# Patient Record
Sex: MALE | Race: WHITE | Employment: OTHER | RURAL
[De-identification: names, ages, dates, MRNs, and addresses within clinical notes are randomized per-mention and may not be internally consistent; named-entity substitution may affect disease eponyms.]

---

## 2017-01-03 ENCOUNTER — NURSE ONLY (OUTPATIENT)
Dept: FAMILY MEDICINE CLINIC | Age: 67
End: 2017-01-03
Payer: MEDICARE

## 2017-01-03 DIAGNOSIS — I48.91 ATRIAL FIBRILLATION, UNSPECIFIED TYPE (HCC): Primary | ICD-10-CM

## 2017-01-03 LAB
INTERNATIONAL NORMALIZATION RATIO, POC: 2.7
PROTHROMBIN TIME, POC: NORMAL

## 2017-01-03 PROCEDURE — 85610 PROTHROMBIN TIME: CPT | Performed by: FAMILY MEDICINE

## 2017-01-09 RX ORDER — FUROSEMIDE 40 MG/1
TABLET ORAL
Qty: 30 TABLET | Refills: 5 | Status: SHIPPED | OUTPATIENT
Start: 2017-01-09 | End: 2017-06-19 | Stop reason: SDUPTHER

## 2017-01-09 RX ORDER — FERROUS SULFATE 325(65) MG
TABLET ORAL
Qty: 60 TABLET | Refills: 5 | Status: SHIPPED | OUTPATIENT
Start: 2017-01-09 | End: 2017-06-19 | Stop reason: SDUPTHER

## 2017-01-12 ENCOUNTER — OFFICE VISIT (OUTPATIENT)
Dept: CARDIOLOGY | Facility: CLINIC | Age: 67
End: 2017-01-12

## 2017-01-12 VITALS
SYSTOLIC BLOOD PRESSURE: 120 MMHG | BODY MASS INDEX: 27.57 KG/M2 | DIASTOLIC BLOOD PRESSURE: 60 MMHG | HEART RATE: 69 BPM | WEIGHT: 208 LBS | HEIGHT: 73 IN

## 2017-01-12 DIAGNOSIS — I48.0 PAROXYSMAL ATRIAL FIBRILLATION (HCC): ICD-10-CM

## 2017-01-12 DIAGNOSIS — I25.10 CORONARY ARTERY DISEASE INVOLVING NATIVE CORONARY ARTERY OF NATIVE HEART WITHOUT ANGINA PECTORIS: Primary | ICD-10-CM

## 2017-01-12 DIAGNOSIS — I10 ESSENTIAL HYPERTENSION: ICD-10-CM

## 2017-01-12 PROCEDURE — 99213 OFFICE O/P EST LOW 20 MIN: CPT | Performed by: INTERNAL MEDICINE

## 2017-01-12 RX ORDER — FUROSEMIDE 40 MG/1
40 TABLET ORAL DAILY
COMMUNITY
End: 2019-07-19 | Stop reason: DRUGHIGH

## 2017-01-12 RX ORDER — WARFARIN SODIUM 1 MG/1
1 TABLET ORAL
COMMUNITY
End: 2018-01-25

## 2017-01-12 RX ORDER — FERROUS SULFATE 325(65) MG
325 TABLET ORAL 2 TIMES DAILY
COMMUNITY
End: 2023-01-01 | Stop reason: SDUPTHER

## 2017-01-12 RX ORDER — GLIPIZIDE 10 MG/1
10 TABLET ORAL 3 TIMES DAILY
COMMUNITY
End: 2022-01-01

## 2017-01-12 RX ORDER — METOPROLOL SUCCINATE 25 MG/1
12.5 TABLET, EXTENDED RELEASE ORAL DAILY
Status: ON HOLD | COMMUNITY

## 2017-01-12 RX ORDER — DIGOXIN 250 MCG
250 TABLET ORAL
COMMUNITY
End: 2019-07-19 | Stop reason: DRUGHIGH

## 2017-01-12 RX ORDER — LOVASTATIN 20 MG/1
20 TABLET ORAL NIGHTLY
COMMUNITY
End: 2022-01-01

## 2017-01-12 RX ORDER — AMLODIPINE BESYLATE AND BENAZEPRIL HYDROCHLORIDE 5; 20 MG/1; MG/1
1 CAPSULE ORAL 2 TIMES DAILY
COMMUNITY
End: 2020-08-13

## 2017-01-12 NOTE — PROGRESS NOTES
Subjective:     Encounter Date:01/12/2017      Patient ID: Swapnil Lawson is a 66 y.o. male.    Chief Complaint: Routine follow-up  HPI  This is a 66-year-old white male patient with known coronary artery disease who has undergone 2 separate coronary artery bypass surgeries, the first in 1993 and a redo in 2013.  The patient has no chest pain or shortness of breath.  He has no exertional chest arm neck jaw shoulder or back discomfort.  He has no orthopnea PND or lower extremity edema.  He has no dizziness palpitations or syncope.  The patient remains reasonably active without exertional limitation.  He is a nonsmoker.  He reports compliance with his medications.  There has been no changes to his past medical history, family history or social history since his last visit.  The following portions of the patient's history were reviewed and updated as appropriate: allergies, current medications, past family history, past medical history, past social history, past surgical history and problem  Review of Systems   Constitution: Negative for chills, diaphoresis, fever, malaise/fatigue, weight gain and weight loss.   HENT: Negative for ear discharge, hearing loss, hoarse voice and nosebleeds.    Eyes: Negative for discharge, double vision, pain and photophobia.   Cardiovascular: Negative for chest pain, claudication, cyanosis, dyspnea on exertion, irregular heartbeat, leg swelling, near-syncope, orthopnea, palpitations, paroxysmal nocturnal dyspnea and syncope.   Respiratory: Negative for cough, hemoptysis, sputum production and wheezing.    Endocrine: Negative for cold intolerance, heat intolerance, polydipsia, polyphagia and polyuria.   Hematologic/Lymphatic: Negative for adenopathy and bleeding problem. Does not bruise/bleed easily.   Skin: Negative for color change, flushing, itching and rash.   Musculoskeletal: Negative for muscle cramps, muscle weakness, myalgias and stiffness.   Gastrointestinal: Negative for  abdominal pain, diarrhea, hematemesis, hematochezia, nausea and vomiting.   Genitourinary: Negative for dysuria, frequency and nocturia.   Neurological: Negative for focal weakness, loss of balance, numbness, paresthesias and seizures.   Psychiatric/Behavioral: Negative for altered mental status, hallucinations and suicidal ideas.   Allergic/Immunologic: Negative for HIV exposure, hives and persistent infections.       Current Outpatient Prescriptions:   •  amLODIPine-benazepril (LOTREL 5-20) 5-20 MG per capsule, Take 1 capsule by mouth 2 (Two) Times a Day., Disp: , Rfl:   •  aspirin 81 MG tablet, Take 81 mg by mouth Daily., Disp: , Rfl:   •  digoxin (LANOXIN) 250 MCG tablet, Take 250 mcg by mouth Daily., Disp: , Rfl:   •  ferrous sulfate 325 (65 FE) MG tablet, Take 325 mg by mouth 2 (Two) Times a Day., Disp: , Rfl:   •  furosemide (LASIX) 40 MG tablet, Take 40 mg by mouth Daily., Disp: , Rfl:   •  glipiZIDE (GLUCOTROL) 10 MG tablet, Take 10 mg by mouth 2 (Two) Times a Day Before Meals., Disp: , Rfl:   •  lovastatin (MEVACOR) 20 MG tablet, Take 20 mg by mouth Every Night., Disp: , Rfl:   •  metFORMIN (GLUCOPHAGE) 1000 MG tablet, Take 1,000 mg by mouth 2 (Two) Times a Day With Meals., Disp: , Rfl:   •  metoprolol succinate XL (TOPROL-XL) 25 MG 24 hr tablet, Take 25 mg by mouth Daily., Disp: , Rfl:   •  warfarin (COUMADIN) 1 MG tablet, Take 1 mg by mouth Daily. As directed, Disp: , Rfl:      Objective:     Physical Exam   Constitutional: He is oriented to person, place, and time. He appears well-developed and well-nourished.   HENT:   Head: Normocephalic and atraumatic.   Eyes: Conjunctivae are normal. No scleral icterus.   Cardiovascular: Normal rate, regular rhythm, normal heart sounds and intact distal pulses.  Exam reveals no gallop and no friction rub.    No murmur heard.  Pulmonary/Chest: Effort normal and breath sounds normal. No respiratory distress.   Abdominal: Soft. Bowel sounds are normal. There is no  "tenderness.   Musculoskeletal: He exhibits no edema.   Neurological: He is alert and oriented to person, place, and time.   Skin: Skin is warm and dry.   Psychiatric: He has a normal mood and affect. His behavior is normal.     Blood pressure 120/60, pulse 69, height 73\" (185.4 cm), weight 208 lb (94.3 kg).   Lab Review:       Assessment:         1. Coronary artery disease involving native coronary artery of native heart without angina pectoris  Stable and angina free    2. Essential hypertension  Excellent blood pressure control    3. Paroxysmal atrial fibrillation  He appears to be maintaining normal sinus rhythm.  He is therapeutically anticoagulated with Coumadin.    Procedures     Plan:       No changes in his medication profile are indicated at this time.  No additional cardiovascular testing is indicated.  He will follow-up in our office in one year.         "

## 2017-02-03 ENCOUNTER — HOSPITAL ENCOUNTER (OUTPATIENT)
Dept: OTHER | Age: 67
Discharge: HOME | End: 2017-02-03
Attending: NURSE PRACTITIONER | Admitting: NURSE PRACTITIONER

## 2017-02-03 ENCOUNTER — OFFICE VISIT (OUTPATIENT)
Dept: FAMILY MEDICINE CLINIC | Age: 67
End: 2017-02-03
Payer: MEDICARE

## 2017-02-03 VITALS
DIASTOLIC BLOOD PRESSURE: 73 MMHG | OXYGEN SATURATION: 96 % | HEART RATE: 60 BPM | HEIGHT: 73 IN | BODY MASS INDEX: 27.04 KG/M2 | WEIGHT: 204 LBS | SYSTOLIC BLOOD PRESSURE: 137 MMHG

## 2017-02-03 DIAGNOSIS — E78.00 HYPERCHOLESTEROLEMIA: ICD-10-CM

## 2017-02-03 DIAGNOSIS — E11.9 TYPE 2 DIABETES MELLITUS WITHOUT COMPLICATION, WITHOUT LONG-TERM CURRENT USE OF INSULIN (HCC): ICD-10-CM

## 2017-02-03 DIAGNOSIS — I25.119 ATHEROSCLEROSIS OF NATIVE CORONARY ARTERY OF NATIVE HEART WITH ANGINA PECTORIS (HCC): ICD-10-CM

## 2017-02-03 DIAGNOSIS — I10 ESSENTIAL HYPERTENSION, BENIGN: ICD-10-CM

## 2017-02-03 DIAGNOSIS — I48.91 ATRIAL FIBRILLATION, UNSPECIFIED TYPE (HCC): Primary | ICD-10-CM

## 2017-02-03 LAB
HBA1C MFR BLD: 7.4 %
INTERNATIONAL NORMALIZATION RATIO, POC: 2.5
PROTHROMBIN TIME, POC: NORMAL

## 2017-02-03 PROCEDURE — 83036 HEMOGLOBIN GLYCOSYLATED A1C: CPT | Performed by: NURSE PRACTITIONER

## 2017-02-03 PROCEDURE — 99214 OFFICE O/P EST MOD 30 MIN: CPT | Performed by: NURSE PRACTITIONER

## 2017-02-03 PROCEDURE — 85610 PROTHROMBIN TIME: CPT | Performed by: NURSE PRACTITIONER

## 2017-02-03 RX ORDER — AMLODIPINE BESYLATE AND BENAZEPRIL HYDROCHLORIDE 5; 20 MG/1; MG/1
1 CAPSULE ORAL 2 TIMES DAILY
Qty: 60 CAPSULE | Refills: 5 | Status: SHIPPED | OUTPATIENT
Start: 2017-02-03 | End: 2017-08-04 | Stop reason: SDUPTHER

## 2017-02-03 RX ORDER — METOPROLOL SUCCINATE 25 MG/1
TABLET, EXTENDED RELEASE ORAL
Qty: 30 TABLET | Refills: 5 | Status: SHIPPED | OUTPATIENT
Start: 2017-02-03 | End: 2017-08-04 | Stop reason: SDUPTHER

## 2017-02-03 RX ORDER — LOVASTATIN 20 MG/1
20 TABLET ORAL DAILY
Qty: 30 TABLET | Refills: 5 | Status: SHIPPED | OUTPATIENT
Start: 2017-02-03 | End: 2017-08-04 | Stop reason: SDUPTHER

## 2017-02-03 ASSESSMENT — ENCOUNTER SYMPTOMS
SHORTNESS OF BREATH: 0
BLOOD IN STOOL: 0
ALLERGIC/IMMUNOLOGIC NEGATIVE: 1
GASTROINTESTINAL NEGATIVE: 1
EYES NEGATIVE: 1
RESPIRATORY NEGATIVE: 1

## 2017-02-04 LAB
CHOLESTEROL, TOTAL: 141 MG/DL
CHOLESTEROL/HDL RATIO: 2.4
CREATININE, URINE: 88.8
HDLC SERPL-MCNC: 28 MG/DL (ref 35–70)
LDL CHOLESTEROL CALCULATED: 68 MG/DL (ref 0–160)
MICROALBUMIN/CREAT 24H UR: 51.1 MG/G{CREAT}
MICROALBUMIN/CREAT UR-RTO: 57.5
TRIGL SERPL-MCNC: 225 MG/DL
VLDLC SERPL CALC-MCNC: 45 MG/DL

## 2017-02-06 DIAGNOSIS — E11.9 TYPE 2 DIABETES MELLITUS WITHOUT COMPLICATION, WITHOUT LONG-TERM CURRENT USE OF INSULIN (HCC): ICD-10-CM

## 2017-02-06 RX ORDER — GLIPIZIDE 10 MG/1
TABLET ORAL
Qty: 90 TABLET | Refills: 3 | Status: SHIPPED | OUTPATIENT
Start: 2017-02-06 | End: 2017-06-04 | Stop reason: SDUPTHER

## 2017-02-14 DIAGNOSIS — E11.9 TYPE 2 DIABETES MELLITUS WITHOUT COMPLICATION, WITHOUT LONG-TERM CURRENT USE OF INSULIN (HCC): ICD-10-CM

## 2017-02-14 DIAGNOSIS — E78.00 HYPERCHOLESTEROLEMIA: ICD-10-CM

## 2017-02-15 DIAGNOSIS — E78.00 HYPERCHOLESTEROLEMIA: ICD-10-CM

## 2017-02-15 DIAGNOSIS — I10 ESSENTIAL HYPERTENSION, BENIGN: ICD-10-CM

## 2017-03-03 ENCOUNTER — OFFICE VISIT (OUTPATIENT)
Dept: FAMILY MEDICINE CLINIC | Age: 67
End: 2017-03-03
Payer: MEDICARE

## 2017-03-03 VITALS
WEIGHT: 199.2 LBS | OXYGEN SATURATION: 94 % | SYSTOLIC BLOOD PRESSURE: 116 MMHG | HEIGHT: 73 IN | BODY MASS INDEX: 26.4 KG/M2 | DIASTOLIC BLOOD PRESSURE: 65 MMHG | HEART RATE: 66 BPM | RESPIRATION RATE: 18 BRPM

## 2017-03-03 DIAGNOSIS — I48.91 ATRIAL FIBRILLATION, UNSPECIFIED TYPE (HCC): Primary | ICD-10-CM

## 2017-03-03 LAB
INTERNATIONAL NORMALIZATION RATIO, POC: 2.3
PROTHROMBIN TIME, POC: NORMAL

## 2017-03-03 PROCEDURE — 85610 PROTHROMBIN TIME: CPT | Performed by: NURSE PRACTITIONER

## 2017-03-03 PROCEDURE — 99213 OFFICE O/P EST LOW 20 MIN: CPT | Performed by: NURSE PRACTITIONER

## 2017-03-03 ASSESSMENT — ENCOUNTER SYMPTOMS
GASTROINTESTINAL NEGATIVE: 1
EYES NEGATIVE: 1
RESPIRATORY NEGATIVE: 1
ALLERGIC/IMMUNOLOGIC NEGATIVE: 1

## 2017-03-06 RX ORDER — NITROGLYCERIN 0.4 MG/1
TABLET SUBLINGUAL
Qty: 25 TABLET | Refills: 5 | Status: SHIPPED | OUTPATIENT
Start: 2017-03-06

## 2017-04-03 ENCOUNTER — OFFICE VISIT (OUTPATIENT)
Dept: FAMILY MEDICINE CLINIC | Age: 67
End: 2017-04-03
Payer: MEDICARE

## 2017-04-03 VITALS
DIASTOLIC BLOOD PRESSURE: 70 MMHG | OXYGEN SATURATION: 96 % | WEIGHT: 203 LBS | BODY MASS INDEX: 26.9 KG/M2 | SYSTOLIC BLOOD PRESSURE: 121 MMHG | HEIGHT: 73 IN | RESPIRATION RATE: 18 BRPM | HEART RATE: 60 BPM

## 2017-04-03 DIAGNOSIS — I48.91 ATRIAL FIBRILLATION, UNSPECIFIED TYPE (HCC): Primary | ICD-10-CM

## 2017-04-03 LAB
INTERNATIONAL NORMALIZATION RATIO, POC: 2.4
PROTHROMBIN TIME, POC: NORMAL

## 2017-04-03 PROCEDURE — 99213 OFFICE O/P EST LOW 20 MIN: CPT | Performed by: NURSE PRACTITIONER

## 2017-04-03 PROCEDURE — 85610 PROTHROMBIN TIME: CPT | Performed by: NURSE PRACTITIONER

## 2017-04-03 ASSESSMENT — ENCOUNTER SYMPTOMS
GASTROINTESTINAL NEGATIVE: 1
ALLERGIC/IMMUNOLOGIC NEGATIVE: 1
EYES NEGATIVE: 1
RESPIRATORY NEGATIVE: 1
SHORTNESS OF BREATH: 0

## 2017-04-06 DIAGNOSIS — I48.20 CHRONIC ATRIAL FIBRILLATION (HCC): ICD-10-CM

## 2017-04-06 DIAGNOSIS — I25.119 ATHEROSCLEROSIS OF NATIVE CORONARY ARTERY OF NATIVE HEART WITH ANGINA PECTORIS (HCC): ICD-10-CM

## 2017-04-06 RX ORDER — DULOXETINE HYDROCHLORIDE 30 MG/1
CAPSULE, DELAYED RELEASE ORAL
Qty: 30 TABLET | Refills: 5 | Status: SHIPPED | OUTPATIENT
Start: 2017-04-06 | End: 2017-10-04 | Stop reason: SDUPTHER

## 2017-05-04 ENCOUNTER — OFFICE VISIT (OUTPATIENT)
Dept: FAMILY MEDICINE CLINIC | Age: 67
End: 2017-05-04
Payer: MEDICARE

## 2017-05-04 ENCOUNTER — HOSPITAL ENCOUNTER (OUTPATIENT)
Dept: OTHER | Age: 67
Discharge: HOME | End: 2017-05-04
Attending: NURSE PRACTITIONER | Admitting: NURSE PRACTITIONER

## 2017-05-04 VITALS
BODY MASS INDEX: 26.51 KG/M2 | RESPIRATION RATE: 18 BRPM | DIASTOLIC BLOOD PRESSURE: 63 MMHG | OXYGEN SATURATION: 96 % | HEART RATE: 62 BPM | WEIGHT: 200 LBS | SYSTOLIC BLOOD PRESSURE: 109 MMHG | HEIGHT: 73 IN

## 2017-05-04 DIAGNOSIS — I48.91 ATRIAL FIBRILLATION, UNSPECIFIED TYPE (HCC): Primary | ICD-10-CM

## 2017-05-04 DIAGNOSIS — I10 ESSENTIAL HYPERTENSION, BENIGN: ICD-10-CM

## 2017-05-04 DIAGNOSIS — M10.9 ACUTE GOUT, UNSPECIFIED CAUSE, UNSPECIFIED SITE: ICD-10-CM

## 2017-05-04 DIAGNOSIS — E11.9 TYPE 2 DIABETES MELLITUS WITHOUT COMPLICATION, WITHOUT LONG-TERM CURRENT USE OF INSULIN (HCC): ICD-10-CM

## 2017-05-04 DIAGNOSIS — E78.00 HYPERCHOLESTEROLEMIA: ICD-10-CM

## 2017-05-04 LAB
GLUCOSE BLD-MCNC: 116 MG/DL
HBA1C MFR BLD: 7.1 %
INTERNATIONAL NORMALIZATION RATIO, POC: 3.3
PROTHROMBIN TIME, POC: ABNORMAL

## 2017-05-04 PROCEDURE — 99214 OFFICE O/P EST MOD 30 MIN: CPT | Performed by: NURSE PRACTITIONER

## 2017-05-04 PROCEDURE — 83036 HEMOGLOBIN GLYCOSYLATED A1C: CPT | Performed by: NURSE PRACTITIONER

## 2017-05-04 PROCEDURE — 85610 PROTHROMBIN TIME: CPT | Performed by: NURSE PRACTITIONER

## 2017-05-04 PROCEDURE — 96372 THER/PROPH/DIAG INJ SC/IM: CPT | Performed by: NURSE PRACTITIONER

## 2017-05-04 PROCEDURE — 82962 GLUCOSE BLOOD TEST: CPT | Performed by: NURSE PRACTITIONER

## 2017-05-04 RX ORDER — METHYLPREDNISOLONE ACETATE 80 MG/ML
80 INJECTION, SUSPENSION INTRA-ARTICULAR; INTRALESIONAL; INTRAMUSCULAR; SOFT TISSUE ONCE
Status: COMPLETED | OUTPATIENT
Start: 2017-05-04 | End: 2017-05-04

## 2017-05-04 RX ORDER — COLCHICINE 0.6 MG/1
TABLET ORAL
Qty: 30 TABLET | Refills: 3 | Status: SHIPPED | OUTPATIENT
Start: 2017-05-04 | End: 2017-08-04 | Stop reason: ALTCHOICE

## 2017-05-04 RX ADMIN — METHYLPREDNISOLONE ACETATE 80 MG: 80 INJECTION, SUSPENSION INTRA-ARTICULAR; INTRALESIONAL; INTRAMUSCULAR; SOFT TISSUE at 09:54

## 2017-05-04 ASSESSMENT — ENCOUNTER SYMPTOMS
SHORTNESS OF BREATH: 0
ALLERGIC/IMMUNOLOGIC NEGATIVE: 1
RESPIRATORY NEGATIVE: 1
GASTROINTESTINAL NEGATIVE: 1
EYES NEGATIVE: 1

## 2017-05-09 LAB
CHOLESTEROL, TOTAL: 128 MG/DL
CHOLESTEROL/HDL RATIO: 2.1
HDLC SERPL-MCNC: 23 MG/DL (ref 35–70)
LDL CHOLESTEROL CALCULATED: 48 MG/DL (ref 0–160)
TRIGL SERPL-MCNC: 285 MG/DL
VLDLC SERPL CALC-MCNC: 57 MG/DL

## 2017-05-17 ENCOUNTER — HOSPITAL ENCOUNTER (OUTPATIENT)
Dept: OTHER | Age: 67
Discharge: HOME | End: 2017-05-17
Attending: FAMILY MEDICINE | Admitting: FAMILY MEDICINE

## 2017-05-17 ENCOUNTER — OFFICE VISIT (OUTPATIENT)
Dept: FAMILY MEDICINE CLINIC | Age: 67
End: 2017-05-17
Payer: MEDICARE

## 2017-05-17 VITALS — DIASTOLIC BLOOD PRESSURE: 70 MMHG | SYSTOLIC BLOOD PRESSURE: 115 MMHG | RESPIRATION RATE: 18 BRPM

## 2017-05-17 DIAGNOSIS — I48.91 ATRIAL FIBRILLATION, UNSPECIFIED TYPE (HCC): ICD-10-CM

## 2017-05-17 DIAGNOSIS — I82.4Z3 DEEP VEIN THROMBOSIS (DVT) OF DISTAL VEIN OF BOTH LOWER EXTREMITIES, UNSPECIFIED CHRONICITY (HCC): ICD-10-CM

## 2017-05-17 DIAGNOSIS — M1A.00X0 CHRONIC GOUTY ARTHROPATHY: Primary | ICD-10-CM

## 2017-05-17 DIAGNOSIS — M1A.00X0 CHRONIC GOUTY ARTHROPATHY: ICD-10-CM

## 2017-05-17 LAB
INTERNATIONAL NORMALIZATION RATIO, POC: 2.5
PROTHROMBIN TIME, POC: NORMAL

## 2017-05-17 PROCEDURE — 1123F ACP DISCUSS/DSCN MKR DOCD: CPT | Performed by: FAMILY MEDICINE

## 2017-05-17 PROCEDURE — 4040F PNEUMOC VAC/ADMIN/RCVD: CPT | Performed by: FAMILY MEDICINE

## 2017-05-17 PROCEDURE — 1036F TOBACCO NON-USER: CPT | Performed by: FAMILY MEDICINE

## 2017-05-17 PROCEDURE — G8419 CALC BMI OUT NRM PARAM NOF/U: HCPCS | Performed by: FAMILY MEDICINE

## 2017-05-17 PROCEDURE — G8427 DOCREV CUR MEDS BY ELIG CLIN: HCPCS | Performed by: FAMILY MEDICINE

## 2017-05-17 PROCEDURE — 85610 PROTHROMBIN TIME: CPT | Performed by: FAMILY MEDICINE

## 2017-05-17 PROCEDURE — 3017F COLORECTAL CA SCREEN DOC REV: CPT | Performed by: FAMILY MEDICINE

## 2017-05-17 PROCEDURE — G8598 ASA/ANTIPLAT THER USED: HCPCS | Performed by: FAMILY MEDICINE

## 2017-05-17 PROCEDURE — 99213 OFFICE O/P EST LOW 20 MIN: CPT | Performed by: FAMILY MEDICINE

## 2017-05-17 RX ORDER — ALLOPURINOL 100 MG/1
100 TABLET ORAL DAILY
Qty: 30 TABLET | Refills: 5 | Status: SHIPPED | OUTPATIENT
Start: 2017-05-17 | End: 2017-12-04 | Stop reason: SDUPTHER

## 2017-05-17 ASSESSMENT — ENCOUNTER SYMPTOMS
SHORTNESS OF BREATH: 0
GASTROINTESTINAL NEGATIVE: 1
ALLERGIC/IMMUNOLOGIC NEGATIVE: 1
RESPIRATORY NEGATIVE: 1
EYES NEGATIVE: 1

## 2017-05-23 ENCOUNTER — TELEPHONE (OUTPATIENT)
Dept: FAMILY MEDICINE CLINIC | Age: 67
End: 2017-05-23

## 2017-05-23 ENCOUNTER — HOSPITAL ENCOUNTER (OUTPATIENT)
Dept: OTHER | Age: 67
Discharge: HOME | End: 2017-05-23
Attending: NURSE PRACTITIONER | Admitting: NURSE PRACTITIONER

## 2017-05-23 DIAGNOSIS — I48.91 ATRIAL FIBRILLATION, UNSPECIFIED TYPE (HCC): Primary | ICD-10-CM

## 2017-05-23 DIAGNOSIS — I48.91 ATRIAL FIBRILLATION, UNSPECIFIED TYPE (HCC): ICD-10-CM

## 2017-05-23 LAB — DIGOXIN LEVEL: 2.1 NG/ML (ref 0.8–2)

## 2017-06-01 ENCOUNTER — OFFICE VISIT (OUTPATIENT)
Dept: FAMILY MEDICINE CLINIC | Age: 67
End: 2017-06-01
Payer: MEDICARE

## 2017-06-01 ENCOUNTER — HOSPITAL ENCOUNTER (OUTPATIENT)
Dept: OTHER | Age: 67
Discharge: HOME | End: 2017-06-01
Attending: NURSE PRACTITIONER | Admitting: NURSE PRACTITIONER

## 2017-06-01 VITALS
DIASTOLIC BLOOD PRESSURE: 60 MMHG | HEART RATE: 62 BPM | OXYGEN SATURATION: 98 % | RESPIRATION RATE: 18 BRPM | SYSTOLIC BLOOD PRESSURE: 99 MMHG

## 2017-06-01 DIAGNOSIS — E11.9 TYPE 2 DIABETES MELLITUS WITHOUT COMPLICATION, WITHOUT LONG-TERM CURRENT USE OF INSULIN (HCC): ICD-10-CM

## 2017-06-01 DIAGNOSIS — R79.89 ELEVATED SERUM CREATININE: Primary | ICD-10-CM

## 2017-06-01 DIAGNOSIS — E87.5 HYPERKALEMIA: ICD-10-CM

## 2017-06-01 DIAGNOSIS — R79.89 ELEVATED SERUM CREATININE: ICD-10-CM

## 2017-06-01 PROCEDURE — 99214 OFFICE O/P EST MOD 30 MIN: CPT | Performed by: NURSE PRACTITIONER

## 2017-06-01 RX ORDER — COLCHICINE 0.6 MG/1
TABLET ORAL
Qty: 30 TABLET | Refills: 3 | Status: CANCELLED | OUTPATIENT
Start: 2017-06-01

## 2017-06-01 ASSESSMENT — ENCOUNTER SYMPTOMS
ALLERGIC/IMMUNOLOGIC NEGATIVE: 1
RESPIRATORY NEGATIVE: 1
GASTROINTESTINAL NEGATIVE: 1
EYES NEGATIVE: 1

## 2017-06-19 ENCOUNTER — OFFICE VISIT (OUTPATIENT)
Dept: FAMILY MEDICINE CLINIC | Age: 67
End: 2017-06-19
Payer: MEDICARE

## 2017-06-19 ENCOUNTER — HOSPITAL ENCOUNTER (OUTPATIENT)
Dept: OTHER | Age: 67
Discharge: HOME | End: 2017-06-19
Attending: NURSE PRACTITIONER | Admitting: NURSE PRACTITIONER

## 2017-06-19 VITALS
OXYGEN SATURATION: 98 % | HEART RATE: 60 BPM | SYSTOLIC BLOOD PRESSURE: 128 MMHG | RESPIRATION RATE: 18 BRPM | DIASTOLIC BLOOD PRESSURE: 64 MMHG

## 2017-06-19 DIAGNOSIS — I82.4Z3 DEEP VEIN THROMBOSIS (DVT) OF DISTAL VEIN OF BOTH LOWER EXTREMITIES, UNSPECIFIED CHRONICITY (HCC): ICD-10-CM

## 2017-06-19 DIAGNOSIS — I48.91 ATRIAL FIBRILLATION, UNSPECIFIED TYPE (HCC): ICD-10-CM

## 2017-06-19 DIAGNOSIS — D50.9 IRON DEFICIENCY ANEMIA, UNSPECIFIED IRON DEFICIENCY ANEMIA TYPE: ICD-10-CM

## 2017-06-19 DIAGNOSIS — R79.89 ELEVATED SERUM CREATININE: ICD-10-CM

## 2017-06-19 DIAGNOSIS — L02.511 ABSCESS OF RIGHT INDEX FINGER: ICD-10-CM

## 2017-06-19 DIAGNOSIS — I10 ESSENTIAL HYPERTENSION, BENIGN: Primary | ICD-10-CM

## 2017-06-19 PROCEDURE — 85610 PROTHROMBIN TIME: CPT | Performed by: NURSE PRACTITIONER

## 2017-06-19 PROCEDURE — 99214 OFFICE O/P EST MOD 30 MIN: CPT | Performed by: NURSE PRACTITIONER

## 2017-06-19 RX ORDER — FERROUS SULFATE 325(65) MG
TABLET ORAL
Qty: 60 TABLET | Refills: 5 | Status: SHIPPED | OUTPATIENT
Start: 2017-06-19 | End: 2018-01-04 | Stop reason: SDUPTHER

## 2017-06-19 RX ORDER — SULFAMETHOXAZOLE AND TRIMETHOPRIM 800; 160 MG/1; MG/1
1 TABLET ORAL 2 TIMES DAILY
Qty: 20 TABLET | Refills: 0 | Status: SHIPPED | OUTPATIENT
Start: 2017-06-19 | End: 2017-06-29

## 2017-06-19 RX ORDER — FUROSEMIDE 40 MG/1
TABLET ORAL
Qty: 30 TABLET | Refills: 5 | Status: SHIPPED | OUTPATIENT
Start: 2017-06-19 | End: 2018-01-04 | Stop reason: SDUPTHER

## 2017-06-19 ASSESSMENT — ENCOUNTER SYMPTOMS
RESPIRATORY NEGATIVE: 1
EYES NEGATIVE: 1
SHORTNESS OF BREATH: 0
GASTROINTESTINAL NEGATIVE: 1
ALLERGIC/IMMUNOLOGIC NEGATIVE: 1

## 2017-06-20 LAB
INTERNATIONAL NORMALIZATION RATIO, POC: 2.4
PROTHROMBIN TIME, POC: NORMAL

## 2017-06-21 ENCOUNTER — HOSPITAL ENCOUNTER (OUTPATIENT)
Dept: OTHER | Age: 67
Discharge: HOME | End: 2017-06-21
Attending: NURSE PRACTITIONER | Admitting: NURSE PRACTITIONER

## 2017-06-21 DIAGNOSIS — L02.511 ABSCESS OF RIGHT INDEX FINGER: ICD-10-CM

## 2017-06-23 ENCOUNTER — HOSPITAL ENCOUNTER (OUTPATIENT)
Dept: OTHER | Age: 67
Discharge: HOME | End: 2017-06-23
Attending: NURSE PRACTITIONER | Admitting: NURSE PRACTITIONER

## 2017-06-23 ENCOUNTER — TELEPHONE (OUTPATIENT)
Dept: FAMILY MEDICINE CLINIC | Age: 67
End: 2017-06-23

## 2017-06-23 DIAGNOSIS — L03.019 CELLULITIS AND ABSCESS OF FINGER, UNSPECIFIED: ICD-10-CM

## 2017-06-23 DIAGNOSIS — M86.9 OSTEOMYELITIS OF FINGER OF RIGHT HAND (HCC): Primary | ICD-10-CM

## 2017-06-23 DIAGNOSIS — L02.519 CELLULITIS AND ABSCESS OF FINGER, UNSPECIFIED: ICD-10-CM

## 2017-06-23 DIAGNOSIS — L02.519 CELLULITIS AND ABSCESS OF FINGER, UNSPECIFIED: Primary | ICD-10-CM

## 2017-06-23 DIAGNOSIS — L03.019 CELLULITIS AND ABSCESS OF FINGER, UNSPECIFIED: Primary | ICD-10-CM

## 2017-06-26 ENCOUNTER — TELEPHONE (OUTPATIENT)
Dept: FAMILY MEDICINE CLINIC | Age: 67
End: 2017-06-26

## 2017-06-28 ENCOUNTER — TELEPHONE (OUTPATIENT)
Dept: FAMILY MEDICINE CLINIC | Age: 67
End: 2017-06-28

## 2017-07-06 ENCOUNTER — HOSPITAL ENCOUNTER (OUTPATIENT)
Dept: OTHER | Age: 67
Discharge: HOME | End: 2017-07-06
Attending: NURSE PRACTITIONER | Admitting: NURSE PRACTITIONER

## 2017-07-06 ENCOUNTER — OFFICE VISIT (OUTPATIENT)
Dept: FAMILY MEDICINE CLINIC | Age: 67
End: 2017-07-06
Payer: MEDICARE

## 2017-07-06 VITALS
RESPIRATION RATE: 18 BRPM | OXYGEN SATURATION: 97 % | HEART RATE: 57 BPM | DIASTOLIC BLOOD PRESSURE: 60 MMHG | SYSTOLIC BLOOD PRESSURE: 122 MMHG

## 2017-07-06 DIAGNOSIS — E87.5 HYPERKALEMIA: ICD-10-CM

## 2017-07-06 DIAGNOSIS — E87.5 HYPERKALEMIA: Primary | ICD-10-CM

## 2017-07-06 PROCEDURE — 99213 OFFICE O/P EST LOW 20 MIN: CPT | Performed by: NURSE PRACTITIONER

## 2017-07-06 ASSESSMENT — ENCOUNTER SYMPTOMS
GASTROINTESTINAL NEGATIVE: 1
RESPIRATORY NEGATIVE: 1
EYES NEGATIVE: 1
SHORTNESS OF BREATH: 0
ALLERGIC/IMMUNOLOGIC NEGATIVE: 1

## 2017-08-04 ENCOUNTER — HOSPITAL ENCOUNTER (OUTPATIENT)
Dept: OTHER | Age: 67
Discharge: HOME | End: 2017-08-04
Attending: NURSE PRACTITIONER | Admitting: NURSE PRACTITIONER

## 2017-08-04 ENCOUNTER — OFFICE VISIT (OUTPATIENT)
Dept: FAMILY MEDICINE CLINIC | Age: 67
End: 2017-08-04
Payer: MEDICARE

## 2017-08-04 VITALS
SYSTOLIC BLOOD PRESSURE: 110 MMHG | HEART RATE: 69 BPM | RESPIRATION RATE: 18 BRPM | OXYGEN SATURATION: 97 % | DIASTOLIC BLOOD PRESSURE: 62 MMHG

## 2017-08-04 DIAGNOSIS — I10 ESSENTIAL HYPERTENSION, BENIGN: ICD-10-CM

## 2017-08-04 DIAGNOSIS — I48.91 ATRIAL FIBRILLATION, UNSPECIFIED TYPE (HCC): Primary | ICD-10-CM

## 2017-08-04 DIAGNOSIS — E11.9 TYPE 2 DIABETES MELLITUS WITHOUT COMPLICATION, WITHOUT LONG-TERM CURRENT USE OF INSULIN (HCC): ICD-10-CM

## 2017-08-04 DIAGNOSIS — Z89.021 S/P SURGICAL AMPUTATION OF FINGER, RIGHT: ICD-10-CM

## 2017-08-04 DIAGNOSIS — E78.00 HYPERCHOLESTEROLEMIA: ICD-10-CM

## 2017-08-04 DIAGNOSIS — I25.119 ATHEROSCLEROSIS OF NATIVE CORONARY ARTERY OF NATIVE HEART WITH ANGINA PECTORIS (HCC): ICD-10-CM

## 2017-08-04 LAB
HBA1C MFR BLD: 6.7 %
INTERNATIONAL NORMALIZATION RATIO, POC: 1.9
PROTHROMBIN TIME, POC: ABNORMAL

## 2017-08-04 PROCEDURE — 99214 OFFICE O/P EST MOD 30 MIN: CPT | Performed by: NURSE PRACTITIONER

## 2017-08-04 PROCEDURE — 83036 HEMOGLOBIN GLYCOSYLATED A1C: CPT | Performed by: NURSE PRACTITIONER

## 2017-08-04 PROCEDURE — 85610 PROTHROMBIN TIME: CPT | Performed by: NURSE PRACTITIONER

## 2017-08-04 RX ORDER — METOPROLOL SUCCINATE 25 MG/1
TABLET, EXTENDED RELEASE ORAL
Qty: 30 TABLET | Refills: 5 | Status: SHIPPED | OUTPATIENT
Start: 2017-08-04 | End: 2022-04-18 | Stop reason: SDUPTHER

## 2017-08-04 RX ORDER — LOVASTATIN 20 MG/1
20 TABLET ORAL DAILY
Qty: 30 TABLET | Refills: 5 | Status: SHIPPED | OUTPATIENT
Start: 2017-08-04 | End: 2022-04-18 | Stop reason: SDUPTHER

## 2017-08-04 RX ORDER — AMLODIPINE BESYLATE AND BENAZEPRIL HYDROCHLORIDE 5; 20 MG/1; MG/1
1 CAPSULE ORAL 2 TIMES DAILY
Qty: 60 CAPSULE | Refills: 5 | Status: SHIPPED | OUTPATIENT
Start: 2017-08-04 | End: 2021-12-08

## 2017-08-04 ASSESSMENT — ENCOUNTER SYMPTOMS
EYES NEGATIVE: 1
VOMITING: 0
GASTROINTESTINAL NEGATIVE: 1
SHORTNESS OF BREATH: 0
NAUSEA: 0
ABDOMINAL PAIN: 0
COUGH: 0
RESPIRATORY NEGATIVE: 1
ALLERGIC/IMMUNOLOGIC NEGATIVE: 1
DIARRHEA: 0

## 2017-09-05 ENCOUNTER — OFFICE VISIT (OUTPATIENT)
Dept: FAMILY MEDICINE CLINIC | Age: 67
End: 2017-09-05
Payer: MEDICARE

## 2017-09-05 VITALS
RESPIRATION RATE: 18 BRPM | DIASTOLIC BLOOD PRESSURE: 72 MMHG | HEART RATE: 78 BPM | OXYGEN SATURATION: 96 % | SYSTOLIC BLOOD PRESSURE: 114 MMHG

## 2017-09-05 DIAGNOSIS — K59.00 CONSTIPATION, UNSPECIFIED CONSTIPATION TYPE: ICD-10-CM

## 2017-09-05 DIAGNOSIS — I48.91 ATRIAL FIBRILLATION, UNSPECIFIED TYPE (HCC): Primary | ICD-10-CM

## 2017-09-05 LAB
INTERNATIONAL NORMALIZATION RATIO, POC: 2.9
PROTHROMBIN TIME, POC: NORMAL

## 2017-09-05 PROCEDURE — 99214 OFFICE O/P EST MOD 30 MIN: CPT | Performed by: NURSE PRACTITIONER

## 2017-09-05 PROCEDURE — 85610 PROTHROMBIN TIME: CPT | Performed by: NURSE PRACTITIONER

## 2017-09-05 RX ORDER — DOCUSATE SODIUM 100 MG/1
100 CAPSULE, LIQUID FILLED ORAL 2 TIMES DAILY
Qty: 60 CAPSULE | Refills: 5 | Status: SHIPPED | OUTPATIENT
Start: 2017-09-05 | End: 2022-04-18 | Stop reason: SDUPTHER

## 2017-09-05 ASSESSMENT — ENCOUNTER SYMPTOMS
SHORTNESS OF BREATH: 0
DIARRHEA: 0
VOMITING: 0
BACK PAIN: 0
BLOATING: 1
NAUSEA: 0
CONSTIPATION: 1
FLATUS: 0
COUGH: 0
ALLERGIC/IMMUNOLOGIC NEGATIVE: 1
RESPIRATORY NEGATIVE: 1
HEMATOCHEZIA: 0
ABDOMINAL PAIN: 0
EYES NEGATIVE: 1
RECTAL PAIN: 0

## 2017-10-04 DIAGNOSIS — I48.20 CHRONIC ATRIAL FIBRILLATION (HCC): ICD-10-CM

## 2017-10-04 DIAGNOSIS — I25.119 ATHEROSCLEROSIS OF NATIVE CORONARY ARTERY OF NATIVE HEART WITH ANGINA PECTORIS (HCC): ICD-10-CM

## 2017-10-05 RX ORDER — DULOXETINE HYDROCHLORIDE 30 MG/1
CAPSULE, DELAYED RELEASE ORAL
Qty: 30 TABLET | Refills: 5 | Status: SHIPPED | OUTPATIENT
Start: 2017-10-05 | End: 2021-12-08

## 2017-10-06 ENCOUNTER — OFFICE VISIT (OUTPATIENT)
Dept: FAMILY MEDICINE CLINIC | Age: 67
End: 2017-10-06
Payer: MEDICARE

## 2017-10-06 VITALS
SYSTOLIC BLOOD PRESSURE: 118 MMHG | RESPIRATION RATE: 18 BRPM | DIASTOLIC BLOOD PRESSURE: 76 MMHG | HEART RATE: 56 BPM | OXYGEN SATURATION: 98 %

## 2017-10-06 DIAGNOSIS — I48.91 ATRIAL FIBRILLATION, UNSPECIFIED TYPE (HCC): Primary | ICD-10-CM

## 2017-10-06 DIAGNOSIS — K59.00 CONSTIPATION, UNSPECIFIED CONSTIPATION TYPE: ICD-10-CM

## 2017-10-06 DIAGNOSIS — Z23 NEED FOR INFLUENZA VACCINATION: ICD-10-CM

## 2017-10-06 LAB
INTERNATIONAL NORMALIZATION RATIO, POC: 3
PROTHROMBIN TIME, POC: NORMAL

## 2017-10-06 PROCEDURE — 85610 PROTHROMBIN TIME: CPT | Performed by: NURSE PRACTITIONER

## 2017-10-06 PROCEDURE — G0008 ADMIN INFLUENZA VIRUS VAC: HCPCS | Performed by: NURSE PRACTITIONER

## 2017-10-06 PROCEDURE — 90688 IIV4 VACCINE SPLT 0.5 ML IM: CPT | Performed by: NURSE PRACTITIONER

## 2017-10-06 PROCEDURE — 99213 OFFICE O/P EST LOW 20 MIN: CPT | Performed by: NURSE PRACTITIONER

## 2017-10-06 ASSESSMENT — ENCOUNTER SYMPTOMS
BLOATING: 0
ALLERGIC/IMMUNOLOGIC NEGATIVE: 1
CONSTIPATION: 0
HEMATOCHEZIA: 0
RESPIRATORY NEGATIVE: 1
EYES NEGATIVE: 1
NAUSEA: 0
ABDOMINAL PAIN: 0
DIARRHEA: 0
BACK PAIN: 0
FLATUS: 0
VOMITING: 0
RECTAL PAIN: 0

## 2017-10-06 NOTE — PROGRESS NOTES
SUBJECTIVE:    Olinda Prado is a 75 y.o. male    HPI Comments: Anticoagulation: Patient here for followup of chronic anticoagulation. Indication: atrial fibrillation  Bleeding Signs/Symptoms:  None  Thromboembolic Signs/Symptoms:  None    Missed Coumadin Doses:  None  Medication Changes:  no  Dietary Changes:  no  Bacterial/Viral Infection:  no    Other Concerns: Coumadin 5mg/5mg/7.5mg      Last visit patient was started on Colace 100mg BID for intermittent constipation. Pt reports constipation has resolved and he has not needed to take any laxatives. Pt reports he is feeling well without complaints today      Constipation   This is a new problem. The current episode started more than 1 month ago (last couple months). The problem has been resolved since onset. Stool frequency: every 3 days. The patient is on a high fiber diet. He does not exercise regularly. There has been adequate water intake. Pertinent negatives include no abdominal pain, anorexia, back pain, bloating, diarrhea, difficulty urinating, fecal incontinence, fever, flatus, hematochezia, hemorrhoids, melena, nausea, rectal pain, vomiting or weight loss. Risk factors include change in medication usage/dosage. He has tried stool softeners for the symptoms. The treatment provided significant (but causes loose stool) relief. Chief Complaint   Patient presents with    Office Visit for Anticoagulation Management        Review of Systems   Constitutional: Negative. Negative for fever and weight loss. HENT: Negative. Eyes: Negative. Respiratory: Negative. Cardiovascular: Negative. Gastrointestinal: Negative for abdominal pain, anorexia, bloating, constipation, diarrhea, flatus, hematochezia, hemorrhoids, melena, nausea, rectal pain and vomiting. Endocrine: Negative. Genitourinary: Negative. Negative for difficulty urinating. Musculoskeletal: Negative. Negative for back pain. Allergic/Immunologic: Negative. Neurological: Negative. Hematological: Negative. Does not bruise/bleed easily. Psychiatric/Behavioral: Negative. OBJECTIVE:    /76  Pulse 56  Resp 18  SpO2 98%   Physical Exam   Constitutional: He is oriented to person, place, and time. He appears well-developed and well-nourished. Neck: Carotid bruit is not present. No thyromegaly present. Cardiovascular: Normal rate, normal heart sounds and intact distal pulses. An irregularly irregular rhythm present. No murmur heard. Pulmonary/Chest: Effort normal and breath sounds normal.   Neurological: He is alert and oriented to person, place, and time. Skin: Skin is warm and dry. Psychiatric: He has a normal mood and affect. His behavior is normal.   Nursing note and vitals reviewed. ASSESSMENT/PLAN:   Stephanie Mendez was seen today for office visit for anticoagulation management. Diagnoses and all orders for this visit:    Atrial fibrillation, unspecified type ([address]  -     POCT INR 3.0  Continue 5mg/5mg/7.5mg- report any s/s of abnormal bleeding or bruising. Sunday is next 7.5mg dose. Pt instructed to take 5mg then resume regular cycle. Pt verbalized understanding. Constiption-resolved  Colace 100mg BID for constipation. Need for influenza vaccination  -     INFLUENZA, QUADV, 18 YRS AND OLDER, IM, MDV, 0.5ML (AFLURIA QUADV)Roshan was seen today for office visit for anticoagulation management. Return in about 1 month (around 11/6/2017).       Current Outpatient Prescriptions on File Prior to Visit   Medication Sig Dispense Refill    DIGITEK 250 MCG tablet take 1 tablet by mouth once daily 30 tablet 5    docusate sodium (COLACE) 100 MG capsule Take 1 capsule by mouth 2 times daily 60 capsule 5    lovastatin (MEVACOR) 20 MG tablet Take 1 tablet by mouth daily 30 tablet 5    amLODIPine-benazepril (LOTREL) 5-20 MG per capsule Take 1 capsule by mouth 2 times daily 60 capsule 5    metoprolol succinate (TOPROL XL) 25 MG

## 2017-10-18 DIAGNOSIS — E11.9 TYPE 2 DIABETES MELLITUS WITHOUT COMPLICATION, WITHOUT LONG-TERM CURRENT USE OF INSULIN (HCC): ICD-10-CM

## 2017-10-18 RX ORDER — GLIPIZIDE 10 MG/1
TABLET ORAL
Qty: 90 TABLET | Refills: 0 | OUTPATIENT
Start: 2017-10-18 | End: 2022-02-01 | Stop reason: SDUPTHER

## 2017-10-19 ENCOUNTER — NURSE ONLY (OUTPATIENT)
Dept: FAMILY MEDICINE CLINIC | Age: 67
End: 2017-10-19
Payer: MEDICARE

## 2017-10-19 DIAGNOSIS — Z23 NEED FOR PNEUMOCOCCAL VACCINATION: Primary | ICD-10-CM

## 2017-10-19 PROCEDURE — 90670 PCV13 VACCINE IM: CPT | Performed by: NURSE PRACTITIONER

## 2017-10-19 PROCEDURE — G0009 ADMIN PNEUMOCOCCAL VACCINE: HCPCS | Performed by: NURSE PRACTITIONER

## 2017-11-08 DIAGNOSIS — E11.319 DIABETIC RETINOPATHY ASSOCIATED WITH TYPE 2 DIABETES MELLITUS, MACULAR EDEMA PRESENCE UNSPECIFIED, UNSPECIFIED LATERALITY, UNSPECIFIED RETINOPATHY SEVERITY (HCC): Primary | ICD-10-CM

## 2017-11-08 DIAGNOSIS — E78.00 HYPERCHOLESTEROLEMIA: ICD-10-CM

## 2017-11-08 DIAGNOSIS — I25.119 ATHEROSCLEROSIS OF NATIVE CORONARY ARTERY OF NATIVE HEART WITH ANGINA PECTORIS (HCC): ICD-10-CM

## 2017-11-20 ENCOUNTER — HOSPITAL ENCOUNTER (OUTPATIENT)
Dept: GENERAL RADIOLOGY | Age: 67
Discharge: HOME | End: 2017-11-20
Attending: NURSE PRACTITIONER | Admitting: NURSE PRACTITIONER

## 2017-11-20 DIAGNOSIS — I25.119 ATHEROSCLEROSIS OF NATIVE CORONARY ARTERY OF NATIVE HEART WITH ANGINA PECTORIS (HCC): ICD-10-CM

## 2017-11-20 DIAGNOSIS — E78.00 HYPERCHOLESTEROLEMIA: ICD-10-CM

## 2017-11-20 DIAGNOSIS — E11.319 DIABETIC RETINOPATHY ASSOCIATED WITH TYPE 2 DIABETES MELLITUS, MACULAR EDEMA PRESENCE UNSPECIFIED, UNSPECIFIED LATERALITY, UNSPECIFIED RETINOPATHY SEVERITY (HCC): ICD-10-CM

## 2017-11-20 DIAGNOSIS — E11.319 TYPE 2 DIABETES MELLITUS WITH RETINOPATHY WITHOUT MACULAR EDEMA (HCC): ICD-10-CM

## 2017-12-04 RX ORDER — ALLOPURINOL 100 MG/1
100 TABLET ORAL DAILY
Qty: 30 TABLET | Refills: 5 | Status: SHIPPED | OUTPATIENT
Start: 2017-12-04 | End: 2022-01-05 | Stop reason: ALTCHOICE

## 2017-12-11 ENCOUNTER — HOSPITAL ENCOUNTER (OUTPATIENT)
Dept: OTHER | Age: 67
Discharge: HOME | End: 2017-12-11
Attending: NURSE PRACTITIONER | Admitting: NURSE PRACTITIONER

## 2017-12-20 DIAGNOSIS — E78.00 HYPERCHOLESTEROLEMIA: Primary | ICD-10-CM

## 2018-01-04 DIAGNOSIS — I10 ESSENTIAL HYPERTENSION, BENIGN: ICD-10-CM

## 2018-01-04 DIAGNOSIS — D50.9 IRON DEFICIENCY ANEMIA, UNSPECIFIED IRON DEFICIENCY ANEMIA TYPE: ICD-10-CM

## 2018-01-05 RX ORDER — FERROUS SULFATE 325(65) MG
TABLET ORAL
Qty: 60 TABLET | Refills: 5 | Status: SHIPPED | OUTPATIENT
Start: 2018-01-05 | End: 2022-04-18 | Stop reason: SDUPTHER

## 2018-01-05 RX ORDER — FUROSEMIDE 40 MG/1
TABLET ORAL
Qty: 30 TABLET | Refills: 5 | Status: SHIPPED | OUTPATIENT
Start: 2018-01-05

## 2018-01-11 ENCOUNTER — HOSPITAL ENCOUNTER (OUTPATIENT)
Dept: OTHER | Age: 68
Discharge: HOME | End: 2018-01-11
Attending: FAMILY MEDICINE | Admitting: FAMILY MEDICINE

## 2018-01-11 DIAGNOSIS — J44.9 CHRONIC OBSTRUCTIVE PULMONARY DISEASE, UNSPECIFIED COPD TYPE (HCC): ICD-10-CM

## 2018-01-11 DIAGNOSIS — R05.9 COUGH: ICD-10-CM

## 2018-01-25 ENCOUNTER — OFFICE VISIT (OUTPATIENT)
Dept: CARDIOLOGY | Facility: CLINIC | Age: 68
End: 2018-01-25

## 2018-01-25 VITALS
DIASTOLIC BLOOD PRESSURE: 60 MMHG | SYSTOLIC BLOOD PRESSURE: 138 MMHG | BODY MASS INDEX: 27.06 KG/M2 | HEIGHT: 73 IN | WEIGHT: 204.2 LBS | HEART RATE: 60 BPM

## 2018-01-25 DIAGNOSIS — I10 ESSENTIAL HYPERTENSION: ICD-10-CM

## 2018-01-25 DIAGNOSIS — I48.0 PAROXYSMAL ATRIAL FIBRILLATION (HCC): ICD-10-CM

## 2018-01-25 DIAGNOSIS — I87.2 VENOUS INSUFFICIENCY: ICD-10-CM

## 2018-01-25 DIAGNOSIS — E78.2 MIXED HYPERLIPIDEMIA: ICD-10-CM

## 2018-01-25 DIAGNOSIS — I25.10 CORONARY ARTERY DISEASE INVOLVING NATIVE CORONARY ARTERY OF NATIVE HEART WITHOUT ANGINA PECTORIS: Primary | ICD-10-CM

## 2018-01-25 PROCEDURE — 99214 OFFICE O/P EST MOD 30 MIN: CPT | Performed by: INTERNAL MEDICINE

## 2018-01-25 RX ORDER — WARFARIN SODIUM 5 MG/1
5 TABLET ORAL
COMMUNITY
End: 2018-01-25

## 2018-01-25 RX ORDER — ALLOPURINOL 100 MG/1
100 TABLET ORAL DAILY
COMMUNITY
End: 2019-03-28

## 2018-01-25 RX ORDER — WARFARIN SODIUM 5 MG/1
5 TABLET ORAL
Status: ON HOLD | COMMUNITY
End: 2022-01-01 | Stop reason: SDUPTHER

## 2018-01-25 NOTE — PROGRESS NOTES
[facility] at [facility]  Office visit  Swapnil Lawson  1950  [phone]    VISIT DATE:  01/25/2018    PCP: Shakir Mccarthy MD  [address] [place] [id number]    CC:  Chief Complaint   Patient presents with   • Atrial Fibrillation       PROBLEM LIST:  1. Coronary artery disease:  a. CABG, 1993, Abimael Tomlin MD.  b. CABG, August 2013, [facility].  2. Hypertension.  3. Paroxysmal atrial fibrillation.  4. Diabetes mellitus.   5. Venous insufficiency.   6.  Surgical history:  a.  Appendectomy, 1981.  b. Cholecystectomy, 2002.  c. Toe amputation, 2009.  d. Bone spur removal, 2010.  7. Previous history of myocardial infarction in 1992 and 1993 prior to CABG.  8. Hyperlipidemia.      ASSESSMENT:   Diagnosis Plan   1. Coronary artery disease involving native coronary artery of native heart without angina pectoris     2. Essential hypertension     3. Paroxysmal atrial fibrillation     4. Venous insufficiency     5. Mixed hyperlipidemia         PLAN:  Coronary artery disease: Currently stable and asymptomatic.  Continue aspirin, statin and afterload reduction    Hypertension: Goal less than 130/80 mmHg.  Controlled based on home blood pressure readings.  Continue current medications.    Peripheral edema: Consistent with venous insufficiency.  Recent worsening after upper respiratory tract infection.  Instructed patient to double up on his Lasix for one to 2 days until he gets back to baseline.  Transthoracic echo pending to evaluate underlying myocardial structure and function.    Hyperlipidemia: Goal LDL S100, ideally less than 70.  Continue statin.    [place] is more atrophic fibrillation: Currently asymptomatic.  Continue stroke prophylaxis with Coumadin, goal INR 2-3.  Continue rate control accommodation of digoxin and metoprolol.  Would check digoxin level at least every 6 months.    Subjective  67 Naseem with underlying COPD who had bronchitis approximately one month ago.   "Effectively treated and is now back to baseline.  He did notice some recent worsening of bilateral lower extremity edema which is gradually improving.  Now satting 96% on room air on his home oxygen meter.  Compliant with medical therapy.  Denies chest discomfort.  No palpitations.  Blood pressures at home tender run less than 130/80 mmHg.    PHYSICAL EXAMINATION:  Vitals:    01/25/18 0919   BP: 138/60   BP Location: Left arm   Patient Position: Sitting   Pulse: 60   Weight: 92.6 kg (204 lb 3.2 oz)   Height: 185.4 cm (73\")     General Appearance:    Alert, cooperative, no distress, appears stated age   Head:    Normocephalic, without obvious abnormality, atraumatic   Eyes:    conjunctiva/corneas clear   Nose:   Nares normal, septum midline, mucosa normal, no drainage   Throat:   Lips, teeth and gums normal   Neck:   Supple, symmetrical, trachea midline, no carotid    bruit or JVD   Lungs:     Minimal right basilar inspiratory rales, respirations unlabored   Chest Wall:    No tenderness or deformity    Heart:    Regular rate and rhythm, S1 and S2 normal, no murmur, rub   or gallop, normal carotid impulse bilaterally without bruit.   Abdomen:     Soft, non-tender   Extremities:   Extremities normal, atraumatic, no cyanosis, Trivial bilateral pretibial edema, compression stockings in place    Pulses:   2+ and symmetric all extremities   Skin:   Skin color, texture, turgor normal, no rashes or lesions       Diagnostic Data:  Procedures  No results found for: CHLPL, TRIG, HDL, LDLDIRECT  No results found for: GLUCOSE, BUN, CREATININE, NA, K, CL, CO2, CREATININE, BUN  No results found for: HGBA1C  No results found for: WBC, HGB, HCT, PLT    Allergies  No Known Allergies    Current Medications    Current Outpatient Prescriptions:   •  allopurinol (ZYLOPRIM) 100 MG tablet, Take 100 mg by mouth Daily., Disp: , Rfl:   •  amLODIPine-benazepril (LOTREL 5-20) 5-20 MG per capsule, Take 1 capsule by mouth 2 (Two) Times a Day., " Disp: , Rfl:   •  aspirin 81 MG tablet, Take 81 mg by mouth Daily., Disp: , Rfl:   •  digoxin (LANOXIN) 250 MCG tablet, Take 250 mcg by mouth Daily., Disp: , Rfl:   •  ferrous sulfate 325 (65 FE) MG tablet, Take 325 mg by mouth 2 (Two) Times a Day., Disp: , Rfl:   •  furosemide (LASIX) 40 MG tablet, Take 40 mg by mouth Daily., Disp: , Rfl:   •  glipiZIDE (GLUCOTROL) 10 MG tablet, Take 10 mg by mouth 2 (Two) Times a Day Before Meals. 2 tablets in the am and 1 tablet in the pm, Disp: , Rfl:   •  lovastatin (MEVACOR) 20 MG tablet, Take 20 mg by mouth Every Night., Disp: , Rfl:   •  metFORMIN (GLUCOPHAGE) 1000 MG tablet, Take 1,000 mg by mouth 2 (Two) Times a Day With Meals., Disp: , Rfl:   •  metoprolol succinate XL (TOPROL-XL) 25 MG 24 hr tablet, Take 25 mg by mouth Daily., Disp: , Rfl:   •  warfarin (COUMADIN) 5 MG tablet, Take 5 mg by mouth Daily. Take 5 mg as directed and 7.5 mg every third day, Disp: , Rfl:           ROS  Review of Systems   Cardiovascular: Positive for leg swelling. Negative for chest pain, dyspnea on exertion, irregular heartbeat and palpitations.   Respiratory: Negative for cough and shortness of breath.        SOCIAL HX  Social History     Social History   • Marital status:      Spouse name: N/A   • Number of children: N/A   • Years of education: N/A     Occupational History   • Not on file.     Social History Main Topics   • Smoking status: Former Smoker     Quit date: 1993   • Smokeless tobacco: Never Used   • Alcohol use No   • Drug use: No   • Sexual activity: Defer     Other Topics Concern   • Not on file     Social History Narrative       FAMILY HX  Family History   Problem Relation Age of Onset   • Heart attack Father              Naseem Campbell III, MD, FACC

## 2018-01-29 DIAGNOSIS — R06.00 DYSPNEA, UNSPECIFIED TYPE: Primary | ICD-10-CM

## 2018-01-30 ENCOUNTER — OUTSIDE FACILITY SERVICE (OUTPATIENT)
Dept: CARDIOLOGY | Facility: CLINIC | Age: 68
End: 2018-01-30

## 2018-01-30 ENCOUNTER — HOSPITAL ENCOUNTER (OUTPATIENT)
Dept: NON INVASIVE DIAGNOSTICS | Age: 68
Discharge: HOME | End: 2018-01-30
Attending: INTERNAL MEDICINE | Admitting: INTERNAL MEDICINE

## 2018-01-30 LAB
LV EF: 43 %
LVEF MODALITY: NORMAL

## 2018-01-30 PROCEDURE — 93306 TTE W/DOPPLER COMPLETE: CPT | Performed by: INTERNAL MEDICINE

## 2018-07-26 ENCOUNTER — OFFICE VISIT (OUTPATIENT)
Dept: CARDIOLOGY | Facility: CLINIC | Age: 68
End: 2018-07-26

## 2018-07-26 VITALS
OXYGEN SATURATION: 96 % | HEIGHT: 73 IN | BODY MASS INDEX: 25.71 KG/M2 | HEART RATE: 56 BPM | SYSTOLIC BLOOD PRESSURE: 128 MMHG | WEIGHT: 194 LBS | DIASTOLIC BLOOD PRESSURE: 56 MMHG

## 2018-07-26 DIAGNOSIS — I48.0 PAROXYSMAL ATRIAL FIBRILLATION (HCC): ICD-10-CM

## 2018-07-26 DIAGNOSIS — I25.10 CORONARY ARTERY DISEASE INVOLVING NATIVE CORONARY ARTERY OF NATIVE HEART WITHOUT ANGINA PECTORIS: Primary | ICD-10-CM

## 2018-07-26 DIAGNOSIS — E78.2 MIXED HYPERLIPIDEMIA: ICD-10-CM

## 2018-07-26 DIAGNOSIS — I10 ESSENTIAL HYPERTENSION: ICD-10-CM

## 2018-07-26 PROCEDURE — 99214 OFFICE O/P EST MOD 30 MIN: CPT | Performed by: INTERNAL MEDICINE

## 2018-07-26 NOTE — PROGRESS NOTES
[facility] at [facility]  Office visit  Swapnil Lawson  1950  [phone]    VISIT DATE:  01/25/2018    PCP: Shakir Mccarthy MD  [address] [place] [id number]    CC:  Chief Complaint   Patient presents with   • Atrial Fibrillation   • Coronary Artery Disease   • Hypertension       PROBLEM LIST:  1. Coronary artery disease:  a. CABG, 1993, Abimael Tomlin MD.  b. CABG, August 2013, [facility].  c. January 2018 echo - Ejection fraction is visually estimated to be 40-45 %.   mild concentric left ventricular hypertrophy.   Pseudonormal filling pattern noted.   Akinesis of the mid posterolateral, and basal to mid inferior walls   consistent with previous myocardial infarction.  2. Hypertension.  3. Paroxysmal atrial fibrillation.  4. Diabetes mellitus.   5. Venous insufficiency.   6.  Surgical history:  a.  Appendectomy, 1981.  b. Cholecystectomy, 2002.  c. Toe amputation, 2009.  d. Bone spur removal, 2010.  7. Previous history of myocardial infarction in 1992 and 1993 prior to CABG.  8. Hyperlipidemia.      ASSESSMENT:   Diagnosis Plan   1. Coronary artery disease involving native coronary artery of native heart without angina pectoris     2. Essential hypertension     3. Paroxysmal atrial fibrillation ([facility])     4. Mixed hyperlipidemia         PLAN:  Coronary artery disease: Currently stable and asymptomatic.  Continue aspirin, statin and afterload reduction    Hypertension: Goal less than 130/80 mmHg.  Controlled based on home blood pressure readings.  Continue current medications.    Peripheral edema: Consistent with venous insufficiency.  Currently well controlled, continue low-dose loop diuretic and compression stockings    Hyperlipidemia: Goal LDL <100, ideally less than 70.  Continue statin.    Paroxysmal fibrillation: Currently asymptomatic.  Continue stroke prophylaxis with Coumadin, goal INR 2-3.  Continue rate control accommodation of digoxin and metoprolol.  Would  "check digoxin level at least every 6 months with PCP, goal less than 1.    Subjective  Reports stable functional capacity. Denies chest pain, dyspnea on exertion. Intermittent brief fluttering sensations predominant noticed at night. Occur one to 2 times per week. No associated symptoms. No known triggers. Still working for the [employer] [employer].  Compliant with medical therapy.  Denies chest discomfort.  No palpitations.  Blood pressures at home tender run less than 130/80 mmHg.    PHYSICAL EXAMINATION:  Vitals:    07/26/18 0906   BP: 128/56   BP Location: Left arm   Patient Position: Sitting   Pulse: 56   SpO2: 96%   Weight: 88 kg (194 lb)   Height: 185.4 cm (73\")     General Appearance:    Alert, cooperative, no distress, appears stated age   Head:    Normocephalic, without obvious abnormality, atraumatic   Eyes:    conjunctiva/corneas clear   Nose:   Nares normal, septum midline, mucosa normal, no drainage   Throat:   Lips, teeth and gums normal   Neck:   Supple, symmetrical, trachea midline, no carotid    bruit or JVD   Lungs:     Minimal right basilar inspiratory rales, respirations unlabored   Chest Wall:    No tenderness or deformity    Heart:    Regular rate and rhythm, S1 and S2 normal, no murmur, rub   or gallop, normal carotid impulse bilaterally without bruit.   Abdomen:     Soft, non-tender   Extremities:   Extremities normal, atraumatic, no cyanosis, Trivial bilateral pretibial edema, compression stockings in place    Pulses:   2+ and symmetric all extremities   Skin:   Skin color, texture, turgor normal, no rashes or lesions       Diagnostic Data:  Procedures  No results found for: CHLPL, TRIG, HDL, LDLDIRECT  No results found for: GLUCOSE, BUN, CREATININE, NA, K, CL, CO2, CREATININE, BUN  No results found for: HGBA1C  No results found for: WBC, HGB, HCT, PLT    Allergies  No Known Allergies    Current Medications    Current Outpatient Prescriptions:   •  allopurinol (ZYLOPRIM) 100 MG tablet, " Take 100 mg by mouth Daily., Disp: , Rfl:   •  amLODIPine-benazepril (LOTREL 5-20) 5-20 MG per capsule, Take 1 capsule by mouth 2 (Two) Times a Day., Disp: , Rfl:   •  aspirin 81 MG tablet, Take 81 mg by mouth Daily., Disp: , Rfl:   •  digoxin (LANOXIN) 250 MCG tablet, Take 250 mcg by mouth Daily., Disp: , Rfl:   •  ferrous sulfate 325 (65 FE) MG tablet, Take 325 mg by mouth 2 (Two) Times a Day., Disp: , Rfl:   •  furosemide (LASIX) 40 MG tablet, Take 40 mg by mouth Daily., Disp: , Rfl:   •  glipiZIDE (GLUCOTROL) 10 MG tablet, Take 10 mg by mouth 2 (Two) Times a Day Before Meals. 2 tablets in the am and 1 tablet in the pm, Disp: , Rfl:   •  lovastatin (MEVACOR) 20 MG tablet, Take 20 mg by mouth Every Night., Disp: , Rfl:   •  metoprolol succinate XL (TOPROL-XL) 25 MG 24 hr tablet, Take 25 mg by mouth Daily., Disp: , Rfl:   •  warfarin (COUMADIN) 5 MG tablet, Take 5 mg by mouth Daily., Disp: , Rfl:           ROS  Review of Systems   Cardiovascular: Positive for irregular heartbeat. Negative for chest pain, dyspnea on exertion, leg swelling and palpitations.   Respiratory: Negative for cough and shortness of breath.        SOCIAL HX  Social History     Social History   • Marital status:      Spouse name: N/A   • Number of children: N/A   • Years of education: N/A     Occupational History   • Not on file.     Social History Main Topics   • Smoking status: Former Smoker     Types: Cigarettes     Quit date: 1993   • Smokeless tobacco: Never Used   • Alcohol use No   • Drug use: No   • Sexual activity: Defer     Other Topics Concern   • Not on file     Social History Narrative   • No narrative on file       FAMILY HX  Family History   Problem Relation Age of Onset   • COPD Mother    • Heart attack Father              Naseem Campbell III, MD, [facility]

## 2019-02-01 ENCOUNTER — TELEPHONE (OUTPATIENT)
Dept: CARDIOLOGY | Facility: CLINIC | Age: 69
End: 2019-02-01

## 2019-02-01 NOTE — TELEPHONE ENCOUNTER
Stated when to PCP on Monday and dig level was high and digoxin dose was decreased. Heart rate was in the 30's. Cannot sleep at night due to soa. Heart rate irregular  @59 bpm and having  palpitations.Instructed him to go to the nearest ED to be evaluated. He verbalized understanding.

## 2019-02-06 ENCOUNTER — APPOINTMENT (OUTPATIENT)
Dept: GENERAL RADIOLOGY | Facility: HOSPITAL | Age: 69
End: 2019-02-06
Payer: MEDICARE

## 2019-02-06 ENCOUNTER — APPOINTMENT (OUTPATIENT)
Dept: ULTRASOUND IMAGING | Facility: HOSPITAL | Age: 69
End: 2019-02-06
Payer: MEDICARE

## 2019-02-06 ENCOUNTER — HOSPITAL ENCOUNTER (EMERGENCY)
Facility: HOSPITAL | Age: 69
Discharge: HOME | End: 2019-02-06
Attending: EMERGENCY MEDICINE
Payer: MEDICARE

## 2019-02-06 VITALS
BODY MASS INDEX: 25.33 KG/M2 | RESPIRATION RATE: 16 BRPM | DIASTOLIC BLOOD PRESSURE: 63 MMHG | HEART RATE: 56 BPM | TEMPERATURE: 97.6 F | OXYGEN SATURATION: 94 % | WEIGHT: 192 LBS | SYSTOLIC BLOOD PRESSURE: 132 MMHG

## 2019-02-06 DIAGNOSIS — M79.89 LEG SWELLING: Primary | ICD-10-CM

## 2019-02-06 DIAGNOSIS — R60.9 PERIPHERAL EDEMA: ICD-10-CM

## 2019-02-06 LAB
A/G RATIO: 1.2 (ref 0.8–2)
ALBUMIN SERPL-MCNC: 3.8 G/DL (ref 3.4–4.8)
ALP BLD-CCNC: 61 U/L (ref 25–100)
ALT SERPL-CCNC: 9 U/L (ref 4–36)
ANION GAP SERPL CALCULATED.3IONS-SCNC: 14 MMOL/L (ref 3–16)
AST SERPL-CCNC: 12 U/L (ref 8–33)
BASOPHILS ABSOLUTE: 0 K/UL (ref 0–0.1)
BASOPHILS RELATIVE PERCENT: 0.3 %
BILIRUB SERPL-MCNC: 0.3 MG/DL (ref 0.3–1.2)
BILIRUBIN URINE: NEGATIVE
BLOOD, URINE: NEGATIVE
BUN BLDV-MCNC: 18 MG/DL (ref 6–20)
CALCIUM SERPL-MCNC: 9.3 MG/DL (ref 8.5–10.5)
CHLORIDE BLD-SCNC: 103 MMOL/L (ref 98–107)
CLARITY: CLEAR
CO2: 24 MMOL/L (ref 20–30)
COLOR: YELLOW
CREAT SERPL-MCNC: 1.3 MG/DL (ref 0.4–1.2)
EOSINOPHILS ABSOLUTE: 0.2 K/UL (ref 0–0.4)
EOSINOPHILS RELATIVE PERCENT: 3.3 %
GFR AFRICAN AMERICAN: >59
GFR NON-AFRICAN AMERICAN: 55
GLOBULIN: 3.3 G/DL
GLUCOSE BLD-MCNC: 149 MG/DL (ref 74–106)
GLUCOSE URINE: NEGATIVE MG/DL
HCT VFR BLD CALC: 38.7 % (ref 40–54)
HEMOGLOBIN: 12.3 G/DL (ref 13–18)
IMMATURE GRANULOCYTES #: 0.1 K/UL
IMMATURE GRANULOCYTES %: 1.3 % (ref 0–5)
INR BLD: 2 (ref 0.94–1.06)
KETONES, URINE: NEGATIVE MG/DL
LEUKOCYTE ESTERASE, URINE: NEGATIVE
LYMPHOCYTES ABSOLUTE: 0.8 K/UL (ref 1.5–4)
LYMPHOCYTES RELATIVE PERCENT: 13 %
MCH RBC QN AUTO: 29.8 PG (ref 27–32)
MCHC RBC AUTO-ENTMCNC: 31.8 G/DL (ref 31–35)
MCV RBC AUTO: 93.7 FL (ref 80–100)
MICROSCOPIC EXAMINATION: NORMAL
MONOCYTES ABSOLUTE: 0.6 K/UL (ref 0.2–0.8)
MONOCYTES RELATIVE PERCENT: 9.3 %
NEUTROPHILS ABSOLUTE: 4.4 K/UL (ref 2–7.5)
NEUTROPHILS RELATIVE PERCENT: 72.8 %
NITRITE, URINE: NEGATIVE
PDW BLD-RTO: 14.2 % (ref 11–16)
PH UA: 5.5
PLATELET # BLD: 120 K/UL (ref 150–400)
PMV BLD AUTO: 10.4 FL (ref 6–10)
POTASSIUM REFLEX MAGNESIUM: 4.3 MMOL/L (ref 3.4–5.1)
PRO-BNP: 2356 PG/ML (ref 0–1800)
PROTEIN UA: NEGATIVE MG/DL
PROTHROMBIN TIME: 19.2 SEC (ref 9.4–10.6)
RBC # BLD: 4.13 M/UL (ref 4.5–6)
SODIUM BLD-SCNC: 141 MMOL/L (ref 136–145)
SPECIFIC GRAVITY UA: 1.01
TOTAL PROTEIN: 7.1 G/DL (ref 6.4–8.3)
TROPONIN: <0.3 NG/ML
URINE REFLEX TO CULTURE: NORMAL
URINE TYPE: NORMAL
UROBILINOGEN, URINE: 0.2 E.U./DL
WBC # BLD: 6 K/UL (ref 4–11)

## 2019-02-06 PROCEDURE — 81003 URINALYSIS AUTO W/O SCOPE: CPT

## 2019-02-06 PROCEDURE — 84484 ASSAY OF TROPONIN QUANT: CPT

## 2019-02-06 PROCEDURE — 71045 X-RAY EXAM CHEST 1 VIEW: CPT

## 2019-02-06 PROCEDURE — 83880 ASSAY OF NATRIURETIC PEPTIDE: CPT

## 2019-02-06 PROCEDURE — 80053 COMPREHEN METABOLIC PANEL: CPT

## 2019-02-06 PROCEDURE — 99285 EMERGENCY DEPT VISIT HI MDM: CPT

## 2019-02-06 PROCEDURE — 36415 COLL VENOUS BLD VENIPUNCTURE: CPT

## 2019-02-06 PROCEDURE — 96374 THER/PROPH/DIAG INJ IV PUSH: CPT

## 2019-02-06 PROCEDURE — 6360000002 HC RX W HCPCS: Performed by: EMERGENCY MEDICINE

## 2019-02-06 PROCEDURE — 93971 EXTREMITY STUDY: CPT

## 2019-02-06 PROCEDURE — 85610 PROTHROMBIN TIME: CPT

## 2019-02-06 PROCEDURE — 93005 ELECTROCARDIOGRAM TRACING: CPT

## 2019-02-06 PROCEDURE — 85025 COMPLETE CBC W/AUTO DIFF WBC: CPT

## 2019-02-06 RX ORDER — FUROSEMIDE 10 MG/ML
40 INJECTION INTRAMUSCULAR; INTRAVENOUS ONCE
Status: COMPLETED | OUTPATIENT
Start: 2019-02-06 | End: 2019-02-06

## 2019-02-06 RX ADMIN — FUROSEMIDE 40 MG: 10 INJECTION, SOLUTION INTRAMUSCULAR; INTRAVENOUS at 08:30

## 2019-03-25 ENCOUNTER — TELEPHONE (OUTPATIENT)
Dept: CARDIOLOGY | Facility: CLINIC | Age: 69
End: 2019-03-25

## 2019-03-25 NOTE — TELEPHONE ENCOUNTER
Requesting f/u appt be moved up. Went to PCP today for leg swelling and soa. States they increased his lasix dosage and requested he f/u with Cardiologist. Legs have been selling for the past 3 weeks. Went to ED last month and was admitted for 5 days at [facility]. B/P has been averaging @ 108/55 HR 50's.Discussed with  in clinic. Appt in [place] this Thursday 3/28/19 at 10:45. He verbalized understanding of appt. Records requested.

## 2019-03-28 ENCOUNTER — OFFICE VISIT (OUTPATIENT)
Dept: CARDIOLOGY | Facility: CLINIC | Age: 69
End: 2019-03-28

## 2019-03-28 VITALS
DIASTOLIC BLOOD PRESSURE: 46 MMHG | OXYGEN SATURATION: 94 % | BODY MASS INDEX: 25.66 KG/M2 | HEART RATE: 64 BPM | WEIGHT: 193.6 LBS | HEIGHT: 73 IN | SYSTOLIC BLOOD PRESSURE: 120 MMHG

## 2019-03-28 DIAGNOSIS — I48.0 PAROXYSMAL ATRIAL FIBRILLATION (HCC): ICD-10-CM

## 2019-03-28 DIAGNOSIS — E78.2 MIXED HYPERLIPIDEMIA: ICD-10-CM

## 2019-03-28 DIAGNOSIS — I25.10 CORONARY ARTERY DISEASE INVOLVING NATIVE CORONARY ARTERY OF NATIVE HEART WITHOUT ANGINA PECTORIS: Primary | ICD-10-CM

## 2019-03-28 DIAGNOSIS — I10 ESSENTIAL HYPERTENSION: ICD-10-CM

## 2019-03-28 PROCEDURE — 99214 OFFICE O/P EST MOD 30 MIN: CPT | Performed by: INTERNAL MEDICINE

## 2019-03-28 RX ORDER — METOLAZONE 5 MG/1
5 TABLET ORAL DAILY
Refills: 0 | COMMUNITY
Start: 2019-03-12 | End: 2019-07-11

## 2019-03-28 RX ORDER — SPIRONOLACTONE 25 MG/1
25 TABLET ORAL DAILY
Refills: 0 | COMMUNITY
Start: 2019-03-25 | End: 2020-07-09 | Stop reason: HOSPADM

## 2019-03-28 RX ORDER — ALLOPURINOL 300 MG/1
300 TABLET ORAL DAILY
Refills: 0 | COMMUNITY
Start: 2019-01-15 | End: 2023-01-01

## 2019-03-28 RX ORDER — DOCUSATE SODIUM 100 MG/1
100 CAPSULE, LIQUID FILLED ORAL 2 TIMES DAILY
Refills: 0 | COMMUNITY
Start: 2019-03-25 | End: 2020-07-09 | Stop reason: HOSPADM

## 2019-03-28 NOTE — PROGRESS NOTES
[facility] at [facility]  Office visit  Swapnil Lawson  1950  [phone]    VISIT DATE:  01/25/2018    PCP: Shakir Mccarthy MD  [address] [place] [id number]    CC:  Chief Complaint   Patient presents with   • Atrial Fibrillation   • Shortness of Breath       PROBLEM LIST:  1. Coronary artery disease:  a. CABG, 1993, Abimael Tomlin MD.  b. CABG, August 2013, [facility].  c. January 2018 echo - Ejection fraction is visually estimated to be 40-45 %.   mild concentric left ventricular hypertrophy.   Pseudonormal filling pattern noted.   Akinesis of the mid posterolateral, and basal to mid inferior walls   consistent with previous myocardial infarction.  2. Hypertension.  3. Paroxysmal atrial fibrillation.  4. Diabetes mellitus.   5. Venous insufficiency.   6.  Surgical history:  a.  Appendectomy, 1981.  b. Cholecystectomy, 2002.  c. Toe amputation, 2009.  d. Bone spur removal, 2010.  7. Previous history of myocardial infarction in 1992 and 1993 prior to CABG.  8. Hyperlipidemia.      ASSESSMENT:   Diagnosis Plan   1. Coronary artery disease involving native coronary artery of native heart without angina pectoris     2. Essential hypertension     3. Paroxysmal atrial fibrillation (CMS/[facility])     4. Mixed hyperlipidemia         PLAN:  Congestive heart failure, diastolic, chronic: Agree with recent addition of Aldactone.  Will need to follow renal function electrolytes and digoxin level closely.  Continue current dose of loop diuretic.  We will follow-up in 3 months.  Cannot exclude underlying pericardial disease as etiology for his symptoms, will continue to trend closely and consider further evaluation if symptoms do not respond to medical therapy.    Coronary artery disease: Currently stable and asymptomatic.  Continue aspirin, statin and afterload reduction    Hypertension: Goal less than 130/80 mmHg.  Controlled based on home blood pressure readings.  Continue current  "medications.    Peripheral edema: Consistent with venous insufficiency.  Currently well controlled, continue low-dose loop diuretic and compression stockings    Hyperlipidemia: Goal LDL <100, ideally less than 70.  Continue statin.    Persistent atrial fibrillation: Currently asymptomatic.  Continue stroke prophylaxis with Coumadin, goal INR 2-3.  Continue rate control accommodation of digoxin and metoprolol.  Would check digoxin level at least every 6 months with PCP, goal less than 1.,  May need to down titrate digoxin until 125 mcg p.o. daily in the near future.    Subjective  Recently admitted at [facility] for worsening shortness of breath and lower extremity, treated for COPD exacerbation, CHF exacerbation.  Was complicated by acute on chronic kidney injury, repeat lab work reveals that his kidney function is back to baseline, current creatinine 1.3 with a GFR of 57.  Digoxin level during that admission of 1.28.  He is not anemic.  Twelve-lead EKGs revealed rate controlled atrial fibrillation.  Transthoracic echo dated February 22, 2019 revealed an EF of 50%, moderately dilated left ventricle, moderately dilated right ventricle with normal function, moderate to severe left atrial enlargement, dilated IVC.  Aldactone has been added to his regimen but he has not started taking it yet.  He feels like functionally is back to his baseline but has some mild increase in his baseline lower extremity edema, wearing compression stockings.  Does have some persistent orthopnea.    PHYSICAL EXAMINATION:  Vitals:    03/28/19 0926   BP: 120/46   BP Location: Left arm   Patient Position: Sitting   Pulse: 64   SpO2: 94%   Weight: 87.8 kg (193 lb 9.6 oz)   Height: 185.4 cm (73\")     General Appearance:    Alert, cooperative, no distress, appears stated age   Head:    Normocephalic, without obvious abnormality, atraumatic   Eyes:    conjunctiva/corneas clear   Nose:   Nares normal, septum midline, mucosa " normal, no drainage   Throat:   Lips, teeth and gums normal   Neck:   Supple, symmetrical, trachea midline, no carotid    bruit or JVD   Lungs:     Minimal right basilar inspiratory rales, respirations unlabored   Chest Wall:    No tenderness or deformity    Heart:   Irregularly irregular, S1 and S2 normal, no murmur, rub   or gallop, normal carotid impulse bilaterally without bruit.   Abdomen:     Soft, non-tender   Extremities:   Extremities normal, atraumatic, no cyanosis, 1+ bilateral pretibial edema, compression stockings in place    Pulses:   2+ and symmetric all extremities   Skin:   Skin color, texture, turgor normal, no rashes or lesions       Diagnostic Data:  Procedures  No results found for: CHLPL, TRIG, HDL, LDLDIRECT  No results found for: GLUCOSE, BUN, CREATININE, NA, K, CL, CO2, CREATININE, BUN  No results found for: HGBA1C  No results found for: WBC, HGB, HCT, PLT    Allergies  No Known Allergies    Current Medications    Current Outpatient Medications:   •  allopurinol (ZYLOPRIM) 300 MG tablet, Take 300 mg by mouth Daily., Disp: , Rfl: 0  •  amLODIPine-benazepril (LOTREL 5-20) 5-20 MG per capsule, Take 1 capsule by mouth 2 (Two) Times a Day., Disp: , Rfl:   •  aspirin 81 MG tablet, Take 81 mg by mouth Daily., Disp: , Rfl:   •  digoxin (LANOXIN) 250 MCG tablet, Take 250 mcg by mouth Daily., Disp: , Rfl:   •  ferrous sulfate 325 (65 FE) MG tablet, Take 325 mg by mouth 2 (Two) Times a Day., Disp: , Rfl:   •  furosemide (LASIX) 40 MG tablet, Take 40 mg by mouth Daily., Disp: , Rfl:   •  glipiZIDE (GLUCOTROL) 10 MG tablet, Take 10 mg by mouth 2 (Two) Times a Day., Disp: , Rfl:   •  ipratropium (ATROVENT) 0.02 % nebulizer solution, Take 0.5 mg by nebulization Every 4 (Four) Hours As Needed., Disp: , Rfl:   •  lovastatin (MEVACOR) 20 MG tablet, Take 20 mg by mouth Every Night., Disp: , Rfl:   •  metFORMIN (GLUCOPHAGE) 1000 MG tablet, Take 1 tablet by mouth 2 (Two) Times a Day., Disp: , Rfl: 0  •   metOLazone (ZAROXOLYN) 5 MG tablet, Take 5 mg by mouth Daily., Disp: , Rfl: 0  •  metoprolol succinate XL (TOPROL-XL) 25 MG 24 hr tablet, Take 25 mg by mouth Daily., Disp: , Rfl:   •  O2 (OXYGEN), Inhale 2 L/min Daily., Disp: , Rfl:   •  RA COL-RITE 100 MG capsule, Take 100 mg by mouth 2 (Two) Times a Day., Disp: , Rfl: 0  •  spironolactone (ALDACTONE) 25 MG tablet, Take 25 mg by mouth Daily., Disp: , Rfl: 0  •  Unable to find, 1 each Every Night. Med Name: C PAP MACHINE (Pt said he doesn't use it because it is hard for him to sleep), Disp: , Rfl:   •  warfarin (COUMADIN) 5 MG tablet, Take 5 mg by mouth Daily., Disp: , Rfl:           ROS  Review of Systems   Cardiovascular: Positive for irregular heartbeat and leg swelling. Negative for chest pain, dyspnea on exertion and palpitations.   Respiratory: Negative for cough and shortness of breath.        SOCIAL HX  Social History     Socioeconomic History   • Marital status:      Spouse name: Not on file   • Number of children: Not on file   • Years of education: Not on file   • Highest education level: Not on file   Tobacco Use   • Smoking status: Former Smoker     Packs/day: 1.00     Types: Cigarettes     Last attempt to quit:      Years since quittin.2   • Smokeless tobacco: Never Used   Substance and Sexual Activity   • Alcohol use: No   • Drug use: No   • Sexual activity: Defer       FAMILY HX  Family History   Problem Relation Age of Onset   • COPD Mother    • Heart attack Father              Naseem Campbell III, MD, FACC

## 2019-07-11 ENCOUNTER — OFFICE VISIT (OUTPATIENT)
Dept: CARDIOLOGY | Facility: CLINIC | Age: 69
End: 2019-07-11

## 2019-07-11 VITALS
HEIGHT: 73 IN | OXYGEN SATURATION: 98 % | DIASTOLIC BLOOD PRESSURE: 60 MMHG | HEART RATE: 61 BPM | BODY MASS INDEX: 24.73 KG/M2 | SYSTOLIC BLOOD PRESSURE: 132 MMHG | WEIGHT: 186.6 LBS

## 2019-07-11 DIAGNOSIS — E78.2 MIXED HYPERLIPIDEMIA: ICD-10-CM

## 2019-07-11 DIAGNOSIS — I10 ESSENTIAL HYPERTENSION: ICD-10-CM

## 2019-07-11 DIAGNOSIS — I25.10 CORONARY ARTERY DISEASE INVOLVING NATIVE CORONARY ARTERY OF NATIVE HEART WITHOUT ANGINA PECTORIS: Primary | ICD-10-CM

## 2019-07-11 DIAGNOSIS — I48.0 PAROXYSMAL ATRIAL FIBRILLATION (HCC): ICD-10-CM

## 2019-07-11 PROCEDURE — 99214 OFFICE O/P EST MOD 30 MIN: CPT | Performed by: INTERNAL MEDICINE

## 2019-07-11 RX ORDER — COLCHICINE 0.6 MG/1
0.6 TABLET ORAL AS NEEDED
COMMUNITY
End: 2023-01-01 | Stop reason: SDUPTHER

## 2019-07-11 RX ORDER — NITROGLYCERIN 0.4 MG/1
0.4 TABLET SUBLINGUAL
COMMUNITY

## 2019-07-11 RX ORDER — DIPHENHYDRAMINE HCL 25 MG
25 TABLET ORAL
COMMUNITY

## 2019-07-11 NOTE — PROGRESS NOTES
[facility] at [facility]  Office visit  Swpanil Lawson  1950  [phone]    VISIT DATE:  7/11/2019      PCP: Shakir Mccarthy MD  [address] [place] [id number]    CC:  Chief Complaint   Patient presents with   • Atrial Fibrillation   • Coronary Artery Disease       PROBLEM LIST:  1. Coronary artery disease:  a. CABG, 1993, Abimael Tomlin MD.  b. CABG, August 2013, [facility].  c. January 2018 echo - Ejection fraction is visually estimated to be 40-45 %.   mild concentric left ventricular hypertrophy.   Pseudonormal filling pattern noted.   Akinesis of the mid posterolateral, and basal to mid inferior walls   consistent with previous myocardial infarction.  2. Hypertension.  3. Paroxysmal atrial fibrillation.  4. Diabetes mellitus.   5. Venous insufficiency.   6.  Surgical history:  a.  Appendectomy, 1981.  b. Cholecystectomy, 2002.  c. Toe amputation, 2009.  d. Bone spur removal, 2010.  7. Previous history of myocardial infarction in 1992 and 1993 prior to CABG.  8. Hyperlipidemia.      Cardiac studies and procedures:  February 2019:Transthoracic echo - EF of 50%, moderately dilated left ventricle, moderately dilated right ventricle with normal function, moderate to severe left atrial enlargement, dilated IVC    ASSESSMENT:   Diagnosis Plan   1. Coronary artery disease involving native coronary artery of native heart without angina pectoris     2. Essential hypertension     3. Paroxysmal atrial fibrillation (CMS/[facility])     4. Mixed hyperlipidemia         PLAN:  Congestive heart failure, diastolic, chronic: Currently euvolemic and compensated.  Continue current medical therapy.    Coronary artery disease: Currently stable and asymptomatic.  Continue aspirin, statin and afterload reduction    Hypertension: Goal less than 130/80 mmHg.  Controlled based on home blood pressure readings.  Continue current medications.    Peripheral edema: Consistent with venous insufficiency.   "Currently well controlled, continue low-dose loop diuretic and compression stockings    Hyperlipidemia: Goal LDL <100, ideally less than 70.  Continue statin.    Persistent atrial fibrillation: Currently asymptomatic.  Continue stroke prophylaxis with Coumadin, goal INR 2-3.  Continue rate control accommodation of digoxin and metoprolol.  Digoxin level pending next week, goal less than 1.    Subjective  Has been doing very well without limitations.  Lower extremity edema is well controlled on current medical therapy.  Blood pressures are running less than 125/80 mmHg.  Compliant with compression stockings.  Denies chest pain, limiting dyspnea on exertion or palpitations.  Has upcoming routine lab work at primary care physician's office next week.    PHYSICAL EXAMINATION:  Vitals:    07/11/19 1058   BP: 132/60   BP Location: Left arm   Patient Position: Sitting   Pulse: 61   SpO2: 98%   Weight: 84.6 kg (186 lb 9.6 oz)   Height: 185.4 cm (73\")     General Appearance:    Alert, cooperative, no distress, appears stated age   Head:    Normocephalic, without obvious abnormality, atraumatic   Eyes:    conjunctiva/corneas clear   Nose:   Nares normal, septum midline, mucosa normal, no drainage   Throat:   Lips, teeth and gums normal   Neck:   Supple, symmetrical, trachea midline, no carotid    bruit or JVD   Lungs:     Minimal right basilar inspiratory rales, respirations unlabored   Chest Wall:    No tenderness or deformity    Heart:   Irregularly irregular, S1 and S2 normal, no murmur, rub   or gallop, normal carotid impulse bilaterally without bruit.   Abdomen:     Soft, non-tender   Extremities:   Extremities normal, atraumatic, no cyanosis, trivial bilateral pretibial edema, compression stockings in place    Pulses:   2+ and symmetric all extremities   Skin:   Skin color, texture, turgor normal, no rashes or lesions       Diagnostic Data:  Procedures  No results found for: CHLPL, TRIG, HDL, LDLDIRECT  No results found " for: GLUCOSE, BUN, CREATININE, NA, K, CL, CO2, CREATININE, BUN  No results found for: HGBA1C  No results found for: WBC, HGB, HCT, PLT    Allergies  No Known Allergies    Current Medications    Current Outpatient Medications:   •  allopurinol (ZYLOPRIM) 300 MG tablet, Take 300 mg by mouth Daily., Disp: , Rfl: 0  •  amLODIPine-benazepril (LOTREL 5-20) 5-20 MG per capsule, Take 1 capsule by mouth 2 (Two) Times a Day., Disp: , Rfl:   •  aspirin 81 MG tablet, Take 81 mg by mouth Daily., Disp: , Rfl:   •  colchicine 0.6 MG tablet, Take 0.6 mg by mouth As Needed for Muscle / Joint Pain., Disp: , Rfl:   •  digoxin (LANOXIN) 250 MCG tablet, Take 250 mcg by mouth Daily., Disp: , Rfl:   •  diphenhydrAMINE (BENADRYL) 25 MG tablet, Take 25 mg by mouth Every 6 (Six) Hours As Needed for Itching., Disp: , Rfl:   •  ferrous sulfate 325 (65 FE) MG tablet, Take 325 mg by mouth 2 (Two) Times a Day., Disp: , Rfl:   •  furosemide (LASIX) 40 MG tablet, Take 40 mg by mouth Daily., Disp: , Rfl:   •  glipiZIDE (GLUCOTROL) 10 MG tablet, Take 10 mg by mouth 3 (Three) Times a Day., Disp: , Rfl:   •  ipratropium (ATROVENT) 0.02 % nebulizer solution, Take 0.5 mg by nebulization Every 4 (Four) Hours As Needed., Disp: , Rfl:   •  lovastatin (MEVACOR) 20 MG tablet, Take 20 mg by mouth Every Night., Disp: , Rfl:   •  metoprolol succinate XL (TOPROL-XL) 25 MG 24 hr tablet, Take 25 mg by mouth Daily., Disp: , Rfl:   •  nitroglycerin (NITROSTAT) 0.4 MG SL tablet, Place 0.4 mg under the tongue Every 5 (Five) Minutes As Needed for Chest Pain. Take no more than 3 doses in 15 minutes., Disp: , Rfl:   •  RA COL-RITE 100 MG capsule, Take 100 mg by mouth 2 (Two) Times a Day., Disp: , Rfl: 0  •  spironolactone (ALDACTONE) 25 MG tablet, Take 25 mg by mouth Daily., Disp: , Rfl: 0  •  Unable to find, 1 each Every Night. Med Name: C PAP MACHINE (Pt said he doesn't use it because it is hard for him to sleep), Disp: , Rfl:   •  warfarin (COUMADIN) 5 MG tablet, Take  7.5 mg by mouth Daily., Disp: , Rfl:           ROS  Review of Systems   Cardiovascular: Negative for chest pain, dyspnea on exertion, irregular heartbeat, leg swelling and palpitations.   Respiratory: Negative for cough and shortness of breath.        SOCIAL HX  Social History     Socioeconomic History   • Marital status:      Spouse name: Not on file   • Number of children: Not on file   • Years of education: Not on file   • Highest education level: Not on file   Tobacco Use   • Smoking status: Former Smoker     Packs/day: 1.00     Types: Cigarettes     Last attempt to quit:      Years since quittin.5   • Smokeless tobacco: Never Used   Substance and Sexual Activity   • Alcohol use: No   • Drug use: No   • Sexual activity: Defer       FAMILY HX  Family History   Problem Relation Age of Onset   • COPD Mother    • Heart attack Father              Naseem Campbell III, MD, FACC

## 2019-07-19 ENCOUNTER — TELEPHONE (OUTPATIENT)
Dept: CARDIOLOGY | Facility: CLINIC | Age: 69
End: 2019-07-19

## 2019-07-19 DIAGNOSIS — Z79.899 LONG-TERM USE OF HIGH-RISK MEDICATION: Primary | ICD-10-CM

## 2019-07-19 RX ORDER — FUROSEMIDE 20 MG/1
TABLET ORAL
Qty: 60 TABLET | Refills: 0 | Status: SHIPPED | OUTPATIENT
Start: 2019-07-19 | End: 2022-02-24

## 2019-07-19 RX ORDER — DIGOXIN 125 MCG
125 TABLET ORAL DAILY
Qty: 30 TABLET | Refills: 3 | Status: SHIPPED | OUTPATIENT
Start: 2019-07-19 | End: 2019-08-16 | Stop reason: DRUGHIGH

## 2019-07-19 NOTE — TELEPHONE ENCOUNTER
Notified patient to decrease digoxin to 0.125 mg by mouth daily and decrease Lasix from 40 mg by mouth daily to alternating doses of 40 mg and 20 mg daily. New Rx's sent to his pharmacy. Also told him we needed a repeat digoxin and BMP in 2 weeks. He verbalized understanding.

## 2019-07-19 NOTE — TELEPHONE ENCOUNTER
----- Message from Naseem Campbell III, MD sent at 7/19/2019 11:43 AM EDT -----  Decrease digoxin to 0.125 mg p.o. daily and decrease Lasix from 40 mg p.o. daily to alternating doses of 40 mg and 20 mg daily.  Repeat dig level and BMP in 2 weeks.

## 2019-08-02 ENCOUNTER — RESULTS ENCOUNTER (OUTPATIENT)
Dept: CARDIOLOGY | Facility: CLINIC | Age: 69
End: 2019-08-02

## 2019-08-02 DIAGNOSIS — Z79.899 LONG-TERM USE OF HIGH-RISK MEDICATION: ICD-10-CM

## 2019-08-16 ENCOUNTER — TELEPHONE (OUTPATIENT)
Dept: CARDIOLOGY | Facility: CLINIC | Age: 69
End: 2019-08-16

## 2019-08-16 DIAGNOSIS — Z79.899 LONG-TERM USE OF HIGH-RISK MEDICATION: Primary | ICD-10-CM

## 2019-08-16 RX ORDER — DIGOXIN 125 MCG
125 TABLET ORAL EVERY OTHER DAY
Qty: 15 TABLET | Refills: 1 | Status: SHIPPED | OUTPATIENT
Start: 2019-08-16 | End: 2022-02-24 | Stop reason: SDUPTHER

## 2019-08-16 NOTE — TELEPHONE ENCOUNTER
----- Message from Naseem Campbell III, MD sent at 8/15/2019  1:22 PM EDT -----  Decrease digoxin to every other day, repeat dig level in 1 month.

## 2019-09-11 ENCOUNTER — OFFICE VISIT (OUTPATIENT)
Dept: SURGERY | Age: 69
End: 2019-09-11
Payer: MEDICARE

## 2019-09-11 ENCOUNTER — PREP FOR PROCEDURE (OUTPATIENT)
Dept: SURGERY | Age: 69
End: 2019-09-11

## 2019-09-11 VITALS
HEIGHT: 73 IN | RESPIRATION RATE: 18 BRPM | DIASTOLIC BLOOD PRESSURE: 61 MMHG | HEART RATE: 60 BPM | WEIGHT: 190.3 LBS | BODY MASS INDEX: 25.22 KG/M2 | TEMPERATURE: 98 F | SYSTOLIC BLOOD PRESSURE: 112 MMHG

## 2019-09-11 DIAGNOSIS — Z86.010 HX OF ADENOMATOUS COLONIC POLYPS: Primary | ICD-10-CM

## 2019-09-11 DIAGNOSIS — Z86.718 HISTORY OF DVT (DEEP VEIN THROMBOSIS): ICD-10-CM

## 2019-09-11 DIAGNOSIS — G47.30 SLEEP APNEA, UNSPECIFIED TYPE: ICD-10-CM

## 2019-09-11 PROCEDURE — G8419 CALC BMI OUT NRM PARAM NOF/U: HCPCS | Performed by: SURGERY

## 2019-09-11 PROCEDURE — 4040F PNEUMOC VAC/ADMIN/RCVD: CPT | Performed by: SURGERY

## 2019-09-11 PROCEDURE — G8598 ASA/ANTIPLAT THER USED: HCPCS | Performed by: SURGERY

## 2019-09-11 PROCEDURE — 99214 OFFICE O/P EST MOD 30 MIN: CPT | Performed by: SURGERY

## 2019-09-11 PROCEDURE — 1036F TOBACCO NON-USER: CPT | Performed by: SURGERY

## 2019-09-11 PROCEDURE — 3017F COLORECTAL CA SCREEN DOC REV: CPT | Performed by: SURGERY

## 2019-09-11 PROCEDURE — G8427 DOCREV CUR MEDS BY ELIG CLIN: HCPCS | Performed by: SURGERY

## 2019-09-11 PROCEDURE — 99214 OFFICE O/P EST MOD 30 MIN: CPT

## 2019-09-11 PROCEDURE — 1123F ACP DISCUSS/DSCN MKR DOCD: CPT | Performed by: SURGERY

## 2019-09-11 RX ORDER — SODIUM CHLORIDE 0.9 % (FLUSH) 0.9 %
10 SYRINGE (ML) INJECTION PRN
Status: CANCELLED | OUTPATIENT
Start: 2019-09-11

## 2019-09-11 RX ORDER — DIGOXIN 250 MCG
125 TABLET ORAL DAILY
COMMUNITY
End: 2022-01-05

## 2019-09-11 RX ORDER — SODIUM CHLORIDE, SODIUM LACTATE, POTASSIUM CHLORIDE, CALCIUM CHLORIDE 600; 310; 30; 20 MG/100ML; MG/100ML; MG/100ML; MG/100ML
INJECTION, SOLUTION INTRAVENOUS CONTINUOUS
Status: CANCELLED | OUTPATIENT
Start: 2019-09-11

## 2019-09-11 RX ORDER — FERROUS SULFATE 325(65) MG
325 TABLET ORAL 2 TIMES DAILY
COMMUNITY
End: 2021-12-08

## 2019-09-11 RX ORDER — SODIUM CHLORIDE 0.9 % (FLUSH) 0.9 %
10 SYRINGE (ML) INJECTION EVERY 12 HOURS SCHEDULED
Status: CANCELLED | OUTPATIENT
Start: 2019-09-11

## 2019-09-11 RX ORDER — SPIRONOLACTONE 25 MG/1
25 TABLET ORAL
COMMUNITY
Start: 2019-03-25 | End: 2021-12-08

## 2019-09-11 RX ORDER — GLIPIZIDE 10 MG/1
10 TABLET ORAL
COMMUNITY
End: 2021-12-08

## 2019-09-11 ASSESSMENT — ENCOUNTER SYMPTOMS
RESPIRATORY NEGATIVE: 1
EYES NEGATIVE: 1
GASTROINTESTINAL NEGATIVE: 1

## 2019-09-16 ENCOUNTER — RESULTS ENCOUNTER (OUTPATIENT)
Dept: CARDIOLOGY | Facility: CLINIC | Age: 69
End: 2019-09-16

## 2019-09-16 DIAGNOSIS — Z79.899 LONG-TERM USE OF HIGH-RISK MEDICATION: ICD-10-CM

## 2019-09-18 ENCOUNTER — HOSPITAL ENCOUNTER (OUTPATIENT)
Facility: HOSPITAL | Age: 69
Setting detail: OUTPATIENT SURGERY
Discharge: HOME | End: 2019-09-18
Attending: SURGERY | Admitting: SURGERY
Payer: MEDICARE

## 2019-09-18 ENCOUNTER — ANESTHESIA EVENT (OUTPATIENT)
Dept: ENDOSCOPY | Facility: HOSPITAL | Age: 69
End: 2019-09-18
Payer: MEDICARE

## 2019-09-18 ENCOUNTER — ANESTHESIA (OUTPATIENT)
Dept: ENDOSCOPY | Facility: HOSPITAL | Age: 69
End: 2019-09-18
Payer: MEDICARE

## 2019-09-18 VITALS
OXYGEN SATURATION: 98 % | DIASTOLIC BLOOD PRESSURE: 51 MMHG | SYSTOLIC BLOOD PRESSURE: 114 MMHG | RESPIRATION RATE: 19 BRPM

## 2019-09-18 VITALS
RESPIRATION RATE: 18 BRPM | HEIGHT: 73 IN | HEART RATE: 60 BPM | TEMPERATURE: 97.7 F | OXYGEN SATURATION: 95 % | SYSTOLIC BLOOD PRESSURE: 142 MMHG | BODY MASS INDEX: 23.66 KG/M2 | WEIGHT: 178.5 LBS | DIASTOLIC BLOOD PRESSURE: 66 MMHG

## 2019-09-18 LAB
GLUCOSE BLD-MCNC: 116 MG/DL (ref 74–106)
PERFORMED ON: ABNORMAL

## 2019-09-18 PROCEDURE — 2580000003 HC RX 258: Performed by: SURGERY

## 2019-09-18 PROCEDURE — 45385 COLONOSCOPY W/LESION REMOVAL: CPT | Performed by: SURGERY

## 2019-09-18 PROCEDURE — 3700000001 HC ADD 15 MINUTES (ANESTHESIA): Performed by: SURGERY

## 2019-09-18 PROCEDURE — 7100000010 HC PHASE II RECOVERY - FIRST 15 MIN: Performed by: SURGERY

## 2019-09-18 PROCEDURE — 2500000003 HC RX 250 WO HCPCS: Performed by: NURSE ANESTHETIST, CERTIFIED REGISTERED

## 2019-09-18 PROCEDURE — 3609010600 HC COLONOSCOPY POLYPECTOMY SNARE/COLD BIOPSY: Performed by: SURGERY

## 2019-09-18 PROCEDURE — 6360000002 HC RX W HCPCS: Performed by: NURSE ANESTHETIST, CERTIFIED REGISTERED

## 2019-09-18 PROCEDURE — 2709999900 HC NON-CHARGEABLE SUPPLY: Performed by: SURGERY

## 2019-09-18 PROCEDURE — 3700000000 HC ANESTHESIA ATTENDED CARE: Performed by: SURGERY

## 2019-09-18 PROCEDURE — 7100000011 HC PHASE II RECOVERY - ADDTL 15 MIN: Performed by: SURGERY

## 2019-09-18 RX ORDER — SODIUM CHLORIDE 0.9 % (FLUSH) 0.9 %
10 SYRINGE (ML) INJECTION PRN
Status: DISCONTINUED | OUTPATIENT
Start: 2019-09-18 | End: 2019-09-18 | Stop reason: HOSPADM

## 2019-09-18 RX ORDER — PROPOFOL 10 MG/ML
INJECTION, EMULSION INTRAVENOUS PRN
Status: DISCONTINUED | OUTPATIENT
Start: 2019-09-18 | End: 2019-09-18 | Stop reason: SDUPTHER

## 2019-09-18 RX ORDER — SODIUM CHLORIDE, SODIUM LACTATE, POTASSIUM CHLORIDE, CALCIUM CHLORIDE 600; 310; 30; 20 MG/100ML; MG/100ML; MG/100ML; MG/100ML
INJECTION, SOLUTION INTRAVENOUS CONTINUOUS
Status: DISCONTINUED | OUTPATIENT
Start: 2019-09-18 | End: 2019-09-18 | Stop reason: HOSPADM

## 2019-09-18 RX ORDER — SODIUM CHLORIDE 0.9 % (FLUSH) 0.9 %
10 SYRINGE (ML) INJECTION EVERY 12 HOURS SCHEDULED
Status: DISCONTINUED | OUTPATIENT
Start: 2019-09-18 | End: 2019-09-18 | Stop reason: HOSPADM

## 2019-09-18 RX ADMIN — LIDOCAINE HYDROCHLORIDE 20 MG: 10 INJECTION, SOLUTION INFILTRATION; PERINEURAL at 08:50

## 2019-09-18 RX ADMIN — PROPOFOL 30 MG: 10 INJECTION, EMULSION INTRAVENOUS at 09:07

## 2019-09-18 RX ADMIN — PROPOFOL 40 MG: 10 INJECTION, EMULSION INTRAVENOUS at 09:19

## 2019-09-18 RX ADMIN — PROPOFOL 40 MG: 10 INJECTION, EMULSION INTRAVENOUS at 09:12

## 2019-09-18 RX ADMIN — SODIUM CHLORIDE, POTASSIUM CHLORIDE, SODIUM LACTATE AND CALCIUM CHLORIDE: 600; 310; 30; 20 INJECTION, SOLUTION INTRAVENOUS at 07:54

## 2019-09-18 RX ADMIN — PROPOFOL 30 MG: 10 INJECTION, EMULSION INTRAVENOUS at 08:57

## 2019-09-18 RX ADMIN — PROPOFOL 40 MG: 10 INJECTION, EMULSION INTRAVENOUS at 08:50

## 2019-09-18 RX ADMIN — Medication 10 ML: at 07:54

## 2019-09-18 ASSESSMENT — COPD QUESTIONNAIRES: CAT_SEVERITY: MILD

## 2019-09-18 NOTE — PROGRESS NOTES
Report received from [facility]. Pt to room via stretcher, NAD, No c/o noted. HOB elevated 30 degrees, Left side lying position. BS active in all 4 quads. Lung sounds CTA and no distress noted. Rails up x2, stretcher locked. Assessment completed. Awakens easily. Abd soft /NT/ND:+flatus, +BS, Denies N/V/abd pain.

## 2019-09-18 NOTE — ANESTHESIA PRE PROCEDURE
tablet take 1 tablet by mouth once daily 8/4/17  Yes August All, APRN - NP   nitroGLYCERIN (NITROSTAT) 0.4 MG SL tablet place 1 tablet under the tongue if needed every 5 minutes for chest pain for 3 doses IF NO RELIEF AFTER 3RD DOSE CALL PRESCRIBER . 3/6/17  Yes August All, APRN - NP   COUMADIN 5 MG tablet take as directed 12/1/16  Yes Marianne Mon MD   cephALEXin (KEFLEX) 500 MG capsule Take 1 capsule by mouth 4 times daily 7/8/16  Yes August All, APRN - NP   ondansetron (ZOFRAN) 4 MG tablet Take 2 tablets by mouth every 8 hours as needed for Nausea or Vomiting 1 by mouth every 8 hours when necessary 11/6/15  Yes Marianne Mon MD   [facility] ULTRA TEST strip use ONE TO THREE TIMES A DAY 9/17/14  Yes Marianne Mon MD   sitaGLIPtan (JANUVIA) 100 MG tablet Take 1 tablet by mouth daily. 1/2/14  Yes Marianne Mon MD   PATADAY 0.2 % SOLN  12/30/12  Yes Historical Provider, MD   aspirin 81 MG tablet Take 81 mg by mouth daily. Yes Historical Provider, MD       Current medications:    Current Facility-Administered Medications   Medication Dose Route Frequency Provider Last Rate Last Dose    lactated ringers infusion   Intravenous Continuous Patel Luo MD 75 mL/hr at 09/18/19 0754      sodium chloride flush 0.9 % injection 10 mL  10 mL Intravenous 2 times per day Patel Luo MD   10 mL at 09/18/19 0754    sodium chloride flush 0.9 % injection 10 mL  10 mL Intravenous PRN Patel Luo MD           Allergies:  No Known Allergies    Problem List:    Patient Active Problem List   Diagnosis Code    Atrial fibrillation ([facility] [address] I48.91    Hypercholesterolemia E78.00    Diabetes mellitus ([facility] [address] E11.9    Coronary atherosclerosis of native coronary artery I25.10    Chronic gouty arthropathy M1A. 00X0    Elevated serum creatinine R79.89    Essential hypertension, benign I10    DVT of lower extremity, bilateral (HCC) I82.403    Toe pain, left M79.675       Past Medical History:

## 2019-09-25 ENCOUNTER — OFFICE VISIT (OUTPATIENT)
Dept: SURGERY | Age: 69
End: 2019-09-25
Payer: MEDICARE

## 2019-09-25 VITALS
WEIGHT: 189.9 LBS | HEART RATE: 52 BPM | TEMPERATURE: 97.8 F | BODY MASS INDEX: 25.17 KG/M2 | SYSTOLIC BLOOD PRESSURE: 106 MMHG | RESPIRATION RATE: 16 BRPM | HEIGHT: 73 IN | DIASTOLIC BLOOD PRESSURE: 57 MMHG

## 2019-09-25 DIAGNOSIS — D12.3 ADENOMATOUS POLYP OF TRANSVERSE COLON: ICD-10-CM

## 2019-09-25 DIAGNOSIS — K57.90 DIVERTICULOSIS: ICD-10-CM

## 2019-09-25 DIAGNOSIS — K64.8 INTERNAL HEMORRHOIDS WITHOUT COMPLICATION: ICD-10-CM

## 2019-09-25 DIAGNOSIS — D12.2 ADENOMATOUS POLYP OF ASCENDING COLON: Primary | ICD-10-CM

## 2019-09-25 PROCEDURE — 99212 OFFICE O/P EST SF 10 MIN: CPT

## 2019-09-25 PROCEDURE — G8598 ASA/ANTIPLAT THER USED: HCPCS | Performed by: SURGERY

## 2019-09-25 PROCEDURE — 4040F PNEUMOC VAC/ADMIN/RCVD: CPT | Performed by: SURGERY

## 2019-09-25 PROCEDURE — 1036F TOBACCO NON-USER: CPT | Performed by: SURGERY

## 2019-09-25 PROCEDURE — 3017F COLORECTAL CA SCREEN DOC REV: CPT | Performed by: SURGERY

## 2019-09-25 PROCEDURE — 1123F ACP DISCUSS/DSCN MKR DOCD: CPT | Performed by: SURGERY

## 2019-09-25 PROCEDURE — 99212 OFFICE O/P EST SF 10 MIN: CPT | Performed by: SURGERY

## 2019-09-25 PROCEDURE — G8427 DOCREV CUR MEDS BY ELIG CLIN: HCPCS | Performed by: SURGERY

## 2019-09-25 PROCEDURE — G8419 CALC BMI OUT NRM PARAM NOF/U: HCPCS | Performed by: SURGERY

## 2020-01-23 ENCOUNTER — OFFICE VISIT (OUTPATIENT)
Dept: CARDIOLOGY | Facility: CLINIC | Age: 70
End: 2020-01-23

## 2020-01-23 VITALS
SYSTOLIC BLOOD PRESSURE: 120 MMHG | WEIGHT: 188 LBS | OXYGEN SATURATION: 95 % | DIASTOLIC BLOOD PRESSURE: 58 MMHG | HEART RATE: 71 BPM | HEIGHT: 73 IN | BODY MASS INDEX: 24.92 KG/M2

## 2020-01-23 DIAGNOSIS — I48.0 PAROXYSMAL ATRIAL FIBRILLATION (HCC): ICD-10-CM

## 2020-01-23 DIAGNOSIS — I87.2 VENOUS INSUFFICIENCY: ICD-10-CM

## 2020-01-23 DIAGNOSIS — I25.10 CORONARY ARTERY DISEASE INVOLVING NATIVE CORONARY ARTERY OF NATIVE HEART WITHOUT ANGINA PECTORIS: Primary | ICD-10-CM

## 2020-01-23 DIAGNOSIS — I10 ESSENTIAL HYPERTENSION: ICD-10-CM

## 2020-01-23 DIAGNOSIS — E78.2 MIXED HYPERLIPIDEMIA: ICD-10-CM

## 2020-01-23 PROCEDURE — 99214 OFFICE O/P EST MOD 30 MIN: CPT | Performed by: INTERNAL MEDICINE

## 2020-01-23 NOTE — PROGRESS NOTES
[facility]  Office visit  Swapnil Lawson  1950  [phone]    VISIT DATE:  1/23/2020      PCP: Shakir Mccarthy MD  [address] [place] [id number]    CC:  Chief Complaint   Patient presents with   • Atrial Fibrillation   • Coronary Artery Disease       PROBLEM LIST:  1. Coronary artery disease:  a. CABG, 1993, Abimael Tomlin MD.  b. CABG, August 2013, [facility].  c. January 2018 echo - Ejection fraction is visually estimated to be 40-45 %.   mild concentric left ventricular hypertrophy.   Pseudonormal filling pattern noted.   Akinesis of the mid posterolateral, and basal to mid inferior walls   consistent with previous myocardial infarction.  2. Hypertension.  3. Paroxysmal atrial fibrillation.  4. Diabetes mellitus.   5. Venous insufficiency.   6.  Surgical history:  a.  Appendectomy, 1981.  b. Cholecystectomy, 2002.  c. Toe amputation, 2009.  d. Bone spur removal, 2010.  7. Previous history of myocardial infarction in 1992 and 1993 prior to CABG.  8. Hyperlipidemia.      Cardiac studies and procedures:  February 2019:Transthoracic echo - EF of 50%, moderately dilated left ventricle, moderately dilated right ventricle with normal function, moderate to severe left atrial enlargement, dilated IVC    ASSESSMENT:   Diagnosis Plan   1. Coronary artery disease involving native coronary artery of native heart without angina pectoris     2. Essential hypertension     3. Paroxysmal atrial fibrillation (CMS/[facility])     4. Venous insufficiency     5. Mixed hyperlipidemia         PLAN:  Congestive heart failure, diastolic, chronic: Currently euvolemic and compensated.  Continue current medical therapy.  Continue current dose of Lasix and spironolactone, trending renal function closely.  Will take an extra dose of Lasix if he gains more than 3 to 5 pounds above his baseline weight.    Coronary artery disease: Currently stable and asymptomatic.  Continue aspirin, statin and  "afterload reduction    Hypertension: Goal less than 130/80 mmHg.  Controlled based on home blood pressure readings.  Continue current medications.    Peripheral edema: Consistent with venous insufficiency.  Currently well controlled, continue low-dose loop diuretic and compression stockings    Hyperlipidemia: Goal LDL <100, ideally less than 70.  Continue statin.    Persistent atrial fibrillation: Currently asymptomatic.  Continue stroke prophylaxis with Coumadin, goal INR 2-3.  Continue rate control with combination of digoxin and metoprolol.  Digoxin level 0.7 in September 2019.    Subjective  Has been doing very well without limitations.  Lower extremity edema is well controlled on current medical therapy.  Blood pressures are running less than 130/80 mmHg.  Compliant with compression stockings.  Had an episode of weight retention of approximately 8 to 10 pounds and congestion recently which corrected with 1 extra dose of Lasix, 20 mg p.o.  He does not remember any triggers such as dietary indiscretion.  Now back to baseline..  Has upcoming routine lab work at primary care physician's office next week.  September 2019 creatinine 2.04.    PHYSICAL EXAMINATION:  Vitals:    01/23/20 0915   BP: 120/58   BP Location: Left arm   Patient Position: Sitting   Pulse: 71   SpO2: 95%   Weight: 85.3 kg (188 lb)   Height: 185.4 cm (73\")     General Appearance:    Alert, cooperative, no distress, appears stated age   Head:    Normocephalic, without obvious abnormality, atraumatic   Eyes:    conjunctiva/corneas clear   Nose:   Nares normal, septum midline, mucosa normal, no drainage   Throat:   Lips, teeth and gums normal   Neck:   Supple, symmetrical, trachea midline, no carotid    bruit or JVD   Lungs:     Minimal right basilar inspiratory rales, respirations unlabored   Chest Wall:    No tenderness or deformity    Heart:   Irregularly irregular, S1 and S2 normal, no murmur, rub   or gallop, normal carotid impulse bilaterally " without bruit.   Abdomen:     Soft, non-tender   Extremities:   Extremities normal, atraumatic, no cyanosis, trivial bilateral pretibial edema, compression stockings in place    Pulses:   2+ and symmetric all extremities   Skin:   Skin color, texture, turgor normal, no rashes or lesions       Diagnostic Data:  Procedures  No results found for: CHLPL, TRIG, HDL, LDLDIRECT  No results found for: GLUCOSE, BUN, CREATININE, NA, K, CL, CO2, CREATININE, BUN  No results found for: HGBA1C  Lab Results   Component Value Date    WBC 6.0 02/06/2019    HGB 12.3 (L) 02/06/2019    HCT 38.7 (L) 02/06/2019     (L) 02/06/2019       Allergies  No Known Allergies    Current Medications    Current Outpatient Medications:   •  aspirin 81 MG tablet, Take 81 mg by mouth Daily., Disp: , Rfl:   •  diphenhydrAMINE (BENADRYL) 25 MG tablet, Take 25 mg by mouth Every 6 (Six) Hours As Needed for Itching., Disp: , Rfl:   •  glipiZIDE (GLUCOTROL) 10 MG tablet, Take 10 mg by mouth 3 (Three) Times a Day., Disp: , Rfl:   •  lovastatin (MEVACOR) 20 MG tablet, Take 20 mg by mouth Every Night., Disp: , Rfl:   •  warfarin (COUMADIN) 5 MG tablet, Take 7.5 mg by mouth Daily., Disp: , Rfl:   •  allopurinol (ZYLOPRIM) 300 MG tablet, Take 300 mg by mouth Daily., Disp: , Rfl: 0  •  amLODIPine-benazepril (LOTREL 5-20) 5-20 MG per capsule, Take 1 capsule by mouth 2 (Two) Times a Day., Disp: , Rfl:   •  colchicine 0.6 MG tablet, Take 0.6 mg by mouth As Needed for Muscle / Joint Pain., Disp: , Rfl:   •  digoxin (LANOXIN) 125 MCG tablet, Take 1 tablet by mouth Every Other Day., Disp: 15 tablet, Rfl: 1  •  ferrous sulfate 325 (65 FE) MG tablet, Take 325 mg by mouth 2 (Two) Times a Day., Disp: , Rfl:   •  furosemide (LASIX) 20 MG tablet, Take 1 tablet by mouth Daily AND 2 tablets Every Other Day., Disp: 60 tablet, Rfl: 0  •  ipratropium (ATROVENT) 0.02 % nebulizer solution, Take 0.5 mg by nebulization Every 4 (Four) Hours As Needed., Disp: , Rfl:   •   metoprolol succinate XL (TOPROL-XL) 25 MG 24 hr tablet, Take 25 mg by mouth Daily., Disp: , Rfl:   •  nitroglycerin (NITROSTAT) 0.4 MG SL tablet, Place 0.4 mg under the tongue Every 5 (Five) Minutes As Needed for Chest Pain. Take no more than 3 doses in 15 minutes., Disp: , Rfl:   •  RA COL-RITE 100 MG capsule, Take 100 mg by mouth 2 (Two) Times a Day., Disp: , Rfl: 0  •  spironolactone (ALDACTONE) 25 MG tablet, Take 25 mg by mouth Daily., Disp: , Rfl: 0  •  Unable to find, 1 each Every Night. Med Name: C PAP MACHINE (Pt said he doesn't use it because it is hard for him to sleep), Disp: , Rfl:           ROS  Review of Systems   Cardiovascular: Positive for dyspnea on exertion, irregular heartbeat and leg swelling. Negative for chest pain and palpitations.   Respiratory: Negative for cough.        SOCIAL HX  Social History     Socioeconomic History   • Marital status:      Spouse name: Not on file   • Number of children: Not on file   • Years of education: Not on file   • Highest education level: Not on file   Tobacco Use   • Smoking status: Former Smoker     Packs/day: 1.00     Types: Cigarettes     Last attempt to quit:      Years since quittin.0   • Smokeless tobacco: Never Used   Substance and Sexual Activity   • Alcohol use: No   • Drug use: No   • Sexual activity: Defer       FAMILY HX  Family History   Problem Relation Age of Onset   • COPD Mother    • Heart attack Father              Naseem Campbell III, MD, [facility]

## 2020-06-15 ENCOUNTER — PREP FOR SURGERY (OUTPATIENT)
Dept: OTHER | Facility: HOSPITAL | Age: 70
End: 2020-06-15

## 2020-06-15 DIAGNOSIS — H25.11 NUCLEAR SCLEROTIC CATARACT OF RIGHT EYE: Primary | ICD-10-CM

## 2020-06-15 DIAGNOSIS — Z11.59 SPECIAL SCREENING EXAMINATION FOR UNSPECIFIED VIRAL DISEASE: Primary | ICD-10-CM

## 2020-06-15 DIAGNOSIS — H25.011 CORTICAL AGE-RELATED CATARACT OF RIGHT EYE: ICD-10-CM

## 2020-06-15 RX ORDER — TETRACAINE HYDROCHLORIDE 5 MG/ML
1 SOLUTION OPHTHALMIC SEE ADMIN INSTRUCTIONS
Status: CANCELLED | OUTPATIENT
Start: 2020-06-15

## 2020-06-15 RX ORDER — PHENYLEPHRINE HYDROCHLORIDE 100 MG/ML
1 SOLUTION/ DROPS OPHTHALMIC
Status: CANCELLED | OUTPATIENT
Start: 2020-06-15 | End: 2020-06-15

## 2020-06-15 RX ORDER — SODIUM CHLORIDE 0.9 % (FLUSH) 0.9 %
1-10 SYRINGE (ML) INJECTION AS NEEDED
Status: CANCELLED | OUTPATIENT
Start: 2020-06-15

## 2020-06-15 RX ORDER — SODIUM CHLORIDE 0.9 % (FLUSH) 0.9 %
3 SYRINGE (ML) INJECTION EVERY 12 HOURS SCHEDULED
Status: CANCELLED | OUTPATIENT
Start: 2020-06-15

## 2020-06-15 RX ORDER — PREDNISOLONE ACETATE 10 MG/ML
1 SUSPENSION/ DROPS OPHTHALMIC SEE ADMIN INSTRUCTIONS
Status: CANCELLED | OUTPATIENT
Start: 2020-06-15

## 2020-06-15 RX ORDER — CYCLOPENTOLATE HYDROCHLORIDE 20 MG/ML
1 SOLUTION/ DROPS OPHTHALMIC
Status: CANCELLED | OUTPATIENT
Start: 2020-06-15 | End: 2020-06-15

## 2020-06-17 PROBLEM — H25.011 CORTICAL AGE-RELATED CATARACT OF RIGHT EYE: Status: ACTIVE | Noted: 2020-06-17

## 2020-06-17 PROBLEM — H25.11 NUCLEAR SCLEROTIC CATARACT OF RIGHT EYE: Status: ACTIVE | Noted: 2020-06-17

## 2020-06-22 ENCOUNTER — LAB (OUTPATIENT)
Dept: LAB | Facility: HOSPITAL | Age: 70
End: 2020-06-22

## 2020-06-22 DIAGNOSIS — Z11.59 SPECIAL SCREENING EXAMINATION FOR UNSPECIFIED VIRAL DISEASE: ICD-10-CM

## 2020-06-22 PROCEDURE — U0004 COV-19 TEST NON-CDC HGH THRU: HCPCS

## 2020-06-22 PROCEDURE — C9803 HOPD COVID-19 SPEC COLLECT: HCPCS

## 2020-06-22 PROCEDURE — U0002 COVID-19 LAB TEST NON-CDC: HCPCS

## 2020-06-23 LAB
REF LAB TEST METHOD: NORMAL
SARS-COV-2 RNA RESP QL NAA+PROBE: NOT DETECTED

## 2020-06-25 ENCOUNTER — ANESTHESIA EVENT (OUTPATIENT)
Dept: PERIOP | Facility: HOSPITAL | Age: 70
End: 2020-06-25

## 2020-06-25 ENCOUNTER — ANESTHESIA (OUTPATIENT)
Dept: PERIOP | Facility: HOSPITAL | Age: 70
End: 2020-06-25

## 2020-06-25 ENCOUNTER — HOSPITAL ENCOUNTER (OUTPATIENT)
Facility: HOSPITAL | Age: 70
Setting detail: HOSPITAL OUTPATIENT SURGERY
Discharge: HOME | End: 2020-06-25
Attending: OPHTHALMOLOGY | Admitting: OPHTHALMOLOGY

## 2020-06-25 VITALS
HEIGHT: 73 IN | RESPIRATION RATE: 16 BRPM | DIASTOLIC BLOOD PRESSURE: 51 MMHG | TEMPERATURE: 98 F | BODY MASS INDEX: 25.71 KG/M2 | HEART RATE: 60 BPM | WEIGHT: 194 LBS | SYSTOLIC BLOOD PRESSURE: 114 MMHG | OXYGEN SATURATION: 95 %

## 2020-06-25 DIAGNOSIS — H25.011 CORTICAL AGE-RELATED CATARACT OF RIGHT EYE: ICD-10-CM

## 2020-06-25 DIAGNOSIS — H25.11 NUCLEAR SCLEROTIC CATARACT OF RIGHT EYE: ICD-10-CM

## 2020-06-25 LAB — GLUCOSE BLDC GLUCOMTR-MCNC: 137 MG/DL (ref 70–130)

## 2020-06-25 PROCEDURE — 82962 GLUCOSE BLOOD TEST: CPT

## 2020-06-25 PROCEDURE — 25010000002 PROPOFOL 200 MG/20ML EMULSION: Performed by: NURSE ANESTHETIST, CERTIFIED REGISTERED

## 2020-06-25 PROCEDURE — V2632 POST CHMBR INTRAOCULAR LENS: HCPCS | Performed by: OPHTHALMOLOGY

## 2020-06-25 DEVICE — LENS ACRYSOF IQ SA60WF W/ULTRASERT 6X13MM ACU0T0 21.5: Type: IMPLANTABLE DEVICE | Site: POSTERIOR CHAMBER | Status: FUNCTIONAL

## 2020-06-25 RX ORDER — LIDOCAINE HYDROCHLORIDE 20 MG/ML
INJECTION, SOLUTION INTRAVENOUS AS NEEDED
Status: DISCONTINUED | OUTPATIENT
Start: 2020-06-25 | End: 2020-06-25 | Stop reason: SURG

## 2020-06-25 RX ORDER — KETAMINE HCL IN NACL, ISO-OSM 100MG/10ML
SYRINGE (ML) INJECTION AS NEEDED
Status: DISCONTINUED | OUTPATIENT
Start: 2020-06-25 | End: 2020-06-25 | Stop reason: SURG

## 2020-06-25 RX ORDER — TETRACAINE HYDROCHLORIDE 5 MG/ML
SOLUTION OPHTHALMIC AS NEEDED
Status: DISCONTINUED | OUTPATIENT
Start: 2020-06-25 | End: 2020-06-25 | Stop reason: HOSPADM

## 2020-06-25 RX ORDER — SODIUM CHLORIDE, SODIUM LACTATE, POTASSIUM CHLORIDE, CALCIUM CHLORIDE 600; 310; 30; 20 MG/100ML; MG/100ML; MG/100ML; MG/100ML
1000 INJECTION, SOLUTION INTRAVENOUS CONTINUOUS
Status: DISCONTINUED | OUTPATIENT
Start: 2020-06-25 | End: 2020-06-25 | Stop reason: HOSPADM

## 2020-06-25 RX ORDER — BALANCED SALT SOLUTION 6.4; .75; .48; .3; 3.9; 1.7 MG/ML; MG/ML; MG/ML; MG/ML; MG/ML; MG/ML
SOLUTION OPHTHALMIC AS NEEDED
Status: DISCONTINUED | OUTPATIENT
Start: 2020-06-25 | End: 2020-06-25 | Stop reason: HOSPADM

## 2020-06-25 RX ORDER — PROPOFOL 10 MG/ML
INJECTION, EMULSION INTRAVENOUS AS NEEDED
Status: DISCONTINUED | OUTPATIENT
Start: 2020-06-25 | End: 2020-06-25 | Stop reason: SURG

## 2020-06-25 RX ORDER — TETRACAINE HYDROCHLORIDE 5 MG/ML
1 SOLUTION OPHTHALMIC SEE ADMIN INSTRUCTIONS
Status: COMPLETED | OUTPATIENT
Start: 2020-06-25 | End: 2020-06-25

## 2020-06-25 RX ORDER — CYCLOPENTOLATE HYDROCHLORIDE 20 MG/ML
1 SOLUTION/ DROPS OPHTHALMIC
Status: COMPLETED | OUTPATIENT
Start: 2020-06-25 | End: 2020-06-25

## 2020-06-25 RX ORDER — PHENYLEPHRINE HYDROCHLORIDE 100 MG/ML
1 SOLUTION/ DROPS OPHTHALMIC
Status: COMPLETED | OUTPATIENT
Start: 2020-06-25 | End: 2020-06-25

## 2020-06-25 RX ORDER — SODIUM CHLORIDE 0.9 % (FLUSH) 0.9 %
3 SYRINGE (ML) INJECTION EVERY 12 HOURS SCHEDULED
Status: DISCONTINUED | OUTPATIENT
Start: 2020-06-25 | End: 2020-06-25 | Stop reason: HOSPADM

## 2020-06-25 RX ORDER — PREDNISOLONE ACETATE 10 MG/ML
SUSPENSION/ DROPS OPHTHALMIC AS NEEDED
Status: DISCONTINUED | OUTPATIENT
Start: 2020-06-25 | End: 2020-06-25 | Stop reason: HOSPADM

## 2020-06-25 RX ORDER — ACETAZOLAMIDE 500 MG/1
500 CAPSULE, EXTENDED RELEASE ORAL ONCE
Status: COMPLETED | OUTPATIENT
Start: 2020-06-25 | End: 2020-06-25

## 2020-06-25 RX ORDER — SODIUM CHLORIDE 0.9 % (FLUSH) 0.9 %
1-10 SYRINGE (ML) INJECTION AS NEEDED
Status: DISCONTINUED | OUTPATIENT
Start: 2020-06-25 | End: 2020-06-25 | Stop reason: HOSPADM

## 2020-06-25 RX ORDER — PREDNISOLONE ACETATE 10 MG/ML
1 SUSPENSION/ DROPS OPHTHALMIC SEE ADMIN INSTRUCTIONS
Status: DISCONTINUED | OUTPATIENT
Start: 2020-06-25 | End: 2020-06-25 | Stop reason: HOSPADM

## 2020-06-25 RX ORDER — LIDOCAINE HYDROCHLORIDE 40 MG/ML
INJECTION, SOLUTION RETROBULBAR; TOPICAL AS NEEDED
Status: DISCONTINUED | OUTPATIENT
Start: 2020-06-25 | End: 2020-06-25 | Stop reason: HOSPADM

## 2020-06-25 RX ADMIN — ACETAZOLAMIDE 500 MG: 500 CAPSULE, EXTENDED RELEASE ORAL at 10:03

## 2020-06-25 RX ADMIN — TETRACAINE HYDROCHLORIDE 1 DROP: 5 SOLUTION OPHTHALMIC at 08:08

## 2020-06-25 RX ADMIN — TETRACAINE HYDROCHLORIDE 1 DROP: 5 SOLUTION OPHTHALMIC at 08:09

## 2020-06-25 RX ADMIN — PHENYLEPHRINE HYDROCHLORIDE 1 DROP: 100 SOLUTION/ DROPS OPHTHALMIC at 08:10

## 2020-06-25 RX ADMIN — CYCLOPENTOLATE HYDROCHLORIDE 1 DROP: 20 SOLUTION/ DROPS OPHTHALMIC at 08:15

## 2020-06-25 RX ADMIN — CYCLOPENTOLATE HYDROCHLORIDE 1 DROP: 20 SOLUTION/ DROPS OPHTHALMIC at 08:20

## 2020-06-25 RX ADMIN — CYCLOPENTOLATE HYDROCHLORIDE 1 DROP: 20 SOLUTION/ DROPS OPHTHALMIC at 08:10

## 2020-06-25 RX ADMIN — LIDOCAINE HYDROCHLORIDE 50 MG: 20 INJECTION, SOLUTION INTRAVENOUS at 09:35

## 2020-06-25 RX ADMIN — PHENYLEPHRINE HYDROCHLORIDE 1 DROP: 100 SOLUTION/ DROPS OPHTHALMIC at 08:20

## 2020-06-25 RX ADMIN — PROPOFOL 50 MG: 10 INJECTION, EMULSION INTRAVENOUS at 09:35

## 2020-06-25 RX ADMIN — Medication 25 MG: at 09:35

## 2020-06-25 RX ADMIN — SODIUM CHLORIDE, POTASSIUM CHLORIDE, SODIUM LACTATE AND CALCIUM CHLORIDE 1000 ML: 600; 310; 30; 20 INJECTION, SOLUTION INTRAVENOUS at 08:14

## 2020-06-25 RX ADMIN — PHENYLEPHRINE HYDROCHLORIDE 1 DROP: 100 SOLUTION/ DROPS OPHTHALMIC at 08:15

## 2020-06-25 NOTE — H&P
[facility]         History and Physical    Patient Name: Swapnil Lawson  MRN: [id number]  : 1950  Gender: male     HPI: Patient complaint of PPLOV Right eye diagnosed with cataract. Patient requests PHACO PCIOL for Increase of VA/ADL.    History:    Past Medical History:   Diagnosis Date   • Coronary artery disease 2016    CABG, , Abimael Tomlin MD. CABG, 2013, [facility].   • Diabetes mellitus (CMS/HCC) 2016   • Hyperlipidemia 2016   • Hypertension 2016   • Myocardial infarction (CMS/HCC)      in  and  prior to CABG.   • Paroxysmal atrial fibrillation (CMS/HCC) 2016   • Venous insufficiency 2016       Past Surgical History:   Procedure Laterality Date   • APPENDECTOMY     • CARDIAC CATHETERIZATION     • CHOLECYSTECTOMY     • COLONOSCOPY     • FINGER SURGERY      Rt index (second finger)   • OTHER SURGICAL HISTORY      Bone spur removal   • TOE AMPUTATION         Social History     Socioeconomic History   • Marital status:      Spouse name: Not on file   • Number of children: Not on file   • Years of education: Not on file   • Highest education level: Not on file   Tobacco Use   • Smoking status: Former Smoker     Packs/day: 1.00     Types: Cigarettes     Last attempt to quit:      Years since quittin.4   • Smokeless tobacco: Never Used   Substance and Sexual Activity   • Alcohol use: No   • Drug use: No   • Sexual activity: Defer       Family History   Problem Relation Age of Onset   • COPD Mother    • Heart attack Father        Prior to Admission Medications:  Medications Prior to Admission   Medication Sig Dispense Refill Last Dose   • allopurinol (ZYLOPRIM) 300 MG tablet Take 300 mg by mouth Daily.  0 2020 at 0100   • amLODIPine-benazepril (LOTREL 5-20) 5-20 MG per capsule Take 1 capsule by mouth 2 (Two) Times a Day.   2020 at 0100   • aspirin 81 MG tablet Take 81  mg by mouth Daily.   6/25/2020 at 0100   • colchicine 0.6 MG tablet Take 0.6 mg by mouth As Needed for Muscle / Joint Pain.   6/25/2020 at 0100   • digoxin (LANOXIN) 125 MCG tablet Take 1 tablet by mouth Every Other Day. (Patient taking differently: Take 125 mcg by mouth Daily.) 15 tablet 1 6/25/2020 at 0100   • diphenhydrAMINE (BENADRYL) 25 MG tablet Take 25 mg by mouth Every 6 (Six) Hours As Needed for Itching.   Past Week at Unknown time   • ferrous sulfate 325 (65 FE) MG tablet Take 325 mg by mouth 2 (Two) Times a Day.   6/25/2020 at 0100   • furosemide (LASIX) 20 MG tablet Take 1 tablet by mouth Daily AND 2 tablets Every Other Day. (Patient taking differently: Take 1 tablet by mouth twice aq day) 60 tablet 0 6/25/2020 at 0100   • glipiZIDE (GLUCOTROL) 10 MG tablet Take 10 mg by mouth 3 (Three) Times a Day.   6/25/2020 at 0100   • ipratropium (ATROVENT) 0.02 % nebulizer solution Take 0.5 mg by nebulization Every 4 (Four) Hours As Needed.   6/24/2020 at 1600   • lovastatin (MEVACOR) 20 MG tablet Take 20 mg by mouth Every Night.   6/24/2020 at 1600   • metoprolol succinate XL (TOPROL-XL) 25 MG 24 hr tablet Take 25 mg by mouth Daily.   6/25/2020 at 0100   • O2 (OXYGEN) Inhale 2 L/min As Needed.   6/25/2020 at Unknown time   • RA COL-RITE 100 MG capsule Take 100 mg by mouth 2 (Two) Times a Day.  0 Past Week at Unknown time   • spironolactone (ALDACTONE) 25 MG tablet Take 25 mg by mouth Daily.  0 6/25/2020 at 0100   • warfarin (COUMADIN) 5 MG tablet Take 7.5 mg by mouth Daily.   6/25/2020 at 0100   • nitroglycerin (NITROSTAT) 0.4 MG SL tablet Place 0.4 mg under the tongue Every 5 (Five) Minutes As Needed for Chest Pain. Take no more than 3 doses in 15 minutes.   Unknown at Unknown time   • Unable to find 1 each Every Night. Med Name: C PAP MACHINE (Pt said he doesn't use it because it is hard for him to sleep)   Taking       Allergies:  No Known Allergies     Vitals: Temp:  [98 °F (36.7 °C)] 98 °F (36.7 °C)  Heart  Rate:  [68] 68  Resp:  [16] 16  BP: (118)/(55) 118/55    Review of Systems:   Within Normal Limits Abnormal   HEENT [x]    []     Cardiovascular [x]   []     Gastrointestinal [x]   []     Gentiourinary [x]   []     Neurologic [x]   []     Pulmonary [x]   []       Physical Exam:   Within Normal Limits Abnormal   HEENT [x]    []     Heart [x]   []     Lungs [x]   []     Abdomen [x]   []     Extremities [x]   []       Impression: Right nuclear sclerotic cataract.     Plan: CATARACT PHACO EXTRACTION WITH INTRAOCULAR LENS IMPLANT RIGHT (Right)     Omar Blood MD  6/25/2020

## 2020-06-25 NOTE — ANESTHESIA PREPROCEDURE EVALUATION
Anesthesia Evaluation     Patient summary reviewed and Nursing notes reviewed   NPO Solid Status: > 8 hours  NPO Liquid Status: > 8 hours           Airway   Mallampati: I  TM distance: >3 FB  Neck ROM: full  No difficulty expected  Dental - normal exam     Pulmonary - normal exam   (+) a smoker Former,   Cardiovascular - normal exam    (+) hypertension, past MI  >12 months, CAD, dysrhythmias Atrial Fib, Paroxysmal Atrial Fib, hyperlipidemia,       Neuro/Psych- negative ROS  GI/Hepatic/Renal/Endo    (+)   diabetes mellitus,     Musculoskeletal (-) negative ROS    Abdominal  - normal exam   Substance History - negative use     OB/GYN negative ob/gyn ROS         Other                        Anesthesia Plan    ASA 3     MAC   (Pt advised that intravenous sedation will be used as primary anesthetic technique, along with topical anesthesia. Every effort will be made to make sure patient is comfortable.     The patient was told that they may experience recall for the procedure. Pt verbalized understanding and agrees to plan of care.)  intravenous induction     Anesthetic plan, all risks, benefits, and alternatives have been provided, discussed and informed consent has been obtained with: patient.    Plan discussed with CRNA.

## 2020-06-25 NOTE — DISCHARGE INSTRUCTIONS
No pushing, pulling, tugging,  heavy lifting, or strenuous activity.  No major decision making, driving, or drinking alcoholic beverages for 24 hours. ( due to the medications you have  received)  Always use good hand hygiene/washing techniques.  NO driving while taking pain medications.    * if you have an incision:  Check your incision area every day for signs of infection.   Check for:  * more redness, swelling, or pain  *more fluid or blood  *warmth  *pus or bad smell    To assist you in voiding:  Drink plenty of fluids  Listen to running water while attempting to void.    If you are unable to urinate and you have an uncomfortable urge to void or it has been   6 hours since you were discharged, return to the Emergency Room        Post Operative Cataract Instructions     Right Eye    1.  Wear eye shield at bedtime for 3 nights.  You may wear glasses or sunglasses during the day.  You must keep eye protected at all times.  2.  Try not to sleep on the side in which the eye was operated (1-2 weeks).  3.  No heavy lifting (at least 3 days).  No bending below the waist.  Keep your head above your heart.  4.  Try not to cough or sneeze excessively.  5.  Bring eye drops and instructions with you to post-op appointment.  6.  If eyes become stuck together after surgery you may soak with warm cloth.  7. Start Prednisilone (Pred-Forte) today as soon as you get home.   *Apply 1 drop to operative eye four times a day for 7 days and then twice daily until all medication is gone.    Complications from cataract surgery usually occur within the first couple of weeks.  Complications could include excessive redness, pain, decreased vision, or changes in vision.  If problems are treated early, there is a better chance of resolution.  Please contact Dr. Blood at one of the numbers listed below if you are experiencing any problems.  If a holiday, evenings, or weekends, do not hesitate to call if you are having a problem.        hSakir Blood    [phone] CELL  [phone] CELL    [phone] CELL             [phone] CELL        If you are unable to reach any of the above doctors, please call [facility] at [phone]    IMPORTANT INFORMATION  If you have any questions or need any refills on your eye drops, please call the office at [phone].PAT PASS GIVEN/REVIEWED WITH PT.  VERBALIZED UNDERSTANDING OF THE FOLLOWING:  DO NOT EAT, DRINK, SMOKE, USE SMOKELESS TOBACCO OR CHEW GUM AFTER MIDNIGHT THE NIGHT BEFORE SURGERY.  THIS ALSO INCLUDES HARD CANDIES AND MINTS.    DO NOT SHAVE THE AREA TO BE OPERATED ON AT LEAST 48 HOURS PRIOR TO THE PROCEDURE.  DO NOT WEAR MAKE UP OR NAIL POLISH.  DO NOT LEAVE IN ANY PIERCING OR WEAR JEWELRY THE DAY OF SURGERY.      DO NOT USE ADHESIVES IF YOU WEAR DENTURES.    DO NOT WEAR EYE CONTACTS; BRING IN YOUR GLASSES.    ONLY TAKE MEDICATION THE MORNING OF YOUR PROCEDURE IF INSTRUCTED BY YOUR SURGEON WITH ENOUGH WATER TO SWALLOW THE MEDICATION.  IF YOUR SURGEON DID NOT SPECIFY WHICH MEDICATIONS TO TAKE, YOU WILL NEED TO CALL THEIR OFFICE FOR FURTHER INSTRUCTIONS AND DO AS THEY INSTRUCT.    LEAVE ANYTHING YOU CONSIDER VALUABLE AT HOME.    YOU WILL NEED TO ARRANGE FOR SOMEONE TO DRIVE YOU HOME AFTER SURGERY.  IT IS RECOMMENDED THAT YOU DO NOT DRIVE, WORK, DRINK ALCOHOL OR MAKE MAJOR DECISIONS FOR AT LEAST 24 HOURS AFTER YOUR PROCEDURE IS COMPLETE.      THE DAY OF YOUR PROCEDURE, BRING IN THE FOLLOWING IF APPLICABLE:   PICTURE ID AND INSURANCE/MEDICARE OR MEDICAID CARDS/ANY CO-PAY THAT MAY BE DUE   COPY OF ADVANCED DIRECTIVE/LIVING WILL/POWER OR    CPAP/BIPAP/INHALERS   SKIN PREP SHEET   YOUR PREADMISSION TESTING PASS (IF NOT A PHONE HISTORY)

## 2020-06-25 NOTE — OP NOTE
OPERATIVE NOTE    Date of Procedure: 6/25/2020  Patient Name: Swapnil Lawson  Patient MRN: [id number]  YOB: 1950     Preoperative Diagnosis: Right nuclear sclerotic cataract.     Postoperative Diagnosis: Right nuclear sclerotic cataract.     Procedure Performed: Phacoemulsification with implantation of a  foldable posterior chamber intraocular lens, Right eye.     Surgeon: Omar Blood MD     Anesthesia:  Monitored Anesthesia Care (MAC)      Brief History and Indication: The patient presents with a history of past progressive loss of vision.  Patient was diagnosed with cataract and requests removal for increased ability to read and see.     Operation Description: The patient was taken to the OR and prepped and draped in the usual sterile ophthalmic fashion. A lid speculum was placed in the eye.  A #75 blade was then used to make a stab incision two o’clock hours from the intended temporal clear cornea groove. The anterior chamber was then inflated with a Viscoelastic. A metal microkeratome blade was then used to enter the anterior chamber at the temporal clear cornea site. A three level tunnel incision was made. A curvilinear capsulorrhexis was then performed with a bent cystotome needle and capsulorrhexis forceps.  BSS on a 30 gauge bent cannula was used to hydro-dissect, and hydro-delineate the lens. Good fluid waves and hydro-delineation were noted. Phacoemulsification was then used to remove nuclear material without complications. The residual cortical and lenticular material was then removed with irrigation and aspiration. Viscoelastics were then used to inflate the bag in a soft shell technique. A PCIOL was injected into the bag. Post-implantation, there were no rents or tears in the bag and the lens was noted to be stable and centered. The residual Viscoelastic was then removed with irrigation and aspiration.  The wound was checked and found to be without leaks. Therefore a Polydex  ointment and one drop of a Prednisilone eye drop was placed in the eye.     Implant Information:   Implant Name Type Inv. Item Serial No.  Lot No. LRB No. Used   LENS ACRYSOF IQ SA60WF W/ULTRASERT 6X13MM ACU0T0 21.5 - [id number] - [id number] Implant LENS ACRYSOF IQ [id number] W/ULTRASERT 6X13MM ACU0T0 21.5 [id number] SERGIO NA Right 1       Complications: None    Discharge and Condition  The patient was transported to same day surgery in excellent condition and scheduled for follow-up tomorrow morning. The patient was given instructions on use of eye drops for the operative eye and was specifically instructed to call Dr. Blood at his office or home for any nausea, vomiting, headache, decreased visual acuity, or pain, or if the patient had any general concerns.    Omar Blood MD  6/25/2020

## 2020-06-25 NOTE — ANESTHESIA POSTPROCEDURE EVALUATION
Patient: Swapnil Lawson    Procedure Summary     Date:  06/25/20 Room / Location:  [facility] OR  / [facility] OR    Anesthesia Start:  0930 Anesthesia Stop:  0950    Procedure:  CATARACT PHACO EXTRACTION WITH INTRAOCULAR LENS IMPLANT RIGHT (Right Eye) Diagnosis:       Nuclear sclerotic cataract of right eye      Cortical age-related cataract of right eye      (Nuclear sclerotic cataract of right eye [H25.11])      (Cortical age-related cataract of right eye [H25.011])    Surgeon:  Omar Blood MD Provider:  Lucius Andrews CRNA    Anesthesia Type:  MAC ASA Status:  3          Anesthesia Type: MAC    Vitals  Vitals Value Taken Time   /51 6/25/2020 10:27 AM   Temp 98 °F (36.7 °C) 6/25/2020  9:55 AM   Pulse 60 6/25/2020 10:27 AM   Resp 16 6/25/2020 10:27 AM   SpO2 95 % 6/25/2020 10:27 AM           Post Anesthesia Care and Evaluation    Patient location during evaluation: bedside  Patient participation: complete - patient participated  Level of consciousness: awake and sleepy but conscious  Pain management: adequate  Airway patency: patent  Anesthetic complications: No anesthetic complications  PONV Status: none  Cardiovascular status: acceptable and hemodynamically stable  Respiratory status: acceptable, room air, nonlabored ventilation and spontaneous ventilation  Hydration status: acceptable

## 2020-07-01 ENCOUNTER — PREP FOR SURGERY (OUTPATIENT)
Dept: OTHER | Facility: HOSPITAL | Age: 70
End: 2020-07-01

## 2020-07-01 DIAGNOSIS — H25.012 CORTICAL AGE-RELATED CATARACT OF LEFT EYE: ICD-10-CM

## 2020-07-01 DIAGNOSIS — H25.12 NUCLEAR SCLEROTIC CATARACT OF LEFT EYE: Primary | ICD-10-CM

## 2020-07-01 DIAGNOSIS — Z11.59 SPECIAL SCREENING EXAMINATION FOR UNSPECIFIED VIRAL DISEASE: Primary | ICD-10-CM

## 2020-07-01 RX ORDER — TETRACAINE HYDROCHLORIDE 5 MG/ML
1 SOLUTION OPHTHALMIC SEE ADMIN INSTRUCTIONS
Status: CANCELLED | OUTPATIENT
Start: 2020-07-09

## 2020-07-01 RX ORDER — SODIUM CHLORIDE 0.9 % (FLUSH) 0.9 %
3 SYRINGE (ML) INJECTION EVERY 12 HOURS SCHEDULED
Status: CANCELLED | OUTPATIENT
Start: 2020-07-09

## 2020-07-01 RX ORDER — PHENYLEPHRINE HYDROCHLORIDE 100 MG/ML
1 SOLUTION/ DROPS OPHTHALMIC
Status: CANCELLED | OUTPATIENT
Start: 2020-07-09 | End: 2020-07-09

## 2020-07-01 RX ORDER — PREDNISOLONE ACETATE 10 MG/ML
1 SUSPENSION/ DROPS OPHTHALMIC SEE ADMIN INSTRUCTIONS
Status: CANCELLED | OUTPATIENT
Start: 2020-07-09

## 2020-07-01 RX ORDER — SODIUM CHLORIDE 0.9 % (FLUSH) 0.9 %
1-10 SYRINGE (ML) INJECTION AS NEEDED
Status: CANCELLED | OUTPATIENT
Start: 2020-07-09

## 2020-07-01 RX ORDER — CYCLOPENTOLATE HYDROCHLORIDE 20 MG/ML
1 SOLUTION/ DROPS OPHTHALMIC
Status: CANCELLED | OUTPATIENT
Start: 2020-07-09 | End: 2020-07-09

## 2020-07-06 ENCOUNTER — LAB (OUTPATIENT)
Dept: LAB | Facility: HOSPITAL | Age: 70
End: 2020-07-06

## 2020-07-06 DIAGNOSIS — Z11.59 SPECIAL SCREENING EXAMINATION FOR UNSPECIFIED VIRAL DISEASE: ICD-10-CM

## 2020-07-06 PROBLEM — H25.12 NUCLEAR SCLEROTIC CATARACT OF LEFT EYE: Status: ACTIVE | Noted: 2020-07-06

## 2020-07-06 PROBLEM — H25.012 CORTICAL AGE-RELATED CATARACT OF LEFT EYE: Status: ACTIVE | Noted: 2020-07-06

## 2020-07-06 PROCEDURE — U0004 COV-19 TEST NON-CDC HGH THRU: HCPCS

## 2020-07-06 PROCEDURE — C9803 HOPD COVID-19 SPEC COLLECT: HCPCS

## 2020-07-06 PROCEDURE — U0002 COVID-19 LAB TEST NON-CDC: HCPCS

## 2020-07-06 RX ORDER — CALCIUM POLYCARBOPHIL 625 MG 625 MG/1
625 TABLET ORAL 2 TIMES DAILY
COMMUNITY

## 2020-07-07 LAB
REF LAB TEST METHOD: NORMAL
SARS-COV-2 RNA RESP QL NAA+PROBE: NOT DETECTED

## 2020-07-09 ENCOUNTER — ANESTHESIA EVENT (OUTPATIENT)
Dept: PERIOP | Facility: HOSPITAL | Age: 70
End: 2020-07-09

## 2020-07-09 ENCOUNTER — HOSPITAL ENCOUNTER (OUTPATIENT)
Facility: HOSPITAL | Age: 70
Setting detail: HOSPITAL OUTPATIENT SURGERY
Discharge: HOME | End: 2020-07-09
Attending: OPHTHALMOLOGY | Admitting: OPHTHALMOLOGY

## 2020-07-09 ENCOUNTER — ANESTHESIA (OUTPATIENT)
Dept: PERIOP | Facility: HOSPITAL | Age: 70
End: 2020-07-09

## 2020-07-09 VITALS
OXYGEN SATURATION: 96 % | HEIGHT: 73 IN | RESPIRATION RATE: 16 BRPM | SYSTOLIC BLOOD PRESSURE: 116 MMHG | BODY MASS INDEX: 25.05 KG/M2 | DIASTOLIC BLOOD PRESSURE: 54 MMHG | HEART RATE: 58 BPM | TEMPERATURE: 97.6 F | WEIGHT: 189 LBS

## 2020-07-09 DIAGNOSIS — H25.012 CORTICAL AGE-RELATED CATARACT OF LEFT EYE: ICD-10-CM

## 2020-07-09 DIAGNOSIS — H25.12 NUCLEAR SCLEROTIC CATARACT OF LEFT EYE: ICD-10-CM

## 2020-07-09 LAB — GLUCOSE BLDC GLUCOMTR-MCNC: 156 MG/DL (ref 70–130)

## 2020-07-09 PROCEDURE — 82962 GLUCOSE BLOOD TEST: CPT

## 2020-07-09 PROCEDURE — V2632 POST CHMBR INTRAOCULAR LENS: HCPCS | Performed by: OPHTHALMOLOGY

## 2020-07-09 PROCEDURE — 25010000002 PROPOFOL 10 MG/ML EMULSION: Performed by: NURSE ANESTHETIST, CERTIFIED REGISTERED

## 2020-07-09 DEVICE — LENS ACRYSOF IQ SA60WF W/ULTRASERT 6X13MM ACU0T0 21.5: Type: IMPLANTABLE DEVICE | Status: FUNCTIONAL

## 2020-07-09 RX ORDER — ACETAZOLAMIDE 500 MG/1
500 CAPSULE, EXTENDED RELEASE ORAL ONCE
Status: COMPLETED | OUTPATIENT
Start: 2020-07-09 | End: 2020-07-09

## 2020-07-09 RX ORDER — SODIUM CHLORIDE 0.9 % (FLUSH) 0.9 %
1-10 SYRINGE (ML) INJECTION AS NEEDED
Status: DISCONTINUED | OUTPATIENT
Start: 2020-07-09 | End: 2020-07-09 | Stop reason: HOSPADM

## 2020-07-09 RX ORDER — PREDNISOLONE ACETATE 10 MG/ML
SUSPENSION/ DROPS OPHTHALMIC AS NEEDED
Status: DISCONTINUED | OUTPATIENT
Start: 2020-07-09 | End: 2020-07-09 | Stop reason: HOSPADM

## 2020-07-09 RX ORDER — TETRACAINE HYDROCHLORIDE 5 MG/ML
SOLUTION OPHTHALMIC AS NEEDED
Status: DISCONTINUED | OUTPATIENT
Start: 2020-07-09 | End: 2020-07-09 | Stop reason: HOSPADM

## 2020-07-09 RX ORDER — TETRACAINE HYDROCHLORIDE 5 MG/ML
1 SOLUTION OPHTHALMIC SEE ADMIN INSTRUCTIONS
Status: COMPLETED | OUTPATIENT
Start: 2020-07-09 | End: 2020-07-09

## 2020-07-09 RX ORDER — PHENYLEPHRINE HYDROCHLORIDE 100 MG/ML
1 SOLUTION/ DROPS OPHTHALMIC
Status: COMPLETED | OUTPATIENT
Start: 2020-07-09 | End: 2020-07-09

## 2020-07-09 RX ORDER — PREDNISOLONE ACETATE 10 MG/ML
SUSPENSION/ DROPS OPHTHALMIC
Qty: 2 ML | Refills: 0
Start: 2020-07-09 | End: 2021-05-04

## 2020-07-09 RX ORDER — SODIUM CHLORIDE 0.9 % (FLUSH) 0.9 %
3 SYRINGE (ML) INJECTION EVERY 12 HOURS SCHEDULED
Status: DISCONTINUED | OUTPATIENT
Start: 2020-07-09 | End: 2020-07-09 | Stop reason: HOSPADM

## 2020-07-09 RX ORDER — BALANCED SALT SOLUTION 6.4; .75; .48; .3; 3.9; 1.7 MG/ML; MG/ML; MG/ML; MG/ML; MG/ML; MG/ML
SOLUTION OPHTHALMIC AS NEEDED
Status: DISCONTINUED | OUTPATIENT
Start: 2020-07-09 | End: 2020-07-09 | Stop reason: HOSPADM

## 2020-07-09 RX ORDER — CYCLOPENTOLATE HYDROCHLORIDE 20 MG/ML
1 SOLUTION/ DROPS OPHTHALMIC
Status: COMPLETED | OUTPATIENT
Start: 2020-07-09 | End: 2020-07-09

## 2020-07-09 RX ORDER — PREDNISOLONE ACETATE 10 MG/ML
1 SUSPENSION/ DROPS OPHTHALMIC SEE ADMIN INSTRUCTIONS
Status: DISCONTINUED | OUTPATIENT
Start: 2020-07-09 | End: 2020-07-09 | Stop reason: HOSPADM

## 2020-07-09 RX ORDER — KETAMINE HYDROCHLORIDE 50 MG/ML
INJECTION, SOLUTION, CONCENTRATE INTRAMUSCULAR; INTRAVENOUS AS NEEDED
Status: DISCONTINUED | OUTPATIENT
Start: 2020-07-09 | End: 2020-07-09 | Stop reason: SURG

## 2020-07-09 RX ORDER — SODIUM CHLORIDE, SODIUM LACTATE, POTASSIUM CHLORIDE, CALCIUM CHLORIDE 600; 310; 30; 20 MG/100ML; MG/100ML; MG/100ML; MG/100ML
1000 INJECTION, SOLUTION INTRAVENOUS CONTINUOUS
Status: DISCONTINUED | OUTPATIENT
Start: 2020-07-09 | End: 2020-07-09 | Stop reason: HOSPADM

## 2020-07-09 RX ORDER — LIDOCAINE HYDROCHLORIDE 40 MG/ML
INJECTION, SOLUTION RETROBULBAR; TOPICAL AS NEEDED
Status: DISCONTINUED | OUTPATIENT
Start: 2020-07-09 | End: 2020-07-09 | Stop reason: HOSPADM

## 2020-07-09 RX ORDER — PROPOFOL 10 MG/ML
VIAL (ML) INTRAVENOUS AS NEEDED
Status: DISCONTINUED | OUTPATIENT
Start: 2020-07-09 | End: 2020-07-09 | Stop reason: SURG

## 2020-07-09 RX ADMIN — CYCLOPENTOLATE HYDROCHLORIDE 1 DROP: 20 SOLUTION/ DROPS OPHTHALMIC at 10:55

## 2020-07-09 RX ADMIN — TETRACAINE HYDROCHLORIDE 1 DROP: 5 SOLUTION OPHTHALMIC at 10:48

## 2020-07-09 RX ADMIN — TETRACAINE HYDROCHLORIDE 1 DROP: 5 SOLUTION OPHTHALMIC at 10:49

## 2020-07-09 RX ADMIN — SODIUM CHLORIDE, POTASSIUM CHLORIDE, SODIUM LACTATE AND CALCIUM CHLORIDE 1000 ML: 600; 310; 30; 20 INJECTION, SOLUTION INTRAVENOUS at 10:56

## 2020-07-09 RX ADMIN — ACETAZOLAMIDE 500 MG: 500 CAPSULE, EXTENDED RELEASE ORAL at 13:41

## 2020-07-09 RX ADMIN — PHENYLEPHRINE HYDROCHLORIDE 1 DROP: 100 SOLUTION/ DROPS OPHTHALMIC at 11:00

## 2020-07-09 RX ADMIN — PHENYLEPHRINE HYDROCHLORIDE 1 DROP: 100 SOLUTION/ DROPS OPHTHALMIC at 10:55

## 2020-07-09 RX ADMIN — CYCLOPENTOLATE HYDROCHLORIDE 1 DROP: 20 SOLUTION/ DROPS OPHTHALMIC at 11:00

## 2020-07-09 RX ADMIN — PROPOFOL 50 MG: 10 INJECTION, EMULSION INTRAVENOUS at 13:19

## 2020-07-09 RX ADMIN — PHENYLEPHRINE HYDROCHLORIDE 1 DROP: 100 SOLUTION/ DROPS OPHTHALMIC at 10:50

## 2020-07-09 RX ADMIN — KETAMINE HYDROCHLORIDE 20 MG: 50 INJECTION, SOLUTION INTRAMUSCULAR; INTRAVENOUS at 13:13

## 2020-07-09 RX ADMIN — PROPOFOL 50 MG: 10 INJECTION, EMULSION INTRAVENOUS at 13:13

## 2020-07-09 RX ADMIN — CYCLOPENTOLATE HYDROCHLORIDE 1 DROP: 20 SOLUTION/ DROPS OPHTHALMIC at 10:50

## 2020-07-09 NOTE — DISCHARGE INSTRUCTIONS
Post Operative Cataract Instructions     Left Eye    1.  Wear eye shield at bedtime for 3 nights.  You may wear glasses or sunglasses during the day.  You must keep eye protected at all times.  2.  Try not to sleep on the side in which the eye was operated (1-2 weeks).  3.  No heavy lifting (at least 3 days).  No bending below the waist.  Keep your head above your heart.  4.  Try not to cough or sneeze excessively.  5.  Bring eye drops and instructions with you to post-op appointment.  6.  If eyes become stuck together after surgery you may soak with warm cloth.  7. Start Prednisilone (Pred-Forte) today as soon as you get home.   *Apply 1 drop to operative eye four times a day for 7 days and then twice daily until all medication is gone.    Complications from cataract surgery usually occur within the first couple of weeks.  Complications could include excessive redness, pain, decreased vision, or changes in vision.  If problems are treated early, there is a better chance of resolution.  Please contact Dr. Blood at one of the numbers listed below if you are experiencing any problems.  If a holiday, evenings, or weekends, do not hesitate to call if you are having a problem.      Dr. Shakir Blood    [phone] CELL  [phone] CELL    [phone] CELL             [phone] CELL        If you are unable to reach any of the above doctors, please call [facility] at [phone]    IMPORTANT INFORMATION  If you have any questions or need any refills on your eye drops, please call the office at [phone].No pushing, pulling, tugging,  heavy lifting, or strenuous activity.  No major decision making, driving, or drinking alcoholic beverages for 24 hours. ( due to the medications you have  received)  Always use good hand hygiene/washing techniques.  NO driving while taking pain medications.    * if you have an incision:  Check your incision area every day  for signs of infection.   Check for:  * more redness, swelling, or pain  *more fluid or blood  *warmth  *pus or bad smell    To assist you in voiding:  Drink plenty of fluids  Listen to running water while attempting to void.    If you are unable to urinate and you have an uncomfortable urge to void or it has been   6 hours since you were discharged, return to the Emergency Room

## 2020-07-09 NOTE — ANESTHESIA POSTPROCEDURE EVALUATION
Patient: Swapnil Lawson    Procedure Summary     Date:  07/09/20 Room / Location:  [facility] OR 1 / [facility] OR    Anesthesia Start:  1312 Anesthesia Stop:      Procedure:  CATARACT PHACO EXTRACTION WITH INTRAOCULAR LENS IMPLANT LEFT (Left Eye) Diagnosis:       Nuclear sclerotic cataract of left eye      Cortical age-related cataract of left eye      (Nuclear sclerotic cataract of left eye [H25.12])      (Cortical age-related cataract of left eye [H25.012])    Surgeon:  Omar Blood MD Provider:  Jack Mejia CRNA    Anesthesia Type:  MAC ASA Status:  3          Anesthesia Type: MAC    Vitals  BP 99/54  HR 61  Resp 20  Sat 98  Temp 98        Post Anesthesia Care and Evaluation    Patient location during evaluation: bedside  Patient participation: complete - patient participated  Level of consciousness: awake and alert  Pain score: 0  Pain management: adequate  Airway patency: patent  Anesthetic complications: No anesthetic complications  PONV Status: none  Cardiovascular status: acceptable  Respiratory status: acceptable  Hydration status: acceptable

## 2020-07-09 NOTE — ANESTHESIA PREPROCEDURE EVALUATION
Anesthesia Evaluation     Patient summary reviewed and Nursing notes reviewed   no history of anesthetic complications:  NPO Solid Status: > 8 hours  NPO Liquid Status: > 8 hours           Airway   Mallampati: I  TM distance: >3 FB  Neck ROM: full  No difficulty expected  Dental - normal exam     Pulmonary - normal exam   (+) a smoker Former, COPD, home oxygen, shortness of breath, sleep apnea,   Cardiovascular - normal exam    Patient on routine beta blocker    (+) hypertension, past MI  >12 months, CAD, dysrhythmias Atrial Fib, Paroxysmal Atrial Fib, angina, hyperlipidemia,       Neuro/Psych- negative ROS  GI/Hepatic/Renal/Endo    (+)   diabetes mellitus,     Musculoskeletal (-) negative ROS    Abdominal  - normal exam   Substance History - negative use     OB/GYN negative ob/gyn ROS         Other                          Anesthesia Plan    ASA 3     MAC   (Pt advised that intravenous sedation will be used as primary anesthetic technique, along with topical anesthesia. Every effort will be made to make sure patient is comfortable.     The patient was told that they may experience recall for the procedure. Pt verbalized understanding and agrees to plan of care.)  intravenous induction     Anesthetic plan, all risks, benefits, and alternatives have been provided, discussed and informed consent has been obtained with: patient.

## 2020-07-09 NOTE — OP NOTE
OPERATIVE NOTE    Date of Procedure: 7/9/2020  Patient Name: Swapnil Lawson  Patient MRN: [id number]  YOB: 1950     Preoperative Diagnosis: Left nuclear sclerotic cataract.     Postoperative Diagnosis: Left nuclear sclerotic cataract.     Procedure Performed: Phacoemulsification with implantation of a  foldable posterior chamber intraocular lens, Left eye.     Surgeon: Omar Blood MD     Anesthesia:  Monitored Anesthesia Care (MAC)      Brief History and Indication: The patient presents with a history of past progressive loss of vision.  Patient was diagnosed with cataract and requests removal for increased ability to read and see.     Operation Description: The patient was taken to the OR and prepped and draped in the usual sterile ophthalmic fashion. A lid speculum was placed in the eye.  A #75 blade was then used to make a stab incision two o’clock hours from the intended temporal clear cornea groove. The anterior chamber was then inflated with a Viscoelastic. A metal microkeratome blade was then used to enter the anterior chamber at the temporal clear cornea site. A three level tunnel incision was made. A curvilinear capsulorrhexis was then performed with a bent cystotome needle and capsulorrhexis forceps.  BSS on a 30 gauge bent cannula was used to hydro-dissect, and hydro-delineate the lens. Good fluid waves and hydro-delineation were noted. Phacoemulsification was then used to remove nuclear material without complications. The residual cortical and lenticular material was then removed with irrigation and aspiration. Viscoelastics were then used to inflate the bag in a soft shell technique. A PCIOL was injected into the bag. Post-implantation, there were no rents or tears in the bag and the lens was noted to be stable and centered. The residual Viscoelastic was then removed with irrigation and aspiration.  The wound was checked and found to be without leaks. Therefore a Polydex ointment  and one drop of a Prednisilone eye drop was placed in the eye.     Implant Information:   Implant Name Type Inv. Item Serial No.  Lot No. LRB No. Used   LENS ACRYSOF IQ SA60WF W/ULTRASERT [id number] ACU0T0 21.5 - [id number] - [id number] Implant LENS ACRYSOF IQ SA60WF W/ULTRASERT 6X13MM ACU0T0 21.5 [id number] SERGIO  Left 1       Complications: None    Discharge and Condition  The patient was transported to same day surgery in excellent condition and scheduled for follow-up tomorrow morning. The patient was given instructions on use of eye drops for the operative eye and was specifically instructed to call Dr. Blood at his office or home for any nausea, vomiting, headache, decreased visual acuity, or pain, or if the patient had any general concerns.    Omar Blood MD  7/9/2020

## 2020-07-09 NOTE — H&P
[facility]         History and Physical    Patient Name: Swapnil Lawson  MRN: [id number]  : 1950  Gender: male     HPI: Patient complaint of PPLOV Left eye diagnosed with cataract. Patient requests PHACO PCIOL for Increase of VA/ADL.    History:    Past Medical History:   Diagnosis Date   • COPD (chronic obstructive pulmonary disease) (CMS/[facility])    • Coronary artery disease 2016    CABG, , Abimael Tomlin MD. CABG, 2013, [facility].   • Diabetes mellitus (CMS/HCC) 2016   • Gout    • Hyperlipidemia 2016   • Hypertension 2016   • Myocardial infarction (CMS/[facility])      in  and  prior to CABG.   • Paroxysmal atrial fibrillation (CMS/[facility]) 2016   • Sleep apnea    • Venous insufficiency 2016       Past Surgical History:   Procedure Laterality Date   • APPENDECTOMY     • CARDIAC CATHETERIZATION     • CARDIAC SURGERY       and    • CATARACT EXTRACTION W/ INTRAOCULAR LENS IMPLANT Right 2020    Procedure: CATARACT PHACO EXTRACTION WITH INTRAOCULAR LENS IMPLANT RIGHT;  Surgeon: Omar Blood MD;  [facility];  Service: Ophthalmology;  Laterality: Right;   • CHOLECYSTECTOMY     • COLONOSCOPY     • FINGER SURGERY      Rt index (second finger)   • OTHER SURGICAL HISTORY      Bone spur removal   • TOE AMPUTATION     • VASECTOMY         Social History     Socioeconomic History   • Marital status:      Spouse name: Not on file   • Number of children: Not on file   • Years of education: Not on file   • Highest education level: Not on file   Tobacco Use   • Smoking status: Former Smoker     Packs/day: 1.00     Types: Cigarettes     Last attempt to quit:      Years since quittin.5   • Smokeless tobacco: Never Used   Substance and Sexual Activity   • Alcohol use: No   • Drug use: No   • Sexual activity: Defer       Family History   Problem Relation Age of Onset   • COPD Mother     • Heart attack Father        Prior to Admission Medications:  Medications Prior to Admission   Medication Sig Dispense Refill Last Dose   • allopurinol (ZYLOPRIM) 300 MG tablet Take 300 mg by mouth Daily.  0 7/8/2020 at 1600   • amLODIPine-benazepril (LOTREL 5-20) 5-20 MG per capsule Take 1 capsule by mouth 2 (Two) Times a Day.   7/9/2020 at 0600   • aspirin 81 MG tablet Take 81 mg by mouth Daily.   7/9/2020 at 0600   • calcium polycarbophil (FIBERCON) 625 MG tablet Take 625 mg by mouth Daily. Takes four tablets daily   7/8/2020 at 1600   • digoxin (LANOXIN) 125 MCG tablet Take 1 tablet by mouth Every Other Day. 15 tablet 1 7/9/2020 at 0600   • diphenhydrAMINE (BENADRYL) 25 MG tablet Take 25 mg by mouth Every 6 (Six) Hours As Needed for Itching.   7/8/2020 at 1600   • ferrous sulfate 325 (65 FE) MG tablet Take 325 mg by mouth 2 (Two) Times a Day.   7/9/2020 at 0600   • furosemide (LASIX) 20 MG tablet Take 1 tablet by mouth Daily AND 2 tablets Every Other Day. (Patient taking differently: Take 1 tablet by mouth twice aq day) 60 tablet 0 7/9/2020 at 0600   • glipiZIDE (GLUCOTROL) 10 MG tablet Take 10 mg by mouth 3 (Three) Times a Day.   7/8/2020 at 1600   • ipratropium (ATROVENT) 0.02 % nebulizer solution Take 0.5 mg by nebulization Every 4 (Four) Hours As Needed.   7/9/2020 at 0600   • lovastatin (MEVACOR) 20 MG tablet Take 20 mg by mouth Every Night.   7/8/2020 at 1600   • metoprolol succinate XL (TOPROL-XL) 25 MG 24 hr tablet Take 25 mg by mouth Daily.   7/9/2020 at 0600   • O2 (OXYGEN) Inhale 2 L/min Every Night.   7/8/2020 at Unknown time   • warfarin (COUMADIN) 5 MG tablet Take 7.5 mg by mouth Daily.   7/9/2020 at 0600   • colchicine 0.6 MG tablet Take 0.6 mg by mouth As Needed for Muscle / Joint Pain.   More than a month at Unknown time   • nitroglycerin (NITROSTAT) 0.4 MG SL tablet Place 0.4 mg under the tongue Every 5 (Five) Minutes As Needed for Chest Pain. Take no more than 3 doses in 15 minutes.    Unknown at Unknown time   • RA COL-RITE 100 MG capsule Take 100 mg by mouth 2 (Two) Times a Day.  0 Past Week at Unknown time   • spironolactone (ALDACTONE) 25 MG tablet Take 25 mg by mouth Daily.  0 6/25/2020 at 0100       Allergies:  No Known Allergies     Vitals: Temp:  [97.8 °F (36.6 °C)] 97.8 °F (36.6 °C)  Heart Rate:  [67] 67  Resp:  [18] 18  BP: (115)/(61) 115/61    Review of Systems:   Within Normal Limits Abnormal   HEENT [x]    []     Cardiovascular [x]   []     Gastrointestinal [x]   []     Genitourinary [x]   []     Neurologic [x]   []     Pulmonary [x]   []       Physical Exam:   Within Normal Limits Abnormal   HEENT [x]    []     Heart [x]   []     Lungs [x]   []     Abdomen [x]   []     Extremities [x]   []       Impression: Left nuclear sclerotic cataract.     Plan: CATARACT PHACO EXTRACTION WITH INTRAOCULAR LENS IMPLANT LEFT (Left)     Omar Blood MD  7/9/2020

## 2020-07-29 ENCOUNTER — HOSPITAL ENCOUNTER (OUTPATIENT)
Facility: HOSPITAL | Age: 70
Discharge: HOME | End: 2020-07-29
Payer: MEDICARE

## 2020-07-29 ENCOUNTER — HOSPITAL ENCOUNTER (OUTPATIENT)
Dept: ULTRASOUND IMAGING | Facility: HOSPITAL | Age: 70
Discharge: HOME | End: 2020-07-29
Payer: MEDICARE

## 2020-07-29 PROCEDURE — 36415 COLL VENOUS BLD VENIPUNCTURE: CPT

## 2020-07-29 PROCEDURE — 76770 US EXAM ABDO BACK WALL COMP: CPT

## 2020-08-13 ENCOUNTER — OFFICE VISIT (OUTPATIENT)
Dept: CARDIOLOGY | Facility: CLINIC | Age: 70
End: 2020-08-13

## 2020-08-13 VITALS
HEIGHT: 73 IN | WEIGHT: 195 LBS | SYSTOLIC BLOOD PRESSURE: 110 MMHG | HEART RATE: 58 BPM | OXYGEN SATURATION: 98 % | BODY MASS INDEX: 25.84 KG/M2 | DIASTOLIC BLOOD PRESSURE: 60 MMHG

## 2020-08-13 DIAGNOSIS — I48.0 PAROXYSMAL ATRIAL FIBRILLATION (HCC): ICD-10-CM

## 2020-08-13 DIAGNOSIS — I25.10 CORONARY ARTERY DISEASE INVOLVING NATIVE CORONARY ARTERY OF NATIVE HEART WITHOUT ANGINA PECTORIS: Primary | ICD-10-CM

## 2020-08-13 DIAGNOSIS — I10 ESSENTIAL HYPERTENSION: ICD-10-CM

## 2020-08-13 DIAGNOSIS — E78.2 MIXED HYPERLIPIDEMIA: ICD-10-CM

## 2020-08-13 PROCEDURE — 99214 OFFICE O/P EST MOD 30 MIN: CPT | Performed by: INTERNAL MEDICINE

## 2020-08-13 RX ORDER — DOCUSATE SODIUM 100 MG/1
300 CAPSULE, LIQUID FILLED ORAL 2 TIMES DAILY PRN
COMMUNITY

## 2020-08-13 RX ORDER — AMLODIPINE BESYLATE 5 MG/1
5 TABLET ORAL 2 TIMES DAILY
COMMUNITY
End: 2021-05-04

## 2020-08-13 NOTE — PROGRESS NOTES
[facility]  Office visit  Swapnil Lawson  1950  [phone]    VISIT DATE:  8/13/2020      PCP: Shakir Mccarthy MD  [address] [place] [id number]    CC:  Chief Complaint   Patient presents with   • Coronary artery disease involving native coronary artery of        PROBLEM LIST:  1. Coronary artery disease:  a. CABG, 1993, Abimael Tomlin MD.  b. CABG, August 2013, [facility].  c. January 2018 echo - Ejection fraction is visually estimated to be 40-45 %.   mild concentric left ventricular hypertrophy.   Pseudonormal filling pattern noted.   Akinesis of the mid posterolateral, and basal to mid inferior walls   consistent with previous myocardial infarction.  2. Hypertension.  3. Paroxysmal atrial fibrillation.  4. Diabetes mellitus.   5. Venous insufficiency.   6.  Surgical history:  a.  Appendectomy, 1981.  b. Cholecystectomy, 2002.  c. Toe amputation, 2009.  d. Bone spur removal, 2010.  7. Previous history of myocardial infarction in 1992 and 1993 prior to CABG.  8. Hyperlipidemia.      Cardiac studies and procedures:  February 2019:Transthoracic echo - EF of 50%, moderately dilated left ventricle, moderately dilated right ventricle with normal function, moderate to severe left atrial enlargement, dilated IVC    ASSESSMENT:   Diagnosis Plan   1. Coronary artery disease involving native coronary artery of native heart without angina pectoris     2. Essential hypertension     3. Paroxysmal atrial fibrillation (CMS/[facility])     4. Mixed hyperlipidemia         PLAN:  Congestive heart failure, diastolic, chronic: Currently euvolemic and compensated.  Continue current medical therapy.  Continue current dose of Lasix and spironolactone, trending renal function closely.  Will take an extra dose of Lasix if he gains more than 3 to 5 pounds above his baseline weight.    Coronary artery disease: Currently stable and asymptomatic.  Continue aspirin, statin and afterload  "reduction    Hypertension: Goal less than 130/80 mmHg.  Controlled based on home blood pressure readings.  Continue current medications.    Peripheral edema: Consistent with venous insufficiency.  Currently well controlled, continue low-dose loop diuretic and compression stockings    Hyperlipidemia: Goal LDL <100, ideally less than 70.  Continue statin.    Persistent atrial fibrillation: Currently asymptomatic.  Continue stroke prophylaxis with Coumadin, goal INR 2-3.  Continue rate control with combination of digoxin and metoprolol.  Goal dig level less than 1.    Subjective  Has been doing very well without limitations.  Lower extremity edema is well controlled on current medical therapy.  Blood pressures are running less than 120/80 mmHg.  Compliant with compression stockings.  Baseline weight is been stable.  Has upcoming routine lab work at primary care physician's office in 2 weeks.  September 2019 creatinine 2.04.    PHYSICAL EXAMINATION:  Vitals:    08/13/20 0906   BP: 110/60   BP Location: Left arm   Patient Position: Sitting   Pulse: 58   SpO2: 98%   Weight: 88.5 kg (195 lb)   Height: 185.4 cm (73\")     General Appearance:    Alert, cooperative, no distress, appears stated age   Head:    Normocephalic, without obvious abnormality, atraumatic   Eyes:    conjunctiva/corneas clear   Nose:   Nares normal, septum midline, mucosa normal, no drainage   Throat:   Lips, teeth and gums normal   Neck:   Supple, symmetrical, trachea midline, no carotid    bruit or JVD   Lungs:     Minimal right basilar inspiratory rales, respirations unlabored   Chest Wall:    No tenderness or deformity    Heart:   Irregularly irregular, S1 and S2 normal, no murmur, rub   or gallop, normal carotid impulse bilaterally without bruit.   Abdomen:     Soft, non-tender   Extremities:   Extremities normal, atraumatic, no cyanosis, trivial bilateral pretibial edema, compression stockings in place    Pulses:   2+ and symmetric all extremities "   Skin:   Skin color, texture, turgor normal, no rashes or lesions       Diagnostic Data:  Procedures  No results found for: CHLPL, TRIG, HDL, LDLDIRECT  No results found for: GLUCOSE, BUN, CREATININE, NA, K, CL, CO2, CREATININE, BUN  No results found for: HGBA1C  Lab Results   Component Value Date    WBC 6.0 02/06/2019    HGB 12.3 (L) 02/06/2019    HCT 38.7 (L) 02/06/2019     (L) 02/06/2019       Allergies  No Known Allergies    Current Medications    Current Outpatient Medications:   •  allopurinol (ZYLOPRIM) 300 MG tablet, Take 300 mg by mouth Daily., Disp: , Rfl: 0  •  amLODIPine (NORVASC) 5 MG tablet, Take 5 mg by mouth 2 (two) times a day., Disp: , Rfl:   •  aspirin 81 MG tablet, Take 81 mg by mouth Daily., Disp: , Rfl:   •  calcium polycarbophil (FIBERCON) 625 MG tablet, Take 625 mg by mouth Daily. Takes four tablets daily, Disp: , Rfl:   •  colchicine 0.6 MG tablet, Take 0.6 mg by mouth As Needed for Muscle / Joint Pain., Disp: , Rfl:   •  digoxin (LANOXIN) 125 MCG tablet, Take 1 tablet by mouth Every Other Day., Disp: 15 tablet, Rfl: 1  •  diphenhydrAMINE (BENADRYL) 25 MG tablet, Take 25 mg by mouth Every 6 (Six) Hours As Needed for Itching., Disp: , Rfl:   •  docusate sodium (COLACE) 100 MG capsule, Take 100 mg by mouth 2 (Two) Times a Day., Disp: , Rfl:   •  ferrous sulfate 325 (65 FE) MG tablet, Take 325 mg by mouth 2 (Two) Times a Day., Disp: , Rfl:   •  furosemide (LASIX) 20 MG tablet, Take 1 tablet by mouth Daily AND 2 tablets Every Other Day. (Patient taking differently: Take 1 tablet by mouth twice aq day), Disp: 60 tablet, Rfl: 0  •  glipiZIDE (GLUCOTROL) 10 MG tablet, Take 10 mg by mouth 3 (Three) Times a Day., Disp: , Rfl:   •  ipratropium (ATROVENT) 0.02 % nebulizer solution, Take 0.5 mg by nebulization Every 4 (Four) Hours As Needed., Disp: , Rfl:   •  lovastatin (MEVACOR) 20 MG tablet, Take 20 mg by mouth Every Night., Disp: , Rfl:   •  metoprolol succinate XL (TOPROL-XL) 25 MG 24 hr  tablet, Take 25 mg by mouth Daily., Disp: , Rfl:   •  nitroglycerin (NITROSTAT) 0.4 MG SL tablet, Place 0.4 mg under the tongue Every 5 (Five) Minutes As Needed for Chest Pain. Take no more than 3 doses in 15 minutes., Disp: , Rfl:   •  O2 (OXYGEN), Inhale 2 L/min Every Night., Disp: , Rfl:   •  prednisoLONE acetate (Pred Forte) 1 % ophthalmic suspension, Follow instructions on the After Visit Summary., Disp: 2 mL, Rfl: 0  •  warfarin (COUMADIN) 5 MG tablet, Take 7.5 mg by mouth Daily., Disp: , Rfl:           ROS  Review of Systems   Cardiovascular: Positive for dyspnea on exertion, irregular heartbeat and leg swelling. Negative for chest pain and palpitations.   Respiratory: Negative for cough.        SOCIAL HX  Social History     Socioeconomic History   • Marital status:      Spouse name: Not on file   • Number of children: Not on file   • Years of education: Not on file   • Highest education level: Not on file   Tobacco Use   • Smoking status: Former Smoker     Packs/day: 1.00     Types: Cigarettes     Last attempt to quit:      Years since quittin.6   • Smokeless tobacco: Never Used   Substance and Sexual Activity   • Alcohol use: No   • Drug use: No   • Sexual activity: Defer       FAMILY HX  Family History   Problem Relation Age of Onset   • COPD Mother    • Heart attack Father              Naseem Campbell III, MD, FACC

## 2020-09-25 ENCOUNTER — HOSPITAL ENCOUNTER (OUTPATIENT)
Facility: HOSPITAL | Age: 70
Discharge: HOME | End: 2020-09-25
Payer: MEDICARE

## 2020-09-25 PROCEDURE — 36415 COLL VENOUS BLD VENIPUNCTURE: CPT

## 2020-11-04 ENCOUNTER — LAB (OUTPATIENT)
Dept: LAB | Facility: HOSPITAL | Age: 70
End: 2020-11-04

## 2020-11-04 ENCOUNTER — CONSULT (OUTPATIENT)
Dept: ONCOLOGY | Facility: CLINIC | Age: 70
End: 2020-11-04

## 2020-11-04 VITALS
RESPIRATION RATE: 16 BRPM | HEART RATE: 99 BPM | SYSTOLIC BLOOD PRESSURE: 134 MMHG | HEIGHT: 73 IN | WEIGHT: 197 LBS | OXYGEN SATURATION: 90 % | TEMPERATURE: 96.9 F | DIASTOLIC BLOOD PRESSURE: 65 MMHG | BODY MASS INDEX: 26.11 KG/M2

## 2020-11-04 DIAGNOSIS — D69.6 THROMBOCYTOPENIA (HCC): ICD-10-CM

## 2020-11-04 DIAGNOSIS — D69.6 THROMBOCYTOPENIA (HCC): Primary | ICD-10-CM

## 2020-11-04 LAB
ALBUMIN SERPL-MCNC: 4.3 G/DL (ref 3.5–5.2)
ALBUMIN/GLOB SERPL: 1.6 G/DL
ALP SERPL-CCNC: 88 U/L (ref 39–117)
ALT SERPL W P-5'-P-CCNC: 11 U/L (ref 1–41)
ANION GAP SERPL CALCULATED.3IONS-SCNC: 12 MMOL/L (ref 5–15)
AST SERPL-CCNC: 12 U/L (ref 1–40)
BILIRUB SERPL-MCNC: 0.6 MG/DL (ref 0–1.2)
BUN SERPL-MCNC: 21 MG/DL (ref 8–23)
BUN/CREAT SERPL: 13.3 (ref 7–25)
CALCIUM SPEC-SCNC: 9.9 MG/DL (ref 8.6–10.5)
CHLORIDE SERPL-SCNC: 95 MMOL/L (ref 98–107)
CO2 SERPL-SCNC: 31 MMOL/L (ref 22–29)
CREAT SERPL-MCNC: 1.58 MG/DL (ref 0.76–1.27)
CYTOLOGIST CVX/VAG CYTO: NORMAL
ERYTHROCYTE [DISTWIDTH] IN BLOOD BY AUTOMATED COUNT: 16.2 % (ref 12.3–15.4)
FERRITIN SERPL-MCNC: 440.3 NG/ML (ref 30–400)
FOLATE SERPL-MCNC: 10.2 NG/ML (ref 4.78–24.2)
GFR SERPL CREATININE-BSD FRML MDRD: 43 ML/MIN/1.73
GLOBULIN UR ELPH-MCNC: 2.7 GM/DL
GLUCOSE SERPL-MCNC: 316 MG/DL (ref 65–99)
HCT VFR BLD AUTO: 40.2 % (ref 37.5–51)
HGB BLD-MCNC: 12.9 G/DL (ref 13–17.7)
HIV1+2 AB SER QL: NORMAL
IRON 24H UR-MRATE: 58 MCG/DL (ref 59–158)
IRON SATN MFR SERPL: 18 % (ref 20–50)
LYMPHOCYTES # BLD AUTO: 1.5 10*3/MM3 (ref 0.7–3.1)
LYMPHOCYTES NFR BLD AUTO: 17.2 % (ref 19.6–45.3)
MCH RBC QN AUTO: 29.5 PG (ref 26.6–33)
MCHC RBC AUTO-ENTMCNC: 32.1 G/DL (ref 31.5–35.7)
MCV RBC AUTO: 91.8 FL (ref 79–97)
MONOCYTES # BLD AUTO: 0.7 10*3/MM3 (ref 0.1–0.9)
MONOCYTES NFR BLD AUTO: 7.8 % (ref 5–12)
NEUTROPHILS NFR BLD AUTO: 6.5 10*3/MM3 (ref 1.7–7)
NEUTROPHILS NFR BLD AUTO: 75 % (ref 42.7–76)
PATH INTERP BLD-IMP: NORMAL
PLATELET # BLD AUTO: 140 10*3/MM3 (ref 140–450)
PMV BLD AUTO: 6.6 FL (ref 6–12)
POTASSIUM SERPL-SCNC: 4 MMOL/L (ref 3.5–5.2)
PROT SERPL-MCNC: 7 G/DL (ref 6–8.5)
RBC # BLD AUTO: 4.38 10*6/MM3 (ref 4.14–5.8)
SODIUM SERPL-SCNC: 138 MMOL/L (ref 136–145)
TIBC SERPL-MCNC: 323 MCG/DL (ref 298–536)
TRANSFERRIN SERPL-MCNC: 217 MG/DL (ref 200–360)
VIT B12 BLD-MCNC: 378 PG/ML (ref 211–946)
WBC # BLD AUTO: 8.7 10*3/MM3 (ref 3.4–10.8)

## 2020-11-04 PROCEDURE — 82728 ASSAY OF FERRITIN: CPT

## 2020-11-04 PROCEDURE — 36415 COLL VENOUS BLD VENIPUNCTURE: CPT

## 2020-11-04 PROCEDURE — 84466 ASSAY OF TRANSFERRIN: CPT

## 2020-11-04 PROCEDURE — 85060 BLOOD SMEAR INTERPRETATION: CPT

## 2020-11-04 PROCEDURE — 82607 VITAMIN B-12: CPT

## 2020-11-04 PROCEDURE — G0432 EIA HIV-1/HIV-2 SCREEN: HCPCS

## 2020-11-04 PROCEDURE — 82746 ASSAY OF FOLIC ACID SERUM: CPT

## 2020-11-04 PROCEDURE — 83540 ASSAY OF IRON: CPT

## 2020-11-04 PROCEDURE — 85025 COMPLETE CBC W/AUTO DIFF WBC: CPT

## 2020-11-04 PROCEDURE — 80053 COMPREHEN METABOLIC PANEL: CPT

## 2020-11-04 PROCEDURE — 99204 OFFICE O/P NEW MOD 45 MIN: CPT | Performed by: INTERNAL MEDICINE

## 2020-11-04 RX ORDER — OLOPATADINE HYDROCHLORIDE 7 MG/ML
SOLUTION OPHTHALMIC
COMMUNITY
Start: 2020-08-15 | End: 2022-01-01

## 2020-11-04 RX ORDER — ACETAMINOPHEN AND CODEINE PHOSPHATE 300; 30 MG/1; MG/1
TABLET ORAL
COMMUNITY
Start: 2020-09-16 | End: 2021-05-04

## 2020-11-04 RX ORDER — IBUPROFEN 800 MG/1
TABLET ORAL
COMMUNITY
Start: 2020-09-20 | End: 2021-05-04

## 2020-11-04 RX ORDER — CYCLOBENZAPRINE HCL 10 MG
TABLET ORAL
COMMUNITY
Start: 2020-10-20 | End: 2021-05-04

## 2020-11-04 NOTE — PROGRESS NOTES
New Patient Office Visit      Date: 2020     Patient Name: Swapnil Lawson  MRN: [id number]  : 1950  Referring Physician: Shakir Mccarthy    Chief Complaint: Establish care for thrombocytopenia    History of Present Illness: Swapnil Lawson is a pleasant 70 y.o. male past medical history of atrial fibrillation, CAD, hypertension, hyperlipidemia, type 2 diabetes, iron deficiency, gout, COPD who presents today for evaluation of thrombocytopenia. The patient has been in his usual state of health and followed by his PCP with recent labs showing a platelet count of 94 in 2020.  On chart review patient has had a platelet count of 120 in 2019.  He feels well denies any fatigue or any bleeding or bruising episodes.  Denies any night sweats, weight loss, unexplained fevers.  Does take a baby aspirin daily and colchicine as needed for gout but denies any heavy NSAID use.  Denies any work exposure to any heavy chemicals.  Hemoglobin has been slightly low around 12-13 range and white count has been within normal limits on lab check.  He has baseline shortness of breath with exertion and requires supplemental oxygen.  He is otherwise compliant with his other medications    Oncology History:    Oncology/Hematology History    No history exists.       Subjective      Review of Systems:     Constitutional: Negative for fevers, chills, or weight loss  Eyes: Negative for blurred vision or discharge         Ear/Nose/Throat: Negative for difficulty swallowing, sore throat, LAD                                                       Respiratory: Negative for cough, SOA, wheezing                                                                                        Cardiovascular: Negative for chest pain or palpitations                                                                  Gastrointestinal: Negative for nausea, vomiting or diarrhea                                                                      Genitourinary: Negative for dysuria or hematuria                                                                                           Musculoskeletal: Negative for any joint pains or muscle aches                                                                        Neurologic: Negative for any weakness, headaches, dizziness                                                                         Hematologic: Negative for any easy bleeding or bruising                                                                                   Psychiatric: Negative for anxiety or depression                             Past Medical History:   Past Medical History:   Diagnosis Date   • COPD (chronic obstructive pulmonary disease) (CMS/[facility])    • Coronary artery disease 12/14/2016    CABG, 1993, Abimael Tomlin MD. CABG, August 2013, [facility].   • Diabetes mellitus (CMS/HCC) 12/14/2016   • Gout    • Hyperlipidemia 12/14/2016   • Hypertension 12/14/2016   • Myocardial infarction (CMS/[facility])      in 1992 and 1993 prior to CABG.   • Paroxysmal atrial fibrillation (CMS/[facility]) 12/14/2016   • Sleep apnea    • Venous insufficiency 12/14/2016       Past Surgical History:   Past Surgical History:   Procedure Laterality Date   • APPENDECTOMY  1981   • CARDIAC CATHETERIZATION     • CARDIAC SURGERY      1993 and 2013   • CATARACT EXTRACTION W/ INTRAOCULAR LENS IMPLANT Right 6/25/2020    Procedure: CATARACT PHACO EXTRACTION WITH INTRAOCULAR LENS IMPLANT RIGHT;  Surgeon: Omar Blood MD;  Location: [facility] OR;  Service: Ophthalmology;  Laterality: Right;   • CATARACT EXTRACTION W/ INTRAOCULAR LENS IMPLANT Left 7/9/2020    Procedure: CATARACT PHACO EXTRACTION WITH INTRAOCULAR LENS IMPLANT LEFT;  Surgeon: Omar Blood MD;  Location: [facility];  Service: Ophthalmology;  Laterality: Left;   • CHOLECYSTECTOMY  2002   • COLONOSCOPY     • FINGER SURGERY      Rt index (second finger)   • OTHER SURGICAL HISTORY  2010    Bone  spur removal   • TOE AMPUTATION  2009   • VASECTOMY         Family History:   Family History   Problem Relation Age of Onset   • COPD Mother    • Heart attack Father        Social History:   Social History     Socioeconomic History   • Marital status:      Spouse name: Not on file   • Number of children: Not on file   • Years of education: Not on file   • Highest education level: Not on file   Tobacco Use   • Smoking status: Former Smoker     Packs/day: 1.00     Types: Cigarettes     Quit date:      Years since quittin.8   • Smokeless tobacco: Never Used   Substance and Sexual Activity   • Alcohol use: No   • Drug use: No   • Sexual activity: Defer       Medications:     Current Outpatient Medications:   •  acetaminophen-codeine (TYLENOL #3) 300-30 MG per tablet, TK 1 T PO Q 4 TO 6 H PRN, Disp: , Rfl:   •  allopurinol (ZYLOPRIM) 300 MG tablet, Take 300 mg by mouth Daily., Disp: , Rfl: 0  •  amLODIPine (NORVASC) 5 MG tablet, Take 5 mg by mouth 2 (two) times a day., Disp: , Rfl:   •  aspirin 81 MG tablet, Take 81 mg by mouth Daily., Disp: , Rfl:   •  calcium polycarbophil (FIBERCON) 625 MG tablet, Take 625 mg by mouth Daily. Takes four tablets daily, Disp: , Rfl:   •  colchicine 0.6 MG tablet, Take 0.6 mg by mouth As Needed for Muscle / Joint Pain., Disp: , Rfl:   •  cyclobenzaprine (FLEXERIL) 10 MG tablet, TK 1 T PO Q 8 TO 12 HOURS, Disp: , Rfl:   •  digoxin (LANOXIN) 125 MCG tablet, Take 1 tablet by mouth Every Other Day., Disp: 15 tablet, Rfl: 1  •  diphenhydrAMINE (BENADRYL) 25 MG tablet, Take 25 mg by mouth Every 6 (Six) Hours As Needed for Itching., Disp: , Rfl:   •  docusate sodium (COLACE) 100 MG capsule, Take 100 mg by mouth 2 (Two) Times a Day., Disp: , Rfl:   •  ferrous sulfate 325 (65 FE) MG tablet, Take 325 mg by mouth 2 (Two) Times a Day., Disp: , Rfl:   •  furosemide (LASIX) 20 MG tablet, Take 1 tablet by mouth Daily AND 2 tablets Every Other Day. (Patient taking differently: Take 1  "tablet by mouth twice aq day), Disp: 60 tablet, Rfl: 0  •  glipiZIDE (GLUCOTROL) 10 MG tablet, Take 10 mg by mouth 3 (Three) Times a Day., Disp: , Rfl:   •  ibuprofen (ADVIL,MOTRIN) 800 MG tablet, TAKE 1 TABLET BY MOUTH EVERY 12 HOURS AS NEEDED FOR PAIN CONTROL, Disp: , Rfl:   •  ipratropium (ATROVENT) 0.02 % nebulizer solution, Take 0.5 mg by nebulization Every 4 (Four) Hours As Needed., Disp: , Rfl:   •  lovastatin (MEVACOR) 20 MG tablet, Take 20 mg by mouth Every Night., Disp: , Rfl:   •  metoprolol succinate XL (TOPROL-XL) 25 MG 24 hr tablet, Take 25 mg by mouth Daily., Disp: , Rfl:   •  nitroglycerin (NITROSTAT) 0.4 MG SL tablet, Place 0.4 mg under the tongue Every 5 (Five) Minutes As Needed for Chest Pain. Take no more than 3 doses in 15 minutes., Disp: , Rfl:   •  O2 (OXYGEN), Inhale 2 L/min Every Night., Disp: , Rfl:   •  Pazeo 0.7 % solution, INSTILL 1 DROP INTO BOTH EYES ONCE DAILY, Disp: , Rfl:   •  prednisoLONE acetate (Pred Forte) 1 % ophthalmic suspension, Follow instructions on the After Visit Summary., Disp: 2 mL, Rfl: 0  •  warfarin (COUMADIN) 5 MG tablet, Take 7.5 mg by mouth Daily., Disp: , Rfl:     Allergies:   No Known Allergies    Objective     Physical Exam:  Vital Signs:   Vitals:    11/04/20 0836   BP: 134/65   Pulse: 99   Resp: 16   Temp: 96.9 °F (36.1 °C)   TempSrc: Temporal   SpO2: 90%   Weight: 89.4 kg (197 lb)   Height: 185.4 cm (72.99\")   PainSc: 0-No pain     Pain Score    11/04/20 0836   PainSc: 0-No pain     ECOG Performance Status: 1 - Symptomatic but completely ambulatory    Constitutional: NAD, ECOG 1  Eyes: PERRLA, scleral anicteric  ENT: No LAD, no thyromegaly  Respiratory: CTAB, no wheezing, rales, rhonchi  Cardiovascular: RRR, no murmurs, pulses 2+ bilaterally  Abdomen: soft, NT/ND, no HSM  Musculoskeletal: strength 5/5 bilaterally, no c/c/e  Neurologic: A&O x 3, CN II-XII intact grossly  Psych: mood and affect congruent, no SI or HI    Results Review:   No visits with " results within 2 Week(s) from this visit.   Latest known visit with results is:   Admission on 07/09/2020, Discharged on 07/09/2020   Component Date Value Ref Range Status   • Glucose 07/09/2020 156* 70 - 130 mg/dL Final    Serial Number: [id number]:  [id number]       No results found.    Assessment / Plan      Assessment/Plan:   1. Thrombocytopenia (CMS/HCC) (Primary)  -Unclear etiology at this time likely medication induced versus MDS versus ITP  -We will repeat CBC today as well as labs below to rule out other secondary causes  -Platelet count remained stable we will hold off on a bone marrow biopsy as patient's performance status would not lead him to be a good candidate for aggressive treatment for MDS  -Okay to transfuse platelets as necessary for any emergent surgical/bleeding need    -     CBC & Differential; Future  -     CMP  -     Ferritin; Future  -     Iron Profile; Future  -     Transferrin Saturation; Future  -     Vitamin B12; Future  -     Folate; Future  -     Peripheral Blood Smear; Future  -     HIV-1 / O / 2 Ag / Antibody 4th Generation; Future    Follow Up:   Follow-up in 6 months with repeat CBC     James Ennis MD  Hematology and Oncology     Please note that portions of this note may have been completed with a voice recognition program. Efforts were made to edit the dictations, but occasionally words are mistranscribed.

## 2021-01-27 ENCOUNTER — HOSPITAL ENCOUNTER (OUTPATIENT)
Facility: HOSPITAL | Age: 71
Discharge: HOME | End: 2021-01-27
Payer: MEDICARE

## 2021-02-25 ENCOUNTER — OFFICE VISIT (OUTPATIENT)
Dept: CARDIOLOGY | Facility: CLINIC | Age: 71
End: 2021-02-25

## 2021-02-25 VITALS
BODY MASS INDEX: 25.58 KG/M2 | HEART RATE: 56 BPM | DIASTOLIC BLOOD PRESSURE: 48 MMHG | WEIGHT: 193 LBS | HEIGHT: 73 IN | SYSTOLIC BLOOD PRESSURE: 106 MMHG | OXYGEN SATURATION: 92 % | RESPIRATION RATE: 18 BRPM

## 2021-02-25 DIAGNOSIS — I25.10 CORONARY ARTERY DISEASE INVOLVING NATIVE CORONARY ARTERY OF NATIVE HEART WITHOUT ANGINA PECTORIS: Primary | ICD-10-CM

## 2021-02-25 DIAGNOSIS — E78.2 MIXED HYPERLIPIDEMIA: ICD-10-CM

## 2021-02-25 DIAGNOSIS — I10 ESSENTIAL HYPERTENSION: ICD-10-CM

## 2021-02-25 DIAGNOSIS — I48.0 PAROXYSMAL ATRIAL FIBRILLATION (HCC): ICD-10-CM

## 2021-02-25 PROCEDURE — 99214 OFFICE O/P EST MOD 30 MIN: CPT | Performed by: INTERNAL MEDICINE

## 2021-02-25 NOTE — PROGRESS NOTES
[facility] at [facility]  Office visit  Swapnil Lawson  1950  [phone]    VISIT DATE:  2/25/2021      PCP: Shakir Mccarthy MD  [address] [place] [id number]    CC:  Chief Complaint   Patient presents with   • Coronary Artery Disease       PROBLEM LIST:  1. Coronary artery disease:  a. CABG, 1993, Abimael Tomlin MD.  b. CABG, August 2013, [facility].  c. January 2018 echo - Ejection fraction is visually estimated to be 40-45 %.   mild concentric left ventricular hypertrophy.   Pseudonormal filling pattern noted.   Akinesis of the mid posterolateral, and basal to mid inferior walls   consistent with previous myocardial infarction.  2. Hypertension.  3. Paroxysmal atrial fibrillation.  4. Diabetes mellitus.   5. Venous insufficiency.   6.  Surgical history:  a.  Appendectomy, 1981.  b. Cholecystectomy, 2002.  c. Toe amputation, 2009.  d. Bone spur removal, 2010.  7. Previous history of myocardial infarction in 1992 and 1993 prior to CABG.  8. Hyperlipidemia.      Cardiac studies and procedures:  February 2019:Transthoracic echo - EF of 50%, moderately dilated left ventricle, moderately dilated right ventricle with normal function, moderate to severe left atrial enlargement, dilated IVC    ASSESSMENT:   Diagnosis Plan   1. Coronary artery disease involving native coronary artery of native heart without angina pectoris     2. Mixed hyperlipidemia     3. Essential hypertension     4. Paroxysmal atrial fibrillation (CMS/[facility])         PLAN:  Congestive heart failure, diastolic, chronic: Currently euvolemic and compensated.  Continue current medical therapy.  Continue current dose of Lasix.  Will take an extra dose of Lasix if he gains more than 3 to 5 pounds above his baseline weight.    Coronary artery disease: Currently stable and asymptomatic.  Continue aspirin, statin and afterload reduction    Hypertension: Goal less than 130/80 mmHg.  Controlled based on home blood pressure  "readings.  Continue current medications.    Peripheral edema: Consistent with venous insufficiency.  Currently well controlled, continue low-dose loop diuretic and compression stockings    Hyperlipidemia: Goal LDL <100, ideally less than 70.  Continue statin.    Persistent atrial fibrillation: Currently asymptomatic.  Continue stroke prophylaxis with Coumadin, goal INR 2-3.  Continue rate control with combination of digoxin and metoprolol.  Goal dig level less than 1.    Subjective  Has been doing very well without limitations.  Lower extremity edema is well controlled on current medical therapy.  Blood pressures are running less than 120/80 mmHg.  Compliant with compression stockings.  Baseline weight is been stable.     PHYSICAL EXAMINATION:  Vitals:    02/25/21 1256   BP: 106/48   Pulse: 56   Resp: 18   SpO2: 92%   Weight: 87.5 kg (193 lb)   Height: 185.4 cm (73\")     General Appearance:    Alert, cooperative, no distress, appears stated age   Head:    Normocephalic, without obvious abnormality, atraumatic   Eyes:    conjunctiva/corneas clear   Nose:   Nares normal, septum midline, mucosa normal, no drainage   Throat:   Lips, teeth and gums normal   Neck:   Supple, symmetrical, trachea midline, no carotid    bruit or JVD   Lungs:     Minimal right basilar inspiratory rales, respirations unlabored   Chest Wall:    No tenderness or deformity    Heart:   Irregularly irregular, S1 and S2 normal, no murmur, rub   or gallop, normal carotid impulse bilaterally without bruit.   Abdomen:     Soft, non-tender   Extremities:   Extremities normal, atraumatic, no cyanosis, trivial bilateral pretibial edema, compression stockings in place    Pulses:   2+ and symmetric all extremities   Skin:   Skin color, texture, turgor normal, no rashes or lesions       Diagnostic Data:  Procedures  No results found for: CHLPL, TRIG, HDL, LDLDIRECT  Lab Results   Component Value Date    GLUCOSE 316 (H) 11/04/2020    BUN 21 11/04/2020    " CREATININE 1.58 (H) 11/04/2020     11/04/2020    K 4.0 11/04/2020    CL 95 (L) 11/04/2020    CO2 31.0 (H) 11/04/2020     No results found for: HGBA1C  Lab Results   Component Value Date    WBC 8.70 11/04/2020    HGB 12.9 (L) 11/04/2020    HCT 40.2 11/04/2020     11/04/2020       Allergies  No Known Allergies    Current Medications    Current Outpatient Medications:   •  acetaminophen-codeine (TYLENOL #3) 300-30 MG per tablet, TK 1 T PO Q 4 TO 6 H PRN, Disp: , Rfl:   •  allopurinol (ZYLOPRIM) 300 MG tablet, Take 300 mg by mouth Daily., Disp: , Rfl: 0  •  amLODIPine (NORVASC) 5 MG tablet, Take 5 mg by mouth 2 (two) times a day., Disp: , Rfl:   •  aspirin 81 MG tablet, Take 81 mg by mouth Daily., Disp: , Rfl:   •  calcium polycarbophil (FIBERCON) 625 MG tablet, Take 625 mg by mouth Daily. Takes four tablets daily, Disp: , Rfl:   •  colchicine 0.6 MG tablet, Take 0.6 mg by mouth As Needed for Muscle / Joint Pain., Disp: , Rfl:   •  cyclobenzaprine (FLEXERIL) 10 MG tablet, TK 1 T PO Q 8 TO 12 HOURS, Disp: , Rfl:   •  digoxin (LANOXIN) 125 MCG tablet, Take 1 tablet by mouth Every Other Day., Disp: 15 tablet, Rfl: 1  •  diphenhydrAMINE (BENADRYL) 25 MG tablet, Take 25 mg by mouth Every 6 (Six) Hours As Needed for Itching., Disp: , Rfl:   •  docusate sodium (COLACE) 100 MG capsule, Take 100 mg by mouth 2 (Two) Times a Day., Disp: , Rfl:   •  ferrous sulfate 325 (65 FE) MG tablet, Take 325 mg by mouth 2 (Two) Times a Day., Disp: , Rfl:   •  furosemide (LASIX) 20 MG tablet, Take 1 tablet by mouth Daily AND 2 tablets Every Other Day. (Patient taking differently: Take 1 tablet by mouth twice aq day), Disp: 60 tablet, Rfl: 0  •  glipiZIDE (GLUCOTROL) 10 MG tablet, Take 10 mg by mouth 3 (Three) Times a Day., Disp: , Rfl:   •  ibuprofen (ADVIL,MOTRIN) 800 MG tablet, TAKE 1 TABLET BY MOUTH EVERY 12 HOURS AS NEEDED FOR PAIN CONTROL, Disp: , Rfl:   •  ipratropium (ATROVENT) 0.02 % nebulizer solution, Take 0.5 mg by  nebulization Every 4 (Four) Hours As Needed., Disp: , Rfl:   •  lovastatin (MEVACOR) 20 MG tablet, Take 20 mg by mouth Every Night., Disp: , Rfl:   •  metoprolol succinate XL (TOPROL-XL) 25 MG 24 hr tablet, Take 25 mg by mouth Daily., Disp: , Rfl:   •  nitroglycerin (NITROSTAT) 0.4 MG SL tablet, Place 0.4 mg under the tongue Every 5 (Five) Minutes As Needed for Chest Pain. Take no more than 3 doses in 15 minutes., Disp: , Rfl:   •  O2 (OXYGEN), Inhale 2 L/min Every Night., Disp: , Rfl:   •  Pazeo 0.7 % solution, INSTILL 1 DROP INTO BOTH EYES ONCE DAILY, Disp: , Rfl:   •  prednisoLONE acetate (Pred Forte) 1 % ophthalmic suspension, Follow instructions on the After Visit Summary., Disp: 2 mL, Rfl: 0  •  warfarin (COUMADIN) 5 MG tablet, Take 7.5 mg by mouth Daily., Disp: , Rfl:           ROS  Review of Systems   Cardiovascular: Positive for dyspnea on exertion, irregular heartbeat and leg swelling. Negative for chest pain and palpitations.   Respiratory: Negative for cough.        SOCIAL HX  Social History     Socioeconomic History   • Marital status:      Spouse name: Not on file   • Number of children: Not on file   • Years of education: Not on file   • Highest education level: Not on file   Tobacco Use   • Smoking status: Former Smoker     Packs/day: 1.00     Types: Cigarettes     Quit date:      Years since quittin.1   • Smokeless tobacco: Never Used   Substance and Sexual Activity   • Alcohol use: No   • Drug use: No   • Sexual activity: Defer       FAMILY HX  Family History   Problem Relation Age of Onset   • COPD Mother    • Heart attack Father              Naseem Campbell III, MD, FACC

## 2021-03-11 NOTE — PROGRESS NOTES
Patient: Swapnil Lawson    YOB: 1950    Date: 03/15/2021    Primary Care Provider: Shakir Mccarthy MD    Chief Complaint   Patient presents with   • Colonoscopy     abnormal bowel habits        SUBJECTIVE:    History of present illness:  I saw the patient in the office today as a consultation for evaluation and treatment of constipation. Patient has tried OTC stool softeners with no relief. This has went on for 5 weeks. Patients last colonoscopy was 2019 with Dr. Manuel.  Patient had multiple polyps in the past removed.  He is not due for colonoscopy for another 18 months.  No rectal bleeding or change in bowel habits.  No nausea vomiting or severe abdominal distention.    The following portions of the patient's history were reviewed and updated as appropriate: allergies, current medications, past family history, past medical history, past social history, past surgical history and problem list.    Review of Systems   Gastrointestinal: Positive for diarrhea. Negative for abdominal pain and blood in stool.   All other systems reviewed and are negative.      History:  Past Medical History:   Diagnosis Date   • COPD (chronic obstructive pulmonary disease) (CMS/HCC)    • Coronary artery disease 12/14/2016    CABG, 1993, Abimael Tomlin MD. CABG, August 2013, [facility].   • Diabetes mellitus (CMS/HCC) 12/14/2016   • Gout    • Hyperlipidemia 12/14/2016   • Hypertension 12/14/2016   • Myocardial infarction (CMS/HCC)      in 1992 and 1993 prior to CABG.   • Paroxysmal atrial fibrillation (CMS/HCC) 12/14/2016   • Sleep apnea    • Venous insufficiency 12/14/2016       Past Surgical History:   Procedure Laterality Date   • APPENDECTOMY  1981   • CARDIAC CATHETERIZATION     • CARDIAC SURGERY      1993 and 2013   • CATARACT EXTRACTION W/ INTRAOCULAR LENS IMPLANT Right 6/25/2020    Procedure: CATARACT PHACO EXTRACTION WITH INTRAOCULAR LENS IMPLANT RIGHT;  Surgeon: Omar Blood MD;  Location:   [facility];  Service: Ophthalmology;  Laterality: Right;   • CATARACT EXTRACTION W/ INTRAOCULAR LENS IMPLANT Left 2020    Procedure: CATARACT PHACO EXTRACTION WITH INTRAOCULAR LENS IMPLANT LEFT;  Surgeon: Omar Blood MD;  Location: [facility] OR;  Service: Ophthalmology;  Laterality: Left;   • CHOLECYSTECTOMY     • COLONOSCOPY     • FINGER SURGERY      Rt index (second finger)   • OTHER SURGICAL HISTORY      Bone spur removal   • TOE AMPUTATION     • VASECTOMY         Family History   Problem Relation Age of Onset   • COPD Mother    • Heart attack Father        Social History     Tobacco Use   • Smoking status: Former Smoker     Packs/day: 1.00     Types: Cigarettes     Quit date:      Years since quittin.2   • Smokeless tobacco: Never Used   Substance Use Topics   • Alcohol use: No   • Drug use: No       Medications:   Current Outpatient Medications:   •  acetaminophen-codeine (TYLENOL #3) 300-30 MG per tablet, TK 1 T PO Q 4 TO 6 H PRN, Disp: , Rfl:   •  allopurinol (ZYLOPRIM) 300 MG tablet, Take 300 mg by mouth Daily., Disp: , Rfl: 0  •  amLODIPine (NORVASC) 5 MG tablet, Take 5 mg by mouth 2 (two) times a day., Disp: , Rfl:   •  aspirin 81 MG tablet, Take 81 mg by mouth Daily., Disp: , Rfl:   •  calcium polycarbophil (FIBERCON) 625 MG tablet, Take 625 mg by mouth Daily. Takes four tablets daily, Disp: , Rfl:   •  colchicine 0.6 MG tablet, Take 0.6 mg by mouth As Needed for Muscle / Joint Pain., Disp: , Rfl:   •  cyclobenzaprine (FLEXERIL) 10 MG tablet, TK 1 T PO Q 8 TO 12 HOURS, Disp: , Rfl:   •  digoxin (LANOXIN) 125 MCG tablet, Take 1 tablet by mouth Every Other Day., Disp: 15 tablet, Rfl: 1  •  diphenhydrAMINE (BENADRYL) 25 MG tablet, Take 25 mg by mouth Every 6 (Six) Hours As Needed for Itching., Disp: , Rfl:   •  docusate sodium (COLACE) 100 MG capsule, Take 100 mg by mouth 2 (Two) Times a Day., Disp: , Rfl:   •  ferrous sulfate 325 (65 FE) MG tablet, Take 325 mg by mouth 2 (Two)  "Times a Day., Disp: , Rfl:   •  furosemide (LASIX) 20 MG tablet, Take 1 tablet by mouth Daily AND 2 tablets Every Other Day. (Patient taking differently: Take 1 tablet by mouth twice aq day), Disp: 60 tablet, Rfl: 0  •  glipiZIDE (GLUCOTROL) 10 MG tablet, Take 10 mg by mouth 3 (Three) Times a Day., Disp: , Rfl:   •  ibuprofen (ADVIL,MOTRIN) 800 MG tablet, TAKE 1 TABLET BY MOUTH EVERY 12 HOURS AS NEEDED FOR PAIN CONTROL, Disp: , Rfl:   •  ipratropium (ATROVENT) 0.02 % nebulizer solution, Take 0.5 mg by nebulization Every 4 (Four) Hours As Needed., Disp: , Rfl:   •  lovastatin (MEVACOR) 20 MG tablet, Take 20 mg by mouth Every Night., Disp: , Rfl:   •  metoprolol succinate XL (TOPROL-XL) 25 MG 24 hr tablet, Take 25 mg by mouth Daily., Disp: , Rfl:   •  nitroglycerin (NITROSTAT) 0.4 MG SL tablet, Place 0.4 mg under the tongue Every 5 (Five) Minutes As Needed for Chest Pain. Take no more than 3 doses in 15 minutes., Disp: , Rfl:   •  O2 (OXYGEN), Inhale 2 L/min Every Night., Disp: , Rfl:   •  Pazeo 0.7 % solution, INSTILL 1 DROP INTO BOTH EYES ONCE DAILY, Disp: , Rfl:   •  Polyethylene Glycol 3350 (MIRALAX PO), Take  by mouth., Disp: , Rfl:   •  prednisoLONE acetate (Pred Forte) 1 % ophthalmic suspension, Follow instructions on the After Visit Summary., Disp: 2 mL, Rfl: 0  •  warfarin (COUMADIN) 5 MG tablet, Take 7.5 mg by mouth Daily., Disp: , Rfl:        Allergies: No Known Allergies    OBJECTIVE:    Vital Signs:  Vitals:    03/15/21 1353   BP: 138/78   Pulse: 86   Resp: 18   Temp: 98.7 °F (37.1 °C)   SpO2: 96%   Weight: 87.5 kg (193 lb)   Height: 185.4 cm (72.99\")       Physical Exam:   General Appearance:    Alert, cooperative, in no acute distress   Head:    Normocephalic, without obvious abnormality, atraumatic   Eyes:            Lids and lashes normal, conjunctivae and sclerae normal, no   icterus, no pallor, corneas clear, PERRL   Ears:    Ears appear intact with no abnormalities noted   Throat:   No oral " lesions, no thrush, oral mucosa moist   Neck:   No adenopathy, supple, trachea midline, no thyromegaly,  no JVD   Lungs:     Clear to auscultation,respirations regular, even and                  unlabored    Heart:    Regular rhythm and normal rate, normal S1 and S2, no            murmur   Abdomen:     no masses, no organomegaly, soft non-tender, non-distended, no guarding.  No abdominal hernias or masses.  No abdominal distention.   Extremities:   Moves all extremities well, no edema, no cyanosis, no             redness   Pulses:   Pulses palpable and equal bilaterally   Skin:   No bleeding, bruising or rash   Lymph nodes:   No palpable adenopathy   Neurologic:   Cranial nerves 2 - 12 grossly intact, sensation intact  Psychiatric: No evidence of depression or anxiety   Results Review:   I reviewed the patient's new clinical results.    Review of Systems was reviewed and confirmed as accurate as documented by the MA.    ASSESSMENT/PLAN:    1. Chronic idiopathic constipation        I do not recommend a colonoscopy for further evaluation.  Patient told to continue stool softeners daily as needed.  Increase water intake and decrease caffeine intake.  Recommend Metamucil and probiotics daily.  Patient due for colonoscopy in 18 months and follow-up at that time.  Electronically signed by Kenji Garcias MD  03/15/21  10:38 EST

## 2021-03-15 ENCOUNTER — OFFICE VISIT (OUTPATIENT)
Dept: SURGERY | Facility: CLINIC | Age: 71
End: 2021-03-15

## 2021-03-15 VITALS
BODY MASS INDEX: 25.58 KG/M2 | SYSTOLIC BLOOD PRESSURE: 138 MMHG | DIASTOLIC BLOOD PRESSURE: 78 MMHG | HEART RATE: 86 BPM | RESPIRATION RATE: 18 BRPM | WEIGHT: 193 LBS | HEIGHT: 73 IN | TEMPERATURE: 98.7 F | OXYGEN SATURATION: 96 %

## 2021-03-15 DIAGNOSIS — K59.04 CHRONIC IDIOPATHIC CONSTIPATION: Primary | ICD-10-CM

## 2021-03-15 PROCEDURE — 99203 OFFICE O/P NEW LOW 30 MIN: CPT | Performed by: SURGERY

## 2021-03-18 ENCOUNTER — HOSPITAL ENCOUNTER (OUTPATIENT)
Facility: HOSPITAL | Age: 71
Discharge: HOME | End: 2021-03-18
Payer: MEDICARE

## 2021-03-18 PROCEDURE — 36415 COLL VENOUS BLD VENIPUNCTURE: CPT

## 2021-05-04 ENCOUNTER — LAB (OUTPATIENT)
Dept: LAB | Facility: HOSPITAL | Age: 71
End: 2021-05-04

## 2021-05-04 ENCOUNTER — OFFICE VISIT (OUTPATIENT)
Dept: ONCOLOGY | Facility: CLINIC | Age: 71
End: 2021-05-04

## 2021-05-04 VITALS
TEMPERATURE: 98.4 F | HEIGHT: 73 IN | HEART RATE: 77 BPM | DIASTOLIC BLOOD PRESSURE: 61 MMHG | OXYGEN SATURATION: 91 % | SYSTOLIC BLOOD PRESSURE: 102 MMHG | WEIGHT: 191 LBS | BODY MASS INDEX: 25.31 KG/M2 | RESPIRATION RATE: 16 BRPM

## 2021-05-04 DIAGNOSIS — D69.6 THROMBOCYTOPENIA (HCC): ICD-10-CM

## 2021-05-04 DIAGNOSIS — D69.6 THROMBOCYTOPENIA (HCC): Primary | ICD-10-CM

## 2021-05-04 LAB
ERYTHROCYTE [DISTWIDTH] IN BLOOD BY AUTOMATED COUNT: 16.5 % (ref 12.3–15.4)
HCT VFR BLD AUTO: 39.6 % (ref 37.5–51)
HGB BLD-MCNC: 13.4 G/DL (ref 13–17.7)
LYMPHOCYTES # BLD AUTO: 1.5 10*3/MM3 (ref 0.7–3.1)
LYMPHOCYTES NFR BLD AUTO: 18.3 % (ref 19.6–45.3)
MCH RBC QN AUTO: 30 PG (ref 26.6–33)
MCHC RBC AUTO-ENTMCNC: 33.8 G/DL (ref 31.5–35.7)
MCV RBC AUTO: 88.7 FL (ref 79–97)
MONOCYTES # BLD AUTO: 0.6 10*3/MM3 (ref 0.1–0.9)
MONOCYTES NFR BLD AUTO: 7.9 % (ref 5–12)
NEUTROPHILS NFR BLD AUTO: 6 10*3/MM3 (ref 1.7–7)
NEUTROPHILS NFR BLD AUTO: 73.8 % (ref 42.7–76)
PLATELET # BLD AUTO: 110 10*3/MM3 (ref 140–450)
PMV BLD AUTO: 7.3 FL (ref 6–12)
RBC # BLD AUTO: 4.47 10*6/MM3 (ref 4.14–5.8)
WBC # BLD AUTO: 8.1 10*3/MM3 (ref 3.4–10.8)

## 2021-05-04 PROCEDURE — 85025 COMPLETE CBC W/AUTO DIFF WBC: CPT

## 2021-05-04 PROCEDURE — 99214 OFFICE O/P EST MOD 30 MIN: CPT | Performed by: INTERNAL MEDICINE

## 2021-05-04 PROCEDURE — 36415 COLL VENOUS BLD VENIPUNCTURE: CPT

## 2021-05-04 NOTE — PROGRESS NOTES
Follow Up Office Visit      Date: 2021     Patient Name: Swapnil Lawson  MRN: [id number]  : 1950  Referring Physician: Shakir Mccarthy     Chief Complaint:  Follow-up for thrombocytopenia     History of Present Illness: Swapnil Lawson is a pleasant 70 y.o. male past medical history of atrial fibrillation, CAD, hypertension, hyperlipidemia, type 2 diabetes, iron deficiency, gout, COPD who presents today for evaluation of thrombocytopenia. The patient has been in his usual state of health and followed by his PCP with recent labs showing a platelet count of 94 in 2020.  On chart review patient has had a platelet count of 120 in 2019.  He feels well denies any fatigue or any bleeding or bruising episodes.  Denies any night sweats, weight loss, unexplained fevers.  Does take a baby aspirin daily and colchicine as needed for gout but denies any heavy NSAID use.  Denies any work exposure to any heavy chemicals.  Hemoglobin has been slightly low around 12-13 range and white count has been within normal limits on lab check.  He has baseline shortness of breath with exertion and requires supplemental oxygen.  He is otherwise compliant with his other medications    Interval History:  Presents clinic for follow-up.  States he is overall doing well.  Denies any significant easy bleeding or bruising.  Denies any unexplained weight loss, night sweats, fevers.  States that he feels well and is compliant with all his home medications.  States he recently started Trulicity for his type 2 diabetes.  Tolerating this well.  Otherwise has no major complaints today    Oncology History:    Oncology/Hematology History    No history exists.       Subjective      Review of Systems:   Constitutional: Negative for fevers, chills, or weight loss  Eyes: Negative for blurred vision or discharge         Ear/Nose/Throat: Negative for difficulty swallowing, sore throat, LAD                                                        Respiratory: Negative for cough, SOA, wheezing                                                                                        Cardiovascular: Negative for chest pain or palpitations                                                                  Gastrointestinal: Negative for nausea, vomiting or diarrhea                                                                     Genitourinary: Negative for dysuria or hematuria                                                                                           Musculoskeletal: Negative for any joint pains or muscle aches                                                                        Neurologic: Negative for any weakness, headaches, dizziness                                                                         Hematologic: Negative for any easy bleeding or bruising                                                                                   Psychiatric: Negative for anxiety or depression                          Past Medical History/Past Surgical History/ Family History/ Social History: Reviewed by me and unchanged from my previous documentation done on November 2020.     Medications:     Current Outpatient Medications:   •  allopurinol (ZYLOPRIM) 300 MG tablet, Take 300 mg by mouth Daily., Disp: , Rfl: 0  •  aspirin 81 MG tablet, Take 81 mg by mouth Daily., Disp: , Rfl:   •  calcium polycarbophil (FIBERCON) 625 MG tablet, Take 625 mg by mouth Daily. Takes four tablets daily, Disp: , Rfl:   •  colchicine 0.6 MG tablet, Take 0.6 mg by mouth As Needed for Muscle / Joint Pain., Disp: , Rfl:   •  digoxin (LANOXIN) 125 MCG tablet, Take 1 tablet by mouth Every Other Day., Disp: 15 tablet, Rfl: 1  •  diphenhydrAMINE (BENADRYL) 25 MG tablet, Take 25 mg by mouth Every 6 (Six) Hours As Needed for Itching., Disp: , Rfl:   •  docusate sodium (COLACE) 100 MG capsule, Take 100 mg by mouth 2 (Two) Times a Day., Disp: , Rfl:   •  ferrous sulfate 325  "(65 FE) MG tablet, Take 325 mg by mouth 2 (Two) Times a Day., Disp: , Rfl:   •  furosemide (LASIX) 20 MG tablet, Take 1 tablet by mouth Daily AND 2 tablets Every Other Day. (Patient taking differently: TID), Disp: 60 tablet, Rfl: 0  •  glipiZIDE (GLUCOTROL) 10 MG tablet, Take 10 mg by mouth 3 (Three) Times a Day., Disp: , Rfl:   •  ipratropium (ATROVENT) 0.02 % nebulizer solution, Take 0.5 mg by nebulization Every 4 (Four) Hours As Needed., Disp: , Rfl:   •  lovastatin (MEVACOR) 20 MG tablet, Take 20 mg by mouth Every Night., Disp: , Rfl:   •  metoprolol succinate XL (TOPROL-XL) 25 MG 24 hr tablet, Take 25 mg by mouth Daily., Disp: , Rfl:   •  nitroglycerin (NITROSTAT) 0.4 MG SL tablet, Place 0.4 mg under the tongue Every 5 (Five) Minutes As Needed for Chest Pain. Take no more than 3 doses in 15 minutes., Disp: , Rfl:   •  O2 (OXYGEN), Inhale 2 L/min Every Night., Disp: , Rfl:   •  Pazeo 0.7 % solution, INSTILL 1 DROP INTO BOTH EYES ONCE DAILY, Disp: , Rfl:   •  Polyethylene Glycol 3350 (MIRALAX PO), Take  by mouth., Disp: , Rfl:   •  warfarin (COUMADIN) 5 MG tablet, Take 7.5 mg by mouth Daily. 5, 5, 7.5, Disp: , Rfl:   •  acetaminophen-codeine (TYLENOL #3) 300-30 MG per tablet, TK 1 T PO Q 4 TO 6 H PRN, Disp: , Rfl:   •  amLODIPine (NORVASC) 5 MG tablet, Take 5 mg by mouth 2 (two) times a day., Disp: , Rfl:   •  cyclobenzaprine (FLEXERIL) 10 MG tablet, TK 1 T PO Q 8 TO 12 HOURS, Disp: , Rfl:   •  ibuprofen (ADVIL,MOTRIN) 800 MG tablet, TAKE 1 TABLET BY MOUTH EVERY 12 HOURS AS NEEDED FOR PAIN CONTROL, Disp: , Rfl:   •  prednisoLONE acetate (Pred Forte) 1 % ophthalmic suspension, Follow instructions on the After Visit Summary., Disp: 2 mL, Rfl: 0    Allergies:   No Known Allergies    Objective     Physical Exam:  Vital Signs:   Vitals:    05/04/21 0810   BP: 102/61   Pulse: 77   Resp: 16   Temp: 98.4 °F (36.9 °C)   SpO2: 91%   Weight: 86.6 kg (191 lb)   Height: 185.4 cm (73\")   PainSc: 0-No pain     Pain Score    " 05/04/21 0810   PainSc: 0-No pain     ECOG Performance Status: 0 - Asymptomatic    Constitutional: NAD, ECOG 0  Eyes: PERRLA, scleral anicteric  ENT: No LAD, no thyromegaly  Respiratory: CTAB, no wheezing, rales, rhonchi  Cardiovascular: RRR, no murmurs, pulses 2+ bilaterally  Abdomen: soft, NT/ND, no HSM  Musculoskeletal: strength 5/5 bilaterally, no c/c/e  Neurologic: A&O x 3, CN II-XII intact grossly    Results Review:   Lab on 05/04/2021   Component Date Value Ref Range Status   • WBC 05/04/2021 8.10  3.40 - 10.80 10*3/mm3 Final   • RBC 05/04/2021 4.47  4.14 - 5.80 10*6/mm3 Final   • Hemoglobin 05/04/2021 13.4  13.0 - 17.7 g/dL Final   • Hematocrit 05/04/2021 39.6  37.5 - 51.0 % Final   • RDW 05/04/2021 16.5* 12.3 - 15.4 % Final   • MCV 05/04/2021 88.7  79.0 - 97.0 fL Final   • MCH 05/04/2021 30.0  26.6 - 33.0 pg Final   • MCHC 05/04/2021 33.8  31.5 - 35.7 g/dL Final   • MPV 05/04/2021 7.3  6.0 - 12.0 fL Final   • Platelets 05/04/2021 110* 140 - 450 10*3/mm3 Final   • Neutrophil % 05/04/2021 73.8  42.7 - 76.0 % Final   • Lymphocyte % 05/04/2021 18.3* 19.6 - 45.3 % Final   • Monocyte % 05/04/2021 7.9  5.0 - 12.0 % Final   • Neutrophils, Absolute 05/04/2021 6.00  1.70 - 7.00 10*3/mm3 Final   • Lymphocytes, Absolute 05/04/2021 1.50  0.70 - 3.10 10*3/mm3 Final   • Monocytes, Absolute 05/04/2021 0.60  0.10 - 0.90 10*3/mm3 Final       No results found.    Assessment / Plan      Assessment/Plan:   1. Thrombocytopenia (CMS/HCC) (Primary)  -Unclear etiology at this time likely medication induced (ASA 81mg) vs MDS vs ITP  -Iron studies consistent with anemia of chronic disease  -Vitamin B12/folate within normal limits  -HIV negative  -Peripheral smear showing no schistocytes or abnormal WBCs  -Platelet count today 110K which is stable  -As his platelet count remains stable we will hold off on a bone marrow biopsy as patient's performance status would not lead him to be a good candidate for aggressive treatment for  MDS  -Okay to transfuse platelets as necessary for any emergent surgical/bleeding need    2.  Type 2 diabetes  -Followed by his PCP  -States that he was recently started on Trulicity    3.  Atrial fibrillation  -Rate stable today in clinic  -On warfarin for stroke prophylaxis    Follow Up:   Follow-up in 6 months with repeat CBC     James Ennis MD  Hematology and Oncology     Please note that portions of this note may have been completed with a voice recognition program. Efforts were made to edit the dictations, but occasionally words are mistranscribed.

## 2021-06-15 ENCOUNTER — HOSPITAL ENCOUNTER (OUTPATIENT)
Facility: HOSPITAL | Age: 71
Discharge: HOME | End: 2021-06-15
Payer: MEDICARE

## 2021-06-15 PROCEDURE — 36415 COLL VENOUS BLD VENIPUNCTURE: CPT

## 2021-08-26 ENCOUNTER — HOSPITAL ENCOUNTER (OUTPATIENT)
Facility: HOSPITAL | Age: 71
Discharge: HOME | End: 2021-08-26
Payer: MEDICARE

## 2021-08-26 ENCOUNTER — OFFICE VISIT (OUTPATIENT)
Dept: CARDIOLOGY | Facility: CLINIC | Age: 71
End: 2021-08-26

## 2021-08-26 VITALS
DIASTOLIC BLOOD PRESSURE: 52 MMHG | BODY MASS INDEX: 24.57 KG/M2 | HEART RATE: 68 BPM | SYSTOLIC BLOOD PRESSURE: 108 MMHG | HEIGHT: 73 IN | WEIGHT: 185.4 LBS | OXYGEN SATURATION: 93 %

## 2021-08-26 DIAGNOSIS — I48.0 PAROXYSMAL ATRIAL FIBRILLATION (HCC): ICD-10-CM

## 2021-08-26 DIAGNOSIS — E78.2 MIXED HYPERLIPIDEMIA: ICD-10-CM

## 2021-08-26 DIAGNOSIS — I25.10 CORONARY ARTERY DISEASE INVOLVING NATIVE CORONARY ARTERY OF NATIVE HEART WITHOUT ANGINA PECTORIS: Primary | ICD-10-CM

## 2021-08-26 DIAGNOSIS — I10 ESSENTIAL HYPERTENSION: ICD-10-CM

## 2021-08-26 PROCEDURE — 93005 ELECTROCARDIOGRAM TRACING: CPT

## 2021-08-26 PROCEDURE — 93000 ELECTROCARDIOGRAM COMPLETE: CPT | Performed by: INTERNAL MEDICINE

## 2021-08-26 PROCEDURE — 99214 OFFICE O/P EST MOD 30 MIN: CPT | Performed by: INTERNAL MEDICINE

## 2021-08-26 RX ORDER — METOLAZONE 5 MG/1
5 TABLET ORAL DAILY PRN
COMMUNITY
Start: 2021-05-29 | End: 2022-01-01

## 2021-08-26 RX ORDER — DULAGLUTIDE 1.5 MG/.5ML
INJECTION, SOLUTION SUBCUTANEOUS
COMMUNITY
Start: 2021-07-06 | End: 2022-01-01

## 2021-08-26 RX ORDER — AMLODIPINE BESYLATE 5 MG/1
5 TABLET ORAL 2 TIMES DAILY
COMMUNITY
Start: 2021-08-11 | End: 2022-01-01

## 2021-08-26 RX ORDER — MONTELUKAST SODIUM 10 MG/1
10 TABLET ORAL
COMMUNITY
Start: 2021-07-06 | End: 2022-01-01

## 2021-08-26 NOTE — PROGRESS NOTES
[facility]  Office visit  Swapnil Lawson  1950  [phone]    VISIT DATE:  8/26/2021      PCP: Shakir Mccarthy MD  [address] [place] [id number]    CC:  Chief Complaint   Patient presents with   • Atrial Fibrillation       PROBLEM LIST:  1. Coronary artery disease:  a. CABG, 1993, Abimael Tomlin MD.  b. CABG, August 2013, [facility].  c. January 2018 echo - Ejection fraction is visually estimated to be 40-45 %.   mild concentric left ventricular hypertrophy.   Pseudonormal filling pattern noted.   Akinesis of the mid posterolateral, and basal to mid inferior walls   consistent with previous myocardial infarction.  2. Hypertension.  3. Paroxysmal atrial fibrillation.  4. Diabetes mellitus.   5. Venous insufficiency.   6.  Surgical history:  a.  Appendectomy, 1981.  b. Cholecystectomy, 2002.  c. Toe amputation, 2009.  d. Bone spur removal, 2010.  7. Previous history of myocardial infarction in 1992 and 1993 prior to CABG.  8. Hyperlipidemia.      Cardiac studies and procedures:  February 2019:Transthoracic echo - EF of 50%, moderately dilated left ventricle, moderately dilated right ventricle with normal function, moderate to severe left atrial enlargement, dilated IVC    ASSESSMENT:   Diagnosis Plan   1. Coronary artery disease involving native coronary artery of native heart without angina pectoris     2. Mixed hyperlipidemia     3. Essential hypertension     4. Paroxysmal atrial fibrillation (CMS/[facility])         PLAN:  Congestive heart failure, diastolic, chronic: Currently euvolemic and compensated.  Continue current medical therapy.  Continue current dose of Lasix.  Will take an extra dose of Lasix if he gains more than 3 to 5 pounds above his baseline weight.    Coronary artery disease: Currently stable and asymptomatic.  Continue aspirin, statin and afterload reduction    Hypertension: Goal less than 130/80 mmHg.  Controlled based on home blood pressure  "readings.  Continue current medications.    Peripheral edema: Consistent with venous insufficiency.  Currently well controlled, continue low-dose loop diuretic and compression stockings    Hyperlipidemia: Goal LDL <100, ideally less than 70.  Continue statin.    Persistent atrial fibrillation: Currently asymptomatic.  Continue stroke prophylaxis with Coumadin, goal INR 2-3.  Continue rate control with combination of digoxin and metoprolol.  Goal dig level less than 1.    -Bilateral carotid duplex pending.    Subjective  Has been doing very well without limitations.  Lower extremity edema is well controlled on current medical therapy.  Blood pressures are running less than 120/80 mmHg.  Compliant with compression stockings.  Baseline weight is been stable.  Recent ophthalmology exam concerning for carotid atherosclerosis.    PHYSICAL EXAMINATION:  Vitals:    08/26/21 0906   BP: 108/52   BP Location: Left arm   Patient Position: Sitting   Pulse: 68   SpO2: 93%   Weight: 84.1 kg (185 lb 6.4 oz)   Height: 185.4 cm (73\")     General Appearance:    Alert, cooperative, no distress, appears stated age   Head:    Normocephalic, without obvious abnormality, atraumatic   Eyes:    conjunctiva/corneas clear   Nose:   Nares normal, septum midline, mucosa normal, no drainage   Throat:   Lips, teeth and gums normal   Neck:   Supple, symmetrical, trachea midline, no carotid    bruit or JVD   Lungs:     Minimal right basilar inspiratory rales, respirations unlabored   Chest Wall:    No tenderness or deformity    Heart:   Irregularly irregular, S1 and S2 normal, no murmur, rub   or gallop, normal carotid impulse bilaterally without bruit.   Abdomen:     Soft, non-tender   Extremities:   Extremities normal, atraumatic, no cyanosis, trivial bilateral pretibial edema, compression stockings in place    Pulses:   2+ and symmetric all extremities   Skin:   Skin color, texture, turgor normal, no rashes or lesions       Diagnostic " Data:    ECG 12 Lead    Date/Time: 8/26/2021 9:31 AM  Performed by: Naseem Campbell III, MD  Authorized by: Naseem Campbell III, MD   Previous ECG: no previous ECG available  Rhythm: atrial fibrillation  Conduction: incomplete left bundle branch block    Clinical impression: abnormal EKG          No results found for: CHLPL, TRIG, HDL, LDLDIRECT  Lab Results   Component Value Date    GLUCOSE 316 (H) 11/04/2020    BUN 21 11/04/2020    CREATININE 1.58 (H) 11/04/2020     11/04/2020    K 4.0 11/04/2020    CL 95 (L) 11/04/2020    CO2 31.0 (H) 11/04/2020     No results found for: HGBA1C  Lab Results   Component Value Date    WBC 8.10 05/04/2021    HGB 13.4 05/04/2021    HCT 39.6 05/04/2021     (L) 05/04/2021       Allergies  No Known Allergies    Current Medications    Current Outpatient Medications:   •  allopurinol (ZYLOPRIM) 300 MG tablet, Take 300 mg by mouth Daily., Disp: , Rfl: 0  •  amLODIPine (NORVASC) 5 MG tablet, Take 5 mg by mouth 2 (Two) Times a Day., Disp: , Rfl:   •  aspirin 81 MG tablet, Take 81 mg by mouth Daily., Disp: , Rfl:   •  calcium polycarbophil (FIBERCON) 625 MG tablet, Take 625 mg by mouth Daily. Takes four tablets daily, Disp: , Rfl:   •  colchicine 0.6 MG tablet, Take 0.6 mg by mouth As Needed for Muscle / Joint Pain., Disp: , Rfl:   •  digoxin (LANOXIN) 125 MCG tablet, Take 1 tablet by mouth Every Other Day., Disp: 15 tablet, Rfl: 1  •  diphenhydrAMINE (BENADRYL) 25 MG tablet, Take 25 mg by mouth Every 6 (Six) Hours As Needed for Itching., Disp: , Rfl:   •  docusate sodium (COLACE) 100 MG capsule, Take 100 mg by mouth 2 (Two) Times a Day., Disp: , Rfl:   •  ferrous sulfate 325 (65 FE) MG tablet, Take 325 mg by mouth 2 (Two) Times a Day., Disp: , Rfl:   •  furosemide (LASIX) 20 MG tablet, Take 1 tablet by mouth Daily AND 2 tablets Every Other Day. (Patient taking differently: BID), Disp: 60 tablet, Rfl: 0  •  glipiZIDE (GLUCOTROL) 10 MG tablet, Take 10 mg by mouth 3 (Three) Times a  Day., Disp: , Rfl:   •  ipratropium (ATROVENT) 0.02 % nebulizer solution, Take 0.5 mg by nebulization Every 4 (Four) Hours As Needed., Disp: , Rfl:   •  lovastatin (MEVACOR) 20 MG tablet, Take 20 mg by mouth Every Night., Disp: , Rfl:   •  metOLazone (ZAROXOLYN) 5 MG tablet, Take 5 mg by mouth Daily As Needed., Disp: , Rfl:   •  metoprolol succinate XL (TOPROL-XL) 25 MG 24 hr tablet, Take 25 mg by mouth Daily., Disp: , Rfl:   •  montelukast (SINGULAIR) 10 MG tablet, Take 10 mg by mouth every night at bedtime., Disp: , Rfl:   •  nitroglycerin (NITROSTAT) 0.4 MG SL tablet, Place 0.4 mg under the tongue Every 5 (Five) Minutes As Needed for Chest Pain. Take no more than 3 doses in 15 minutes., Disp: , Rfl:   •  O2 (OXYGEN), Inhale 2 L/min Every Night., Disp: , Rfl:   •  Pazeo 0.7 % solution, INSTILL 1 DROP INTO BOTH EYES ONCE DAILY, Disp: , Rfl:   •  Polyethylene Glycol 3350 (MIRALAX PO), Take 1 application by mouth Daily As Needed., Disp: , Rfl:   •  Trulicity 1.5 MG/0.5ML solution pen-injector, INJECT 1 SYRINGE UNDER THE SKIN EVERY WEEK ON THE SAME DAY, Disp: , Rfl:   •  warfarin (COUMADIN) 5 MG tablet, Take 7.5 mg by mouth Daily. 5, 5, 7.5, Disp: , Rfl:           ROS  Review of Systems   Cardiovascular: Positive for dyspnea on exertion, irregular heartbeat and leg swelling. Negative for chest pain and palpitations.   Respiratory: Negative for cough.        SOCIAL HX  Social History     Socioeconomic History   • Marital status:      Spouse name: Not on file   • Number of children: Not on file   • Years of education: Not on file   • Highest education level: Not on file   Tobacco Use   • Smoking status: Former Smoker     Packs/day: 1.00     Types: Cigarettes     Quit date:      Years since quittin.6   • Smokeless tobacco: Never Used   Substance and Sexual Activity   • Alcohol use: No   • Drug use: No   • Sexual activity: Defer       FAMILY HX  Family History   Problem Relation Age of Onset   • COPD  Mother    • Heart attack Father              Naseem Campbell III, MD, FACC

## 2021-09-02 ENCOUNTER — DOCUMENTATION (OUTPATIENT)
Dept: CARDIOLOGY | Facility: CLINIC | Age: 71
End: 2021-09-02

## 2021-09-02 DIAGNOSIS — I65.23 CAROTID STENOSIS, BILATERAL: Primary | ICD-10-CM

## 2021-09-07 ENCOUNTER — HOSPITAL ENCOUNTER (OUTPATIENT)
Dept: ULTRASOUND IMAGING | Facility: HOSPITAL | Age: 71
Discharge: HOME | End: 2021-09-07
Payer: MEDICARE

## 2021-09-07 DIAGNOSIS — I65.23 BILATERAL CAROTID ARTERY STENOSIS: ICD-10-CM

## 2021-09-07 PROCEDURE — 93880 EXTRACRANIAL BILAT STUDY: CPT

## 2021-11-02 ENCOUNTER — OFFICE VISIT (OUTPATIENT)
Dept: ONCOLOGY | Facility: CLINIC | Age: 71
End: 2021-11-02

## 2021-11-02 ENCOUNTER — LAB (OUTPATIENT)
Dept: LAB | Facility: HOSPITAL | Age: 71
End: 2021-11-02

## 2021-11-02 VITALS
HEIGHT: 73 IN | OXYGEN SATURATION: 91 % | DIASTOLIC BLOOD PRESSURE: 67 MMHG | SYSTOLIC BLOOD PRESSURE: 116 MMHG | TEMPERATURE: 98 F | HEART RATE: 63 BPM | BODY MASS INDEX: 25.62 KG/M2 | WEIGHT: 193.3 LBS | RESPIRATION RATE: 16 BRPM

## 2021-11-02 DIAGNOSIS — D69.6 THROMBOCYTOPENIA (HCC): Primary | ICD-10-CM

## 2021-11-02 DIAGNOSIS — D69.6 THROMBOCYTOPENIA (HCC): ICD-10-CM

## 2021-11-02 LAB
ERYTHROCYTE [DISTWIDTH] IN BLOOD BY AUTOMATED COUNT: 16 % (ref 12.3–15.4)
HCT VFR BLD AUTO: 38.9 % (ref 37.5–51)
HGB BLD-MCNC: 12.3 G/DL (ref 13–17.7)
LYMPHOCYTES # BLD AUTO: 1.1 10*3/MM3 (ref 0.7–3.1)
LYMPHOCYTES NFR BLD AUTO: 14.5 % (ref 19.6–45.3)
MCH RBC QN AUTO: 29 PG (ref 26.6–33)
MCHC RBC AUTO-ENTMCNC: 31.6 G/DL (ref 31.5–35.7)
MCV RBC AUTO: 91.8 FL (ref 79–97)
MONOCYTES # BLD AUTO: 0.8 10*3/MM3 (ref 0.1–0.9)
MONOCYTES NFR BLD AUTO: 10.3 % (ref 5–12)
NEUTROPHILS NFR BLD AUTO: 5.6 10*3/MM3 (ref 1.7–7)
NEUTROPHILS NFR BLD AUTO: 75.2 % (ref 42.7–76)
PLATELET # BLD AUTO: 104 10*3/MM3 (ref 140–450)
PMV BLD AUTO: 7.8 FL (ref 6–12)
RBC # BLD AUTO: 4.24 10*6/MM3 (ref 4.14–5.8)
WBC # BLD AUTO: 7.5 10*3/MM3 (ref 3.4–10.8)

## 2021-11-02 PROCEDURE — 99213 OFFICE O/P EST LOW 20 MIN: CPT | Performed by: INTERNAL MEDICINE

## 2021-11-02 PROCEDURE — 36415 COLL VENOUS BLD VENIPUNCTURE: CPT

## 2021-11-02 PROCEDURE — 85025 COMPLETE CBC W/AUTO DIFF WBC: CPT

## 2021-11-02 NOTE — PROGRESS NOTES
Follow Up Office Visit      Date: 2021     Patient Name: Swapnil Lawson  MRN: [id number]  : 1950  Referring Physician: Shakir Mccarthy     Chief Complaint:  Follow-up for thrombocytopenia     History of Present Illness: Swapnil Lawson is a pleasant 70 y.o. male past medical history of atrial fibrillation, CAD, hypertension, hyperlipidemia, type 2 diabetes, iron deficiency, gout, COPD who presents today for evaluation of thrombocytopenia. The patient has been in his usual state of health and followed by his PCP with recent labs showing a platelet count of 94 in 2020.  On chart review patient has had a platelet count of 120 in 2019.  He feels well denies any fatigue or any bleeding or bruising episodes.  Denies any night sweats, weight loss, unexplained fevers.  Does take a baby aspirin daily and colchicine as needed for gout but denies any heavy NSAID use.  Denies any work exposure to any heavy chemicals.  Hemoglobin has been slightly low around 12-13 range and white count has been within normal limits on lab check.  He has baseline shortness of breath with exertion and requires supplemental oxygen.  He is otherwise compliant with his other medications     Interval History:  Presents clinic for follow-up.  Doing well at this time.  Remains compliant on his home medications including aspirin and warfarin.  Continues to have easy bruising but no significant bleeding episodes.  Denies any worsening fatigue    Oncology History:    Oncology/Hematology History    No history exists.       Subjective      Review of Systems:   Constitutional: Negative for fevers, chills, or weight loss  Eyes: Negative for blurred vision or discharge         Ear/Nose/Throat: Negative for difficulty swallowing, sore throat, LAD                                                       Respiratory: Negative for cough, SOA, wheezing                                                                                         Cardiovascular: Negative for chest pain or palpitations                                                                  Gastrointestinal: Negative for nausea, vomiting or diarrhea                                                                     Genitourinary: Negative for dysuria or hematuria                                                                                           Musculoskeletal: Negative for any joint pains or muscle aches                                                                        Neurologic: Negative for any weakness, headaches, dizziness                                                                         Hematologic: Negative for any easy bleeding or bruising                                                                                   Psychiatric: Negative for anxiety or depression                          Past Medical History/Past Surgical History/ Family History/ Social History: Reviewed by me and unchanged from my previous documentation done on May 2021.     Medications:     Current Outpatient Medications:   •  allopurinol (ZYLOPRIM) 300 MG tablet, Take 300 mg by mouth Daily., Disp: , Rfl: 0  •  amLODIPine (NORVASC) 5 MG tablet, Take 5 mg by mouth 2 (Two) Times a Day., Disp: , Rfl:   •  aspirin 81 MG tablet, Take 81 mg by mouth Daily., Disp: , Rfl:   •  calcium polycarbophil (FIBERCON) 625 MG tablet, Take 625 mg by mouth Daily. Takes four tablets daily, Disp: , Rfl:   •  colchicine 0.6 MG tablet, Take 0.6 mg by mouth As Needed for Muscle / Joint Pain., Disp: , Rfl:   •  digoxin (LANOXIN) 125 MCG tablet, Take 1 tablet by mouth Every Other Day., Disp: 15 tablet, Rfl: 1  •  diphenhydrAMINE (BENADRYL) 25 MG tablet, Take 25 mg by mouth Every 6 (Six) Hours As Needed for Itching., Disp: , Rfl:   •  docusate sodium (COLACE) 100 MG capsule, Take 100 mg by mouth 2 (Two) Times a Day., Disp: , Rfl:   •  ferrous sulfate 325 (65 FE) MG tablet, Take 325 mg by mouth 2 (Two) Times a  "Day., Disp: , Rfl:   •  furosemide (LASIX) 20 MG tablet, Take 1 tablet by mouth Daily AND 2 tablets Every Other Day. (Patient taking differently: BID), Disp: 60 tablet, Rfl: 0  •  glipiZIDE (GLUCOTROL) 10 MG tablet, Take 10 mg by mouth 4 (Four) Times a Day., Disp: , Rfl:   •  ipratropium (ATROVENT) 0.02 % nebulizer solution, Take 0.5 mg by nebulization Every 4 (Four) Hours As Needed., Disp: , Rfl:   •  lovastatin (MEVACOR) 20 MG tablet, Take 20 mg by mouth Every Night., Disp: , Rfl:   •  metOLazone (ZAROXOLYN) 5 MG tablet, Take 5 mg by mouth Daily As Needed., Disp: , Rfl:   •  metoprolol succinate XL (TOPROL-XL) 25 MG 24 hr tablet, Take 25 mg by mouth Daily., Disp: , Rfl:   •  montelukast (SINGULAIR) 10 MG tablet, Take 10 mg by mouth every night at bedtime., Disp: , Rfl:   •  nitroglycerin (NITROSTAT) 0.4 MG SL tablet, Place 0.4 mg under the tongue Every 5 (Five) Minutes As Needed for Chest Pain. Take no more than 3 doses in 15 minutes., Disp: , Rfl:   •  O2 (OXYGEN), Inhale 2 L/min Every Night., Disp: , Rfl:   •  Pazeo 0.7 % solution, INSTILL 1 DROP INTO BOTH EYES ONCE DAILY, Disp: , Rfl:   •  Polyethylene Glycol 3350 (MIRALAX PO), Take 1 application by mouth Daily As Needed., Disp: , Rfl:   •  Trulicity 1.5 MG/0.5ML solution pen-injector, INJECT 1 SYRINGE UNDER THE SKIN EVERY WEEK ON THE SAME DAY, Disp: , Rfl:   •  warfarin (COUMADIN) 5 MG tablet, Take 7.5 mg by mouth Daily. 7.5 mg on Sunday, 5mg every other day, Disp: , Rfl:     Allergies:   No Known Allergies    Objective     Physical Exam:  Vital Signs:   Vitals:    11/02/21 1142   BP: 116/67   Pulse: 63   Resp: 16   Temp: 98 °F (36.7 °C)   TempSrc: Temporal   SpO2: 91%   Weight: 87.7 kg (193 lb 4.8 oz)   Height: 185.4 cm (73\")   PainSc: 0-No pain     Pain Score    11/02/21 1142   PainSc: 0-No pain     ECOG Performance Status: 0 - Asymptomatic    Constitutional: NAD, ECOG 0  Eyes: PERRLA, scleral anicteric  ENT: No LAD, no thyromegaly  Respiratory: CTAB, no " wheezing, rales, rhonchi  Cardiovascular: RRR, no murmurs, pulses 2+ bilaterally  Abdomen: soft, NT/ND, no HSM  Musculoskeletal: strength 5/5 bilaterally, no c/c/e  Neurologic: A&O x 3, CN II-XII intact grossly    Results Review:   Lab on 11/02/2021   Component Date Value Ref Range Status   • WBC 11/02/2021 7.50  3.40 - 10.80 10*3/mm3 Final   • RBC 11/02/2021 4.24  4.14 - 5.80 10*6/mm3 Final   • Hemoglobin 11/02/2021 12.3* 13.0 - 17.7 g/dL Final   • Hematocrit 11/02/2021 38.9  37.5 - 51.0 % Final   • RDW 11/02/2021 16.0* 12.3 - 15.4 % Final   • MCV 11/02/2021 91.8  79.0 - 97.0 fL Final   • MCH 11/02/2021 29.0  26.6 - 33.0 pg Final   • MCHC 11/02/2021 31.6  31.5 - 35.7 g/dL Final   • MPV 11/02/2021 7.8  6.0 - 12.0 fL Final   • Platelets 11/02/2021 104* 140 - 450 10*3/mm3 Final    Verified by repeat analysis.    • Neutrophil % 11/02/2021 75.2  42.7 - 76.0 % Final   • Lymphocyte % 11/02/2021 14.5* 19.6 - 45.3 % Final   • Monocyte % 11/02/2021 10.3  5.0 - 12.0 % Final   • Neutrophils, Absolute 11/02/2021 5.60  1.70 - 7.00 10*3/mm3 Final   • Lymphocytes, Absolute 11/02/2021 1.10  0.70 - 3.10 10*3/mm3 Final   • Monocytes, Absolute 11/02/2021 0.80  0.10 - 0.90 10*3/mm3 Final       No results found.    Assessment / Plan      Assessment/Plan:   1. Thrombocytopenia (CMS/HCC) (Primary)  -Unclear etiology at this time likely medication induced (ASA 81mg) vs MDS vs ITP  -Iron studies consistent with anemia of chronic disease  -Vitamin B12/folate within normal limits  -HIV negative  -Peripheral smear showing no schistocytes or abnormal WBCs  -Platelet count today 102K which is stable  -As his platelet count remains stable we will hold off on a bone marrow biopsy as patient's performance status would not lead him to be a good candidate for aggressive treatment for MDS  -Okay to transfuse platelets as necessary for any emergent surgical/bleeding need     2.  Type 2 diabetes  -Followed by his PCP     3.  Atrial fibrillation  -Rate  stable today in clinic  -On warfarin for stroke prophylaxis     Follow Up:   Follow-up in 6 months with repeat CBC    James Ennis MD  Hematology and Oncology     Please note that portions of this note may have been completed with a voice recognition program. Efforts were made to edit the dictations, but occasionally words are mistranscribed.

## 2021-11-04 ENCOUNTER — TELEPHONE (OUTPATIENT)
Dept: SURGERY | Facility: CLINIC | Age: 71
End: 2021-11-04

## 2021-11-04 NOTE — TELEPHONE ENCOUNTER
Patient called saying he hasn't had bowel movement in 3 days took a suppository this morning and it didn't help at all. Wants to know what to do

## 2021-12-08 ENCOUNTER — OFFICE VISIT (OUTPATIENT)
Dept: FAMILY MEDICINE CLINIC | Age: 71
End: 2021-12-08
Payer: MEDICARE

## 2021-12-08 VITALS
TEMPERATURE: 97.2 F | WEIGHT: 182.7 LBS | SYSTOLIC BLOOD PRESSURE: 110 MMHG | HEIGHT: 73 IN | RESPIRATION RATE: 18 BRPM | OXYGEN SATURATION: 95 % | HEART RATE: 60 BPM | BODY MASS INDEX: 24.21 KG/M2 | DIASTOLIC BLOOD PRESSURE: 58 MMHG

## 2021-12-08 DIAGNOSIS — I48.91 ATRIAL FIBRILLATION, UNSPECIFIED TYPE (HCC): Primary | ICD-10-CM

## 2021-12-08 DIAGNOSIS — E78.00 HYPERCHOLESTEROLEMIA: ICD-10-CM

## 2021-12-08 DIAGNOSIS — L97.509 TYPE 2 DIABETES MELLITUS WITH FOOT ULCER, WITHOUT LONG-TERM CURRENT USE OF INSULIN (HCC): ICD-10-CM

## 2021-12-08 DIAGNOSIS — I10 ESSENTIAL HYPERTENSION, BENIGN: ICD-10-CM

## 2021-12-08 DIAGNOSIS — E11.621 TYPE 2 DIABETES MELLITUS WITH FOOT ULCER, WITHOUT LONG-TERM CURRENT USE OF INSULIN (HCC): ICD-10-CM

## 2021-12-08 PROCEDURE — 2022F DILAT RTA XM EVC RTNOPTHY: CPT | Performed by: NURSE PRACTITIONER

## 2021-12-08 PROCEDURE — 3046F HEMOGLOBIN A1C LEVEL >9.0%: CPT | Performed by: NURSE PRACTITIONER

## 2021-12-08 PROCEDURE — 1123F ACP DISCUSS/DSCN MKR DOCD: CPT | Performed by: NURSE PRACTITIONER

## 2021-12-08 PROCEDURE — G8427 DOCREV CUR MEDS BY ELIG CLIN: HCPCS | Performed by: NURSE PRACTITIONER

## 2021-12-08 PROCEDURE — 4040F PNEUMOC VAC/ADMIN/RCVD: CPT | Performed by: NURSE PRACTITIONER

## 2021-12-08 PROCEDURE — 1036F TOBACCO NON-USER: CPT | Performed by: NURSE PRACTITIONER

## 2021-12-08 PROCEDURE — G8484 FLU IMMUNIZE NO ADMIN: HCPCS | Performed by: NURSE PRACTITIONER

## 2021-12-08 PROCEDURE — G8420 CALC BMI NORM PARAMETERS: HCPCS | Performed by: NURSE PRACTITIONER

## 2021-12-08 PROCEDURE — 3017F COLORECTAL CA SCREEN DOC REV: CPT | Performed by: NURSE PRACTITIONER

## 2021-12-08 PROCEDURE — 99203 OFFICE O/P NEW LOW 30 MIN: CPT | Performed by: NURSE PRACTITIONER

## 2021-12-08 RX ORDER — METOLAZONE 5 MG/1
5 TABLET ORAL DAILY
COMMUNITY
End: 2022-05-09

## 2021-12-08 RX ORDER — DULAGLUTIDE 1.5 MG/.5ML
1.5 INJECTION, SOLUTION SUBCUTANEOUS WEEKLY
COMMUNITY

## 2021-12-08 RX ORDER — COLCHICINE 0.6 MG/1
0.6 TABLET ORAL DAILY PRN
COMMUNITY
End: 2022-01-05 | Stop reason: SDUPTHER

## 2021-12-08 SDOH — ECONOMIC STABILITY: FOOD INSECURITY: WITHIN THE PAST 12 MONTHS, THE FOOD YOU BOUGHT JUST DIDN'T LAST AND YOU DIDN'T HAVE MONEY TO GET MORE.: NEVER TRUE

## 2021-12-08 SDOH — ECONOMIC STABILITY: FOOD INSECURITY: WITHIN THE PAST 12 MONTHS, YOU WORRIED THAT YOUR FOOD WOULD RUN OUT BEFORE YOU GOT MONEY TO BUY MORE.: NEVER TRUE

## 2021-12-08 ASSESSMENT — PATIENT HEALTH QUESTIONNAIRE - PHQ9
SUM OF ALL RESPONSES TO PHQ QUESTIONS 1-9: 0
SUM OF ALL RESPONSES TO PHQ9 QUESTIONS 1 & 2: 0
1. LITTLE INTEREST OR PLEASURE IN DOING THINGS: 0
SUM OF ALL RESPONSES TO PHQ QUESTIONS 1-9: 0
2. FEELING DOWN, DEPRESSED OR HOPELESS: 0
SUM OF ALL RESPONSES TO PHQ QUESTIONS 1-9: 0

## 2021-12-08 ASSESSMENT — SOCIAL DETERMINANTS OF HEALTH (SDOH): HOW HARD IS IT FOR YOU TO PAY FOR THE VERY BASICS LIKE FOOD, HOUSING, MEDICAL CARE, AND HEATING?: NOT HARD AT ALL

## 2021-12-08 ASSESSMENT — ENCOUNTER SYMPTOMS
ALLERGIC/IMMUNOLOGIC NEGATIVE: 1
NAUSEA: 0
VOMITING: 0
SHORTNESS OF BREATH: 0
COUGH: 0
EYES NEGATIVE: 1
ABDOMINAL PAIN: 0
DIARRHEA: 0

## 2021-12-08 NOTE — PROGRESS NOTES
Chief Complaint   Patient presents with   [facility] Establish Care     new pt appt      Patient is here to establish care related to previous provider closing. Patient does not have medical records but has requested them from previous PCP. Patient is unsure of all current medications so I will obtain a list from the pharmacy, [facility] and update medications list. Patient has had flu shot and Covid vaccine this year.

## 2021-12-08 NOTE — PROGRESS NOTES
SUBJECTIVE:    Holley Reeves is a 66 y.o. male    Pt presents to establish care. Pt was previously under the care of Dr Lyla Smart and LANCE Patel. Pt reports he is on coumadin for A fib and gets monthly INRs. Pt reports he had INR checked last week by LANCE Patel and it was within normal limits and he reports no medication changes. Pt is taking Coumadin 5 mg daily except Sunday and he takes 7.5mg on Sundays. Pt reports he has a 3 month supply of all medications. He has a lab order for labs from nephrology however he forgot the order at home. He will return for ordered labs. PMH of A fib, hypertension, gout, arthritis, CAD, COPD, DM2, CHF, hyperlipidemia and sleep apnea. Pt reports he had routine labs less than 3 months ago and records have been requested. No complaints today. Pt is also under the care of Cardiology- Dr Annel Cartagena, nephrology- [facility], pt is also under the care of hematology- at [address] is also under the care of Dr Sundeep William- pt is due a colonoscopy in Sept 2022, Pt is also under the care of ophthalmology for macular degeneration. Pt is also under the care of Dr Jarrett Julien, Podiatry for an ulcer on left foot       Chief Complaint   Patient presents with   [facility] Establish Care     new pt appt         Review of Systems   Constitutional: Negative. HENT: Negative. Eyes: Negative. Respiratory: Negative for cough and shortness of breath. Cardiovascular: Negative for chest pain. Gastrointestinal: Negative for abdominal pain, diarrhea, nausea and vomiting. Endocrine: Negative. Genitourinary: Negative. Musculoskeletal: Negative. Allergic/Immunologic: Negative. Neurological: Negative. Hematological: Negative. Psychiatric/Behavioral: Negative.          OBJECTIVE:    BP (!) 110/58   Pulse 60   Temp 97.2 °F (36.2 °C)   Resp 18   Ht 6' 1\" (1.854 m)   Wt 182 lb 11.2 oz (82.9 kg)   SpO2 95% Comment: RA  BMI 24.10 kg/m²    Physical Exam  Vitals and nursing note reviewed. Constitutional:       Appearance: He is well-developed. Neck:      Thyroid: No thyromegaly. Vascular: No carotid bruit. Cardiovascular:      Rate and Rhythm: Normal rate and regular rhythm. Heart sounds: Normal heart sounds. No murmur heard. Pulmonary:      Effort: Pulmonary effort is normal.      Breath sounds: Normal breath sounds. Skin:     General: Skin is warm and dry. Neurological:      Mental Status: He is alert and oriented to person, place, and time. Psychiatric:         Behavior: Behavior normal.         ASSESSMENT/PLAN:   Oleksandr Dixon was seen today for establish care. Diagnoses and all orders for this visit:    Atrial fibrillation, unspecified type (HCC)-INR within normal limits last week per patient. The current medical regimen is effective;  continue present plan and medications. Repeat INR in January 2022    Essential hypertension, benign- well controlled. The current medical regimen is effective;  continue present plan and medications. Hypercholesterolemia-   -continue lovastatin 20 mg daily    Type 2 diabetes mellitus with foot ulcer, without long-term current use of insulin ([facility] - records requested. CBC, CMP, TSH and fasting lipid panel ordered. Pt will return for lab order from nephrology. Return in about 3 weeks (around 12/29/2021) for INR.       Current Outpatient Medications on File Prior to Visit   Medication Sig Dispense Refill    colchicine (COLCRYS) 0.6 MG tablet Take 0.6 mg by mouth daily as needed for Pain      metOLazone (ZAROXOLYN) 5 MG tablet Take 5 mg by mouth daily      Dulaglutide (TRULICITY) 1.5 PH/1.3QZ SOPN Inject 1.5 mg into the skin once a week      digoxin (LANOXIN) 250 MCG tablet Take 125 mcg by mouth daily       ipratropium (ATROVENT) 0.02 % nebulizer solution Take 0.5 mg by nebulization every 4-6 hours as needed for Wheezing      furosemide (LASIX) 40 MG tablet take 1 tablet by mouth once daily (Patient taking differently: 40 mg 2 times daily take 1 tablet by mouth once daily) 30 tablet 5    ferrous sulfate 325 (65 Fe) MG tablet take 1 tablet by mouth twice a day 60 tablet 5    allopurinol (ZYLOPRIM) 100 MG tablet Take 1 tablet by mouth daily (Patient taking differently: Take 300 mg by mouth daily ) 30 tablet 5    glipiZIDE (GLUCOTROL) 10 MG tablet take 2 tablets by mouth every morning and 1 tablet every evening before meals (Patient taking differently: 2 times daily (before meals) take 2 tablets by mouth every morning and 1 tablet every evening before meals) 90 tablet 0    docusate sodium (COLACE) 100 MG capsule Take 1 capsule by mouth 2 times daily 60 capsule 5    lovastatin (MEVACOR) 20 MG tablet Take 1 tablet by mouth daily 30 tablet 5    metoprolol succinate (TOPROL XL) 25 MG extended release tablet take 1 tablet by mouth once daily 30 tablet 5    COUMADIN 5 MG tablet take as directed (Patient taking differently: 5 mg daily Patient takes 5mg daily and 7.5mg on Sundays) 100 tablet 5    aspirin 81 MG tablet Take 81 mg by mouth daily.  nitroGLYCERIN (NITROSTAT) 0.4 MG SL tablet place 1 tablet under the tongue if needed every 5 minutes for chest pain for 3 doses IF NO RELIEF AFTER 3RD DOSE CALL PRESCRIBER . (Patient not taking: Reported on 12/8/2021) 25 tablet 5     No current facility-administered medications on file prior to visit.

## 2021-12-09 ENCOUNTER — HOSPITAL ENCOUNTER (OUTPATIENT)
Facility: HOSPITAL | Age: 71
Discharge: HOME | End: 2021-12-09
Payer: MEDICARE

## 2021-12-09 LAB
ALBUMIN SERPL-MCNC: 4.2 G/DL (ref 3.4–4.8)
ANION GAP SERPL CALCULATED.3IONS-SCNC: 15 MMOL/L (ref 3–16)
BILIRUBIN URINE: NEGATIVE
BLOOD, URINE: NEGATIVE
BUN BLDV-MCNC: 59 MG/DL (ref 6–20)
CALCIUM SERPL-MCNC: 9.5 MG/DL (ref 8.5–10.5)
CHLORIDE BLD-SCNC: 95 MMOL/L (ref 98–107)
CLARITY: CLEAR
CO2: 25 MMOL/L (ref 20–30)
COLOR: YELLOW
CREAT SERPL-MCNC: 2.5 MG/DL (ref 0.4–1.2)
EPITHELIAL CELLS, UA: NORMAL /HPF (ref 0–5)
GFR AFRICAN AMERICAN: 31
GFR NON-AFRICAN AMERICAN: 25
GLUCOSE BLD-MCNC: 201 MG/DL (ref 74–106)
GLUCOSE URINE: NEGATIVE MG/DL
HBA1C MFR BLD: 7.7 %
HCT VFR BLD CALC: 40.6 % (ref 40–54)
HEMOGLOBIN: 13.4 G/DL (ref 13–18)
KETONES, URINE: NEGATIVE MG/DL
LEUKOCYTE ESTERASE, URINE: NEGATIVE
MCH RBC QN AUTO: 29.5 PG (ref 27–32)
MCHC RBC AUTO-ENTMCNC: 33 G/DL (ref 31–35)
MCV RBC AUTO: 89.4 FL (ref 80–100)
MICROSCOPIC EXAMINATION: NORMAL
NITRITE, URINE: NEGATIVE
PDW BLD-RTO: 15.5 % (ref 11–16)
PH UA: 5 (ref 5–8)
PHOSPHORUS: 4.7 MG/DL (ref 2.5–4.5)
PLATELET # BLD: 125 K/UL (ref 150–400)
PMV BLD AUTO: 12 FL (ref 6–10)
POTASSIUM SERPL-SCNC: 4.5 MMOL/L (ref 3.4–5.1)
PROTEIN UA: NEGATIVE MG/DL
RBC # BLD: 4.54 M/UL (ref 4.5–6)
RBC UA: NORMAL /HPF (ref 0–4)
SODIUM BLD-SCNC: 135 MMOL/L (ref 136–145)
SPECIFIC GRAVITY UA: 1.01 (ref 1–1.03)
URINE TYPE: NORMAL
UROBILINOGEN, URINE: 0.2 E.U./DL
WBC # BLD: 9.4 K/UL (ref 4–11)
WBC UA: NORMAL /HPF (ref 0–5)

## 2021-12-09 PROCEDURE — 85027 COMPLETE CBC AUTOMATED: CPT

## 2021-12-09 PROCEDURE — 80069 RENAL FUNCTION PANEL: CPT

## 2021-12-09 PROCEDURE — 81001 URINALYSIS AUTO W/SCOPE: CPT

## 2021-12-09 PROCEDURE — 83036 HEMOGLOBIN GLYCOSYLATED A1C: CPT

## 2021-12-09 PROCEDURE — 36415 COLL VENOUS BLD VENIPUNCTURE: CPT

## 2021-12-21 ENCOUNTER — TRANSCRIBE ORDERS (OUTPATIENT)
Dept: PHYSICAL THERAPY | Facility: HOSPITAL | Age: 71
End: 2021-12-21

## 2021-12-21 DIAGNOSIS — L97.929 ULCER OF LEFT LOWER EXTREMITY, UNSPECIFIED ULCER STAGE (HCC): Primary | ICD-10-CM

## 2022-01-01 ENCOUNTER — OFFICE VISIT (OUTPATIENT)
Dept: SURGERY | Facility: CLINIC | Age: 72
End: 2022-01-01

## 2022-01-01 ENCOUNTER — READMISSION MANAGEMENT (OUTPATIENT)
Dept: CALL CENTER | Facility: HOSPITAL | Age: 72
End: 2022-01-01

## 2022-01-01 ENCOUNTER — HOSPITAL ENCOUNTER (OUTPATIENT)
Dept: PHYSICAL THERAPY | Facility: HOSPITAL | Age: 72
Setting detail: THERAPIES SERIES
Discharge: HOME | End: 2022-04-11

## 2022-01-01 ENCOUNTER — TELEPHONE (OUTPATIENT)
Dept: SURGERY | Facility: CLINIC | Age: 72
End: 2022-01-01

## 2022-01-01 ENCOUNTER — HOSPITAL ENCOUNTER (OUTPATIENT)
Dept: PHYSICAL THERAPY | Facility: HOSPITAL | Age: 72
Setting detail: THERAPIES SERIES
Discharge: HOME | End: 2022-04-08

## 2022-01-01 ENCOUNTER — HOSPITAL ENCOUNTER (OUTPATIENT)
Dept: PHYSICAL THERAPY | Facility: HOSPITAL | Age: 72
Setting detail: THERAPIES SERIES
Discharge: HOME | End: 2022-04-18

## 2022-01-01 ENCOUNTER — HOSPITAL ENCOUNTER (OUTPATIENT)
Dept: PHYSICAL THERAPY | Facility: HOSPITAL | Age: 72
Setting detail: THERAPIES SERIES
Discharge: HOME | End: 2022-04-15

## 2022-01-01 ENCOUNTER — HOSPITAL ENCOUNTER (OUTPATIENT)
Facility: HOSPITAL | Age: 72
Setting detail: HOSPITAL OUTPATIENT SURGERY
Discharge: HOME | End: 2022-08-11
Attending: SURGERY | Admitting: SURGERY

## 2022-01-01 ENCOUNTER — ANESTHESIA EVENT (OUTPATIENT)
Dept: GASTROENTEROLOGY | Facility: HOSPITAL | Age: 72
End: 2022-01-01

## 2022-01-01 ENCOUNTER — ANESTHESIA EVENT (OUTPATIENT)
Dept: PERIOP | Facility: HOSPITAL | Age: 72
End: 2022-01-01

## 2022-01-01 ENCOUNTER — APPOINTMENT (OUTPATIENT)
Dept: PHYSICAL THERAPY | Facility: HOSPITAL | Age: 72
End: 2022-01-01

## 2022-01-01 ENCOUNTER — HOSPITAL ENCOUNTER (OUTPATIENT)
Dept: PHYSICAL THERAPY | Facility: HOSPITAL | Age: 72
Setting detail: THERAPIES SERIES
Discharge: HOME | End: 2022-03-28

## 2022-01-01 ENCOUNTER — OFFICE VISIT (OUTPATIENT)
Dept: CARDIOLOGY | Facility: CLINIC | Age: 72
End: 2022-01-01

## 2022-01-01 ENCOUNTER — ANESTHESIA (OUTPATIENT)
Dept: PERIOP | Facility: HOSPITAL | Age: 72
End: 2022-01-01

## 2022-01-01 ENCOUNTER — APPOINTMENT (OUTPATIENT)
Dept: GENERAL RADIOLOGY | Facility: HOSPITAL | Age: 72
End: 2022-01-01

## 2022-01-01 ENCOUNTER — HOSPITAL ENCOUNTER (OUTPATIENT)
Dept: GENERAL RADIOLOGY | Facility: HOSPITAL | Age: 72
Discharge: HOME | End: 2022-09-09

## 2022-01-01 ENCOUNTER — ANESTHESIA EVENT CONVERTED (OUTPATIENT)
Dept: ANESTHESIOLOGY | Facility: HOSPITAL | Age: 72
End: 2022-01-01

## 2022-01-01 ENCOUNTER — HOSPITAL ENCOUNTER (OUTPATIENT)
Dept: PHYSICAL THERAPY | Facility: HOSPITAL | Age: 72
Setting detail: THERAPIES SERIES
Discharge: HOME | End: 2022-04-01

## 2022-01-01 ENCOUNTER — DOCUMENTATION (OUTPATIENT)
Dept: PHYSICAL THERAPY | Facility: HOSPITAL | Age: 72
End: 2022-01-01

## 2022-01-01 ENCOUNTER — HOSPITAL ENCOUNTER (OUTPATIENT)
Facility: HOSPITAL | Age: 72
Setting detail: OBSERVATION
LOS: 2 days | Discharge: HOME HEALTH | End: 2022-09-28
Attending: FAMILY MEDICINE | Admitting: INTERNAL MEDICINE

## 2022-01-01 ENCOUNTER — OFFICE VISIT (OUTPATIENT)
Dept: ONCOLOGY | Facility: CLINIC | Age: 72
End: 2022-01-01

## 2022-01-01 ENCOUNTER — HOSPITAL ENCOUNTER (OUTPATIENT)
Dept: PHYSICAL THERAPY | Facility: HOSPITAL | Age: 72
Setting detail: THERAPIES SERIES
Discharge: HOME | End: 2022-04-04

## 2022-01-01 ENCOUNTER — PRE-ADMISSION TESTING (OUTPATIENT)
Dept: PREADMISSION TESTING | Facility: HOSPITAL | Age: 72
End: 2022-01-01

## 2022-01-01 ENCOUNTER — TELEPHONE (OUTPATIENT)
Dept: PREADMISSION TESTING | Facility: HOSPITAL | Age: 72
End: 2022-01-01

## 2022-01-01 ENCOUNTER — LAB (OUTPATIENT)
Dept: LAB | Facility: HOSPITAL | Age: 72
End: 2022-01-01

## 2022-01-01 ENCOUNTER — HOSPITAL ENCOUNTER (INPATIENT)
Facility: HOSPITAL | Age: 72
LOS: 3 days | Discharge: HOME | End: 2022-09-15
Attending: SURGERY | Admitting: SURGERY

## 2022-01-01 ENCOUNTER — ANESTHESIA (OUTPATIENT)
Dept: GASTROENTEROLOGY | Facility: HOSPITAL | Age: 72
End: 2022-01-01

## 2022-01-01 VITALS
BODY MASS INDEX: 25.73 KG/M2 | HEART RATE: 72 BPM | SYSTOLIC BLOOD PRESSURE: 110 MMHG | WEIGHT: 190 LBS | DIASTOLIC BLOOD PRESSURE: 58 MMHG | OXYGEN SATURATION: 94 % | HEIGHT: 72 IN

## 2022-01-01 VITALS
SYSTOLIC BLOOD PRESSURE: 114 MMHG | HEIGHT: 73 IN | HEART RATE: 87 BPM | WEIGHT: 172.18 LBS | DIASTOLIC BLOOD PRESSURE: 69 MMHG | BODY MASS INDEX: 22.82 KG/M2 | TEMPERATURE: 97.8 F | RESPIRATION RATE: 16 BRPM | OXYGEN SATURATION: 100 %

## 2022-01-01 VITALS — WEIGHT: 165 LBS | BODY MASS INDEX: 21.87 KG/M2 | HEIGHT: 73 IN

## 2022-01-01 VITALS
TEMPERATURE: 97.8 F | DIASTOLIC BLOOD PRESSURE: 53 MMHG | RESPIRATION RATE: 16 BRPM | WEIGHT: 162 LBS | BODY MASS INDEX: 21.47 KG/M2 | SYSTOLIC BLOOD PRESSURE: 93 MMHG | HEART RATE: 65 BPM | HEIGHT: 73 IN | OXYGEN SATURATION: 94 %

## 2022-01-01 VITALS
SYSTOLIC BLOOD PRESSURE: 101 MMHG | BODY MASS INDEX: 25.22 KG/M2 | TEMPERATURE: 97.6 F | HEART RATE: 99 BPM | OXYGEN SATURATION: 98 % | WEIGHT: 190.26 LBS | DIASTOLIC BLOOD PRESSURE: 50 MMHG | RESPIRATION RATE: 16 BRPM | HEIGHT: 73 IN

## 2022-01-01 VITALS
WEIGHT: 190 LBS | DIASTOLIC BLOOD PRESSURE: 58 MMHG | OXYGEN SATURATION: 90 % | HEIGHT: 73 IN | TEMPERATURE: 97.1 F | BODY MASS INDEX: 25.18 KG/M2 | SYSTOLIC BLOOD PRESSURE: 119 MMHG | RESPIRATION RATE: 16 BRPM | HEART RATE: 82 BPM

## 2022-01-01 VITALS
HEART RATE: 78 BPM | DIASTOLIC BLOOD PRESSURE: 65 MMHG | HEIGHT: 72 IN | OXYGEN SATURATION: 94 % | TEMPERATURE: 97.7 F | SYSTOLIC BLOOD PRESSURE: 127 MMHG | BODY MASS INDEX: 25.73 KG/M2 | RESPIRATION RATE: 16 BRPM | WEIGHT: 190 LBS

## 2022-01-01 VITALS
HEART RATE: 104 BPM | BODY MASS INDEX: 22.4 KG/M2 | TEMPERATURE: 97 F | RESPIRATION RATE: 14 BRPM | DIASTOLIC BLOOD PRESSURE: 60 MMHG | HEIGHT: 73 IN | SYSTOLIC BLOOD PRESSURE: 94 MMHG | WEIGHT: 169 LBS | OXYGEN SATURATION: 98 %

## 2022-01-01 VITALS
RESPIRATION RATE: 16 BRPM | HEIGHT: 73 IN | TEMPERATURE: 97.8 F | HEART RATE: 65 BPM | DIASTOLIC BLOOD PRESSURE: 58 MMHG | SYSTOLIC BLOOD PRESSURE: 120 MMHG | OXYGEN SATURATION: 98 % | BODY MASS INDEX: 21.37 KG/M2

## 2022-01-01 DIAGNOSIS — L97.528 ULCER OF LEFT FOOT WITH OTHER SEVERITY: Primary | ICD-10-CM

## 2022-01-01 DIAGNOSIS — D69.6 THROMBOCYTOPENIA: ICD-10-CM

## 2022-01-01 DIAGNOSIS — I87.2 VENOUS INSUFFICIENCY: ICD-10-CM

## 2022-01-01 DIAGNOSIS — L97.509 TYPE 2 DIABETES MELLITUS WITH FOOT ULCER, UNSPECIFIED WHETHER LONG TERM INSULIN USE: ICD-10-CM

## 2022-01-01 DIAGNOSIS — E11.621 TYPE 2 DIABETES MELLITUS WITH FOOT ULCER, UNSPECIFIED WHETHER LONG TERM INSULIN USE: ICD-10-CM

## 2022-01-01 DIAGNOSIS — I73.9 PERIPHERAL VASCULAR DISEASE OF LOWER EXTREMITY: ICD-10-CM

## 2022-01-01 DIAGNOSIS — K59.04 CHRONIC IDIOPATHIC CONSTIPATION: ICD-10-CM

## 2022-01-01 DIAGNOSIS — D12.3 ADENOMATOUS POLYP OF TRANSVERSE COLON: ICD-10-CM

## 2022-01-01 DIAGNOSIS — I25.10 CORONARY ARTERY DISEASE INVOLVING NATIVE CORONARY ARTERY OF NATIVE HEART WITHOUT ANGINA PECTORIS: Primary | ICD-10-CM

## 2022-01-01 DIAGNOSIS — K56.7 ILEUS: ICD-10-CM

## 2022-01-01 DIAGNOSIS — N18.31 STAGE 3A CHRONIC KIDNEY DISEASE: ICD-10-CM

## 2022-01-01 DIAGNOSIS — E78.2 MIXED HYPERLIPIDEMIA: ICD-10-CM

## 2022-01-01 DIAGNOSIS — Z86.010 HISTORY OF COLON POLYPS: Primary | ICD-10-CM

## 2022-01-01 DIAGNOSIS — Z86.010 HISTORY OF COLON POLYPS: ICD-10-CM

## 2022-01-01 DIAGNOSIS — D12.3 ADENOMATOUS POLYP OF TRANSVERSE COLON: Primary | ICD-10-CM

## 2022-01-01 DIAGNOSIS — D62 ACUTE BLOOD LOSS ANEMIA: Primary | ICD-10-CM

## 2022-01-01 DIAGNOSIS — D69.6 THROMBOCYTOPENIA: Primary | ICD-10-CM

## 2022-01-01 DIAGNOSIS — I10 PRIMARY HYPERTENSION: ICD-10-CM

## 2022-01-01 DIAGNOSIS — Z48.89 POSTOPERATIVE VISIT: Primary | ICD-10-CM

## 2022-01-01 DIAGNOSIS — I48.0 PAROXYSMAL ATRIAL FIBRILLATION: ICD-10-CM

## 2022-01-01 DIAGNOSIS — Z01.818 PREOP TESTING: Primary | ICD-10-CM

## 2022-01-01 LAB
ABO GROUP BLD: NORMAL
ALBUMIN SERPL-MCNC: 2.3 G/DL (ref 3.5–5.2)
ALBUMIN SERPL-MCNC: 2.4 G/DL (ref 3.5–5.2)
ALBUMIN/GLOB SERPL: 1 G/DL
ALP SERPL-CCNC: 47 U/L (ref 39–117)
ALT SERPL W P-5'-P-CCNC: 12 U/L (ref 1–41)
ANION GAP SERPL CALCULATED.3IONS-SCNC: 10.5 MMOL/L (ref 5–15)
ANION GAP SERPL CALCULATED.3IONS-SCNC: 10.9 MMOL/L (ref 5–15)
ANION GAP SERPL CALCULATED.3IONS-SCNC: 10.9 MMOL/L (ref 5–15)
ANION GAP SERPL CALCULATED.3IONS-SCNC: 4 MMOL/L (ref 5–15)
ANION GAP SERPL CALCULATED.3IONS-SCNC: 4.3 MMOL/L (ref 5–15)
ANION GAP SERPL CALCULATED.3IONS-SCNC: 7.5 MMOL/L (ref 5–15)
ANION GAP SERPL CALCULATED.3IONS-SCNC: 8.3 MMOL/L (ref 5–15)
ANION GAP SERPL CALCULATED.3IONS-SCNC: 8.9 MMOL/L (ref 5–15)
ANION GAP SERPL CALCULATED.3IONS-SCNC: 9.5 MMOL/L (ref 5–15)
AST SERPL-CCNC: 15 U/L (ref 1–40)
BACTERIA SPEC AEROBE CULT: NORMAL
BASOPHILS # BLD AUTO: 0.02 10*3/MM3 (ref 0–0.2)
BASOPHILS # BLD AUTO: 0.03 10*3/MM3 (ref 0–0.2)
BASOPHILS # BLD AUTO: 0.04 10*3/MM3 (ref 0–0.2)
BASOPHILS # BLD AUTO: 0.04 10*3/MM3 (ref 0–0.2)
BASOPHILS # BLD AUTO: 0.05 10*3/MM3 (ref 0–0.2)
BASOPHILS NFR BLD AUTO: 0.1 % (ref 0–1.5)
BASOPHILS NFR BLD AUTO: 0.4 % (ref 0–1.5)
BASOPHILS NFR BLD AUTO: 0.5 % (ref 0–1.5)
BASOPHILS NFR BLD AUTO: 0.5 % (ref 0–1.5)
BASOPHILS NFR BLD AUTO: 0.6 % (ref 0–1.5)
BH BB BLOOD EXPIRATION DATE: NORMAL
BH BB BLOOD EXPIRATION DATE: NORMAL
BH BB BLOOD TYPE BARCODE: 6200
BH BB BLOOD TYPE BARCODE: 6200
BH BB DISPENSE STATUS: NORMAL
BH BB DISPENSE STATUS: NORMAL
BH BB PRODUCT CODE: NORMAL
BH BB PRODUCT CODE: NORMAL
BH BB UNIT NUMBER: NORMAL
BH BB UNIT NUMBER: NORMAL
BILIRUB SERPL-MCNC: 0.4 MG/DL (ref 0–1.2)
BLD GP AB SCN SERPL QL: NEGATIVE
BUN SERPL-MCNC: 20 MG/DL (ref 8–23)
BUN SERPL-MCNC: 22 MG/DL (ref 8–23)
BUN SERPL-MCNC: 22 MG/DL (ref 8–23)
BUN SERPL-MCNC: 26 MG/DL (ref 8–23)
BUN SERPL-MCNC: 27 MG/DL (ref 8–23)
BUN SERPL-MCNC: 29 MG/DL (ref 8–23)
BUN SERPL-MCNC: 30 MG/DL (ref 8–23)
BUN SERPL-MCNC: 30 MG/DL (ref 8–23)
BUN SERPL-MCNC: 36 MG/DL (ref 8–23)
BUN/CREAT SERPL: 10.5 (ref 7–25)
BUN/CREAT SERPL: 11.2 (ref 7–25)
BUN/CREAT SERPL: 14.4 (ref 7–25)
BUN/CREAT SERPL: 14.6 (ref 7–25)
BUN/CREAT SERPL: 14.9 (ref 7–25)
BUN/CREAT SERPL: 15.2 (ref 7–25)
BUN/CREAT SERPL: 15.6 (ref 7–25)
BUN/CREAT SERPL: 17.4 (ref 7–25)
BUN/CREAT SERPL: 19.1 (ref 7–25)
CALCIUM SPEC-SCNC: 7.6 MG/DL (ref 8.6–10.5)
CALCIUM SPEC-SCNC: 8 MG/DL (ref 8.6–10.5)
CALCIUM SPEC-SCNC: 8 MG/DL (ref 8.6–10.5)
CALCIUM SPEC-SCNC: 8.1 MG/DL (ref 8.6–10.5)
CALCIUM SPEC-SCNC: 8.2 MG/DL (ref 8.6–10.5)
CALCIUM SPEC-SCNC: 8.7 MG/DL (ref 8.6–10.5)
CALCIUM SPEC-SCNC: 8.8 MG/DL (ref 8.6–10.5)
CALCIUM SPEC-SCNC: 9 MG/DL (ref 8.6–10.5)
CALCIUM SPEC-SCNC: 9.7 MG/DL (ref 8.6–10.5)
CHLORIDE SERPL-SCNC: 102 MMOL/L (ref 98–107)
CHLORIDE SERPL-SCNC: 103 MMOL/L (ref 98–107)
CHLORIDE SERPL-SCNC: 105 MMOL/L (ref 98–107)
CHLORIDE SERPL-SCNC: 108 MMOL/L (ref 98–107)
CHLORIDE SERPL-SCNC: 108 MMOL/L (ref 98–107)
CHLORIDE SERPL-SCNC: 111 MMOL/L (ref 98–107)
CHLORIDE SERPL-SCNC: 111 MMOL/L (ref 98–107)
CHLORIDE SERPL-SCNC: 112 MMOL/L (ref 98–107)
CHLORIDE SERPL-SCNC: 115 MMOL/L (ref 98–107)
CO2 SERPL-SCNC: 16.7 MMOL/L (ref 22–29)
CO2 SERPL-SCNC: 18.1 MMOL/L (ref 22–29)
CO2 SERPL-SCNC: 19.1 MMOL/L (ref 22–29)
CO2 SERPL-SCNC: 19.5 MMOL/L (ref 22–29)
CO2 SERPL-SCNC: 21.1 MMOL/L (ref 22–29)
CO2 SERPL-SCNC: 21.5 MMOL/L (ref 22–29)
CO2 SERPL-SCNC: 21.5 MMOL/L (ref 22–29)
CO2 SERPL-SCNC: 21.7 MMOL/L (ref 22–29)
CO2 SERPL-SCNC: 24 MMOL/L (ref 22–29)
CREAT SERPL-MCNC: 1.53 MG/DL (ref 0.76–1.27)
CREAT SERPL-MCNC: 1.55 MG/DL (ref 0.76–1.27)
CREAT SERPL-MCNC: 1.71 MG/DL (ref 0.76–1.27)
CREAT SERPL-MCNC: 1.88 MG/DL (ref 0.76–1.27)
CREAT SERPL-MCNC: 1.91 MG/DL (ref 0.76–1.27)
CREAT SERPL-MCNC: 1.92 MG/DL (ref 0.76–1.27)
CREAT SERPL-MCNC: 1.97 MG/DL (ref 0.76–1.27)
CREAT SERPL-MCNC: 1.99 MG/DL (ref 0.76–1.27)
CREAT SERPL-MCNC: 2.01 MG/DL (ref 0.76–1.27)
CROSSMATCH INTERPRETATION: NORMAL
CROSSMATCH INTERPRETATION: NORMAL
D-LACTATE SERPL-SCNC: 0.8 MMOL/L (ref 0.5–2)
D-LACTATE SERPL-SCNC: 1.1 MMOL/L (ref 0.5–2)
D-LACTATE SERPL-SCNC: 1.8 MMOL/L (ref 0.5–2)
DEPRECATED RDW RBC AUTO: 50.5 FL (ref 37–54)
DEPRECATED RDW RBC AUTO: 51.4 FL (ref 37–54)
DEPRECATED RDW RBC AUTO: 51.6 FL (ref 37–54)
DEPRECATED RDW RBC AUTO: 54.6 FL (ref 37–54)
DEPRECATED RDW RBC AUTO: 55.3 FL (ref 37–54)
DEPRECATED RDW RBC AUTO: 55.6 FL (ref 37–54)
DEPRECATED RDW RBC AUTO: 56.1 FL (ref 37–54)
DEPRECATED RDW RBC AUTO: 57.4 FL (ref 37–54)
DEPRECATED RDW RBC AUTO: 57.5 FL (ref 37–54)
DIGOXIN SERPL-MCNC: 0.33 NG/ML (ref 0.6–1.2)
EGFRCR SERPLBLD CKD-EPI 2021: 34.4 ML/MIN/1.73
EGFRCR SERPLBLD CKD-EPI 2021: 34.8 ML/MIN/1.73
EGFRCR SERPLBLD CKD-EPI 2021: 35.2 ML/MIN/1.73
EGFRCR SERPLBLD CKD-EPI 2021: 36.3 ML/MIN/1.73
EGFRCR SERPLBLD CKD-EPI 2021: 36.6 ML/MIN/1.73
EGFRCR SERPLBLD CKD-EPI 2021: 37.3 ML/MIN/1.73
EGFRCR SERPLBLD CKD-EPI 2021: 41.7 ML/MIN/1.73
EGFRCR SERPLBLD CKD-EPI 2021: 47 ML/MIN/1.73
EGFRCR SERPLBLD CKD-EPI 2021: 47.7 ML/MIN/1.73
EOSINOPHIL # BLD AUTO: 0 10*3/MM3 (ref 0–0.4)
EOSINOPHIL # BLD AUTO: 0.03 10*3/MM3 (ref 0–0.4)
EOSINOPHIL # BLD AUTO: 0.06 10*3/MM3 (ref 0–0.4)
EOSINOPHIL # BLD AUTO: 0.11 10*3/MM3 (ref 0–0.4)
EOSINOPHIL # BLD AUTO: 0.15 10*3/MM3 (ref 0–0.4)
EOSINOPHIL NFR BLD AUTO: 0 % (ref 0.3–6.2)
EOSINOPHIL NFR BLD AUTO: 0.4 % (ref 0.3–6.2)
EOSINOPHIL NFR BLD AUTO: 0.7 % (ref 0.3–6.2)
EOSINOPHIL NFR BLD AUTO: 1.3 % (ref 0.3–6.2)
EOSINOPHIL NFR BLD AUTO: 1.8 % (ref 0.3–6.2)
ERYTHROCYTE [DISTWIDTH] IN BLOOD BY AUTOMATED COUNT: 15.5 % (ref 12.3–15.4)
ERYTHROCYTE [DISTWIDTH] IN BLOOD BY AUTOMATED COUNT: 15.5 % (ref 12.3–15.4)
ERYTHROCYTE [DISTWIDTH] IN BLOOD BY AUTOMATED COUNT: 15.6 % (ref 12.3–15.4)
ERYTHROCYTE [DISTWIDTH] IN BLOOD BY AUTOMATED COUNT: 16.1 % (ref 12.3–15.4)
ERYTHROCYTE [DISTWIDTH] IN BLOOD BY AUTOMATED COUNT: 16.1 % (ref 12.3–15.4)
ERYTHROCYTE [DISTWIDTH] IN BLOOD BY AUTOMATED COUNT: 16.4 % (ref 12.3–15.4)
ERYTHROCYTE [DISTWIDTH] IN BLOOD BY AUTOMATED COUNT: 16.5 % (ref 12.3–15.4)
ERYTHROCYTE [DISTWIDTH] IN BLOOD BY AUTOMATED COUNT: 16.8 % (ref 12.3–15.4)
ERYTHROCYTE [DISTWIDTH] IN BLOOD BY AUTOMATED COUNT: 16.9 % (ref 12.3–15.4)
ERYTHROCYTE [DISTWIDTH] IN BLOOD BY AUTOMATED COUNT: 19.6 % (ref 12.3–15.4)
GLOBULIN UR ELPH-MCNC: 2.4 GM/DL
GLUCOSE BLDC GLUCOMTR-MCNC: 109 MG/DL (ref 70–130)
GLUCOSE BLDC GLUCOMTR-MCNC: 117 MG/DL (ref 70–130)
GLUCOSE BLDC GLUCOMTR-MCNC: 118 MG/DL (ref 70–130)
GLUCOSE BLDC GLUCOMTR-MCNC: 125 MG/DL (ref 70–130)
GLUCOSE BLDC GLUCOMTR-MCNC: 128 MG/DL (ref 70–130)
GLUCOSE BLDC GLUCOMTR-MCNC: 129 MG/DL (ref 70–130)
GLUCOSE BLDC GLUCOMTR-MCNC: 134 MG/DL (ref 70–130)
GLUCOSE BLDC GLUCOMTR-MCNC: 171 MG/DL (ref 70–130)
GLUCOSE BLDC GLUCOMTR-MCNC: 180 MG/DL (ref 70–130)
GLUCOSE BLDC GLUCOMTR-MCNC: 201 MG/DL (ref 70–130)
GLUCOSE BLDC GLUCOMTR-MCNC: 295 MG/DL (ref 70–130)
GLUCOSE BLDC GLUCOMTR-MCNC: 67 MG/DL (ref 70–130)
GLUCOSE BLDC GLUCOMTR-MCNC: 81 MG/DL (ref 70–130)
GLUCOSE BLDC GLUCOMTR-MCNC: 87 MG/DL (ref 70–130)
GLUCOSE BLDC GLUCOMTR-MCNC: 95 MG/DL (ref 70–130)
GLUCOSE SERPL-MCNC: 112 MG/DL (ref 65–99)
GLUCOSE SERPL-MCNC: 131 MG/DL (ref 65–99)
GLUCOSE SERPL-MCNC: 154 MG/DL (ref 65–99)
GLUCOSE SERPL-MCNC: 155 MG/DL (ref 65–99)
GLUCOSE SERPL-MCNC: 166 MG/DL (ref 65–99)
GLUCOSE SERPL-MCNC: 233 MG/DL (ref 65–99)
GLUCOSE SERPL-MCNC: 365 MG/DL (ref 65–99)
GLUCOSE SERPL-MCNC: 63 MG/DL (ref 65–99)
GLUCOSE SERPL-MCNC: 77 MG/DL (ref 65–99)
HCT VFR BLD AUTO: 23.2 % (ref 37.5–51)
HCT VFR BLD AUTO: 25.4 % (ref 37.5–51)
HCT VFR BLD AUTO: 25.9 % (ref 37.5–51)
HCT VFR BLD AUTO: 26.3 % (ref 37.5–51)
HCT VFR BLD AUTO: 26.8 % (ref 37.5–51)
HCT VFR BLD AUTO: 27.2 % (ref 37.5–51)
HCT VFR BLD AUTO: 27.6 % (ref 37.5–51)
HCT VFR BLD AUTO: 27.7 % (ref 37.5–51)
HCT VFR BLD AUTO: 28.5 % (ref 37.5–51)
HCT VFR BLD AUTO: 28.8 % (ref 37.5–51)
HCT VFR BLD AUTO: 29.8 % (ref 37.5–51)
HCT VFR BLD AUTO: 36.9 % (ref 37.5–51)
HCT VFR BLD AUTO: 40.9 % (ref 37.5–51)
HCT VFR BLD AUTO: 44.1 % (ref 37.5–51)
HGB BLD-MCNC: 12.3 G/DL (ref 13–17.7)
HGB BLD-MCNC: 12.4 G/DL (ref 13–17.7)
HGB BLD-MCNC: 14.5 G/DL (ref 13–17.7)
HGB BLD-MCNC: 7.3 G/DL (ref 13–17.7)
HGB BLD-MCNC: 7.8 G/DL (ref 13–17.7)
HGB BLD-MCNC: 8.3 G/DL (ref 13–17.7)
HGB BLD-MCNC: 8.3 G/DL (ref 13–17.7)
HGB BLD-MCNC: 8.4 G/DL (ref 13–17.7)
HGB BLD-MCNC: 8.8 G/DL (ref 13–17.7)
HGB BLD-MCNC: 8.9 G/DL (ref 13–17.7)
HGB BLD-MCNC: 9 G/DL (ref 13–17.7)
HGB BLD-MCNC: 9 G/DL (ref 13–17.7)
HGB BLD-MCNC: 9.6 G/DL (ref 13–17.7)
HGB BLD-MCNC: 9.7 G/DL (ref 13–17.7)
IMM GRANULOCYTES # BLD AUTO: 0.15 10*3/MM3 (ref 0–0.05)
IMM GRANULOCYTES # BLD AUTO: 0.2 10*3/MM3 (ref 0–0.05)
IMM GRANULOCYTES # BLD AUTO: 0.26 10*3/MM3 (ref 0–0.05)
IMM GRANULOCYTES # BLD AUTO: 0.3 10*3/MM3 (ref 0–0.05)
IMM GRANULOCYTES # BLD AUTO: 0.3 10*3/MM3 (ref 0–0.05)
IMM GRANULOCYTES NFR BLD AUTO: 0.9 % (ref 0–0.5)
IMM GRANULOCYTES NFR BLD AUTO: 2.4 % (ref 0–0.5)
IMM GRANULOCYTES NFR BLD AUTO: 2.9 % (ref 0–0.5)
IMM GRANULOCYTES NFR BLD AUTO: 3.5 % (ref 0–0.5)
IMM GRANULOCYTES NFR BLD AUTO: 3.7 % (ref 0–0.5)
INR PPP: 1.42 (ref 0.9–1.1)
INR PPP: 1.48 (ref 0.9–1.1)
INR PPP: 1.55 (ref 0.9–1.1)
IRON 24H UR-MRATE: 90 MCG/DL (ref 59–158)
IRON SATN MFR SERPL: 48 % (ref 20–50)
LYMPHOCYTES # BLD AUTO: 0.44 10*3/MM3 (ref 0.7–3.1)
LYMPHOCYTES # BLD AUTO: 0.48 10*3/MM3 (ref 0.7–3.1)
LYMPHOCYTES # BLD AUTO: 0.53 10*3/MM3 (ref 0.7–3.1)
LYMPHOCYTES # BLD AUTO: 0.56 10*3/MM3 (ref 0.7–3.1)
LYMPHOCYTES # BLD AUTO: 0.56 10*3/MM3 (ref 0.7–3.1)
LYMPHOCYTES # BLD AUTO: 1.3 10*3/MM3 (ref 0.7–3.1)
LYMPHOCYTES NFR BLD AUTO: 16 % (ref 19.6–45.3)
LYMPHOCYTES NFR BLD AUTO: 2.8 % (ref 19.6–45.3)
LYMPHOCYTES NFR BLD AUTO: 5.6 % (ref 19.6–45.3)
LYMPHOCYTES NFR BLD AUTO: 6.2 % (ref 19.6–45.3)
LYMPHOCYTES NFR BLD AUTO: 6.5 % (ref 19.6–45.3)
LYMPHOCYTES NFR BLD AUTO: 6.9 % (ref 19.6–45.3)
MCH RBC QN AUTO: 27 PG (ref 26.6–33)
MCH RBC QN AUTO: 29.5 PG (ref 26.6–33)
MCH RBC QN AUTO: 29.7 PG (ref 26.6–33)
MCH RBC QN AUTO: 30 PG (ref 26.6–33)
MCH RBC QN AUTO: 30.1 PG (ref 26.6–33)
MCH RBC QN AUTO: 30.2 PG (ref 26.6–33)
MCH RBC QN AUTO: 30.4 PG (ref 26.6–33)
MCHC RBC AUTO-ENTMCNC: 30.3 G/DL (ref 31.5–35.7)
MCHC RBC AUTO-ENTMCNC: 30.9 G/DL (ref 31.5–35.7)
MCHC RBC AUTO-ENTMCNC: 31.5 G/DL (ref 31.5–35.7)
MCHC RBC AUTO-ENTMCNC: 32 G/DL (ref 31.5–35.7)
MCHC RBC AUTO-ENTMCNC: 32.2 G/DL (ref 31.5–35.7)
MCHC RBC AUTO-ENTMCNC: 32.5 G/DL (ref 31.5–35.7)
MCHC RBC AUTO-ENTMCNC: 32.8 G/DL (ref 31.5–35.7)
MCHC RBC AUTO-ENTMCNC: 32.9 G/DL (ref 31.5–35.7)
MCHC RBC AUTO-ENTMCNC: 33.3 G/DL (ref 31.5–35.7)
MCHC RBC AUTO-ENTMCNC: 34 G/DL (ref 31.5–35.7)
MCV RBC AUTO: 89.1 FL (ref 79–97)
MCV RBC AUTO: 89.3 FL (ref 79–97)
MCV RBC AUTO: 90.7 FL (ref 79–97)
MCV RBC AUTO: 91.5 FL (ref 79–97)
MCV RBC AUTO: 91.7 FL (ref 79–97)
MCV RBC AUTO: 92.8 FL (ref 79–97)
MCV RBC AUTO: 93 FL (ref 79–97)
MCV RBC AUTO: 93.7 FL (ref 79–97)
MCV RBC AUTO: 95.4 FL (ref 79–97)
MCV RBC AUTO: 95.9 FL (ref 79–97)
MONOCYTES # BLD AUTO: 0.41 10*3/MM3 (ref 0.1–0.9)
MONOCYTES # BLD AUTO: 0.45 10*3/MM3 (ref 0.1–0.9)
MONOCYTES # BLD AUTO: 0.46 10*3/MM3 (ref 0.1–0.9)
MONOCYTES # BLD AUTO: 0.47 10*3/MM3 (ref 0.1–0.9)
MONOCYTES # BLD AUTO: 0.6 10*3/MM3 (ref 0.1–0.9)
MONOCYTES # BLD AUTO: 1.23 10*3/MM3 (ref 0.1–0.9)
MONOCYTES NFR BLD AUTO: 5 % (ref 5–12)
MONOCYTES NFR BLD AUTO: 5 % (ref 5–12)
MONOCYTES NFR BLD AUTO: 5.3 % (ref 5–12)
MONOCYTES NFR BLD AUTO: 5.8 % (ref 5–12)
MONOCYTES NFR BLD AUTO: 7.4 % (ref 5–12)
MONOCYTES NFR BLD AUTO: 7.7 % (ref 5–12)
NEUTROPHILS NFR BLD AUTO: 14.08 10*3/MM3 (ref 1.7–7)
NEUTROPHILS NFR BLD AUTO: 6.4 10*3/MM3 (ref 1.7–7)
NEUTROPHILS NFR BLD AUTO: 6.63 10*3/MM3 (ref 1.7–7)
NEUTROPHILS NFR BLD AUTO: 7 10*3/MM3 (ref 1.7–7)
NEUTROPHILS NFR BLD AUTO: 7.24 10*3/MM3 (ref 1.7–7)
NEUTROPHILS NFR BLD AUTO: 7.65 10*3/MM3 (ref 1.7–7)
NEUTROPHILS NFR BLD AUTO: 76.6 % (ref 42.7–76)
NEUTROPHILS NFR BLD AUTO: 81.3 % (ref 42.7–76)
NEUTROPHILS NFR BLD AUTO: 83.8 % (ref 42.7–76)
NEUTROPHILS NFR BLD AUTO: 84.6 % (ref 42.7–76)
NEUTROPHILS NFR BLD AUTO: 85.3 % (ref 42.7–76)
NEUTROPHILS NFR BLD AUTO: 88.5 % (ref 42.7–76)
NRBC BLD AUTO-RTO: 0 /100 WBC (ref 0–0.2)
PLATELET # BLD AUTO: 106 10*3/MM3 (ref 140–450)
PLATELET # BLD AUTO: 118 10*3/MM3 (ref 140–450)
PLATELET # BLD AUTO: 120 10*3/MM3 (ref 140–450)
PLATELET # BLD AUTO: 123 10*3/MM3 (ref 140–450)
PLATELET # BLD AUTO: 135 10*3/MM3 (ref 140–450)
PLATELET # BLD AUTO: 140 10*3/MM3 (ref 140–450)
PLATELET # BLD AUTO: 171 10*3/MM3 (ref 140–450)
PLATELET # BLD AUTO: 175 10*3/MM3 (ref 140–450)
PLATELET # BLD AUTO: 76 10*3/MM3 (ref 140–450)
PLATELET # BLD AUTO: 87 10*3/MM3 (ref 140–450)
PMV BLD AUTO: 10 FL (ref 6–12)
PMV BLD AUTO: 10 FL (ref 6–12)
PMV BLD AUTO: 10.1 FL (ref 6–12)
PMV BLD AUTO: 11.1 FL (ref 6–12)
PMV BLD AUTO: 7.4 FL (ref 6–12)
PMV BLD AUTO: 9.4 FL (ref 6–12)
PMV BLD AUTO: 9.5 FL (ref 6–12)
PMV BLD AUTO: 9.7 FL (ref 6–12)
PMV BLD AUTO: 9.9 FL (ref 6–12)
PMV BLD AUTO: 9.9 FL (ref 6–12)
POTASSIUM SERPL-SCNC: 3.5 MMOL/L (ref 3.5–5.2)
POTASSIUM SERPL-SCNC: 3.6 MMOL/L (ref 3.5–5.2)
POTASSIUM SERPL-SCNC: 3.8 MMOL/L (ref 3.5–5.2)
POTASSIUM SERPL-SCNC: 3.9 MMOL/L (ref 3.5–5.2)
POTASSIUM SERPL-SCNC: 4.5 MMOL/L (ref 3.5–5.2)
POTASSIUM SERPL-SCNC: 4.7 MMOL/L (ref 3.5–5.2)
POTASSIUM SERPL-SCNC: 4.8 MMOL/L (ref 3.5–5.2)
POTASSIUM SERPL-SCNC: 5 MMOL/L (ref 3.5–5.2)
POTASSIUM SERPL-SCNC: 5.2 MMOL/L (ref 3.5–5.2)
PREALB SERPL-MCNC: 5.6 MG/DL (ref 20–40)
PROCALCITONIN SERPL-MCNC: 0.17 NG/ML (ref 0–0.25)
PROT SERPL-MCNC: 4.7 G/DL (ref 6–8.5)
PROTHROMBIN TIME: 17.9 SECONDS (ref 12.5–14.5)
PROTHROMBIN TIME: 18.5 SECONDS (ref 12.5–14.5)
PROTHROMBIN TIME: 19.1 SECONDS (ref 12.5–14.5)
QT INTERVAL: 412 MS
QTC INTERVAL: 435 MS
RBC # BLD AUTO: 2.42 10*6/MM3 (ref 4.14–5.8)
RBC # BLD AUTO: 2.79 10*6/MM3 (ref 4.14–5.8)
RBC # BLD AUTO: 2.85 10*6/MM3 (ref 4.14–5.8)
RBC # BLD AUTO: 2.93 10*6/MM3 (ref 4.14–5.8)
RBC # BLD AUTO: 2.98 10*6/MM3 (ref 4.14–5.8)
RBC # BLD AUTO: 3.18 10*6/MM3 (ref 4.14–5.8)
RBC # BLD AUTO: 3.19 10*6/MM3 (ref 4.14–5.8)
RBC # BLD AUTO: 4.07 10*6/MM3 (ref 4.14–5.8)
RBC # BLD AUTO: 4.59 10*6/MM3 (ref 4.14–5.8)
RBC # BLD AUTO: 4.81 10*6/MM3 (ref 4.14–5.8)
REF LAB TEST METHOD: NORMAL
REF LAB TEST METHOD: NORMAL
RH BLD: POSITIVE
SODIUM SERPL-SCNC: 132 MMOL/L (ref 136–145)
SODIUM SERPL-SCNC: 132 MMOL/L (ref 136–145)
SODIUM SERPL-SCNC: 133 MMOL/L (ref 136–145)
SODIUM SERPL-SCNC: 133 MMOL/L (ref 136–145)
SODIUM SERPL-SCNC: 137 MMOL/L (ref 136–145)
SODIUM SERPL-SCNC: 137 MMOL/L (ref 136–145)
SODIUM SERPL-SCNC: 141 MMOL/L (ref 136–145)
SODIUM SERPL-SCNC: 144 MMOL/L (ref 136–145)
SODIUM SERPL-SCNC: 144 MMOL/L (ref 136–145)
T&S EXPIRATION DATE: NORMAL
TIBC SERPL-MCNC: 186 MCG/DL (ref 298–536)
TRANSFERRIN SERPL-MCNC: 125 MG/DL (ref 200–360)
UNIT  ABO: NORMAL
UNIT  ABO: NORMAL
UNIT  RH: NORMAL
UNIT  RH: NORMAL
WBC NRBC COR # BLD: 12.41 10*3/MM3 (ref 3.4–10.8)
WBC NRBC COR # BLD: 15.92 10*3/MM3 (ref 3.4–10.8)
WBC NRBC COR # BLD: 7.76 10*3/MM3 (ref 3.4–10.8)
WBC NRBC COR # BLD: 8.15 10*3/MM3 (ref 3.4–10.8)
WBC NRBC COR # BLD: 8.2 10*3/MM3 (ref 3.4–10.8)
WBC NRBC COR # BLD: 8.4 10*3/MM3 (ref 3.4–10.8)
WBC NRBC COR # BLD: 8.63 10*3/MM3 (ref 3.4–10.8)
WBC NRBC COR # BLD: 8.95 10*3/MM3 (ref 3.4–10.8)
WBC NRBC COR # BLD: 9.03 10*3/MM3 (ref 3.4–10.8)
WBC NRBC COR # BLD: 9.99 10*3/MM3 (ref 3.4–10.8)

## 2022-01-01 PROCEDURE — 99232 SBSQ HOSP IP/OBS MODERATE 35: CPT | Performed by: INTERNAL MEDICINE

## 2022-01-01 PROCEDURE — 25010000002 DEXAMETHASONE PER 1 MG: Performed by: NURSE ANESTHETIST, CERTIFIED REGISTERED

## 2022-01-01 PROCEDURE — 25010000002 ONDANSETRON PER 1 MG: Performed by: NURSE ANESTHETIST, CERTIFIED REGISTERED

## 2022-01-01 PROCEDURE — 84466 ASSAY OF TRANSFERRIN: CPT | Performed by: INTERNAL MEDICINE

## 2022-01-01 PROCEDURE — 80048 BASIC METABOLIC PNL TOTAL CA: CPT

## 2022-01-01 PROCEDURE — 94799 UNLISTED PULMONARY SVC/PX: CPT

## 2022-01-01 PROCEDURE — 97597 DBRDMT OPN WND 1ST 20 CM/<: CPT

## 2022-01-01 PROCEDURE — P9047 ALBUMIN (HUMAN), 25%, 50ML: HCPCS | Performed by: INTERNAL MEDICINE

## 2022-01-01 PROCEDURE — 94760 N-INVAS EAR/PLS OXIMETRY 1: CPT

## 2022-01-01 PROCEDURE — 94761 N-INVAS EAR/PLS OXIMETRY MLT: CPT

## 2022-01-01 PROCEDURE — 96375 TX/PRO/DX INJ NEW DRUG ADDON: CPT

## 2022-01-01 PROCEDURE — 85018 HEMOGLOBIN: CPT | Performed by: INTERNAL MEDICINE

## 2022-01-01 PROCEDURE — 88305 TISSUE EXAM BY PATHOLOGIST: CPT

## 2022-01-01 PROCEDURE — 85025 COMPLETE CBC W/AUTO DIFF WBC: CPT | Performed by: STUDENT IN AN ORGANIZED HEALTH CARE EDUCATION/TRAINING PROGRAM

## 2022-01-01 PROCEDURE — 84134 ASSAY OF PREALBUMIN: CPT | Performed by: SURGERY

## 2022-01-01 PROCEDURE — 94640 AIRWAY INHALATION TREATMENT: CPT

## 2022-01-01 PROCEDURE — 25010000002 ALBUMIN HUMAN 25% PER 50 ML: Performed by: INTERNAL MEDICINE

## 2022-01-01 PROCEDURE — 99238 HOSP IP/OBS DSCHRG MGMT 30/<: CPT | Performed by: INTERNAL MEDICINE

## 2022-01-01 PROCEDURE — 99214 OFFICE O/P EST MOD 30 MIN: CPT | Performed by: SURGERY

## 2022-01-01 PROCEDURE — 29581 APPL MULTLAYER CMPRN SYS LEG: CPT

## 2022-01-01 PROCEDURE — G0378 HOSPITAL OBSERVATION PER HR: HCPCS

## 2022-01-01 PROCEDURE — 86900 BLOOD TYPING SEROLOGIC ABO: CPT | Performed by: STUDENT IN AN ORGANIZED HEALTH CARE EDUCATION/TRAINING PROGRAM

## 2022-01-01 PROCEDURE — 82962 GLUCOSE BLOOD TEST: CPT

## 2022-01-01 PROCEDURE — 97116 GAIT TRAINING THERAPY: CPT

## 2022-01-01 PROCEDURE — 63710000001 INSULIN ASPART PER 5 UNITS: Performed by: INTERNAL MEDICINE

## 2022-01-01 PROCEDURE — 44204 LAPARO PARTIAL COLECTOMY: CPT | Performed by: SURGERY

## 2022-01-01 PROCEDURE — 83540 ASSAY OF IRON: CPT | Performed by: INTERNAL MEDICINE

## 2022-01-01 PROCEDURE — 0 AMPICILLIN-SULBACTAM PER 1.5 G

## 2022-01-01 PROCEDURE — 80048 BASIC METABOLIC PNL TOTAL CA: CPT | Performed by: STUDENT IN AN ORGANIZED HEALTH CARE EDUCATION/TRAINING PROGRAM

## 2022-01-01 PROCEDURE — 96376 TX/PRO/DX INJ SAME DRUG ADON: CPT

## 2022-01-01 PROCEDURE — 85027 COMPLETE CBC AUTOMATED: CPT | Performed by: INTERNAL MEDICINE

## 2022-01-01 PROCEDURE — 99214 OFFICE O/P EST MOD 30 MIN: CPT | Performed by: INTERNAL MEDICINE

## 2022-01-01 PROCEDURE — 93005 ELECTROCARDIOGRAM TRACING: CPT

## 2022-01-01 PROCEDURE — 96365 THER/PROPH/DIAG IV INF INIT: CPT

## 2022-01-01 PROCEDURE — 85610 PROTHROMBIN TIME: CPT | Performed by: STUDENT IN AN ORGANIZED HEALTH CARE EDUCATION/TRAINING PROGRAM

## 2022-01-01 PROCEDURE — 45380 COLONOSCOPY AND BIOPSY: CPT | Performed by: SURGERY

## 2022-01-01 PROCEDURE — 97597 DBRDMT OPN WND 1ST 20 CM/<: CPT | Performed by: PHYSICAL THERAPIST

## 2022-01-01 PROCEDURE — 71045 X-RAY EXAM CHEST 1 VIEW: CPT

## 2022-01-01 PROCEDURE — 25010000002 FENTANYL CITRATE (PF) 250 MCG/5ML SOLUTION: Performed by: NURSE ANESTHETIST, CERTIFIED REGISTERED

## 2022-01-01 PROCEDURE — 94664 DEMO&/EVAL PT USE INHALER: CPT

## 2022-01-01 PROCEDURE — 88309 TISSUE EXAM BY PATHOLOGIST: CPT

## 2022-01-01 PROCEDURE — 80048 BASIC METABOLIC PNL TOTAL CA: CPT | Performed by: INTERNAL MEDICINE

## 2022-01-01 PROCEDURE — 85014 HEMATOCRIT: CPT | Performed by: INTERNAL MEDICINE

## 2022-01-01 PROCEDURE — 25010000002 HYDROMORPHONE 1 MG/ML SOLUTION: Performed by: SURGERY

## 2022-01-01 PROCEDURE — 25010000002 PROPOFOL 200 MG/20ML EMULSION: Performed by: NURSE ANESTHETIST, CERTIFIED REGISTERED

## 2022-01-01 PROCEDURE — 97530 THERAPEUTIC ACTIVITIES: CPT

## 2022-01-01 PROCEDURE — 99024 POSTOP FOLLOW-UP VISIT: CPT | Performed by: SURGERY

## 2022-01-01 PROCEDURE — 99239 HOSP IP/OBS DSCHRG MGMT >30: CPT | Performed by: INTERNAL MEDICINE

## 2022-01-01 PROCEDURE — 85610 PROTHROMBIN TIME: CPT | Performed by: INTERNAL MEDICINE

## 2022-01-01 PROCEDURE — 82040 ASSAY OF SERUM ALBUMIN: CPT | Performed by: SURGERY

## 2022-01-01 PROCEDURE — 87040 BLOOD CULTURE FOR BACTERIA: CPT | Performed by: STUDENT IN AN ORGANIZED HEALTH CARE EDUCATION/TRAINING PROGRAM

## 2022-01-01 PROCEDURE — 80048 BASIC METABOLIC PNL TOTAL CA: CPT | Performed by: SURGERY

## 2022-01-01 PROCEDURE — 25010000002 HYDROMORPHONE 1 MG/ML SOLUTION: Performed by: NURSE ANESTHETIST, CERTIFIED REGISTERED

## 2022-01-01 PROCEDURE — 99223 1ST HOSP IP/OBS HIGH 75: CPT | Performed by: STUDENT IN AN ORGANIZED HEALTH CARE EDUCATION/TRAINING PROGRAM

## 2022-01-01 PROCEDURE — 86900 BLOOD TYPING SEROLOGIC ABO: CPT

## 2022-01-01 PROCEDURE — 99213 OFFICE O/P EST LOW 20 MIN: CPT | Performed by: SURGERY

## 2022-01-01 PROCEDURE — 93010 ELECTROCARDIOGRAM REPORT: CPT | Performed by: INTERNAL MEDICINE

## 2022-01-01 PROCEDURE — 97165 OT EVAL LOW COMPLEX 30 MIN: CPT

## 2022-01-01 PROCEDURE — 25010000002 PIPERACILLIN SOD-TAZOBACTAM PER 1 G: Performed by: STUDENT IN AN ORGANIZED HEALTH CARE EDUCATION/TRAINING PROGRAM

## 2022-01-01 PROCEDURE — 97610 LOW FREQUENCY NON-THERMAL US: CPT

## 2022-01-01 PROCEDURE — 25010000002 FUROSEMIDE PER 20 MG: Performed by: INTERNAL MEDICINE

## 2022-01-01 PROCEDURE — G0379 DIRECT REFER HOSPITAL OBSERV: HCPCS

## 2022-01-01 PROCEDURE — 85025 COMPLETE CBC W/AUTO DIFF WBC: CPT | Performed by: SURGERY

## 2022-01-01 PROCEDURE — 36415 COLL VENOUS BLD VENIPUNCTURE: CPT

## 2022-01-01 PROCEDURE — 96366 THER/PROPH/DIAG IV INF ADDON: CPT

## 2022-01-01 PROCEDURE — 96361 HYDRATE IV INFUSION ADD-ON: CPT

## 2022-01-01 PROCEDURE — 86920 COMPATIBILITY TEST SPIN: CPT

## 2022-01-01 PROCEDURE — 86850 RBC ANTIBODY SCREEN: CPT | Performed by: STUDENT IN AN ORGANIZED HEALTH CARE EDUCATION/TRAINING PROGRAM

## 2022-01-01 PROCEDURE — 86901 BLOOD TYPING SEROLOGIC RH(D): CPT | Performed by: STUDENT IN AN ORGANIZED HEALTH CARE EDUCATION/TRAINING PROGRAM

## 2022-01-01 PROCEDURE — 63710000001 INSULIN DETEMIR PER 5 UNITS: Performed by: INTERNAL MEDICINE

## 2022-01-01 PROCEDURE — 97161 PT EVAL LOW COMPLEX 20 MIN: CPT

## 2022-01-01 PROCEDURE — 25010000002 DIGOXIN PER 500 MCG: Performed by: STUDENT IN AN ORGANIZED HEALTH CARE EDUCATION/TRAINING PROGRAM

## 2022-01-01 PROCEDURE — 45385 COLONOSCOPY W/LESION REMOVAL: CPT | Performed by: SURGERY

## 2022-01-01 PROCEDURE — 85027 COMPLETE CBC AUTOMATED: CPT

## 2022-01-01 PROCEDURE — 85025 COMPLETE CBC W/AUTO DIFF WBC: CPT | Performed by: INTERNAL MEDICINE

## 2022-01-01 PROCEDURE — 25010000002 DEXAMETHASONE SODIUM PHOSPHATE 10 MG/ML SOLUTION: Performed by: NURSE ANESTHETIST, CERTIFIED REGISTERED

## 2022-01-01 PROCEDURE — 63710000001 ONDANSETRON PER 8 MG: Performed by: SURGERY

## 2022-01-01 PROCEDURE — 25010000002 SUCCINYLCHOLINE PER 20 MG: Performed by: NURSE ANESTHETIST, CERTIFIED REGISTERED

## 2022-01-01 PROCEDURE — 0DTF4ZZ RESECTION OF RIGHT LARGE INTESTINE, PERCUTANEOUS ENDOSCOPIC APPROACH: ICD-10-PCS | Performed by: SURGERY

## 2022-01-01 PROCEDURE — 83605 ASSAY OF LACTIC ACID: CPT | Performed by: STUDENT IN AN ORGANIZED HEALTH CARE EDUCATION/TRAINING PROGRAM

## 2022-01-01 PROCEDURE — 25010000002 ROPIVACAINE PER 1 MG: Performed by: NURSE ANESTHETIST, CERTIFIED REGISTERED

## 2022-01-01 PROCEDURE — 80053 COMPREHEN METABOLIC PANEL: CPT | Performed by: INTERNAL MEDICINE

## 2022-01-01 PROCEDURE — 85025 COMPLETE CBC W/AUTO DIFF WBC: CPT

## 2022-01-01 PROCEDURE — 25010000002 HEPARIN (PORCINE) PER 1000 UNITS: Performed by: SURGERY

## 2022-01-01 PROCEDURE — 99233 SBSQ HOSP IP/OBS HIGH 50: CPT | Performed by: INTERNAL MEDICINE

## 2022-01-01 PROCEDURE — P9016 RBC LEUKOCYTES REDUCED: HCPCS

## 2022-01-01 PROCEDURE — 74018 RADEX ABDOMEN 1 VIEW: CPT

## 2022-01-01 PROCEDURE — 36430 TRANSFUSION BLD/BLD COMPNT: CPT

## 2022-01-01 PROCEDURE — 96367 TX/PROPH/DG ADDL SEQ IV INF: CPT

## 2022-01-01 PROCEDURE — 84145 PROCALCITONIN (PCT): CPT | Performed by: STUDENT IN AN ORGANIZED HEALTH CARE EDUCATION/TRAINING PROGRAM

## 2022-01-01 PROCEDURE — 25010000002 PROPOFOL 10 MG/ML EMULSION: Performed by: NURSE ANESTHETIST, CERTIFIED REGISTERED

## 2022-01-01 PROCEDURE — 83605 ASSAY OF LACTIC ACID: CPT | Performed by: INTERNAL MEDICINE

## 2022-01-01 PROCEDURE — 97110 THERAPEUTIC EXERCISES: CPT

## 2022-01-01 PROCEDURE — 97610 LOW FREQUENCY NON-THERMAL US: CPT | Performed by: PHYSICAL THERAPIST

## 2022-01-01 PROCEDURE — 80162 ASSAY OF DIGOXIN TOTAL: CPT | Performed by: STUDENT IN AN ORGANIZED HEALTH CARE EDUCATION/TRAINING PROGRAM

## 2022-01-01 PROCEDURE — 99212 OFFICE O/P EST SF 10 MIN: CPT | Performed by: SURGERY

## 2022-01-01 PROCEDURE — C1751 CATH, INF, PER/CENT/MIDLINE: HCPCS

## 2022-01-01 PROCEDURE — C1894 INTRO/SHEATH, NON-LASER: HCPCS

## 2022-01-01 DEVICE — PROXIMATE LINEAR CUTTER RELOAD, BLUE, 75MM
Type: IMPLANTABLE DEVICE | Site: ABDOMEN | Status: FUNCTIONAL
Brand: PROXIMATE

## 2022-01-01 DEVICE — PROXIMATE RELOADABLE LINEAR CUTTER WITH SAFETY LOCK-OUT, 75MM
Type: IMPLANTABLE DEVICE | Site: ABDOMEN | Status: FUNCTIONAL
Brand: PROXIMATE

## 2022-01-01 DEVICE — PROXIMATE RELOADABLE LINEAR STAPLER
Type: IMPLANTABLE DEVICE | Site: ABDOMEN | Status: FUNCTIONAL
Brand: PROXIMATE

## 2022-01-01 RX ORDER — SODIUM BICARBONATE 650 MG/1
650 TABLET ORAL 2 TIMES DAILY
Status: DISCONTINUED | OUTPATIENT
Start: 2022-01-01 | End: 2022-01-01 | Stop reason: HOSPADM

## 2022-01-01 RX ORDER — INSULIN ASPART 100 [IU]/ML
0-9 INJECTION, SOLUTION INTRAVENOUS; SUBCUTANEOUS
Status: DISCONTINUED | OUTPATIENT
Start: 2022-01-01 | End: 2022-01-01 | Stop reason: HOSPADM

## 2022-01-01 RX ORDER — CEFTRIAXONE 500 MG/1
500 INJECTION, POWDER, FOR SOLUTION INTRAMUSCULAR; INTRAVENOUS
COMMUNITY
Start: 2022-01-01 | End: 2022-01-01

## 2022-01-01 RX ORDER — SODIUM CHLORIDE 450 MG/100ML
50 INJECTION, SOLUTION INTRAVENOUS CONTINUOUS
Status: DISCONTINUED | OUTPATIENT
Start: 2022-01-01 | End: 2022-01-01

## 2022-01-01 RX ORDER — POLYETHYLENE GLYCOL 3350 17 G/17G
POWDER, FOR SOLUTION ORAL
Qty: 238 G | Refills: 0 | Status: SHIPPED | OUTPATIENT
Start: 2022-01-01 | End: 2022-01-01

## 2022-01-01 RX ORDER — ACETAMINOPHEN 160 MG/5ML
650 SOLUTION ORAL EVERY 4 HOURS PRN
Status: DISCONTINUED | OUTPATIENT
Start: 2022-01-01 | End: 2022-01-01 | Stop reason: HOSPADM

## 2022-01-01 RX ORDER — SODIUM CHLORIDE 0.9 % (FLUSH) 0.9 %
10 SYRINGE (ML) INJECTION AS NEEDED
Status: DISCONTINUED | OUTPATIENT
Start: 2022-01-01 | End: 2022-01-01 | Stop reason: HOSPADM

## 2022-01-01 RX ORDER — TORSEMIDE 20 MG/1
TABLET ORAL
COMMUNITY
Start: 2022-01-01 | End: 2023-01-01

## 2022-01-01 RX ORDER — PROCHLORPERAZINE EDISYLATE 5 MG/ML
10 INJECTION INTRAMUSCULAR; INTRAVENOUS ONCE AS NEEDED
Status: DISCONTINUED | OUTPATIENT
Start: 2022-01-01 | End: 2022-01-01 | Stop reason: HOSPADM

## 2022-01-01 RX ORDER — SODIUM CHLORIDE 0.9 % (FLUSH) 0.9 %
10 SYRINGE (ML) INJECTION EVERY 12 HOURS SCHEDULED
Status: DISCONTINUED | OUTPATIENT
Start: 2022-01-01 | End: 2022-01-01 | Stop reason: HOSPADM

## 2022-01-01 RX ORDER — FENTANYL CITRATE 50 UG/ML
INJECTION, SOLUTION INTRAMUSCULAR; INTRAVENOUS AS NEEDED
Status: DISCONTINUED | OUTPATIENT
Start: 2022-01-01 | End: 2022-01-01 | Stop reason: SURG

## 2022-01-01 RX ORDER — ACETAMINOPHEN 650 MG/1
650 SUPPOSITORY RECTAL EVERY 4 HOURS PRN
Status: DISCONTINUED | OUTPATIENT
Start: 2022-01-01 | End: 2022-01-01 | Stop reason: HOSPADM

## 2022-01-01 RX ORDER — LORAZEPAM 2 MG/ML
1 INJECTION INTRAMUSCULAR
Status: DISCONTINUED | OUTPATIENT
Start: 2022-01-01 | End: 2022-01-01 | Stop reason: HOSPADM

## 2022-01-01 RX ORDER — NITROGLYCERIN 0.4 MG/1
0.4 TABLET SUBLINGUAL
Status: DISCONTINUED | OUTPATIENT
Start: 2022-01-01 | End: 2022-01-01 | Stop reason: HOSPADM

## 2022-01-01 RX ORDER — SODIUM CHLORIDE, SODIUM LACTATE, POTASSIUM CHLORIDE, CALCIUM CHLORIDE 600; 310; 30; 20 MG/100ML; MG/100ML; MG/100ML; MG/100ML
1000 INJECTION, SOLUTION INTRAVENOUS CONTINUOUS
Status: ACTIVE | OUTPATIENT
Start: 2022-01-01 | End: 2022-01-01

## 2022-01-01 RX ORDER — NICOTINE POLACRILEX 4 MG
15 LOZENGE BUCCAL
Status: DISCONTINUED | OUTPATIENT
Start: 2022-01-01 | End: 2022-01-01 | Stop reason: HOSPADM

## 2022-01-01 RX ORDER — ONDANSETRON 4 MG/1
4 TABLET, FILM COATED ORAL EVERY 6 HOURS PRN
Status: DISCONTINUED | OUTPATIENT
Start: 2022-01-01 | End: 2022-01-01 | Stop reason: HOSPADM

## 2022-01-01 RX ORDER — SODIUM CHLORIDE, SODIUM LACTATE, POTASSIUM CHLORIDE, CALCIUM CHLORIDE 600; 310; 30; 20 MG/100ML; MG/100ML; MG/100ML; MG/100ML
1000 INJECTION, SOLUTION INTRAVENOUS CONTINUOUS
Status: DISCONTINUED | OUTPATIENT
Start: 2022-01-01 | End: 2022-01-01 | Stop reason: HOSPADM

## 2022-01-01 RX ORDER — DIGOXIN 125 MCG
62.5 TABLET ORAL
Status: DISCONTINUED | OUTPATIENT
Start: 2022-01-01 | End: 2022-01-01 | Stop reason: HOSPADM

## 2022-01-01 RX ORDER — SODIUM CHLORIDE 0.9 % (FLUSH) 0.9 %
3-10 SYRINGE (ML) INJECTION AS NEEDED
Status: DISCONTINUED | OUTPATIENT
Start: 2022-01-01 | End: 2022-01-01 | Stop reason: HOSPADM

## 2022-01-01 RX ORDER — ROCURONIUM BROMIDE 10 MG/ML
INJECTION, SOLUTION INTRAVENOUS AS NEEDED
Status: DISCONTINUED | OUTPATIENT
Start: 2022-01-01 | End: 2022-01-01 | Stop reason: SURG

## 2022-01-01 RX ORDER — DIGOXIN 0.25 MG/ML
62.5 INJECTION INTRAMUSCULAR; INTRAVENOUS
Status: DISCONTINUED | OUTPATIENT
Start: 2022-01-01 | End: 2022-01-01

## 2022-01-01 RX ORDER — DEXAMETHASONE SODIUM PHOSPHATE 4 MG/ML
INJECTION, SOLUTION INTRA-ARTICULAR; INTRALESIONAL; INTRAMUSCULAR; INTRAVENOUS; SOFT TISSUE AS NEEDED
Status: DISCONTINUED | OUTPATIENT
Start: 2022-01-01 | End: 2022-01-01 | Stop reason: SURG

## 2022-01-01 RX ORDER — AMLODIPINE BESYLATE 5 MG/1
2.5 TABLET ORAL DAILY
Status: DISCONTINUED | OUTPATIENT
Start: 2022-01-01 | End: 2022-01-01 | Stop reason: HOSPADM

## 2022-01-01 RX ORDER — ROPIVACAINE HYDROCHLORIDE 5 MG/ML
INJECTION, SOLUTION EPIDURAL; INFILTRATION; PERINEURAL
Status: DISCONTINUED | OUTPATIENT
Start: 2022-01-01 | End: 2022-01-01 | Stop reason: SURG

## 2022-01-01 RX ORDER — PANTOPRAZOLE SODIUM 40 MG/10ML
80 INJECTION, POWDER, LYOPHILIZED, FOR SOLUTION INTRAVENOUS ONCE
Status: COMPLETED | OUTPATIENT
Start: 2022-01-01 | End: 2022-01-01

## 2022-01-01 RX ORDER — WARFARIN SODIUM 5 MG/1
5 TABLET ORAL
Status: ON HOLD
Start: 2022-01-01 | End: 2022-01-01 | Stop reason: SDUPTHER

## 2022-01-01 RX ORDER — BISACODYL 5 MG
TABLET, DELAYED RELEASE (ENTERIC COATED) ORAL
Qty: 4 TABLET | Refills: 0 | Status: SHIPPED | OUTPATIENT
Start: 2022-01-01 | End: 2022-01-01 | Stop reason: HOSPADM

## 2022-01-01 RX ORDER — BISACODYL 5 MG
TABLET, DELAYED RELEASE (ENTERIC COATED) ORAL
Qty: 4 TABLET | Refills: 0 | Status: SHIPPED | OUTPATIENT
Start: 2022-01-01 | End: 2022-01-01

## 2022-01-01 RX ORDER — ONDANSETRON 2 MG/ML
4 INJECTION INTRAMUSCULAR; INTRAVENOUS ONCE
Status: COMPLETED | OUTPATIENT
Start: 2022-01-01 | End: 2022-01-01

## 2022-01-01 RX ORDER — POTASSIUM CHLORIDE 750 MG/1
20 CAPSULE, EXTENDED RELEASE ORAL DAILY
Status: DISCONTINUED | OUTPATIENT
Start: 2022-01-01 | End: 2022-01-01 | Stop reason: HOSPADM

## 2022-01-01 RX ORDER — ONDANSETRON 2 MG/ML
4 INJECTION INTRAMUSCULAR; INTRAVENOUS EVERY 6 HOURS PRN
Status: DISCONTINUED | OUTPATIENT
Start: 2022-01-01 | End: 2022-01-01 | Stop reason: HOSPADM

## 2022-01-01 RX ORDER — LOVASTATIN 20 MG/1
1 TABLET ORAL DAILY
COMMUNITY
Start: 2022-01-01 | End: 2022-01-01

## 2022-01-01 RX ORDER — FAMOTIDINE 10 MG/ML
20 INJECTION, SOLUTION INTRAVENOUS DAILY
Status: DISCONTINUED | OUTPATIENT
Start: 2022-01-01 | End: 2022-01-01 | Stop reason: HOSPADM

## 2022-01-01 RX ORDER — HYDROCODONE BITARTRATE AND ACETAMINOPHEN 7.5; 325 MG/1; MG/1
1 TABLET ORAL EVERY 4 HOURS PRN
Status: DISCONTINUED | OUTPATIENT
Start: 2022-01-01 | End: 2022-01-01 | Stop reason: HOSPADM

## 2022-01-01 RX ORDER — FUROSEMIDE 40 MG/1
20 TABLET ORAL DAILY
Status: DISCONTINUED | OUTPATIENT
Start: 2022-01-01 | End: 2022-01-01 | Stop reason: HOSPADM

## 2022-01-01 RX ORDER — HYDROCODONE BITARTRATE AND ACETAMINOPHEN 7.5; 325 MG/1; MG/1
1 TABLET ORAL EVERY 6 HOURS PRN
Qty: 20 TABLET | Refills: 0 | Status: SHIPPED | OUTPATIENT
Start: 2022-01-01 | End: 2023-01-01

## 2022-01-01 RX ORDER — NEOMYCIN SULFATE 500 MG/1
TABLET ORAL
COMMUNITY
Start: 2022-01-01 | End: 2023-01-01

## 2022-01-01 RX ORDER — DOCUSATE SODIUM 100 MG/1
100 CAPSULE, LIQUID FILLED ORAL 2 TIMES DAILY
Status: DISCONTINUED | OUTPATIENT
Start: 2022-01-01 | End: 2022-01-01 | Stop reason: HOSPADM

## 2022-01-01 RX ORDER — METOLAZONE 5 MG/1
5 TABLET ORAL DAILY
COMMUNITY
End: 2022-01-01

## 2022-01-01 RX ORDER — WARFARIN SODIUM 5 MG/1
5 TABLET ORAL
Start: 2022-01-01 | End: 2022-01-01 | Stop reason: SDUPTHER

## 2022-01-01 RX ORDER — IPRATROPIUM BROMIDE AND ALBUTEROL SULFATE 2.5; .5 MG/3ML; MG/3ML
1.5 SOLUTION RESPIRATORY (INHALATION)
COMMUNITY
Start: 2022-01-10 | End: 2022-01-01

## 2022-01-01 RX ORDER — SODIUM CHLORIDE 9 MG/ML
100 INJECTION, SOLUTION INTRAVENOUS CONTINUOUS
Status: DISCONTINUED | OUTPATIENT
Start: 2022-01-01 | End: 2022-01-01

## 2022-01-01 RX ORDER — DEXAMETHASONE SODIUM PHOSPHATE 10 MG/ML
INJECTION, SOLUTION INTRAMUSCULAR; INTRAVENOUS AS NEEDED
Status: DISCONTINUED | OUTPATIENT
Start: 2022-01-01 | End: 2022-01-01 | Stop reason: SURG

## 2022-01-01 RX ORDER — LOVASTATIN 20 MG/1
20 TABLET ORAL NIGHTLY
COMMUNITY

## 2022-01-01 RX ORDER — WARFARIN SODIUM 5 MG/1
5 TABLET ORAL
Start: 2022-01-01

## 2022-01-01 RX ORDER — PANTOPRAZOLE SODIUM 40 MG/1
40 TABLET, DELAYED RELEASE ORAL
Qty: 30 TABLET | Refills: 0 | Status: SHIPPED | OUTPATIENT
Start: 2022-01-01 | End: 2023-01-01

## 2022-01-01 RX ORDER — DEXTROSE, SODIUM CHLORIDE, AND POTASSIUM CHLORIDE 5; .45; .15 G/100ML; G/100ML; G/100ML
50 INJECTION INTRAVENOUS CONTINUOUS
Status: DISCONTINUED | OUTPATIENT
Start: 2022-01-01 | End: 2022-01-01

## 2022-01-01 RX ORDER — ACETAMINOPHEN 325 MG/1
650 TABLET ORAL EVERY 4 HOURS PRN
Status: DISCONTINUED | OUTPATIENT
Start: 2022-01-01 | End: 2022-01-01 | Stop reason: HOSPADM

## 2022-01-01 RX ORDER — MULTIPLE VITAMINS W/ MINERALS TAB 9MG-400MCG
1 TAB ORAL DAILY
Status: DISCONTINUED | OUTPATIENT
Start: 2022-01-01 | End: 2022-01-01 | Stop reason: HOSPADM

## 2022-01-01 RX ORDER — ERYTHROMYCIN 500 MG/1
TABLET, COATED ORAL
COMMUNITY
Start: 2022-01-01 | End: 2023-01-01

## 2022-01-01 RX ORDER — PREDNISONE 10 MG/1
TABLET ORAL
COMMUNITY
Start: 2022-01-01 | End: 2022-01-01

## 2022-01-01 RX ORDER — AMLODIPINE BESYLATE 5 MG/1
1 TABLET ORAL 2 TIMES DAILY
COMMUNITY
Start: 2022-01-01 | End: 2022-01-01

## 2022-01-01 RX ORDER — ALLOPURINOL 300 MG/1
1 TABLET ORAL DAILY
COMMUNITY
Start: 2022-01-01 | End: 2022-01-01

## 2022-01-01 RX ORDER — NALOXONE HCL 0.4 MG/ML
0.1 VIAL (ML) INJECTION
Status: DISCONTINUED | OUTPATIENT
Start: 2022-01-01 | End: 2022-01-01 | Stop reason: HOSPADM

## 2022-01-01 RX ORDER — PANTOPRAZOLE SODIUM 40 MG/10ML
40 INJECTION, POWDER, LYOPHILIZED, FOR SOLUTION INTRAVENOUS
Status: DISCONTINUED | OUTPATIENT
Start: 2022-01-01 | End: 2022-01-01

## 2022-01-01 RX ORDER — MAGNESIUM HYDROXIDE 1200 MG/15ML
LIQUID ORAL AS NEEDED
Status: DISCONTINUED | OUTPATIENT
Start: 2022-01-01 | End: 2022-01-01 | Stop reason: HOSPADM

## 2022-01-01 RX ORDER — DOXYCYCLINE HYCLATE 50 MG/1
324 CAPSULE, GELATIN COATED ORAL
Qty: 30 TABLET | Refills: 0 | Status: SHIPPED | OUTPATIENT
Start: 2022-01-01

## 2022-01-01 RX ORDER — ONDANSETRON 2 MG/ML
INJECTION INTRAMUSCULAR; INTRAVENOUS AS NEEDED
Status: DISCONTINUED | OUTPATIENT
Start: 2022-01-01 | End: 2022-01-01 | Stop reason: SURG

## 2022-01-01 RX ORDER — AMLODIPINE BESYLATE 2.5 MG/1
2.5 TABLET ORAL DAILY
Status: ON HOLD | COMMUNITY
End: 2023-01-01

## 2022-01-01 RX ORDER — METOPROLOL SUCCINATE 25 MG/1
25 TABLET, EXTENDED RELEASE ORAL DAILY
Status: DISCONTINUED | OUTPATIENT
Start: 2022-01-01 | End: 2022-01-01 | Stop reason: HOSPADM

## 2022-01-01 RX ORDER — SODIUM CHLORIDE 0.9 % (FLUSH) 0.9 %
3 SYRINGE (ML) INJECTION EVERY 12 HOURS SCHEDULED
Status: DISCONTINUED | OUTPATIENT
Start: 2022-01-01 | End: 2022-01-01 | Stop reason: HOSPADM

## 2022-01-01 RX ORDER — DULAGLUTIDE 1.5 MG/.5ML
INJECTION, SOLUTION SUBCUTANEOUS WEEKLY
COMMUNITY

## 2022-01-01 RX ORDER — GUAIFENESIN 600 MG/1
1200 TABLET, EXTENDED RELEASE ORAL 2 TIMES DAILY
COMMUNITY
Start: 2022-03-01 | End: 2022-01-01

## 2022-01-01 RX ORDER — ALLOPURINOL 100 MG/1
300 TABLET ORAL DAILY
Status: DISCONTINUED | OUTPATIENT
Start: 2022-01-01 | End: 2022-01-01 | Stop reason: HOSPADM

## 2022-01-01 RX ORDER — COLCHICINE 0.6 MG/1
CAPSULE ORAL
COMMUNITY
Start: 2022-01-01

## 2022-01-01 RX ORDER — DEXTROMETHORPHAN HYDROBROMIDE, GUAIFENESIN 10; 100 MG/5ML; MG/5ML
LIQUID ORAL
COMMUNITY
Start: 2022-01-01 | End: 2022-01-01

## 2022-01-01 RX ORDER — POTASSIUM CHLORIDE 750 MG/1
10 CAPSULE, EXTENDED RELEASE ORAL 2 TIMES DAILY
COMMUNITY
End: 2023-01-01

## 2022-01-01 RX ORDER — PROPOFOL 10 MG/ML
INJECTION, EMULSION INTRAVENOUS AS NEEDED
Status: DISCONTINUED | OUTPATIENT
Start: 2022-01-01 | End: 2022-01-01 | Stop reason: SURG

## 2022-01-01 RX ORDER — PANTOPRAZOLE SODIUM 40 MG/1
40 TABLET, DELAYED RELEASE ORAL
Status: DISCONTINUED | OUTPATIENT
Start: 2022-01-01 | End: 2022-01-01 | Stop reason: HOSPADM

## 2022-01-01 RX ORDER — HEPARIN SODIUM 5000 [USP'U]/ML
5000 INJECTION, SOLUTION INTRAVENOUS; SUBCUTANEOUS EVERY 12 HOURS SCHEDULED
Status: DISCONTINUED | OUTPATIENT
Start: 2022-01-01 | End: 2022-01-01

## 2022-01-01 RX ORDER — SUCCINYLCHOLINE CHLORIDE 20 MG/ML
INJECTION INTRAMUSCULAR; INTRAVENOUS AS NEEDED
Status: DISCONTINUED | OUTPATIENT
Start: 2022-01-01 | End: 2022-01-01 | Stop reason: SURG

## 2022-01-01 RX ORDER — ALBUMIN (HUMAN) 12.5 G/50ML
25 SOLUTION INTRAVENOUS ONCE
Status: COMPLETED | OUTPATIENT
Start: 2022-01-01 | End: 2022-01-01

## 2022-01-01 RX ORDER — FUROSEMIDE 10 MG/ML
20 INJECTION INTRAMUSCULAR; INTRAVENOUS ONCE
Status: COMPLETED | OUTPATIENT
Start: 2022-01-01 | End: 2022-01-01

## 2022-01-01 RX ORDER — PROPOFOL 10 MG/ML
VIAL (ML) INTRAVENOUS AS NEEDED
Status: DISCONTINUED | OUTPATIENT
Start: 2022-01-01 | End: 2022-01-01 | Stop reason: SURG

## 2022-01-01 RX ORDER — POLYETHYLENE GLYCOL 3350 17 G/17G
POWDER, FOR SOLUTION ORAL
Qty: 238 G | Refills: 0 | Status: ON HOLD | OUTPATIENT
Start: 2022-01-01 | End: 2023-01-01

## 2022-01-01 RX ORDER — DEXTROSE AND SODIUM CHLORIDE 5; .45 G/100ML; G/100ML
100 INJECTION, SOLUTION INTRAVENOUS CONTINUOUS
Status: DISCONTINUED | OUTPATIENT
Start: 2022-01-01 | End: 2022-01-01 | Stop reason: HOSPADM

## 2022-01-01 RX ORDER — ONDANSETRON 2 MG/ML
4 INJECTION INTRAMUSCULAR; INTRAVENOUS ONCE AS NEEDED
Status: DISCONTINUED | OUTPATIENT
Start: 2022-01-01 | End: 2022-01-01 | Stop reason: HOSPADM

## 2022-01-01 RX ORDER — LIDOCAINE HYDROCHLORIDE 20 MG/ML
INJECTION, SOLUTION INTRAVENOUS AS NEEDED
Status: DISCONTINUED | OUTPATIENT
Start: 2022-01-01 | End: 2022-01-01 | Stop reason: SURG

## 2022-01-01 RX ORDER — L.ACID,PARA/B.BIFIDUM/S.THERM 8B CELL
1 CAPSULE ORAL 2 TIMES DAILY
Status: DISCONTINUED | OUTPATIENT
Start: 2022-01-01 | End: 2022-01-01 | Stop reason: HOSPADM

## 2022-01-01 RX ORDER — SODIUM CHLORIDE 0.9 % (FLUSH) 0.9 %
20 SYRINGE (ML) INJECTION AS NEEDED
Status: DISCONTINUED | OUTPATIENT
Start: 2022-01-01 | End: 2022-01-01 | Stop reason: HOSPADM

## 2022-01-01 RX ORDER — DEXTROSE MONOHYDRATE 25 G/50ML
25 INJECTION, SOLUTION INTRAVENOUS
Status: DISCONTINUED | OUTPATIENT
Start: 2022-01-01 | End: 2022-01-01 | Stop reason: HOSPADM

## 2022-01-01 RX ORDER — MEPERIDINE HYDROCHLORIDE 25 MG/ML
12.5 INJECTION INTRAMUSCULAR; INTRAVENOUS; SUBCUTANEOUS
Status: DISCONTINUED | OUTPATIENT
Start: 2022-01-01 | End: 2022-01-01 | Stop reason: HOSPADM

## 2022-01-01 RX ADMIN — PANTOPRAZOLE SODIUM 40 MG: 40 INJECTION, POWDER, FOR SOLUTION INTRAVENOUS at 06:31

## 2022-01-01 RX ADMIN — TAZOBACTAM SODIUM AND PIPERACILLIN SODIUM 3.38 G: 375; 3 INJECTION, SOLUTION INTRAVENOUS at 09:07

## 2022-01-01 RX ADMIN — HEPARIN SODIUM 5000 UNITS: 5000 INJECTION INTRAVENOUS; SUBCUTANEOUS at 20:01

## 2022-01-01 RX ADMIN — DIGOXIN 62.5 MCG: 0.25 INJECTION INTRAMUSCULAR; INTRAVENOUS at 22:02

## 2022-01-01 RX ADMIN — Medication 3 ML: at 21:35

## 2022-01-01 RX ADMIN — Medication 10 ML: at 20:41

## 2022-01-01 RX ADMIN — IPRATROPIUM BROMIDE 0.5 MG: 0.5 SOLUTION RESPIRATORY (INHALATION) at 14:07

## 2022-01-01 RX ADMIN — PANTOPRAZOLE SODIUM 80 MG: 40 INJECTION, POWDER, FOR SOLUTION INTRAVENOUS at 02:48

## 2022-01-01 RX ADMIN — ALLOPURINOL 300 MG: 100 TABLET ORAL at 08:24

## 2022-01-01 RX ADMIN — DEXTROSE AND SODIUM CHLORIDE 100 ML/HR: 5; 450 INJECTION, SOLUTION INTRAVENOUS at 10:07

## 2022-01-01 RX ADMIN — FAMOTIDINE 20 MG: 10 INJECTION INTRAVENOUS at 08:40

## 2022-01-01 RX ADMIN — AMLODIPINE BESYLATE 2.5 MG: 5 TABLET ORAL at 11:09

## 2022-01-01 RX ADMIN — Medication 10 ML: at 21:08

## 2022-01-01 RX ADMIN — HYDROMORPHONE HYDROCHLORIDE 0.5 MG: 1 INJECTION, SOLUTION INTRAMUSCULAR; INTRAVENOUS; SUBCUTANEOUS at 18:00

## 2022-01-01 RX ADMIN — TAZOBACTAM SODIUM AND PIPERACILLIN SODIUM 3.38 G: 375; 3 INJECTION, SOLUTION INTRAVENOUS at 15:08

## 2022-01-01 RX ADMIN — PROPOFOL 150 MCG/KG/MIN: 10 INJECTION, EMULSION INTRAVENOUS at 10:11

## 2022-01-01 RX ADMIN — IPRATROPIUM BROMIDE 0.2 MG: 0.5 SOLUTION RESPIRATORY (INHALATION) at 13:11

## 2022-01-01 RX ADMIN — METOPROLOL SUCCINATE 25 MG: 25 TABLET, EXTENDED RELEASE ORAL at 09:54

## 2022-01-01 RX ADMIN — PANTOPRAZOLE SODIUM 40 MG: 40 TABLET, DELAYED RELEASE ORAL at 08:14

## 2022-01-01 RX ADMIN — FUROSEMIDE 20 MG: 10 INJECTION, SOLUTION INTRAMUSCULAR; INTRAVENOUS at 09:07

## 2022-01-01 RX ADMIN — POTASSIUM CHLORIDE 20 MEQ: 10 CAPSULE, COATED, EXTENDED RELEASE ORAL at 08:39

## 2022-01-01 RX ADMIN — INSULIN ASPART 6 UNITS: 100 INJECTION, SOLUTION INTRAVENOUS; SUBCUTANEOUS at 11:11

## 2022-01-01 RX ADMIN — SODIUM BICARBONATE 650 MG TABLET 650 MG: at 08:24

## 2022-01-01 RX ADMIN — TAZOBACTAM SODIUM AND PIPERACILLIN SODIUM 3.38 G: 375; 3 INJECTION, SOLUTION INTRAVENOUS at 08:24

## 2022-01-01 RX ADMIN — Medication 10 ML: at 08:25

## 2022-01-01 RX ADMIN — DEXTROSE AND SODIUM CHLORIDE 100 ML/HR: 5; 450 INJECTION, SOLUTION INTRAVENOUS at 07:44

## 2022-01-01 RX ADMIN — DOCUSATE SODIUM 100 MG: 100 CAPSULE, LIQUID FILLED ORAL at 08:24

## 2022-01-01 RX ADMIN — Medication 10 ML: at 09:07

## 2022-01-01 RX ADMIN — HYDROMORPHONE HYDROCHLORIDE 0.5 MG: 1 INJECTION, SOLUTION INTRAMUSCULAR; INTRAVENOUS; SUBCUTANEOUS at 00:14

## 2022-01-01 RX ADMIN — SODIUM BICARBONATE 650 MG TABLET 650 MG: at 08:14

## 2022-01-01 RX ADMIN — PANTOPRAZOLE SODIUM 8 MG/HR: 40 INJECTION, POWDER, FOR SOLUTION INTRAVENOUS at 21:45

## 2022-01-01 RX ADMIN — POTASSIUM CHLORIDE 20 MEQ: 10 CAPSULE, COATED, EXTENDED RELEASE ORAL at 08:25

## 2022-01-01 RX ADMIN — FUROSEMIDE 20 MG: 40 TABLET ORAL at 11:10

## 2022-01-01 RX ADMIN — FAMOTIDINE 20 MG: 10 INJECTION INTRAVENOUS at 11:09

## 2022-01-01 RX ADMIN — FAMOTIDINE 20 MG: 10 INJECTION INTRAVENOUS at 09:54

## 2022-01-01 RX ADMIN — MULTIPLE VITAMINS W/ MINERALS TAB 1 TABLET: TAB at 08:14

## 2022-01-01 RX ADMIN — Medication 1 CAPSULE: at 20:27

## 2022-01-01 RX ADMIN — Medication 10 ML: at 21:07

## 2022-01-01 RX ADMIN — TAZOBACTAM SODIUM AND PIPERACILLIN SODIUM 3.38 G: 375; 3 INJECTION, SOLUTION INTRAVENOUS at 02:38

## 2022-01-01 RX ADMIN — SODIUM CHLORIDE 100 ML/HR: 9 INJECTION, SOLUTION INTRAVENOUS at 20:41

## 2022-01-01 RX ADMIN — Medication 10 ML: at 21:06

## 2022-01-01 RX ADMIN — IPRATROPIUM BROMIDE 0.2 MG: 0.5 SOLUTION RESPIRATORY (INHALATION) at 07:10

## 2022-01-01 RX ADMIN — Medication 10 ML: at 09:08

## 2022-01-01 RX ADMIN — IPRATROPIUM BROMIDE 0.5 MG: 0.5 SOLUTION RESPIRATORY (INHALATION) at 19:48

## 2022-01-01 RX ADMIN — ONDANSETRON 4 MG: 4 TABLET, FILM COATED ORAL at 10:04

## 2022-01-01 RX ADMIN — INSULIN DETEMIR 8 UNITS: 100 INJECTION, SOLUTION SUBCUTANEOUS at 20:28

## 2022-01-01 RX ADMIN — Medication 10 ML: at 21:56

## 2022-01-01 RX ADMIN — DIGOXIN 62.5 MCG: 125 TABLET ORAL at 11:44

## 2022-01-01 RX ADMIN — METOPROLOL SUCCINATE 25 MG: 25 TABLET, EXTENDED RELEASE ORAL at 08:25

## 2022-01-01 RX ADMIN — Medication 3 ML: at 08:40

## 2022-01-01 RX ADMIN — INSULIN ASPART 8 UNITS: 100 INJECTION, SOLUTION INTRAVENOUS; SUBCUTANEOUS at 08:58

## 2022-01-01 RX ADMIN — IPRATROPIUM BROMIDE 0.5 MG: 0.5 SOLUTION RESPIRATORY (INHALATION) at 12:16

## 2022-01-01 RX ADMIN — Medication 10 ML: at 08:24

## 2022-01-01 RX ADMIN — HYDROCODONE BITARTRATE AND ACETAMINOPHEN 1 TABLET: 7.5; 325 TABLET ORAL at 10:01

## 2022-01-01 RX ADMIN — FUROSEMIDE 20 MG: 40 TABLET ORAL at 08:25

## 2022-01-01 RX ADMIN — Medication 10 ML: at 21:25

## 2022-01-01 RX ADMIN — PROPOFOL 150 MG: 10 INJECTION, EMULSION INTRAVENOUS at 07:36

## 2022-01-01 RX ADMIN — FAMOTIDINE 20 MG: 10 INJECTION INTRAVENOUS at 10:58

## 2022-01-01 RX ADMIN — DEXAMETHASONE SODIUM PHOSPHATE 4 MG: 4 INJECTION, SOLUTION INTRAMUSCULAR; INTRAVENOUS at 07:36

## 2022-01-01 RX ADMIN — IPRATROPIUM BROMIDE 0.5 MG: 0.5 SOLUTION RESPIRATORY (INHALATION) at 08:49

## 2022-01-01 RX ADMIN — DEXTROSE AND SODIUM CHLORIDE 100 ML/HR: 5; 450 INJECTION, SOLUTION INTRAVENOUS at 11:10

## 2022-01-01 RX ADMIN — HEPARIN SODIUM 5000 UNITS: 5000 INJECTION INTRAVENOUS; SUBCUTANEOUS at 09:54

## 2022-01-01 RX ADMIN — DIGOXIN 62.5 MCG: 125 TABLET ORAL at 11:01

## 2022-01-01 RX ADMIN — DEXTROSE AND SODIUM CHLORIDE 100 ML/HR: 5; 450 INJECTION, SOLUTION INTRAVENOUS at 21:32

## 2022-01-01 RX ADMIN — IPRATROPIUM BROMIDE 0.5 MG: 0.5 SOLUTION RESPIRATORY (INHALATION) at 07:16

## 2022-01-01 RX ADMIN — POTASSIUM CHLORIDE 20 MEQ: 10 CAPSULE, COATED, EXTENDED RELEASE ORAL at 11:09

## 2022-01-01 RX ADMIN — Medication 3 ML: at 11:11

## 2022-01-01 RX ADMIN — IPRATROPIUM BROMIDE 0.5 MG: 0.5 SOLUTION RESPIRATORY (INHALATION) at 07:02

## 2022-01-01 RX ADMIN — PANTOPRAZOLE SODIUM 8 MG/HR: 40 INJECTION, POWDER, FOR SOLUTION INTRAVENOUS at 02:04

## 2022-01-01 RX ADMIN — IPRATROPIUM BROMIDE 0.5 MG: 0.5 SOLUTION RESPIRATORY (INHALATION) at 19:58

## 2022-01-01 RX ADMIN — PANTOPRAZOLE SODIUM 8 MG/HR: 40 INJECTION, POWDER, FOR SOLUTION INTRAVENOUS at 07:06

## 2022-01-01 RX ADMIN — LIDOCAINE HYDROCHLORIDE: 20 SOLUTION ORAL; TOPICAL at 20:28

## 2022-01-01 RX ADMIN — Medication 1 CAPSULE: at 08:24

## 2022-01-01 RX ADMIN — SUGAMMADEX 200 MG: 100 INJECTION, SOLUTION INTRAVENOUS at 09:04

## 2022-01-01 RX ADMIN — TAZOBACTAM SODIUM AND PIPERACILLIN SODIUM 3.38 G: 375; 3 INJECTION, SOLUTION INTRAVENOUS at 23:26

## 2022-01-01 RX ADMIN — AMLODIPINE BESYLATE 2.5 MG: 5 TABLET ORAL at 08:40

## 2022-01-01 RX ADMIN — DEXTROSE MONOHYDRATE, SODIUM CHLORIDE, AND POTASSIUM CHLORIDE 50 ML/HR: 50; 4.5; 1.49 INJECTION, SOLUTION INTRAVENOUS at 07:54

## 2022-01-01 RX ADMIN — METOPROLOL SUCCINATE 25 MG: 25 TABLET, EXTENDED RELEASE ORAL at 08:40

## 2022-01-01 RX ADMIN — POTASSIUM CHLORIDE 20 MEQ: 10 CAPSULE, COATED, EXTENDED RELEASE ORAL at 09:54

## 2022-01-01 RX ADMIN — SUCCINYLCHOLINE CHLORIDE 200 MG: 20 INJECTION, SOLUTION INTRAMUSCULAR; INTRAVENOUS at 07:37

## 2022-01-01 RX ADMIN — DIGOXIN 62.5 MCG: 0.25 INJECTION INTRAMUSCULAR; INTRAVENOUS at 11:19

## 2022-01-01 RX ADMIN — DEXTROSE MONOHYDRATE, SODIUM CHLORIDE, AND POTASSIUM CHLORIDE 50 ML/HR: 50; 4.5; 1.49 INJECTION, SOLUTION INTRAVENOUS at 11:17

## 2022-01-01 RX ADMIN — Medication 3 ML: at 09:45

## 2022-01-01 RX ADMIN — Medication 10 ML: at 08:14

## 2022-01-01 RX ADMIN — TAZOBACTAM SODIUM AND PIPERACILLIN SODIUM 3.38 G: 375; 3 INJECTION, SOLUTION INTRAVENOUS at 23:35

## 2022-01-01 RX ADMIN — PANTOPRAZOLE SODIUM 8 MG/HR: 40 INJECTION, POWDER, FOR SOLUTION INTRAVENOUS at 02:38

## 2022-01-01 RX ADMIN — IPRATROPIUM BROMIDE 0.5 MG: 0.5 SOLUTION RESPIRATORY (INHALATION) at 16:20

## 2022-01-01 RX ADMIN — TAZOBACTAM SODIUM AND PIPERACILLIN SODIUM 3.38 G: 375; 3 INJECTION, SOLUTION INTRAVENOUS at 16:22

## 2022-01-01 RX ADMIN — PANTOPRAZOLE SODIUM 40 MG: 40 INJECTION, POWDER, FOR SOLUTION INTRAVENOUS at 06:22

## 2022-01-01 RX ADMIN — SODIUM CHLORIDE, POTASSIUM CHLORIDE, SODIUM LACTATE AND CALCIUM CHLORIDE 1000 ML: 600; 310; 30; 20 INJECTION, SOLUTION INTRAVENOUS at 06:54

## 2022-01-01 RX ADMIN — SODIUM CHLORIDE, POTASSIUM CHLORIDE, SODIUM LACTATE AND CALCIUM CHLORIDE 1000 ML: 600; 310; 30; 20 INJECTION, SOLUTION INTRAVENOUS at 08:51

## 2022-01-01 RX ADMIN — AMLODIPINE BESYLATE 2.5 MG: 5 TABLET ORAL at 08:25

## 2022-01-01 RX ADMIN — Medication 10 ML: at 20:42

## 2022-01-01 RX ADMIN — DIGOXIN 62.5 MCG: 125 TABLET ORAL at 11:19

## 2022-01-01 RX ADMIN — Medication 3 G: at 07:40

## 2022-01-01 RX ADMIN — ROCURONIUM BROMIDE 5 MG: 10 INJECTION INTRAVENOUS at 07:36

## 2022-01-01 RX ADMIN — TAZOBACTAM SODIUM AND PIPERACILLIN SODIUM 3.38 G: 375; 3 INJECTION, SOLUTION INTRAVENOUS at 07:30

## 2022-01-01 RX ADMIN — Medication 10 ML: at 21:24

## 2022-01-01 RX ADMIN — DIGOXIN 62.5 MCG: 125 TABLET ORAL at 11:49

## 2022-01-01 RX ADMIN — HYDROMORPHONE HYDROCHLORIDE 0.5 MG: 1 INJECTION, SOLUTION INTRAMUSCULAR; INTRAVENOUS; SUBCUTANEOUS at 11:08

## 2022-01-01 RX ADMIN — IPRATROPIUM BROMIDE 0.5 MG: 0.5 SOLUTION RESPIRATORY (INHALATION) at 07:09

## 2022-01-01 RX ADMIN — Medication 1 CAPSULE: at 08:14

## 2022-01-01 RX ADMIN — ONDANSETRON 4 MG: 2 INJECTION INTRAMUSCULAR; INTRAVENOUS at 07:36

## 2022-01-01 RX ADMIN — PANTOPRAZOLE SODIUM 8 MG/HR: 40 INJECTION, POWDER, FOR SOLUTION INTRAVENOUS at 17:59

## 2022-01-01 RX ADMIN — ROPIVACAINE HYDROCHLORIDE 30 ML: 5 INJECTION, SOLUTION EPIDURAL; INFILTRATION; PERINEURAL at 09:12

## 2022-01-01 RX ADMIN — Medication 3 ML: at 08:28

## 2022-01-01 RX ADMIN — MULTIPLE VITAMINS W/ MINERALS TAB 1 TABLET: TAB at 15:37

## 2022-01-01 RX ADMIN — FENTANYL CITRATE 100 MCG: 50 INJECTION, SOLUTION INTRAMUSCULAR; INTRAVENOUS at 07:36

## 2022-01-01 RX ADMIN — IPRATROPIUM BROMIDE 0.2 MG: 0.5 SOLUTION RESPIRATORY (INHALATION) at 13:21

## 2022-01-01 RX ADMIN — DEXAMETHASONE SODIUM PHOSPHATE 10 MG: 10 INJECTION, SOLUTION INTRAMUSCULAR; INTRAVENOUS at 09:12

## 2022-01-01 RX ADMIN — Medication 10 ML: at 21:23

## 2022-01-01 RX ADMIN — ONDANSETRON 4 MG: 2 INJECTION INTRAMUSCULAR; INTRAVENOUS at 09:57

## 2022-01-01 RX ADMIN — SODIUM CHLORIDE 100 ML/HR: 9 INJECTION, SOLUTION INTRAVENOUS at 21:30

## 2022-01-01 RX ADMIN — ALBUMIN (HUMAN) 25 G: 0.25 INJECTION, SOLUTION INTRAVENOUS at 11:17

## 2022-01-01 RX ADMIN — AMLODIPINE BESYLATE 2.5 MG: 5 TABLET ORAL at 09:54

## 2022-01-01 RX ADMIN — FUROSEMIDE 20 MG: 40 TABLET ORAL at 08:39

## 2022-01-01 RX ADMIN — PROPOFOL 50 MG: 10 INJECTION, EMULSION INTRAVENOUS at 10:11

## 2022-01-01 RX ADMIN — LIDOCAINE HYDROCHLORIDE 60 MG: 20 INJECTION, SOLUTION INTRAVENOUS at 10:11

## 2022-01-01 RX ADMIN — DIGOXIN 62.5 MCG: 125 TABLET ORAL at 11:10

## 2022-01-01 RX ADMIN — HYDROMORPHONE HYDROCHLORIDE 0.5 MG: 1 INJECTION, SOLUTION INTRAMUSCULAR; INTRAVENOUS; SUBCUTANEOUS at 09:53

## 2022-01-01 RX ADMIN — DIGOXIN 62.5 MCG: 0.25 INJECTION INTRAMUSCULAR; INTRAVENOUS at 18:04

## 2022-01-01 RX ADMIN — FUROSEMIDE 20 MG: 40 TABLET ORAL at 09:54

## 2022-01-01 RX ADMIN — ALLOPURINOL 300 MG: 100 TABLET ORAL at 08:14

## 2022-01-01 RX ADMIN — SODIUM BICARBONATE 650 MG TABLET 650 MG: at 20:27

## 2022-01-05 ENCOUNTER — OFFICE VISIT (OUTPATIENT)
Dept: FAMILY MEDICINE CLINIC | Age: 72
End: 2022-01-05
Payer: MEDICARE

## 2022-01-05 ENCOUNTER — HOSPITAL ENCOUNTER (OUTPATIENT)
Facility: HOSPITAL | Age: 72
Discharge: HOME | End: 2022-01-05
Payer: MEDICARE

## 2022-01-05 VITALS
DIASTOLIC BLOOD PRESSURE: 55 MMHG | BODY MASS INDEX: 24.86 KG/M2 | RESPIRATION RATE: 18 BRPM | HEIGHT: 73 IN | TEMPERATURE: 97.4 F | WEIGHT: 187.6 LBS | HEART RATE: 60 BPM | SYSTOLIC BLOOD PRESSURE: 125 MMHG | OXYGEN SATURATION: 95 %

## 2022-01-05 DIAGNOSIS — N28.9 IMPAIRED RENAL FUNCTION: Primary | ICD-10-CM

## 2022-01-05 DIAGNOSIS — M1A.00X0 CHRONIC GOUTY ARTHROPATHY: ICD-10-CM

## 2022-01-05 DIAGNOSIS — I48.91 ATRIAL FIBRILLATION, UNSPECIFIED TYPE (HCC): ICD-10-CM

## 2022-01-05 DIAGNOSIS — N28.9 IMPAIRED RENAL FUNCTION: ICD-10-CM

## 2022-01-05 LAB
A/G RATIO: 1.5 (ref 0.8–2)
ALBUMIN SERPL-MCNC: 4.1 G/DL (ref 3.4–4.8)
ALP BLD-CCNC: 88 U/L (ref 25–100)
ALT SERPL-CCNC: 18 U/L (ref 4–36)
ANION GAP SERPL CALCULATED.3IONS-SCNC: 16 MMOL/L (ref 3–16)
AST SERPL-CCNC: 20 U/L (ref 8–33)
BILIRUB SERPL-MCNC: 0.3 MG/DL (ref 0.3–1.2)
BUN BLDV-MCNC: 44 MG/DL (ref 6–20)
CALCIUM SERPL-MCNC: 9.1 MG/DL (ref 8.5–10.5)
CHLORIDE BLD-SCNC: 101 MMOL/L (ref 98–107)
CO2: 22 MMOL/L (ref 20–30)
CREAT SERPL-MCNC: 2.3 MG/DL (ref 0.4–1.2)
GFR AFRICAN AMERICAN: 34
GFR NON-AFRICAN AMERICAN: 28
GLOBULIN: 2.8 G/DL
GLUCOSE BLD-MCNC: 155 MG/DL (ref 74–106)
INTERNATIONAL NORMALIZATION RATIO, POC: 2.5
POTASSIUM SERPL-SCNC: 4.2 MMOL/L (ref 3.4–5.1)
PROTHROMBIN TIME, POC: NORMAL
SODIUM BLD-SCNC: 139 MMOL/L (ref 136–145)
TOTAL PROTEIN: 6.9 G/DL (ref 6.4–8.3)

## 2022-01-05 PROCEDURE — 4040F PNEUMOC VAC/ADMIN/RCVD: CPT | Performed by: NURSE PRACTITIONER

## 2022-01-05 PROCEDURE — 80053 COMPREHEN METABOLIC PANEL: CPT

## 2022-01-05 PROCEDURE — 1123F ACP DISCUSS/DSCN MKR DOCD: CPT | Performed by: NURSE PRACTITIONER

## 2022-01-05 PROCEDURE — 3017F COLORECTAL CA SCREEN DOC REV: CPT | Performed by: NURSE PRACTITIONER

## 2022-01-05 PROCEDURE — G8427 DOCREV CUR MEDS BY ELIG CLIN: HCPCS | Performed by: NURSE PRACTITIONER

## 2022-01-05 PROCEDURE — 85610 PROTHROMBIN TIME: CPT | Performed by: NURSE PRACTITIONER

## 2022-01-05 PROCEDURE — G8420 CALC BMI NORM PARAMETERS: HCPCS | Performed by: NURSE PRACTITIONER

## 2022-01-05 PROCEDURE — 1036F TOBACCO NON-USER: CPT | Performed by: NURSE PRACTITIONER

## 2022-01-05 PROCEDURE — G8484 FLU IMMUNIZE NO ADMIN: HCPCS | Performed by: NURSE PRACTITIONER

## 2022-01-05 PROCEDURE — 99213 OFFICE O/P EST LOW 20 MIN: CPT | Performed by: NURSE PRACTITIONER

## 2022-01-05 RX ORDER — DIGOXIN 125 MCG
TABLET ORAL
COMMUNITY
Start: 2021-12-08 | End: 2022-04-18 | Stop reason: SDUPTHER

## 2022-01-05 RX ORDER — COLCHICINE 0.6 MG/1
0.6 TABLET ORAL DAILY PRN
Qty: 30 TABLET | Refills: 5 | Status: SHIPPED | OUTPATIENT
Start: 2022-01-05 | End: 2022-04-07 | Stop reason: SDUPTHER

## 2022-01-05 RX ORDER — AMLODIPINE BESYLATE 5 MG/1
TABLET ORAL
COMMUNITY
Start: 2021-12-30 | End: 2022-04-18 | Stop reason: SDUPTHER

## 2022-01-05 RX ORDER — ALLOPURINOL 300 MG/1
TABLET ORAL
COMMUNITY
Start: 2021-11-01 | End: 2022-04-18 | Stop reason: SDUPTHER

## 2022-01-05 RX ORDER — CALCIUM POLYCARBOPHIL 625 MG
625 TABLET ORAL DAILY
COMMUNITY

## 2022-01-05 NOTE — PROGRESS NOTES
SUBJECTIVE:    Hi Hill is a 66 y.o. male    Anticoagulation: Patient here for followup of chronic anticoagulation. Indication: atrial fibrillation  Bleeding Signs/Symptoms:  None  Thromboembolic Signs/Symptoms:  None    Missed Coumadin Doses:  None  Medication Changes:  Decreased lasix dose- pt reports he was previously taking 80 mg- he is now taking 20mg - denies increase in swelling. Pt reports he also started taking OTC potassium due to cramps. Pt reports cramps have improved since decreasing lasix dose and starting potassium. Dietary Changes:  no  Bacterial/Viral Infection:  no    Other Concerns:  No    Pt is taking 5 mg every day except sundays and he takes 7.5mg         Chief Complaint   Patient presents with    Diabetes     f/u        Review of Systems   Constitutional: Negative. Respiratory: Negative for cough and shortness of breath. Cardiovascular: Negative for chest pain. Gastrointestinal: Negative for abdominal pain, diarrhea, nausea and vomiting. Hematological: Does not bruise/bleed easily. OBJECTIVE:    BP (!) 125/55   Pulse 60   Temp 97.4 °F (36.3 °C)   Resp 18   Ht 6' 1\" (1.854 m)   Wt 187 lb 9.6 oz (85.1 kg)   SpO2 95% Comment: RA  BMI 24.75 kg/m²    Physical Exam  Vitals and nursing note reviewed. Constitutional:       Appearance: He is well-developed. Neck:      Thyroid: No thyromegaly. Vascular: No carotid bruit. Cardiovascular:      Rate and Rhythm: Normal rate. Rhythm irregularly irregular. Heart sounds: Normal heart sounds. No murmur heard. Pulmonary:      Effort: Pulmonary effort is normal.      Breath sounds: Normal breath sounds. Skin:     General: Skin is warm and dry. Neurological:      Mental Status: He is alert and oriented to person, place, and time. Psychiatric:         Behavior: Behavior normal.         ASSESSMENT/PLAN:   Laura Alonzo was seen today for diabetes.     Diagnoses and all orders for this visit:    Impaired renal function  -     Comprehensive Metabolic Panel; Future- will fax results to nephrology    Chronic gouty arthropathy- stable- pt request refill  -     colchicine (COLCRYS) 0.6 MG tablet; Take 1 tablet by mouth daily as needed for Pain    Atrial fibrillation, unspecified type ([facility]  -     POCT INR 2.5- no changes The current medical regimen is effective;  continue present plan and medications. Return in about 1 month (around 2/5/2022), or as needed.       Current Outpatient Medications on File Prior to Visit   Medication Sig Dispense Refill    allopurinol (ZYLOPRIM) 300 MG tablet TAKE 1 TABLET BY MOUTH EVERY DAY      amLODIPine (NORVASC) 5 MG tablet TAKE 1 TABLET BY MOUTH TWICE A DAY      Calcium Polycarbophil (FIBER) 625 MG TABS Take 625 mg by mouth daily      digoxin (LANOXIN) 125 MCG tablet TAKE 1 TABLET BY MOUTH EVERY DAY OR AS DIRECTED      metOLazone (ZAROXOLYN) 5 MG tablet Take 5 mg by mouth daily      Dulaglutide (TRULICITY) 1.5 SS/3.6UV SOPN Inject 1.5 mg into the skin once a week      ipratropium (ATROVENT) 0.02 % nebulizer solution Take 0.5 mg by nebulization every 4-6 hours as needed for Wheezing      furosemide (LASIX) 40 MG tablet take 1 tablet by mouth once daily (Patient taking differently: 40 mg 2 times daily take 1 tablet by mouth once daily) 30 tablet 5    ferrous sulfate 325 (65 Fe) MG tablet take 1 tablet by mouth twice a day 60 tablet 5    glipiZIDE (GLUCOTROL) 10 MG tablet take 2 tablets by mouth every morning and 1 tablet every evening before meals (Patient taking differently: 2 times daily (before meals) take 2 tablets by mouth every morning and 1 tablet every evening before meals) 90 tablet 0    docusate sodium (COLACE) 100 MG capsule Take 1 capsule by mouth 2 times daily 60 capsule 5    lovastatin (MEVACOR) 20 MG tablet Take 1 tablet by mouth daily 30 tablet 5    metoprolol succinate (TOPROL XL) 25 MG extended release tablet take 1 tablet by mouth once daily 30 tablet 5  COUMADIN 5 MG tablet take as directed (Patient taking differently: 5 mg daily Patient takes 5mg daily and 7.5mg on Sundays) 100 tablet 5    aspirin 81 MG tablet Take 81 mg by mouth daily.  nitroGLYCERIN (NITROSTAT) 0.4 MG SL tablet place 1 tablet under the tongue if needed every 5 minutes for chest pain for 3 doses IF NO RELIEF AFTER 3RD DOSE CALL PRESCRIBER . (Patient not taking: Reported on 12/8/2021) 25 tablet 5     No current facility-administered medications on file prior to visit.

## 2022-01-06 ASSESSMENT — ENCOUNTER SYMPTOMS
DIARRHEA: 0
NAUSEA: 0
SHORTNESS OF BREATH: 0
VOMITING: 0
COUGH: 0
ABDOMINAL PAIN: 0

## 2022-01-10 ENCOUNTER — TELEPHONE (OUTPATIENT)
Dept: FAMILY MEDICINE CLINIC | Age: 72
End: 2022-01-10

## 2022-01-10 RX ORDER — COLCHICINE 0.6 MG/1
0.6 CAPSULE ORAL DAILY
Qty: 30 CAPSULE | Refills: 1 | Status: SHIPPED | OUTPATIENT
Start: 2022-01-10 | End: 2022-02-23

## 2022-01-11 DIAGNOSIS — I10 ESSENTIAL HYPERTENSION, BENIGN: ICD-10-CM

## 2022-01-11 RX ORDER — METOLAZONE 5 MG/1
TABLET ORAL
Qty: 90 TABLET | OUTPATIENT
Start: 2022-01-11

## 2022-01-11 RX ORDER — FUROSEMIDE 40 MG/1
TABLET ORAL
Qty: 360 TABLET | OUTPATIENT
Start: 2022-01-11

## 2022-01-11 NOTE — TELEPHONE ENCOUNTER
Called pt and ask him to call nephrology to make sure that he is on current dose and get refill from his office because of his renal function.  Pt voiced understanding and agreed to call his specialist.

## 2022-01-11 NOTE — TELEPHONE ENCOUNTER
I believe this medication is prescribed by nephrology. Pt has significantly impaired renal function.

## 2022-01-14 ENCOUNTER — APPOINTMENT (OUTPATIENT)
Dept: PHYSICAL THERAPY | Facility: HOSPITAL | Age: 72
End: 2022-01-14

## 2022-01-24 ENCOUNTER — APPOINTMENT (OUTPATIENT)
Dept: PHYSICAL THERAPY | Facility: HOSPITAL | Age: 72
End: 2022-01-24

## 2022-01-31 ENCOUNTER — HOSPITAL ENCOUNTER (OUTPATIENT)
Dept: PHYSICAL THERAPY | Facility: HOSPITAL | Age: 72
Setting detail: THERAPIES SERIES
Discharge: HOME | End: 2022-01-31

## 2022-01-31 DIAGNOSIS — I73.9 PERIPHERAL VASCULAR DISEASE OF LOWER EXTREMITY: ICD-10-CM

## 2022-01-31 DIAGNOSIS — I87.2 VENOUS INSUFFICIENCY: ICD-10-CM

## 2022-01-31 DIAGNOSIS — L97.509 TYPE 2 DIABETES MELLITUS WITH FOOT ULCER, UNSPECIFIED WHETHER LONG TERM INSULIN USE: ICD-10-CM

## 2022-01-31 DIAGNOSIS — L97.528 ULCER OF LEFT FOOT WITH OTHER SEVERITY: Primary | ICD-10-CM

## 2022-01-31 DIAGNOSIS — E11.621 TYPE 2 DIABETES MELLITUS WITH FOOT ULCER, UNSPECIFIED WHETHER LONG TERM INSULIN USE: ICD-10-CM

## 2022-01-31 PROCEDURE — 97597 DBRDMT OPN WND 1ST 20 CM/<: CPT | Performed by: PHYSICAL THERAPIST

## 2022-01-31 PROCEDURE — 97610 LOW FREQUENCY NON-THERMAL US: CPT | Performed by: PHYSICAL THERAPIST

## 2022-01-31 PROCEDURE — 97162 PT EVAL MOD COMPLEX 30 MIN: CPT | Performed by: PHYSICAL THERAPIST

## 2022-02-01 ENCOUNTER — OFFICE VISIT (OUTPATIENT)
Dept: FAMILY MEDICINE CLINIC | Age: 72
End: 2022-02-01
Payer: MEDICARE

## 2022-02-01 VITALS
HEIGHT: 73 IN | OXYGEN SATURATION: 99 % | SYSTOLIC BLOOD PRESSURE: 102 MMHG | DIASTOLIC BLOOD PRESSURE: 70 MMHG | BODY MASS INDEX: 25.15 KG/M2 | HEART RATE: 90 BPM | TEMPERATURE: 96.9 F | RESPIRATION RATE: 18 BRPM | WEIGHT: 189.8 LBS

## 2022-02-01 DIAGNOSIS — E11.9 TYPE 2 DIABETES MELLITUS WITHOUT COMPLICATION, WITHOUT LONG-TERM CURRENT USE OF INSULIN (HCC): ICD-10-CM

## 2022-02-01 DIAGNOSIS — I48.91 ATRIAL FIBRILLATION, UNSPECIFIED TYPE (HCC): Primary | ICD-10-CM

## 2022-02-01 LAB
INTERNATIONAL NORMALIZATION RATIO, POC: 5.1
PROTHROMBIN TIME, POC: NORMAL

## 2022-02-01 PROCEDURE — G8427 DOCREV CUR MEDS BY ELIG CLIN: HCPCS | Performed by: NURSE PRACTITIONER

## 2022-02-01 PROCEDURE — 85610 PROTHROMBIN TIME: CPT | Performed by: NURSE PRACTITIONER

## 2022-02-01 PROCEDURE — 3046F HEMOGLOBIN A1C LEVEL >9.0%: CPT | Performed by: NURSE PRACTITIONER

## 2022-02-01 PROCEDURE — 1123F ACP DISCUSS/DSCN MKR DOCD: CPT | Performed by: NURSE PRACTITIONER

## 2022-02-01 PROCEDURE — G8417 CALC BMI ABV UP PARAM F/U: HCPCS | Performed by: NURSE PRACTITIONER

## 2022-02-01 PROCEDURE — 4040F PNEUMOC VAC/ADMIN/RCVD: CPT | Performed by: NURSE PRACTITIONER

## 2022-02-01 PROCEDURE — 1036F TOBACCO NON-USER: CPT | Performed by: NURSE PRACTITIONER

## 2022-02-01 PROCEDURE — 2022F DILAT RTA XM EVC RTNOPTHY: CPT | Performed by: NURSE PRACTITIONER

## 2022-02-01 PROCEDURE — G8484 FLU IMMUNIZE NO ADMIN: HCPCS | Performed by: NURSE PRACTITIONER

## 2022-02-01 PROCEDURE — 99213 OFFICE O/P EST LOW 20 MIN: CPT | Performed by: NURSE PRACTITIONER

## 2022-02-01 PROCEDURE — 3017F COLORECTAL CA SCREEN DOC REV: CPT | Performed by: NURSE PRACTITIONER

## 2022-02-01 RX ORDER — GLIPIZIDE 10 MG/1
20 TABLET ORAL
Qty: 360 TABLET | Refills: 2 | Status: SHIPPED | OUTPATIENT
Start: 2022-02-01

## 2022-02-01 ASSESSMENT — ENCOUNTER SYMPTOMS
EYES NEGATIVE: 1
NAUSEA: 0
COUGH: 0
ABDOMINAL PAIN: 0
DIARRHEA: 0
SHORTNESS OF BREATH: 0
ALLERGIC/IMMUNOLOGIC NEGATIVE: 1
VOMITING: 0

## 2022-02-01 NOTE — PROGRESS NOTES
Chief Complaint   Patient presents with    Atrial Fibrillation     patient here for follow and INR     Patient here for follow up and INR.      Have you seen any other physician or provider since your last visit no    Have you had any other diagnostic tests since your last visit? no

## 2022-02-03 ENCOUNTER — NURSE ONLY (OUTPATIENT)
Dept: FAMILY MEDICINE CLINIC | Age: 72
End: 2022-02-03
Payer: MEDICARE

## 2022-02-03 ENCOUNTER — APPOINTMENT (OUTPATIENT)
Dept: PHYSICAL THERAPY | Facility: HOSPITAL | Age: 72
End: 2022-02-03

## 2022-02-03 DIAGNOSIS — Z79.01 ANTICOAGULATED ON COUMADIN: ICD-10-CM

## 2022-02-03 DIAGNOSIS — E11.9 TYPE 2 DIABETES MELLITUS WITHOUT COMPLICATION, WITHOUT LONG-TERM CURRENT USE OF INSULIN (HCC): Primary | ICD-10-CM

## 2022-02-03 LAB
INTERNATIONAL NORMALIZATION RATIO, POC: 3
PROTHROMBIN TIME, POC: NORMAL

## 2022-02-03 PROCEDURE — 85610 PROTHROMBIN TIME: CPT | Performed by: NURSE PRACTITIONER

## 2022-02-08 ENCOUNTER — HOSPITAL ENCOUNTER (OUTPATIENT)
Facility: HOSPITAL | Age: 72
Discharge: HOME | End: 2022-02-08
Payer: MEDICARE

## 2022-02-08 ENCOUNTER — NURSE ONLY (OUTPATIENT)
Dept: FAMILY MEDICINE CLINIC | Age: 72
End: 2022-02-08
Payer: MEDICARE

## 2022-02-08 DIAGNOSIS — Z79.01 ANTICOAGULATED ON COUMADIN: Primary | ICD-10-CM

## 2022-02-08 LAB
ALBUMIN SERPL-MCNC: 3.7 G/DL (ref 3.4–4.8)
ANION GAP SERPL CALCULATED.3IONS-SCNC: 15 MMOL/L (ref 3–16)
BUN BLDV-MCNC: 42 MG/DL (ref 6–20)
CALCIUM SERPL-MCNC: 9 MG/DL (ref 8.5–10.5)
CHLORIDE BLD-SCNC: 98 MMOL/L (ref 98–107)
CO2: 21 MMOL/L (ref 20–30)
CREAT SERPL-MCNC: 2.4 MG/DL (ref 0.4–1.2)
GFR AFRICAN AMERICAN: 32
GFR NON-AFRICAN AMERICAN: 27
GLUCOSE BLD-MCNC: 118 MG/DL (ref 74–106)
HBA1C MFR BLD: 6.6 %
HCT VFR BLD CALC: 36.2 % (ref 40–54)
HEMOGLOBIN: 11.4 G/DL (ref 13–18)
INTERNATIONAL NORMALIZATION RATIO, POC: 1.9
MCH RBC QN AUTO: 28 PG (ref 27–32)
MCHC RBC AUTO-ENTMCNC: 31.5 G/DL (ref 31–35)
MCV RBC AUTO: 88.9 FL (ref 80–100)
PDW BLD-RTO: 15.3 % (ref 11–16)
PHOSPHORUS: 3.7 MG/DL (ref 2.5–4.5)
PLATELET # BLD: 169 K/UL (ref 150–400)
PMV BLD AUTO: 11.1 FL (ref 6–10)
POTASSIUM SERPL-SCNC: 3.9 MMOL/L (ref 3.4–5.1)
PROTHROMBIN TIME, POC: NORMAL
RBC # BLD: 4.07 M/UL (ref 4.5–6)
SODIUM BLD-SCNC: 134 MMOL/L (ref 136–145)
URIC ACID, SERUM: 5.9 MG/DL (ref 3.7–8.6)
WBC # BLD: 7.8 K/UL (ref 4–11)

## 2022-02-08 PROCEDURE — 85027 COMPLETE CBC AUTOMATED: CPT

## 2022-02-08 PROCEDURE — 80069 RENAL FUNCTION PANEL: CPT

## 2022-02-08 PROCEDURE — 83036 HEMOGLOBIN GLYCOSYLATED A1C: CPT

## 2022-02-08 PROCEDURE — 36415 COLL VENOUS BLD VENIPUNCTURE: CPT

## 2022-02-08 PROCEDURE — 85610 PROTHROMBIN TIME: CPT | Performed by: NURSE PRACTITIONER

## 2022-02-08 PROCEDURE — 84550 ASSAY OF BLOOD/URIC ACID: CPT

## 2022-02-08 NOTE — PROGRESS NOTES
[place] [facility] notified of INR 1.9. Patient instructed to take coumadin 5mg daily and return in two weeks for repeat INR. Patient verbalized understanding to come on 2/22.

## 2022-02-09 ENCOUNTER — HOSPITAL ENCOUNTER (OUTPATIENT)
Dept: PHYSICAL THERAPY | Facility: HOSPITAL | Age: 72
Setting detail: THERAPIES SERIES
Discharge: HOME | End: 2022-02-09

## 2022-02-09 DIAGNOSIS — E11.621 TYPE 2 DIABETES MELLITUS WITH FOOT ULCER, UNSPECIFIED WHETHER LONG TERM INSULIN USE: ICD-10-CM

## 2022-02-09 DIAGNOSIS — I87.2 VENOUS INSUFFICIENCY: ICD-10-CM

## 2022-02-09 DIAGNOSIS — I73.9 PERIPHERAL VASCULAR DISEASE OF LOWER EXTREMITY: ICD-10-CM

## 2022-02-09 DIAGNOSIS — L97.528 ULCER OF LEFT FOOT WITH OTHER SEVERITY: Primary | ICD-10-CM

## 2022-02-09 DIAGNOSIS — L97.509 TYPE 2 DIABETES MELLITUS WITH FOOT ULCER, UNSPECIFIED WHETHER LONG TERM INSULIN USE: ICD-10-CM

## 2022-02-09 PROCEDURE — 97610 LOW FREQUENCY NON-THERMAL US: CPT

## 2022-02-09 PROCEDURE — 97597 DBRDMT OPN WND 1ST 20 CM/<: CPT

## 2022-02-09 PROCEDURE — 29581 APPL MULTLAYER CMPRN SYS LEG: CPT

## 2022-02-09 NOTE — THERAPY WOUND CARE TREATMENT
Outpatient Rehabilitation - Wound/Debridement Treatment Note   Gamaliel     Patient Name: Swapnil Lawson  : 1950  MRN: [id number]  Today's Date: 2022                 Admit Date: 2022    Visit Dx:    ICD-10-CM ICD-9-CM   1. Ulcer of left foot with other severity ([facility])  L97.528 707.15   2. Venous insufficiency  I87.2 459.81   3. Type 2 diabetes mellitus with foot ulcer, unspecified whether long term insulin use ([facility])  E11.621 250.80    L97.509 707.15   4. Peripheral vascular disease of lower extremity ([facility])  I73.9 443.9   L lateral foot wound:    LLE edema:      Patient Active Problem List   Diagnosis   • Coronary artery disease   • Hypertension   • Paroxysmal atrial fibrillation (HCC)   • Diabetes mellitus (HCC)   • Venous insufficiency   • Hyperlipidemia   • Nuclear sclerotic cataract of right eye   • Cortical age-related cataract of right eye   • Nuclear sclerotic cataract of left eye   • Cortical age-related cataract of left eye   • Thrombocytopenia ([facility])        Past Medical History:   Diagnosis Date   • COPD (chronic obstructive pulmonary disease) ([facility])    • Coronary artery disease 2016    CABG, , Abimael Tomlin MD. CABG, 2013, [facility].   • Diabetes mellitus ([facility]) 2016   • Gout    • Hyperlipidemia 2016   • Hypertension 2016   • Myocardial infarction ([facility])      in  and  prior to CABG.   • Paroxysmal atrial fibrillation ([facility]) 2016   • Sleep apnea    • Venous insufficiency 2016        Past Surgical History:   Procedure Laterality Date   • APPENDECTOMY     • CARDIAC CATHETERIZATION     • CARDIAC SURGERY       and    • CATARACT EXTRACTION W/ INTRAOCULAR LENS IMPLANT Right 2020    Procedure: CATARACT PHACO EXTRACTION WITH INTRAOCULAR LENS IMPLANT RIGHT;  Surgeon: Omar Blood MD;  Location: [facility];  Service: Ophthalmology;  Laterality: Right;   • CATARACT EXTRACTION W/ INTRAOCULAR LENS IMPLANT Left  "7/9/2020    Procedure: CATARACT PHACO EXTRACTION WITH INTRAOCULAR LENS IMPLANT LEFT;  Surgeon: Omar Blood MD;  Location: [facility];  Service: Ophthalmology;  Laterality: Left;   • CHOLECYSTECTOMY  2002   • COLONOSCOPY     • FINGER SURGERY      Rt index (second finger)   • OTHER SURGICAL HISTORY  2010    Bone spur removal   • TOE AMPUTATION  2009   • VASECTOMY           EVALUATION   PT Ortho     Row Name 02/09/22 0800       Subjective Comments    Subjective Comments States he thinks the wound looks a little better.  Pt states he was taking 4 \"fluid pills\" daily, but his doctor cut him down to one a day due to concern about his kidney function, so states his legs are much more swollen than they used to be, and he cannot fit into some of his compression stockings.  Currently wearing an older worn-out pair of stockings.  -       Subjective Pain    Able to rate subjective pain? yes  -JM    Pre-Treatment Pain Level 3  -JM    Post-Treatment Pain Level 3  -       Transfers    Sit-Stand Coplay (Transfers) modified independence  -    Stand-Sit Coplay (Transfers) modified independence  -    Transfers, Sit-Stand-Sit, Assist Device straight cane  arm rests of chair  -    Comment (Transfers) slight LOB posteriorly, self-corrected  -       Gait/Stairs (Locomotion)    Coplay Level (Gait) modified independence  -    Assistive Device (Gait) cane, straight  -          User Key  (r) = Recorded By, (t) = Taken By, (c) = Cosigned By    Initials Name Provider Type    Cheryl Hua PT Physical Therapist                 RAFA Wound     Row Name 02/09/22 0800             Wound 01/31/22 0800 Left lateral foot Incision    Wound - Properties Group Placement Date: 01/31/22  -MW Placement Time: 0800  -MW Present on Hospital Admission: Y  -MW Side: Left  -MW Orientation: lateral  -MW Location: foot  -MW Primary Wound Type: Incision  -MW Additional Comments: per pt \"had bunion removed\"  -MW      " "Wound Image  View All Images View Images  -      Dressing Appearance intact; moist drainage  -      Base moist; red; subcutaneous; exposed structure; yellow; slough  -      Periwound intact; swelling; warm; macerated; pale white  -      Periwound Temperature warm  -      Periwound Skin Turgor soft; other (see comments)  dry, hard proximal / superior callus debrided  -      Edges open; irregular  -      Wound Length (cm) 1.5 cm  -JM      Wound Width (cm) 1.5 cm  -JM      Wound Depth (cm) 0.3 cm  -      Drainage Characteristics/Odor serosanguineous; sanguineous  -      Drainage Amount moderate  -      Care, Wound cleansed with; wound cleanser; debrided; ultrasound therapy, non contact low frequency  MIST 8min  -      Dressing Care dressing applied; collagen; antimicrobial agent applied; foam; silver impregnated; low-adherent; border dressing; multi-layer wrap  tez, HFBt, mepilex ag, 4\" optifoam, MLW  -      Periwound Care cleansed with pH balanced cleanser; dry periwound area maintained  -      Retired Wound - Properties Group Date first assessed: 01/31/22  -MW Time first assessed: 0800  -MW Present on Hospital Admission: Y  -MW Side: Left  -MW Location: foot  -MW Primary Wound Type: Incision  -MW Additional Comments: per pt \"had bunion removed\"  -MW            User Key  (r) = Recorded By, (t) = Taken By, (c) = Cosigned By    Initials Name Provider Type    MW Carleen Lieberman, PT Physical Therapist    Cheryl Hua, PT Physical Therapist               Lymphedema     Row Name 02/09/22 0800             Lymphedema Edema Assessment    Pitting Edema Severe  -              Skin Changes/Observations    Location/Assessment Lower Extremity  -      Lower Extremity Conditions left:; clean; dry; shiny; fragile  -      Lower Extremity Color/Pigment left:; hyperpigmented; blanchable  -      Lower Extremity Skin Details shiny with small intact blisters  -              Lymphedema " Pulses/Capillary Refill    Lymphedema Pulses/Capillary Refill capillary refill  -      Capillary Refill lower extremity capillary refill  -      Lower Extremity Capillary Refill left:; less than 3 seconds  -              Lymphedema Measurements    Measurement Type(s) Quick Girth  -      Quick Girth Areas Lower extremities  -              LLE Quick Girth (cm)    Mid foot 25.7 cm  -JM      Smallest ankle 27 cm  -JM      Largest calf 42.8 cm  -              Compression/Skin Care    Compression/Skin Care skin care; wrapping location; bandaging  -      Skin Care washed/dried  -      Wrapping Location lower extremity  -JM      Wrapping Location LE left:; foot to knee  -      Wrapping Comments size 4&5 compressogrips, layers overlapping for gradient multilayer compression  -            User Key  (r) = Recorded By, (t) = Taken By, (c) = Cosigned By    Initials Name Provider Type    Cheryl Hua, PT Physical Therapist                WOUND DEBRIDEMENT  Total area of Debridement: 4cmsq  Debridement Site 1  Location- Site 1: LT foot  Selective Debridement- Site 1: Wound Surface <20cmsq  Instruments- Site 1: tweezers, #15, scapel  Excised Tissue Description- Site 1: minimum, slough, other (comment) (macerated periwound callous)  Bleeding- Site 1: scant, held pressure, 1 minute              Therapy Education     Row Name 02/09/22 0800             Therapy Education    Education Details Reinforced leg elevation, use of compression.  Need for strict NWB for wound healing, use walker for ambulation to help offload wound and assist with balance.  Continue home dressing changes.  -MARICEL      Given Bandaging/dressing change; Symptoms/condition management  -MARICEL      Program Modified; Reinforced  -MARICEL      How Provided Verbal; Demonstration  -MARICEL      Provided to Patient  -MARICEL      Level of Understanding Verbalized  -MARICEL            User Key  (r) = Recorded By, (t) = Taken By, (c) = Cosigned By    Initials Name  Provider Type    Cheryl Hua, PT Physical Therapist                Recommendation and Plan   PT Assessment/Plan     Row Name 02/09/22 0800          PT Assessment    Functional Limitations Performance in self-care ADL; Other (comment)  wound care  -     Impairments Integumentary integrity; Impaired venous circulation; Impaired arterial circulation; Peripheral nerve integrity  -     Assessment Comments L foot wound with less slough, increased granulation, but still with exposed bone in base.  No S&S of infection at present.  LLE with severe edema and blistering, with pt's current compression stocking appearing inadequate to manage edema, so PT added MLW with size 4&5 compressogrips to reduce LLE edema and resolve blistering  Pt high risk for wounds to LLE due to edema.  Reinforced NWB of L foot and recommended RW for balance and offloading of wound.  Continue MIST and debridement.  Pt's son assisting with home dressing changes.  -            PT Plan    PT Frequency 2x/week  -     Physical Therapy Interventions (Optional Details) patient/family education; wound care  -     PT Plan Comments MIST, debridement  -           User Key  (r) = Recorded By, (t) = Taken By, (c) = Cosigned By    Initials Name Provider Type    Cheryl Hua, PT Physical Therapist                Goals   PT OP Goals     Row Name 02/09/22 0800          Time Calculation    PT Goal Re-Cert Due Date 04/30/22  -           User Key  (r) = Recorded By, (t) = Taken By, (c) = Cosigned By    Initials Name Provider Type    Cheryl Hua, PT Physical Therapist                PT Goal Re-Cert Due Date: 04/30/22            Time Calculation: Start Time: 0800  Untimed Charges  Wound Care: 76287 Multilayer comp below knee  73005-Uwuvncopwl comp below knee: 10  41994-Sqjafaxhg debridement: 15  92115-HUDW Mist: 15  Total Minutes  Untimed Charges Total Minutes: 40   Total Minutes: 40  Therapy Charges for Today     Code Description  Service Date Service Provider Modifiers Qty    74648234677 HC JOSS DEBRIDE OPEN WOUND UP TO 20CM 2/9/2022 Cheryl Washington, PT GP 1    02663378458 HC PT NLFU MIST 2/9/2022 Cheryl Washington, PT GP 1    15131731975 HC PT MULTI LAYER COMP SYS BELOW KNEE 2/9/2022 Cheryl Washington, PT GP 1                  Cheryl Washington, PT  2/9/2022

## 2022-02-12 ENCOUNTER — HOSPITAL ENCOUNTER (OUTPATIENT)
Dept: PHYSICAL THERAPY | Facility: HOSPITAL | Age: 72
Setting detail: THERAPIES SERIES
Discharge: HOME | End: 2022-02-12

## 2022-02-12 DIAGNOSIS — E11.621 TYPE 2 DIABETES MELLITUS WITH FOOT ULCER, UNSPECIFIED WHETHER LONG TERM INSULIN USE: ICD-10-CM

## 2022-02-12 DIAGNOSIS — I87.2 VENOUS INSUFFICIENCY: ICD-10-CM

## 2022-02-12 DIAGNOSIS — L97.509 TYPE 2 DIABETES MELLITUS WITH FOOT ULCER, UNSPECIFIED WHETHER LONG TERM INSULIN USE: ICD-10-CM

## 2022-02-12 DIAGNOSIS — I73.9 PERIPHERAL VASCULAR DISEASE OF LOWER EXTREMITY: ICD-10-CM

## 2022-02-12 DIAGNOSIS — L97.528 ULCER OF LEFT FOOT WITH OTHER SEVERITY: Primary | ICD-10-CM

## 2022-02-12 PROCEDURE — 97597 DBRDMT OPN WND 1ST 20 CM/<: CPT | Performed by: PHYSICAL THERAPIST

## 2022-02-12 PROCEDURE — 97610 LOW FREQUENCY NON-THERMAL US: CPT | Performed by: PHYSICAL THERAPIST

## 2022-02-12 PROCEDURE — 29581 APPL MULTLAYER CMPRN SYS LEG: CPT | Performed by: PHYSICAL THERAPIST

## 2022-02-12 NOTE — THERAPY WOUND CARE TREATMENT
Outpatient Rehabilitation - Wound/Debridement Treatment Note   Gamaliel     Patient Name: Swapnil Lawson  : 1950  MRN: [id number]  Today's Date: 2022                 Admit Date: 2022    Visit Dx:    ICD-10-CM ICD-9-CM   1. Ulcer of left foot with other severity ([facility])  L97.528 707.15   2. Venous insufficiency  I87.2 459.81   3. Type 2 diabetes mellitus with foot ulcer, unspecified whether long term insulin use ([facility])  E11.621 250.80    L97.509 707.15   4. Peripheral vascular disease of lower extremity ([facility])  I73.9 443.9     LT lateral foot wound    Patient Active Problem List   Diagnosis   • Coronary artery disease   • Hypertension   • Paroxysmal atrial fibrillation (HCC)   • Diabetes mellitus (HCC)   • Venous insufficiency   • Hyperlipidemia   • Nuclear sclerotic cataract of right eye   • Cortical age-related cataract of right eye   • Nuclear sclerotic cataract of left eye   • Cortical age-related cataract of left eye   • Thrombocytopenia (HCC)        Past Medical History:   Diagnosis Date   • COPD (chronic obstructive pulmonary disease) ([facility])    • Coronary artery disease 2016    CABG, , Abimael Tomlin MD. CABG, 2013, [facility].   • Diabetes mellitus (HCC) 2016   • Gout    • Hyperlipidemia 2016   • Hypertension 2016   • Myocardial infarction (HCC)      in  and  prior to CABG.   • Paroxysmal atrial fibrillation (HCC) 2016   • Sleep apnea    • Venous insufficiency 2016        Past Surgical History:   Procedure Laterality Date   • APPENDECTOMY     • CARDIAC CATHETERIZATION     • CARDIAC SURGERY       and    • CATARACT EXTRACTION W/ INTRAOCULAR LENS IMPLANT Right 2020    Procedure: CATARACT PHACO EXTRACTION WITH INTRAOCULAR LENS IMPLANT RIGHT;  Surgeon: Omar Blood MD;  Location: [facility];  Service: Ophthalmology;  Laterality: Right;   • CATARACT EXTRACTION W/ INTRAOCULAR LENS IMPLANT Left 2020     "Procedure: CATARACT PHACO EXTRACTION WITH INTRAOCULAR LENS IMPLANT LEFT;  Surgeon: Omar Blood MD;  Location: [facility];  Service: Ophthalmology;  Laterality: Left;   • CHOLECYSTECTOMY  2002   • COLONOSCOPY     • FINGER SURGERY      Rt index (second finger)   • OTHER SURGICAL HISTORY  2010    Bone spur removal   • TOE AMPUTATION  2009   • VASECTOMY           EVALUATION   PT Ortho     Row Name 02/12/22 1100       Subjective Comments    Subjective Comments Seems to be getting better.  -MW       Subjective Pain    Able to rate subjective pain? yes  -MW    Pre-Treatment Pain Level 2  -MW    Post-Treatment Pain Level 2  -MW       Transfers    Sit-Stand Long (Transfers) modified independence  -MW    Stand-Sit Long (Transfers) modified independence  -MW    Transfers, Sit-Stand-Sit, Assist Device straight cane  arm rests of chair  -MW       Gait/Stairs (Locomotion)    Long Level (Gait) modified independence  -MW    Assistive Device (Gait) cane, straight  -MW          User Key  (r) = Recorded By, (t) = Taken By, (c) = Cosigned By    Initials Name Provider Type    MW Carleen Lieberman, PT Physical Therapist                 LDA Wound     Row Name 02/12/22 1100             Wound 01/31/22 0800 Left lateral foot Incision    Wound - Properties Group Placement Date: 01/31/22  -MW Placement Time: 0800  -MW Present on Hospital Admission: Y  -MW Side: Left  -MW Orientation: lateral  -MW Location: foot  -MW Primary Wound Type: Incision  -MW Additional Comments: per pt \"had bunion removed\"  -MW      Wound Image  View All Images View Images  -MW      Dressing Appearance intact; moist drainage  -MW      Base moist; red; subcutaneous; exposed structure; yellow; slough  bone point; slough tracking proximally  -MW      Periwound intact; swelling; warm; macerated; pale white  -MW      Periwound Temperature warm  -MW      Periwound Skin Turgor soft  -MW      Edges open; irregular  -MW      Drainage " "Characteristics/Odor serosanguineous; sanguineous  -MW      Drainage Amount moderate; small  -MW      Care, Wound cleansed with; wound cleanser; debrided; ultrasound therapy, non contact low frequency  MIST x 7 mn  -MW      Dressing Care dressing applied; collagen; antimicrobial agent applied; foam; silver impregnated; low-adherent; border dressing; multi-layer wrap  Tamie, HFBt, Mepilex Ag, 4\" optifoam. MLW  -MW      Periwound Care cleansed with pH balanced cleanser; dry periwound area maintained  -MW      Retired Wound - Properties Group Date first assessed: 01/31/22  -MW Time first assessed: 0800  -MW Present on Hospital Admission: Y  -MW Side: Left  -MW Location: foot  -MW Primary Wound Type: Incision  -MW Additional Comments: per pt \"had bunion removed\"  -MW            User Key  (r) = Recorded By, (t) = Taken By, (c) = Cosigned By    Initials Name Provider Type    MW Carleen Lieberman, PT Physical Therapist               Lymphedema     Row Name 02/12/22 1100             Lymphedema Edema Assessment    Pitting Edema Moderate; Mild  -MW              Skin Changes/Observations    Location/Assessment Lower Extremity  -MW      Lower Extremity Conditions left:; clean; dry; fragile  -MW      Lower Extremity Color/Pigment left:; hyperpigmented; blanchable  -MW              Lymphedema Pulses/Capillary Refill    Lymphedema Pulses/Capillary Refill capillary refill  -MW      Capillary Refill lower extremity capillary refill  -MW      Lower Extremity Capillary Refill left:; less than 3 seconds  -MW              Compression/Skin Care    Compression/Skin Care skin care; wrapping location; bandaging  -MW      Skin Care washed/dried; moisturizing lotion applied  -MW      Wrapping Location lower extremity  -MW      Wrapping Location LE left:; foot to knee  -MW      Wrapping Comments size 4&5 compressogrips, layers overlapping for gradient multilayer compression  -MW            User Key  (r) = Recorded By, (t) = Taken By, (c) = " Cosigned By    Initials Name Provider Type    Carleen Rosales, PT Physical Therapist                WOUND DEBRIDEMENT  Total area of Debridement: ~3 cm2  Debridement Site 1  Location- Site 1: LT foot  Selective Debridement- Site 1: Wound Surface <20cmsq  Instruments- Site 1: tweezers, #15, scapel  Excised Tissue Description- Site 1: minimum, slough, moderate, other (comment) (mod periwound hypertrophic skin; bone fragment)  Bleeding- Site 1: minimum, held pressure, 2 minutes                 Recommendation and Plan   PT Assessment/Plan     Row Name 02/12/22 1100          PT Assessment    Functional Limitations Performance in self-care ADL; Other (comment)  wound care  -MW     Impairments Integumentary integrity; Impaired venous circulation; Impaired arterial circulation; Peripheral nerve integrity  -     Assessment Comments LT lateral foot wound with exposed point of sharp bone, roughest tip able to be debrided but expect this may need to be revised by podiatrist in order for the area to successfully heal. Overall, less slough/ nonviable tissue in distal aspect of the wound, but note slough in proximal aspect just beneath dermis edge. Cont MIST to promote increased healing potential and decreased bioburden. Cont PT wound care.  -MW     Rehab Potential Fair  -MW     Patient/caregiver participated in establishment of treatment plan and goals Yes  -MW     Patient would benefit from skilled therapy intervention Yes  -MW            PT Plan    PT Frequency 2x/week  -MW     Physical Therapy Interventions (Optional Details) wound care; patient/family education  -     PT Plan Comments MIST, debridement; monitor circulation  -           User Key  (r) = Recorded By, (t) = Taken By, (c) = Cosigned By    Initials Name Provider Type    Carleen Rosales, PT Physical Therapist                Goals                   Time Calculation: Start Time: 1100  Untimed Charges  34629-Sewqowsuib comp below knee:  10  89019-Bweeptnaf debridement: 12  87993-OBBU Mist: 12  Total Minutes  Untimed Charges Total Minutes: 34   Total Minutes: 34  Therapy Charges for Today     Code Description Service Date Service Provider Modifiers Qty    40243144181 HC JOSS DEBRIDE OPEN WOUND UP TO 20CM 2/12/2022 Carleen Lieberman, PT GP 1    [id number] HC PT NLFU MIST 2/12/2022 Carleen Lieberman, PT GP 1    [id number] HC PT MULTI LAYER COMP SYS BELOW KNEE 2/12/2022 Carleen Lieberman, PT GP 1                  Carleen Lieberman, PT  2/12/2022

## 2022-02-16 ENCOUNTER — HOSPITAL ENCOUNTER (OUTPATIENT)
Dept: PHYSICAL THERAPY | Facility: HOSPITAL | Age: 72
Setting detail: THERAPIES SERIES
Discharge: HOME | End: 2022-02-16

## 2022-02-16 DIAGNOSIS — L97.509 TYPE 2 DIABETES MELLITUS WITH FOOT ULCER, UNSPECIFIED WHETHER LONG TERM INSULIN USE: ICD-10-CM

## 2022-02-16 DIAGNOSIS — E11.621 TYPE 2 DIABETES MELLITUS WITH FOOT ULCER, UNSPECIFIED WHETHER LONG TERM INSULIN USE: ICD-10-CM

## 2022-02-16 DIAGNOSIS — L97.528 ULCER OF LEFT FOOT WITH OTHER SEVERITY: Primary | ICD-10-CM

## 2022-02-16 DIAGNOSIS — I73.9 PERIPHERAL VASCULAR DISEASE OF LOWER EXTREMITY: ICD-10-CM

## 2022-02-16 DIAGNOSIS — I87.2 VENOUS INSUFFICIENCY: ICD-10-CM

## 2022-02-16 PROCEDURE — 97610 LOW FREQUENCY NON-THERMAL US: CPT

## 2022-02-16 PROCEDURE — 29581 APPL MULTLAYER CMPRN SYS LEG: CPT

## 2022-02-16 PROCEDURE — 97597 DBRDMT OPN WND 1ST 20 CM/<: CPT

## 2022-02-16 NOTE — THERAPY WOUND CARE TREATMENT
Outpatient Rehabilitation - Wound/Debridement Treatment Note   Gamal[facility]     Patient Name: Swapnil Lawson  : 1950  MRN: [id number]  Today's Date: 2022                 Admit Date: 2022    Visit Dx:    ICD-10-CM ICD-9-CM   1. Ulcer of left foot with other severity ([facility])  L97.528 707.15   2. Venous insufficiency  I87.2 459.81   3. Type 2 diabetes mellitus with foot ulcer, unspecified whether long term insulin use ([facility])  E11.621 250.80    L97.509 707.15   4. Peripheral vascular disease of lower extremity ([facility])  I73.9 443.9       Patient Active Problem List   Diagnosis   • Coronary artery disease   • Hypertension   • Paroxysmal atrial fibrillation (HCC)   • Diabetes mellitus (HCC)   • Venous insufficiency   • Hyperlipidemia   • Nuclear sclerotic cataract of right eye   • Cortical age-related cataract of right eye   • Nuclear sclerotic cataract of left eye   • Cortical age-related cataract of left eye   • Thrombocytopenia (HCC)        Past Medical History:   Diagnosis Date   • COPD (chronic obstructive pulmonary disease) ([facility])    • Coronary artery disease 2016    CABG, , Abimael Tomlin MD. CABG, 2013, [facility].   • Diabetes mellitus (HCC) 2016   • Gout    • Hyperlipidemia 2016   • Hypertension 2016   • Myocardial infarction (HCC)      in  and  prior to CABG.   • Paroxysmal atrial fibrillation (HCC) 2016   • Sleep apnea    • Venous insufficiency 2016        Past Surgical History:   Procedure Laterality Date   • APPENDECTOMY     • CARDIAC CATHETERIZATION     • CARDIAC SURGERY       and    • CATARACT EXTRACTION W/ INTRAOCULAR LENS IMPLANT Right 2020    Procedure: CATARACT PHACO EXTRACTION WITH INTRAOCULAR LENS IMPLANT RIGHT;  Surgeon: Omar Blood MD;  Location: [facility];  Service: Ophthalmology;  Laterality: Right;   • CATARACT EXTRACTION W/ INTRAOCULAR LENS IMPLANT Left 2020    Procedure: CATARACT  "PHACO EXTRACTION WITH INTRAOCULAR LENS IMPLANT LEFT;  Surgeon: Omar Blood MD;  Location: [facility];  Service: Ophthalmology;  Laterality: Left;   • CHOLECYSTECTOMY  2002   • COLONOSCOPY     • FINGER SURGERY      Rt index (second finger)   • OTHER SURGICAL HISTORY  2010    Bone spur removal   • TOE AMPUTATION  2009   • VASECTOMY           EVALUATION   PT Ortho     Row Name 02/16/22 0800       Subjective Comments    Subjective Comments No complaints, sees his cardiologist soon, saw Dr. Mederos for follow-up Monday, wound is looking better.  -       Subjective Pain    Able to rate subjective pain? yes  -JM    Pre-Treatment Pain Level 0  -JM    Post-Treatment Pain Level 0  -JM       Transfers    Sit-Stand [place] (Transfers) modified independence  -    Stand-Sit [place] (Transfers) modified independence  -    Transfers, Sit-Stand-Sit, Assist Device straight cane  arm rests of chair  -JM    Comment (Transfers) seated for tx  -       Gait/Stairs (Locomotion)    [place] Level (Gait) modified independence  -    Assistive Device (Gait) cane, straight  -          User Key  (r) = Recorded By, (t) = Taken By, (c) = Cosigned By    Initials Name Provider Type    Cheryl Hua PT Physical Therapist                 LDA Wound     Row Name 02/16/22 0800             Wound 01/31/22 0800 Left lateral foot Incision    Wound - Properties Group Placement Date: 01/31/22  - Placement Time: 0800  -MW Present on Hospital Admission: Y  -MW Side: Left  - Orientation: lateral  - Location: foot  - Primary Wound Type: Incision  - Additional Comments: per pt \"had bunion removed\"  -      Dressing Appearance intact; moist drainage  -      Base moist; red; subcutaneous; exposed structure; yellow; slough  almost fully granulated, no exposed bone today  -      Periwound intact; swelling; warm; macerated; pale white  -      Periwound Temperature warm  -      Periwound Skin Turgor soft  -  " "    Edges open; irregular  -JM      Drainage Characteristics/Odor serosanguineous; sanguineous  -JM      Drainage Amount small  -JM      Care, Wound cleansed with; wound cleanser; debrided; ultrasound therapy, non contact low frequency  MIST 6min  -JM      Dressing Care dressing applied; collagen; antimicrobial agent applied; foam; silver impregnated; silicone; border dressing; multi-layer wrap  tez, HFBt, mepilex ag, 4\" optifoam, MLW  -JM      Periwound Care cleansed with pH balanced cleanser; dry periwound area maintained  -JM      Retired Wound - Properties Group Date first assessed: 01/31/22  -MW Time first assessed: 0800  -MW Present on Hospital Admission: Y  -MW Side: Left  -MW Location: foot  -MW Primary Wound Type: Incision  -MW Additional Comments: per pt \"had bunion removed\"  -MW            User Key  (r) = Recorded By, (t) = Taken By, (c) = Cosigned By    Initials Name Provider Type    MW Carleen Lieberman, PT Physical Therapist    Cheryl Hua, PT Physical Therapist               Lymphedema     Row Name 02/16/22 0800             Lymphedema Edema Assessment    Pitting Edema Moderate; Mild  -              Skin Changes/Observations    Location/Assessment Lower Extremity  -      Lower Extremity Conditions left:; clean; dry; fragile  -      Lower Extremity Color/Pigment left:; hyperpigmented; blanchable  -              Lymphedema Pulses/Capillary Refill    Lymphedema Pulses/Capillary Refill capillary refill  -      Capillary Refill lower extremity capillary refill  -      Lower Extremity Capillary Refill left:; less than 3 seconds  -              Compression/Skin Care    Compression/Skin Care skin care; wrapping location; bandaging  -      Skin Care washed/dried; moisturizing lotion applied  -      Wrapping Location lower extremity  -      Wrapping Location LE left:; foot to knee  -      Wrapping Comments size 4&5 compressogrips, layers overlapping for gradient multilayer " compression  -            User Key  (r) = Recorded By, (t) = Taken By, (c) = Cosigned By    Initials Name Provider Type    Cheryl Hua, PT Physical Therapist                WOUND DEBRIDEMENT  Total area of Debridement: 2cmsq  Debridement Site 1  Location- Site 1: LT foot  Selective Debridement- Site 1: Wound Surface <20cmsq  Instruments- Site 1: tweezers, #15, scapel  Excised Tissue Description- Site 1: minimum, slough  Bleeding- Site 1: scant, held pressure, 1 minute              Therapy Education     Row Name 02/16/22 0800             Therapy Education    Education Details Continue offloading and leg elevation  -      Given Bandaging/dressing change; Symptoms/condition management  -      Program Modified; Reinforced  -MARICEL      How Provided Verbal; Demonstration  -      Provided to Patient  -      Level of Understanding Verbalized  -            User Key  (r) = Recorded By, (t) = Taken By, (c) = Cosigned By    Initials Name Provider Type    Cheryl Hua, PT Physical Therapist                Recommendation and Plan   PT Assessment/Plan     Row Name 02/16/22 0800          PT Assessment    Functional Limitations Performance in self-care ADL; Other (comment)  wound care  -     Impairments Integumentary integrity; Impaired venous circulation; Impaired arterial circulation; Peripheral nerve integrity  -     Assessment Comments L foot wound improving with increased granulation and no exposed bone today, still with some residual slough/fibrous tissues, periwound still with mild redness but not warm.  Continue with current POC.  -            PT Plan    PT Frequency 2x/week  -MARICEL     Physical Therapy Interventions (Optional Details) patient/family education; wound care  -     PT Plan Comments MIST, debridement, MLW  -           User Key  (r) = Recorded By, (t) = Taken By, (c) = Cosigned By    Initials Name Provider Type    Cheryl Hua, PT Physical Therapist                 Goals   PT OP Goals     Row Name 02/16/22 0800          Time Calculation    PT Goal Re-Cert Due Date 04/30/22  -MARICEL           User Key  (r) = Recorded By, (t) = Taken By, (c) = Cosigned By    Initials Name Provider Type    Cheryl Hua, PT Physical Therapist                PT Goal Re-Cert Due Date: 04/30/22            Time Calculation: Start Time: 0800  Untimed Charges  09450-Fwgekotlpa comp below knee: 8  93491-Unhdqsdgl debridement: 8  84959-QDJX Mist: 8  Total Minutes  Untimed Charges Total Minutes: 24   Total Minutes: 24  Therapy Charges for Today     Code Description Service Date Service Provider Modifiers Qty    96696856315 HC JOSS DEBRIDE OPEN WOUND UP TO 20CM 2/16/2022 Cheryl Washington, PT GP 1    66651978022 HC PT NLFU MIST 2/16/2022 Chreyl Washington, PT GP 1    21410336410 HC PT MULTI LAYER COMP SYS BELOW KNEE 2/16/2022 Cheryl Washington, PT GP 1                  Cheryl Washington, PT  2/16/2022

## 2022-02-18 ENCOUNTER — HOSPITAL ENCOUNTER (OUTPATIENT)
Dept: PHYSICAL THERAPY | Facility: HOSPITAL | Age: 72
Setting detail: THERAPIES SERIES
Discharge: HOME | End: 2022-02-18

## 2022-02-18 DIAGNOSIS — L97.509 TYPE 2 DIABETES MELLITUS WITH FOOT ULCER, UNSPECIFIED WHETHER LONG TERM INSULIN USE: ICD-10-CM

## 2022-02-18 DIAGNOSIS — I87.2 VENOUS INSUFFICIENCY: ICD-10-CM

## 2022-02-18 DIAGNOSIS — L97.528 ULCER OF LEFT FOOT WITH OTHER SEVERITY: Primary | ICD-10-CM

## 2022-02-18 DIAGNOSIS — I73.9 PERIPHERAL VASCULAR DISEASE OF LOWER EXTREMITY: ICD-10-CM

## 2022-02-18 DIAGNOSIS — E11.621 TYPE 2 DIABETES MELLITUS WITH FOOT ULCER, UNSPECIFIED WHETHER LONG TERM INSULIN USE: ICD-10-CM

## 2022-02-18 PROCEDURE — 97597 DBRDMT OPN WND 1ST 20 CM/<: CPT

## 2022-02-18 PROCEDURE — 29581 APPL MULTLAYER CMPRN SYS LEG: CPT

## 2022-02-18 PROCEDURE — 97610 LOW FREQUENCY NON-THERMAL US: CPT

## 2022-02-18 NOTE — THERAPY WOUND CARE TREATMENT
Outpatient Rehabilitation - Wound/Debridement Treatment Note   [facility]     Patient Name: Swapnil Lawson  : 1950  MRN: [id number]  Today's Date: 2022                  Admit Date: 2022    Visit Dx:    ICD-10-CM ICD-9-CM   1. Ulcer of left foot with other severity ([facility])  L97.528 707.15   2. Venous insufficiency  I87.2 459.81   3. Type 2 diabetes mellitus with foot ulcer, unspecified whether long term insulin use ([facility])  E11.621 250.80    L97.509 707.15   4. Peripheral vascular disease of lower extremity ([facility])  I73.9 443.9       Patient Active Problem List   Diagnosis   • Coronary artery disease   • Hypertension   • Paroxysmal atrial fibrillation (HCC)   • Diabetes mellitus (HCC)   • Venous insufficiency   • Hyperlipidemia   • Nuclear sclerotic cataract of right eye   • Cortical age-related cataract of right eye   • Nuclear sclerotic cataract of left eye   • Cortical age-related cataract of left eye   • Thrombocytopenia (HCC)        Past Medical History:   Diagnosis Date   • COPD (chronic obstructive pulmonary disease) ([facility])    • Coronary artery disease 2016    CABG, , Abimael Tomlin MD. CABG, 2013, [facility].   • Diabetes mellitus (HCC) 2016   • Gout    • Hyperlipidemia 2016   • Hypertension 2016   • Myocardial infarction (HCC)      in  and  prior to CABG.   • Paroxysmal atrial fibrillation (HCC) 2016   • Sleep apnea    • Venous insufficiency 2016        Past Surgical History:   Procedure Laterality Date   • APPENDECTOMY     • CARDIAC CATHETERIZATION     • CARDIAC SURGERY       and    • CATARACT EXTRACTION W/ INTRAOCULAR LENS IMPLANT Right 2020    Procedure: CATARACT PHACO EXTRACTION WITH INTRAOCULAR LENS IMPLANT RIGHT;  Surgeon: Omar Blood MD;  Location: [facility];  Service: Ophthalmology;  Laterality: Right;   • CATARACT EXTRACTION W/ INTRAOCULAR LENS IMPLANT Left 2020    Procedure: CATARACT  "PHACO EXTRACTION WITH INTRAOCULAR LENS IMPLANT LEFT;  Surgeon: Omar Blood MD;  Location: [facility];  Service: Ophthalmology;  Laterality: Left;   • CHOLECYSTECTOMY  2002   • COLONOSCOPY     • FINGER SURGERY      Rt index (second finger)   • OTHER SURGICAL HISTORY  2010    Bone spur removal   • TOE AMPUTATION  2009   • VASECTOMY           EVALUATION   PT Ortho     Row Name 02/18/22 0800       Subjective Comments    Subjective Comments No complaints or changes.  -       Subjective Pain    Able to rate subjective pain? yes  -    Pre-Treatment Pain Level 0  -MC    Post-Treatment Pain Level 0  -       Transfers    Sit-Stand [place] (Transfers) modified independence  -    Stand-Sit [place] (Transfers) modified independence  -    Transfers, Sit-Stand-Sit, Assist Device straight cane  -    Comment (Transfers) seated for tx  -       Gait/Stairs (Locomotion)    [place] Level (Gait) modified independence  -    Assistive Device (Gait) cane, straight  -    Comment (Gait/Stairs) mild LOB at end of session, pt able to self-correct with hand placement on wall  -          User Key  (r) = Recorded By, (t) = Taken By, (c) = Cosigned By    Initials Name Provider Type    Morena Rajan PT Physical Therapist                 LDA Wound     Row Name 02/18/22 0800             Wound 01/31/22 0800 Left lateral foot Incision    Wound - Properties Group Placement Date: 01/31/22  -MW Placement Time: 0800  -MW Present on Hospital Admission: Y  -MW Side: Left  - Orientation: lateral  - Location: foot  -MW Primary Wound Type: Incision  -MW Additional Comments: per pt \"had bunion removed\"  -      Wound Image  View All Images View Images  -      Dressing Appearance intact; moist drainage  -      Base moist; red; subcutaneous; exposed structure; yellow; slough  more spongy yellow tissue today, bone palpable  -      Periwound intact; swelling; warm; macerated; pale white  -      " "Periwound Temperature warm  -      Periwound Skin Turgor soft  -      Edges open; irregular  -MC      Wound Length (cm) 1.4 cm  -MC      Wound Width (cm) 1.8 cm  -      Wound Depth (cm) --  obscured  -      Drainage Characteristics/Odor serosanguineous; sanguineous  -      Drainage Amount small  -      Care, Wound cleansed with; wound cleanser; debrided; ultrasound therapy, non contact low frequency  MIST 5 minutes  -      Dressing Care dressing applied; silver impregnated; collagen; antimicrobial agent applied; foam; low-adherent; border dressing; multi-layer wrap  tez, HFBt, 4\" optifoam, MLW  -      Periwound Care cleansed with pH balanced cleanser; barrier ointment applied  zguard  -      Retired Wound - Properties Group Date first assessed: 01/31/22  -MW Time first assessed: 0800  -MW Present on Hospital Admission: Y  -MW Side: Left  - Location: foot  - Primary Wound Type: Incision  -MW Additional Comments: per pt \"had bunion removed\"  -            User Key  (r) = Recorded By, (t) = Taken By, (c) = Cosigned By    Initials Name Provider Type    MW Carleen Lieberman, PT Physical Therapist     Morena Jordan, PT Physical Therapist               Lymphedema     Row Name 02/18/22 0800             Lymphedema Edema Assessment    Pitting Edema Mild; Moderate  -              Skin Changes/Observations    Lower Extremity Conditions left:; clean; dry; fragile  -      Lower Extremity Color/Pigment left:; hyperpigmented; blanchable  -              Lymphedema Pulses/Capillary Refill    Lower Extremity Capillary Refill left:; less than 3 seconds  -              Compression/Skin Care    Compression/Skin Care skin care; wrapping location; bandaging  -      Skin Care washed/dried; moisturizing lotion applied  -      Wrapping Location lower extremity  -      Wrapping Location LE left:; foot to knee  -      Wrapping Comments size 4&5 compressogrips, layers overlapping for gradient " multilayer compression  -MC      Bandage Layers cotton elastic stocking- double layer (comment size)  -MC            User Key  (r) = Recorded By, (t) = Taken By, (c) = Cosigned By    Initials Name Provider Type    Morena Rajan PT Physical Therapist                WOUND DEBRIDEMENT  Total area of Debridement: 4 cm2  Debridement Site 1  Location- Site 1: LT foot wound and periwound  Selective Debridement- Site 1: Wound Surface <20cmsq  Instruments- Site 1: tweezers, #15, scapel  Excised Tissue Description- Site 1: moderate, slough, other (comment) (periwound callus)  Bleeding- Site 1: seeping, held pressure, 2 minutes              Therapy Education     Row Name 02/18/22 0800             Therapy Education    Education Details Continue with current POC  -MC      Given Bandaging/dressing change; Symptoms/condition management  -MC      Program Reinforced  -MC      How Provided Verbal; Demonstration  -MC      Provided to Patient  -MC      Level of Understanding Verbalized  -            User Key  (r) = Recorded By, (t) = Taken By, (c) = Cosigned By    Initials Name Provider Type    Morena Rajan PT Physical Therapist                Recommendation and Plan   PT Assessment/Plan     Row Name 02/18/22 0800          PT Assessment    Functional Limitations Performance in self-care ADL; Other (comment)  wound mgmt  -MC     Impairments Integumentary integrity; Impaired venous circulation; Impaired arterial circulation; Peripheral nerve integrity  -     Assessment Comments Pt with slightly degraded wound status vs last session, with more spongy yellow tissue visible and bone palpable just under a thin layer of tissue. The slow seeping of blood seems to be from the area of the bone, but it is difficult to assess given the overall state of the wound. Pt with some periwound maceration, so PT held the outer mepilex Ag foam and applied a moisture barrier ointment. Pt antonella continue to benefit from skilled PT wound  care to promote healing.  -     Rehab Potential Fair  -     Patient/caregiver participated in establishment of treatment plan and goals Yes  -     Patient would benefit from skilled therapy intervention Yes  -            PT Plan    PT Frequency 2x/week  -     Physical Therapy Interventions (Optional Details) wound care; patient/family education  -     PT Plan Comments MIST, debridement, MLW  -           User Key  (r) = Recorded By, (t) = Taken By, (c) = Cosigned By    Initials Name Provider Type    Morena Rajan, PT Physical Therapist                Goals   PT OP Goals     Row Name 02/18/22 0800          Time Calculation    PT Goal Re-Cert Due Date 04/30/22  -           User Key  (r) = Recorded By, (t) = Taken By, (c) = Cosigned By    Initials Name Provider Type    Morena Rajan, PT Physical Therapist                PT Goal Re-Cert Due Date: 04/30/22            Time Calculation: Start Time: 0800  Untimed Charges  60628-Arwfqjwupl comp below knee: 8  62285-Orhvtdimx debridement: 10  47471-CXAT Mist: 10  Total Minutes  Untimed Charges Total Minutes: 28   Total Minutes: 28  Therapy Charges for Today     Code Description Service Date Service Provider Modifiers Qty    80527562439 HC PT NLFU MIST 2/18/2022 Morena Jordan, PT GP 1    55967769164 HC JOSS DEBRIDE OPEN WOUND UP TO 20CM 2/18/2022 Morena Jordan, PT GP 1    63459745836 HC PT MULTI LAYER COMP SYS BELOW KNEE 2/18/2022 Morena Jordan, PT GP 1                  Morena Jordan PT  2/18/2022

## 2022-02-22 ENCOUNTER — HOSPITAL ENCOUNTER (OUTPATIENT)
Dept: PHYSICAL THERAPY | Facility: HOSPITAL | Age: 72
Setting detail: THERAPIES SERIES
Discharge: HOME | End: 2022-02-22

## 2022-02-22 DIAGNOSIS — I73.9 PERIPHERAL VASCULAR DISEASE OF LOWER EXTREMITY: ICD-10-CM

## 2022-02-22 DIAGNOSIS — E11.621 TYPE 2 DIABETES MELLITUS WITH FOOT ULCER, UNSPECIFIED WHETHER LONG TERM INSULIN USE: ICD-10-CM

## 2022-02-22 DIAGNOSIS — I87.2 VENOUS INSUFFICIENCY: ICD-10-CM

## 2022-02-22 DIAGNOSIS — L97.509 TYPE 2 DIABETES MELLITUS WITH FOOT ULCER, UNSPECIFIED WHETHER LONG TERM INSULIN USE: ICD-10-CM

## 2022-02-22 DIAGNOSIS — L97.528 ULCER OF LEFT FOOT WITH OTHER SEVERITY: Primary | ICD-10-CM

## 2022-02-22 PROCEDURE — 97597 DBRDMT OPN WND 1ST 20 CM/<: CPT

## 2022-02-22 PROCEDURE — 97610 LOW FREQUENCY NON-THERMAL US: CPT

## 2022-02-22 PROCEDURE — 29581 APPL MULTLAYER CMPRN SYS LEG: CPT

## 2022-02-22 NOTE — THERAPY WOUND CARE TREATMENT
Outpatient Rehabilitation - Wound/Debridement Treatment Note   [place]     Patient Name: Swapnil Lawson  : 1950  MRN: [id number]  Today's Date: 2022                 Admit Date: 2022    Visit Dx:    ICD-10-CM ICD-9-CM   1. Ulcer of left foot with other severity ([facility])  L97.528 707.15   2. Venous insufficiency  I87.2 459.81   3. Type 2 diabetes mellitus with foot ulcer, unspecified whether long term insulin use ([facility])  E11.621 250.80    L97.509 707.15   4. Peripheral vascular disease of lower extremity ([facility])  I73.9 443.9       Patient Active Problem List   Diagnosis   • Coronary artery disease   • Hypertension   • Paroxysmal atrial fibrillation (HCC)   • Diabetes mellitus (HCC)   • Venous insufficiency   • Hyperlipidemia   • Nuclear sclerotic cataract of right eye   • Cortical age-related cataract of right eye   • Nuclear sclerotic cataract of left eye   • Cortical age-related cataract of left eye   • Thrombocytopenia (HCC)        Past Medical History:   Diagnosis Date   • COPD (chronic obstructive pulmonary disease) ([facility])    • Coronary artery disease 2016    CABG, , Abimael Tomlin MD. CABG, 2013, [facility].   • Diabetes mellitus (HCC) 2016   • Gout    • Hyperlipidemia 2016   • Hypertension 2016   • Myocardial infarction (HCC)      in  and  prior to CABG.   • Paroxysmal atrial fibrillation (HCC) 2016   • Sleep apnea    • Venous insufficiency 2016        Past Surgical History:   Procedure Laterality Date   • APPENDECTOMY     • CARDIAC CATHETERIZATION     • CARDIAC SURGERY       and    • CATARACT EXTRACTION W/ INTRAOCULAR LENS IMPLANT Right 2020    Procedure: CATARACT PHACO EXTRACTION WITH INTRAOCULAR LENS IMPLANT RIGHT;  Surgeon: Omar Blood MD;  Location: [facility];  Service: Ophthalmology;  Laterality: Right;   • CATARACT EXTRACTION W/ INTRAOCULAR LENS IMPLANT Left 2020    Procedure: CATARACT  "PHACO EXTRACTION WITH INTRAOCULAR LENS IMPLANT LEFT;  Surgeon: Omar Blood MD;  Location: [facility];  Service: Ophthalmology;  Laterality: Left;   • CHOLECYSTECTOMY  2002   • COLONOSCOPY     • FINGER SURGERY      Rt index (second finger)   • OTHER SURGICAL HISTORY  2010    Bone spur removal   • TOE AMPUTATION  2009   • VASECTOMY           EVALUATION   PT Ortho     Row Name 02/22/22 0800       Subjective Comments    Subjective Comments No complaints, thinks his foot is looking better.  States he is staying off his foot \"as much as I can.\"  -       Subjective Pain    Able to rate subjective pain? yes  -JM    Pre-Treatment Pain Level 0  -JM    Post-Treatment Pain Level 0  -       Transfers    Sit-Stand [place] (Transfers) modified independence  -    Stand-Sit [place] (Transfers) modified independence  -    Transfers, Sit-Stand-Sit, Assist Device straight cane  -    Comment (Transfers) seated for tx  -       Gait/Stairs (Locomotion)    [place] Level (Gait) modified independence  -    Assistive Device (Gait) cane, straight  -          User Key  (r) = Recorded By, (t) = Taken By, (c) = Cosigned By    Initials Name Provider Type    Cheryl Hua, PT Physical Therapist                 [facility] Wound     Row Name 02/22/22 0800             Wound 01/31/22 0800 Left lateral foot Incision    Wound - Properties Group Placement Date: 01/31/22  - Placement Time: 0800  -MW Present on Hospital Admission: Y  -MW Side: Left  - Orientation: lateral  - Location: foot  -MW Primary Wound Type: Incision  -MW Additional Comments: per pt \"had bunion removed\"  -      Wound Image  View All Images View Images  -      Dressing Appearance intact; moist drainage  -      Base moist; red; subcutaneous; exposed structure; yellow; slough; granulating  thin layer of soft tissue over palpable bone  -      Periwound intact; swelling; warm; macerated; pale white  -      Periwound Temperature warm  " "-      Periwound Skin Turgor soft  -JM      Edges open; irregular  -JM      Wound Length (cm) 1.5 cm  -JM      Wound Width (cm) 1.3 cm  -JM      Wound Depth (cm) --  obscured  -JM      Drainage Characteristics/Odor serosanguineous; sanguineous  -JM      Drainage Amount small  -JM      Care, Wound cleansed with; wound cleanser; debrided; ultrasound therapy, non contact low frequency  MIST 5min  -JM      Dressing Care dressing applied; collagen; antimicrobial agent applied; silicone; border dressing; multi-layer wrap  tez, HFBt, 4\" optifoam, MLW  -JM      Periwound Care barrier ointment applied; cleansed with pH balanced cleanser; dry periwound area maintained  zguard  -JM      Retired Wound - Properties Group Date first assessed: 01/31/22  -MW Time first assessed: 0800  -MW Present on Hospital Admission: Y  -MW Side: Left  -MW Location: foot  - Primary Wound Type: Incision  -MW Additional Comments: per pt \"had bunion removed\"  -MW            User Key  (r) = Recorded By, (t) = Taken By, (c) = Cosigned By    Initials Name Provider Type    MW Carleen Lieberman, PT Physical Therapist    JM Cheryl Washington, PT Physical Therapist               Lymphedema     Row Name 02/22/22 0800             Lymphedema Edema Assessment    Pitting Edema Mild; Moderate  -              Skin Changes/Observations    Lower Extremity Conditions left:; clean; dry; fragile  -      Lower Extremity Color/Pigment left:; hyperpigmented; blanchable  -              Lymphedema Pulses/Capillary Refill    Lower Extremity Capillary Refill left:; less than 3 seconds  -              Compression/Skin Care    Compression/Skin Care skin care; wrapping location; bandaging  -      Skin Care washed/dried; moisturizing lotion applied  -JM      Wrapping Location lower extremity  -JM      Wrapping Location LE left:; foot to knee  -      Wrapping Comments size 4&5 compressogrips, layers overlapping for gradient multilayer compression  -      " Bandage Layers cotton elastic stocking- double layer (comment size)  -            User Key  (r) = Recorded By, (t) = Taken By, (c) = Cosigned By    Initials Name Provider Type    Cheryl Hua, PT Physical Therapist                WOUND DEBRIDEMENT  Total area of Debridement: 2cmsq  Debridement Site 1  Location- Site 1: LT foot wound and periwound  Selective Debridement- Site 1: Wound Surface <20cmsq  Instruments- Site 1: tweezers, #15, scapel  Excised Tissue Description- Site 1: moderate, slough  Bleeding- Site 1: scant, held pressure, 1 minute              Therapy Education     Row Name 02/22/22 0800             Therapy Education    Education Details Reinforced NWB L foot as much as possible  -      Given Bandaging/dressing change; Symptoms/condition management  -      Program Reinforced  -      How Provided Verbal; Demonstration  -      Provided to Patient  -      Level of Understanding Verbalized  -            User Key  (r) = Recorded By, (t) = Taken By, (c) = Cosigned By    Initials Name Provider Type    Cheryl Hua, PT Physical Therapist                Recommendation and Plan   PT Assessment/Plan     Row Name 02/22/22 0800          PT Assessment    Functional Limitations Performance in self-care ADL; Other (comment)  wound mgmt  -     Impairments Integumentary integrity; Impaired venous circulation; Impaired arterial circulation; Peripheral nerve integrity  -     Assessment Comments L foot wound with slow increase in granulation tissue with new epithelial growth dorsal edge and small decrease in wound dimensions.  PT continued MIST and debridement of loose slough, and MLW for LLE edema.  Reinforced NWB L foot as much as possible for wound healing.  -            PT Plan    PT Frequency 2x/week  -     Physical Therapy Interventions (Optional Details) patient/family education; wound care  -     PT Plan Comments MIST, debridement, MLW  -           User Key  (r) =  Recorded By, (t) = Taken By, (c) = Cosigned By    Initials Name Provider Type    Cheryl Hua, PT Physical Therapist                Goals   PT OP Goals     Row Name 02/22/22 0800          Time Calculation    PT Goal Re-Cert Due Date 04/30/22  -MARICEL           User Key  (r) = Recorded By, (t) = Taken By, (c) = Cosigned By    Initials Name Provider Type    Cheryl Hua, PT Physical Therapist                PT Goal Re-Cert Due Date: 04/30/22            Time Calculation: Start Time: 0800  Untimed Charges  71944-Bvbvzkyomj comp below knee: 10  89501-Wighmgjfn debridement: 10  05587-QIXE Mist: 10  Total Minutes  Untimed Charges Total Minutes: 30   Total Minutes: 30  Therapy Charges for Today     Code Description Service Date Service Provider Modifiers Qty    09215426689 HC JOSS DEBRIDE OPEN WOUND UP TO 20CM 2/22/2022 Cheryl Washington, PT GP 1    87524397934 HC PT NLFU MIST 2/22/2022 Cheryl Washington, PT GP 1    14561892972 HC PT MULTI LAYER COMP SYS BELOW KNEE 2/22/2022 Cheryl Washington, PT GP 1                  Cheryl Washington, PT  2/22/2022

## 2022-02-23 ENCOUNTER — NURSE ONLY (OUTPATIENT)
Dept: FAMILY MEDICINE CLINIC | Age: 72
End: 2022-02-23
Payer: MEDICARE

## 2022-02-23 DIAGNOSIS — Z79.01 ANTICOAGULATED ON COUMADIN: Primary | ICD-10-CM

## 2022-02-23 LAB
INTERNATIONAL NORMALIZATION RATIO, POC: 5
PROTHROMBIN TIME, POC: NORMAL

## 2022-02-23 PROCEDURE — 85610 PROTHROMBIN TIME: CPT | Performed by: NURSE PRACTITIONER

## 2022-02-23 RX ORDER — COLCHICINE 0.6 MG/1
CAPSULE ORAL
Qty: 30 CAPSULE | Refills: 1 | Status: SHIPPED | OUTPATIENT
Start: 2022-02-23

## 2022-02-23 NOTE — TELEPHONE ENCOUNTER
Requested Prescriptions     Signed Prescriptions Disp Refills    MITIGARE 0.6 MG capsule 30 capsule 1     Sig: TAKE 1 CAPSULE BY MOUTH DAILY     Authorizing Provider: Hector Muñiz     Ordering User: Sulma Stout

## 2022-02-23 NOTE — PROGRESS NOTES
Genoveva Jarquin notified of patient INR of 5. Provider instructed patient to hold coumadin for two days and take 2.5mg on Saturday and Sunday and return to office on Monday for repeat INR. Nurse provided written instructions to patient and he verbalized understanding. Patient was instructed on increased risk of bleeding.

## 2022-02-24 ENCOUNTER — OFFICE VISIT (OUTPATIENT)
Dept: CARDIOLOGY | Facility: CLINIC | Age: 72
End: 2022-02-24

## 2022-02-24 ENCOUNTER — HOSPITAL ENCOUNTER (OUTPATIENT)
Facility: HOSPITAL | Age: 72
Discharge: HOME | End: 2022-02-24
Payer: MEDICARE

## 2022-02-24 VITALS
SYSTOLIC BLOOD PRESSURE: 92 MMHG | HEIGHT: 73 IN | OXYGEN SATURATION: 93 % | HEART RATE: 64 BPM | WEIGHT: 182 LBS | BODY MASS INDEX: 24.12 KG/M2 | DIASTOLIC BLOOD PRESSURE: 48 MMHG

## 2022-02-24 DIAGNOSIS — I73.9 ASYMPTOMATIC PVD (PERIPHERAL VASCULAR DISEASE): ICD-10-CM

## 2022-02-24 DIAGNOSIS — I48.0 PAROXYSMAL ATRIAL FIBRILLATION: ICD-10-CM

## 2022-02-24 DIAGNOSIS — I10 PRIMARY HYPERTENSION: ICD-10-CM

## 2022-02-24 DIAGNOSIS — I25.10 CORONARY ARTERY DISEASE INVOLVING NATIVE CORONARY ARTERY OF NATIVE HEART WITHOUT ANGINA PECTORIS: Primary | ICD-10-CM

## 2022-02-24 DIAGNOSIS — E78.2 MIXED HYPERLIPIDEMIA: ICD-10-CM

## 2022-02-24 DIAGNOSIS — R60.0 BILATERAL LEG EDEMA: ICD-10-CM

## 2022-02-24 PROCEDURE — 93000 ELECTROCARDIOGRAM COMPLETE: CPT | Performed by: INTERNAL MEDICINE

## 2022-02-24 PROCEDURE — 99214 OFFICE O/P EST MOD 30 MIN: CPT | Performed by: INTERNAL MEDICINE

## 2022-02-24 PROCEDURE — 93005 ELECTROCARDIOGRAM TRACING: CPT

## 2022-02-24 RX ORDER — DIGOXIN 125 MCG
62.5 TABLET ORAL
Qty: 15 TABLET | Refills: 1
Start: 2022-02-24 | End: 2023-01-01

## 2022-02-24 RX ORDER — FUROSEMIDE 40 MG/1
TABLET ORAL
COMMUNITY
End: 2023-01-01

## 2022-02-24 NOTE — PROGRESS NOTES
[facility] at [facility]  Office visit  Swapnil Lawson  1950  [phone]    VISIT DATE:  2/24/2022      PCP: Argenis Osborne, APRN  [address] [facility] [address]    CC:  Chief Complaint   Patient presents with   • Coronary Artery Disease       PROBLEM LIST:  1. Coronary artery disease:  a. CABG, 1993, Abimael Tomlin MD.  b. CABG, August 2013, [facility].  c. January 2018 echo - Ejection fraction is visually estimated to be 40-45 %.   mild concentric left ventricular hypertrophy.   Pseudonormal filling pattern noted.   Akinesis of the mid posterolateral, and basal to mid inferior walls   consistent with previous myocardial infarction.  2. Hypertension.  3. Paroxysmal atrial fibrillation.  4. Diabetes mellitus.   5. Venous insufficiency.   6.  Surgical history:  a.  Appendectomy, 1981.  b. Cholecystectomy, 2002.  c. Toe amputation, 2009.  d. Bone spur removal, 2010.  7. Previous history of myocardial infarction in 1992 and 1993 prior to CABG.  8. Hyperlipidemia.      Cardiac studies and procedures:  February 2019:Transthoracic echo - EF of 50%, moderately dilated left ventricle, moderately dilated right ventricle with normal function, moderate to severe left atrial enlargement, dilated IVC    September 2021 bilateral carotid duplex  1. Right internal carotid artery estimated diameter reduction: 20-49 %.   2. Left internal carotid artery estimated diameter reduction: 20-49 %.   3. Vertebral arteries demonstrate bilateral antegrade flow.     ASSESSMENT:   Diagnosis Plan   1. Coronary artery disease involving native coronary artery of native heart without angina pectoris     2. Paroxysmal atrial fibrillation (HCC)     3. Primary hypertension     4. Mixed hyperlipidemia         PLAN:  Congestive heart failure, diastolic, chronic: Currently euvolemic and compensated.  Continue current medical therapy.  Worsening bilateral lower extremity edema as nephrology has had to wean  "back on diuretic therapy.    Coronary artery disease: Currently stable and asymptomatic.  Continue aspirin, statin and afterload reduction    Hypertension: Goal less than 130/80 mmHg.  Controlled based on home blood pressure readings.  Continue current medications.    Peripheral edema: Consistent with venous insufficiency.  Continue compression stockings, elevating feet when possible and currently diuretic.  Will evaluate for arterial insufficiency and reflux disease.    Hyperlipidemia: Goal LDL <100, ideally less than 70.  Continue statin.    Persistent atrial fibrillation: Currently asymptomatic.  Continue stroke prophylaxis with Coumadin, goal INR 2-3.  Continue rate control with metoprolol.  Gradually weaning dig off due to underlying chronic kidney disease.      Subjective  Severe hearing left foot wound following previous bunionectomy.  Being seen by wound care twice a week.    PHYSICAL EXAMINATION:  Vitals:    02/24/22 1311   BP: 92/48   BP Location: Left arm   Patient Position: Sitting   Pulse: 64   SpO2: 93%   Weight: 82.6 kg (182 lb)   Height: 185.4 cm (73\")     General Appearance:    Alert, cooperative, no distress, appears stated age   Head:    Normocephalic, without obvious abnormality, atraumatic   Eyes:    conjunctiva/corneas clear   Nose:   Nares normal, septum midline, mucosa normal, no drainage   Throat:   Lips, teeth and gums normal   Neck:   Supple, symmetrical, trachea midline, no carotid    bruit or JVD   Lungs:     Minimal right basilar inspiratory rales, respirations unlabored   Chest Wall:    No tenderness or deformity    Heart:   Irregularly irregular, S1 and S2 normal, no murmur, rub   or gallop, normal carotid impulse bilaterally without bruit.   Abdomen:     Soft, non-tender   Extremities:   Extremities normal, atraumatic, no cyanosis, 1-2+, compression stockings in place    Pulses:   2+ and symmetric all extremities   Skin:   Skin color, texture, turgor normal, no rashes or lesions "       Diagnostic Data:    ECG 12 Lead    Date/Time: 2/24/2022 1:24 PM  Performed by: Naseem Campbell III, MD  Authorized by: Naseem Campbell III, MD   Comparison: compared with previous ECG from 8/21/2021  Similar to previous ECG  Rhythm: atrial fibrillation  Conduction: incomplete left bundle branch block    Clinical impression: abnormal EKG          No results found for: CHLPL, TRIG, HDL, LDLDIRECT  Lab Results   Component Value Date    GLUCOSE 316 (H) 11/04/2020    BUN 21 11/04/2020    CREATININE 1.58 (H) 11/04/2020     11/04/2020    K 4.0 11/04/2020    CL 95 (L) 11/04/2020    CO2 31.0 (H) 11/04/2020     Lab Results   Component Value Date    HGBA1C 6.6 (H) 02/08/2022     Lab Results   Component Value Date    WBC 7.8 02/08/2022    HGB 11.4 (L) 02/08/2022    HCT 36.2 (L) 02/08/2022     02/08/2022       Allergies  No Known Allergies    Current Medications    Current Outpatient Medications:   •  allopurinol (ZYLOPRIM) 300 MG tablet, Take 300 mg by mouth Daily., Disp: , Rfl: 0  •  amLODIPine (NORVASC) 5 MG tablet, Take 5 mg by mouth 2 (Two) Times a Day., Disp: , Rfl:   •  aspirin 81 MG tablet, Take 81 mg by mouth Daily., Disp: , Rfl:   •  calcium polycarbophil (FIBERCON) 625 MG tablet, Take 625 mg by mouth 2 (Two) Times a Day. Takes 2 tablets BID, Disp: , Rfl:   •  colchicine 0.6 MG tablet, Take 0.6 mg by mouth As Needed for Muscle / Joint Pain., Disp: , Rfl:   •  digoxin (LANOXIN) 125 MCG tablet, Take 0.5 tablets by mouth Daily., Disp: 15 tablet, Rfl: 1  •  diphenhydrAMINE (BENADRYL) 25 MG tablet, Take 25 mg by mouth Every 6 (Six) Hours As Needed for Itching., Disp: , Rfl:   •  docusate sodium (COLACE) 100 MG capsule, Take 100 mg by mouth 2 (Two) Times a Day., Disp: , Rfl:   •  ferrous sulfate 325 (65 FE) MG tablet, Take 325 mg by mouth 2 (Two) Times a Day., Disp: , Rfl:   •  furosemide (LASIX) 40 MG tablet, Take 40 mg by mouth 2 (Two) Times a Day., Disp: , Rfl:   •  glipiZIDE (GLUCOTROL) 10 MG tablet, Take  10 mg by mouth 2 (Two) Times a Day. 2 tablets BID, Disp: , Rfl:   •  ipratropium (ATROVENT) 0.02 % nebulizer solution, Take 0.5 mg by nebulization Every 4 (Four) Hours As Needed., Disp: , Rfl:   •  lovastatin (MEVACOR) 20 MG tablet, Take 20 mg by mouth Every Night., Disp: , Rfl:   •  metOLazone (ZAROXOLYN) 5 MG tablet, Take 5 mg by mouth Daily As Needed., Disp: , Rfl:   •  metoprolol succinate XL (TOPROL-XL) 25 MG 24 hr tablet, Take 25 mg by mouth Daily., Disp: , Rfl:   •  montelukast (SINGULAIR) 10 MG tablet, Take 10 mg by mouth every night at bedtime., Disp: , Rfl:   •  nitroglycerin (NITROSTAT) 0.4 MG SL tablet, Place 0.4 mg under the tongue Every 5 (Five) Minutes As Needed for Chest Pain. Take no more than 3 doses in 15 minutes., Disp: , Rfl:   •  O2 (OXYGEN), Inhale 2 L/min Every Night., Disp: , Rfl:   •  Pazeo 0.7 % solution, INSTILL 1 DROP INTO BOTH EYES ONCE DAILY, Disp: , Rfl:   •  Polyethylene Glycol 3350 (MIRALAX PO), Take 1 application by mouth Daily As Needed., Disp: , Rfl:   •  Trulicity 1.5 MG/0.5ML solution pen-injector, INJECT 1 SYRINGE UNDER THE SKIN EVERY WEEK ON THE SAME DAY, Disp: , Rfl:   •  warfarin (COUMADIN) 5 MG tablet, Take 7.5 mg by mouth Daily. 7.5 mg on , 5mg every other day, Disp: , Rfl:           ROS  Review of Systems   Cardiovascular: Positive for dyspnea on exertion, irregular heartbeat and leg swelling. Negative for chest pain and palpitations.   Respiratory: Negative for cough.        SOCIAL HX  Social History     Socioeconomic History   • Marital status:    Tobacco Use   • Smoking status: Former Smoker     Packs/day: 1.00     Types: Cigarettes     Quit date:      Years since quittin.1   • Smokeless tobacco: Never Used   Vaping Use   • Vaping Use: Never used   Substance and Sexual Activity   • Alcohol use: No   • Drug use: No   • Sexual activity: Defer       FAMILY HX  Family History   Problem Relation Age of Onset   • COPD Mother    • Heart attack Father               Naseem Campbell III, MD, FACC

## 2022-02-25 ENCOUNTER — HOSPITAL ENCOUNTER (OUTPATIENT)
Dept: PHYSICAL THERAPY | Facility: HOSPITAL | Age: 72
Setting detail: THERAPIES SERIES
Discharge: HOME | End: 2022-02-25

## 2022-02-25 DIAGNOSIS — I87.2 VENOUS INSUFFICIENCY: ICD-10-CM

## 2022-02-25 DIAGNOSIS — L97.509 TYPE 2 DIABETES MELLITUS WITH FOOT ULCER, UNSPECIFIED WHETHER LONG TERM INSULIN USE: ICD-10-CM

## 2022-02-25 DIAGNOSIS — E11.621 TYPE 2 DIABETES MELLITUS WITH FOOT ULCER, UNSPECIFIED WHETHER LONG TERM INSULIN USE: ICD-10-CM

## 2022-02-25 DIAGNOSIS — I73.9 PERIPHERAL VASCULAR DISEASE OF LOWER EXTREMITY: ICD-10-CM

## 2022-02-25 DIAGNOSIS — L97.528 ULCER OF LEFT FOOT WITH OTHER SEVERITY: Primary | ICD-10-CM

## 2022-02-25 DIAGNOSIS — R60.0 BILATERAL LEG EDEMA: Primary | ICD-10-CM

## 2022-02-25 PROCEDURE — 29581 APPL MULTLAYER CMPRN SYS LEG: CPT

## 2022-02-25 PROCEDURE — 97597 DBRDMT OPN WND 1ST 20 CM/<: CPT

## 2022-02-25 PROCEDURE — 97610 LOW FREQUENCY NON-THERMAL US: CPT

## 2022-02-25 NOTE — THERAPY WOUND CARE TREATMENT
Outpatient Rehabilitation - Wound/Debridement Treatment Note   Gamaliel     Patient Name: Swapnil Lawson  : 1950  MRN: [id number]  Today's Date: 2022                 Admit Date: 2022    Visit Dx:    ICD-10-CM ICD-9-CM   1. Ulcer of left foot with other severity ([facility])  L97.528 707.15   2. Venous insufficiency  I87.2 459.81   3. Type 2 diabetes mellitus with foot ulcer, unspecified whether long term insulin use ([facility])  E11.621 250.80    L97.509 707.15   4. Peripheral vascular disease of lower extremity ([facility])  I73.9 443.9       Patient Active Problem List   Diagnosis   • Coronary artery disease   • Hypertension   • Paroxysmal atrial fibrillation (HCC)   • Diabetes mellitus (HCC)   • Venous insufficiency   • Hyperlipidemia   • Nuclear sclerotic cataract of right eye   • Cortical age-related cataract of right eye   • Nuclear sclerotic cataract of left eye   • Cortical age-related cataract of left eye   • Thrombocytopenia (HCC)        Past Medical History:   Diagnosis Date   • COPD (chronic obstructive pulmonary disease) (HCC)    • Coronary artery disease 2016    CABG, , Abimael Tomlin MD. CABG, 2013, [facility].   • Diabetes mellitus (HCC) 2016   • Gout    • Hyperlipidemia 2016   • Hypertension 2016   • Myocardial infarction (HCC)      in  and  prior to CABG.   • Paroxysmal atrial fibrillation (HCC) 2016   • Sleep apnea    • Venous insufficiency 2016        Past Surgical History:   Procedure Laterality Date   • APPENDECTOMY     • CARDIAC CATHETERIZATION     • CARDIAC SURGERY       and    • CATARACT EXTRACTION W/ INTRAOCULAR LENS IMPLANT Right 2020    Procedure: CATARACT PHACO EXTRACTION WITH INTRAOCULAR LENS IMPLANT RIGHT;  Surgeon: Omar Blood MD;  Location: [facility];  Service: Ophthalmology;  Laterality: Right;   • CATARACT EXTRACTION W/ INTRAOCULAR LENS IMPLANT Left 2020    Procedure: CATARACT  "PHACO EXTRACTION WITH INTRAOCULAR LENS IMPLANT LEFT;  Surgeon: Omar Blood MD;  Location: [facility];  Service: Ophthalmology;  Laterality: Left;   • CHOLECYSTECTOMY  2002   • COLONOSCOPY     • FINGER SURGERY      Rt index (second finger)   • OTHER SURGICAL HISTORY  2010    Bone spur removal   • TOE AMPUTATION  2009   • VASECTOMY           EVALUATION   PT Ortho     Row Name 02/25/22 0800       Subjective Comments    Subjective Comments No complaints or changes. His heart doctor was pleased with how he's doing and told the pt he might need a test to see how the blood flow is doing. I explained to the pt that a vascular surgeon would likely be the one to do the testing.  -       Subjective Pain    Able to rate subjective pain? yes  -    Pre-Treatment Pain Level 0  -MC    Post-Treatment Pain Level 0  -       Transfers    Sit-Stand [place] (Transfers) modified independence  -    Stand-Sit [place] (Transfers) modified independence  -    Transfers, Sit-Stand-Sit, Assist Device straight cane  -    Comment (Transfers) seated for tx  -       Gait/Stairs (Locomotion)    [place] Level (Gait) modified independence  -    Assistive Device (Gait) cane, straight  -          User Key  (r) = Recorded By, (t) = Taken By, (c) = Cosigned By    Initials Name Provider Type    Morena Rajan PT Physical Therapist                 [facility] Wound     Row Name 02/25/22 0800             Wound 01/31/22 0800 Left lateral foot Incision    Wound - Properties Group Placement Date: 01/31/22  -MW Placement Time: 0800  -MW Present on Hospital Admission: Y  -MW Side: Left  - Orientation: lateral  - Location: foot  -MW Primary Wound Type: Incision  -MW Additional Comments: per pt \"had bunion removed\"  -      Dressing Appearance intact; moist drainage  -      Base moist; red; subcutaneous; exposed structure; yellow; slough; granulating  thin layer of soft tissue over palpable bone  -      Periwound " "intact; swelling; warm; macerated; pale white  -      Periwound Temperature warm  -      Periwound Skin Turgor soft  -      Edges open; irregular  -      Drainage Characteristics/Odor serosanguineous; sanguineous  -      Drainage Amount small  -      Care, Wound cleansed with; wound cleanser; debrided; ultrasound therapy, non contact low frequency  MIST 5 minutes  -      Dressing Care dressing applied; silver impregnated; collagen; antimicrobial agent applied; foam; low-adherent; border dressing; multi-layer wrap  tez, HFBt, 4\" optifoam, MLW  -      Periwound Care cleansed with pH balanced cleanser; dry periwound area maintained  -      Retired Wound - Properties Group Date first assessed: 01/31/22  -MW Time first assessed: 0800  -MW Present on Hospital Admission: Y  -MW Side: Left  -MW Location: foot  -MW Primary Wound Type: Incision  -MW Additional Comments: per pt \"had bunion removed\"  -MW            User Key  (r) = Recorded By, (t) = Taken By, (c) = Cosigned By    Initials Name Provider Type    Carleen Rosales, PT Physical Therapist    Morena Rajan, PT Physical Therapist               Lymphedema     Row Name 02/25/22 0800             Lymphedema Edema Assessment    Pitting Edema Mild  -              Skin Changes/Observations    Lower Extremity Conditions left:; clean; dry; fragile  -      Lower Extremity Color/Pigment left:; hyperpigmented; blanchable  -              Lymphedema Pulses/Capillary Refill    Lower Extremity Capillary Refill left:; less than 3 seconds  -              Compression/Skin Care    Compression/Skin Care skin care; wrapping location; bandaging  -      Skin Care washed/dried; moisturizing lotion applied  -      Wrapping Location lower extremity  -      Wrapping Location LE left:; foot to knee  -      Wrapping Comments size 4&5 compressogrips, layers overlapping for gradient multilayer compression  -      Bandage Layers cotton elastic " stocking- double layer (comment size)  -            User Key  (r) = Recorded By, (t) = Taken By, (c) = Cosigned By    Initials Name Provider Type    Morena Rajan PT Physical Therapist                WOUND DEBRIDEMENT  Total area of Debridement: 1 cm2  Debridement Site 1  Location- Site 1: LT foot wound and periwound  Selective Debridement- Site 1: Wound Surface <20cmsq  Instruments- Site 1: tweezers  Excised Tissue Description- Site 1: minimum, slough  Bleeding- Site 1: none              Therapy Education     Row Name 02/25/22 0914             Therapy Education    Education Details Continue NWB as often as possible  -MC      Given Bandaging/dressing change; Symptoms/condition management  -      Program Reinforced  -MC      How Provided Verbal; Demonstration  -MC      Provided to Patient  -MC      Level of Understanding Verbalized  -            User Key  (r) = Recorded By, (t) = Taken By, (c) = Cosigned By    Initials Name Provider Type    Morena Rajan PT Physical Therapist                Recommendation and Plan   PT Assessment/Plan     Row Name 02/25/22 0914          PT Assessment    Functional Limitations Performance in self-care ADL; Other (comment)  wound mgmt  -     Impairments Integumentary integrity; Impaired venous circulation; Impaired arterial circulation; Peripheral nerve integrity  -     Assessment Comments Pt still with moist, thin granulation tissue over bone in the wound base. The edges are somewhat friable but dry and intact today. Pt's LLE edema is improving with MLW. Pt will continue to benefit from skilled PT wound care to continue progress.  -MC     Rehab Potential Fair  -     Patient/caregiver participated in establishment of treatment plan and goals Yes  -MC     Patient would benefit from skilled therapy intervention Yes  -MC            PT Plan    PT Frequency 2x/week  -     Physical Therapy Interventions (Optional Details) wound care; patient/family education   -     PT Plan Comments MIST, debridement, MLW  -           User Key  (r) = Recorded By, (t) = Taken By, (c) = Cosigned By    Initials Name Provider Type    Morena Rajan, PT Physical Therapist                Goals   PT OP Goals     Row Name 02/25/22 0800          Time Calculation    PT Goal Re-Cert Due Date 04/30/22  -           User Key  (r) = Recorded By, (t) = Taken By, (c) = Cosigned By    Initials Name Provider Type    Morena Rajan, PT Physical Therapist                PT Goal Re-Cert Due Date: 04/30/22            Time Calculation: Start Time: 0800  Untimed Charges  46598-[place] comp below knee: 10  10962-Jtiebrmbi debridement: 10  28426-YVZW Mist: 10  Total Minutes  Untimed Charges Total Minutes: 30   Total Minutes: 30  Therapy Charges for Today     Code Description Service Date Service Provider Modifiers Qty    26239831591 HC PT NLFU MIST 2/25/2022 Morena Jordan, PT GP 1    19173912214 HC PT MULTI LAYER COMP SYS BELOW KNEE 2/25/2022 Morena Jordan, PT GP 1    31092224830 HC JOSS DEBRIDE OPEN WOUND UP TO 20CM 2/25/2022 Morena Jordan, PT GP 1                  Morena Jordan, PT  2/25/2022

## 2022-03-01 ENCOUNTER — OFFICE VISIT (OUTPATIENT)
Dept: FAMILY MEDICINE CLINIC | Age: 72
End: 2022-03-01
Payer: MEDICARE

## 2022-03-01 ENCOUNTER — HOSPITAL ENCOUNTER (OUTPATIENT)
Dept: PHYSICAL THERAPY | Facility: HOSPITAL | Age: 72
Setting detail: THERAPIES SERIES
Discharge: HOME | End: 2022-03-01

## 2022-03-01 VITALS
TEMPERATURE: 99.3 F | OXYGEN SATURATION: 96 % | DIASTOLIC BLOOD PRESSURE: 58 MMHG | HEART RATE: 66 BPM | SYSTOLIC BLOOD PRESSURE: 110 MMHG | RESPIRATION RATE: 22 BRPM | BODY MASS INDEX: 25.04 KG/M2 | HEIGHT: 73 IN

## 2022-03-01 DIAGNOSIS — I87.2 VENOUS INSUFFICIENCY: ICD-10-CM

## 2022-03-01 DIAGNOSIS — Z79.01 ANTICOAGULATED ON COUMADIN: Primary | ICD-10-CM

## 2022-03-01 DIAGNOSIS — I73.9 PERIPHERAL VASCULAR DISEASE OF LOWER EXTREMITY: ICD-10-CM

## 2022-03-01 DIAGNOSIS — L97.509 TYPE 2 DIABETES MELLITUS WITH FOOT ULCER, UNSPECIFIED WHETHER LONG TERM INSULIN USE: ICD-10-CM

## 2022-03-01 DIAGNOSIS — R06.02 SHORTNESS OF BREATH: ICD-10-CM

## 2022-03-01 DIAGNOSIS — E11.621 TYPE 2 DIABETES MELLITUS WITH FOOT ULCER, UNSPECIFIED WHETHER LONG TERM INSULIN USE: ICD-10-CM

## 2022-03-01 DIAGNOSIS — Z11.52 ENCOUNTER FOR SCREENING FOR COVID-19: ICD-10-CM

## 2022-03-01 DIAGNOSIS — L97.528 ULCER OF LEFT FOOT WITH OTHER SEVERITY: Primary | ICD-10-CM

## 2022-03-01 DIAGNOSIS — J44.1 COPD EXACERBATION (HCC): ICD-10-CM

## 2022-03-01 LAB
INTERNATIONAL NORMALIZATION RATIO, POC: 1.7
Lab: NORMAL
PROTHROMBIN TIME, POC: NORMAL
QC PASS/FAIL: NORMAL
SARS-COV-2 RDRP RESP QL NAA+PROBE: NEGATIVE

## 2022-03-01 PROCEDURE — 3017F COLORECTAL CA SCREEN DOC REV: CPT | Performed by: NURSE PRACTITIONER

## 2022-03-01 PROCEDURE — 1036F TOBACCO NON-USER: CPT | Performed by: NURSE PRACTITIONER

## 2022-03-01 PROCEDURE — 85610 PROTHROMBIN TIME: CPT | Performed by: NURSE PRACTITIONER

## 2022-03-01 PROCEDURE — 1123F ACP DISCUSS/DSCN MKR DOCD: CPT | Performed by: NURSE PRACTITIONER

## 2022-03-01 PROCEDURE — 29581 APPL MULTLAYER CMPRN SYS LEG: CPT

## 2022-03-01 PROCEDURE — 97597 DBRDMT OPN WND 1ST 20 CM/<: CPT

## 2022-03-01 PROCEDURE — 3023F SPIROM DOC REV: CPT | Performed by: NURSE PRACTITIONER

## 2022-03-01 PROCEDURE — 87635 SARS-COV-2 COVID-19 AMP PRB: CPT | Performed by: NURSE PRACTITIONER

## 2022-03-01 PROCEDURE — G8484 FLU IMMUNIZE NO ADMIN: HCPCS | Performed by: NURSE PRACTITIONER

## 2022-03-01 PROCEDURE — G8417 CALC BMI ABV UP PARAM F/U: HCPCS | Performed by: NURSE PRACTITIONER

## 2022-03-01 PROCEDURE — 99213 OFFICE O/P EST LOW 20 MIN: CPT | Performed by: NURSE PRACTITIONER

## 2022-03-01 PROCEDURE — 4040F PNEUMOC VAC/ADMIN/RCVD: CPT | Performed by: NURSE PRACTITIONER

## 2022-03-01 PROCEDURE — G8427 DOCREV CUR MEDS BY ELIG CLIN: HCPCS | Performed by: NURSE PRACTITIONER

## 2022-03-01 PROCEDURE — 97610 LOW FREQUENCY NON-THERMAL US: CPT

## 2022-03-01 PROCEDURE — 96372 THER/PROPH/DIAG INJ SC/IM: CPT | Performed by: NURSE PRACTITIONER

## 2022-03-01 RX ORDER — CEPHALEXIN 500 MG/1
500 CAPSULE ORAL 4 TIMES DAILY
Qty: 40 CAPSULE | Refills: 0 | Status: SHIPPED | OUTPATIENT
Start: 2022-03-01 | End: 2022-03-09

## 2022-03-01 RX ORDER — METHYLPREDNISOLONE ACETATE 80 MG/ML
80 INJECTION, SUSPENSION INTRA-ARTICULAR; INTRALESIONAL; INTRAMUSCULAR; SOFT TISSUE ONCE
Status: COMPLETED | OUTPATIENT
Start: 2022-03-01 | End: 2022-03-01

## 2022-03-01 RX ORDER — GUAIFENESIN 600 MG/1
1200 TABLET, EXTENDED RELEASE ORAL 2 TIMES DAILY
Qty: 40 TABLET | Refills: 0 | Status: SHIPPED | OUTPATIENT
Start: 2022-03-01 | End: 2022-03-11

## 2022-03-01 RX ADMIN — METHYLPREDNISOLONE ACETATE 80 MG: 80 INJECTION, SUSPENSION INTRA-ARTICULAR; INTRALESIONAL; INTRAMUSCULAR; SOFT TISSUE at 11:31

## 2022-03-01 ASSESSMENT — ENCOUNTER SYMPTOMS
WHEEZING: 1
SPUTUM PRODUCTION: 0
SORE THROAT: 0
HEMOPTYSIS: 0
COUGH: 1
SHORTNESS OF BREATH: 1

## 2022-03-01 NOTE — THERAPY PROGRESS REPORT/RE-CERT
Outpatient Rehabilitation - Wound/Debridement Progress Note   [facility]     Patient Name: Swapnil Lawson  : 1950  MRN: [id number]  Today's Date: 3/1/2022                 Admit Date: 3/1/2022    Visit Dx:    ICD-10-CM ICD-9-CM   1. Ulcer of left foot with other severity ([facility])  L97.528 707.15   2. Venous insufficiency  I87.2 459.81   3. Type 2 diabetes mellitus with foot ulcer, unspecified whether long term insulin use ([facility])  E11.621 250.80    L97.509 707.15   4. Peripheral vascular disease of lower extremity ([facility])  I73.9 443.9   Lat L foot wound:      Patient Active Problem List   Diagnosis   • Coronary artery disease   • Hypertension   • Paroxysmal atrial fibrillation (HCC)   • Diabetes mellitus (HCC)   • Venous insufficiency   • Hyperlipidemia   • Nuclear sclerotic cataract of right eye   • Cortical age-related cataract of right eye   • Nuclear sclerotic cataract of left eye   • Cortical age-related cataract of left eye   • Thrombocytopenia ([facility])        Past Medical History:   Diagnosis Date   • COPD (chronic obstructive pulmonary disease) ([facility])    • Coronary artery disease 2016    CABG, , Abimael Tomlin MD. CABG, 2013, [facility].   • Diabetes mellitus ([facility]) 2016   • Gout    • Hyperlipidemia 2016   • Hypertension 2016   • Myocardial infarction (HCC)      in  and  prior to CABG.   • Paroxysmal atrial fibrillation (HCC) 2016   • Sleep apnea    • Venous insufficiency 2016        Past Surgical History:   Procedure Laterality Date   • APPENDECTOMY     • CARDIAC CATHETERIZATION     • CARDIAC SURGERY       and    • CATARACT EXTRACTION W/ INTRAOCULAR LENS IMPLANT Right 2020    Procedure: CATARACT PHACO EXTRACTION WITH INTRAOCULAR LENS IMPLANT RIGHT;  Surgeon: Omar Blood MD;  Location: [facility];  Service: Ophthalmology;  Laterality: Right;   • CATARACT EXTRACTION W/ INTRAOCULAR LENS IMPLANT Left 2020     "Procedure: CATARACT PHACO EXTRACTION WITH INTRAOCULAR LENS IMPLANT LEFT;  Surgeon: Omar Blood MD;  Location: [facility];  Service: Ophthalmology;  Laterality: Left;   • CHOLECYSTECTOMY  2002   • COLONOSCOPY     • FINGER SURGERY      Rt index (second finger)   • OTHER SURGICAL HISTORY  2010    Bone spur removal   • TOE AMPUTATION  2009   • VASECTOMY           EVALUATION   PT Ortho     Row Name 03/01/22 0800       Subjective Comments    Subjective Comments No complaints other than pain with walking only.  States otherwise, wound hasn't been hurting like it was.  Has arterial and venous studies scheduled in a couple weeks.  -       Subjective Pain    Able to rate subjective pain? yes  -JM    Pre-Treatment Pain Level 0  -JM    Post-Treatment Pain Level 0  -       Transfers    Sit-Stand [place] (Transfers) modified independence  -    Stand-Sit [place] (Transfers) modified independence  -    Transfers, Sit-Stand-Sit, Assist Device straight cane  -    Comment (Transfers) seated for tx  -       Gait/Stairs (Locomotion)    [place] Level (Gait) modified independence  -    Assistive Device (Gait) cane, straight  -          User Key  (r) = Recorded By, (t) = Taken By, (c) = Cosigned By    Initials Name Provider Type    JM Cheryl Washington PT Physical Therapist                 [facility] Wound     Row Name 03/01/22 0800             Wound 01/31/22 0800 Left lateral foot Incision    Wound - Properties Group Placement Date: 01/31/22  - Placement Time: 0800  -MW Present on Hospital Admission: Y  -MW Side: Left  - Orientation: lateral  - Location: foot  - Primary Wound Type: Incision  -MW Additional Comments: per pt \"had bunion removed\"  -MW      Wound Image  View All Images View Images  -      Dressing Appearance intact; moist drainage  -      Base moist; red; subcutaneous; exposed structure; yellow; slough; granulating  increasing granulation  -      Periwound intact; swelling; " "warm; macerated; pale white  -      Periwound Temperature warm  -      Periwound Skin Turgor soft  -      Edges open; irregular  -JM      Wound Length (cm) 1.3 cm  -JM      Wound Width (cm) 1.2 cm  -JM      Wound Depth (cm) --  obscured  -      Drainage Characteristics/Odor serosanguineous  -      Drainage Amount small  -JM      Care, Wound cleansed with; wound cleanser; debrided; ultrasound therapy, non contact low frequency  MIST 5min  -JM      Dressing Care dressing applied; collagen; antimicrobial agent applied; foam; silicone; border dressing; multi-layer wrap  tez, HFBt, 4\" optifoam, MLW  -JM      Periwound Care cleansed with pH balanced cleanser; dry periwound area maintained  -      Retired Wound - Properties Group Date first assessed: 01/31/22  -MW Time first assessed: 0800  -MW Present on Hospital Admission: Y  -MW Side: Left  -MW Location: foot  - Primary Wound Type: Incision  -MW Additional Comments: per pt \"had bunion removed\"  -MW            User Key  (r) = Recorded By, (t) = Taken By, (c) = Cosigned By    Initials Name Provider Type    MW Carleen Lieberman, PT Physical Therapist    JM Cheryl Washington, PT Physical Therapist               Lymphedema     Row Name 03/01/22 0800             Lymphedema Edema Assessment    Pitting Edema Mild; Moderate  -              Skin Changes/Observations    Lower Extremity Conditions left:; clean; dry; fragile  -      Lower Extremity Color/Pigment left:; hyperpigmented; blanchable  -      Lower Extremity Skin Details small intact blisters anterior medial shin  -              Lymphedema Pulses/Capillary Refill    Lower Extremity Capillary Refill left:; less than 3 seconds  -              Compression/Skin Care    Compression/Skin Care skin care; wrapping location; bandaging  -      Skin Care washed/dried; moisturizing lotion applied  -      Wrapping Location lower extremity  -JM      Wrapping Location LE left:; foot to knee  -      " Wrapping Comments size 4&5 compressogrips, layers doubled/overlapping for gradient compression  -MARICEL      Bandage Layers cotton elastic stocking- double layer (comment size)  -            User Key  (r) = Recorded By, (t) = Taken By, (c) = Cosigned By    Initials Name Provider Type    Cheryl Hua, PT Physical Therapist                WOUND DEBRIDEMENT  Total area of Debridement: 1cmsq  Debridement Site 1  Location- Site 1: LT foot wound and periwound  Selective Debridement- Site 1: Wound Surface <20cmsq  Instruments- Site 1: tweezers  Excised Tissue Description- Site 1: scant, slough  Bleeding- Site 1: scant              Therapy Education     Row Name 03/01/22 0800             Therapy Education    Education Details Continue NWB and leg elevation, change dressing every 2-3 days.  -MARICEL      Given Bandaging/dressing change; Symptoms/condition management  -      Program Reinforced  -MARICEL      How Provided Verbal; Demonstration  -MARICEL      Provided to Patient  -      Level of Understanding Verbalized  -            User Key  (r) = Recorded By, (t) = Taken By, (c) = Cosigned By    Initials Name Provider Type    Cheryl Hua, PT Physical Therapist                Recommendation and Plan   PT Assessment/Plan     Row Name 03/01/22 0800          PT Assessment    Functional Limitations Performance in self-care ADL; Other (comment)  wound mgmt  -     Impairments Integumentary integrity; Impaired venous circulation; Impaired arterial circulation; Peripheral nerve integrity  -     Assessment Comments Decreased wound dimensions and increasing granulation with less slough buildup.  Plantar wound bed still with small area of soft yellow tissue over palpable bone, but plantar edge now fully granulated with new epithelial growth.  Foot wound will continue to benefit from debridement and MIST.  LLE edema slightly increased today with 2-3 small intact blisters anterior shin, so PT doubled compressogrip layers for  increased compression.  Pt making slow but continuous progress toward goals, current POC remains appropraite.  -     Rehab Potential Fair  -     Patient/caregiver participated in establishment of treatment plan and goals Yes  -     Patient would benefit from skilled therapy intervention Yes  -            PT Plan    PT Frequency 2x/week  -     Predicted Duration of Therapy Intervention (PT) 24 visits  -     Planned CPT's? PT NLFU MIST: 41267; PT MULTI LAYER COMP SYS LE; PT UNNA BOOT: 23412; PT JOSS DEBRIDE OPEN WOUND UP TO 20 CM: 74980; PT NONSELECT DEBRIDE 15 MIN: 61447; PT SELF CARE/MGMT/TRAIN 15 MIN: 37793  -     Physical Therapy Interventions (Optional Details) patient/family education; wound care  -     PT Plan Comments MIST, debridement, MLW  -           User Key  (r) = Recorded By, (t) = Taken By, (c) = Cosigned By    Initials Name Provider Type    Cheryl Hua, PT Physical Therapist                Goals   PT OP Goals     Row Name 22 0800          PT Short Term Goals    STG Date to Achieve 22  -     STG 1 Pt/ son independent with clean home dressing changes to promote moist, bacteriostatic healing environement.  -     STG 1 Progress Ongoing  -     STG 2 Pt /son able to verbalize s/s of infection and when to seek urgent or emergency care.  -     STG 2 Progress Met  -     STG 3 Wound dimensions to decrease at least 25% to demonstrate wound healing.  -     STG 3 Progress Ongoing; Progressing  -            Long Term Goals    LTG 1 Wound dimensions to decrease at least 75% to demonstrate wound healing.  -     LTG 1 Progress Ongoing  -     LTG 2 Pt /son independent with long term management of vascular changes in LLE; balancing venous swelling and impaired arterial flow.  -     LTG 2 Progress Ongoing  -            Time Calculation    PT Goal Re-Cert Due Date 22  -           User Key  (r) = Recorded By, (t) = Taken By, (c) = Cosigned By     Initials Name Provider Type    Cheryl Hua, PT Physical Therapist                PT Goal Re-Cert Due Date: 04/30/22  PT Short Term Goals  STG Date to Achieve: 02/28/22  STG 1: Pt/ son independent with clean home dressing changes to promote moist, bacteriostatic healing environement.  STG 1 Progress: Ongoing  STG 2: Pt /son able to verbalize s/s of infection and when to seek urgent or emergency care.  STG 2 Progress: Met  STG 3: Wound dimensions to decrease at least 25% to demonstrate wound healing.  STG 3 Progress: Ongoing, Progressing  Long Term Goals  LTG 1: Wound dimensions to decrease at least 75% to demonstrate wound healing.  LTG 1 Progress: Ongoing  LTG 2: Pt /son independent with long term management of vascular changes in LLE; balancing venous swelling and impaired arterial flow.  LTG 2 Progress: Ongoing      Time Calculation: Start Time: 0800  Untimed Charges  49406-Vospjetwog comp below knee: 10  72367-Stbdtudkr debridement: 10  01423-DEXR Mist: 10  Total Minutes  Untimed Charges Total Minutes: 30   Total Minutes: 30  Therapy Charges for Today     Code Description Service Date Service Provider Modifiers Qty    61523020873 HC JOSS DEBRIDE OPEN WOUND UP TO 20CM 3/1/2022 Cheryl Washington, PT GP 1    18944705334 HC PT NLFU MIST 3/1/2022 Cheryl Washington, PT GP 1    93706709021 HC PT MULTI LAYER COMP SYS BELOW KNEE 3/1/2022 Cheryl Washington, PT GP 1                  Cheryl Washington, PT  3/1/2022

## 2022-03-01 NOTE — PROGRESS NOTES
Chief Complaint   Patient presents with    Shortness of Breath       Patient to clinic with complaints of shortness of breath. Patient was originally here for POC INR but was short of breath on entering facility. Patient states she has been this way since yesterday. Patient reports he does wear 2L O2 nightly. On arrival patient was 89-90 on room air. 2L placed on patient and sats increased to 94%. Patient states he has been to [facility] for follow up on \"open area\" on his foot. Patient reports he had a bunion removed and incision has had difficulty healing. Patient denies body aches, no known sick contacts. Administrations This Visit     cefTRIAXone (ROCEPHIN) 1,000 mg in lidocaine 1 % 2.86 mL IM Injection     Admin Date  03/01/2022 Action  Given Dose  1,000 mg Route  IntraMUSCular Administered By  Matilda Lara RN          methylPREDNISolone acetate (DEPO-MEDROL) injection 80 mg     Admin Date  03/01/2022 Action  Given Dose  80 mg Route  IntraMUSCular Administered By  Matilda Lara RN                Patient tolerated injection well. Patient advised to wait 20 minutes in the office following the injection. No signs/symptoms of reaction noted after 20 minutes.

## 2022-03-01 NOTE — PROGRESS NOTES
SUBJECTIVE:    Deacon Dinh is a 66 y.o. male    Pt c/o shortness of breath that started yesterday. Denies cough. Pt reports history of the same last year. Pt reports symptoms resolved with Rocephin. Pt quit smoking in 1993. Pt has PMH of COPD. Pt c/o wheezing today. Pt wears home oxygen at home, 2L at night and PRN. He has a home pulse ox and reports he uses oxygen if pulse ox is less than 90%. Pt uses Atrovent HFA PRN and nebulizer at home. Shortness of Breath  This is a new problem. The current episode started in the past 7 days. Episode frequency: with exertion. The problem has been gradually worsening. Associated symptoms include wheezing (started today). Pertinent negatives include no fever, headaches, hemoptysis, sore throat or sputum production. The symptoms are aggravated by any activity. Associated symptoms comments: Cough- non productive- sometimes. No increase in severity. Treatments tried: atrovent inhaler and Home oxygen. The treatment provided moderate relief. Chief Complaint   Patient presents with    Shortness of Breath        Review of Systems   Constitutional: Negative for activity change, appetite change, chills, diaphoresis, fatigue and fever. HENT: Negative. Negative for sore throat. Respiratory: Positive for cough, shortness of breath and wheezing (started today). Negative for hemoptysis and sputum production. Musculoskeletal:        Denies generalized body aches. Neurological: Negative for headaches. OBJECTIVE:    BP (!) 110/58   Pulse 66   Temp 99.3 °F (37.4 °C) (Infrared)   Resp 22   Ht 6' 1\" (1.854 m)   SpO2 96% Comment: on 2L  BMI 25.04 kg/m²    Physical Exam  Vitals and nursing note reviewed. Constitutional:       Appearance: He is well-developed. Neck:      Thyroid: No thyromegaly. Vascular: No carotid bruit. Cardiovascular:      Rate and Rhythm: Normal rate and regular rhythm. Heart sounds: Normal heart sounds.  No murmur necessary for the treatment of COPD exacerbations. He agrees to go to the ER if symptoms worsen. Return in about 1 week (around 3/8/2022) for INR. Current Outpatient Medications on File Prior to Visit   Medication Sig Dispense Refill    MITIGARE 0.6 MG capsule TAKE 1 CAPSULE BY MOUTH DAILY 30 capsule 1    glipiZIDE (GLUCOTROL) 10 MG tablet Take 2 tablets by mouth 2 times daily (before meals) 360 tablet 2    allopurinol (ZYLOPRIM) 300 MG tablet TAKE 1 TABLET BY MOUTH EVERY DAY      amLODIPine (NORVASC) 5 MG tablet TAKE 1 TABLET BY MOUTH TWICE A DAY      Calcium Polycarbophil (FIBER) 625 MG TABS Take 625 mg by mouth daily      digoxin (LANOXIN) 125 MCG tablet TAKE 1 TABLET BY MOUTH EVERY DAY OR AS DIRECTED      colchicine (COLCRYS) 0.6 MG tablet Take 1 tablet by mouth daily as needed for Pain 30 tablet 5    metOLazone (ZAROXOLYN) 5 MG tablet Take 5 mg by mouth daily      Dulaglutide (TRULICITY) 1.5 QC/2.4OV SOPN Inject 1.5 mg into the skin once a week      ipratropium (ATROVENT) 0.02 % nebulizer solution Take 0.5 mg by nebulization every 4-6 hours as needed for Wheezing      furosemide (LASIX) 40 MG tablet take 1 tablet by mouth once daily (Patient taking differently: 40 mg 2 times daily take 1 tablet by mouth once daily) 30 tablet 5    ferrous sulfate 325 (65 Fe) MG tablet take 1 tablet by mouth twice a day 60 tablet 5    docusate sodium (COLACE) 100 MG capsule Take 1 capsule by mouth 2 times daily 60 capsule 5    lovastatin (MEVACOR) 20 MG tablet Take 1 tablet by mouth daily 30 tablet 5    metoprolol succinate (TOPROL XL) 25 MG extended release tablet take 1 tablet by mouth once daily 30 tablet 5    nitroGLYCERIN (NITROSTAT) 0.4 MG SL tablet place 1 tablet under the tongue if needed every 5 minutes for chest pain for 3 doses IF NO RELIEF AFTER 3RD DOSE CALL PRESCRIBER .  (Patient not taking: Reported on 12/8/2021) 25 tablet 5    COUMADIN 5 MG tablet take as directed (Patient taking differently: 5 mg daily Patient takes 5mg daily and 7.5mg on Sundays) 100 tablet 5    aspirin 81 MG tablet Take 81 mg by mouth daily. No current facility-administered medications on file prior to visit.

## 2022-03-02 ENCOUNTER — TELEPHONE (OUTPATIENT)
Dept: FAMILY MEDICINE CLINIC | Age: 72
End: 2022-03-02

## 2022-03-02 NOTE — TELEPHONE ENCOUNTER
Patient called the office and asked if he could come in today for another \"shot\". Patient stated he was still short of breath and was using his nightly O2 when his \"oxygen dropped below 90\". I informed patient we did not have any appts available this date and suggested that he go to the ED for further evaluation. Patient stated he would have a family member to take him to [facility].

## 2022-03-03 ENCOUNTER — TELEPHONE (OUTPATIENT)
Dept: FAMILY MEDICINE CLINIC | Age: 72
End: 2022-03-03

## 2022-03-03 ENCOUNTER — CARE COORDINATION (OUTPATIENT)
Dept: CARE COORDINATION | Age: 72
End: 2022-03-03

## 2022-03-03 NOTE — CARE COORDINATION
Adrien Ramires, RN  Manager Care Coordination  [phone]    Call placed to [facility] to follow up on ED visit and schedule for fu appointment. No answer, message left with contact information.

## 2022-03-03 NOTE — TELEPHONE ENCOUNTER
Patent came to clinic stating that at his hospital yesterday they told him this office would contact him. Records were requested from [facility] in [place] and patient was d/c home from ED treated for a COPD exacerbation with instructions to follow up with PCP in 1 week. Information was reviewed with patients PCP LANCE Turner. Patient was instructed to continue antibiotics and steroids and follow up next week. I also informed patient if he came short of breath or noticed a decrease in oxygen saturation to return to the ED. Patient scheduled appointment for next week. Understanding verbalized.

## 2022-03-08 ENCOUNTER — HOSPITAL ENCOUNTER (OUTPATIENT)
Dept: PHYSICAL THERAPY | Facility: HOSPITAL | Age: 72
Setting detail: THERAPIES SERIES
Discharge: HOME | End: 2022-03-08

## 2022-03-08 DIAGNOSIS — I73.9 PERIPHERAL VASCULAR DISEASE OF LOWER EXTREMITY: ICD-10-CM

## 2022-03-08 DIAGNOSIS — L97.509 TYPE 2 DIABETES MELLITUS WITH FOOT ULCER, UNSPECIFIED WHETHER LONG TERM INSULIN USE: ICD-10-CM

## 2022-03-08 DIAGNOSIS — I87.2 VENOUS INSUFFICIENCY: ICD-10-CM

## 2022-03-08 DIAGNOSIS — E11.621 TYPE 2 DIABETES MELLITUS WITH FOOT ULCER, UNSPECIFIED WHETHER LONG TERM INSULIN USE: ICD-10-CM

## 2022-03-08 DIAGNOSIS — L97.528 ULCER OF LEFT FOOT WITH OTHER SEVERITY: Primary | ICD-10-CM

## 2022-03-08 PROCEDURE — 97597 DBRDMT OPN WND 1ST 20 CM/<: CPT

## 2022-03-08 PROCEDURE — 97610 LOW FREQUENCY NON-THERMAL US: CPT

## 2022-03-08 PROCEDURE — 29581 APPL MULTLAYER CMPRN SYS LEG: CPT

## 2022-03-08 NOTE — THERAPY WOUND CARE TREATMENT
Outpatient Rehabilitation - Wound/Debridement Treatment Note   [place]     Patient Name: Swapnil Lawson  : 1950  MRN: [id number]  Today's Date: 3/8/2022                 Admit Date: 3/8/2022    Visit Dx:    ICD-10-CM ICD-9-CM   1. Ulcer of left foot with other severity ([facility])  L97.528 707.15   2. Venous insufficiency  I87.2 459.81   3. Type 2 diabetes mellitus with foot ulcer, unspecified whether long term insulin use ([facility])  E11.621 250.80    L97.509 707.15   4. Peripheral vascular disease of lower extremity ([facility])  I73.9 443.9   L foot wound:    Distal L toes (?new blisters):      Patient Active Problem List   Diagnosis   • Coronary artery disease   • Hypertension   • Paroxysmal atrial fibrillation (HCC)   • Diabetes mellitus (HCC)   • Venous insufficiency   • Hyperlipidemia   • Nuclear sclerotic cataract of right eye   • Cortical age-related cataract of right eye   • Nuclear sclerotic cataract of left eye   • Cortical age-related cataract of left eye   • Thrombocytopenia ([facility])        Past Medical History:   Diagnosis Date   • COPD (chronic obstructive pulmonary disease) ([facility])    • Coronary artery disease 2016    CABG, , Abimael Tomlin MD. CABG, 2013, [facility].   • Diabetes mellitus ([facility]) 2016   • Gout    • Hyperlipidemia 2016   • Hypertension 2016   • Myocardial infarction (HCC)      in  and  prior to CABG.   • Paroxysmal atrial fibrillation ([facility]) 2016   • Sleep apnea    • Venous insufficiency 2016        Past Surgical History:   Procedure Laterality Date   • APPENDECTOMY     • CARDIAC CATHETERIZATION     • CARDIAC SURGERY       and    • CATARACT EXTRACTION W/ INTRAOCULAR LENS IMPLANT Right 2020    Procedure: CATARACT PHACO EXTRACTION WITH INTRAOCULAR LENS IMPLANT RIGHT;  Surgeon: Omar Blood MD;  Location: [facility];  Service: Ophthalmology;  Laterality: Right;   • CATARACT EXTRACTION W/ INTRAOCULAR LENS  "IMPLANT Left 7/9/2020    Procedure: CATARACT PHACO EXTRACTION WITH INTRAOCULAR LENS IMPLANT LEFT;  Surgeon: Omar Blood MD;  Location: [facility];  Service: Ophthalmology;  Laterality: Left;   • CHOLECYSTECTOMY  2002   • COLONOSCOPY     • FINGER SURGERY      Rt index (second finger)   • OTHER SURGICAL HISTORY  2010    Bone spur removal   • TOE AMPUTATION  2009   • VASECTOMY           EVALUATION   PT Ortho     Row Name 03/08/22 1330       Subjective Comments    Subjective Comments Pt without complaints, has vascular tests next week.  -       Subjective Pain    Able to rate subjective pain? yes  -    Pre-Treatment Pain Level 0  -    Post-Treatment Pain Level 0  -    Subjective Pain Comment States wound doesn't hurt like it did  -       Transfers    Sit-Stand Carroll (Transfers) modified independence  -    Stand-Sit [place] (Transfers) modified independence  -    Transfers, Sit-Stand-Sit, Assist Device straight cane  -    Comment, (Transfers) seated for tx  -       Gait/Stairs (Locomotion)    Carroll Level (Gait) modified independence  -    Assistive Device (Gait) cane, straight  -          User Key  (r) = Recorded By, (t) = Taken By, (c) = Cosigned By    Initials Name Provider Type    Cheryl Hua PT Physical Therapist                 LDA Wound     Row Name 03/08/22 1330             Wound 01/31/22 0800 Left lateral foot Incision    Wound - Properties Group Placement Date: 01/31/22  - Placement Time: 0800  -MW Present on Hospital Admission: Y  -MW Side: Left  - Orientation: lateral  - Location: foot  - Primary Wound Type: Incision  - Additional Comments: per pt \"had bunion removed\"  -      Wound Image View All Images View Images  -      Dressing Appearance intact;moist drainage  -      Base moist;red;subcutaneous;exposed structure;yellow;slough;granulating  increasing granulation  -      Periwound intact;swelling;warm;macerated;pale white  -   " "   Periwound Temperature warm  -JM      Periwound Skin Turgor soft  -JM      Edges open;irregular  -JM      Wound Length (cm) 0.9 cm  -JM      Wound Width (cm) 0.8 cm  -JM      Wound Depth (cm) 0.3 cm  -JM      Wound Surface Area (cm^2) 0.72 cm^2  -JM      Wound Volume (cm^3) 0.216 cm^3  -JM      Drainage Characteristics/Odor serosanguineous  -JM      Drainage Amount small  -JM      Care, Wound cleansed with;wound cleanser;debrided;ultrasound therapy, non contact low frequency  MIST 5min  -JM      Dressing Care dressing applied;collagen;antimicrobial agent applied;silicone;border dressing;multi-layer wrap  tez, HFBt, 4\" optifoam, MLW  -JM      Periwound Care cleansed with pH balanced cleanser;dry periwound area maintained  -JM      Retired Wound - Properties Group Placement Date: 01/31/22  -MW Placement Time: 0800  -MW Present on Hospital Admission: Y  -MW Side: Left  -MW Orientation: lateral  -MW Location: foot  -MW Primary Wound Type: Incision  -MW Additional Comments: per pt \"had bunion removed\"  -MW      Retired Wound - Properties Group Date first assessed: 01/31/22  -MW Time first assessed: 0800  -MW Present on Hospital Admission: Y  -MW Side: Left  -MW Location: foot  -MW Primary Wound Type: Incision  -MW Additional Comments: per pt \"had bunion removed\"  -MW            User Key  (r) = Recorded By, (t) = Taken By, (c) = Cosigned By    Initials Name Provider Type    Carleen Rosales, PT Physical Therapist    Cheryl Hua, PT Physical Therapist               Lymphedema     Row Name 03/08/22 1300             Lymphedema Edema Assessment    Pitting Edema Mild;Moderate  -JM              Skin Changes/Observations    Lower Extremity Conditions left:;clean;dry;fragile  -JM      Lower Extremity Color/Pigment left:;hyperpigmented;blanchable  -JM              Lymphedema Pulses/Capillary Refill    Lower Extremity Capillary Refill left:;less than 3 seconds  -JM              Compression/Skin Care    " Compression/Skin Care skin care;wrapping location;bandaging  -      Skin Care washed/dried;moisturizing lotion applied  -      Wrapping Location lower extremity  -      Wrapping Location LE left:;foot to knee  -      Wrapping Comments size 4&5 compressogrips, layers doubled/overlapping for gradient compression  -      Bandage Layers cotton elastic stocking- double layer (comment size)  -            User Key  (r) = Recorded By, (t) = Taken By, (c) = Cosigned By    Initials Name Provider Type    Cheryl Hua, PT Physical Therapist                WOUND DEBRIDEMENT  Total area of Debridement: 1cmsq  Debridement Site 1  Location- Site 1: LT foot wound and periwound  Selective Debridement- Site 1: Wound Surface <20cmsq  Instruments- Site 1: tweezers  Excised Tissue Description- Site 1: scant, slough  Bleeding- Site 1: none              Therapy Education     Row Name 03/08/22 1300             Therapy Education    Education Details Recommended follow up on new shoes and wear soft sock over MLW to protect toes.  -JM      Given Bandaging/dressing change;Symptoms/condition management  -      Program Reinforced  -MARICEL      How Provided Verbal;Demonstration  -      Provided to Patient  -      Level of Understanding Verbalized  -            User Key  (r) = Recorded By, (t) = Taken By, (c) = Cosigned By    Initials Name Provider Type    Cheryl Hua, PT Physical Therapist                Recommendation and Plan   PT Assessment/Plan     Row Name 03/08/22 1300          PT Assessment    Functional Limitations Performance in self-care ADL;Other (comment)  wound mgmt  -     Impairments Integumentary integrity;Impaired venous circulation;Impaired arterial circulation;Peripheral nerve integrity  -     Assessment Comments Lat L foot wound with decreased area, increased granulation, no visible exposed bone today.  LLE edema moderate/stable, pt has vascular tests next week.  Noted small intact blisters  to distal L toes today, possibly from worn-out shoes that pt does not tie shoelaces.  Recommended wearing socks to protect toes and new well-fitting shoes.  Continue current tx for L foot wound and LLE edema, monitor toes.  -            PT Plan    PT Frequency 2x/week  -MARICEL     Physical Therapy Interventions (Optional Details) patient/family education;wound care  -     PT Plan Comments MIST, debridement, MLW, check toes  -MARICEL           User Key  (r) = Recorded By, (t) = Taken By, (c) = Cosigned By    Initials Name Provider Type    Cheryl Hua, PT Physical Therapist                Goals   PT OP Goals     Row Name 03/08/22 1330          Time Calculation    PT Goal Re-Cert Due Date 04/30/22  -           User Key  (r) = Recorded By, (t) = Taken By, (c) = Cosigned By    Initials Name Provider Type    Cheryl Hua, PT Physical Therapist                PT Goal Re-Cert Due Date: 04/30/22            Time Calculation: Start Time: 1330  Untimed Charges  57168-Fkqqvubsdg comp below knee: 10  37679-Jpcdxjuro debridement: 5  85136-DICF Mist: 10  Total Minutes  Untimed Charges Total Minutes: 25   Total Minutes: 25  Therapy Charges for Today     Code Description Service Date Service Provider Modifiers Qty    45768579630 HC JOSS DEBRIDE OPEN WOUND UP TO 20CM 3/8/2022 Cheryl Washington, PT GP 1    95361229543 HC PT NLFU MIST 3/8/2022 Cheryl Washington, PT GP 1    98042401437 HC PT MULTI LAYER COMP SYS BELOW KNEE 3/8/2022 Cheryl Washington, PT GP 1                  Cheryl Washington, PT  3/8/2022

## 2022-03-09 ENCOUNTER — OFFICE VISIT (OUTPATIENT)
Dept: FAMILY MEDICINE CLINIC | Age: 72
End: 2022-03-09
Payer: MEDICARE

## 2022-03-09 VITALS
WEIGHT: 189.8 LBS | OXYGEN SATURATION: 97 % | DIASTOLIC BLOOD PRESSURE: 64 MMHG | HEART RATE: 74 BPM | RESPIRATION RATE: 18 BRPM | HEIGHT: 73 IN | SYSTOLIC BLOOD PRESSURE: 100 MMHG | TEMPERATURE: 97.5 F | BODY MASS INDEX: 25.15 KG/M2

## 2022-03-09 DIAGNOSIS — J44.1 COPD EXACERBATION (HCC): ICD-10-CM

## 2022-03-09 DIAGNOSIS — Z79.01 ANTICOAGULATED ON COUMADIN: Primary | ICD-10-CM

## 2022-03-09 LAB
INTERNATIONAL NORMALIZATION RATIO, POC: 2.3
PROTHROMBIN TIME, POC: NORMAL

## 2022-03-09 PROCEDURE — G8417 CALC BMI ABV UP PARAM F/U: HCPCS | Performed by: NURSE PRACTITIONER

## 2022-03-09 PROCEDURE — 99213 OFFICE O/P EST LOW 20 MIN: CPT | Performed by: NURSE PRACTITIONER

## 2022-03-09 PROCEDURE — G8484 FLU IMMUNIZE NO ADMIN: HCPCS | Performed by: NURSE PRACTITIONER

## 2022-03-09 PROCEDURE — 1036F TOBACCO NON-USER: CPT | Performed by: NURSE PRACTITIONER

## 2022-03-09 PROCEDURE — G8427 DOCREV CUR MEDS BY ELIG CLIN: HCPCS | Performed by: NURSE PRACTITIONER

## 2022-03-09 PROCEDURE — 3017F COLORECTAL CA SCREEN DOC REV: CPT | Performed by: NURSE PRACTITIONER

## 2022-03-09 PROCEDURE — 4040F PNEUMOC VAC/ADMIN/RCVD: CPT | Performed by: NURSE PRACTITIONER

## 2022-03-09 PROCEDURE — 85610 PROTHROMBIN TIME: CPT | Performed by: NURSE PRACTITIONER

## 2022-03-09 PROCEDURE — 3023F SPIROM DOC REV: CPT | Performed by: NURSE PRACTITIONER

## 2022-03-09 PROCEDURE — 1123F ACP DISCUSS/DSCN MKR DOCD: CPT | Performed by: NURSE PRACTITIONER

## 2022-03-09 ASSESSMENT — ENCOUNTER SYMPTOMS
WHEEZING: 0
ABDOMINAL PAIN: 0
COUGH: 0
DIARRHEA: 0
NAUSEA: 0
VOMITING: 0
SHORTNESS OF BREATH: 0

## 2022-03-09 NOTE — PROGRESS NOTES
Mood normal.         Behavior: Behavior normal.         Thought Content: Thought content normal.         Judgment: Judgment normal.         ASSESSMENT/PLAN:   Andrea Trujillo was seen today for copd. Diagnoses and all orders for this visit:    COPD exacerbation- resolved    Anticoagulated on Coumadin  -     POCT INR 2.3- Continue Coumadiin 2.5 mg daily- repeat INR in 1 month. Pt advised to stop by the office in 2 weeks for an outpatient INR due to recent fluctuations of INR and he is current on Amoxil 500 mg TID for 10 days. Return in about 1 month (around 4/9/2022).       Current Outpatient Medications on File Prior to Visit   Medication Sig Dispense Refill    guaiFENesin (MUCINEX) 600 MG extended release tablet Take 2 tablets by mouth 2 times daily for 10 days 40 tablet 0    MITIGARE 0.6 MG capsule TAKE 1 CAPSULE BY MOUTH DAILY 30 capsule 1    glipiZIDE (GLUCOTROL) 10 MG tablet Take 2 tablets by mouth 2 times daily (before meals) 360 tablet 2    allopurinol (ZYLOPRIM) 300 MG tablet TAKE 1 TABLET BY MOUTH EVERY DAY      amLODIPine (NORVASC) 5 MG tablet TAKE 1 TABLET BY MOUTH TWICE A DAY      Calcium Polycarbophil (FIBER) 625 MG TABS Take 625 mg by mouth daily      digoxin (LANOXIN) 125 MCG tablet TAKE 1 TABLET BY MOUTH EVERY DAY OR AS DIRECTED      colchicine (COLCRYS) 0.6 MG tablet Take 1 tablet by mouth daily as needed for Pain 30 tablet 5    metOLazone (ZAROXOLYN) 5 MG tablet Take 5 mg by mouth daily      Dulaglutide (TRULICITY) 1.5 IB/7.7YX SOPN Inject 1.5 mg into the skin once a week      ipratropium (ATROVENT) 0.02 % nebulizer solution Take 0.5 mg by nebulization every 4-6 hours as needed for Wheezing      furosemide (LASIX) 40 MG tablet take 1 tablet by mouth once daily (Patient taking differently: 40 mg 2 times daily take 1 tablet by mouth once daily) 30 tablet 5    ferrous sulfate 325 (65 Fe) MG tablet take 1 tablet by mouth twice a day 60 tablet 5    docusate sodium (COLACE) 100 MG capsule Take 1 capsule by mouth 2 times daily 60 capsule 5    lovastatin (MEVACOR) 20 MG tablet Take 1 tablet by mouth daily 30 tablet 5    metoprolol succinate (TOPROL XL) 25 MG extended release tablet take 1 tablet by mouth once daily 30 tablet 5    nitroGLYCERIN (NITROSTAT) 0.4 MG SL tablet place 1 tablet under the tongue if needed every 5 minutes for chest pain for 3 doses IF NO RELIEF AFTER 3RD DOSE CALL PRESCRIBER . (Patient not taking: Reported on 12/8/2021) 25 tablet 5    COUMADIN 5 MG tablet take as directed (Patient taking differently: 5 mg daily Patient takes 5mg daily and 7.5mg on Sundays) 100 tablet 5    aspirin 81 MG tablet Take 81 mg by mouth daily. No current facility-administered medications on file prior to visit.

## 2022-03-09 NOTE — PROGRESS NOTES
Chief Complaint   Patient presents with    COPD     follow up after ED visit     Patient here to follow up after ED visit for COPD exacerbation. Patient reports he is feeling better, denies SOA, and O2 sat 97% on room air. Patient also here for POC INR. Have you seen any other physician or provider since your last visit- [facility] ED     Have you had any other diagnostic tests since your last visit?  Labs and CXR    Have you changed or stopped any medications since your last visit? no

## 2022-03-11 ENCOUNTER — HOSPITAL ENCOUNTER (OUTPATIENT)
Dept: PHYSICAL THERAPY | Facility: HOSPITAL | Age: 72
Setting detail: THERAPIES SERIES
Discharge: HOME | End: 2022-03-11

## 2022-03-11 DIAGNOSIS — I73.9 PERIPHERAL VASCULAR DISEASE OF LOWER EXTREMITY: ICD-10-CM

## 2022-03-11 DIAGNOSIS — I87.2 VENOUS INSUFFICIENCY: ICD-10-CM

## 2022-03-11 DIAGNOSIS — L97.509 TYPE 2 DIABETES MELLITUS WITH FOOT ULCER, UNSPECIFIED WHETHER LONG TERM INSULIN USE: ICD-10-CM

## 2022-03-11 DIAGNOSIS — E11.621 TYPE 2 DIABETES MELLITUS WITH FOOT ULCER, UNSPECIFIED WHETHER LONG TERM INSULIN USE: ICD-10-CM

## 2022-03-11 DIAGNOSIS — L97.528 ULCER OF LEFT FOOT WITH OTHER SEVERITY: Primary | ICD-10-CM

## 2022-03-11 PROCEDURE — 29581 APPL MULTLAYER CMPRN SYS LEG: CPT

## 2022-03-11 PROCEDURE — 97610 LOW FREQUENCY NON-THERMAL US: CPT

## 2022-03-11 PROCEDURE — 97597 DBRDMT OPN WND 1ST 20 CM/<: CPT

## 2022-03-11 NOTE — THERAPY WOUND CARE TREATMENT
Outpatient Rehabilitation - Wound/Debridement Treatment Note   Gamaliel     Patient Name: Swapnil Lawson  : 1950  MRN: [id number]  Today's Date: 3/11/2022                 Admit Date: 3/11/2022    Visit Dx:    ICD-10-CM ICD-9-CM   1. Ulcer of left foot with other severity ([facility])  L97.528 707.15   2. Venous insufficiency  I87.2 459.81   3. Type 2 diabetes mellitus with foot ulcer, unspecified whether long term insulin use ([facility])  E11.621 250.80    L97.509 707.15   4. Peripheral vascular disease of lower extremity ([facility])  I73.9 443.9       Patient Active Problem List   Diagnosis   • Coronary artery disease   • Hypertension   • Paroxysmal atrial fibrillation (HCC)   • Diabetes mellitus (HCC)   • Venous insufficiency   • Hyperlipidemia   • Nuclear sclerotic cataract of right eye   • Cortical age-related cataract of right eye   • Nuclear sclerotic cataract of left eye   • Cortical age-related cataract of left eye   • Thrombocytopenia (HCC)        Past Medical History:   Diagnosis Date   • COPD (chronic obstructive pulmonary disease) ([facility])    • Coronary artery disease 2016    CABG, , Abimael Tomlin MD. CABG, 2013, [facility].   • Diabetes mellitus (HCC) 2016   • Gout    • Hyperlipidemia 2016   • Hypertension 2016   • Myocardial infarction (HCC)      in  and  prior to CABG.   • Paroxysmal atrial fibrillation (HCC) 2016   • Sleep apnea    • Venous insufficiency 2016        Past Surgical History:   Procedure Laterality Date   • APPENDECTOMY     • CARDIAC CATHETERIZATION     • CARDIAC SURGERY       and    • CATARACT EXTRACTION W/ INTRAOCULAR LENS IMPLANT Right 2020    Procedure: CATARACT PHACO EXTRACTION WITH INTRAOCULAR LENS IMPLANT RIGHT;  Surgeon: Omar Blood MD;  Location: [facility];  Service: Ophthalmology;  Laterality: Right;   • CATARACT EXTRACTION W/ INTRAOCULAR LENS IMPLANT Left 2020    Procedure: CATARACT  "PHACO EXTRACTION WITH INTRAOCULAR LENS IMPLANT LEFT;  Surgeon: Omar Blood MD;  Location: [facility];  Service: Ophthalmology;  Laterality: Left;   • CHOLECYSTECTOMY  2002   • COLONOSCOPY     • FINGER SURGERY      Rt index (second finger)   • OTHER SURGICAL HISTORY  2010    Bone spur removal   • TOE AMPUTATION  2009   • VASECTOMY           EVALUATION   PT Ortho     Row Name 03/11/22 1000       Subjective Comments    Subjective Comments No complaints or changes  -MC       Subjective Pain    Able to rate subjective pain? yes  -MC    Pre-Treatment Pain Level 0  -MC    Post-Treatment Pain Level 0  -MC       Transfers    Sit-Stand Nash (Transfers) modified independence  -MC    Stand-Sit [place] (Transfers) modified independence  -    Transfers, Sit-Stand-Sit, Assist Device straight cane  -MC    Comment, (Transfers) seated for tx  -       Gait/Stairs (Locomotion)    [place]ash Level (Gait) modified independence  -    Assistive Device (Gait) cane, straight  -MC          User Key  (r) = Recorded By, (t) = Taken By, (c) = Cosigned By    Initials Name Provider Type    Morena Rajan PT Physical Therapist                 [facility] Wound     Row Name 03/11/22 1000             Wound 01/31/22 0800 Left lateral foot Incision    Wound - Properties Group Placement Date: 01/31/22  -MW Placement Time: 0800  -MW Present on Hospital Admission: Y  -MW Side: Left  -MW Orientation: lateral  -MW Location: foot  -MW Primary Wound Type: Incision  -MW Additional Comments: per pt \"had bunion removed\"  -MW      Dressing Appearance intact;moist drainage  -MC      Base moist;red;subcutaneous;yellow;granulating;yeast  increasing granulation  -MC      Periwound intact;swelling;warm;macerated;pale white  -MC      Periwound Temperature warm  -MC      Periwound Skin Turgor soft  -MC      Edges open;irregular  proximal edge loose  -MC      Drainage Characteristics/Odor serosanguineous  -MC      Drainage Amount small  -MC   " "   Care, Wound cleansed with;wound cleanser;debrided;ultrasound therapy, non contact low frequency  MIST 5 minutes  -      Dressing Care dressing applied;silver impregnated;collagen;antimicrobial agent applied;foam;low-adherent;border dressing  tez, HFBt, 4\" optifoam, MLW  -      Periwound Care cleansed with pH balanced cleanser;barrier ointment applied  zguard  -      Retired Wound - Properties Group Placement Date: 01/31/22  -MW Placement Time: 0800  -MW Present on Hospital Admission: Y  -MW Side: Left  -MW Orientation: lateral  -MW Location: foot  -MW Primary Wound Type: Incision  -MW Additional Comments: per pt \"had bunion removed\"  -MW      Retired Wound - Properties Group Date first assessed: 01/31/22  -MW Time first assessed: 0800  -MW Present on Hospital Admission: Y  -MW Side: Left  -MW Location: foot  -MW Primary Wound Type: Incision  -MW Additional Comments: per pt \"had bunion removed\"  -MW            User Key  (r) = Recorded By, (t) = Taken By, (c) = Cosigned By    Initials Name Provider Type    MW Carleen Lieberman, PT Physical Therapist    Morena Rajan, PT Physical Therapist               Lymphedema     Row Name 03/11/22 1000             Lymphedema Edema Assessment    Pitting Edema Mild  -              Skin Changes/Observations    Lower Extremity Conditions left:;clean;dry;fragile  -      Lower Extremity Color/Pigment left:;hyperpigmented;blanchable  -              Lymphedema Pulses/Capillary Refill    Lower Extremity Capillary Refill left:;less than 3 seconds  -              Compression/Skin Care    Compression/Skin Care skin care;wrapping location;bandaging  -      Skin Care washed/dried  -      Wrapping Location lower extremity  -      Wrapping Location LE left:;foot to knee  -      Wrapping Comments size 4&5 compressogrips, layers doubled/overlapping for gradient compression  -      Bandage Layers cotton elastic stocking- double layer (comment size)  -         "    User Key  (r) = Recorded By, (t) = Taken By, (c) = Cosigned By    Initials Name Provider Type    Morena Rajan, PT Physical Therapist                WOUND DEBRIDEMENT  Total area of Debridement: 1 cm2  Debridement Site 1  Location- Site 1: LT foot wound and periwound  Selective Debridement- Site 1: Wound Surface <20cmsq  Instruments- Site 1: tweezers, #15, scapel  Excised Tissue Description- Site 1: minimum, slough, other (comment) (periwound callus)  Bleeding- Site 1: scant, held pressure, 1 minute              Therapy Education     Row Name 03/11/22 1027             Therapy Education    Education Details Continue current POC  -MC      Given Bandaging/dressing change;Symptoms/condition management  -      Program Reinforced  -      How Provided Verbal;Demonstration  -      Provided to Patient  -      Level of Understanding Verbalized  -            User Key  (r) = Recorded By, (t) = Taken By, (c) = Cosigned By    Initials Name Provider Type    Morena Rajan PT Physical Therapist                Recommendation and Plan   PT Assessment/Plan     Row Name 03/11/22 1027          PT Assessment    Functional Limitations Performance in self-care ADL;Other (comment)  wound mgmt, edema mgmt  -     Impairments Integumentary integrity;Impaired venous circulation;Impaired arterial circulation;Peripheral nerve integrity  -     Assessment Comments Pt with increasing granulation and no slough after debridement today. The proximal periwound edge is loose, with some questionable hypergranulation in that area that may require Ag Nitrate treatment soon. Distal toe blisters appear stable with no signs of skin breakdown that would require additional intervention today, will continue to monitor. Pt's LLE edema appears improved with use of MLW. Pt will continue to benefit from skilled PT wound care to continue progress.  -     Rehab Potential Fair  -     Patient/caregiver participated in establishment of  treatment plan and goals Yes  -     Patient would benefit from skilled therapy intervention Yes  -            PT Plan    PT Frequency 2x/week  -     Physical Therapy Interventions (Optional Details) wound care;patient/family education  -     PT Plan Comments MIST, debridement, MLW, monitor toes  -           User Key  (r) = Recorded By, (t) = Taken By, (c) = Cosigned By    Initials Name Provider Type    Morena Rajan, PT Physical Therapist                Goals   PT OP Goals     Row Name 03/11/22 1029          Time Calculation    PT Goal Re-Cert Due Date 04/30/22  -           User Key  (r) = Recorded By, (t) = Taken By, (c) = Cosigned By    Initials Name Provider Type     Morena Jordan, PT Physical Therapist                PT Goal Re-Cert Due Date: 04/30/22            Time Calculation: Start Time: 0800  Untimed Charges  58678-Njbizndkys comp below knee: 10  25196-Moneosebg debridement: 10  47714-PNOW Mist: 10  Total Minutes  Untimed Charges Total Minutes: 30   Total Minutes: 30  Therapy Charges for Today     Code Description Service Date Service Provider Modifiers Qty    84417199021 HC PT NLFU MIST 3/11/2022 Morena Jordan, PT GP 1    33659545162 HC PT MULTI LAYER COMP SYS BELOW KNEE 3/11/2022 Morena Jordan, PT GP 1    76596638033 HC JOSS DEBRIDE OPEN WOUND UP TO 20CM 3/11/2022 Morena Jordan, PT GP 1                  Morena Jordan, PT  3/11/2022

## 2022-03-15 ENCOUNTER — HOSPITAL ENCOUNTER (OUTPATIENT)
Dept: CARDIOLOGY | Facility: HOSPITAL | Age: 72
End: 2022-03-15

## 2022-03-15 ENCOUNTER — HOSPITAL ENCOUNTER (OUTPATIENT)
Dept: CARDIOLOGY | Facility: HOSPITAL | Age: 72
Discharge: HOME | End: 2022-03-15

## 2022-03-15 ENCOUNTER — HOSPITAL ENCOUNTER (OUTPATIENT)
Dept: PHYSICAL THERAPY | Facility: HOSPITAL | Age: 72
Setting detail: THERAPIES SERIES
Discharge: HOME | End: 2022-03-15

## 2022-03-15 VITALS — BODY MASS INDEX: 24.13 KG/M2 | WEIGHT: 182.1 LBS | HEIGHT: 73 IN

## 2022-03-15 DIAGNOSIS — I73.9 PERIPHERAL VASCULAR DISEASE OF LOWER EXTREMITY: ICD-10-CM

## 2022-03-15 DIAGNOSIS — R60.0 BILATERAL LEG EDEMA: ICD-10-CM

## 2022-03-15 DIAGNOSIS — E11.621 TYPE 2 DIABETES MELLITUS WITH FOOT ULCER, UNSPECIFIED WHETHER LONG TERM INSULIN USE: ICD-10-CM

## 2022-03-15 DIAGNOSIS — I73.9 ASYMPTOMATIC PVD (PERIPHERAL VASCULAR DISEASE): ICD-10-CM

## 2022-03-15 DIAGNOSIS — L97.528 ULCER OF LEFT FOOT WITH OTHER SEVERITY: Primary | ICD-10-CM

## 2022-03-15 DIAGNOSIS — L97.509 TYPE 2 DIABETES MELLITUS WITH FOOT ULCER, UNSPECIFIED WHETHER LONG TERM INSULIN USE: ICD-10-CM

## 2022-03-15 DIAGNOSIS — I87.2 VENOUS INSUFFICIENCY: ICD-10-CM

## 2022-03-15 PROCEDURE — 93925 LOWER EXTREMITY STUDY: CPT

## 2022-03-15 PROCEDURE — 97610 LOW FREQUENCY NON-THERMAL US: CPT

## 2022-03-15 PROCEDURE — 97597 DBRDMT OPN WND 1ST 20 CM/<: CPT

## 2022-03-15 PROCEDURE — 93971 EXTREMITY STUDY: CPT

## 2022-03-15 PROCEDURE — 93925 LOWER EXTREMITY STUDY: CPT | Performed by: INTERNAL MEDICINE

## 2022-03-15 PROCEDURE — 93971 EXTREMITY STUDY: CPT | Performed by: INTERNAL MEDICINE

## 2022-03-15 PROCEDURE — 29581 APPL MULTLAYER CMPRN SYS LEG: CPT

## 2022-03-15 NOTE — THERAPY WOUND CARE TREATMENT
Outpatient Rehabilitation - Wound/Debridement Treatment Note   Gamaliel     Patient Name: Swapnil Lawson  : 1950  MRN: [id number]  Today's Date: 3/15/2022             5th met head      Distal toes      Admit Date: 3/15/2022    Visit Dx:    ICD-10-CM ICD-9-CM   1. Ulcer of left foot with other severity ([facility])  L97.528 707.15   2. Venous insufficiency  I87.2 459.81   3. Type 2 diabetes mellitus with foot ulcer, unspecified whether long term insulin use ([facility])  E11.621 250.80    L97.509 707.15   4. Peripheral vascular disease of lower extremity ([facility])  I73.9 443.9       Patient Active Problem List   Diagnosis   • Coronary artery disease   • Hypertension   • Paroxysmal atrial fibrillation (HCC)   • Diabetes mellitus (HCC)   • Venous insufficiency   • Hyperlipidemia   • Nuclear sclerotic cataract of right eye   • Cortical age-related cataract of right eye   • Nuclear sclerotic cataract of left eye   • Cortical age-related cataract of left eye   • Thrombocytopenia ([facility])        Past Medical History:   Diagnosis Date   • COPD (chronic obstructive pulmonary disease) ([facility])    • Coronary artery disease 2016    CABG, , Abimael Tomlin MD. CABG, 2013, [facility].   • Diabetes mellitus ([facility]) 2016   • Gout    • Hyperlipidemia 2016   • Hypertension 2016   • Myocardial infarction ([facility])      in  and  prior to CABG.   • Paroxysmal atrial fibrillation (HCC) 2016   • Sleep apnea    • Venous insufficiency 2016        Past Surgical History:   Procedure Laterality Date   • APPENDECTOMY     • CARDIAC CATHETERIZATION     • CARDIAC SURGERY       and    • CATARACT EXTRACTION W/ INTRAOCULAR LENS IMPLANT Right 2020    Procedure: CATARACT PHACO EXTRACTION WITH INTRAOCULAR LENS IMPLANT RIGHT;  Surgeon: Omar Blood MD;  [facility];  Service: Ophthalmology;  Laterality: Right;   • CATARACT EXTRACTION W/ INTRAOCULAR LENS IMPLANT Left  "7/9/2020    Procedure: CATARACT PHACO EXTRACTION WITH INTRAOCULAR LENS IMPLANT LEFT;  Surgeon: Omar Blood MD;  Location: [facility];  Service: Ophthalmology;  Laterality: Left;   • CHOLECYSTECTOMY  2002   • COLONOSCOPY     • FINGER SURGERY      Rt index (second finger)   • OTHER SURGICAL HISTORY  2010    Bone spur removal   • TOE AMPUTATION  2009   • VASECTOMY           EVALUATION   PT Ortho     Row Name 03/15/22 0900       Subjective Comments    Subjective Comments No complaints or changes  -       Subjective Pain    Able to rate subjective pain? yes  -MC    Pre-Treatment Pain Level 0  -MC    Post-Treatment Pain Level 0  -MC       Transfers    Sit-Stand [place] (Transfers) modified independence  -    Stand-Sit [place] (Transfers) modified independence  -    Transfers, Sit-Stand-Sit, Assist Device straight cane  -    Comment, (Transfers) seated for tx  -       Gait/Stairs (Locomotion)    [place] Level (Gait) modified independence  -    Assistive Device (Gait) cane, straight  -MC          User Key  (r) = Recorded By, (t) = Taken By, (c) = Cosigned By    Initials Name Provider Type    Morena Rajan PT Physical Therapist                 [facility] Wound     Row Name 03/15/22 0900             Wound 01/31/22 0800 Left lateral foot Incision    Wound - Properties Group Placement Date: 01/31/22  -MW Placement Time: 0800  -MW Present on Hospital Admission: Y  -MW Side: Left  -MW Orientation: lateral  - Location: foot  -MW Primary Wound Type: Incision  -MW Additional Comments: per pt \"had bunion removed\"  -      Wound Image View All Images View Images  -      Dressing Appearance intact;moist drainage  -      Base moist;red;granulating  small lip of hypergranulation plantar aspect  -      Periwound swelling;warm;macerated;pale white  Distal great toe with macerated epithelium and partial thickness areas s/p debridement of blister and coagulated blister material  -      " "Periwound Temperature warm  -      Periwound Skin Turgor soft  -MC      Edges open;irregular  plantar edge loose  -MC      Wound Length (cm) 0.8 cm  -MC      Wound Width (cm) 0.8 cm  -MC      Wound Depth (cm) 0.2 cm  -MC      Wound Surface Area (cm^2) 0.64 cm^2  -MC      Wound Volume (cm^3) 0.128 cm^3  -MC      Drainage Characteristics/Odor serosanguineous  -MC      Drainage Amount small  -      Care, Wound cleansed with;wound cleanser;debrided;ultrasound therapy, non contact low frequency;silver agent applied  MIST 5 minutes, Ag Nitrate to hypergranulation  -      Dressing Care dressing applied;silver impregnated;collagen;antimicrobial agent applied;foam;low-adherent;border dressing;multi-layer wrap  tez, HFBt, 4\" optifoam.  -      Periwound Care cleansed with pH balanced cleanser;barrier ointment applied  zguard. 2\" optifoam to distal R great toe  -      Retired Wound - Properties Group Placement Date: 01/31/22  -MW Placement Time: 0800  -MW Present on Hospital Admission: Y  -MW Side: Left  -MW Orientation: lateral  -MW Location: foot  -MW Primary Wound Type: Incision  -MW Additional Comments: per pt \"had bunion removed\"  -MW      Retired Wound - Properties Group Date first assessed: 01/31/22  -MW Time first assessed: 0800  -MW Present on Hospital Admission: Y  -MW Side: Left  -MW Location: foot  -MW Primary Wound Type: Incision  -MW Additional Comments: per pt \"had bunion removed\"  -MW            User Key  (r) = Recorded By, (t) = Taken By, (c) = Cosigned By    Initials Name Provider Type    MW Carleen Lieberman, PT Physical Therapist    Morena Rajan, PT Physical Therapist               Lymphedema     Row Name 03/15/22 0900             Lymphedema Edema Assessment    Pitting Edema Mild  -MC              Skin Changes/Observations    Lower Extremity Conditions left:;clean;dry;fragile  -      Lower Extremity Color/Pigment left:;hyperpigmented;blanchable  -              Lymphedema " Pulses/Capillary Refill    Lower Extremity Capillary Refill left:;less than 3 seconds  -MC              Compression/Skin Care    Compression/Skin Care skin care;wrapping location;bandaging  -      Skin Care washed/dried  -MC      Wrapping Location lower extremity  -MC      Wrapping Location LE left:;foot to knee  -MC      Wrapping Comments size 4&5 compressogrips, layers doubled/overlapping for gradient compression  -MC      Bandage Layers cotton elastic stocking- double layer (comment size)  -MC            User Key  (r) = Recorded By, (t) = Taken By, (c) = Cosigned By    Initials Name Provider Type    Morena Rajan, PT Physical Therapist                WOUND DEBRIDEMENT  Total area of Debridement: 2 cm2  Debridement Site 1  Location- Site 1: LT foot wound and periwound  Selective Debridement- Site 1: Wound Surface <20cmsq  Instruments- Site 1: tweezers  Excised Tissue Description- Site 1: minimum, slough  Bleeding- Site 1: scant, held pressure, 1 minute   Debridement Site 2  Location- Site 2: Distal R great toe  Selective Debridement- Site 2: Wound Surface <20cmsq  Instruments- Site 2: #15, scapel, tweezers  Excised Tissue Description- Site 2: maximum, other (comment) (blister material)  Bleeding- Site 2: seeping, held pressure, 1 minute          Therapy Education     Row Name 03/15/22 6122             Therapy Education    Education Details Continue current POC. Keep R great toe covered  -MC      Given Bandaging/dressing change;Symptoms/condition management  -MC      Program Reinforced  -MC      How Provided Verbal;Demonstration  -MC      Provided to Patient  -MC      Level of Understanding Verbalized  -MC            User Key  (r) = Recorded By, (t) = Taken By, (c) = Cosigned By    Initials Name Provider Type    Morena Rajan PT Physical Therapist                Recommendation and Plan   PT Assessment/Plan     Row Name 03/15/22 0938          PT Assessment    Functional Limitations Performance in  self-care ADL;Other (comment)  wound, edema management  -     Impairments Integumentary integrity;Impaired venous circulation;Impaired arterial circulation;Peripheral nerve integrity  -     Assessment Comments Pt with slightly improved wound dimensions, with new epithelialization present. Pt with slight hypergranulation that required Ag Nitrate treatment today. PT able to debride nonviable blistered skin and chunky, underlying fluid to reveal partial-thickness open areas and macerated epithelium that PT anticipates will heal quickly. Pt will continue to benefit from the current POC to promote healing.  -     Rehab Potential Fair  -     Patient/caregiver participated in establishment of treatment plan and goals Yes  -     Patient would benefit from skilled therapy intervention Yes  -            PT Plan    PT Frequency 2x/week  -     Physical Therapy Interventions (Optional Details) wound care;patient/family education  -     PT Plan Comments MIST, debridement, MLW, monitor R great toe  -           User Key  (r) = Recorded By, (t) = Taken By, (c) = Cosigned By    Initials Name Provider Type     Morena Jordan, PT Physical Therapist                Goals   PT OP Goals     Row Name 03/15/22 0907          Time Calculation    PT Goal Re-Cert Due Date 04/30/22  -           User Key  (r) = Recorded By, (t) = Taken By, (c) = Cosigned By    Initials Name Provider Type     Morena Jordan, PT Physical Therapist                PT Goal Re-Cert Due Date: 04/30/22            Time Calculation: Start Time: 0800  Untimed Charges  67628-Pfbalvaobm comp below knee: 10  19903-Hgwzvltzp debridement: 15  96384-YQWQ Mist: 10  Total Minutes  Untimed Charges Total Minutes: 35   Total Minutes: 35  Therapy Charges for Today     Code Description Service Date Service Provider Modifiers Qty    13906392419 HC PT NLFU MIST 3/15/2022 Morena Jordan, PT GP 1    20507768936 HC PT MULTI LAYER COMP SYS BELOW KNEE  3/15/2022 Morena Jordan, PT GP 1    82774480361 HC JOSS DEBRIDE OPEN WOUND UP TO 20CM 3/15/2022 Morena Jordan, PT GP 1                  Morena Jordan, PT  3/15/2022

## 2022-03-16 ENCOUNTER — HOSPITAL ENCOUNTER (OUTPATIENT)
Facility: HOSPITAL | Age: 72
Discharge: HOME | End: 2022-03-16
Payer: MEDICARE

## 2022-03-16 LAB
ALBUMIN SERPL-MCNC: 3.9 G/DL (ref 3.4–4.8)
ANION GAP SERPL CALCULATED.3IONS-SCNC: 10 MMOL/L (ref 3–16)
BASOPHILS ABSOLUTE: 0.1 K/UL (ref 0–0.1)
BASOPHILS RELATIVE PERCENT: 0.6 %
BH CV GRAFT BRACHIAL PRESSURE LEFT: 129 MMHG
BH CV GRAFT BRACHIAL PRESSURE RIGHT: 126 MMHG
BH CV LEA LEFT ANT TIBIAL A DISTAL PSV: 43 CM/S
BH CV LEA LEFT ANT TIBIAL A MID PSV: 50 CM/S
BH CV LEA LEFT ANT TIBIAL A PROX PSV: 80 CM/S
BH CV LEA LEFT CFA PROX PSV: 149 CM/S
BH CV LEA LEFT DFA PROX PSV: 136 CM/S
BH CV LEA LEFT PERONEAL  MID PSV: 30 CM/S
BH CV LEA LEFT POPITEAL A  DISTAL PSV: 82 CM/S
BH CV LEA LEFT POPITEAL A  PROX PSV: 72 CM/S
BH CV LEA LEFT PTA DISTAL PSV: 81 CM/S
BH CV LEA LEFT PTA MID PSV: 107 CM/S
BH CV LEA LEFT PTA PROX PSV: 53 CM/S
BH CV LEA LEFT SFA DISTAL PSV: 81 CM/S
BH CV LEA LEFT SFA MID PSV: 99 CM/S
BH CV LEA LEFT SFA PROX PSV: 173 CM/S
BH CV LEA RIGHT ANT TIBIAL A DISTAL PSV: 110 CM/S
BH CV LEA RIGHT ANT TIBIAL A MID PSV: 75 CM/S
BH CV LEA RIGHT ANT TIBIAL A PROX PSV: 76 CM/S
BH CV LEA RIGHT CFA PROX PSV: 151 CM/S
BH CV LEA RIGHT DFA PROX PSV: 51 CM/S
BH CV LEA RIGHT PERONEAL  MID PSV: 75 CM/S
BH CV LEA RIGHT POPITEAL A  DISTAL PSV: 102 CM/S
BH CV LEA RIGHT POPITEAL A  PROX PSV: 77 CM/S
BH CV LEA RIGHT PTA DISTAL PSV: 65 CM/S
BH CV LEA RIGHT PTA MID PSV: 65 CM/S
BH CV LEA RIGHT PTA PROX PSV: 164 CM/S
BH CV LEA RIGHT SFA DISTAL PSV: 82 CM/S
BH CV LEA RIGHT SFA MID PSV: 232 CM/S
BH CV LEA RIGHT SFA PROX PSV: 105 CM/S
BH CV LEFT LOWER VAS AA GSV REFLUX TIME: 0 SEC
BH CV LEFT LOWER VAS AA GSV TRANS DIAMETER: 0.3 CM
BH CV LEFT LOWER VAS COMMON FEMORAL REFLUX TIME: 3887 SEC
BH CV LEFT LOWER VAS DISTAL FV REFLUX COLOR FLOW TIME: 3843 SEC
BH CV LEFT LOWER VAS GSV MID CALF REFLUX TIME: 0 SEC
BH CV LEFT LOWER VAS GSV MID CALF TRANS DIAMETER: 0.2 CM
BH CV LEFT LOWER VAS MID FEMORAL REFLUX TIME: 4284 SEC
BH CV LEFT LOWER VAS PERFORATOR 1 REFLUX TIME: 851 SEC
BH CV LEFT LOWER VAS PERFORATOR 1 TRANS DIAMETER: 0.31 CM
BH CV LEFT LOWER VAS POPLITEAL REFLUX TIME: 2670 SEC
BH CV LEFT LOWER VAS PROX FV REFLUX COLOR FLOW TIME: 3983 SEC
BH CV LEFT LOWER VAS PTV REFLUX COLOR FLOW TIME: 2296 SEC
BH CV LEFT LOWER VAS SPJ REFLUX TIME: 0 SEC
BH CV LEFT LOWER VAS SPJ TRANS DIAMETER: 0.5 CM
BH CV LEFT LOWER VAS SSV MID CALF REFLUX TIME: 0 SEC
BH CV LEFT LOWER VAS SSV MID CALF TRANS DIAMETER: 0.4 CM
BH CV LOW VAS LEFT COMMON FEM NOT VIS SCRIPTING: 1
BH CV LOW VAS LEFT DISTAL FEMORAL NOT VIS SCRIPTING: 1
BH CV LOW VAS LEFT MID FEMORAL NOT VIS SCRIPTING: 1
BH CV LOW VAS LEFT PERONEAL NOT VIS SCRIPTING: 1
BH CV LOW VAS LEFT POPLITEAL NOT VIS SCRIPTING: 1
BH CV LOW VAS LEFT POSTERIOR TIB NOT VIS SCRIPTING: 1
BH CV LOW VAS LEFT PROX FEMORAL NOT VIS SCRIPTING: 1
BH CV LOW VAS RIGHT GSV DIST CALF VESSEL: 1
BH CV LOWER ARTERIAL RIGHT HIGHEST VELOCITY RATIO: 2.6
BH CV LOWER ARTERIAL RIGHT VELOCITY RATIO LOCATION: NORMAL
BH CV LOWER VAS LEFT GSV MID CALF COMPRESSIBILTY: NORMAL
BH CV LOWER VASCULAR LEFT AA GSV COMPETENT: NORMAL
BH CV LOWER VASCULAR LEFT AA GSV COMPRESS: NORMAL
BH CV LOWER VASCULAR LEFT COMMON FEMORAL AUGMENT: NORMAL
BH CV LOWER VASCULAR LEFT COMMON FEMORAL COMPETENT: NORMAL
BH CV LOWER VASCULAR LEFT COMMON FEMORAL COMPRESS: NORMAL
BH CV LOWER VASCULAR LEFT COMMON FEMORAL PHASIC: NORMAL
BH CV LOWER VASCULAR LEFT COMMON FEMORAL SPONT: NORMAL
BH CV LOWER VASCULAR LEFT DISTAL FEMORAL AUGMENT: NORMAL
BH CV LOWER VASCULAR LEFT DISTAL FEMORAL COMPETENT: NORMAL
BH CV LOWER VASCULAR LEFT DISTAL FEMORAL COMPRESS: NORMAL
BH CV LOWER VASCULAR LEFT DISTAL FEMORAL PHASIC: NORMAL
BH CV LOWER VASCULAR LEFT DISTAL FEMORAL SPONT: NORMAL
BH CV LOWER VASCULAR LEFT GSV MID CALF COMPETENT: NORMAL
BH CV LOWER VASCULAR LEFT MID FEMORAL AUGMENT: NORMAL
BH CV LOWER VASCULAR LEFT MID FEMORAL COMPETENT: NORMAL
BH CV LOWER VASCULAR LEFT MID FEMORAL COMPRESS: NORMAL
BH CV LOWER VASCULAR LEFT MID FEMORAL PHASIC: NORMAL
BH CV LOWER VASCULAR LEFT MID FEMORAL SPONT: NORMAL
BH CV LOWER VASCULAR LEFT MID FEMORAL THROMBUS: NORMAL
BH CV LOWER VASCULAR LEFT PERFORATOR 1 COMPETENT: NORMAL
BH CV LOWER VASCULAR LEFT PERFORATOR 1 COMPRESS: NORMAL
BH CV LOWER VASCULAR LEFT PERFORATOR 1 VESSEL: 1
BH CV LOWER VASCULAR LEFT PERONEAL COMPETENT: NORMAL
BH CV LOWER VASCULAR LEFT PERONEAL COMPRESS: NORMAL
BH CV LOWER VASCULAR LEFT POPLITEAL AUGMENT: NORMAL
BH CV LOWER VASCULAR LEFT POPLITEAL COMPETENT: NORMAL
BH CV LOWER VASCULAR LEFT POPLITEAL COMPRESS: NORMAL
BH CV LOWER VASCULAR LEFT POPLITEAL PHASIC: NORMAL
BH CV LOWER VASCULAR LEFT POPLITEAL SPONT: NORMAL
BH CV LOWER VASCULAR LEFT POPLITEAL THROMBUS: NORMAL
BH CV LOWER VASCULAR LEFT POSTERIOR TIBIAL COMPETENT: NORMAL
BH CV LOWER VASCULAR LEFT POSTERIOR TIBIAL COMPRESS: NORMAL
BH CV LOWER VASCULAR LEFT PROXIMAL FEMORAL AUGMENT: NORMAL
BH CV LOWER VASCULAR LEFT PROXIMAL FEMORAL COMPETENT: NORMAL
BH CV LOWER VASCULAR LEFT PROXIMAL FEMORAL COMPRESS: NORMAL
BH CV LOWER VASCULAR LEFT PROXIMAL FEMORAL PHASIC: NORMAL
BH CV LOWER VASCULAR LEFT PROXIMAL FEMORAL SPONT: NORMAL
BH CV LOWER VASCULAR LEFT PROXIMAL FEMORAL THROMBUS: NORMAL
BH CV LOWER VASCULAR LEFT SAPHENOPOP JX AUGMENT: NORMAL
BH CV LOWER VASCULAR LEFT SAPHENOPOP JX COMPETENT: NORMAL
BH CV LOWER VASCULAR LEFT SAPHENOPOP JX COMPRESS: NORMAL
BH CV LOWER VASCULAR LEFT SAPHENOPOP JX PHASIC: NORMAL
BH CV LOWER VASCULAR LEFT SAPHENOPOP JX SPONT: NORMAL
BH CV LOWER VASCULAR LEFT SSV MID CALF COMPETENT: NORMAL
BH CV LOWER VASCULAR LEFT SSV MID CALF COMPRESS: NORMAL
BH CV LOWER VASCULAR RIGHT COMMON FEMORAL COMPETENT: NORMAL
BH CV LOWER VASCULAR RIGHT COMMON FEMORAL PHASIC: NORMAL
BH CV LOWER VASCULAR RIGHT COMMON FEMORAL SPONT: NORMAL
BUN BLDV-MCNC: 25 MG/DL (ref 6–20)
CALCIUM SERPL-MCNC: 9.1 MG/DL (ref 8.5–10.5)
CHLORIDE BLD-SCNC: 102 MMOL/L (ref 98–107)
CO2: 24 MMOL/L (ref 20–30)
CREAT SERPL-MCNC: 1.6 MG/DL (ref 0.4–1.2)
EOSINOPHILS ABSOLUTE: 0.1 K/UL (ref 0–0.4)
EOSINOPHILS RELATIVE PERCENT: 0.8 %
GFR AFRICAN AMERICAN: 52
GFR NON-AFRICAN AMERICAN: 43
GLUCOSE BLD-MCNC: 93 MG/DL (ref 74–106)
HCT VFR BLD CALC: 37.8 % (ref 40–54)
HEMOGLOBIN: 11.6 G/DL (ref 13–18)
IMMATURE GRANULOCYTES #: 0.2 K/UL
IMMATURE GRANULOCYTES %: 2 % (ref 0–5)
LYMPHOCYTES ABSOLUTE: 0.8 K/UL (ref 1.5–4)
LYMPHOCYTES RELATIVE PERCENT: 8.7 %
MAXIMAL PREDICTED HEART RATE: 148 BPM
MCH RBC QN AUTO: 27.6 PG (ref 27–32)
MCHC RBC AUTO-ENTMCNC: 30.7 G/DL (ref 31–35)
MCV RBC AUTO: 89.8 FL (ref 80–100)
MONOCYTES ABSOLUTE: 0.7 K/UL (ref 0.2–0.8)
MONOCYTES RELATIVE PERCENT: 7.9 %
NEUTROPHILS ABSOLUTE: 6.9 K/UL (ref 2–7.5)
NEUTROPHILS RELATIVE PERCENT: 80 %
PDW BLD-RTO: 17 % (ref 11–16)
PHOSPHORUS: 2.8 MG/DL (ref 2.5–4.5)
PLATELET # BLD: 96 K/UL (ref 150–400)
PMV BLD AUTO: 11.3 FL (ref 6–10)
POTASSIUM SERPL-SCNC: 4.3 MMOL/L (ref 3.4–5.1)
RBC # BLD: 4.21 M/UL (ref 4.5–6)
SODIUM BLD-SCNC: 136 MMOL/L (ref 136–145)
STRESS TARGET HR: 126 BPM
WBC # BLD: 8.7 K/UL (ref 4–11)

## 2022-03-16 PROCEDURE — 85025 COMPLETE CBC W/AUTO DIFF WBC: CPT

## 2022-03-16 PROCEDURE — 36415 COLL VENOUS BLD VENIPUNCTURE: CPT

## 2022-03-16 PROCEDURE — 80069 RENAL FUNCTION PANEL: CPT

## 2022-03-17 ENCOUNTER — TELEPHONE (OUTPATIENT)
Dept: CARDIOLOGY | Facility: CLINIC | Age: 72
End: 2022-03-17

## 2022-03-17 NOTE — TELEPHONE ENCOUNTER
Notified of message above from . Notified of appt with  for 4/22/2022 at 1100. Arrive at 1030. Address [address]. Appt made with Duyen at  office. Patent verbalized understanding of all information provided. Records to be sent by our medical records dept.

## 2022-03-17 NOTE — TELEPHONE ENCOUNTER
----- Message from Naseem Campbell III, MD sent at 3/17/2022  8:30 AM EDT -----  Needs referral to a vein specialist, Dr. Brice.  Diagnosis post thrombotic syndrome, venous reflux disease.

## 2022-03-18 ENCOUNTER — HOSPITAL ENCOUNTER (OUTPATIENT)
Dept: PHYSICAL THERAPY | Facility: HOSPITAL | Age: 72
Setting detail: THERAPIES SERIES
Discharge: HOME | End: 2022-03-18

## 2022-03-18 DIAGNOSIS — L97.528 ULCER OF LEFT FOOT WITH OTHER SEVERITY: Primary | ICD-10-CM

## 2022-03-18 DIAGNOSIS — E11.621 TYPE 2 DIABETES MELLITUS WITH FOOT ULCER, UNSPECIFIED WHETHER LONG TERM INSULIN USE: ICD-10-CM

## 2022-03-18 DIAGNOSIS — L97.509 TYPE 2 DIABETES MELLITUS WITH FOOT ULCER, UNSPECIFIED WHETHER LONG TERM INSULIN USE: ICD-10-CM

## 2022-03-18 DIAGNOSIS — I87.2 VENOUS INSUFFICIENCY: ICD-10-CM

## 2022-03-18 DIAGNOSIS — I73.9 PERIPHERAL VASCULAR DISEASE OF LOWER EXTREMITY: ICD-10-CM

## 2022-03-18 PROCEDURE — 29581 APPL MULTLAYER CMPRN SYS LEG: CPT

## 2022-03-18 PROCEDURE — 97597 DBRDMT OPN WND 1ST 20 CM/<: CPT

## 2022-03-18 PROCEDURE — 97610 LOW FREQUENCY NON-THERMAL US: CPT

## 2022-03-18 NOTE — THERAPY WOUND CARE TREATMENT
Outpatient Rehabilitation - Wound/Debridement Treatment Note   Gamaliel     Patient Name: Swapnil Lawson  : 1950  MRN: [id number]  Today's Date: 3/18/2022                 Admit Date: 3/18/2022    Visit Dx:    ICD-10-CM ICD-9-CM   1. Ulcer of left foot with other severity ([facility])  L97.528 707.15   2. Venous insufficiency  I87.2 459.81   3. Peripheral vascular disease of lower extremity ([facility])  I73.9 443.9   4. Type 2 diabetes mellitus with foot ulcer, unspecified whether long term insulin use ([facility])  E11.621 250.80    L97.509 707.15       Patient Active Problem List   Diagnosis   • Coronary artery disease   • Hypertension   • Paroxysmal atrial fibrillation (HCC)   • Diabetes mellitus (HCC)   • Venous insufficiency   • Hyperlipidemia   • Nuclear sclerotic cataract of right eye   • Cortical age-related cataract of right eye   • Nuclear sclerotic cataract of left eye   • Cortical age-related cataract of left eye   • Thrombocytopenia ([facility])        Past Medical History:   Diagnosis Date   • COPD (chronic obstructive pulmonary disease) ([facility])    • Coronary artery disease 2016    CABG, , Abimael Tomlin MD. CABG, 2013, [facility].   • Diabetes mellitus (HCC) 2016   • Gout    • Hyperlipidemia 2016   • Hypertension 2016   • Myocardial infarction (HCC)      in  and  prior to CABG.   • Paroxysmal atrial fibrillation (HCC) 2016   • Sleep apnea    • Venous insufficiency 2016        Past Surgical History:   Procedure Laterality Date   • APPENDECTOMY     • CARDIAC CATHETERIZATION     • CARDIAC SURGERY       and    • CATARACT EXTRACTION W/ INTRAOCULAR LENS IMPLANT Right 2020    Procedure: CATARACT PHACO EXTRACTION WITH INTRAOCULAR LENS IMPLANT RIGHT;  Surgeon: Omar Blood MD;  Location: [facility];  Service: Ophthalmology;  Laterality: Right;   • CATARACT EXTRACTION W/ INTRAOCULAR LENS IMPLANT Left 2020    Procedure: CATARACT  "PHACO EXTRACTION WITH INTRAOCULAR LENS IMPLANT LEFT;  Surgeon: Omar Blood MD;  Location: [facility];  Service: Ophthalmology;  Laterality: Left;   • CHOLECYSTECTOMY  2002   • COLONOSCOPY     • FINGER SURGERY      Rt index (second finger)   • OTHER SURGICAL HISTORY  2010    Bone spur removal   • TOE AMPUTATION  2009   • VASECTOMY           EVALUATION   PT Ortho     Row Name 03/18/22 0800       Subjective Comments    Subjective Comments No complaints since last assessment  -       Subjective Pain    Able to rate subjective pain? yes  -MC    Pre-Treatment Pain Level 0  -MC    Post-Treatment Pain Level 0  -MC       Transfers    Sit-Stand [place] (Transfers) modified independence  -    Stand-Sit [place] (Transfers) modified independence  -    Transfers, Sit-Stand-Sit, Assist Device straight cane  -    Comment, (Transfers) seated for tx  -       Gait/Stairs (Locomotion)    [place] Level (Gait) modified independence  -    Assistive Device (Gait) cane, straight  -MC          User Key  (r) = Recorded By, (t) = Taken By, (c) = Cosigned By    Initials Name Provider Type    Morena Rajan PT Physical Therapist                 [facility] Wound     Row Name 03/18/22 0800             Wound 01/31/22 0800 Left lateral foot Incision    Wound - Properties Group Placement Date: 01/31/22  -MW Placement Time: 0800  -MW Present on Hospital Admission: Y  -MW Side: Left  -MW Orientation: lateral  -MW Location: foot  -MW Primary Wound Type: Incision  -MW Additional Comments: per pt \"had bunion removed\"  -MW      Dressing Appearance intact;moist drainage  -MC      Base moist;red;granulating  -MC      Periwound swelling;warm;macerated;pale white  -      Periwound Temperature warm  -      Periwound Skin Turgor soft  -MC      Edges open;irregular  plantar edge loose  -MC      Drainage Characteristics/Odor serosanguineous  -MC      Drainage Amount small  -MC      Care, Wound cleansed with;wound " "cleanser;debrided;ultrasound therapy, non contact low frequency  MIST 4 minutes  -      Dressing Care dressing applied;silver impregnated;collagen;antimicrobial agent applied;foam;low-adherent;border dressing  tez, HFBt, 4\" optifoam, MLW  -      Periwound Care cleansed with pH balanced cleanser;dry periwound area maintained  -      Retired Wound - Properties Group Placement Date: 01/31/22  -MW Placement Time: 0800  -MW Present on Hospital Admission: Y  -MW Side: Left  -MW Orientation: lateral  -MW Location: foot  -MW Primary Wound Type: Incision  -MW Additional Comments: per pt \"had bunion removed\"  -MW      Retired Wound - Properties Group Date first assessed: 01/31/22  -MW Time first assessed: 0800  -MW Present on Hospital Admission: Y  -MW Side: Left  -MW Location: foot  -MW Primary Wound Type: Incision  -MW Additional Comments: per pt \"had bunion removed\"  -MW              Wound 03/18/22 0800 Left distal great toe Blisters    Wound - Properties Group Placement Date: 03/18/22  - Placement Time: 0800  -MC Present on Hospital Admission: N  -MC Side: Left  -MC Orientation: distal  -MC Location: great toe  -MC Primary Wound Type: Blisters  -      Dressing Appearance intact;moist drainage  adhesive bandage  -      Base moist;pink;red;epithelialization;yellow;slough  -      Periwound intact;pink;dry  -      Periwound Temperature warm  -      Periwound Skin Turgor soft  -      Edges irregular;open  -      Drainage Characteristics/Odor serous  -MC      Drainage Amount scant  -      Care, Wound cleansed with;wound cleanser;debrided;ultrasound therapy, non contact low frequency  MIST 3 minutes  -      Dressing Care dressing applied;antimicrobial agent applied;foam;low-adherent;border dressing  HFBt, 2\" optifoam, primafix tape  -      Retired Wound - Properties Group Placement Date: 03/18/22  - Placement Time: 0800  -MC Present on Hospital Admission: N  - Side: Left  - Orientation: " distal  - Location: great toe  - Primary Wound Type: Blisters  -      Retired Wound - Properties Group Date first assessed: 03/18/22  - Time first assessed: 0800  - Present on Hospital Admission: N  - Side: Left  - Location: great toe  - Primary Wound Type: Blisters  -            User Key  (r) = Recorded By, (t) = Taken By, (c) = Cosigned By    Initials Name Provider Type    Carleen Rosales, PT Physical Therapist    Morena Rajan, PT Physical Therapist               Lymphedema     Row Name 03/18/22 0800             Lymphedema Edema Assessment    Pitting Edema Mild  -              Skin Changes/Observations    Lower Extremity Conditions left:;clean;dry;fragile  -      Lower Extremity Color/Pigment left:;hyperpigmented;blanchable  -              Lymphedema Pulses/Capillary Refill    Lower Extremity Capillary Refill left:;less than 3 seconds  -              Compression/Skin Care    Compression/Skin Care skin care;wrapping location;bandaging  -      Skin Care washed/dried;lotion applied  -      Wrapping Location lower extremity  -      Wrapping Location LE left:;foot to knee  -      Wrapping Comments size 4&5 compressogrips, layers doubled/overlapping for gradient compression  -      Bandage Layers cotton elastic stocking- double layer (comment size)  -            User Key  (r) = Recorded By, (t) = Taken By, (c) = Cosigned By    Initials Name Provider Type    Morena Rajan, PT Physical Therapist                WOUND DEBRIDEMENT  Total area of Debridement: 2 cm2  Debridement Site 1  Location- Site 1: LT foot wound and periwound  Selective Debridement- Site 1: Wound Surface <20cmsq  Instruments- Site 1: tweezers, scissors  Excised Tissue Description- Site 1: minimum, slough, other (comment) (macerated periwound debris)  Bleeding- Site 1: none   Debridement Site 2  Location- Site 2: Distal R great toe  Selective Debridement- Site 2: Wound Surface  <20cmsq  Instruments- Site 2: tweezers  Excised Tissue Description- Site 2: scant, slough  Bleeding- Site 2: none          Therapy Education     Row Name 03/18/22 0834             Therapy Education    Education Details Add HFB to distal toe, change at same frequency as lateral foot (every 2-3 days).  -      Given Bandaging/dressing change;Symptoms/condition management  -      Program Progressed  -      How Provided Verbal;Demonstration  -      Provided to Patient  -      Level of Understanding Verbalized  -            User Key  (r) = Recorded By, (t) = Taken By, (c) = Cosigned By    Initials Name Provider Type     Morena Jordan, PT Physical Therapist                Recommendation and Plan   PT Assessment/Plan     Row Name 03/18/22 5208          PT Assessment    Functional Limitations Performance in self-care ADL;Other (comment)  wound mgmt  -     Impairments Integumentary integrity;Impaired venous circulation;Impaired arterial circulation;Peripheral nerve integrity  -     Assessment Comments L distal great toe added as a wound today to allow for documentation and further treatment. There is minimal slough and open area remaining, so PT added MIST ultrasound to increase blood flow, decrease bioburden, and promote cellular activity. LLE edema remains mild with use of MLW. L lateral foot has no hypergranulation today but is otherwise unchanged. Pt will continue to benefit from skilled PT wound care to promote healing.  -     Rehab Potential Fair  -     Patient/caregiver participated in establishment of treatment plan and goals Yes  -     Patient would benefit from skilled therapy intervention Yes  -MC            PT Plan    PT Frequency 2x/week  -     Physical Therapy Interventions (Optional Details) wound care;patient/family education  -     PT Plan Comments MIST, debridement, MLW  -           User Key  (r) = Recorded By, (t) = Taken By, (c) = Cosigned By    Initials Name Provider  Type    Morena Rajan, PT Physical Therapist                Goals   PT OP Goals     Row Name 03/18/22 0927          Time Calculation    PT Goal Re-Cert Due Date 04/30/22  -           User Key  (r) = Recorded By, (t) = Taken By, (c) = Cosigned By    Initials Name Provider Type    Morena Rajan, PT Physical Therapist                PT Goal Re-Cert Due Date: 04/30/22            Time Calculation: Start Time: 0800  Untimed Charges  85870-Wttjxcfrid comp below knee: 10  41270-Gqdmakksa debridement: 10  93666-NWLP Mist: 10  Total Minutes  Untimed Charges Total Minutes: 30   Total Minutes: 30  Therapy Charges for Today     Code Description Service Date Service Provider Modifiers Qty    34606950354 HC PT NLFU MIST 3/18/2022 Morena Jordan, PT GP 1    31737038088 HC PT MULTI LAYER COMP SYS BELOW KNEE 3/18/2022 Morena Jordan, PT GP 1    16490469516 HC JOSS DEBRIDE OPEN WOUND UP TO 20CM 3/18/2022 Morena Jordan, PT GP 1                  Morena Jordan, PT  3/18/2022

## 2022-03-21 ENCOUNTER — HOSPITAL ENCOUNTER (OUTPATIENT)
Dept: PHYSICAL THERAPY | Facility: HOSPITAL | Age: 72
Setting detail: THERAPIES SERIES
Discharge: HOME | End: 2022-03-21

## 2022-03-21 DIAGNOSIS — I73.9 PERIPHERAL VASCULAR DISEASE OF LOWER EXTREMITY: ICD-10-CM

## 2022-03-21 DIAGNOSIS — L97.509 TYPE 2 DIABETES MELLITUS WITH FOOT ULCER, UNSPECIFIED WHETHER LONG TERM INSULIN USE: ICD-10-CM

## 2022-03-21 DIAGNOSIS — E11.621 TYPE 2 DIABETES MELLITUS WITH FOOT ULCER, UNSPECIFIED WHETHER LONG TERM INSULIN USE: ICD-10-CM

## 2022-03-21 DIAGNOSIS — I87.2 VENOUS INSUFFICIENCY: ICD-10-CM

## 2022-03-21 DIAGNOSIS — L97.528 ULCER OF LEFT FOOT WITH OTHER SEVERITY: Primary | ICD-10-CM

## 2022-03-21 PROCEDURE — 29581 APPL MULTLAYER CMPRN SYS LEG: CPT

## 2022-03-21 PROCEDURE — 97610 LOW FREQUENCY NON-THERMAL US: CPT

## 2022-03-21 PROCEDURE — 97597 DBRDMT OPN WND 1ST 20 CM/<: CPT

## 2022-03-21 NOTE — THERAPY WOUND CARE TREATMENT
Outpatient Rehabilitation - Wound/Debridement Treatment Note   Gamaliel     Patient Name: Swapnil Lawson  : 1950  MRN: [id number]  Today's Date: 3/21/2022             L great toe      L lateral foot      Admit Date: 3/21/2022    Visit Dx:    ICD-10-CM ICD-9-CM   1. Ulcer of left foot with other severity ([facility])  L97.528 707.15   2. Peripheral vascular disease of lower extremity ([facility])  I73.9 443.9   3. Type 2 diabetes mellitus with foot ulcer, unspecified whether long term insulin use ([facility])  E11.621 250.80    L97.509 707.15   4. Venous insufficiency  I87.2 459.81       Patient Active Problem List   Diagnosis   • Coronary artery disease   • Hypertension   • Paroxysmal atrial fibrillation (HCC)   • Diabetes mellitus (HCC)   • Venous insufficiency   • Hyperlipidemia   • Nuclear sclerotic cataract of right eye   • Cortical age-related cataract of right eye   • Nuclear sclerotic cataract of left eye   • Cortical age-related cataract of left eye   • Thrombocytopenia ([facility])        Past Medical History:   Diagnosis Date   • COPD (chronic obstructive pulmonary disease) ([facility])    • Coronary artery disease 2016    CABG, , Abimael Tomlin MD. CABG, 2013, [facility].   • Diabetes mellitus ([facility]) 2016   • Gout    • Hyperlipidemia 2016   • Hypertension 2016   • Myocardial infarction ([facility])      in  and  prior to CABG.   • Paroxysmal atrial fibrillation (HCC) 2016   • Sleep apnea    • Venous insufficiency 2016        Past Surgical History:   Procedure Laterality Date   • APPENDECTOMY     • CARDIAC CATHETERIZATION     • CARDIAC SURGERY       and    • CATARACT EXTRACTION W/ INTRAOCULAR LENS IMPLANT Right 2020    Procedure: CATARACT PHACO EXTRACTION WITH INTRAOCULAR LENS IMPLANT RIGHT;  Surgeon: Omar Blood MD;  Location: [facility];  Service: Ophthalmology;  Laterality: Right;   • CATARACT EXTRACTION W/ INTRAOCULAR LENS IMPLANT Left  7/9/2020    Procedure: CATARACT PHACO EXTRACTION WITH INTRAOCULAR LENS IMPLANT LEFT;  Surgeon: Omar Blood MD;  Location: [facility];  Service: Ophthalmology;  Laterality: Left;   • CHOLECYSTECTOMY  2002   • COLONOSCOPY     • FINGER SURGERY      Rt index (second finger)   • OTHER SURGICAL HISTORY  2010    Bone spur removal   • TOE AMPUTATION  2009   • VASECTOMY           EVALUATION   PT Ortho     Row Name 03/21/22 1000       Subjective Comments    Subjective Comments No complaints or changes, except toe dressing came off in the bed overnight.  -       Subjective Pain    Able to rate subjective pain? yes  -MC    Pre-Treatment Pain Level 0  -MC    Post-Treatment Pain Level 0  -MC       Transfers    Sit-Stand [place] (Transfers) modified independence  -MC    Stand-Sit [place] (Transfers) modified independence  -    Transfers, Sit-Stand-Sit, Assist Device straight cane  -MC    Comment, (Transfers) seated for tx  -       Gait/Stairs (Locomotion)    [place] Level (Gait) modified independence  -    Assistive Device (Gait) cane, straight  -MC          User Key  (r) = Recorded By, (t) = Taken By, (c) = Cosigned By    Initials Name Provider Type    Morena Rajan PT Physical Therapist                 [facility] Wound     Row Name 03/21/22 1000             Wound 03/18/22 0800 Left distal great toe Blisters    Wound - Properties Group Placement Date: 03/18/22  - Placement Time: 0800  - Present on Hospital Admission: N  - Side: Left  - Orientation: distal  - Location: great toe  -MC Primary Wound Type: Blisters  -MC      Wound Image View All Images View Images  -      Dressing Appearance intact;moist drainage  -      Base moist;yellow;slough;subcutaneous;pink  all epithelialized apart from small subQ/slough area  -      Periwound intact;pink;dry  -      Periwound Temperature warm  -      Periwound Skin Turgor soft  -      Edges irregular;open  -      Wound Length (cm) 0.4  "cm  -MC      Wound Width (cm) 0.5 cm  -MC      Wound Depth (cm) --  obscured  -MC      Wound Surface Area (cm^2) 0.2 cm^2  -MC      Drainage Characteristics/Odor serous  -MC      Drainage Amount small  -MC      Care, Wound cleansed with;wound cleanser;debrided;ultrasound therapy, non contact low frequency  MIST 3 minutes  -MC      Dressing Care dressing applied;silver impregnated;collagen;antimicrobial agent applied;foam;low-adherent;border dressing  tez, HFBt, 2\" optifoam, primafix tape  -      Periwound Care cleansed with pH balanced cleanser;dry periwound area maintained  -MC      Retired Wound - Properties Group Placement Date: 03/18/22  -MC Placement Time: 0800  -MC Present on Hospital Admission: N  -MC Side: Left  -MC Orientation: distal  -MC Location: great toe  -MC Primary Wound Type: Blisters  -MC      Retired Wound - Properties Group Date first assessed: 03/18/22  -MC Time first assessed: 0800  -MC Present on Hospital Admission: N  -MC Side: Left  -MC Location: great toe  -MC Primary Wound Type: Blisters  -MC              Wound 01/31/22 0800 Left lateral foot Incision    Wound - Properties Group Placement Date: 01/31/22  -MW Placement Time: 0800  -MW Present on Hospital Admission: Y  -MW Side: Left  -MW Orientation: lateral  -MW Location: foot  -MW Primary Wound Type: Incision  -MW Additional Comments: per pt \"had bunion removed\"  -MW      Wound Image View All Images View Images  -      Dressing Appearance intact;moist drainage  -      Base moist;red;granulating  -      Periwound swelling;warm;macerated;pale white  -      Periwound Temperature warm  -      Periwound Skin Turgor soft  -MC      Edges open;irregular  plantar/distal edge loose  -MC      Wound Length (cm) 0.6 cm  -MC      Wound Width (cm) 0.7 cm  -MC      Wound Depth (cm) 0.2 cm  -MC      Wound Surface Area (cm^2) 0.42 cm^2  -MC      Wound Volume (cm^3) 0.084 cm^3  -MC      Drainage Characteristics/Odor serous  -MC      Drainage " "Amount small  -      Care, Wound cleansed with;wound cleanser;debrided;ultrasound therapy, non contact low frequency  MIST 3 minutes  -      Dressing Care dressing applied;silver impregnated;collagen;antimicrobial agent applied;foam;low-adherent;border dressing;multi-layer wrap  tez, HFBt, 4\" optifoam  -      Periwound Care cleansed with pH balanced cleanser;dry periwound area maintained  -MC      Retired Wound - Properties Group Placement Date: 01/31/22  -MW Placement Time: 0800  -MW Present on Hospital Admission: Y  -MW Side: Left  -MW Orientation: lateral  -MW Location: foot  -MW Primary Wound Type: Incision  -MW Additional Comments: per pt \"had bunion removed\"  -MW      Retired Wound - Properties Group Date first assessed: 01/31/22  -MW Time first assessed: 0800  -MW Present on Hospital Admission: Y  -MW Side: Left  -MW Location: foot  -MW Primary Wound Type: Incision  -MW Additional Comments: per pt \"had bunion removed\"  -MW            User Key  (r) = Recorded By, (t) = Taken By, (c) = Cosigned By    Initials Name Provider Type    MW Carleen Lieberman, PT Physical Therapist    Morena Rajan, PT Physical Therapist               Lymphedema     Row Name 03/21/22 1000             Lymphedema Edema Assessment    Pitting Edema Mild  -MC              Skin Changes/Observations    Lower Extremity Conditions left:;clean;dry;fragile  -      Lower Extremity Color/Pigment left:;hyperpigmented;blanchable  -              Lymphedema Pulses/Capillary Refill    Lower Extremity Capillary Refill left:;less than 3 seconds  -              Compression/Skin Care    Compression/Skin Care skin care;wrapping location;bandaging  -      Skin Care washed/dried;lotion applied  -      Wrapping Location lower extremity  -      Wrapping Location LE left:;foot to knee  -      Wrapping Comments size 4&5 compressogrips, layers doubled/overlapping for gradient compression  -      Bandage Layers cotton elastic " stocking- double layer (comment size)  -            User Key  (r) = Recorded By, (t) = Taken By, (c) = Cosigned By    Initials Name Provider Type    Morena Rajan PT Physical Therapist                WOUND DEBRIDEMENT  Total area of Debridement: 2 cm2  Debridement Site 1  Location- Site 1: LT foot wound and periwound  Selective Debridement- Site 1: Wound Surface <20cmsq  Instruments- Site 1: tweezers  Excised Tissue Description- Site 1: minimum, slough, other (comment) (macerated periwound debris)  Bleeding- Site 1: none   Debridement Site 2  Location- Site 2: Distal R great toe  Selective Debridement- Site 2: Wound Surface <20cmsq  Instruments- Site 2: #15, scapel, tweezers  Excised Tissue Description- Site 2: minimum, slough  Bleeding- Site 2: scant, held pressure, 1 minute          Therapy Education     Row Name 03/21/22 1031             Therapy Education    Education Details Continue current POC.  -MC      Given Bandaging/dressing change;Symptoms/condition management  -MC      Program Reinforced  -MC      How Provided Verbal;Demonstration  -MC      Provided to Patient  -MC      Level of Understanding Verbalized  -            User Key  (r) = Recorded By, (t) = Taken By, (c) = Cosigned By    Initials Name Provider Type    Morena Rajan PT Physical Therapist                Recommendation and Plan   PT Assessment/Plan     Row Name 03/21/22 1032          PT Assessment    Functional Limitations Performance in self-care ADL;Other (comment)  wound mgmt  -MC     Impairments Integumentary integrity;Impaired venous circulation;Impaired arterial circulation;Peripheral nerve integrity  -MC     Assessment Comments Pt with notable improvement to distal great toe wound, with the entire area epithelialized except for the small area of slough/subQ tissue. The lateral foot wound is mostly stable, but has additional epithelialization present at the edges today. Pt will continue to benefit from skilled PT wound  care to promote healing.  -     Rehab Potential Fair  -     Patient/caregiver participated in establishment of treatment plan and goals Yes  -     Patient would benefit from skilled therapy intervention Yes  -            PT Plan    PT Frequency 2x/week  -     Physical Therapy Interventions (Optional Details) wound care;patient/family education  -     PT Plan Comments MIST, debridement, MLW  -           User Key  (r) = Recorded By, (t) = Taken By, (c) = Cosigned By    Initials Name Provider Type    Morena Rajan, PT Physical Therapist                Goals   PT OP Goals     Row Name 03/21/22 1033          Time Calculation    PT Goal Re-Cert Due Date 04/30/22  -           User Key  (r) = Recorded By, (t) = Taken By, (c) = Cosigned By    Initials Name Provider Type    Morena Rajan, PT Physical Therapist                PT Goal Re-Cert Due Date: 04/30/22            Time Calculation: Start Time: 0800  Untimed Charges  74956-Tvknwnqukf comp below knee: 10  69266-Lynfaqxnt debridement: 10  33695-ADDT Mist: 10  Total Minutes  Untimed Charges Total Minutes: 30   Total Minutes: 30  Therapy Charges for Today     Code Description Service Date Service Provider Modifiers Qty    31256275432 HC PT NLFU MIST 3/21/2022 Morena Jordan, PT GP 1    48526541588 HC PT MULTI LAYER COMP SYS BELOW KNEE 3/21/2022 Morena Jordan, PT GP 1    [id number] HC JOSS DEBRIDE OPEN WOUND UP TO 20CM 3/21/2022 Morena Jordan, PT GP 1                  Morena Jordan PT  3/21/2022

## 2022-03-24 ENCOUNTER — HOSPITAL ENCOUNTER (OUTPATIENT)
Dept: PHYSICAL THERAPY | Facility: HOSPITAL | Age: 72
Setting detail: THERAPIES SERIES
Discharge: HOME | End: 2022-03-24

## 2022-03-24 DIAGNOSIS — I87.2 VENOUS INSUFFICIENCY: ICD-10-CM

## 2022-03-24 DIAGNOSIS — E11.621 TYPE 2 DIABETES MELLITUS WITH FOOT ULCER, UNSPECIFIED WHETHER LONG TERM INSULIN USE: ICD-10-CM

## 2022-03-24 DIAGNOSIS — I73.9 PERIPHERAL VASCULAR DISEASE OF LOWER EXTREMITY: ICD-10-CM

## 2022-03-24 DIAGNOSIS — L97.509 TYPE 2 DIABETES MELLITUS WITH FOOT ULCER, UNSPECIFIED WHETHER LONG TERM INSULIN USE: ICD-10-CM

## 2022-03-24 DIAGNOSIS — L97.528 ULCER OF LEFT FOOT WITH OTHER SEVERITY: Primary | ICD-10-CM

## 2022-03-24 PROCEDURE — 97610 LOW FREQUENCY NON-THERMAL US: CPT | Performed by: PHYSICAL THERAPIST

## 2022-03-24 PROCEDURE — 97597 DBRDMT OPN WND 1ST 20 CM/<: CPT | Performed by: PHYSICAL THERAPIST
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
## 2022-03-28 NOTE — THERAPY WOUND CARE TREATMENT
Outpatient Rehabilitation - Wound/Debridement Treatment Note   Gamaliel     Patient Name: Swapnil Lawson  : 1950  MRN: [id number]  Today's Date: 3/28/2022                 Admit Date: 3/28/2022    Visit Dx:    ICD-10-CM ICD-9-CM   1. Ulcer of left foot with other severity ([facility])  L97.528 707.15   2. Peripheral vascular disease of lower extremity ([facility])  I73.9 443.9   3. Type 2 diabetes mellitus with foot ulcer, unspecified whether long term insulin use ([facility])  E11.621 250.80    L97.509 707.15   4. Venous insufficiency  I87.2 459.81       Patient Active Problem List   Diagnosis   • Coronary artery disease   • Hypertension   • Paroxysmal atrial fibrillation (HCC)   • Diabetes mellitus (HCC)   • Venous insufficiency   • Hyperlipidemia   • Nuclear sclerotic cataract of right eye   • Cortical age-related cataract of right eye   • Nuclear sclerotic cataract of left eye   • Cortical age-related cataract of left eye   • Thrombocytopenia (HCC)        Past Medical History:   Diagnosis Date   • COPD (chronic obstructive pulmonary disease) ([facility])    • Coronary artery disease 2016    CABG, , Abimael Tomlin MD. CABG, 2013, [facility].   • Diabetes mellitus (HCC) 2016   • Gout    • Hyperlipidemia 2016   • Hypertension 2016   • Myocardial infarction (HCC)      in  and  prior to CABG.   • Paroxysmal atrial fibrillation (HCC) 2016   • Sleep apnea    • Venous insufficiency 2016        Past Surgical History:   Procedure Laterality Date   • APPENDECTOMY     • CARDIAC CATHETERIZATION     • CARDIAC SURGERY       and    • CATARACT EXTRACTION W/ INTRAOCULAR LENS IMPLANT Right 2020    Procedure: CATARACT PHACO EXTRACTION WITH INTRAOCULAR LENS IMPLANT RIGHT;  Surgeon: Omar Blood MD;  Location: [facility];  Service: Ophthalmology;  Laterality: Right;   • CATARACT EXTRACTION W/ INTRAOCULAR LENS IMPLANT Left 2020    Procedure: CATARACT  PHACO EXTRACTION WITH INTRAOCULAR LENS IMPLANT LEFT;  Surgeon: Omar Blood MD;  Location: [facility];  Service: Ophthalmology;  Laterality: Left;   • CHOLECYSTECTOMY  2002   • COLONOSCOPY     • FINGER SURGERY      Rt index (second finger)   • OTHER SURGICAL HISTORY  2010    Bone spur removal   • TOE AMPUTATION  2009   • VASECTOMY           EVALUATION   PT Ortho     Row Name 03/28/22 0900       Subjective Comments    Subjective Comments Pt reports his compressogrips rolled up onto his foot, causing immediate swelling in his toes. He unrolled it and took one piece off, alleviating the swelling quickly. However, he has some extra swelling in the proximal calf today.  -       Subjective Pain    Able to rate subjective pain? yes  -    Pre-Treatment Pain Level 0  -MC    Post-Treatment Pain Level 0  -       Transfers    Sit-Stand [place] (Transfers) modified independence  -    Stand-Sit [place] (Transfers) modified independence  -    Transfers, Sit-Stand-Sit, Assist Device straight cane  -    Comment, (Transfers) seated for tx  -       Gait/Stairs (Locomotion)    [place] Level (Gait) modified independence  -    Assistive Device (Gait) cane, straight  -          User Key  (r) = Recorded By, (t) = Taken By, (c) = Cosigned By    Initials Name Provider Type    Morena Rajan PT Physical Therapist                 LDA Wound     Row Name 03/28/22 0900             Wound 03/18/22 0800 Left distal great toe Blisters    Wound - Properties Group Placement Date: 03/18/22  - Placement Time: 0800  - Present on Hospital Admission: N  -MC Side: Left  - Orientation: distal  - Location: great toe  - Primary Wound Type: Blisters  -      Dressing Appearance intact;moist drainage  -      Base moist;yellow;slough;subcutaneous;pink   proximity to bone not previously noted  -      Periwound intact;pink;dry  -      Periwound Temperature warm  -      Periwound Skin Turgor soft  -  "     Edges irregular;open  -MC      Drainage Characteristics/Odor serous  -MC      Drainage Amount small  -MC      Care, Wound cleansed with;wound cleanser;debrided;ultrasound therapy, non contact low frequency  MIST 3 minutes  -MC      Dressing Care dressing applied;silver impregnated;collagen;low-adherent;foam  tez, mepilex Ag, primafix tape  -MC      Periwound Care cleansed with pH balanced cleanser;dry periwound area maintained  -MC      Retired Wound - Properties Group Placement Date: 03/18/22  -MC Placement Time: 0800  -MC Present on Hospital Admission: N  -MC Side: Left  -MC Orientation: distal  -MC Location: great toe  -MC Primary Wound Type: Blisters  -MC      Retired Wound - Properties Group Date first assessed: 03/18/22  -MC Time first assessed: 0800  -MC Present on Hospital Admission: N  -MC Side: Left  -MC Location: great toe  -MC Primary Wound Type: Blisters  -MC              Wound 01/31/22 0800 Left lateral foot Incision    Wound - Properties Group Placement Date: 01/31/22  -MW Placement Time: 0800  -MW Present on Hospital Admission: Y  -MW Side: Left  -MW Orientation: lateral  -MW Location: foot  -MW Primary Wound Type: Incision  -MW Additional Comments: per pt \"had bunion removed\"  -MW      Dressing Appearance intact;moist drainage  -MC      Base moist;red;granulating;pink;epithelialization  new epithelial growth  -MC      Periwound swelling;warm  no maceration today  -MC      Periwound Temperature warm  -MC      Periwound Skin Turgor soft  -MC      Edges open;irregular  -MC      Drainage Characteristics/Odor serous  -MC      Drainage Amount small  -MC      Care, Wound cleansed with;wound cleanser;debrided;ultrasound therapy, non contact low frequency  MIST 3 minutes  -MC      Dressing Care dressing applied;silver impregnated;collagen;foam;low-adherent;border dressing;multi-layer wrap  tez, HFBt, 4\" optifoam, MLW  -MC      Periwound Care cleansed with pH balanced cleanser;dry periwound area " "maintained  -MC      Retired Wound - Properties Group Placement Date: 01/31/22  -MW Placement Time: 0800  -MW Present on Hospital Admission: Y  -MW Side: Left  -MW Orientation: lateral  -MW Location: foot  -MW Primary Wound Type: Incision  -MW Additional Comments: per pt \"had bunion removed\"  -MW      Retired Wound - Properties Group Date first assessed: 01/31/22  -MW Time first assessed: 0800  -MW Present on Hospital Admission: Y  -MW Side: Left  -MW Location: foot  -MW Primary Wound Type: Incision  -MW Additional Comments: per pt \"had bunion removed\"  -MW            User Key  (r) = Recorded By, (t) = Taken By, (c) = Cosigned By    Initials Name Provider Type    Carleen Rosales, PT Physical Therapist    Morena Rajan, PT Physical Therapist               Lymphedema     Row Name 03/28/22 0900             Lymphedema Edema Assessment    Pitting Edema Mild;Moderate  mod in proximal calf today  -              Skin Changes/Observations    Lower Extremity Conditions left:;clean;dry;fragile  -      Lower Extremity Color/Pigment left:;hyperpigmented;blanchable  -              Lymphedema Pulses/Capillary Refill    Lower Extremity Capillary Refill left:;less than 3 seconds  -              Compression/Skin Care    Compression/Skin Care skin care;wrapping location;bandaging  -      Skin Care washed/dried;lotion applied  -      Wrapping Location lower extremity  -      Wrapping Location LE left:;foot to knee  -      Wrapping Comments size 5 compressogrip doubled distally to create gradient compression  -      Bandage Layers cotton elastic stocking- double layer (comment size)  -      Bandaging Comments Issued extra size 4/5 compressogrips for patient to place once swelling starts to reduce  -            User Key  (r) = Recorded By, (t) = Taken By, (c) = Cosigned By    Initials Name Provider Type    Morena Rajan, PT Physical Therapist                WOUND DEBRIDEMENT  Total area of " Debridement: 1 cm2  Debridement Site 1  Location- Site 1: LT foot wound and periwound  Selective Debridement- Site 1: Wound Surface <20cmsq  Instruments- Site 1: tweezers, #15, scapel  Excised Tissue Description- Site 1: scant, slough, minimum, other (comment) (callus)  Bleeding- Site 1: none   Debridement Site 2  Location- Site 2: Distal Lt great toe  Selective Debridement- Site 2: Wound Surface <20cmsq  Instruments- Site 2: #15, scapel, tweezers  Excised Tissue Description- Site 2: moderate, slough  Bleeding- Site 2: none          Therapy Education     Row Name 03/28/22 0918             Therapy Education    Education Details Place smaller compressogrips as edema reduces. Elevate legs as often as able. Recommend follow up with DPM or PCP as soon as able to assess great toe wound due to proximity to bone.  -MC      Given Bandaging/dressing change;Symptoms/condition management  -MC      Program Reinforced  -MC      How Provided Verbal;Demonstration  -MC      Provided to Patient  -MC      Level of Understanding Verbalized  -            User Key  (r) = Recorded By, (t) = Taken By, (c) = Cosigned By    Initials Name Provider Type    Morena Rajan, PT Physical Therapist                Recommendation and Plan   PT Assessment/Plan     Row Name 03/28/22 0971          PT Assessment    Functional Limitations Performance in self-care ADL;Other (comment)  wound mgmt  -MC     Impairments Integumentary integrity;Impaired venous circulation;Impaired arterial circulation;Peripheral nerve integrity  -     Assessment Comments The lateral foot wound is improving steadily, with new epithelial growth and scant slough that required debridement today. The L great toe is somewhat deteriorated, with thick yellow slough and subcutaneous tissue that appears to be in close proximity to the bone. Recommended DPM/PCP follow up as soon as able to assess. Reviewed s/sx of infection and encouraged pt to go to the ED should any of those  s/sx develop. Otherwise, pt will continue to benefit from skilled PT wound care to continue progress.  -     Rehab Potential Fair  -     Patient/caregiver participated in establishment of treatment plan and goals Yes  -     Patient would benefit from skilled therapy intervention Yes  -            PT Plan    PT Frequency 2x/week  -     Physical Therapy Interventions (Optional Details) wound care;patient/family education  -     PT Plan Comments MIST, debridement, MLW. Monitor L great toe - immediate follow up needs?  -           User Key  (r) = Recorded By, (t) = Taken By, (c) = Cosigned By    Initials Name Provider Type     Morena Jordan, PT Physical Therapist                Goals   PT OP Goals     Row Name 03/28/22 0921          Time Calculation    PT Goal Re-Cert Due Date 04/30/22  -           User Key  (r) = Recorded By, (t) = Taken By, (c) = Cosigned By    Initials Name Provider Type     Morena Jordan, PT Physical Therapist                PT Goal Re-Cert Due Date: 04/30/22            Time Calculation: Start Time: 0815  Untimed Charges  85650-Nwxpstsqjl comp below knee: 10  93871-Aqxbrxyey debridement: 10  88790-ZCYM Mist: 10  Total Minutes  Untimed Charges Total Minutes: 30   Total Minutes: 30  Therapy Charges for Today     Code Description Service Date Service Provider Modifiers Qty    14372377418 HC PT MULTI LAYER COMP SYS BELOW KNEE 3/28/2022 Morena Jordan, PT GP 1    98491427908 HC PT NLFU MIST 3/28/2022 Morena Jordan, PT GP 1    37223930641 HC JOSS DEBRIDE OPEN WOUND UP TO 20CM 3/28/2022 Morena Jordan, PT GP 1                  Morena Jordan, PT  3/28/2022

## 2022-04-01 NOTE — THERAPY PROGRESS REPORT/RE-CERT
Outpatient Rehabilitation - Wound/Debridement Progress Note   Gamaliel     Patient Name: Swapnil Lawson  : 1950  MRN: [id number]  Today's Date: 2022                 Admit Date: 2022    Visit Dx:    ICD-10-CM ICD-9-CM   1. Ulcer of left foot with other severity ([facility])  L97.528 707.15   2. Peripheral vascular disease of lower extremity ([facility])  I73.9 443.9   3. Venous insufficiency  I87.2 459.81       Patient Active Problem List   Diagnosis   • Coronary artery disease   • Hypertension   • Paroxysmal atrial fibrillation (HCC)   • Diabetes mellitus (HCC)   • Venous insufficiency   • Hyperlipidemia   • Nuclear sclerotic cataract of right eye   • Cortical age-related cataract of right eye   • Nuclear sclerotic cataract of left eye   • Cortical age-related cataract of left eye   • Thrombocytopenia (HCC)        Past Medical History:   Diagnosis Date   • COPD (chronic obstructive pulmonary disease) ([facility])    • Coronary artery disease 2016    CABG, , Abimael Tomlin MD. CABG, 2013, [facility].   • Diabetes mellitus ([facility]) 2016   • Gout    • Hyperlipidemia 2016   • Hypertension 2016   • Myocardial infarction (HCC)      in  and  prior to CABG.   • Paroxysmal atrial fibrillation ([facility]) 2016   • Sleep apnea    • Venous insufficiency 2016        Past Surgical History:   Procedure Laterality Date   • APPENDECTOMY     • CARDIAC CATHETERIZATION     • CARDIAC SURGERY       and    • CATARACT EXTRACTION W/ INTRAOCULAR LENS IMPLANT Right 2020    Procedure: CATARACT PHACO EXTRACTION WITH INTRAOCULAR LENS IMPLANT RIGHT;  Surgeon: Omar Blood MD;  Location: [facility] OR;  Service: Ophthalmology;  Laterality: Right;   • CATARACT EXTRACTION W/ INTRAOCULAR LENS IMPLANT Left 2020    Procedure: CATARACT PHACO EXTRACTION WITH INTRAOCULAR LENS IMPLANT LEFT;  Surgeon: Omar Blood MD;  Location: [facility] OR;  Service: Ophthalmology;   Laterality: Left;   • CHOLECYSTECTOMY  2002   • COLONOSCOPY     • FINGER SURGERY      Rt index (second finger)   • OTHER SURGICAL HISTORY  2010    Bone spur removal   • TOE AMPUTATION  2009   • VASECTOMY           EVALUATION   PT Ortho     Row Name 04/01/22 0800       Subjective Comments    Subjective Comments Pt with no new issues or complaints  -MF       Subjective Pain    Able to rate subjective pain? yes  -MF    Pre-Treatment Pain Level 0  -MF    Post-Treatment Pain Level 0  -MF       Transfers    Sit-Stand [place] (Transfers) modified independence  -MF    Stand-Sit [place] (Transfers) modified independence  -MF    Transfers, Sit-Stand-Sit, Assist Device straight cane  -MF    Comment, (Transfers) seated for tx  -MF       Gait/Stairs (Locomotion)    [place] Level (Gait) modified independence  -MF    Assistive Device (Gait) cane, straight  -MF          User Key  (r) = Recorded By, (t) = Taken By, (c) = Cosigned By    Initials Name Provider Type    MF Reed Johnson, PT Physical Therapist                 LDA Wound     Row Name 04/01/22 0800             Wound 03/18/22 0800 Left distal great toe Blisters    Wound - Properties Group Placement Date: 03/18/22  - Placement Time: 0800  - Present on Hospital Admission: N  - Side: Left  - Orientation: distal  - Location: great toe  -MC Primary Wound Type: Blisters  -MC      Dressing Appearance intact;moist drainage  -MF      Base moist;pink;red;slough;yellow  -MF      Periwound intact;pink;dry  -MF      Periwound Temperature warm  -MF      Periwound Skin Turgor soft  -MF      Edges irregular;open  -MF      Wound Length (cm) 1 cm  slight increase in length noted today with removal of callused skin  -MF      Wound Width (cm) 0.4 cm  -MF      Wound Depth (cm) 0.3 cm  obscured by slough.  -MF      Wound Surface Area (cm^2) 0.4 cm^2  -MF      Wound Volume (cm^3) 0.12 cm^3  -MF      Drainage Characteristics/Odor serosanguineous  -MF      Drainage  "Amount small  -MF      Care, Wound irrigated with;sterile normal saline;debrided;ultrasound therapy, non contact low frequency  3 min MIST  -MF      Dressing Care foam;low-adherent;silver impregnated  tez Ag with mepilex Ag foam and primafix tape  -MF      Periwound Care cleansed with pH balanced cleanser  -MF      Retired Wound - Properties Group Placement Date: 03/18/22  -MC Placement Time: 0800  -MC Present on Hospital Admission: N  -MC Side: Left  -MC Orientation: distal  -MC Location: great toe  -MC Primary Wound Type: Blisters  -MC      Retired Wound - Properties Group Date first assessed: 03/18/22  -MC Time first assessed: 0800  -MC Present on Hospital Admission: N  -MC Side: Left  -MC Location: great toe  -MC Primary Wound Type: Blisters  -MC              Wound 01/31/22 0800 Left lateral foot Incision    Wound - Properties Group Placement Date: 01/31/22  -MW Placement Time: 0800  -MW Present on Hospital Admission: Y  -MW Side: Left  -MW Orientation: lateral  -MW Location: foot  -MW Primary Wound Type: Incision  -MW Additional Comments: per pt \"had bunion removed\"  -MW      Dressing Appearance intact;moist drainage  -MF      Base moist;red;granulating;pink;epithelialization  -MF      Periwound swelling;warm  -MF      Periwound Temperature warm  -MF      Periwound Skin Turgor soft  -MF      Edges open;irregular  -MF      Wound Length (cm) 0.5 cm  -MF      Wound Width (cm) 0.5 cm  -MF      Wound Depth (cm) 0.1 cm  -MF      Wound Surface Area (cm^2) 0.25 cm^2  -MF      Wound Volume (cm^3) 0.025 cm^3  -MF      Drainage Characteristics/Odor serous  -MF      Drainage Amount small  -MF      Care, Wound irrigated with;sterile normal saline;ultrasound therapy, non contact low frequency  3 min MIST  -MF      Dressing Care foam;low-adherent  tez Ag with optifoam  -MF      Periwound Care cleansed with pH balanced cleanser  -      Retired Wound - Properties Group Placement Date: 01/31/22  -MW Placement Time: 0800  " "-MW Present on Hospital Admission: Y  -MW Side: Left  -MW Orientation: lateral  -MW Location: foot  -MW Primary Wound Type: Incision  -MW Additional Comments: per pt \"had bunion removed\"  -MW      Retired Wound - Properties Group Date first assessed: 01/31/22  -MW Time first assessed: 0800  -MW Present on Hospital Admission: Y  -MW Side: Left  -MW Location: foot  -MW Primary Wound Type: Incision  -MW Additional Comments: per pt \"had bunion removed\"  -MW            User Key  (r) = Recorded By, (t) = Taken By, (c) = Cosigned By    Initials Name Provider Type    Reed Ramirez, PT Physical Therapist    MW Carleen Lieberman, PT Physical Therapist    Morena Rajan, PT Physical Therapist               Lymphedema     Row Name 04/01/22 0800             Lymphedema Edema Assessment    Pitting Edema Mild  -MF              Compression/Skin Care    Compression/Skin Care skin care;wrapping location;bandaging  -MF      Skin Care washed/dried;lotion applied  -MF      Wrapping Location lower extremity  -MF      Wrapping Location LE left:;foot to knee  -MF      Wrapping Comments size 4/5 compressogrip doubled and overlapping for gradient compression.  -MF      Bandage Layers cotton elastic stocking- double layer (comment size)  -MF            User Key  (r) = Recorded By, (t) = Taken By, (c) = Cosigned By    Initials Name Provider Type    Reed Ramirez, PT Physical Therapist                WOUND DEBRIDEMENT  Total area of Debridement: ~1cm2      Debridement Site 2  Location- Site 2: Distal Lt great toe  Selective Debridement- Site 2: Wound Surface <20cmsq  Instruments- Site 2: tweezers, scissors  Excised Tissue Description- Site 2: minimum, slough, other (comment) (periwound callus)  Bleeding- Site 2: none          Therapy Education     Row Name 04/01/22 0800             Therapy Education    Education Details Pt to cont with home dressing management with LE elevation to help reduce edema.  -MF      Given " Bandaging/dressing change;Symptoms/condition management  -      Program Reinforced  -      How Provided Verbal;Demonstration  -      Provided to Patient  -      Level of Understanding Verbalized  -            User Key  (r) = Recorded By, (t) = Taken By, (c) = Cosigned By    Initials Name Provider Type    Reed Ramirez, PT Physical Therapist                Recommendation and Plan   PT Assessment/Plan     Row Name 04/01/22 0800          PT Assessment    Functional Limitations Performance in self-care ADL;Other (comment)  wound management  -     Impairments Integumentary integrity;Impaired venous circulation;Impaired arterial circulation;Peripheral nerve integrity  -     Assessment Comments All STGs met to this point and pt making excellent progress towards LTGs. Lateral foot ulcer with moderate reepithelialization and no new issues noted. PT able to lightly debride great toe wound to help decrease bioburden to improve healing.  -     Rehab Potential Fair  -     Patient/caregiver participated in establishment of treatment plan and goals Yes  -     Patient would benefit from skilled therapy intervention Yes  -            PT Plan    PT Frequency 2x/week  -     Physical Therapy Interventions (Optional Details) wound care;patient/family education  -     PT Plan Comments MIST, debridement, dressing management, compression wrapping  -           User Key  (r) = Recorded By, (t) = Taken By, (c) = Cosigned By    Initials Name Provider Type    Reed Ramirez, PT Physical Therapist                Goals   PT OP Goals     Row Name 04/01/22 0800          PT Short Term Goals    STG Date to Achieve 04/30/22  -     STG 1 Pt/ son independent with clean home dressing changes to promote moist, bacteriostatic healing environement.  -     STG 1 Progress Met  -     STG 2 Pt /son able to verbalize s/s of infection and when to seek urgent or emergency care.  -     STG 2 Progress Met  -      STG 3 Wound dimensions to decrease at least 25% to demonstrate wound healing.  -     STG 3 Progress Met  -            Long Term Goals    LTG Date to Achieve 04/30/22  -     LTG 1 Wound dimensions to decrease at least 75% to demonstrate wound healing.  -     LTG 1 Progress Ongoing  -     LTG 2 Pt /son independent with long term management of vascular changes in LLE; balancing venous swelling and impaired arterial flow.  -     LTG 2 Progress Ongoing  -            Time Calculation    PT Goal Re-Cert Due Date 04/30/22  -           User Key  (r) = Recorded By, (t) = Taken By, (c) = Cosigned By    Initials Name Provider Type     Reed Johnson, PT Physical Therapist                PT Goal Re-Cert Due Date: 04/30/22  PT Short Term Goals  STG Date to Achieve: 04/30/22  STG 1: Pt/ son independent with clean home dressing changes to promote moist, bacteriostatic healing environement.  STG 1 Progress: Met  STG 2: Pt /son able to verbalize s/s of infection and when to seek urgent or emergency care.  STG 2 Progress: Met  STG 3: Wound dimensions to decrease at least 25% to demonstrate wound healing.  STG 3 Progress: Met  Long Term Goals  LTG Date to Achieve: 04/30/22  LTG 1: Wound dimensions to decrease at least 75% to demonstrate wound healing.  LTG 1 Progress: Ongoing  LTG 2: Pt /son independent with long term management of vascular changes in LLE; balancing venous swelling and impaired arterial flow.  LTG 2 Progress: Ongoing      Time Calculation: Start Time: 0800  Untimed Charges  65046-Vibyuofqql comp below knee: 10  34676-Rgwbumhor debridement: 10  41415-FZKY Mist: 10  Total Minutes  Untimed Charges Total Minutes: 30   Total Minutes: 30  Therapy Charges for Today     Code Description Service Date Service Provider Modifiers Qty    23725475470 HC JOSS DEBRIDE OPEN WOUND UP TO 20CM 4/1/2022 Reed Johnson, PT GP 1    89970238113 HC PT MULTI LAYER COMP SYS BELOW KNEE 4/1/2022 Reed Johnson, PT GP 1     [id number]  PT NLFU MIST 4/1/2022 Reed Johnson, PT GP 1                  Reed Johnson, PT  4/1/2022

## 2022-04-04 NOTE — THERAPY WOUND CARE TREATMENT
Outpatient Rehabilitation - Wound/Debridement Treatment Note   Gamaliel     Patient Name: Swapnil Lawson  : 1950  MRN: [id number]  Today's Date: 2022                 Admit Date: 2022    Visit Dx:    ICD-10-CM ICD-9-CM   1. Ulcer of left foot with other severity ([facility])  L97.528 707.15   2. Peripheral vascular disease of lower extremity ([facility])  I73.9 443.9   3. Venous insufficiency  I87.2 459.81   4. Type 2 diabetes mellitus with foot ulcer, unspecified whether long term insulin use ([facility])  E11.621 250.80    L97.509 707.15       Patient Active Problem List   Diagnosis   • Coronary artery disease   • Hypertension   • Paroxysmal atrial fibrillation (HCC)   • Diabetes mellitus (HCC)   • Venous insufficiency   • Hyperlipidemia   • Nuclear sclerotic cataract of right eye   • Cortical age-related cataract of right eye   • Nuclear sclerotic cataract of left eye   • Cortical age-related cataract of left eye   • Thrombocytopenia ([facility])        Past Medical History:   Diagnosis Date   • COPD (chronic obstructive pulmonary disease) ([facility])    • Coronary artery disease 2016    CABG, , Abimael Tomlin MD. CABG, 2013, [facility].   • Diabetes mellitus (HCC) 2016   • Gout    • Hyperlipidemia 2016   • Hypertension 2016   • Myocardial infarction (HCC)      in  and  prior to CABG.   • Paroxysmal atrial fibrillation (HCC) 2016   • Sleep apnea    • Venous insufficiency 2016        Past Surgical History:   Procedure Laterality Date   • APPENDECTOMY     • CARDIAC CATHETERIZATION     • CARDIAC SURGERY       and    • CATARACT EXTRACTION W/ INTRAOCULAR LENS IMPLANT Right 2020    Procedure: CATARACT PHACO EXTRACTION WITH INTRAOCULAR LENS IMPLANT RIGHT;  Surgeon: Omar Blood MD;  Location: [facility];  Service: Ophthalmology;  Laterality: Right;   • CATARACT EXTRACTION W/ INTRAOCULAR LENS IMPLANT Left 2020    Procedure: CATARACT  "PHACO EXTRACTION WITH INTRAOCULAR LENS IMPLANT LEFT;  Surgeon: Omar Blood MD;  Location: [facility];  Service: Ophthalmology;  Laterality: Left;   • CHOLECYSTECTOMY  2002   • COLONOSCOPY     • FINGER SURGERY      Rt index (second finger)   • OTHER SURGICAL HISTORY  2010    Bone spur removal   • TOE AMPUTATION  2009   • VASECTOMY           EVALUATION   PT Ortho     Row Name 04/04/22 0800       Subjective Comments    Subjective Comments Pt notes the foot wound bled a little yesterday, \"I was on it too much\" Going to foot doctor after this appt to have the toe looked at so just put a bandaid on and we can put the foam on when I get home.  -MW       Subjective Pain    Able to rate subjective pain? yes  -MW    Pre-Treatment Pain Level 0  -MW    Post-Treatment Pain Level 0  -MW       Transfers    Sit-Stand [place] (Transfers) modified independence  -MW    Stand-Sit [place] (Transfers) modified independence  -MW    Transfers, Sit-Stand-Sit, Assist Device straight cane  -MW    Comment, (Transfers) seated for tx  -MW       Gait/Stairs (Locomotion)    [place] Level (Gait) modified independence  -MW    Assistive Device (Gait) cane, straight  -MW          User Key  (r) = Recorded By, (t) = Taken By, (c) = Cosigned By    Initials Name Provider Type    MW Carleen Lieberman, PT Physical Therapist                 LDA Wound     Row Name 04/04/22 0800             Wound 03/18/22 0800 Left distal great toe Blisters    Wound - Properties Group Placement Date: 03/18/22  - Placement Time: 0800  - Present on Hospital Admission: N  - Side: Left  - Orientation: distal  - Location: great toe  - Primary Wound Type: Blisters  -MC      Dressing Appearance intact;moist drainage  -MW      Base moist;slough;yellow;pink;subcutaneous;granulating  pinpoints of granulation buds  -MW      Periwound intact;pink;dry  shiny  -MW      Periwound Temperature warm  -MW      Periwound Skin Turgor soft  -MW      Edges " "irregular;open  -MW      Drainage Characteristics/Odor serosanguineous  -MW      Drainage Amount small  -MW      Care, Wound irrigated with;sterile normal saline;debrided;ultrasound therapy, non contact low frequency  MIST x 4 min  -MW      Dressing Care dressing applied;non-adherent;gauze;other (see comments)  telfa, primafix (temp dressing as pt going to podiatrist)  -MW      Periwound Care cleansed with pH balanced cleanser  -MW      Retired Wound - Properties Group Placement Date: 03/18/22  -MC Placement Time: 0800  -MC Present on Hospital Admission: N  -MC Side: Left  -MC Orientation: distal  -MC Location: great toe  -MC Primary Wound Type: Blisters  -MC      Retired Wound - Properties Group Date first assessed: 03/18/22  -MC Time first assessed: 0800  -MC Present on Hospital Admission: N  -MC Side: Left  -MC Location: great toe  -MC Primary Wound Type: Blisters  -MC              Wound 01/31/22 0800 Left lateral foot Incision    Wound - Properties Group Placement Date: 01/31/22  -MW Placement Time: 0800  -MW Present on Hospital Admission: Y  -MW Side: Left  -MW Orientation: lateral  -MW Location: foot  -MW Primary Wound Type: Incision  -MW Additional Comments: per pt \"had bunion removed\"  -MW      Dressing Appearance intact;dried drainage  -MW      Base moist;granulating;pink;epithelialization  -MW      Periwound swelling;warm  -MW      Periwound Temperature warm  -MW      Periwound Skin Turgor soft  -MW      Edges open;irregular  -MW      Drainage Characteristics/Odor serous  pt reported bleeding yesterday  -MW      Drainage Amount scant  -MW      Care, Wound cleansed with;wound cleanser;irrigated with;sterile normal saline;ultrasound therapy, non contact low frequency;debrided  MIST x 4 mn  -MW      Dressing Care dressing applied;silver impregnated;collagen;foam;low-adherent;border dressing;multi-layer wrap  Tamie AG, Mepilex AG, 4\" optifoam, MLW  -MW      Periwound Care cleansed with pH balanced " "cleanser;dry periwound area maintained  -MW      Retired Wound - Properties Group Placement Date: 01/31/22  -MW Placement Time: 0800  -MW Present on Hospital Admission: Y  -MW Side: Left  -MW Orientation: lateral  -MW Location: foot  -MW Primary Wound Type: Incision  -MW Additional Comments: per pt \"had bunion removed\"  -MW      Retired Wound - Properties Group Date first assessed: 01/31/22  -MW Time first assessed: 0800  -MW Present on Hospital Admission: Y  -MW Side: Left  -MW Location: foot  -MW Primary Wound Type: Incision  -MW Additional Comments: per pt \"had bunion removed\"  -MW            User Key  (r) = Recorded By, (t) = Taken By, (c) = Cosigned By    Initials Name Provider Type    MW Carleen Lieberman, PT Physical Therapist    Morena Rajan, PT Physical Therapist               Lymphedema     Row Name 04/04/22 0800             Lymphedema Edema Assessment    Pitting Edema Mild  -MW              Skin Changes/Observations    Lower Extremity Conditions left:;clean;dry;crust  -MW      Lower Extremity Color/Pigment left:;hyperpigmented;brawny  -MW              Lymphedema Pulses/Capillary Refill    Lower Extremity Capillary Refill left:;less than 3 seconds  -MW              Compression/Skin Care    Compression/Skin Care skin care;wrapping location;bandaging  -MW      Skin Care washed/dried;lotion applied;topical anti-fungal applied  anitfungal to medial dry scaly area  -MW      Wrapping Location lower extremity  -MW      Wrapping Location LE left:;foot to knee  -MW      Wrapping Comments size 4/5 compressogrip doubled and overlapping for gradient compression.  -MW      Bandage Layers cotton elastic stocking- double layer (comment size)  -MW            User Key  (r) = Recorded By, (t) = Taken By, (c) = Cosigned By    Initials Name Provider Type    Carleen Rosales, PT Physical Therapist                WOUND DEBRIDEMENT  Total area of Debridement: <1 cm2  Debridement Site 1  Location- Site 1: LT lateral " foot  Selective Debridement- Site 1: Wound Surface <20cmsq  Instruments- Site 1: other (comment) (gauze)  Excised Tissue Description- Site 1: minimum, other (comment) (periwound skin debris)  Bleeding- Site 1: none   Debridement Site 2  Location- Site 2: Distal Lt great toe  Selective Debridement- Site 2: Wound Surface <20cmsq  Instruments- Site 2: tweezers, #15, scapel  Excised Tissue Description- Site 2: scant, slough  Bleeding- Site 2: seeping, scant          Therapy Education     Row Name 04/04/22 0800             Therapy Education    Education Details Trial of LT foot Mepilex AG cut larger than wound to protect new fragile skin. May add therahoney to toe wound next visit depending on what podiatrist recommends. Cont MLW.  -MW      Given Bandaging/dressing change;Symptoms/condition management  -MW      Program Reinforced;Progressed  -MW      How Provided Verbal;Demonstration  -MW      Provided to Patient  -MW      Level of Understanding Verbalized  -MW            User Key  (r) = Recorded By, (t) = Taken By, (c) = Cosigned By    Initials Name Provider Type    MW Carleen Lieberman, PT Physical Therapist                Recommendation and Plan   PT Assessment/Plan     Row Name 04/04/22 0800          PT Assessment    Functional Limitations Performance in self-care ADL;Other (comment)  wound management  -MW     Impairments Integumentary integrity;Impaired venous circulation;Impaired arterial circulation;Peripheral nerve integrity  -MW     Assessment Comments Lt lateral foot site cont to re-epitelialize. Trial of Mepilex AG with tez for improved moisture balance to cont to promote closure. LT great toe wound with tiny pinpoints of pink tissue, slight increase in depth. Consider adding Therahoney next visit if still slough covered. Cont PT POC.  -MW     Rehab Potential Fair  -MW     Patient/caregiver participated in establishment of treatment plan and goals Yes  -MW     Patient would benefit from skilled therapy  intervention Yes  -MW            PT Plan    PT Frequency 2x/week  -MW     Physical Therapy Interventions (Optional Details) wound care;patient/family education  -     PT Plan Comments ? Therahoney to great toe tip; cont MIST, debridement, MLW, dressing management  -           User Key  (r) = Recorded By, (t) = Taken By, (c) = Cosigned By    Initials Name Provider Type    MW Carleen Lieberman, PT Physical Therapist                Goals                   Time Calculation: Start Time: 0800  Untimed Charges  00201-Nvvgrthpf debridement: 10  79724-NBJU Mist: 12  Total Minutes  Untimed Charges Total Minutes: 22   Total Minutes: 22  Therapy Charges for Today     Code Description Service Date Service Provider Modifiers Qty    57757261448 HC JOSS DEBRIDE OPEN WOUND UP TO 20CM 4/4/2022 Carleen Lieberman, PT GP 1    [id number]  PT NLFU MIST 4/4/2022 Carleen Lieberman, PT GP 1                  Carleen Lieberman, PT  4/4/2022

## 2022-04-07 ENCOUNTER — OFFICE VISIT (OUTPATIENT)
Dept: FAMILY MEDICINE CLINIC | Age: 72
End: 2022-04-07
Payer: MEDICARE

## 2022-04-07 VITALS
RESPIRATION RATE: 18 BRPM | SYSTOLIC BLOOD PRESSURE: 102 MMHG | HEIGHT: 73 IN | DIASTOLIC BLOOD PRESSURE: 62 MMHG | OXYGEN SATURATION: 93 % | WEIGHT: 190.1 LBS | BODY MASS INDEX: 25.2 KG/M2 | HEART RATE: 64 BPM

## 2022-04-07 DIAGNOSIS — M1A.00X0 CHRONIC GOUTY ARTHROPATHY: ICD-10-CM

## 2022-04-07 DIAGNOSIS — Z79.01 ANTICOAGULATED ON COUMADIN: Primary | ICD-10-CM

## 2022-04-07 DIAGNOSIS — E11.9 TYPE 2 DIABETES MELLITUS WITHOUT COMPLICATION, WITHOUT LONG-TERM CURRENT USE OF INSULIN (HCC): ICD-10-CM

## 2022-04-07 DIAGNOSIS — I48.91 ATRIAL FIBRILLATION, UNSPECIFIED TYPE (HCC): ICD-10-CM

## 2022-04-07 LAB
INTERNATIONAL NORMALIZATION RATIO, POC: 1.2
PROTHROMBIN TIME, POC: NORMAL

## 2022-04-07 PROCEDURE — 3044F HG A1C LEVEL LT 7.0%: CPT | Performed by: NURSE PRACTITIONER

## 2022-04-07 PROCEDURE — 99214 OFFICE O/P EST MOD 30 MIN: CPT | Performed by: NURSE PRACTITIONER

## 2022-04-07 PROCEDURE — 85610 PROTHROMBIN TIME: CPT | Performed by: NURSE PRACTITIONER

## 2022-04-07 PROCEDURE — 2022F DILAT RTA XM EVC RTNOPTHY: CPT | Performed by: NURSE PRACTITIONER

## 2022-04-07 PROCEDURE — 1123F ACP DISCUSS/DSCN MKR DOCD: CPT | Performed by: NURSE PRACTITIONER

## 2022-04-07 PROCEDURE — G8417 CALC BMI ABV UP PARAM F/U: HCPCS | Performed by: NURSE PRACTITIONER

## 2022-04-07 PROCEDURE — 4040F PNEUMOC VAC/ADMIN/RCVD: CPT | Performed by: NURSE PRACTITIONER

## 2022-04-07 PROCEDURE — 1036F TOBACCO NON-USER: CPT | Performed by: NURSE PRACTITIONER

## 2022-04-07 PROCEDURE — G8427 DOCREV CUR MEDS BY ELIG CLIN: HCPCS | Performed by: NURSE PRACTITIONER

## 2022-04-07 PROCEDURE — 3017F COLORECTAL CA SCREEN DOC REV: CPT | Performed by: NURSE PRACTITIONER

## 2022-04-07 RX ORDER — COLCHICINE 0.6 MG/1
0.6 TABLET ORAL DAILY PRN
Qty: 30 TABLET | Refills: 5 | Status: SHIPPED | OUTPATIENT
Start: 2022-04-07 | End: 2022-09-30

## 2022-04-07 RX ORDER — IPRATROPIUM BROMIDE AND ALBUTEROL SULFATE 2.5; .5 MG/3ML; MG/3ML
SOLUTION RESPIRATORY (INHALATION)
COMMUNITY
Start: 2022-01-10 | End: 2022-05-09 | Stop reason: SDUPTHER

## 2022-04-07 ASSESSMENT — ENCOUNTER SYMPTOMS
SHORTNESS OF BREATH: 0
ABDOMINAL PAIN: 0
NAUSEA: 0
COUGH: 0
VOMITING: 0
DIARRHEA: 0
ALLERGIC/IMMUNOLOGIC NEGATIVE: 1

## 2022-04-07 NOTE — PROGRESS NOTES
Chief Complaint   Patient presents with    COPD     F/U       Have you seen any other physician or provider since your last visit Yes - [facility] dr foot dr     Have you had any other diagnostic tests since your last visit? yes - Labs     Have you changed or stopped any medications since your last visit? no       Diabetic retinal exam completed this year?  No

## 2022-04-07 NOTE — PROGRESS NOTES
Per patients pharmacy, Pacheco colchicine is not covered by patients insurance. Mitigare 0.6mg is covered and is the equivalent. Medication was changed by pharmacy. LANCE Heaton notified and agreeable.

## 2022-04-07 NOTE — PROGRESS NOTES
SUBJECTIVE:    Prem Bustos is a 66 y.o. male    Anticoagulation: Patient here for followup of chronic anticoagulation. Indication: atrial fibrillation  Bleeding Signs/Symptoms:  None  Thromboembolic Signs/Symptoms:  None    Missed Coumadin Doses:  None  Medication Changes:  no  Dietary Changes:  no  Bacterial/Viral Infection:  no    Other Concerns:  No - Pt reports his glucose has been intermittently running low for the past few days. He does not feel his diet has changed. He is taking Trulicity weekly injections and Glipizide 20 mg BID. Pt reports one reading was 48. His glucose was also low this morning- he did not check his glucose, however he ate and glucose improved to 129    Pt also request a refill for Colcrys to have on hand for Gout flares. Pt denies Gout flare at this time. Other  Pertinent negatives include no abdominal pain, chest pain, coughing, fatigue, nausea or vomiting. Chief Complaint   Patient presents with    Other     POC INR        Review of Systems   Constitutional: Negative. Negative for fatigue. HENT: Negative. Respiratory: Negative for cough and shortness of breath. Cardiovascular: Negative for chest pain. Gastrointestinal: Negative for abdominal pain, diarrhea, nausea and vomiting. Endocrine: Negative. Negative for cold intolerance, heat intolerance, polydipsia, polyphagia and polyuria. Genitourinary: Negative. Musculoskeletal: Negative. Skin: Negative. Allergic/Immunologic: Negative. Neurological: Negative. Hematological: Negative. Does not bruise/bleed easily. Psychiatric/Behavioral: Negative. OBJECTIVE:    /62   Pulse 64   Resp 18   Ht 6' 1\" (1.854 m)   Wt 190 lb 1.6 oz (86.2 kg)   SpO2 93% Comment: RA  BMI 25.08 kg/m²    Physical Exam  Vitals and nursing note reviewed. Constitutional:       Appearance: He is well-developed. Neck:      Thyroid: No thyromegaly. Vascular: No carotid bruit.    Cardiovascular: Rate and Rhythm: Normal rate. Rhythm irregularly irregular. Heart sounds: Normal heart sounds. No murmur heard. Pulmonary:      Effort: Pulmonary effort is normal.      Breath sounds: Normal breath sounds. Skin:     General: Skin is warm and dry. Neurological:      Mental Status: He is alert and oriented to person, place, and time. Psychiatric:         Mood and Affect: Mood normal.         Behavior: Behavior normal.         Thought Content: Thought content normal.         Judgment: Judgment normal.         ASSESSMENT/PLAN:   Zeke Valera was seen today for other. Diagnoses and all orders for this visit:    Atrial Fib- stable- rate controlled at 64    Anticoagulated on Coumadin  -     POCT INR1. 2- Pt is taking Coumadin 2.5 mg daily. Pt instructed to take Coumadin 5mg/ 2.5 mg alternating days. Type 2 diabetes mellitus without complication, without long-term current use of insulin ([facility])  Pt instructed to decrease Glipizide to 10 mg BID- increase back to 20 mg if glucose greater than 150. Return in about 2 weeks (around 4/21/2022) for INR. Fasting labs in 1 month.   Current Outpatient Medications on File Prior to Visit   Medication Sig Dispense Refill    diphenhydrAMINE (SOMINEX) 25 MG tablet Take 25 mg by mouth every 6 hours as needed      ipratropium-albuterol (DUONEB) 0.5-2.5 (3) MG/3ML SOLN nebulizer solution INHALE 1 VIAL VIA NEBULIZER FOUR TIMES DAILY      MITIGARE 0.6 MG capsule TAKE 1 CAPSULE BY MOUTH DAILY 30 capsule 1    glipiZIDE (GLUCOTROL) 10 MG tablet Take 2 tablets by mouth 2 times daily (before meals) 360 tablet 2    allopurinol (ZYLOPRIM) 300 MG tablet TAKE 1 TABLET BY MOUTH EVERY DAY      amLODIPine (NORVASC) 5 MG tablet TAKE 1 TABLET BY MOUTH TWICE A DAY      Calcium Polycarbophil (FIBER) 625 MG TABS Take 625 mg by mouth daily      digoxin (LANOXIN) 125 MCG tablet TAKE 1 TABLET BY MOUTH EVERY DAY OR AS DIRECTED      metOLazone (ZAROXOLYN) 5 MG tablet Take 5 mg by mouth daily      Dulaglutide (TRULICITY) 1.5 RP/2.5PN SOPN Inject 1.5 mg into the skin once a week      ipratropium (ATROVENT) 0.02 % nebulizer solution Take 0.5 mg by nebulization every 4-6 hours as needed for Wheezing      furosemide (LASIX) 40 MG tablet take 1 tablet by mouth once daily (Patient taking differently: 40 mg 2 times daily take 1 tablet by mouth once daily) 30 tablet 5    ferrous sulfate 325 (65 Fe) MG tablet take 1 tablet by mouth twice a day 60 tablet 5    docusate sodium (COLACE) 100 MG capsule Take 1 capsule by mouth 2 times daily 60 capsule 5    lovastatin (MEVACOR) 20 MG tablet Take 1 tablet by mouth daily 30 tablet 5    metoprolol succinate (TOPROL XL) 25 MG extended release tablet take 1 tablet by mouth once daily 30 tablet 5    COUMADIN 5 MG tablet take as directed (Patient taking differently: 5 mg daily Patient takes 5mg daily and 7.5mg on Sundays) 100 tablet 5    aspirin 81 MG tablet Take 81 mg by mouth daily.  nitroGLYCERIN (NITROSTAT) 0.4 MG SL tablet place 1 tablet under the tongue if needed every 5 minutes for chest pain for 3 doses IF NO RELIEF AFTER 3RD DOSE CALL PRESCRIBER . (Patient not taking: Reported on 12/8/2021) 25 tablet 5     No current facility-administered medications on file prior to visit.

## 2022-04-08 NOTE — THERAPY WOUND CARE TREATMENT
Outpatient Rehabilitation - Wound/Debridement Treatment Note   [place]     Patient Name: Swapnil Lawson  : 1950  MRN: [id number]  Today's Date: 2022                 Admit Date: 2022    Visit Dx:    ICD-10-CM ICD-9-CM   1. Ulcer of left foot with other severity ([facility])  L97.528 707.15   2. Peripheral vascular disease of lower extremity ([facility])  I73.9 443.9   3. Venous insufficiency  I87.2 459.81   4. Type 2 diabetes mellitus with foot ulcer, unspecified whether long term insulin use ([facility])  E11.621 250.80    L97.509 707.15   L lat foot:    L great toe:    Medial LLE:      Patient Active Problem List   Diagnosis   • Coronary artery disease   • Hypertension   • Paroxysmal atrial fibrillation (HCC)   • Diabetes mellitus (HCC)   • Venous insufficiency   • Hyperlipidemia   • Nuclear sclerotic cataract of right eye   • Cortical age-related cataract of right eye   • Nuclear sclerotic cataract of left eye   • Cortical age-related cataract of left eye   • Thrombocytopenia ([facility])        Past Medical History:   Diagnosis Date   • COPD (chronic obstructive pulmonary disease) ([facility])    • Coronary artery disease 2016    CABG, , Abimael Tomlin MD. CABG, 2013, [facility].   • Diabetes mellitus ([facility]) 2016   • Gout    • Hyperlipidemia 2016   • Hypertension 2016   • Myocardial infarction ([facility])      in  and  prior to CABG.   • Paroxysmal atrial fibrillation ([facility]) 2016   • Sleep apnea    • Venous insufficiency 2016        Past Surgical History:   Procedure Laterality Date   • APPENDECTOMY     • CARDIAC CATHETERIZATION     • CARDIAC SURGERY       and    • CATARACT EXTRACTION W/ INTRAOCULAR LENS IMPLANT Right 2020    Procedure: CATARACT PHACO EXTRACTION WITH INTRAOCULAR LENS IMPLANT RIGHT;  Surgeon: Omar Blood MD;  Location: [facility];  Service: Ophthalmology;  Laterality: Right;   • CATARACT EXTRACTION W/ INTRAOCULAR LENS  IMPLANT Left 7/9/2020    Procedure: CATARACT PHACO EXTRACTION WITH INTRAOCULAR LENS IMPLANT LEFT;  Surgeon: Omar Blood MD;  Location: [facility];  Service: Ophthalmology;  Laterality: Left;   • CHOLECYSTECTOMY  2002   • COLONOSCOPY     • FINGER SURGERY      Rt index (second finger)   • OTHER SURGICAL HISTORY  2010    Bone spur removal   • TOE AMPUTATION  2009   • VASECTOMY           EVALUATION   PT Ortho     Row Name 04/08/22 0900       Subjective Comments    Subjective Comments Pt saw Dr. Calderon since last tx, reports he was very happy with his wound healing progress.  -       Subjective Pain    Able to rate subjective pain? yes  -JM    Pre-Treatment Pain Level 0  -JM    Post-Treatment Pain Level 0  -       Transfers    Sit-Stand [place] (Transfers) modified independence  -    Stand-Sit [place] (Transfers) modified independence  -    Transfers, Sit-Stand-Sit, Assist Device straight cane  -    Comment, (Transfers) seated for tx  -JM       Gait/Stairs (Locomotion)    [place] Level (Gait) modified independence  -    Assistive Device (Gait) cane, straight  -          User Key  (r) = Recorded By, (t) = Taken By, (c) = Cosigned By    Initials Name Provider Type    Cheryl Hua PT Physical Therapist                 [facility] Wound     Row Name 04/08/22 0900             Wound 03/18/22 0800 Left distal great toe Blisters    Wound - Properties Group Placement Date: 03/18/22  - Placement Time: 0800  - Present on Hospital Admission: N  - Side: Left  - Orientation: distal  - Location: great toe  - Primary Wound Type: Blisters  -MC      Wound Image View All Images View Images  -      Dressing Appearance intact;other (see comments)  band-aid only placed by pt  -      Base moist;slough;yellow;pink;subcutaneous;granulating  -      Periwound intact;pink;dry  shiny  -      Periwound Temperature warm  -      Periwound Skin Turgor soft  -      Edges irregular;open  -    "   Wound Length (cm) 0.4 cm  -JM      Wound Width (cm) 0.2 cm  -JM      Wound Depth (cm) 0.2 cm  obscured  -JM      Wound Surface Area (cm^2) 0.08 cm^2  -JM      Wound Volume (cm^3) 0.016 cm^3  -JM      Drainage Characteristics/Odor serosanguineous  -JM      Drainage Amount small  -JM      Care, Wound cleansed with;wound cleanser;debrided;ultrasound therapy, non contact low frequency  MIST 3min  -JM      Dressing Care dressing applied;collagen;silver impregnated;foam  tez, mepilex ag, primafix  -JM      Periwound Care cleansed with pH balanced cleanser;dry periwound area maintained  -JM      Retired Wound - Properties Group Placement Date: 03/18/22  -MC Placement Time: 0800  -MC Present on Hospital Admission: N  -MC Side: Left  -MC Orientation: distal  -MC Location: great toe  -MC Primary Wound Type: Blisters  -MC      Retired Wound - Properties Group Date first assessed: 03/18/22  -MC Time first assessed: 0800  - Present on Hospital Admission: N  -MC Side: Left  - Location: great toe  -MC Primary Wound Type: Blisters  -MC              Wound 01/31/22 0800 Left lateral foot Incision    Wound - Properties Group Placement Date: 01/31/22  -MW Placement Time: 0800  -MW Present on Hospital Admission: Y  -MW Side: Left  -MW Orientation: lateral  - Location: foot  -MW Primary Wound Type: Incision  -MW Additional Comments: per pt \"had bunion removed\"  -MW      Wound Image View All Images View Images  -      Dressing Appearance intact;dried drainage  -      Base moist;granulating;pink;epithelialization  -      Periwound swelling;warm  -      Periwound Temperature warm  -      Periwound Skin Turgor soft  -JM      Edges open;callused  -JM      Wound Length (cm) 0.3 cm  -JM      Wound Width (cm) 0.1 cm  -JM      Wound Depth (cm) 0.2 cm  -JM      Wound Surface Area (cm^2) 0.03 cm^2  -JM      Wound Volume (cm^3) 0.006 cm^3  -JM      Drainage Characteristics/Odor serous  -JM      Drainage Amount scant  -JM      " "Care, Wound cleansed with;wound cleanser;debrided;ultrasound therapy, non contact low frequency  MIST 3min  -JM      Dressing Care dressing applied;collagen;silver impregnated;foam;silicone;border dressing;multi-layer wrap  tez, mepilex ag, 4\" optifoam, MLW  -JM      Periwound Care cleansed with pH balanced cleanser;dry periwound area maintained  -JM      Retired Wound - Properties Group Placement Date: 01/31/22  -MW Placement Time: 0800  -MW Present on Hospital Admission: Y  -MW Side: Left  -MW Orientation: lateral  -MW Location: foot  -MW Primary Wound Type: Incision  -MW Additional Comments: per pt \"had bunion removed\"  -MW      Retired Wound - Properties Group Date first assessed: 01/31/22  -MW Time first assessed: 0800  -MW Present on Hospital Admission: Y  -MW Side: Left  -MW Location: foot  -MW Primary Wound Type: Incision  -MW Additional Comments: per pt \"had bunion removed\"  -MW            User Key  (r) = Recorded By, (t) = Taken By, (c) = Cosigned By    Initials Name Provider Type    MW Carleen Lieberman, PT Physical Therapist    Morena Rajan, PT Physical Therapist    Cheryl Hua, PT Physical Therapist               Lymphedema     Row Name 04/08/22 0900             Lymphedema Edema Assessment    Pitting Edema Mild  -JM              Skin Changes/Observations    Lower Extremity Conditions left:;clean;dry;crust  -JM      Lower Extremity Color/Pigment left:;hyperpigmented;brawny  -JM              Lymphedema Pulses/Capillary Refill    Lower Extremity Capillary Refill left:;less than 3 seconds  -JM              LLE Quick Girth (cm)    Met-heads 24 cm  -JM      Mid foot 24.7 cm  -JM      Smallest ankle 26 cm  -JM      Largest calf 39.2 cm  -JM      Tib tuberosity 39.5 cm  -JM              Compression/Skin Care    Compression/Skin Care skin care;wrapping location;bandaging  -JM      Skin Care washed/dried;lotion applied;topical anti-fungal applied  anitfungal to medial dry scaly area  -JM   "    Wrapping Location lower extremity  -JM      Wrapping Location LE left:;foot to knee  -JM      Wrapping Comments size 4/5 compressogrips, doubled/overlapping layers for gradient compression  -JM      Bandage Layers cotton elastic stocking- double layer (comment size)  -JM      Compression/Skin Care Comments Issued size Iv knee-high 20-30mmHg Juzo stocking for use after wounds healed  -            User Key  (r) = Recorded By, (t) = Taken By, (c) = Cosigned By    Initials Name Provider Type    Cheryl Hua, PT Physical Therapist                WOUND DEBRIDEMENT  Total area of Debridement: 1cmsq  Debridement Site 1  Location- Site 1: LT lateral foot  Selective Debridement- Site 1: Wound Surface <20cmsq  Instruments- Site 1: tweezers, #15, scapel  Excised Tissue Description- Site 1: minimum, slough, other (comment) (callous)  Bleeding- Site 1: scant, held pressure, 1 minute   Debridement Site 2  Location- Site 2: Distal Lt great toe  Selective Debridement- Site 2: Wound Surface <20cmsq  Instruments- Site 2: tweezers  Excised Tissue Description- Site 2: minimum, slough  Bleeding- Site 2: none          Therapy Education     Row Name 04/08/22 0900             Therapy Education    Education Details Continue home dressings, may try compression stocking to RLE  -JM      Given Bandaging/dressing change;Symptoms/condition management  -      Program Reinforced;Progressed  -MARICEL      How Provided Verbal;Demonstration  -JM      Provided to Patient  -JM      Level of Understanding Verbalized  -            User Key  (r) = Recorded By, (t) = Taken By, (c) = Cosigned By    Initials Name Provider Type    Cheryl Hua, PT Physical Therapist                Recommendation and Plan   PT Assessment/Plan     Row Name 04/08/22 0900          PT Assessment    Functional Limitations Performance in self-care ADL;Other (comment)  wound management  -     Impairments Integumentary integrity;Impaired venous  circulation;Impaired arterial circulation;Peripheral nerve integrity  -     Assessment Comments L foot wounds improving with decreased area.  Pt continues to benefit from MIST and still requires debridement of nonviable tissue.  Chronic LE edema improving with MLW.  -            PT Plan    PT Frequency 2x/week  -     Physical Therapy Interventions (Optional Details) patient/family education;wound care  -     PT Plan Comments MIST, debridement, MLW  -           User Key  (r) = Recorded By, (t) = Taken By, (c) = Cosigned By    Initials Name Provider Type    Cheryl Hua, PT Physical Therapist                Goals   PT OP Goals     Row Name 04/08/22 0835          Time Calculation    PT Goal Re-Cert Due Date 04/30/22  -           User Key  (r) = Recorded By, (t) = Taken By, (c) = Cosigned By    Initials Name Provider Type    Cheryl Hua, PT Physical Therapist                PT Goal Re-Cert Due Date: 04/30/22            Time Calculation: Start Time: 0835  Untimed Charges  32880-Medivfjkeq comp below knee: 10  72282-Iaucrhchf debridement: 10  29774-NMNI Mist: 10  Total Minutes  Untimed Charges Total Minutes: 30   Total Minutes: 30  Therapy Charges for Today     Code Description Service Date Service Provider Modifiers Qty    65628094398 HC JOSS DEBRIDE OPEN WOUND UP TO 20CM 4/8/2022 Cheryl Washington, PT GP 1    76358996819 HC PT NLFU MIST 4/8/2022 Cheryl Washington, PT GP 1    81704113806 HC PT MULTI LAYER COMP SYS BELOW KNEE 4/8/2022 Cheryl Washington, PT GP 1                  Cheryl Washington, PT  4/8/2022

## 2022-04-11 ENCOUNTER — NURSE ONLY (OUTPATIENT)
Dept: FAMILY MEDICINE CLINIC | Age: 72
End: 2022-04-11
Payer: MEDICARE

## 2022-04-11 DIAGNOSIS — E11.9 TYPE 2 DIABETES MELLITUS WITHOUT COMPLICATION, WITHOUT LONG-TERM CURRENT USE OF INSULIN (HCC): Primary | ICD-10-CM

## 2022-04-11 LAB
CHP ED QC CHECK: NORMAL
GLUCOSE BLD-MCNC: 108 MG/DL
HBA1C MFR BLD: 5.6 %

## 2022-04-11 PROCEDURE — 82962 GLUCOSE BLOOD TEST: CPT | Performed by: NURSE PRACTITIONER

## 2022-04-11 NOTE — PROGRESS NOTES
Patient came to clinic stating his \"sugars has been all over the place\". Fingerstick was obtained and was 108. Patient brought in recordings over the last several days ranging from 's. Patient states he has been taking his glipizide 10mg BID and has not had his trulicity in one week. Spoke with LANCE Barreto regarding patients concerns and A1C results. Patient is to be taking glipizide 10mg daily. Patient was instructed to keep a log and bring to next scheduled appointment. Understanding verbalized.

## 2022-04-11 NOTE — THERAPY WOUND CARE TREATMENT
Outpatient Rehabilitation - Wound/Debridement Treatment Note   Gamaliel     Patient Name: Swapnil Lawson  : 1950  MRN: [id number]  Today's Date: 2022                 Admit Date: 2022    Visit Dx:    ICD-10-CM ICD-9-CM   1. Ulcer of left foot with other severity ([facility])  L97.528 707.15   2. Peripheral vascular disease of lower extremity ([facility])  I73.9 443.9   3. Venous insufficiency  I87.2 459.81   4. Type 2 diabetes mellitus with foot ulcer, unspecified whether long term insulin use ([facility])  E11.621 250.80    L97.509 707.15       Patient Active Problem List   Diagnosis   • Coronary artery disease   • Hypertension   • Paroxysmal atrial fibrillation (HCC)   • Diabetes mellitus (HCC)   • Venous insufficiency   • Hyperlipidemia   • Nuclear sclerotic cataract of right eye   • Cortical age-related cataract of right eye   • Nuclear sclerotic cataract of left eye   • Cortical age-related cataract of left eye   • Thrombocytopenia (HCC)        Past Medical History:   Diagnosis Date   • COPD (chronic obstructive pulmonary disease) ([facility])    • Coronary artery disease 2016    CABG, , Abimael Tomlin MD. CABG, 2013, [facility].   • Diabetes mellitus (HCC) 2016   • Gout    • Hyperlipidemia 2016   • Hypertension 2016   • Myocardial infarction (HCC)      in  and  prior to CABG.   • Paroxysmal atrial fibrillation (HCC) 2016   • Sleep apnea    • Venous insufficiency 2016        Past Surgical History:   Procedure Laterality Date   • APPENDECTOMY     • CARDIAC CATHETERIZATION     • CARDIAC SURGERY       and    • CATARACT EXTRACTION W/ INTRAOCULAR LENS IMPLANT Right 2020    Procedure: CATARACT PHACO EXTRACTION WITH INTRAOCULAR LENS IMPLANT RIGHT;  Surgeon: Omar Blood MD;  Location: [facility];  Service: Ophthalmology;  Laterality: Right;   • CATARACT EXTRACTION W/ INTRAOCULAR LENS IMPLANT Left 2020    Procedure: CATARACT  "PHACO EXTRACTION WITH INTRAOCULAR LENS IMPLANT LEFT;  Surgeon: Omar Blood MD;  Location: [facility];  Service: Ophthalmology;  Laterality: Left;   • CHOLECYSTECTOMY  2002   • COLONOSCOPY     • FINGER SURGERY      Rt index (second finger)   • OTHER SURGICAL HISTORY  2010    Bone spur removal   • TOE AMPUTATION  2009   • VASECTOMY           EVALUATION   PT Ortho     Row Name 04/11/22 0800       Subjective Comments    Subjective Comments Pt states his blood glucose has \"been all over the place\" for the last 2-3 days.  States he is working with doctor on it.  -JM       Subjective Pain    Able to rate subjective pain? yes  -JM    Pre-Treatment Pain Level 0  -JM    Post-Treatment Pain Level 0  -JM       Transfers    Sit-Stand [place] (Transfers) modified independence  -JM    Stand-Sit [place] (Transfers) modified independence  -JM    Transfers, Sit-Stand-Sit, Assist Device straight cane  -JM    Comment, (Transfers) seated for tx  -JM       Gait/Stairs (Locomotion)    [place] Level (Gait) modified independence  -    Assistive Device (Gait) cane, straight  -JM          User Key  (r) = Recorded By, (t) = Taken By, (c) = Cosigned By    Initials Name Provider Type    Cheryl Hua, PT Physical Therapist                 [facility] Wound     Row Name 04/11/22 0800             Wound 03/18/22 0800 Left distal great toe Blisters    Wound - Properties Group Placement Date: 03/18/22  - Placement Time: 0800  - Present on Hospital Admission: N  - Side: Left  - Orientation: distal  - Location: great toe  - Primary Wound Type: Blisters  -      Dressing Appearance intact;moist drainage  -      Base moist;slough;yellow;pink;subcutaneous  -      Periwound intact;pink;dry  -      Periwound Temperature warm  -      Periwound Skin Turgor soft  -      Edges irregular;open  -      Drainage Characteristics/Odor serosanguineous  -      Drainage Amount small  -      Care, Wound cleansed " "with;wound cleanser;debrided;ultrasound therapy, non contact low frequency  MIST 3min  -JM      Dressing Care dressing applied;collagen;silver impregnated;foam  tez, mepilex ag, primafix  -      Periwound Care cleansed with pH balanced cleanser;dry periwound area maintained  -JM      Retired Wound - Properties Group Placement Date: 03/18/22  -MC Placement Time: 0800  -MC Present on Hospital Admission: N  -MC Side: Left  -MC Orientation: distal  -MC Location: great toe  -MC Primary Wound Type: Blisters  -MC      Retired Wound - Properties Group Date first assessed: 03/18/22  -MC Time first assessed: 0800  -MC Present on Hospital Admission: N  -MC Side: Left  -MC Location: great toe  -MC Primary Wound Type: Blisters  -MC              Wound 01/31/22 0800 Left lateral foot Incision    Wound - Properties Group Placement Date: 01/31/22  -MW Placement Time: 0800  -MW Present on Hospital Admission: Y  -MW Side: Left  -MW Orientation: lateral  -MW Location: foot  - Primary Wound Type: Incision  - Additional Comments: per pt \"had bunion removed\"  -MW      Dressing Appearance intact;dried drainage  tez dry  -      Base moist;granulating;pink;epithelialization  -      Periwound swelling;warm  -      Periwound Temperature warm  -      Periwound Skin Turgor soft  -      Edges open;callused  -      Drainage Characteristics/Odor serous  -JM      Drainage Amount scant  -JM      Care, Wound cleansed with;wound cleanser;debrided;ultrasound therapy, non contact low frequency  MIST 3min  -JM      Dressing Care dressing applied;collagen;silver impregnated;foam;silicone;border dressing;multi-layer wrap  tez, mepilex ag, 4\" optifoam, MLW  -JM      Periwound Care cleansed with pH balanced cleanser;dry periwound area maintained  -      Retired Wound - Properties Group Placement Date: 01/31/22  -MW Placement Time: 0800  -MW Present on Hospital Admission: Y  -MW Side: Left  -MW Orientation: lateral  -MW Location: " "foot  -MW Primary Wound Type: Incision  -MW Additional Comments: per pt \"had bunion removed\"  -MW      Retired Wound - Properties Group Date first assessed: 01/31/22  -MW Time first assessed: 0800  -MW Present on Hospital Admission: Y  -MW Side: Left  -MW Location: foot  -MW Primary Wound Type: Incision  -MW Additional Comments: per pt \"had bunion removed\"  -MW            User Key  (r) = Recorded By, (t) = Taken By, (c) = Cosigned By    Initials Name Provider Type    MW Carleen Lieberman, PT Physical Therapist    Morena Rajan, PT Physical Therapist    Cheryl Hua, PT Physical Therapist               Lymphedema     Row Name 04/11/22 0800             Lymphedema Edema Assessment    Pitting Edema Mild;Moderate  mild foot, mod ankle/calf  -              Skin Changes/Observations    Lower Extremity Conditions left:;clean;dry;crust  -      Lower Extremity Color/Pigment left:;hyperpigmented;brawny  -              Lymphedema Pulses/Capillary Refill    Lower Extremity Capillary Refill left:;less than 3 seconds  -              Compression/Skin Care    Compression/Skin Care skin care;wrapping location;bandaging  -      Skin Care washed/dried;lotion applied;topical anti-fungal applied  anitfungal to medial dry scaly area  -      Wrapping Location lower extremity  -      Wrapping Location LE left:;foot to knee  -      Wrapping Comments size 4/5 compressogrips, doubled/overlapping layers for gradient compression  -      Bandage Layers cotton elastic stocking- double layer (comment size)  -            User Key  (r) = Recorded By, (t) = Taken By, (c) = Cosigned By    Initials Name Provider Type    Cheryl Hua, PT Physical Therapist                WOUND DEBRIDEMENT  Total area of Debridement: 1cmsq  Debridement Site 1  Location- Site 1: LT lateral foot  Selective Debridement- Site 1: Wound Surface <20cmsq  Instruments- Site 1: tweezers, #15, scapel  Excised Tissue Description- Site 1: " minimum, slough, other (comment) (callous/hypertrophic growth)  Bleeding- Site 1: scant, held pressure, 1 minute   Debridement Site 2  Location- Site 2: Distal Lt great toe  Selective Debridement- Site 2: Wound Surface <20cmsq  Instruments- Site 2: tweezers, scissors  Excised Tissue Description- Site 2: minimum, slough, other (comment) (loose periwound callous/skin)  Bleeding- Site 2: none          Therapy Education     Row Name 04/11/22 0800             Therapy Education    Education Details Continuation of POC  -      Given Bandaging/dressing change;Symptoms/condition management  -      Program Reinforced;Progressed  -      How Provided Verbal;Demonstration  -      Provided to Patient  -      Level of Understanding Verbalized  -            User Key  (r) = Recorded By, (t) = Taken By, (c) = Cosigned By    Initials Name Provider Type    Cheryl Hua, PT Physical Therapist                Recommendation and Plan   PT Assessment/Plan     Row Name 04/11/22 0800          PT Assessment    Functional Limitations Performance in self-care ADL;Other (comment)  wound management  -     Impairments Integumentary integrity;Impaired venous circulation;Impaired arterial circulation;Peripheral nerve integrity  -     Assessment Comments L foot wounds with decreasing drainage, tez mostly dry to lat foot today.  Pt continues to benefit from current POC to promote continued wound closure.  Pt reporting he will hopefully have new shoes with metatarsal bar which should reduce pressure to wounds and assist with wound healing.  -            PT Plan    PT Frequency 2x/week  -MARICEL     Physical Therapy Interventions (Optional Details) patient/family education;wound care  -     PT Plan Comments MIST, debridement, MLW  -           User Key  (r) = Recorded By, (t) = Taken By, (c) = Cosigned By    Initials Name Provider Type    Cheryl Hua, PT Physical Therapist                Goals   PT OP Goals     Row  Name 04/11/22 0800          Time Calculation    PT Goal Re-Cert Due Date 04/30/22  -MARICEL           User Key  (r) = Recorded By, (t) = Taken By, (c) = Cosigned By    Initials Name Provider Type    Cheryl Hua, PT Physical Therapist                PT Goal Re-Cert Due Date: 04/30/22            Time Calculation: Start Time: 0800  Untimed Charges  08419-Qbfezrbhoi comp below knee: 10  45024-Ocyikjibh debridement: 10  06388-JYLD Mist: 10  Total Minutes  Untimed Charges Total Minutes: 30   Total Minutes: 30  Therapy Charges for Today     Code Description Service Date Service Provider Modifiers Qty    77433422371 HC JOSS DEBRIDE OPEN WOUND UP TO 20CM 4/11/2022 Cheryl Washington, PT GP 1    63025065271 HC PT NLFU MIST 4/11/2022 Cheryl Washington, PT GP 1    68914375007 HC PT MULTI LAYER COMP SYS BELOW KNEE 4/11/2022 Cheryl Washington, PT GP 1                  Cheryl Washington, PT  4/11/2022

## 2022-04-15 NOTE — THERAPY WOUND CARE TREATMENT
Outpatient Rehabilitation - Wound/Debridement Treatment Note   Gamaliel     Patient Name: Swapnil Lawson  : 1950  MRN: [id number]  Today's Date: 4/15/2022                 Admit Date: 4/15/2022    Visit Dx:    ICD-10-CM ICD-9-CM   1. Ulcer of left foot with other severity ([facility])  L97.528 707.15   2. Peripheral vascular disease of lower extremity ([facility])  I73.9 443.9   3. Venous insufficiency  I87.2 459.81   4. Type 2 diabetes mellitus with foot ulcer, unspecified whether long term insulin use ([facility])  E11.621 250.80    L97.509 707.15   L great toe:    Lateral L foot:    Medial LLE:      Patient Active Problem List   Diagnosis   • Coronary artery disease   • Hypertension   • Paroxysmal atrial fibrillation (HCC)   • Diabetes mellitus (HCC)   • Venous insufficiency   • Hyperlipidemia   • Nuclear sclerotic cataract of right eye   • Cortical age-related cataract of right eye   • Nuclear sclerotic cataract of left eye   • Cortical age-related cataract of left eye   • Thrombocytopenia ([facility])        Past Medical History:   Diagnosis Date   • COPD (chronic obstructive pulmonary disease) ([facility])    • Coronary artery disease 2016    CABG, , Abimael Tomlin MD. CABG, 2013, [facility].   • Diabetes mellitus ([facility]) 2016   • Gout    • Hyperlipidemia 2016   • Hypertension 2016   • Myocardial infarction ([facility])      in  and  prior to CABG.   • Paroxysmal atrial fibrillation ([facility]) 2016   • Sleep apnea    • Venous insufficiency 2016        Past Surgical History:   Procedure Laterality Date   • APPENDECTOMY     • CARDIAC CATHETERIZATION     • CARDIAC SURGERY       and    • CATARACT EXTRACTION W/ INTRAOCULAR LENS IMPLANT Right 2020    Procedure: CATARACT PHACO EXTRACTION WITH INTRAOCULAR LENS IMPLANT RIGHT;  Surgeon: Omar Blood MD;  Location: [facility];  Service: Ophthalmology;  Laterality: Right;   • CATARACT EXTRACTION W/ INTRAOCULAR LENS  IMPLANT Left 7/9/2020    Procedure: CATARACT PHACO EXTRACTION WITH INTRAOCULAR LENS IMPLANT LEFT;  Surgeon: Omar Blood MD;  Location: [facility];  Service: Ophthalmology;  Laterality: Left;   • CHOLECYSTECTOMY  2002   • COLONOSCOPY     • FINGER SURGERY      Rt index (second finger)   • OTHER SURGICAL HISTORY  2010    Bone spur removal   • TOE AMPUTATION  2009   • VASECTOMY           EVALUATION   PT Ortho     Row Name 04/15/22 0800       Subjective Comments    Subjective Comments Pt states he is getting his new shoes today, that have a metatarsal bar to help offload toes.  No complaints.  -JM       Subjective Pain    Able to rate subjective pain? yes  -JM    Pre-Treatment Pain Level 0  -JM    Post-Treatment Pain Level 0  -JM       Transfers    Sit-Stand [place] (Transfers) modified independence  -JM    Stand-Sit [place] (Transfers) modified independence  -JM    Transfers, Sit-Stand-Sit, Assist Device straight cane  -JM    Comment, (Transfers) seated for tx  -JM       Gait/Stairs (Locomotion)    [place] Level (Gait) modified independence  -    Assistive Device (Gait) cane, straight  -          User Key  (r) = Recorded By, (t) = Taken By, (c) = Cosigned By    Initials Name Provider Type    Cheryl Hua PT Physical Therapist                 [facility] Wound     Row Name 04/15/22 0800             Wound 03/18/22 0800 Left distal great toe Blisters    Wound - Properties Group Placement Date: 03/18/22  - Placement Time: 0800  - Present on Hospital Admission: N  - Side: Left  - Orientation: distal  - Location: great toe  - Primary Wound Type: Blisters  -      Wound Image View All Images View Images  -      Dressing Appearance intact;moist drainage  -      Base moist;yellow;pink  -      Periwound intact;pink;dry  -      Periwound Temperature warm  -      Periwound Skin Turgor soft  -      Edges irregular;open  -      Wound Length (cm) 0.15 cm  -      Wound Width  "(cm) 0.05 cm  -      Wound Surface Area (cm^2) 0.0075 cm^2  -JM      Drainage Characteristics/Odor serous  -JM      Drainage Amount scant  -JM      Care, Wound cleansed with;wound cleanser;debrided;ultrasound therapy, non contact low frequency  MIST 3min  -JM      Dressing Care dressing applied;silver impregnated;foam  mepilex ag, primafix  -JM      Periwound Care cleansed with pH balanced cleanser;dry periwound area maintained  -JM      Retired Wound - Properties Group Placement Date: 03/18/22  -MC Placement Time: 0800  -MC Present on Hospital Admission: N  -MC Side: Left  -MC Orientation: distal  -MC Location: great toe  -MC Primary Wound Type: Blisters  -MC      Retired Wound - Properties Group Date first assessed: 03/18/22  -MC Time first assessed: 0800  -MC Present on Hospital Admission: N  -MC Side: Left  -MC Location: great toe  -MC Primary Wound Type: Blisters  -MC              Wound 01/31/22 0800 Left lateral foot Incision    Wound - Properties Group Placement Date: 01/31/22  -MW Placement Time: 0800  -MW Present on Hospital Admission: Y  -MW Side: Left  -MW Orientation: lateral  -MW Location: foot  -MW Primary Wound Type: Incision  -MW Additional Comments: per pt \"had bunion removed\"  -      Wound Image View All Images View Images  -      Dressing Appearance dry;intact;no drainage  -      Base pink;epithelialization;yellow;maroon/purple  nearly closed with hypertrophic crusting  -      Periwound intact;dry;pink;swelling  -      Periwound Temperature warm  -      Periwound Skin Turgor soft  -      Edges open;callused  -      Wound Length (cm) 0 cm  no measurable open area noted  -      Wound Width (cm) 0 cm  -      Wound Depth (cm) 0 cm  -JM      Wound Surface Area (cm^2) 0 cm^2  -JM      Wound Volume (cm^3) 0 cm^3  -JM      Drainage Amount none  -JM      Care, Wound cleansed with;wound cleanser;debrided;ultrasound therapy, non contact low frequency  MIST 3min  -JM      Dressing Care " "dressing applied;silver impregnated;foam;silicone;border dressing;multi-layer wrap  mepilex ag, 4\" optifoam, MLW  -JM      Periwound Care cleansed with pH balanced cleanser;dry periwound area maintained  -JM      Retired Wound - Properties Group Placement Date: 01/31/22  -MW Placement Time: 0800  -MW Present on Hospital Admission: Y  -MW Side: Left  -MW Orientation: lateral  -MW Location: foot  -MW Primary Wound Type: Incision  -MW Additional Comments: per pt \"had bunion removed\"  -MW      Retired Wound - Properties Group Date first assessed: 01/31/22  -MW Time first assessed: 0800  -MW Present on Hospital Admission: Y  -MW Side: Left  -MW Location: foot  -MW Primary Wound Type: Incision  -MW Additional Comments: per pt \"had bunion removed\"  -MW            User Key  (r) = Recorded By, (t) = Taken By, (c) = Cosigned By    Initials Name Provider Type    MW Carleen Lieberman, PT Physical Therapist    Morena Rajan, PT Physical Therapist    Cheryl Hua, PT Physical Therapist               Lymphedema     Row Name 04/15/22 0800             Lymphedema Edema Assessment    Pitting Edema Mild;Moderate  mild foot, mod ankle/calf  -              Skin Changes/Observations    Lower Extremity Conditions left:;clean;dry;crust  -      Lower Extremity Color/Pigment left:;hyperpigmented;brawny  -              Lymphedema Pulses/Capillary Refill    Lower Extremity Capillary Refill left:;less than 3 seconds  -              Compression/Skin Care    Compression/Skin Care skin care;wrapping location;bandaging  -      Skin Care washed/dried;lotion applied;topical anti-fungal applied  anitfungal to medial dry scaly area  -JM      Wrapping Location lower extremity  -JM      Wrapping Location LE left:;foot to knee  -      Wrapping Comments size 4/5 compressogrips, doubled/overlapping layers for gradient compression  -      Bandage Layers cotton elastic stocking- double layer (comment size)  -            User " Key  (r) = Recorded By, (t) = Taken By, (c) = Cosigned By    Initials Name Provider Type    Cheryl Hua, PT Physical Therapist                WOUND DEBRIDEMENT  Total area of Debridement: 1cmsq  Debridement Site 1  Location- Site 1: LT lateral foot  Selective Debridement- Site 1: Wound Surface <20cmsq  Instruments- Site 1: tweezers, #15, scapel  Excised Tissue Description- Site 1: minimum, other (comment) (callous)  Bleeding- Site 1: none   Debridement Site 2  Location- Site 2: Distal Lt great toe  Selective Debridement- Site 2: Wound Surface <20cmsq  Instruments- Site 2: tweezers  Excised Tissue Description- Site 2: minimum, other (comment) (loose skin flakes)  Bleeding- Site 2: none   Debridement Site 3  Location- Site 3: medial LLE  Selective Debridement- Site 3: Wound Surface <20cmsq  Instruments- Site 3: tweezers  Excised Tissue Description- Site 3: minimum, other (comment) (loose hypertrophic crusts)  Bleeding- Site 3: none      Therapy Education     Row Name 04/15/22 0800             Therapy Education    Education Details Continue current dressings, may use compression stockings instead of compressogrips now that skin integrity is improving and wounds nearly healed.  -JM      Given Bandaging/dressing change;Symptoms/condition management  -MARICEL      Program Reinforced;Progressed  -MARICEL      How Provided Verbal;Demonstration  -MARICEL      Provided to Patient  -MARICEL      Level of Understanding Verbalized  -            User Key  (r) = Recorded By, (t) = Taken By, (c) = Cosigned By    Initials Name Provider Type    Cheryl Hua, PT Physical Therapist                Recommendation and Plan   PT Assessment/Plan     Row Name 04/15/22 0800          PT Assessment    Functional Limitations Performance in self-care ADL;Other (comment)  wound management  -     Impairments Integumentary integrity;Impaired venous circulation;Impaired arterial circulation;Peripheral nerve integrity  -     Assessment Comments  Lat L foot wound with hypertrohpic crusting but no visible open area or drainage noted today.  Distal great toe also nearly closed with only small slit of open skin remaining.  Pt likely to have wound closure and d/c in next 1-2 weeks.  Pt to obtain new shoes with custom inserts and metatarsal bar today, which should help residual foot wounds heal and help prevent future ulcerations.  -            PT Plan    PT Frequency 2x/week  -     Physical Therapy Interventions (Optional Details) patient/family education;wound care  -     PT Plan Comments ?d/c MIST, debridement, MLW  -           User Key  (r) = Recorded By, (t) = Taken By, (c) = Cosigned By    Initials Name Provider Type    Cheryl Hua, PT Physical Therapist                Goals   PT OP Goals     Row Name 04/15/22 0800          Time Calculation    PT Goal Re-Cert Due Date 04/30/22  -           User Key  (r) = Recorded By, (t) = Taken By, (c) = Cosigned By    Initials Name Provider Type    Cheryl Hua, PT Physical Therapist                PT Goal Re-Cert Due Date: 04/30/22            Time Calculation: Start Time: 0800  Untimed Charges  70054-Lrurbmbfyl comp below knee: 10  93172-Oeuuhujua debridement: 10  58669-OVWY Mist: 10  Total Minutes  Untimed Charges Total Minutes: 30   Total Minutes: 30  Therapy Charges for Today     Code Description Service Date Service Provider Modifiers Qty    70071668471 HC JOSS DEBRIDE OPEN WOUND UP TO 20CM 4/15/2022 Cheryl Washington, PT GP 1    60835186865 HC PT NLFU MIST 4/15/2022 Cheryl Washington, PT GP 1    [id number] HC PT MULTI LAYER COMP SYS BELOW KNEE 4/15/2022 Cheryl Washington, PT GP 1                  Cheryl Washington PT  4/15/2022

## 2022-04-18 ENCOUNTER — TELEPHONE (OUTPATIENT)
Dept: FAMILY MEDICINE CLINIC | Age: 72
End: 2022-04-18

## 2022-04-18 ENCOUNTER — NURSE ONLY (OUTPATIENT)
Dept: FAMILY MEDICINE CLINIC | Age: 72
End: 2022-04-18
Payer: MEDICARE

## 2022-04-18 VITALS — HEART RATE: 86 BPM | OXYGEN SATURATION: 96 %

## 2022-04-18 DIAGNOSIS — K59.00 CONSTIPATION, UNSPECIFIED CONSTIPATION TYPE: ICD-10-CM

## 2022-04-18 DIAGNOSIS — E78.00 HYPERCHOLESTEROLEMIA: ICD-10-CM

## 2022-04-18 DIAGNOSIS — I10 ESSENTIAL HYPERTENSION, BENIGN: ICD-10-CM

## 2022-04-18 DIAGNOSIS — D50.9 IRON DEFICIENCY ANEMIA, UNSPECIFIED IRON DEFICIENCY ANEMIA TYPE: ICD-10-CM

## 2022-04-18 PROCEDURE — 96372 THER/PROPH/DIAG INJ SC/IM: CPT | Performed by: NURSE PRACTITIONER

## 2022-04-18 RX ORDER — CEFTRIAXONE 500 MG/1
500 INJECTION, POWDER, FOR SOLUTION INTRAMUSCULAR; INTRAVENOUS ONCE
Qty: 1 EACH | Refills: 0
Start: 2022-04-18 | End: 2022-04-18 | Stop reason: CLARIF

## 2022-04-18 RX ORDER — LOVASTATIN 20 MG/1
20 TABLET ORAL DAILY
Qty: 90 TABLET | Refills: 2 | Status: SHIPPED | OUTPATIENT
Start: 2022-04-18

## 2022-04-18 RX ORDER — ALLOPURINOL 300 MG/1
TABLET ORAL
Qty: 90 TABLET | Refills: 2 | Status: SHIPPED | OUTPATIENT
Start: 2022-04-18 | End: 2022-08-08 | Stop reason: SDUPTHER

## 2022-04-18 RX ORDER — DIGOXIN 125 MCG
TABLET ORAL
Qty: 90 TABLET | Refills: 2 | Status: SHIPPED | OUTPATIENT
Start: 2022-04-18

## 2022-04-18 RX ORDER — FERROUS SULFATE 325(65) MG
TABLET ORAL
Qty: 180 TABLET | Refills: 2 | Status: SHIPPED | OUTPATIENT
Start: 2022-04-18

## 2022-04-18 RX ORDER — METOPROLOL SUCCINATE 25 MG/1
TABLET, EXTENDED RELEASE ORAL
Qty: 90 TABLET | Refills: 1 | Status: SHIPPED | OUTPATIENT
Start: 2022-04-18 | End: 2022-09-30

## 2022-04-18 RX ORDER — CEFTRIAXONE 500 MG/1
1000 INJECTION, POWDER, FOR SOLUTION INTRAMUSCULAR; INTRAVENOUS ONCE
Status: COMPLETED | OUTPATIENT
Start: 2022-04-18 | End: 2022-04-18

## 2022-04-18 RX ORDER — CEFTRIAXONE 500 MG/1
500 INJECTION, POWDER, FOR SOLUTION INTRAMUSCULAR; INTRAVENOUS ONCE
Status: DISCONTINUED | OUTPATIENT
Start: 2022-04-18 | End: 2022-06-08

## 2022-04-18 RX ORDER — DOCUSATE SODIUM 100 MG/1
100 CAPSULE, LIQUID FILLED ORAL 2 TIMES DAILY
Qty: 180 CAPSULE | Refills: 1 | Status: SHIPPED | OUTPATIENT
Start: 2022-04-18 | End: 2022-06-23 | Stop reason: SDUPTHER

## 2022-04-18 RX ORDER — AMLODIPINE BESYLATE 5 MG/1
TABLET ORAL
Qty: 90 TABLET | Refills: 2 | Status: SHIPPED | OUTPATIENT
Start: 2022-04-18 | End: 2022-07-18 | Stop reason: SDUPTHER

## 2022-04-18 RX ADMIN — CEFTRIAXONE 1000 MG: 500 INJECTION, POWDER, FOR SOLUTION INTRAMUSCULAR; INTRAVENOUS at 11:11

## 2022-04-18 NOTE — TELEPHONE ENCOUNTER
Patient walked into the clinic requesting several of his rx be refilled. Says he will be running out of several in the next couple of days. He has a list of the ones he is needing.

## 2022-04-18 NOTE — THERAPY WOUND CARE TREATMENT
Outpatient Rehabilitation - Wound/Debridement Treatment Note   [place]     Patient Name: Swapnil Lawson  : 1950  MRN: [id number]  Today's Date: 2022                 Admit Date: 2022    Visit Dx:    ICD-10-CM ICD-9-CM   1. Ulcer of left foot with other severity ([facility])  L97.528 707.15   2. Peripheral vascular disease of lower extremity ([facility])  I73.9 443.9   3. Venous insufficiency  I87.2 459.81   4. Type 2 diabetes mellitus with foot ulcer, unspecified whether long term insulin use ([facility])  E11.621 250.80    L97.509 707.15       Patient Active Problem List   Diagnosis   • Coronary artery disease   • Hypertension   • Paroxysmal atrial fibrillation (HCC)   • Diabetes mellitus (HCC)   • Venous insufficiency   • Hyperlipidemia   • Nuclear sclerotic cataract of right eye   • Cortical age-related cataract of right eye   • Nuclear sclerotic cataract of left eye   • Cortical age-related cataract of left eye   • Thrombocytopenia ([facility])        Past Medical History:   Diagnosis Date   • COPD (chronic obstructive pulmonary disease) ([facility])    • Coronary artery disease 2016    CABG, , Abimael Tomlin MD. CABG, 2013, [facility].   • Diabetes mellitus (HCC) 2016   • Gout    • Hyperlipidemia 2016   • Hypertension 2016   • Myocardial infarction (HCC)      in  and  prior to CABG.   • Paroxysmal atrial fibrillation (HCC) 2016   • Sleep apnea    • Venous insufficiency 2016        Past Surgical History:   Procedure Laterality Date   • APPENDECTOMY     • CARDIAC CATHETERIZATION     • CARDIAC SURGERY       and    • CATARACT EXTRACTION W/ INTRAOCULAR LENS IMPLANT Right 2020    Procedure: CATARACT PHACO EXTRACTION WITH INTRAOCULAR LENS IMPLANT RIGHT;  Surgeon: mOar Blood MD;  [facility];  Service: Ophthalmology;  Laterality: Right;   • CATARACT EXTRACTION W/ INTRAOCULAR LENS IMPLANT Left 2020    Procedure: CATARACT  PHACO EXTRACTION WITH INTRAOCULAR LENS IMPLANT LEFT;  Surgeon: Omar Blood MD;  Location: [facility];  Service: Ophthalmology;  Laterality: Left;   • CHOLECYSTECTOMY  2002   • COLONOSCOPY     • FINGER SURGERY      Rt index (second finger)   • OTHER SURGICAL HISTORY  2010    Bone spur removal   • TOE AMPUTATION  2009   • VASECTOMY           EVALUATION   PT Ortho     Row Name 04/18/22 0800       Subjective Comments    Subjective Comments Pt using portable O2 today, reports ED visit on Friday due to inability to urinate and 12# weight gain, states he was told he has a cyst.  States he had to cut off the compressogrips due to their getting tight and causing indentions in his legs.  -JM       Subjective Pain    Able to rate subjective pain? yes  -JM    Pre-Treatment Pain Level 0  -    Post-Treatment Pain Level 0  -    Subjective Pain Comment no wound pain  -       Transfers    Sit-Stand [place] (Transfers) modified independence  -    Stand-Sit [place] (Transfers) modified independence  -    Transfers, Sit-Stand-Sit, Assist Device straight cane  -    Comment, (Transfers) seated for tx  -       Gait/Stairs (Locomotion)    [place] Level (Gait) modified independence  -    Assistive Device (Gait) cane, straight  -          User Key  (r) = Recorded By, (t) = Taken By, (c) = Cosigned By    Initials Name Provider Type    Cheryl Hua, PT Physical Therapist                 [facility] Wound     Row Name 04/18/22 0800             Wound 03/18/22 0800 Left distal great toe Blisters    Wound - Properties Group Placement Date: 03/18/22  - Placement Time: 0800  - Present on Hospital Admission: N  - Side: Left  - Orientation: distal  - Location: great toe  -MC Primary Wound Type: Blisters  -      Dressing Appearance intact;moist drainage  band-aid  -      Base moist;pink  pinpoint open area  -      Periwound intact;pink;dry  -      Periwound Temperature warm  -      Periwound  "Skin Turgor soft  -JM      Edges irregular;open  -JM      Drainage Characteristics/Odor serous  -JM      Drainage Amount scant  -JM      Care, Wound cleansed with;wound cleanser;debrided  -JM      Dressing Care dressing applied;silver impregnated;foam  mepilex ag, primafix tape  -      Periwound Care cleansed with pH balanced cleanser;dry periwound area maintained  -JM      Retired Wound - Properties Group Placement Date: 03/18/22  - Placement Time: 0800  - Present on Hospital Admission: N  -MC Side: Left  -MC Orientation: distal  -MC Location: great toe  -MC Primary Wound Type: Blisters  -MC      Retired Wound - Properties Group Date first assessed: 03/18/22  - Time first assessed: 0800  - Present on Hospital Admission: N  -MC Side: Left  -MC Location: great toe  -MC Primary Wound Type: Blisters  -MC              Wound 01/31/22 0800 Left lateral foot Incision    Wound - Properties Group Placement Date: 01/31/22  -MW Placement Time: 0800  -MW Present on Hospital Admission: Y  -MW Side: Left  -MW Orientation: lateral  -MW Location: foot  -MW Primary Wound Type: Incision  -MW Additional Comments: per pt \"had bunion removed\"  -MW      Dressing Appearance dry;intact;no drainage  -      Base pink;clean;closed/resurfaced;epithelialization  -      Periwound intact;dry;pink;swelling  -      Periwound Temperature warm  -      Periwound Skin Turgor soft  -JM      Edges callused  -JM      Drainage Amount none  -JM      Care, Wound cleansed with;wound cleanser;debrided  -JM      Dressing Care dressing applied;silver impregnated;foam;silicone;border dressing;multi-layer wrap  mepilex ag, 4\" optifoam, MLW  -JM      Periwound Care cleansed with pH balanced cleanser;dry periwound area maintained  -      Retired Wound - Properties Group Placement Date: 01/31/22  -MW Placement Time: 0800  -MW Present on Hospital Admission: Y  -MW Side: Left  -MW Orientation: lateral  -MW Location: foot  -MW Primary Wound Type: " "Incision  -MW Additional Comments: per pt \"had bunion removed\"  -MW      Retired Wound - Properties Group Date first assessed: 01/31/22  -MW Time first assessed: 0800  -MW Present on Hospital Admission: Y  -MW Side: Left  -MW Location: foot  -MW Primary Wound Type: Incision  -MW Additional Comments: per pt \"had bunion removed\"  -MW            User Key  (r) = Recorded By, (t) = Taken By, (c) = Cosigned By    Initials Name Provider Type    MW Carleen Lieberman, PT Physical Therapist    Morena Rajan, PT Physical Therapist    Cheryl Hua, PT Physical Therapist               Lymphedema     Row Name 04/18/22 0800             Lymphedema Edema Assessment    Pitting Edema Mild;Moderate  mild foot, mod ankle/calf  -              Skin Changes/Observations    Lower Extremity Conditions left:;clean;dry;crust  -      Lower Extremity Color/Pigment left:;hyperpigmented;brawny  -              Lymphedema Pulses/Capillary Refill    Lower Extremity Capillary Refill left:;less than 3 seconds  -              Compression/Skin Care    Compression/Skin Care skin care;wrapping location;bandaging  -      Skin Care washed/dried;lotion applied;topical anti-fungal applied  anitfungal to medial dry scaly area  -      Wrapping Location lower extremity  -      Wrapping Location LE left:;foot to knee  -      Wrapping Comments size 4/5 compressogrips, doubled/overlapping layers for gradient compression  -      Bandage Layers cotton elastic stocking- double layer (comment size)  -            User Key  (r) = Recorded By, (t) = Taken By, (c) = Cosigned By    Initials Name Provider Type    Cheryl Hua, PT Physical Therapist                WOUND DEBRIDEMENT  Total area of Debridement: 1cmsq  Debridement Site 1  Location- Site 1: LT lateral foot  Selective Debridement- Site 1: Wound Surface <20cmsq  Instruments- Site 1: tweezers, #15, scapel  Excised Tissue Description- Site 1: minimum, other (comment) " (callous)  Bleeding- Site 1: none              Therapy Education     Row Name 04/18/22 0800             Therapy Education    Education Details Pt to continue with dressings to toe until closed, recommended cushioned dressing for lateral foot for another couple weeks.  Recommended keeping callous soft using pumice stone, monitoring for skin breakdown.  Plan to d/c today with pt to call for appt if needed until end of April, then will need new referral to return to tx if needed.  -MARICEL      Given Bandaging/dressing change;Symptoms/condition management  -      Program Reinforced;Progressed  -MARICEL      How Provided Verbal;Demonstration  -      Provided to Patient  -      Level of Understanding Verbalized  -            User Key  (r) = Recorded By, (t) = Taken By, (c) = Cosigned By    Initials Name Provider Type    Cheryl Hua PT Physical Therapist                Recommendation and Plan   PT Assessment/Plan     Row Name 04/18/22 0800          PT Assessment    Functional Limitations Performance in self-care ADL;Other (comment)  wound management  -     Impairments Integumentary integrity;Impaired venous circulation;Impaired arterial circulation;Peripheral nerve integrity  -     Assessment Comments Lat L foot wound closed after debridement of callous buildup, and distal L great toe with only pinpoint open area remaining.  Pt independent with compression use and dressings management, is appropriate to trial home management for next two weeks, then d/c if no further needs.  Pt to call for appt PRN until 5/1/22, then will be d/c'd.  -            PT Plan    PT Frequency Other (comment)  tentative d/c  -     PT Plan Comments tentative d/c until 5/1/22  -           User Key  (r) = Recorded By, (t) = Taken By, (c) = Cosigned By    Initials Name Provider Type    Cheryl Hua PT Physical Therapist                Goals   PT OP Goals     Row Name 04/18/22 0800          Time Calculation    PT Goal  Re-Cert Due Date 04/30/22  -MARICEL           User Key  (r) = Recorded By, (t) = Taken By, (c) = Cosigned By    Initials Name Provider Type    Cheryl Hua, PT Physical Therapist                PT Goal Re-Cert Due Date: 04/30/22            Time Calculation: Start Time: 0800  Untimed Charges  87533-[id number] comp below knee: 10  15711-Kdkbbdpwr debridement: 10  Total Minutes  Untimed Charges Total Minutes: 20   Total Minutes: 20  Therapy Charges for Today     Code Description Service Date Service Provider Modifiers Qty    82708349538 HC JOSS DEBRIDE OPEN WOUND UP TO 20CM 4/18/2022 Cheryl Washington, PT GP 1    37328307147 HC PT MULTI LAYER COMP SYS BELOW KNEE 4/18/2022 Cheryl Washington, PT GP 1                  Cheryl Washington, PT  4/18/2022

## 2022-04-18 NOTE — PROGRESS NOTES
Patient came into clinic for medication refills. Patient states he was seen in the ED on Friday and diagnosed with bronchitis He states at the ER he received a dose of rocephin and is taking oral abx and steroids. Patient had an appointment in [facility] and the [facility] this AM and at his appt his oxygen was 93% and he felt short of breath after walking into the office so he is now wearing his home O2 and sats 96%. No acute distress noted upon walking patient to room. Patient is requesting abx shot today. Perla [place] ordered rocephin. Patient to return to clinic on Wednesday for follow up. Administrations This Visit     cefTRIAXone (ROCEPHIN) injection 1,000 mg     Admin Date  04/18/2022 Action  Given Dose  1,000 mg Route  IntraMUSCular Administered By  Aurora Neri RN                Patient tolerated injection well. Patient advised to wait 20 minutes in the office following the injection. No signs/symptoms of reaction noted after 20 minutes.     Requested Prescriptions     Signed Prescriptions Disp Refills    metoprolol succinate (TOPROL XL) 25 MG extended release tablet 90 tablet 1     Sig: take 1 tablet by mouth once daily    docusate sodium (COLACE) 100 MG capsule 180 capsule 1     Sig: Take 1 capsule by mouth 2 times daily    allopurinol (ZYLOPRIM) 300 MG tablet 90 tablet 2     Sig: TAKE 1 TABLET BY MOUTH EVERY DAY    lovastatin (MEVACOR) 20 MG tablet 90 tablet 2     Sig: Take 1 tablet by mouth daily    digoxin (LANOXIN) 125 MCG tablet 90 tablet 2     Sig: TAKE 1 TABLET BY MOUTH EVERY DAY OR AS DIRECTED    ferrous sulfate (IRON 325) 325 (65 Fe) MG tablet 180 tablet 2     Sig: take 1 tablet by mouth twice a day    amLODIPine (NORVASC) 5 MG tablet 90 tablet 2     Sig: TAKE 1 TABLET BY MOUTH TWICE A DAY

## 2022-04-20 ENCOUNTER — OFFICE VISIT (OUTPATIENT)
Dept: FAMILY MEDICINE CLINIC | Age: 72
End: 2022-04-20
Payer: MEDICARE

## 2022-04-20 VITALS
DIASTOLIC BLOOD PRESSURE: 60 MMHG | HEART RATE: 66 BPM | BODY MASS INDEX: 26.51 KG/M2 | WEIGHT: 200 LBS | HEIGHT: 73 IN | SYSTOLIC BLOOD PRESSURE: 118 MMHG | OXYGEN SATURATION: 97 % | RESPIRATION RATE: 18 BRPM

## 2022-04-20 DIAGNOSIS — Z79.01 ANTICOAGULATED ON COUMADIN: Primary | ICD-10-CM

## 2022-04-20 DIAGNOSIS — E11.9 TYPE 2 DIABETES MELLITUS WITHOUT COMPLICATION, WITHOUT LONG-TERM CURRENT USE OF INSULIN (HCC): ICD-10-CM

## 2022-04-20 LAB
INTERNATIONAL NORMALIZATION RATIO, POC: 2.7
PROTHROMBIN TIME, POC: NORMAL

## 2022-04-20 PROCEDURE — 1036F TOBACCO NON-USER: CPT | Performed by: NURSE PRACTITIONER

## 2022-04-20 PROCEDURE — G8427 DOCREV CUR MEDS BY ELIG CLIN: HCPCS | Performed by: NURSE PRACTITIONER

## 2022-04-20 PROCEDURE — 99213 OFFICE O/P EST LOW 20 MIN: CPT | Performed by: NURSE PRACTITIONER

## 2022-04-20 PROCEDURE — 4040F PNEUMOC VAC/ADMIN/RCVD: CPT | Performed by: NURSE PRACTITIONER

## 2022-04-20 PROCEDURE — 85610 PROTHROMBIN TIME: CPT | Performed by: NURSE PRACTITIONER

## 2022-04-20 PROCEDURE — 3017F COLORECTAL CA SCREEN DOC REV: CPT | Performed by: NURSE PRACTITIONER

## 2022-04-20 PROCEDURE — 2022F DILAT RTA XM EVC RTNOPTHY: CPT | Performed by: NURSE PRACTITIONER

## 2022-04-20 PROCEDURE — 1123F ACP DISCUSS/DSCN MKR DOCD: CPT | Performed by: NURSE PRACTITIONER

## 2022-04-20 PROCEDURE — G8417 CALC BMI ABV UP PARAM F/U: HCPCS | Performed by: NURSE PRACTITIONER

## 2022-04-20 PROCEDURE — 3044F HG A1C LEVEL LT 7.0%: CPT | Performed by: NURSE PRACTITIONER

## 2022-04-20 ASSESSMENT — ENCOUNTER SYMPTOMS
ABDOMINAL PAIN: 0
SHORTNESS OF BREATH: 0
VOMITING: 0
WHEEZING: 0
NAUSEA: 0
DIARRHEA: 0
COUGH: 0

## 2022-04-20 NOTE — PROGRESS NOTES
SUBJECTIVE:    Tosha Rubin is a 66 y.o. male    Anticoagulation: Patient here for followup of chronic anticoagulation. Indication: atrial fibrillation  Bleeding Signs/Symptoms:  None  Thromboembolic Signs/Symptoms:  None    Missed Coumadin Doses:  None  Medication Changes:  Pt is taking azithromycin for acute bronchitis diagnosed by [facility] ER. Pt reports he is feeling better. Dietary Changes:  no  Bacterial/Viral Infection:  no    Other Concerns:  Coumadin 5mg/7.5mg alternating days. Pt brought glucose log in for review. Glucose is running  taking GLipizide 5 mg daily. Pt was previously taking 20 mg BID however was having episodes of hypoglycemia. Pt had a few days of elevated glucose up to 200s due to oral prednisone. He is taking his last dose of prednisone today. Chief Complaint   Patient presents with    Diabetes     f/u        Review of Systems   Constitutional: Negative. Respiratory: Negative for cough, shortness of breath and wheezing. Cardiovascular: Negative for chest pain. Gastrointestinal: Negative for abdominal pain, diarrhea, nausea and vomiting. Endocrine: Negative for polydipsia, polyphagia and polyuria. Hematological: Negative. Psychiatric/Behavioral: Negative. OBJECTIVE:    /60   Pulse 66   Resp 18   Ht 6' 1\" (1.854 m)   Wt 200 lb (90.7 kg)   SpO2 97% Comment: RA  BMI 26.39 kg/m²    Physical Exam  Vitals and nursing note reviewed. Constitutional:       Appearance: Normal appearance. He is normal weight. Eyes:      Extraocular Movements: Extraocular movements intact. Conjunctiva/sclera: Conjunctivae normal.      Pupils: Pupils are equal, round, and reactive to light. Cardiovascular:      Rate and Rhythm: Rhythm irregularly irregular. Neurological:      Mental Status: He is alert. Psychiatric:         Mood and Affect: Mood normal.         Behavior: Behavior normal.         Thought Content:  Thought content normal. Judgment: Judgment normal.         ASSESSMENT/PLAN:   Cristina Cheek was seen today for diabetes. Diagnoses and all orders for this visit:    Anticoagulated on Coumadin  -     POCT INR 2.7 - continue current dose. Type 2 diabetes mellitus without complication, without long-term current use of insulin (HCC)  - continue Glipizide 5 mg daily  Continue monitoring glucose daily and PRN. No follow-ups on file.     Current Outpatient Medications on File Prior to Visit   Medication Sig Dispense Refill    metoprolol succinate (TOPROL XL) 25 MG extended release tablet take 1 tablet by mouth once daily 90 tablet 1    docusate sodium (COLACE) 100 MG capsule Take 1 capsule by mouth 2 times daily 180 capsule 1    allopurinol (ZYLOPRIM) 300 MG tablet TAKE 1 TABLET BY MOUTH EVERY DAY 90 tablet 2    lovastatin (MEVACOR) 20 MG tablet Take 1 tablet by mouth daily 90 tablet 2    digoxin (LANOXIN) 125 MCG tablet TAKE 1 TABLET BY MOUTH EVERY DAY OR AS DIRECTED 90 tablet 2    ferrous sulfate (IRON 325) 325 (65 Fe) MG tablet take 1 tablet by mouth twice a day 180 tablet 2    amLODIPine (NORVASC) 5 MG tablet TAKE 1 TABLET BY MOUTH TWICE A DAY 90 tablet 2    ipratropium-albuterol (DUONEB) 0.5-2.5 (3) MG/3ML SOLN nebulizer solution INHALE 1 VIAL VIA NEBULIZER FOUR TIMES DAILY      colchicine (COLCRYS) 0.6 MG tablet Take 1 tablet by mouth daily as needed for Pain 30 tablet 5    MITIGARE 0.6 MG capsule TAKE 1 CAPSULE BY MOUTH DAILY 30 capsule 1    glipiZIDE (GLUCOTROL) 10 MG tablet Take 2 tablets by mouth 2 times daily (before meals) 360 tablet 2    Calcium Polycarbophil (FIBER) 625 MG TABS Take 625 mg by mouth daily      metOLazone (ZAROXOLYN) 5 MG tablet Take 5 mg by mouth daily      Dulaglutide (TRULICITY) 1.5 DR/9.6JG SOPN Inject 1.5 mg into the skin once a week      ipratropium (ATROVENT) 0.02 % nebulizer solution Take 0.5 mg by nebulization every 4-6 hours as needed for Wheezing      furosemide (LASIX) 40 MG tablet take 1 tablet by mouth once daily (Patient taking differently: 40 mg 2 times daily take 1 tablet by mouth once daily) 30 tablet 5    COUMADIN 5 MG tablet take as directed (Patient taking differently: 5 mg daily Patient takes 5mg daily and 7.5mg on Sundays) 100 tablet 5    aspirin 81 MG tablet Take 81 mg by mouth daily.  diphenhydrAMINE (SOMINEX) 25 MG tablet Take 25 mg by mouth every 6 hours as needed (Patient not taking: Reported on 4/20/2022)      nitroGLYCERIN (NITROSTAT) 0.4 MG SL tablet place 1 tablet under the tongue if needed every 5 minutes for chest pain for 3 doses IF NO RELIEF AFTER 3RD DOSE CALL PRESCRIBER .  (Patient not taking: Reported on 12/8/2021) 25 tablet 5     Current Facility-Administered Medications on File Prior to Visit   Medication Dose Route Frequency Provider Last Rate Last Admin    cefTRIAXone (ROCEPHIN) injection 500 mg  500 mg IntraMUSCular Once LANCE Vásquez NP

## 2022-04-20 NOTE — PROGRESS NOTES
SUBJECTIVE:    Giorgi Green is a 66 y.o. male    HPI     Chief Complaint   Patient presents with    Diabetes     f/u        Review of Systems     OBJECTIVE:    /60   Pulse 66   Resp 18   Ht 6' 1\" (1.854 m)   Wt 200 lb (90.7 kg)   SpO2 97% Comment: RA  BMI 26.39 kg/m²    Physical Exam    ASSESSMENT/PLAN:     Current Outpatient Medications on File Prior to Visit   Medication Sig Dispense Refill    metoprolol succinate (TOPROL XL) 25 MG extended release tablet take 1 tablet by mouth once daily 90 tablet 1    docusate sodium (COLACE) 100 MG capsule Take 1 capsule by mouth 2 times daily 180 capsule 1    allopurinol (ZYLOPRIM) 300 MG tablet TAKE 1 TABLET BY MOUTH EVERY DAY 90 tablet 2    lovastatin (MEVACOR) 20 MG tablet Take 1 tablet by mouth daily 90 tablet 2    digoxin (LANOXIN) 125 MCG tablet TAKE 1 TABLET BY MOUTH EVERY DAY OR AS DIRECTED 90 tablet 2    ferrous sulfate (IRON 325) 325 (65 Fe) MG tablet take 1 tablet by mouth twice a day 180 tablet 2    amLODIPine (NORVASC) 5 MG tablet TAKE 1 TABLET BY MOUTH TWICE A DAY 90 tablet 2    ipratropium-albuterol (DUONEB) 0.5-2.5 (3) MG/3ML SOLN nebulizer solution INHALE 1 VIAL VIA NEBULIZER FOUR TIMES DAILY      colchicine (COLCRYS) 0.6 MG tablet Take 1 tablet by mouth daily as needed for Pain 30 tablet 5    MITIGARE 0.6 MG capsule TAKE 1 CAPSULE BY MOUTH DAILY 30 capsule 1    glipiZIDE (GLUCOTROL) 10 MG tablet Take 2 tablets by mouth 2 times daily (before meals) 360 tablet 2    Calcium Polycarbophil (FIBER) 625 MG TABS Take 625 mg by mouth daily      metOLazone (ZAROXOLYN) 5 MG tablet Take 5 mg by mouth daily      Dulaglutide (TRULICITY) 1.5 WD/9.6HS SOPN Inject 1.5 mg into the skin once a week      ipratropium (ATROVENT) 0.02 % nebulizer solution Take 0.5 mg by nebulization every 4-6 hours as needed for Wheezing      furosemide (LASIX) 40 MG tablet take 1 tablet by mouth once daily (Patient taking differently: 40 mg 2 times daily take 1 tablet by mouth once daily) 30 tablet 5    COUMADIN 5 MG tablet take as directed (Patient taking differently: 5 mg daily Patient takes 5mg daily and 7.5mg on Sundays) 100 tablet 5    aspirin 81 MG tablet Take 81 mg by mouth daily.  diphenhydrAMINE (SOMINEX) 25 MG tablet Take 25 mg by mouth every 6 hours as needed (Patient not taking: Reported on 4/20/2022)      nitroGLYCERIN (NITROSTAT) 0.4 MG SL tablet place 1 tablet under the tongue if needed every 5 minutes for chest pain for 3 doses IF NO RELIEF AFTER 3RD DOSE CALL PRESCRIBER .  (Patient not taking: Reported on 12/8/2021) 25 tablet 5     Current Facility-Administered Medications on File Prior to Visit   Medication Dose Route Frequency Provider Last Rate Last Admin    cefTRIAXone (ROCEPHIN) injection 500 mg  500 mg IntraMUSCular Once Liberty Given, APRN - NP

## 2022-04-27 ENCOUNTER — TELEPHONE (OUTPATIENT)
Dept: FAMILY MEDICINE CLINIC | Age: 72
End: 2022-04-27

## 2022-04-27 NOTE — TELEPHONE ENCOUNTER
Patient called office requesting \"rocephin shot\". Patient states he has been congested and is requesting abx. Patient was scheduled with provider for tomorrow. I did inform patient to go to the ED with any shortness of air and instruct patient to monitor his oxygen at home. Patient reports at this time his oxygen is 98% on his usual 2L and HR 86. Patient was agreeable to go to the ED if O2 was below 90 or he felt worse.

## 2022-04-28 ENCOUNTER — OFFICE VISIT (OUTPATIENT)
Dept: FAMILY MEDICINE CLINIC | Age: 72
End: 2022-04-28
Payer: MEDICARE

## 2022-04-28 VITALS
RESPIRATION RATE: 18 BRPM | HEART RATE: 90 BPM | TEMPERATURE: 96.9 F | OXYGEN SATURATION: 92 % | HEIGHT: 73 IN | DIASTOLIC BLOOD PRESSURE: 62 MMHG | SYSTOLIC BLOOD PRESSURE: 100 MMHG | BODY MASS INDEX: 26.51 KG/M2 | WEIGHT: 200 LBS

## 2022-04-28 DIAGNOSIS — J43.9 PULMONARY EMPHYSEMA, UNSPECIFIED EMPHYSEMA TYPE (HCC): Primary | ICD-10-CM

## 2022-04-28 PROCEDURE — 99212 OFFICE O/P EST SF 10 MIN: CPT | Performed by: NURSE PRACTITIONER

## 2022-04-28 PROCEDURE — 1036F TOBACCO NON-USER: CPT | Performed by: NURSE PRACTITIONER

## 2022-04-28 PROCEDURE — G8417 CALC BMI ABV UP PARAM F/U: HCPCS | Performed by: NURSE PRACTITIONER

## 2022-04-28 PROCEDURE — 3017F COLORECTAL CA SCREEN DOC REV: CPT | Performed by: NURSE PRACTITIONER

## 2022-04-28 PROCEDURE — 3023F SPIROM DOC REV: CPT | Performed by: NURSE PRACTITIONER

## 2022-04-28 PROCEDURE — 4040F PNEUMOC VAC/ADMIN/RCVD: CPT | Performed by: NURSE PRACTITIONER

## 2022-04-28 PROCEDURE — G8427 DOCREV CUR MEDS BY ELIG CLIN: HCPCS | Performed by: NURSE PRACTITIONER

## 2022-04-28 PROCEDURE — 1123F ACP DISCUSS/DSCN MKR DOCD: CPT | Performed by: NURSE PRACTITIONER

## 2022-04-28 RX ORDER — PREDNISONE 10 MG/1
10 TABLET ORAL 2 TIMES DAILY
Qty: 10 TABLET | Refills: 0 | Status: SHIPPED | OUTPATIENT
Start: 2022-04-28 | End: 2022-06-08 | Stop reason: SDUPTHER

## 2022-04-28 ASSESSMENT — ENCOUNTER SYMPTOMS
SINUS PRESSURE: 1
SHORTNESS OF BREATH: 1

## 2022-04-28 NOTE — PROGRESS NOTES
Chief Complaint   Patient presents with    Cough    Shortness of Breath     pt reports intermittent     Patient to clinic with complaints of cough and intermittent shortness of breath. Patient has been wearing his home O2. Patient states symptoms started Wednesday. Patient was treated 4/18/22 with antibiotics and steroids for bronchitis. Patient here today for sick visit only. Health Maintenance not reviewed during this visit.

## 2022-04-28 NOTE — PROGRESS NOTES
This is a 66 y.o. male who presents with   Chief Complaint   Patient presents with    Cough    Shortness of Breath     pt reports intermittent       SUBJECTIVE:     Cough ,  Started  Antibiotic ,   For bronchitis  . On multiple meds.      He did mow his yard the other day ,  Just feels bad with uiri symptoms ,  Using nebulizwre more        Current Outpatient Medications   Medication Sig Dispense Refill    metoprolol succinate (TOPROL XL) 25 MG extended release tablet take 1 tablet by mouth once daily 90 tablet 1    docusate sodium (COLACE) 100 MG capsule Take 1 capsule by mouth 2 times daily 180 capsule 1    allopurinol (ZYLOPRIM) 300 MG tablet TAKE 1 TABLET BY MOUTH EVERY DAY 90 tablet 2    lovastatin (MEVACOR) 20 MG tablet Take 1 tablet by mouth daily 90 tablet 2    digoxin (LANOXIN) 125 MCG tablet TAKE 1 TABLET BY MOUTH EVERY DAY OR AS DIRECTED 90 tablet 2    ferrous sulfate (IRON 325) 325 (65 Fe) MG tablet take 1 tablet by mouth twice a day 180 tablet 2    amLODIPine (NORVASC) 5 MG tablet TAKE 1 TABLET BY MOUTH TWICE A DAY 90 tablet 2    diphenhydrAMINE (SOMINEX) 25 MG tablet Take 25 mg by mouth every 6 hours as needed (Patient not taking: Reported on 4/20/2022)      ipratropium-albuterol (DUONEB) 0.5-2.5 (3) MG/3ML SOLN nebulizer solution INHALE 1 VIAL VIA NEBULIZER FOUR TIMES DAILY      colchicine (COLCRYS) 0.6 MG tablet Take 1 tablet by mouth daily as needed for Pain 30 tablet 5    MITIGARE 0.6 MG capsule TAKE 1 CAPSULE BY MOUTH DAILY 30 capsule 1    glipiZIDE (GLUCOTROL) 10 MG tablet Take 2 tablets by mouth 2 times daily (before meals) 360 tablet 2    Calcium Polycarbophil (FIBER) 625 MG TABS Take 625 mg by mouth daily      metOLazone (ZAROXOLYN) 5 MG tablet Take 5 mg by mouth daily      Dulaglutide (TRULICITY) 1.5 LA/1.5UR SOPN Inject 1.5 mg into the skin once a week      ipratropium (ATROVENT) 0.02 % nebulizer solution Take 0.5 mg by nebulization every 4-6 hours as needed for Wheezing  furosemide (LASIX) 40 MG tablet take 1 tablet by mouth once daily (Patient taking differently: 40 mg 2 times daily take 1 tablet by mouth once daily) 30 tablet 5    nitroGLYCERIN (NITROSTAT) 0.4 MG SL tablet place 1 tablet under the tongue if needed every 5 minutes for chest pain for 3 doses IF NO RELIEF AFTER 3RD DOSE CALL PRESCRIBER . (Patient not taking: Reported on 12/8/2021) 25 tablet 5    COUMADIN 5 MG tablet take as directed (Patient taking differently: 5 mg daily Patient takes 5mg daily and 7.5mg on Sundays) 100 tablet 5    aspirin 81 MG tablet Take 81 mg by mouth daily. Current Facility-Administered Medications   Medication Dose Route Frequency Provider Last Rate Last Admin    cefTRIAXone (ROCEPHIN) injection 500 mg  500 mg IntraMUSCular Once LANCE Wilcox - DELANO            No Known Allergies    Review of Systems   HENT: Positive for congestion and sinus pressure. Respiratory: Positive for shortness of breath. OBJECTIVE:     /62   Pulse 90   Temp 96.9 °F (36.1 °C) (Infrared)   Resp 18   Ht 6' 1\" (1.854 m)   Wt 200 lb (90.7 kg)   SpO2 92% Comment: O2 @ 2L NC  BMI 26.39 kg/m²      Physical Exam  Vitals and nursing note reviewed. Constitutional:       General: He is not in acute distress. Appearance: He is normal weight. He is not ill-appearing, toxic-appearing or diaphoretic. Comments: Appears frail,  Some malnourishment ,  On O@ ,  But in no distress and from interview , he know time is short but still enjoying life. HENT:      Head: Normocephalic and atraumatic. Right Ear: Ear canal and external ear normal.      Left Ear: Ear canal and external ear normal.      Nose: Congestion present. No rhinorrhea. Mouth/Throat:      Mouth: Mucous membranes are moist.      Pharynx: No oropharyngeal exudate or posterior oropharyngeal erythema.    Eyes:      Conjunctiva/sclera: Conjunctivae normal.   Cardiovascular:      Rate and Rhythm: Normal rate and regular rhythm. Pulses: Normal pulses. Heart sounds: Normal heart sounds. Pulmonary:      Effort: No respiratory distress. Breath sounds: Normal breath sounds. No stridor. No wheezing, rhonchi or rales. Chest:      Chest wall: No tenderness. Abdominal:      General: Abdomen is flat. Tenderness: There is no abdominal tenderness. Musculoskeletal:         General: No swelling, tenderness or deformity. Cervical back: No rigidity or tenderness. Comments: Uses cane to walk    Lymphadenopathy:      Cervical: No cervical adenopathy. Skin:     General: Skin is warm and dry. Capillary Refill: Capillary refill takes less than 2 seconds. Coloration: Skin is pale. Neurological:      General: No focal deficit present. Mental Status: He is alert and oriented to person, place, and time. Psychiatric:         Mood and Affect: Mood normal.         Behavior: Behavior normal.         Thought Content: Thought content normal.         Judgment: Judgment normal.         No orders of the defined types were placed in this encounter. No orders of the defined types were placed in this encounter. ASSESSMENT/PLAN  1. Copd   2. Seasonal allergies. Went over risk of steroids , and no need for antibiotic. Will give 10 tablets of  10 mg prednisone to use only prn for shortness of breath,  He will then keep closer monitoring of BS.  balanced   Quality of life to  Risk of medication,  agreed upon   3. DM     No follow-ups on file.

## 2022-05-04 NOTE — PROGRESS NOTES
Follow Up Office Visit      Date: 2022     Patient Name: Swapnil Lawson  MRN: [id number]  : 1950  Referring Physician: Shakir Mccarthy     Chief Complaint:  Follow-up for thrombocytopenia     History of Present Illness: Swapnil Lawson is a pleasant 70 y.o. male past medical history of atrial fibrillation, CAD, hypertension, hyperlipidemia, type 2 diabetes, iron deficiency, gout, COPD who presents today for evaluation of thrombocytopenia. The patient has been in his usual state of health and followed by his PCP with recent labs showing a platelet count of 94 in 2020.  On chart review patient has had a platelet count of 120 in 2019.  He feels well denies any fatigue or any bleeding or bruising episodes.  Denies any night sweats, weight loss, unexplained fevers.  Does take a baby aspirin daily and colchicine as needed for gout but denies any heavy NSAID use.  Denies any work exposure to any heavy chemicals.  Hemoglobin has been slightly low around 12-13 range and white count has been within normal limits on lab check.  He has baseline shortness of breath with exertion and requires supplemental oxygen.  He is otherwise compliant with his other medications     Interval History:  Presents clinic for follow-up.  Stable at this time.  Continues to have easy bruising but no significant bleeding episodes.  Remains compliant with his baby aspirin and warfarin.  Denies any worsening heart palpitations    Oncology History:    Oncology/Hematology History    No history exists.       Subjective      Review of Systems:   Constitutional: Negative for fevers, chills, or weight loss  Eyes: Negative for blurred vision or discharge         Ear/Nose/Throat: Negative for difficulty swallowing, sore throat, LAD                                                       Respiratory: Negative for cough, SOA, wheezing                                                                                         Cardiovascular: Negative for chest pain or palpitations                                                                  Gastrointestinal: Negative for nausea, vomiting or diarrhea                                                                     Genitourinary: Negative for dysuria or hematuria                                                                                           Musculoskeletal: Negative for any joint pains or muscle aches                                                                        Neurologic: Negative for any weakness, headaches, dizziness                                                                         Hematologic: Negative for any easy bleeding or bruising                                                                                   Psychiatric: Negative for anxiety or depression                          Past Medical History/Past Surgical History/ Family History/ Social History: Reviewed by me and unchanged from my previous documentation done on November 2021.     Medications:     Current Outpatient Medications:   •  allopurinol (ZYLOPRIM) 300 MG tablet, Take 300 mg by mouth Daily., Disp: , Rfl: 0  •  amLODIPine (NORVASC) 5 MG tablet, Take 1 tablet by mouth 2 (Two) Times a Day., Disp: , Rfl:   •  colchicine 0.6 MG tablet, Take 0.6 mg by mouth As Needed for Muscle / Joint Pain., Disp: , Rfl:   •  digoxin (LANOXIN) 125 MCG tablet, Take 0.5 tablets by mouth Daily., Disp: 15 tablet, Rfl: 1  •  diphenhydrAMINE (BENADRYL) 25 MG tablet, Take 25 mg by mouth Every 6 (Six) Hours As Needed for Itching., Disp: , Rfl:   •  docusate sodium (COLACE) 100 MG capsule, Take 100 mg by mouth 2 (Two) Times a Day., Disp: , Rfl:   •  ferrous sulfate 325 (65 FE) MG tablet, Take 325 mg by mouth 2 (Two) Times a Day., Disp: , Rfl:   •  glipiZIDE (GLUCOTROL) 10 MG tablet, Take 10 mg by mouth 2 (Two) Times a Day. 2 tablets BID, Disp: , Rfl:   •  ipratropium (ATROVENT) 0.02 % nebulizer solution,  "Take 0.5 mg by nebulization Every 4 (Four) Hours As Needed., Disp: , Rfl:   •  ipratropium-albuterol (DUO-NEB) 0.5-2.5 mg/3 ml nebulizer, INHALE 1 VIAL VIA NEBULIZER FOUR TIMES DAILY, Disp: , Rfl:   •  metoprolol succinate XL (TOPROL-XL) 25 MG 24 hr tablet, Take 25 mg by mouth Daily., Disp: , Rfl:   •  nitroglycerin (NITROSTAT) 0.4 MG SL tablet, Place 0.4 mg under the tongue Every 5 (Five) Minutes As Needed for Chest Pain. Take no more than 3 doses in 15 minutes., Disp: , Rfl:   •  warfarin (COUMADIN) 5 MG tablet, Take 7.5 mg by mouth Daily. 7.5 mg on Sunday, 5mg every other day, Disp: , Rfl:   •  aspirin 81 MG tablet, Take 81 mg by mouth Daily., Disp: , Rfl:   •  calcium polycarbophil (FIBERCON) 625 MG tablet, Take 625 mg by mouth 2 (Two) Times a Day. Takes 2 tablets BID, Disp: , Rfl:   •  furosemide (LASIX) 40 MG tablet, Take 40 mg by mouth 2 (Two) Times a Day., Disp: , Rfl:   •  O2 (OXYGEN), Inhale 2 L/min Every Night., Disp: , Rfl:     Allergies:   No Known Allergies    Objective     Physical Exam:  Vital Signs:   Vitals:    05/04/22 0832   BP: 119/58   Pulse: 82   Resp: 16   Temp: 97.1 °F (36.2 °C)   TempSrc: Temporal   SpO2: 90%   Weight: 86.2 kg (190 lb)   Height: 185.4 cm (72.99\")   PainSc: 0-No pain     Pain Score    05/04/22 0832   PainSc: 0-No pain     ECOG Performance Status: 0 - Asymptomatic    Constitutional: NAD, ECOG 0  Eyes: PERRLA, scleral anicteric  ENT: No LAD, no thyromegaly  Respiratory: CTAB, no wheezing, rales, rhonchi  Cardiovascular: RRR, no murmurs, pulses 2+ bilaterally  Abdomen: soft, NT/ND, no HSM  Musculoskeletal: strength 5/5 bilaterally, no c/c/e  Neurologic: A&O x 3, CN II-XII intact grossly    Results Review:   Lab on 05/04/2022   Component Date Value Ref Range Status   • WBC 05/04/2022 8.40  3.40 - 10.80 10*3/mm3 Final   • RBC 05/04/2022 4.59  4.14 - 5.80 10*6/mm3 Final   • Hemoglobin 05/04/2022 12.4 (A) 13.0 - 17.7 g/dL Final   • Hematocrit 05/04/2022 40.9  37.5 - 51.0 % Final "   • RDW 05/04/2022 19.6 (A) 12.3 - 15.4 % Final   • MCV 05/04/2022 89.1  79.0 - 97.0 fL Final   • MCH 05/04/2022 27.0  26.6 - 33.0 pg Final   • MCHC 05/04/2022 30.3 (A) 31.5 - 35.7 g/dL Final   • MPV 05/04/2022 7.4  6.0 - 12.0 fL Final   • Platelets 05/04/2022 120 (A) 140 - 450 10*3/mm3 Final    Verified by repeat analysis.    • Neutrophil % 05/04/2022 76.6 (A) 42.7 - 76.0 % Final   • Lymphocyte % 05/04/2022 16.0 (A) 19.6 - 45.3 % Final   • Monocyte % 05/04/2022 7.4  5.0 - 12.0 % Final   • Neutrophils, Absolute 05/04/2022 6.40  1.70 - 7.00 10*3/mm3 Final   • Lymphocytes, Absolute 05/04/2022 1.30  0.70 - 3.10 10*3/mm3 Final   • Monocytes, Absolute 05/04/2022 0.60  0.10 - 0.90 10*3/mm3 Final       No results found.    Assessment / Plan      Assessment/Plan:   1. Thrombocytopenia (CMS/HCC) (Primary)  -Unclear etiology at this time likely medication induced (ASA 81mg) vs MDS vs ITP  -Iron studies consistent with anemia of chronic disease  -Vitamin B12/folate within normal limits  -HIV negative  -Peripheral smear showing no schistocytes or abnormal WBCs  -Platelet count today 120K which is stable  -Platelet count along with his WBC and hemoglobin have been stable for the past 1-2 years.  As such, he does not require a bone marrow biopsy at this time  -No further hematologic work-up required.  Advised patient to contact clinic should his platelet count ever drop below 40K  -Okay to transfuse platelets as necessary for any emergent surgical/bleeding need  -Can follow-up in the clinic as needed  2.  Type 2 diabetes  -Followed by his PCP     3.  Atrial fibrillation  -Heart rate stable today in clinic  -On warfarin for stroke prophylaxis     Follow Up:   Follow-up as needed    James Ennis MD  Hematology and Oncology     Please note that portions of this note may have been completed with a voice recognition program. Efforts were made to edit the dictations, but occasionally words are mistranscribed.

## 2022-05-09 ENCOUNTER — OFFICE VISIT (OUTPATIENT)
Dept: FAMILY MEDICINE CLINIC | Age: 72
End: 2022-05-09
Payer: MEDICARE

## 2022-05-09 VITALS
WEIGHT: 192.9 LBS | HEART RATE: 71 BPM | BODY MASS INDEX: 25.57 KG/M2 | DIASTOLIC BLOOD PRESSURE: 60 MMHG | TEMPERATURE: 97.8 F | HEIGHT: 73 IN | SYSTOLIC BLOOD PRESSURE: 100 MMHG | RESPIRATION RATE: 18 BRPM | OXYGEN SATURATION: 96 %

## 2022-05-09 DIAGNOSIS — J44.9 CHRONIC OBSTRUCTIVE PULMONARY DISEASE, UNSPECIFIED COPD TYPE (HCC): ICD-10-CM

## 2022-05-09 DIAGNOSIS — Z79.01 ANTICOAGULATED ON COUMADIN: Primary | ICD-10-CM

## 2022-05-09 LAB
INTERNATIONAL NORMALIZATION RATIO, POC: 1.6
PROTHROMBIN TIME, POC: NORMAL

## 2022-05-09 PROCEDURE — 4040F PNEUMOC VAC/ADMIN/RCVD: CPT | Performed by: NURSE PRACTITIONER

## 2022-05-09 PROCEDURE — G8427 DOCREV CUR MEDS BY ELIG CLIN: HCPCS | Performed by: NURSE PRACTITIONER

## 2022-05-09 PROCEDURE — 3017F COLORECTAL CA SCREEN DOC REV: CPT | Performed by: NURSE PRACTITIONER

## 2022-05-09 PROCEDURE — 1036F TOBACCO NON-USER: CPT | Performed by: NURSE PRACTITIONER

## 2022-05-09 PROCEDURE — 85610 PROTHROMBIN TIME: CPT | Performed by: NURSE PRACTITIONER

## 2022-05-09 PROCEDURE — G8417 CALC BMI ABV UP PARAM F/U: HCPCS | Performed by: NURSE PRACTITIONER

## 2022-05-09 PROCEDURE — 99213 OFFICE O/P EST LOW 20 MIN: CPT | Performed by: NURSE PRACTITIONER

## 2022-05-09 PROCEDURE — 3023F SPIROM DOC REV: CPT | Performed by: NURSE PRACTITIONER

## 2022-05-09 PROCEDURE — 1123F ACP DISCUSS/DSCN MKR DOCD: CPT | Performed by: NURSE PRACTITIONER

## 2022-05-09 RX ORDER — IPRATROPIUM BROMIDE AND ALBUTEROL SULFATE 2.5; .5 MG/3ML; MG/3ML
SOLUTION RESPIRATORY (INHALATION)
Qty: 360 ML | Refills: 2 | Status: SHIPPED | OUTPATIENT
Start: 2022-05-09 | End: 2022-08-08 | Stop reason: SDUPTHER

## 2022-05-09 ASSESSMENT — ENCOUNTER SYMPTOMS
DIARRHEA: 0
ALLERGIC/IMMUNOLOGIC NEGATIVE: 1
VOMITING: 0
COUGH: 0
GASTROINTESTINAL NEGATIVE: 1
RESPIRATORY NEGATIVE: 1
EYES NEGATIVE: 1
ABDOMINAL PAIN: 0
NAUSEA: 0
SHORTNESS OF BREATH: 0

## 2022-05-09 NOTE — PROGRESS NOTES
Chief Complaint   Patient presents with    Follow-up     POC INR r/t coumadin therapy      Patient here for follow up and INR check. Patient INR is 1.6. Patient reports he has been alternating between 5mg and 2.5mg. He reports yesterday he took 5mg.      Have you seen any other physician or provider since your last visit no    Have you had any other diagnostic tests since your last visit? no    Have you changed or stopped any medications since your last visit? no

## 2022-05-09 NOTE — PROGRESS NOTES
SUBJECTIVE:    Curt Aase is a 67 y.o. male    Anticoagulation: Patient here for followup of chronic anticoagulation. Indication: atrial fibrillation  Bleeding Signs/Symptoms:  None  Thromboembolic Signs/Symptoms:  None    Missed Coumadin Doses:  None  Medication Changes:  Prednisone- pt is taking 5 mg daily. Dietary Changes:  no  Bacterial/Viral Infection:  no    Other Concerns:  Coumadin 2.5mg/5mg alternating days. Pt reports he is feeling well. Pt is taking Prednisone 5 mg daily prescribed by LANCE Roman. He also request a refill on Duonebs. The takes Duonebs for the management of COPD> pt reports he tolerates medication well and Duonebs improve wheezing and shortness of breath. Chief Complaint   Patient presents with    Follow-up     POC INR r/t coumadin therapy         Review of Systems   Constitutional: Negative. HENT: Negative. Eyes: Negative. Respiratory: Negative. Negative for cough and shortness of breath. Cardiovascular: Negative. Negative for chest pain. Gastrointestinal: Negative. Negative for abdominal pain, diarrhea, nausea and vomiting. Endocrine: Negative. Genitourinary: Negative. Musculoskeletal: Negative. Allergic/Immunologic: Negative. Neurological: Negative. Hematological: Negative. Psychiatric/Behavioral: Negative. OBJECTIVE:    /60   Pulse 71   Temp 97.8 °F (36.6 °C) (Infrared)   Resp 18   Ht 6' 1\" (1.854 m)   Wt 192 lb 14.4 oz (87.5 kg)   SpO2 91% Comment: RA  BMI 25.45 kg/m²    Physical Exam  Vitals and nursing note reviewed. Constitutional:       Appearance: He is well-developed. HENT:      Head: Normocephalic. Neck:      Thyroid: No thyromegaly. Vascular: No carotid bruit. Cardiovascular:      Rate and Rhythm: Normal rate. Rhythm irregularly irregular. Heart sounds: Normal heart sounds. No murmur heard.       Pulmonary:      Effort: Pulmonary effort is normal. No tachypnea, accessory muscle usage or respiratory distress. Breath sounds: Normal breath sounds. Musculoskeletal:      Right lower leg: No edema. Left lower leg: No edema. Skin:     General: Skin is warm and dry. Neurological:      Mental Status: He is alert and oriented to person, place, and time. Psychiatric:         Mood and Affect: Mood normal.         Behavior: Behavior normal.         Thought Content: Thought content normal.         Judgment: Judgment normal.         ASSESSMENT/PLAN:     Tika Pineda was seen today for follow-up. Diagnoses and all orders for this visit:    Anticoagulated on Coumadin  -     POCT INR 1.6- increase coumadin to 5mg/5mg/2.5 mg-rotating schedule Pt verbalized understanding. Repeat INR in 2 weeks. Chronic obstructive pulmonary disease, unspecified COPD type (HCC)-stable- pt is at baseline and feeling well. Request refill on Duonebs. -     ipratropium-albuterol (DUONEB) 0.5-2.5 (3) MG/3ML SOLN nebulizer solution; INHALE 1 VIAL VIA NEBULIZER FOUR TIMES DAILY        Return in about 2 weeks (around 5/23/2022). Return in about 2 weeks (around 5/23/2022).       Current Outpatient Medications on File Prior to Visit   Medication Sig Dispense Refill    metoprolol succinate (TOPROL XL) 25 MG extended release tablet take 1 tablet by mouth once daily 90 tablet 1    docusate sodium (COLACE) 100 MG capsule Take 1 capsule by mouth 2 times daily 180 capsule 1    allopurinol (ZYLOPRIM) 300 MG tablet TAKE 1 TABLET BY MOUTH EVERY DAY 90 tablet 2    lovastatin (MEVACOR) 20 MG tablet Take 1 tablet by mouth daily 90 tablet 2    digoxin (LANOXIN) 125 MCG tablet TAKE 1 TABLET BY MOUTH EVERY DAY OR AS DIRECTED 90 tablet 2    ferrous sulfate (IRON 325) 325 (65 Fe) MG tablet take 1 tablet by mouth twice a day 180 tablet 2    amLODIPine (NORVASC) 5 MG tablet TAKE 1 TABLET BY MOUTH TWICE A DAY 90 tablet 2    diphenhydrAMINE (SOMINEX) 25 MG tablet Take 25 mg by mouth every 6 hours as needed       colchicine (COLCRYS) 0.6 MG tablet Take 1 tablet by mouth daily as needed for Pain 30 tablet 5    MITIGARE 0.6 MG capsule TAKE 1 CAPSULE BY MOUTH DAILY 30 capsule 1    glipiZIDE (GLUCOTROL) 10 MG tablet Take 2 tablets by mouth 2 times daily (before meals) (Patient taking differently: Take 10 mg by mouth daily ) 360 tablet 2    Dulaglutide (TRULICITY) 1.5 AB/4.1QB SOPN Inject 1.5 mg into the skin once a week      furosemide (LASIX) 40 MG tablet take 1 tablet by mouth once daily (Patient taking differently: 20 mg daily take 1 tablet by mouth once daily) 30 tablet 5    Calcium Polycarbophil (FIBER) 625 MG TABS Take 625 mg by mouth daily      ipratropium (ATROVENT) 0.02 % nebulizer solution Take 0.5 mg by nebulization every 4-6 hours as needed for Wheezing      nitroGLYCERIN (NITROSTAT) 0.4 MG SL tablet place 1 tablet under the tongue if needed every 5 minutes for chest pain for 3 doses IF NO RELIEF AFTER 3RD DOSE CALL PRESCRIBER . (Patient not taking: Reported on 12/8/2021) 25 tablet 5    COUMADIN 5 MG tablet take as directed (Patient taking differently: 5 mg daily Patient rotating between 5mg and 2.5mg.) 100 tablet 5    aspirin 81 MG tablet Take 81 mg by mouth daily.          Current Facility-Administered Medications on File Prior to Visit   Medication Dose Route Frequency Provider Last Rate Last Admin    cefTRIAXone (ROCEPHIN) injection 500 mg  500 mg IntraMUSCular Once LANCE Wilcox - DELANO

## 2022-05-11 ENCOUNTER — HOSPITAL ENCOUNTER (OUTPATIENT)
Facility: HOSPITAL | Age: 72
Discharge: HOME | End: 2022-05-11
Payer: MEDICARE

## 2022-05-11 ENCOUNTER — OFFICE VISIT (OUTPATIENT)
Dept: FAMILY MEDICINE CLINIC | Age: 72
End: 2022-05-11
Payer: MEDICARE

## 2022-05-11 VITALS
DIASTOLIC BLOOD PRESSURE: 64 MMHG | BODY MASS INDEX: 25.71 KG/M2 | WEIGHT: 194 LBS | HEIGHT: 73 IN | TEMPERATURE: 97.6 F | OXYGEN SATURATION: 95 % | SYSTOLIC BLOOD PRESSURE: 110 MMHG | RESPIRATION RATE: 20 BRPM | HEART RATE: 76 BPM

## 2022-05-11 DIAGNOSIS — Z13.220 SCREENING FOR HYPERLIPIDEMIA: ICD-10-CM

## 2022-05-11 DIAGNOSIS — Z00.00 INITIAL MEDICARE ANNUAL WELLNESS VISIT: Primary | ICD-10-CM

## 2022-05-11 LAB
CHOLESTEROL, TOTAL: 92 MG/DL (ref 0–200)
HDLC SERPL-MCNC: 39 MG/DL (ref 40–60)
LDL CHOLESTEROL CALCULATED: 40 MG/DL
TRIGL SERPL-MCNC: 63 MG/DL (ref 0–249)
VLDLC SERPL CALC-MCNC: 13 MG/DL

## 2022-05-11 PROCEDURE — 80061 LIPID PANEL: CPT

## 2022-05-11 PROCEDURE — 36415 COLL VENOUS BLD VENIPUNCTURE: CPT

## 2022-05-11 PROCEDURE — 4040F PNEUMOC VAC/ADMIN/RCVD: CPT | Performed by: NURSE PRACTITIONER

## 2022-05-11 PROCEDURE — 1123F ACP DISCUSS/DSCN MKR DOCD: CPT | Performed by: NURSE PRACTITIONER

## 2022-05-11 PROCEDURE — G0438 PPPS, INITIAL VISIT: HCPCS | Performed by: NURSE PRACTITIONER

## 2022-05-11 PROCEDURE — 3017F COLORECTAL CA SCREEN DOC REV: CPT | Performed by: NURSE PRACTITIONER

## 2022-05-11 ASSESSMENT — LIFESTYLE VARIABLES: HOW OFTEN DO YOU HAVE A DRINK CONTAINING ALCOHOL: NEVER

## 2022-05-11 ASSESSMENT — PATIENT HEALTH QUESTIONNAIRE - PHQ9
9. THOUGHTS THAT YOU WOULD BE BETTER OFF DEAD, OR OF HURTING YOURSELF: 0
5. POOR APPETITE OR OVEREATING: 0
SUM OF ALL RESPONSES TO PHQ9 QUESTIONS 1 & 2: 0
1. LITTLE INTEREST OR PLEASURE IN DOING THINGS: 0
6. FEELING BAD ABOUT YOURSELF - OR THAT YOU ARE A FAILURE OR HAVE LET YOURSELF OR YOUR FAMILY DOWN: 0
10. IF YOU CHECKED OFF ANY PROBLEMS, HOW DIFFICULT HAVE THESE PROBLEMS MADE IT FOR YOU TO DO YOUR WORK, TAKE CARE OF THINGS AT HOME, OR GET ALONG WITH OTHER PEOPLE: 0
SUM OF ALL RESPONSES TO PHQ QUESTIONS 1-9: 0
SUM OF ALL RESPONSES TO PHQ QUESTIONS 1-9: 0
2. FEELING DOWN, DEPRESSED OR HOPELESS: 0
4. FEELING TIRED OR HAVING LITTLE ENERGY: 0
7. TROUBLE CONCENTRATING ON THINGS, SUCH AS READING THE NEWSPAPER OR WATCHING TELEVISION: 0
8. MOVING OR SPEAKING SO SLOWLY THAT OTHER PEOPLE COULD HAVE NOTICED. OR THE OPPOSITE, BEING SO FIGETY OR RESTLESS THAT YOU HAVE BEEN MOVING AROUND A LOT MORE THAN USUAL: 0
SUM OF ALL RESPONSES TO PHQ QUESTIONS 1-9: 0
SUM OF ALL RESPONSES TO PHQ QUESTIONS 1-9: 0
3. TROUBLE FALLING OR STAYING ASLEEP: 0

## 2022-05-11 ASSESSMENT — COLUMBIA-SUICIDE SEVERITY RATING SCALE - C-SSRS
2. HAVE YOU ACTUALLY HAD ANY THOUGHTS OF KILLING YOURSELF?: NO
6. HAVE YOU EVER DONE ANYTHING, STARTED TO DO ANYTHING, OR PREPARED TO DO ANYTHING TO END YOUR LIFE?: NO
1. WITHIN THE PAST MONTH, HAVE YOU WISHED YOU WERE DEAD OR WISHED YOU COULD GO TO SLEEP AND NOT WAKE UP?: NO

## 2022-05-11 NOTE — PROGRESS NOTES
Chief Complaint   [facility]     Patient here for [facility].       Have you seen any other physician or provider since your last visit no    Have you had any other diagnostic tests since your last visit? no    Have you changed or stopped any medications since your last visit? no

## 2022-05-11 NOTE — PATIENT INSTRUCTIONS
The medication list included in this document is our record of what you are currently taking, including any changes that were made at today's visit.  If you find any differences when compared to your medications at home, or have any questions that were not answered at your visit, please contact the office. · Keep a list of your medicines with you. List all of the prescription medicines, nonprescription medicines, supplements, natural remedies, and vitamins that you take. Tell your healthcare providers who treat you about all of the products you are taking. Your provider can provide you with a form to keep track of them. Just ask. · Follow the directions that come with your medicine, including information about food or alcohol. Make sure you know how and when to take your medicine. Do not take more or less than you are supposed to take. · Keep all medicines out of the reach of children. · Store medicines according to the directions on the label. · Monitor yourself. Learn to know how your body reacts to your new medicine and keep track of how it makes you feel before attempting (If your provider has allowed you to do so) to drive or go to work. · Seek emergency medical attention if you think you have used too much of this medicine. An overdose of any prescription medicine can be fatal. Overdose symptoms may include extreme drowsiness, muscle weakness, confusion, cold and clammy skin, pinpoint pupils, shallow breathing, slow heart rate, fainting, or coma. · Don't share prescription medicines with others, even when they seem to have the same symptoms. What may be good for you may be harmful to others. · If you are no longer taking a prescribed medication and you have pills left please take your pills out of their original containers. Mix crushed pills with an undesirable substance, such as cat litter or used coffee grounds.  Put the mixture into a disposable container with a lid, such as an empty margarine tub, or into a sealable bag. Cover up or remove any of your personal information on the empty containers by covering it with black permanent marker or duct tape. Place the sealed container with the mixture, and the empty drug containers, in the trash. · If you use a medication that is in the form of a patch, dispose of used patches by folding them in half so that the sticky sides meet, and then flushing them down a toilet. They should not be placed in the household trash where children or pets can find them. · If you have any questions, ask your provider or pharmacist for more information. · Be sure to keep all appointments for provider visits or tests. We are committed to providing you with the best care possible. In order to help us achieve these goals please remember to bring all medications, herbal products, and over the counter supplements with you to each visit. If your provider has ordered testing for you, please be sure to follow up with our office if you have not received results within 7 days after the testing took place. *If you receive a survey after visiting one of our offices, please take time to share your experience concerning your physician office visit. These surveys are confidential and no health information about you is shared. We are eager to improve for you and we are counting on your feedback to help make that happen. Advance Directives: Care Instructions  Overview  An advance directive is a legal way to state your wishes at the end of your life. It tells your family and your doctor what to do if you can't say what youwant. There are two main types of advance directives. You can change them any timeyour wishes change. Living will. This form tells your family and your doctor your wishes about life support and other treatment. The form is also called a declaration. Medical power of .   This form lets you name a person to make treatment decisions for you when you can't speak for yourself. This person is called a health care agent (health care proxy, health care surrogate). The form is also called a durable power of  for health care. If you do not have an advance directive, decisions about your medical care maybe made by a family member, or by a doctor or a  who doesn't know you. It may help to think of an advance directive as a gift to the people who carefor you. If you have one, they won't have to make tough decisions by themselves. Follow-up care is a key part of your treatment and safety. Be sure to make and go to all appointments, and call your doctor if you are having problems. It's also a good idea to know your test results and keep alist of the medicines you take. What should you include in an advance directive? Many states have a unique advance directive form. (It may ask you to address specific issues.) Or you might use a universal form that's approved by manystates. If your form doesn't tell you what to address, it may be hard to know what to include in your advance directive. Use the questions below to help you getstarted. Who do you want to make decisions about your medical care if you are not able to? What life-support measures do you want if you have a serious illness that gets worse over time or can't be cured? What are you most afraid of that might happen? (Maybe you're afraid of having pain, losing your independence, or being kept alive by machines.)  Where would you prefer to die? (Your home? A hospital? A nursing home?)  Do you want to donate your organs when you die? Do you want certain Zoroastrianism practices performed before you die? When should you call for help? Be sure to contact your doctor if you have any questions. Where can you learn more? Go to [email or web address] and sign in to your [employer] account.  Enter R264 in the [employer] box to learn more about \"Advance Directives: Care Instructions. \"     If you do not have an account, please click on the \"Sign Up Now\" link. Current as of: October 18, 2021               Content Version: 13.2  © 2006-2022 Healthwise, Incorporated. Care instructions adapted under license by [facility] ([facility]). If you have questions about a medical condition or this instruction, always ask your healthcare professional. [address] any warranty or liability for your use of this information. Learning About Medical Power of   What is a medical power of ? A medical power of , also called a durable power of  for health care, is one type of the legal forms called advance directives. It lets you name the person you want to make treatment decisions for you if you can't speak or decide for yourself. The person you choose is called your health care agent. This person is also called a health care proxy or health care surrogate. A medical power of  may be called something else in your state. How do you choose a health care agent? Choose your health care agent carefully. This person may or may not be a familymember. Talk to the person before you make your final decision. Make sure he or she iscomfortable with this responsibility. It's a good idea to choose someone who:  Is at least 25years old. Knows you well and understands what makes life meaningful for you. Understands your Zoroastrianism and moral values. Will do what you want, not what he or she wants. Will be able to make difficult choices at a stressful time. Will be able to refuse or stop treatment, if that is what you would want, even if you could die. Will be firm and confident with health professionals if needed. Will ask questions to get needed information. Lives near you or agrees to travel to you if needed. Your family may help you make medical decisions while you can still be part of that process.  But it's important to choose one person to be your health careagent in case you aren't able to make decisions for yourself. If you don't fill out the legal form and name a health care agent, thedecisions your family can make may be limited. A health care agent may be called something else in your state. Who will make decisions for you if you don't have a health care agent? If you don't have a health care agent or a living will, you may not get the care you want. Decisions may be made by family members who disagree about your medical care. Or decisions may be made by a medical professional who doesn'tknow you well. In some cases, a  makes the decisions. When you name a health care agent, it is very clear who has the power to [place] decisions for you. How do you name a health care agent? You name your health care agent on a legal form. This form is usually called a medical power of . Ask your hospital, state bar association, or officeon aging where to find these forms. You must sign the form to make it legal. Some states require you to get the form notarized. This means that a person called a  watches you sign the form and then he or she signs the form. Some states also require thattwo or more witnesses sign the form. Be sure to tell your family members and doctors who your health care agent is. Where can you learn more? Go to [email or web address] and sign in to your [email or web address] account. Enter [id number] in the [facility] box to learn more about \"Learning About [address]. \"     If you do not have an account, please click on the \"Sign Up Now\" link. Current as of: October 18, 2021               Content Version: 13.2  © 8754-9318 Healthwise, Incorporated. Care instructions adapted under license by [facility] ([facility]).  If you have questions about a medical condition or this instruction, always ask your healthcare professional. Mariangel Reese disclaims any warranty or liability for your use of this information. Learning About Ron Raymond  What is a living will? A living will, also called a declaration, is a legal form. It tells your family and your doctor your wishes when you can't speak for yourself. It's used by the health professionals who will treat you as you near the end of your life or ifyou get seriously hurt or ill. If you put your wishes in writing, your loved ones and others will know what kind of care you want. They won't need to guess. This can ease your mind and behelpful to others. And you can change or cancel your living will at any time. A living will is not the same as an estate or property will. An estate willexplains what you want to happen with your money and property after you die. How do you use it? Keep these facts in mind about how a living will is used. Your living will is used only if you can't speak or make decisions for yourself. Most often, one or more doctors must certify that you can't speak or decide for yourself before your living will takes effect. If you get better and can speak for yourself again, you can accept or refuse any treatment. It doesn't matter what you said in your living will. Some states may limit your right to refuse treatment in certain cases. For example, you may need to clearly state in your living will that you don't want artificial hydration and nutrition, such as being fed through a tube. Is a living will a legal document? A living will is a legal document. Each state has its own laws about livingwills. And a living will may be called something else in your state. Here are some things to know about living crain. You don't need an  to complete a living will. But legal advice can be helpful if your state's laws are unclear. It can also help if your health history is complicated or your family can't agree on what should be in your living will. You can change your living will at any time.  Some people find that their wishes about end-of-life care change as their health changes. If you make big changes to your living will, complete a new form. If you move to another state, make sure that your living will is legal in the state where you now live. In most cases, doctors will respect your wishes even if you have a form from a different state. You might use a universal form that has been approved by many states. This kind of form can sometimes be filled out and stored online. Your digital copy will then be available wherever you have a connection to the internet. The doctors and nurses who need to treat you can find it right away. Your state may offer an online registry. This is another place where you can store your living will online. It's a good idea to get your living will notarized. This means using a person called a [employer] to watch two people sign, or witness, your living will. What should you know when you create a living will? Here are some questions to ask yourself as you make your living will. Do you know enough about life support methods that might be used? If not, talk to your doctor so you know what might be done if you can't breathe on your own, your heart stops, or you can't swallow. What things would you still want to be able to do after you receive life-support methods? Would you want to be able to walk? To speak? To eat on your own? To live without the help of machines? Do you want certain Orthodox practices performed if you become very ill? If you have a choice, where do you want to be cared for? In your home? At a hospital or nursing home? If you have a choice at the end of your life, where would you prefer to die? At home? In a hospital or nursing home? Somewhere else? Would you prefer to be buried or cremated? Do you want your organs to be donated after you die? What should you do with your living will?   Make sure that your family members and your health care agent have copies of your living will (also called a declaration). Give your doctor a copy of your living will. Ask to have it kept as part of your medical record. If you have more than one doctor, make sure that each one has a copy. Put a copy of your living will where it can be easily found. For example, some people may put a copy on their refrigerator door. If you are using a digital copy, be sure your doctor, family members, and health care agent know how to find and access it. Where can you learn more? Go to [email or web address] and sign in to your Epuramat account. Enter Y299 in the IXcellerate box to learn more about \"Learning About Living Marta Humphries. \"     If you do not have an account, please click on the \"Sign Up Now\" link. Current as of: October 18, 2021               Content Version: 13.2  © 4715-2446 [employer]. Care instructions adapted under license by [facility] ([facility]). If you have questions about a medical condition or this instruction, always ask your healthcare professional. [place] [address] any warranty or liability for your use of this information. Personalized Preventive Plan for Donna Tracy - 5/11/2022  Medicare offers a range of preventive health benefits. Some of the tests and screenings are paid in full while other may be subject to a deductible, co-insurance, and/or copay. Some of these benefits include a comprehensive review of your medical history including lifestyle, illnesses that may run in your family, and various assessments and screenings as appropriate. After reviewing your medical record and screening and assessments performed today your provider may have ordered immunizations, labs, imaging, and/or referrals for you. A list of these orders (if applicable) as well as your Preventive Care list are included within your After Visit Summary for your review.     Other Preventive Recommendations:    A preventive eye exam performed by an eye specialist is recommended every 1-2 years to screen for glaucoma; cataracts, macular degeneration, and other eye disorders. A preventive dental visit is recommended every 6 months. Try to get at least 150 minutes of exercise per week or 10,000 steps per day on a pedometer . Order or download the FREE \"Exercise & Physical Activity: Your Everyday Guide\" from [employer]. Call 0-617.806.4456 or search [employer] online. You need 3043-3252 mg of calcium and 1555-6536 IU of vitamin D per day. It is possible to meet your calcium requirement with diet alone, but a vitamin D supplement is usually necessary to meet this goal.  When exposed to the sun, use a sunscreen that protects against both UVA and UVB radiation with an SPF of 30 or greater. Reapply every 2 to 3 hours or after sweating, drying off with a towel, or swimming. Always wear a seat belt when traveling in a car. Always wear a helmet when riding a bicycle or motorcycle.

## 2022-05-11 NOTE — PROGRESS NOTES
Medicare Annual Wellness Visit    Hermila Gama is here for Medicare AWV    Assessment & Plan   Initial Medicare annual wellness visit  Screening for hyperlipidemia  -     Lipid Panel; Future      Recommendations for Preventive Services Due: see orders and patient instructions/AVS.  Recommended screening schedule for the next 5-10 years is provided to the patient in written form: see Patient Instructions/AVS.     Return for Medicare Annual Wellness Visit in 1 year. Subjective    Pt reports he is feeling well today without complaints. Patient's complete Health Risk Assessment and screening values have been reviewed and are found in Flowsheets. The following problems were reviewed today and where indicated follow up appointments were made and/or referrals ordered. Positive Risk Factor Screenings with Interventions:             General Health and ACP:  General  In general, how would you say your health is?: Good  In the past 7 days, have you experienced any of the following: New or Increased Pain, New or Increased Fatigue, Loneliness, Social Isolation, Stress or Anger?: No  Do you get the social and emotional support that you need?: Yes  Do you have a Living Will?: Yes    Advance Directives     Power of  Living Will ACP-Advance Directive ACP-Power of     Not on File Not on File Not on File Not on File      General Health Risk Interventions:  · No Living Will: Pt reports he had a living will made 38 years ago. he does not have a copy. Advanced care planning addressed today.      Health Habits/Nutrition:     Physical Activity: Insufficiently Active    Days of Exercise per Week: 5 days    Minutes of Exercise per Session: 20 min     Have you lost any weight without trying in the past 3 months?: (!) Yes (pt reports 4lbs)  Body mass index: (!) 25.59  Have you seen the dentist within the past year?: (!) No    Health Habits/Nutrition Interventions:  · Dental exam overdue:  patient encouraged to make appointment with his/her dentist    Hearing/Vision:  Do you or your family notice any trouble with your hearing that hasn't been managed with hearing aids?: (!) Yes  Do you have difficulty driving, watching TV, or doing any of your daily activities because of your eyesight?: No  Have you had an eye exam within the past year?: Yes  No exam data present    Hearing/Vision Interventions:  · Hearing concerns:  patient declines any further evaluation/treatment for hearing issues    Safety:  Do you have working smoke detectors?: Yes  Do you have any tripping hazards - loose or unsecured carpets or rugs?: (!) Yes  Do you have any tripping hazards - clutter in doorways, halls, or stairs?: No  Do you have either shower bars, grab bars, non-slip mats or non-slip surfaces in your shower or bathtub?: Yes  Do you always fasten your seatbelt when you are in a car?: Yes    Safety Interventions:  · Home safety tips provided           Objective   Vitals:    05/11/22 1039   BP: 110/64   Pulse: 76   Resp: 20   Temp: 97.6 °F (36.4 °C)   TempSrc: Infrared   SpO2: 95%   Weight: 194 lb (88 kg)   Height: 6' 1\" (1.854 m)      Body mass index is 25.6 kg/m². No Known Allergies  Prior to Visit Medications    Medication Sig Taking?  Authorizing Provider   ipratropium-albuterol (DUONEB) 0.5-2.5 (3) MG/3ML SOLN nebulizer solution INHALE 1 VIAL VIA NEBULIZER FOUR TIMES DAILY  LANCE Llanos - DELANO   metoprolol succinate (TOPROL XL) 25 MG extended release tablet take 1 tablet by mouth once daily  Zoey Dc APRN - NP   docusate sodium (COLACE) 100 MG capsule Take 1 capsule by mouth 2 times daily  Zoey Dc APRN - NP   allopurinol (ZYLOPRIM) 300 MG tablet TAKE 1 TABLET BY MOUTH EVERY DAY  LANCE Llanos - NP   lovastatin (MEVACOR) 20 MG tablet Take 1 tablet by mouth daily  Zoey Dc APRN - NP   digoxin (LANOXIN) 125 MCG tablet TAKE 1 TABLET BY MOUTH EVERY DAY OR AS DIRECTED  LANCE Llanos NP   ferrous sulfate (IRON 325) 325 (65 Fe) MG tablet take 1 tablet by mouth twice a day  LANCE Tomlin NP   amLODIPine (NORVASC) 5 MG tablet TAKE 1 TABLET BY MOUTH TWICE A DAY  LANCE Tomlin NP   diphenhydrAMINE (SOMINEX) 25 MG tablet Take 25 mg by mouth every 6 hours as needed   Historical Provider, MD   colchicine (COLCRYS) 0.6 MG tablet Take 1 tablet by mouth daily as needed for Pain  LANCE Tomlin NP   MITIGARE 0.6 MG capsule TAKE 1 CAPSULE BY MOUTH DAILY  LANCE Tomlin NP   glipiZIDE (GLUCOTROL) 10 MG tablet Take 2 tablets by mouth 2 times daily (before meals)  Patient taking differently: Take 10 mg by mouth daily   LANCE Tomlin NP   Calcium Polycarbophil (FIBER) 625 MG TABS Take 625 mg by mouth daily  Historical Provider, MD   Dulaglutide (TRULICITY) 1.5 HS/6.1AQ SOPN Inject 1.5 mg into the skin once a week  Historical Provider, MD   ipratropium (ATROVENT) 0.02 % nebulizer solution Take 0.5 mg by nebulization every 4-6 hours as needed for Wheezing  Historical Provider, MD   furosemide (LASIX) 40 MG tablet take 1 tablet by mouth once daily  Patient taking differently: 20 mg daily take 1 tablet by mouth once daily  LANCE Tomlin NP   nitroGLYCERIN (NITROSTAT) 0.4 MG SL tablet place 1 tablet under the tongue if needed every 5 minutes for chest pain for 3 doses IF NO RELIEF AFTER 3RD DOSE CALL PRESCRIBER . Patient not taking: Reported on 12/8/2021  LANCE Tomlin NP   COUMADIN 5 MG tablet take as directed  Patient taking differently: 5 mg daily Patient rotating between 5mg and 2.5mg.  Janie Fitzpatrick MD   aspirin 81 MG tablet Take 81 mg by mouth daily.     Historical Provider, MD [place]nel[place] (Including outside providers/suppliers regularly involved in providing care):   Patient Care Team:  LANCE Tomlin NP as PCP - General (Family Nurse Practitioner)  LANCE Tomlin NP as PCP - [facility] Empaneled Provider    Reviewed and updated this visit:  Tobacco Allergies  Meds  Med Hx  Surg Hx  Soc Hx  Fam Hx                 Cardiovascular Disease Risk Counseling: Assessed the patient's risk to develop cardiovascular disease and reviewed main risk factors. Reviewed steps to reduce disease risk including:   · Quitting tobacco use, reducing amount smoked, or not starting the habit  · Making healthy food choices  · Being physically active and gradualy increasing activity levels   · Reduce weight and determine a healthy BMI goal  · Monitor blood pressure and treat if higher than 140/90 mmHg  · Maintain blood total cholesterol levels under 5 mmol/l or 190 mg/dl  · Maintain LDL cholesterol levels under 3.0 mmol/l or 115 mg/dl   · Control blood glucose levels  · Consider taking aspirin (75 mg daily), once blood pressure is controlled   Provided a follow up plan.   Time spent (minutes): 10

## 2022-05-13 NOTE — THERAPY DISCHARGE NOTE
Outpatient Rehabilitation - Wound/Debridement Discharge Summary       Patient Name: Swapnil Lawson  : 1950  MRN: [id number]  Today's Date: 2022                  Admit Date: (Not on file)    Visit Dx:    ICD-10-CM ICD-9-CM   1. Ulcer of left foot with other severity (Formerly [facility])  L97.528 707.15   2. Peripheral vascular disease of lower extremity (Formerly [facility])  I73.9 443.9   3. Venous insufficiency  I87.2 459.81   4. Type 2 diabetes mellitus with foot ulcer, unspecified whether long term insulin use (Formerly [facility])  E11.621 250.80    L97.509 707.15       Patient Active Problem List   Diagnosis   • Coronary artery disease   • Hypertension   • Paroxysmal atrial fibrillation (HCC)   • Diabetes mellitus (HCC)   • Venous insufficiency   • Hyperlipidemia   • Nuclear sclerotic cataract of right eye   • Cortical age-related cataract of right eye   • Nuclear sclerotic cataract of left eye   • Cortical age-related cataract of left eye   • Thrombocytopenia (HCC)        Past Medical History:   Diagnosis Date   • COPD (chronic obstructive pulmonary disease) (Formerly [facility])    • Coronary artery disease 2016    CABG, , Abimael Tomlin MD. CABG, 2013, [facility].   • Diabetes mellitus (HCC) 2016   • Gout    • Hyperlipidemia 2016   • Hypertension 2016   • Myocardial infarction (HCC)      in  and  prior to CABG.   • Paroxysmal atrial fibrillation (HCC) 2016   • Sleep apnea    • Venous insufficiency 2016        Past Surgical History:   Procedure Laterality Date   • APPENDECTOMY     • CARDIAC CATHETERIZATION     • CARDIAC SURGERY       and    • CATARACT EXTRACTION W/ INTRAOCULAR LENS IMPLANT Right 2020    Procedure: CATARACT PHACO EXTRACTION WITH INTRAOCULAR LENS IMPLANT RIGHT;  Surgeon: Omar Blood MD;  Location: [facility];  Service: Ophthalmology;  Laterality: Right;   • CATARACT EXTRACTION W/ INTRAOCULAR LENS IMPLANT Left 2020    Procedure: CATARACT PHACO  EXTRACTION WITH INTRAOCULAR LENS IMPLANT LEFT;  Surgeon: Omar Blood MD;  Location: [facility];  Service: Ophthalmology;  Laterality: Left;   • CHOLECYSTECTOMY  2002   • COLONOSCOPY     • FINGER SURGERY      Rt index (second finger)   • OTHER SURGICAL HISTORY  2010    Bone spur removal   • TOE AMPUTATION  2009   • VASECTOMY           Goals   PT OP Goals     Row Name 05/13/22 0800          PT Short Term Goals    STG Date to Achieve 04/30/22  -     STG 1 Pt/ son independent with clean home dressing changes to promote moist, bacteriostatic healing environement.  -     STG 1 Progress Met  -     STG 2 Pt /son able to verbalize s/s of infection and when to seek urgent or emergency care.  -     STG 2 Progress Met  -     STG 3 Wound dimensions to decrease at least 25% to demonstrate wound healing.  -     STG 3 Progress Met  -            Long Term Goals    LTG Date to Achieve 04/30/22  -     LTG 1 Wound dimensions to decrease at least 75% to demonstrate wound healing.  -     LTG 1 Progress Met  -     LTG 2 Pt /son independent with long term management of vascular changes in LLE; balancing venous swelling and impaired arterial flow.  -     LTG 2 Progress Met  -           User Key  (r) = Recorded By, (t) = Taken By, (c) = Cosigned By    Initials Name Provider Type    Cheryl Hua, PT Physical Therapist                   OP Discharge Summary     Row Name 05/13/22 0848             OP PT Discharge Summary    Date of Discharge 05/13/22  -      Reason for Discharge All goals achieved;Independent  -      Outcomes Achieved Able to achieve all goals within established timeline  -      Discharge Destination Home with home program  -            User Key  (r) = Recorded By, (t) = Taken By, (c) = Cosigned By    Initials Name Provider Type    Cheryl Hua, PT Physical Therapist                Cheryl Washington, PT  5/13/2022

## 2022-05-23 ENCOUNTER — NURSE ONLY (OUTPATIENT)
Dept: FAMILY MEDICINE CLINIC | Age: 72
End: 2022-05-23
Payer: MEDICARE

## 2022-05-23 DIAGNOSIS — Z79.01 ANTICOAGULATED ON COUMADIN: Primary | ICD-10-CM

## 2022-05-23 LAB
INTERNATIONAL NORMALIZATION RATIO, POC: 2.1
PROTHROMBIN TIME, POC: NORMAL

## 2022-05-23 PROCEDURE — 85610 PROTHROMBIN TIME: CPT | Performed by: NURSE PRACTITIONER

## 2022-05-26 NOTE — PROGRESS NOTES
[facility]  Office visit  Swapnil Lawson  1950  [phone]    VISIT DATE:  5/26/2022      PCP: Argenis Osborne, APRN  [address] [facility] [id number]    CC:  Chief Complaint   Patient presents with   • Coronary artery disease involving native coronary artery of       PROBLEM LIST:  1. Coronary artery disease:  a. CABG, 1993, Abimael Tomlin MD.  b. CABG, August 2013, [facility].  c. January 2018 echo - Ejection fraction is visually estimated to be 40-45 %.   mild concentric left ventricular hypertrophy.   Pseudonormal filling pattern noted.   Akinesis of the mid posterolateral, and basal to mid inferior walls   consistent with previous myocardial infarction.  2. Hypertension.  3. Paroxysmal atrial fibrillation.  4. Diabetes mellitus.   5. Venous insufficiency.   6.  Surgical history:  a.  Appendectomy, 1981.  b. Cholecystectomy, 2002.  c. Toe amputation, 2009.  d. Bone spur removal, 2010.  7. Previous history of myocardial infarction in 1992 and 1993 prior to CABG.  8. Hyperlipidemia.      Cardiac studies and procedures:  February 2019:Transthoracic echo - EF of 50%, moderately dilated left ventricle, moderately dilated right ventricle with normal function, moderate to severe left atrial enlargement, dilated IVC    September 2021 bilateral carotid duplex  1. Right internal carotid artery estimated diameter reduction: 20-49 %.   2. Left internal carotid artery estimated diameter reduction: 20-49 %.   3. Vertebral arteries demonstrate bilateral antegrade flow.     March 2022  Venous reflux study  · Chronic post thrombotic changes are noted in the left femoral and popliteal veins.  · Reflux is detected in the left common femoral, femoral, popliteal, posterior tibial, and peroneal veins.  Bilateral lower extremity arterial duplex  · Greater than 70% distal right SFA stenosis  · Greater than 50% right proximal posterior tibial artery stenosis  · Otherwise mild  "diffuse nonobstructive atherosclerosis bilaterally.        ASSESSMENT:   Diagnosis Plan   1. Coronary artery disease involving native coronary artery of native heart without angina pectoris     2. Mixed hyperlipidemia     3. Primary hypertension     4. Paroxysmal atrial fibrillation (HCC)         PLAN:  Congestive heart failure, diastolic, chronic: Currently euvolemic and compensated.  Continue current medical therapy.      Coronary artery disease: Currently stable and asymptomatic.  Continue aspirin, statin and afterload reduction    Hypertension: Goal less than 130/80 mmHg.  Controlled based on home blood pressure readings.  Continue current medications.    Venous insufficiency, left: Currently well controlled, does not appear to be affecting wound healing status post left-sided bunionectomy.  Continue compression stockings and elevating feet when possible.    Hyperlipidemia: Goal LDL <100, ideally less than 70.  Continue statin.    Persistent atrial fibrillation: Currently asymptomatic.  Continue stroke prophylaxis with Coumadin, goal INR 2-3.  Continue rate control with metoprolol.  Continue low-dose digoxin but following closely in the setting of CKD.    Peripheral vascular disease: Potential high-grade right SFA disease.  Currently asymptomatic.  Continue current medical therapy.    Subjective  Denies chest pain, palpitations or dyspnea.  Reports lower extremity edema has been well controlled.  Blood pressures running less than 120/80 mmHg.    PHYSICAL EXAMINATION:  Vitals:    05/26/22 1304   BP: 110/58   BP Location: Right arm   Patient Position: Sitting   Pulse: 72   SpO2: 94%   Weight: 86.2 kg (190 lb)   Height: 182.9 cm (72\")     General Appearance:    Alert, cooperative, no distress, appears stated age   Head:    Normocephalic, without obvious abnormality, atraumatic   Eyes:    conjunctiva/corneas clear   Nose:   Nares normal, septum midline, mucosa normal, no drainage   Throat:   Lips, teeth and gums " normal   Neck:   Supple, symmetrical, trachea midline, no carotid    bruit or JVD   Lungs:     Minimal right basilar inspiratory rales, respirations unlabored   Chest Wall:    No tenderness or deformity    Heart:   Irregularly irregular, S1 and S2 normal, no murmur, rub   or gallop, normal carotid impulse bilaterally without bruit.   Abdomen:     Soft, non-tender   Extremities:   Extremities normal, atraumatic, no cyanosis, trivial pretibial edema bilaterally, compression stockings in place    Pulses:   2+ and symmetric all extremities   Skin:   Skin color, texture, turgor normal, no rashes or lesions       Diagnostic Data:  Procedures  Lab Results   Component Value Date    CHLPL 92 05/11/2022    TRIG 63 05/11/2022    HDL 39 (L) 05/11/2022     Lab Results   Component Value Date    GLUCOSE 316 (H) 11/04/2020    BUN 21 11/04/2020    CREATININE 1.58 (H) 11/04/2020     11/04/2020    K 4.0 11/04/2020    CL 95 (L) 11/04/2020    CO2 31.0 (H) 11/04/2020     Lab Results   Component Value Date    HGBA1C 6.6 (H) 02/08/2022     Lab Results   Component Value Date    WBC 8.40 05/04/2022    HGB 12.4 (L) 05/04/2022    HCT 40.9 05/04/2022     (L) 05/04/2022       Allergies  No Known Allergies    Current Medications    Current Outpatient Medications:   •  allopurinol (ZYLOPRIM) 300 MG tablet, Take 300 mg by mouth Daily., Disp: , Rfl: 0  •  amLODIPine (NORVASC) 5 MG tablet, Take 1 tablet by mouth 2 (Two) Times a Day., Disp: , Rfl:   •  aspirin 81 MG tablet, Take 81 mg by mouth Daily., Disp: , Rfl:   •  calcium polycarbophil (FIBERCON) 625 MG tablet, Take 625 mg by mouth 2 (Two) Times a Day. Takes 2 tablets BID, Disp: , Rfl:   •  colchicine 0.6 MG tablet, Take 0.6 mg by mouth As Needed for Muscle / Joint Pain., Disp: , Rfl:   •  digoxin (LANOXIN) 125 MCG tablet, Take 0.5 tablets by mouth Daily., Disp: 15 tablet, Rfl: 1  •  diphenhydrAMINE (BENADRYL) 25 MG tablet, Take 25 mg by mouth Every 6 (Six) Hours As Needed for  Itching., Disp: , Rfl:   •  diphenhydrAMINE (SOMINEX) 25 MG tablet, Take 25 mg by mouth Daily., Disp: , Rfl:   •  docusate sodium (COLACE) 100 MG capsule, Take 100 mg by mouth 2 (Two) Times a Day., Disp: , Rfl:   •  Dulaglutide (Trulicity) 1.5 MG/0.5ML solution pen-injector, Inject  under the skin into the appropriate area as directed 1 (One) Time Per Week., Disp: , Rfl:   •  ferrous sulfate 325 (65 FE) MG tablet, Take 325 mg by mouth 2 (Two) Times a Day., Disp: , Rfl:   •  furosemide (LASIX) 40 MG tablet, Take 40 mg by mouth 2 (Two) Times a Day., Disp: , Rfl:   •  glipiZIDE (GLUCOTROL) 10 MG tablet, Take 10 mg by mouth 3 (Three) Times a Day., Disp: , Rfl:   •  ipratropium (ATROVENT) 0.02 % nebulizer solution, Take 0.5 mg by nebulization Every 4 (Four) Hours As Needed., Disp: , Rfl:   •  ipratropium-albuterol (DUO-NEB) 0.5-2.5 mg/3 ml nebulizer, INHALE 1 VIAL VIA NEBULIZER FOUR TIMES DAILY, Disp: , Rfl:   •  lovastatin (MEVACOR) 20 MG tablet, Take 20 mg by mouth Every Night., Disp: , Rfl:   •  metOLazone (ZAROXOLYN) 5 MG tablet, Take 5 mg by mouth Daily., Disp: , Rfl:   •  metoprolol succinate XL (TOPROL-XL) 25 MG 24 hr tablet, Take 25 mg by mouth Daily., Disp: , Rfl:   •  nitroglycerin (NITROSTAT) 0.4 MG SL tablet, Place 0.4 mg under the tongue Every 5 (Five) Minutes As Needed for Chest Pain. Take no more than 3 doses in 15 minutes., Disp: , Rfl:   •  O2 (OXYGEN), Inhale 2 L/min Every Night., Disp: , Rfl:   •  warfarin (COUMADIN) 5 MG tablet, Take 7.5 mg by mouth Daily. 7.5 mg on Sunday, 5mg every other day, Disp: , Rfl:           ROS  Review of Systems   Cardiovascular: Positive for dyspnea on exertion, irregular heartbeat and leg swelling. Negative for chest pain and palpitations.   Respiratory: Negative for cough.        SOCIAL HX  Social History     Socioeconomic History   • Marital status:    Tobacco Use   • Smoking status: Former Smoker     Packs/day: 1.00     Types: Cigarettes     Quit date: 1993      Years since quittin.4   • Smokeless tobacco: Never Used   Vaping Use   • Vaping Use: Never used   Substance and Sexual Activity   • Alcohol use: No   • Drug use: No   • Sexual activity: Defer       FAMILY HX  Family History   Problem Relation Age of Onset   • COPD Mother    • Heart attack Father              Naseem Campbell III, MD, FACC

## 2022-06-08 ENCOUNTER — OFFICE VISIT (OUTPATIENT)
Dept: FAMILY MEDICINE CLINIC | Age: 72
End: 2022-06-08
Payer: MEDICARE

## 2022-06-08 VITALS
SYSTOLIC BLOOD PRESSURE: 110 MMHG | RESPIRATION RATE: 18 BRPM | HEART RATE: 75 BPM | BODY MASS INDEX: 26.09 KG/M2 | TEMPERATURE: 97.6 F | WEIGHT: 196.9 LBS | DIASTOLIC BLOOD PRESSURE: 60 MMHG | HEIGHT: 73 IN | OXYGEN SATURATION: 94 %

## 2022-06-08 DIAGNOSIS — J30.89 ALLERGIC RHINITIS DUE TO OTHER ALLERGIC TRIGGER, UNSPECIFIED SEASONALITY: ICD-10-CM

## 2022-06-08 DIAGNOSIS — J44.1 COPD EXACERBATION (HCC): Primary | ICD-10-CM

## 2022-06-08 PROCEDURE — 3023F SPIROM DOC REV: CPT | Performed by: NURSE PRACTITIONER

## 2022-06-08 PROCEDURE — 99213 OFFICE O/P EST LOW 20 MIN: CPT | Performed by: NURSE PRACTITIONER

## 2022-06-08 PROCEDURE — G8417 CALC BMI ABV UP PARAM F/U: HCPCS | Performed by: NURSE PRACTITIONER

## 2022-06-08 PROCEDURE — G8427 DOCREV CUR MEDS BY ELIG CLIN: HCPCS | Performed by: NURSE PRACTITIONER

## 2022-06-08 PROCEDURE — 1123F ACP DISCUSS/DSCN MKR DOCD: CPT | Performed by: NURSE PRACTITIONER

## 2022-06-08 PROCEDURE — 3017F COLORECTAL CA SCREEN DOC REV: CPT | Performed by: NURSE PRACTITIONER

## 2022-06-08 PROCEDURE — 1036F TOBACCO NON-USER: CPT | Performed by: NURSE PRACTITIONER

## 2022-06-08 PROCEDURE — 96372 THER/PROPH/DIAG INJ SC/IM: CPT | Performed by: NURSE PRACTITIONER

## 2022-06-08 RX ORDER — AMOXICILLIN AND CLAVULANATE POTASSIUM 875; 125 MG/1; MG/1
TABLET, FILM COATED ORAL
COMMUNITY
Start: 2022-05-31 | End: 2022-07-27 | Stop reason: ALTCHOICE

## 2022-06-08 RX ORDER — PREDNISONE 10 MG/1
10 TABLET ORAL 2 TIMES DAILY
Qty: 10 TABLET | Refills: 0 | Status: SHIPPED | OUTPATIENT
Start: 2022-06-08 | End: 2022-06-13

## 2022-06-08 RX ORDER — LORATADINE 10 MG/1
10 TABLET ORAL DAILY
Qty: 30 TABLET | Refills: 5 | Status: SHIPPED | OUTPATIENT
Start: 2022-06-08

## 2022-06-08 RX ORDER — METHYLPREDNISOLONE ACETATE 80 MG/ML
80 INJECTION, SUSPENSION INTRA-ARTICULAR; INTRALESIONAL; INTRAMUSCULAR; SOFT TISSUE ONCE
Status: COMPLETED | OUTPATIENT
Start: 2022-06-08 | End: 2022-06-08

## 2022-06-08 RX ADMIN — METHYLPREDNISOLONE ACETATE 80 MG: 80 INJECTION, SUSPENSION INTRA-ARTICULAR; INTRALESIONAL; INTRAMUSCULAR; SOFT TISSUE at 12:07

## 2022-06-08 ASSESSMENT — ENCOUNTER SYMPTOMS
COUGH: 0
DIARRHEA: 0
SHORTNESS OF BREATH: 0
ABDOMINAL PAIN: 0
CONSTIPATION: 1
VOMITING: 0
NAUSEA: 0

## 2022-06-08 NOTE — PROGRESS NOTES
Chief Complaint   Patient presents with    Shortness of Breath     Patient to clinic with complaints of shortness of breath. Patient states that this started approx three days ago. Denies any cough or fever. No known sick contacts. Patient reports until yesterday he could use his nebulizer and it would help with SOA. He states yesterday on room air his oxygen was 88% but runs 94% on 3L O2. Patient is also taking Augmentin for foot ulcer prescribed by podiatry. Patient here today for sick visit only. Health Maintenance not reviewed during this visit. Administrations This Visit     cefTRIAXone (ROCEPHIN) 1,000 mg in lidocaine 1 % 2.86 mL IM Injection     Admin Date  06/08/2022 Action  Given Dose  1,000 mg Route  IntraMUSCular Administered By  Narayan Brian RN          methylPREDNISolone acetate (DEPO-MEDROL) injection 80 mg     Admin Date  06/08/2022 Action  Given Dose  80 mg Route  IntraMUSCular Administered By  Narayan Brian, RYAN                Patient tolerated injection well. Patient advised to wait 20 minutes in the office following the injection. No signs/symptoms of reaction noted after 20 minutes.

## 2022-06-08 NOTE — PROGRESS NOTES
SUBJECTIVE:    Ede Allred is a 67 y.o. male    Pt c/o shortness of breath for 3 days. Symptoms are unchanged. Hx of COPD. Denies wheezing. Denies chest pain. Denies edema. Pt reports symptoms improve with nebulizer- Atrovent. Pt reports after his nebulizer he doesn't need oxygen at all for 3-4 hrs. Pt feels like his allergies are worse. Pt feels benadryl hasn't been helping. Pt c/o runny nose, +PND and cough . Pt has home oxygen at home. He has been using 2L at home and spo2 has been 97%. Pt reports he uses 3L on his portable tank due to conserver device. Denies fever. Pt reports he is on antibiotic for a sore on his foot that causes constipation. Pt is unsure the medication but he feels SOB is worse when he is constipated. Chief Complaint   Patient presents with    Shortness of Breath        Review of Systems   Constitutional: Negative. Negative for appetite change and fever. Respiratory: Negative for cough and shortness of breath. Cardiovascular: Negative for chest pain. Gastrointestinal: Positive for constipation (improves with stool softner). Negative for abdominal pain, diarrhea, nausea and vomiting. OBJECTIVE:    /60   Pulse 75   Temp 97.6 °F (36.4 °C) (Infrared)   Resp 18   Ht 6' 1\" (1.854 m)   Wt 196 lb 14.4 oz (89.3 kg)   SpO2 94% Comment: O2 @ 3L  BMI 25.98 kg/m²    Physical Exam  Vitals and nursing note reviewed. Constitutional:       Appearance: He is well-developed. Neck:      Thyroid: No thyromegaly. Vascular: No carotid bruit. Cardiovascular:      Rate and Rhythm: Normal rate and regular rhythm. Heart sounds: Normal heart sounds. No murmur heard. Pulmonary:      Effort: Pulmonary effort is normal. No tachypnea, accessory muscle usage or prolonged expiration. Breath sounds: Decreased breath sounds (diminished breath sounds throughout.) present. Skin:     General: Skin is warm and dry.    Neurological:      Mental Status: He is alert and oriented to person, place, and time. Psychiatric:         Mood and Affect: Mood normal.         Behavior: Behavior normal.         Thought Content: Thought content normal.         Judgment: Judgment normal.         ASSESSMENT/PLAN:   Kayla Forbes was seen today for shortness of breath. Diagnoses and all orders for this visit:    COPD exacerbation ([facility] [address]  -     cefTRIAXone (ROCEPHIN) 1,000 mg in lidocaine 1 % 2.86 mL IM Injection  -     methylPREDNISolone acetate (DEPO-MEDROL) injection 80 mg  -     predniSONE (DELTASONE) 10 MG tablet; Take 1 tablet by mouth 2 times daily for 5 days  -Pt agrees to go to the ER if symptoms worsen or fail to resolve.    -continue Augmentin 875 BID. Pt reports he has 6-7 days remaining. Allergic rhinitis due to other allergic trigger, unspecified seasonality  -     loratadine (CLARITIN) 10 MG tablet; Take 1 tablet by mouth daily  -stop benadryl. Return if symptoms worsen or fail to improve.     Current Outpatient Medications on File Prior to Visit   Medication Sig Dispense Refill    amoxicillin-clavulanate (AUGMENTIN) 875-125 MG per tablet TAKE 1 TABLET BY MOUTH TWICE DAILY      ipratropium-albuterol (DUONEB) 0.5-2.5 (3) MG/3ML SOLN nebulizer solution INHALE 1 VIAL VIA NEBULIZER FOUR TIMES DAILY 360 mL 2    metoprolol succinate (TOPROL XL) 25 MG extended release tablet take 1 tablet by mouth once daily 90 tablet 1    docusate sodium (COLACE) 100 MG capsule Take 1 capsule by mouth 2 times daily 180 capsule 1    allopurinol (ZYLOPRIM) 300 MG tablet TAKE 1 TABLET BY MOUTH EVERY DAY 90 tablet 2    lovastatin (MEVACOR) 20 MG tablet Take 1 tablet by mouth daily 90 tablet 2    digoxin (LANOXIN) 125 MCG tablet TAKE 1 TABLET BY MOUTH EVERY DAY OR AS DIRECTED 90 tablet 2    ferrous sulfate (IRON 325) 325 (65 Fe) MG tablet take 1 tablet by mouth twice a day 180 tablet 2    amLODIPine (NORVASC) 5 MG tablet TAKE 1 TABLET BY MOUTH TWICE A DAY 90 tablet 2    colchicine (COLCRYS) 0.6 MG tablet Take 1 tablet by mouth daily as needed for Pain 30 tablet 5    MITIGARE 0.6 MG capsule TAKE 1 CAPSULE BY MOUTH DAILY 30 capsule 1    glipiZIDE (GLUCOTROL) 10 MG tablet Take 2 tablets by mouth 2 times daily (before meals) (Patient taking differently: Take 10 mg by mouth daily ) 360 tablet 2    Calcium Polycarbophil (FIBER) 625 MG TABS Take 625 mg by mouth daily      Dulaglutide (TRULICITY) 1.5 YT/4.1NJ SOPN Inject 1.5 mg into the skin once a week      ipratropium (ATROVENT) 0.02 % nebulizer solution Take 0.5 mg by nebulization every 4-6 hours as needed for Wheezing      furosemide (LASIX) 40 MG tablet take 1 tablet by mouth once daily (Patient taking differently: 20 mg daily take 1 tablet by mouth once daily) 30 tablet 5    nitroGLYCERIN (NITROSTAT) 0.4 MG SL tablet place 1 tablet under the tongue if needed every 5 minutes for chest pain for 3 doses IF NO RELIEF AFTER 3RD DOSE CALL PRESCRIBER . (Patient not taking: Reported on 12/8/2021) 25 tablet 5    COUMADIN 5 MG tablet take as directed (Patient taking differently: 5 mg daily Patient rotating between 5mg and 2.5mg.) 100 tablet 5    aspirin 81 MG tablet Take 81 mg by mouth daily. No current facility-administered medications on file prior to visit.

## 2022-06-23 ENCOUNTER — OFFICE VISIT (OUTPATIENT)
Dept: FAMILY MEDICINE CLINIC | Age: 72
End: 2022-06-23
Payer: MEDICARE

## 2022-06-23 VITALS
DIASTOLIC BLOOD PRESSURE: 62 MMHG | SYSTOLIC BLOOD PRESSURE: 116 MMHG | TEMPERATURE: 97.9 F | BODY MASS INDEX: 25.96 KG/M2 | WEIGHT: 195.9 LBS | HEIGHT: 73 IN | HEART RATE: 66 BPM | OXYGEN SATURATION: 94 % | RESPIRATION RATE: 20 BRPM

## 2022-06-23 DIAGNOSIS — K59.00 CONSTIPATION, UNSPECIFIED CONSTIPATION TYPE: ICD-10-CM

## 2022-06-23 DIAGNOSIS — R19.4 CHANGE IN BOWEL HABITS: ICD-10-CM

## 2022-06-23 DIAGNOSIS — Z79.01 ANTICOAGULATED ON COUMADIN: Primary | ICD-10-CM

## 2022-06-23 LAB
INTERNATIONAL NORMALIZATION RATIO, POC: 2.1
PROTHROMBIN TIME, POC: 0

## 2022-06-23 PROCEDURE — G8417 CALC BMI ABV UP PARAM F/U: HCPCS | Performed by: NURSE PRACTITIONER

## 2022-06-23 PROCEDURE — 1123F ACP DISCUSS/DSCN MKR DOCD: CPT | Performed by: NURSE PRACTITIONER

## 2022-06-23 PROCEDURE — 99214 OFFICE O/P EST MOD 30 MIN: CPT | Performed by: NURSE PRACTITIONER

## 2022-06-23 PROCEDURE — 85610 PROTHROMBIN TIME: CPT | Performed by: NURSE PRACTITIONER

## 2022-06-23 PROCEDURE — G8427 DOCREV CUR MEDS BY ELIG CLIN: HCPCS | Performed by: NURSE PRACTITIONER

## 2022-06-23 PROCEDURE — 3017F COLORECTAL CA SCREEN DOC REV: CPT | Performed by: NURSE PRACTITIONER

## 2022-06-23 PROCEDURE — 1036F TOBACCO NON-USER: CPT | Performed by: NURSE PRACTITIONER

## 2022-06-23 RX ORDER — DOCUSATE SODIUM 100 MG/1
100 CAPSULE, LIQUID FILLED ORAL 3 TIMES DAILY
Qty: 270 CAPSULE | Refills: 1 | Status: SHIPPED | OUTPATIENT
Start: 2022-06-23 | End: 2022-09-21

## 2022-06-23 RX ORDER — BISACODYL 10 MG
10 SUPPOSITORY, RECTAL RECTAL DAILY
Qty: 30 SUPPOSITORY | Refills: 0 | Status: SHIPPED | OUTPATIENT
Start: 2022-06-23 | End: 2022-07-23

## 2022-06-23 ASSESSMENT — ENCOUNTER SYMPTOMS
SHORTNESS OF BREATH: 1
ALLERGIC/IMMUNOLOGIC NEGATIVE: 1
BLOOD IN STOOL: 0
ABDOMINAL PAIN: 0
DIARRHEA: 0
CONSTIPATION: 1
COUGH: 0
VOMITING: 0
ABDOMINAL DISTENTION: 0
NAUSEA: 0

## 2022-06-23 NOTE — PROGRESS NOTES
Chief Complaint   Patient presents with    Follow-up     POC INR    Constipation     Patient to clinic for INR monitoring related to anticoagulation on coumadin. POC INR 2.1. Patient also reports frequent constipation. Patient reports he has been taking miralax and prune juice but is requesting a daily medication. Patient reports last bowel movement last night but prior it had been four days.      Have you seen any other physician or provider since your last visit no    Have you had any other diagnostic tests since your last visit? no    Have you changed or stopped any medications since your last visit? no

## 2022-06-23 NOTE — PROGRESS NOTES
SUBJECTIVE:    Giorgi Green is a 67 y.o. male    Anticoagulation: Patient here for followup of chronic anticoagulation. Indication: atrial fibrillation  Bleeding Signs/Symptoms:  None  Thromboembolic Signs/Symptoms:  None    Missed Coumadin Doses:  None  Medication Changes: none  Dietary Changes:  no  Bacterial/Viral Infection:  no    Other Concerns:  Pt is taking Coumadin 5mg/2.5 mg alternating days. INR today was 2.1. Pt has complaints of constipation for the past 6 months. Pt reports he will go 3-4 days between bowel movements. Pt reports after 3-4 his COPD worsens due to constipation. Pt reports last colonoscopy was 3 years ago. Colonoscopy due in Septemeber 2022. Pt was previously under the care of Dr Gustavo Simons. He is now under the care of Dr Bethany Paredes. Pt reports he is taking Miralax daily. He is using OTC suppository and stool softeners BID. Constipation  This is a new problem. The current episode started more than 1 month ago (6 months). The problem has been gradually worsening since onset. He does not exercise regularly. There has not been adequate water intake (on fluid restriction by nephrology). Pertinent negatives include no abdominal pain, diarrhea, fever, nausea or vomiting. Associated symptoms comments: Shortness of breath is worse when constipated. He has tried laxatives and stool softeners for the symptoms. The treatment provided mild relief. Chief Complaint   Patient presents with    Follow-up     POC INR    Constipation        Review of Systems   Constitutional: Negative. Negative for appetite change and fever. Respiratory: Positive for shortness of breath (chronic- pt c/o increase in severity yesterday but SOB improved after having BM. Pt reports he is feeling well today. ). Negative for cough. Cardiovascular: Negative for chest pain. Gastrointestinal: Positive for constipation. Negative for abdominal distention, abdominal pain, blood in stool, diarrhea, nausea and vomiting. Allergic/Immunologic: Negative. Neurological: Negative. Hematological: Negative. Psychiatric/Behavioral: Negative. OBJECTIVE:    /62   Pulse 66   Temp 97.9 °F (36.6 °C) (Infrared)   Resp 20   Ht 6' 1\" (1.854 m)   Wt 195 lb 14.4 oz (88.9 kg)   SpO2 94% Comment: 2L  BMI 25.85 kg/m²    Physical Exam  Vitals and nursing note reviewed. Constitutional:       Appearance: He is well-developed. Neck:      Thyroid: No thyromegaly. Vascular: No carotid bruit. Cardiovascular:      Rate and Rhythm: Normal rate and regular rhythm. Heart sounds: Normal heart sounds. No murmur heard. Pulmonary:      Effort: Pulmonary effort is normal.      Breath sounds: Normal breath sounds. Skin:     General: Skin is warm and dry. Neurological:      Mental Status: He is alert and oriented to person, place, and time. Psychiatric:         Mood and Affect: Mood normal.         Behavior: Behavior normal.         Thought Content: Thought content normal.         Judgment: Judgment normal.         ASSESSMENT/PLAN:   Rigoberto Lowe was seen today for follow-up and constipation. Diagnoses and all orders for this visit:    Anticoagulated on Coumadin  -     POCT INR 2.1- continue current coumadin dose. Repeat INR in 1 month and PRN>    Constipation, unspecified constipation type  -     docusate sodium (COLACE) 100 MG capsule; Take 1 capsule by mouth 3 times daily  -     bisacodyl (DULCOLAX) 10 MG suppository; Place 1 suppository rectally daily PRN> Pt advised to only use suppository when necessary. COntinue Colace and Miralax daily. Change in bowel habits  Pt reports he is due colonoscopy in September 2022. Pt reports he started having Constipation approx 6 months ago. Constipation is worsening. Naomi Negrete RN is attempting to call and schedule appt for patient with Dr Maggy Gutierrez due to change in Bowel habits.  Will notify patient of appt with Dr Maggy Gutierrez      Return in about 1 month (around 7/23/2022), or if symptoms worsen or fail to improve.     Current Outpatient Medications on File Prior to Visit   Medication Sig Dispense Refill    amoxicillin-clavulanate (AUGMENTIN) 875-125 MG per tablet TAKE 1 TABLET BY MOUTH TWICE DAILY      loratadine (CLARITIN) 10 MG tablet Take 1 tablet by mouth daily 30 tablet 5    ipratropium-albuterol (DUONEB) 0.5-2.5 (3) MG/3ML SOLN nebulizer solution INHALE 1 VIAL VIA NEBULIZER FOUR TIMES DAILY 360 mL 2    metoprolol succinate (TOPROL XL) 25 MG extended release tablet take 1 tablet by mouth once daily 90 tablet 1    allopurinol (ZYLOPRIM) 300 MG tablet TAKE 1 TABLET BY MOUTH EVERY DAY 90 tablet 2    lovastatin (MEVACOR) 20 MG tablet Take 1 tablet by mouth daily 90 tablet 2    digoxin (LANOXIN) 125 MCG tablet TAKE 1 TABLET BY MOUTH EVERY DAY OR AS DIRECTED 90 tablet 2    ferrous sulfate (IRON 325) 325 (65 Fe) MG tablet take 1 tablet by mouth twice a day 180 tablet 2    amLODIPine (NORVASC) 5 MG tablet TAKE 1 TABLET BY MOUTH TWICE A DAY 90 tablet 2    colchicine (COLCRYS) 0.6 MG tablet Take 1 tablet by mouth daily as needed for Pain 30 tablet 5    MITIGARE 0.6 MG capsule TAKE 1 CAPSULE BY MOUTH DAILY 30 capsule 1    glipiZIDE (GLUCOTROL) 10 MG tablet Take 2 tablets by mouth 2 times daily (before meals) (Patient taking differently: Take 10 mg by mouth daily ) 360 tablet 2    Calcium Polycarbophil (FIBER) 625 MG TABS Take 625 mg by mouth daily      Dulaglutide (TRULICITY) 1.5 BD/2.9FS SOPN Inject 1.5 mg into the skin once a week      ipratropium (ATROVENT) 0.02 % nebulizer solution Take 0.5 mg by nebulization every 4-6 hours as needed for Wheezing      furosemide (LASIX) 40 MG tablet take 1 tablet by mouth once daily (Patient taking differently: 20 mg daily take 1 tablet by mouth once daily) 30 tablet 5    nitroGLYCERIN (NITROSTAT) 0.4 MG SL tablet place 1 tablet under the tongue if needed every 5 minutes for chest pain for 3 doses IF NO RELIEF AFTER 3RD DOSE CALL PRESCRIBER . (Patient not taking: Reported on 12/8/2021) 25 tablet 5    COUMADIN 5 MG tablet take as directed (Patient taking differently: 5 mg daily Patient rotating between 5mg and 2.5mg.) 100 tablet 5    aspirin 81 MG tablet Take 81 mg by mouth daily. No current facility-administered medications on file prior to visit.

## 2022-06-24 ENCOUNTER — TELEPHONE (OUTPATIENT)
Dept: FAMILY MEDICINE CLINIC | Age: 72
End: 2022-06-24

## 2022-06-24 DIAGNOSIS — R19.4 CHANGE IN BOWEL HABITS: Primary | ICD-10-CM

## 2022-06-24 NOTE — TELEPHONE ENCOUNTER
----- Message from LANCE Wilcox NP sent at 6/23/2022  5:28 PM EDT -----  Please schedule patient appt with Dr Cisco Ken due to Change in Bowel habits. New onset Constipation .

## 2022-06-24 NOTE — TELEPHONE ENCOUNTER
Contact Dr Deng  office to schedule patient. Appointment July 14 @ 10:20 AM at the [facility] office. Patient stopped by office and was informed of his appointment and agreeable.

## 2022-06-30 ENCOUNTER — HOSPITAL ENCOUNTER (OUTPATIENT)
Facility: HOSPITAL | Age: 72
Discharge: HOME | End: 2022-06-30
Payer: MEDICARE

## 2022-06-30 LAB
BILIRUBIN URINE: NEGATIVE
BLOOD, URINE: NEGATIVE
CLARITY: CLEAR
COLOR: YELLOW
FERRITIN: 323.3 NG/ML (ref 22–322)
GLUCOSE URINE: NEGATIVE MG/DL
HCT VFR BLD CALC: 38.8 % (ref 40–54)
HEMOGLOBIN: 11.9 G/DL (ref 13–18)
IRON: 39 UG/DL (ref 59–158)
KETONES, URINE: NEGATIVE MG/DL
LEUKOCYTE ESTERASE, URINE: NEGATIVE
MCH RBC QN AUTO: 28.9 PG (ref 27–32)
MCHC RBC AUTO-ENTMCNC: 30.7 G/DL (ref 31–35)
MCV RBC AUTO: 94.2 FL (ref 80–100)
MICROSCOPIC EXAMINATION: NORMAL
NITRITE, URINE: NEGATIVE
PDW BLD-RTO: 16.2 % (ref 11–16)
PH UA: 5 (ref 5–8)
PLATELET # BLD: 101 K/UL (ref 150–400)
PMV BLD AUTO: 11.2 FL (ref 6–10)
PROTEIN UA: NEGATIVE MG/DL
RBC # BLD: 4.12 M/UL (ref 4.5–6)
SPECIFIC GRAVITY UA: 1.02 (ref 1–1.03)
TOTAL IRON BINDING CAPACITY: 228 UG/DL (ref 250–450)
URINE TYPE: NORMAL
UROBILINOGEN, URINE: 0.2 E.U./DL
WBC # BLD: 6.2 K/UL (ref 4–11)

## 2022-06-30 PROCEDURE — 85027 COMPLETE CBC AUTOMATED: CPT

## 2022-06-30 PROCEDURE — 83550 IRON BINDING TEST: CPT

## 2022-06-30 PROCEDURE — 83540 ASSAY OF IRON: CPT

## 2022-06-30 PROCEDURE — 36415 COLL VENOUS BLD VENIPUNCTURE: CPT

## 2022-06-30 PROCEDURE — 82728 ASSAY OF FERRITIN: CPT

## 2022-06-30 PROCEDURE — 81003 URINALYSIS AUTO W/O SCOPE: CPT

## 2022-07-12 RX ORDER — AMLODIPINE BESYLATE 5 MG/1
TABLET ORAL
Qty: 180 TABLET | OUTPATIENT
Start: 2022-07-12

## 2022-07-12 NOTE — TELEPHONE ENCOUNTER
Requested Prescriptions     Refused Prescriptions Disp Refills    amLODIPine (NORVASC) 5 MG tablet [Pharmacy Med Name: AMLODIPINE BESYLATE 5MG TABLETS] 180 tablet      Sig: TAKE 1 TABLET BY MOUTH TWICE DAILY     Refused By: Romeo Seay     Reason for Refusal: Patient has requested refill too soon

## 2022-07-14 PROBLEM — K59.04 CHRONIC IDIOPATHIC CONSTIPATION: Status: ACTIVE | Noted: 2022-01-01

## 2022-07-14 PROBLEM — Z86.010 HISTORY OF COLON POLYPS: Status: ACTIVE | Noted: 2022-01-01

## 2022-07-18 RX ORDER — AMLODIPINE BESYLATE 5 MG/1
TABLET ORAL
Qty: 90 TABLET | Refills: 2 | OUTPATIENT
Start: 2022-07-18

## 2022-07-18 RX ORDER — AMLODIPINE BESYLATE 5 MG/1
TABLET ORAL
Qty: 90 TABLET | Refills: 2 | Status: SHIPPED | OUTPATIENT
Start: 2022-07-18 | End: 2022-09-30

## 2022-07-27 ENCOUNTER — HOSPITAL ENCOUNTER (OUTPATIENT)
Facility: HOSPITAL | Age: 72
Discharge: HOME | End: 2022-07-27
Payer: MEDICARE

## 2022-07-27 ENCOUNTER — OFFICE VISIT (OUTPATIENT)
Dept: FAMILY MEDICINE CLINIC | Age: 72
End: 2022-07-27

## 2022-07-27 VITALS
RESPIRATION RATE: 18 BRPM | TEMPERATURE: 96.9 F | HEIGHT: 73 IN | DIASTOLIC BLOOD PRESSURE: 66 MMHG | WEIGHT: 176.1 LBS | BODY MASS INDEX: 23.34 KG/M2 | HEART RATE: 59 BPM | SYSTOLIC BLOOD PRESSURE: 116 MMHG | OXYGEN SATURATION: 97 %

## 2022-07-27 DIAGNOSIS — R63.4 WEIGHT LOSS: ICD-10-CM

## 2022-07-27 DIAGNOSIS — Z79.01 ANTICOAGULATED ON COUMADIN: ICD-10-CM

## 2022-07-27 DIAGNOSIS — R63.4 WEIGHT LOSS: Primary | ICD-10-CM

## 2022-07-27 LAB
A/G RATIO: 1.4 (ref 0.8–2)
ALBUMIN SERPL-MCNC: 4 G/DL (ref 3.4–4.8)
ALP BLD-CCNC: 101 U/L (ref 25–100)
ALT SERPL-CCNC: 11 U/L (ref 4–36)
ANION GAP SERPL CALCULATED.3IONS-SCNC: 14 MMOL/L (ref 3–16)
AST SERPL-CCNC: 12 U/L (ref 8–33)
BASOPHILS ABSOLUTE: 0.1 K/UL (ref 0–0.1)
BASOPHILS RELATIVE PERCENT: 0.7 %
BILIRUB SERPL-MCNC: 0.5 MG/DL (ref 0.3–1.2)
BUN BLDV-MCNC: 35 MG/DL (ref 6–20)
CALCIUM SERPL-MCNC: 9.2 MG/DL (ref 8.5–10.5)
CHLORIDE BLD-SCNC: 110 MMOL/L (ref 98–107)
CO2: 16 MMOL/L (ref 20–30)
CREAT SERPL-MCNC: 2 MG/DL (ref 0.4–1.2)
EOSINOPHILS ABSOLUTE: 0.2 K/UL (ref 0–0.4)
EOSINOPHILS RELATIVE PERCENT: 2.3 %
GFR AFRICAN AMERICAN: 40
GFR NON-AFRICAN AMERICAN: 33
GLOBULIN: 2.8 G/DL
GLUCOSE BLD-MCNC: 51 MG/DL (ref 74–106)
HCT VFR BLD CALC: 39.2 % (ref 40–54)
HEMOGLOBIN: 12.6 G/DL (ref 13–18)
IMMATURE GRANULOCYTES #: 0.1 K/UL
IMMATURE GRANULOCYTES %: 1.1 % (ref 0–5)
INTERNATIONAL NORMALIZATION RATIO, POC: 2.2
LYMPHOCYTES ABSOLUTE: 0.9 K/UL (ref 1.5–4)
LYMPHOCYTES RELATIVE PERCENT: 11.6 %
MCH RBC QN AUTO: 29.4 PG (ref 27–32)
MCHC RBC AUTO-ENTMCNC: 32.1 G/DL (ref 31–35)
MCV RBC AUTO: 91.4 FL (ref 80–100)
MONOCYTES ABSOLUTE: 0.6 K/UL (ref 0.2–0.8)
MONOCYTES RELATIVE PERCENT: 8.5 %
NEUTROPHILS ABSOLUTE: 5.6 K/UL (ref 2–7.5)
NEUTROPHILS RELATIVE PERCENT: 75.8 %
PDW BLD-RTO: 16.8 % (ref 11–16)
PLATELET # BLD: 126 K/UL (ref 150–400)
PMV BLD AUTO: 11.5 FL (ref 6–10)
POTASSIUM SERPL-SCNC: 3 MMOL/L (ref 3.4–5.1)
PROTHROMBIN TIME, POC: 0
RBC # BLD: 4.29 M/UL (ref 4.5–6)
SODIUM BLD-SCNC: 140 MMOL/L (ref 136–145)
TOTAL PROTEIN: 6.8 G/DL (ref 6.4–8.3)
TSH REFLEX: 0.56 UIU/ML (ref 0.27–4.2)
WBC # BLD: 7.4 K/UL (ref 4–11)

## 2022-07-27 PROCEDURE — 80053 COMPREHEN METABOLIC PANEL: CPT

## 2022-07-27 PROCEDURE — G8427 DOCREV CUR MEDS BY ELIG CLIN: HCPCS | Performed by: NURSE PRACTITIONER

## 2022-07-27 PROCEDURE — G8420 CALC BMI NORM PARAMETERS: HCPCS | Performed by: NURSE PRACTITIONER

## 2022-07-27 PROCEDURE — 85610 PROTHROMBIN TIME: CPT | Performed by: NURSE PRACTITIONER

## 2022-07-27 PROCEDURE — 1036F TOBACCO NON-USER: CPT | Performed by: NURSE PRACTITIONER

## 2022-07-27 PROCEDURE — 85025 COMPLETE CBC W/AUTO DIFF WBC: CPT

## 2022-07-27 PROCEDURE — 36415 COLL VENOUS BLD VENIPUNCTURE: CPT

## 2022-07-27 PROCEDURE — 84443 ASSAY THYROID STIM HORMONE: CPT

## 2022-07-27 PROCEDURE — 99213 OFFICE O/P EST LOW 20 MIN: CPT | Performed by: NURSE PRACTITIONER

## 2022-07-27 PROCEDURE — 1123F ACP DISCUSS/DSCN MKR DOCD: CPT | Performed by: NURSE PRACTITIONER

## 2022-07-27 PROCEDURE — 3017F COLORECTAL CA SCREEN DOC REV: CPT | Performed by: NURSE PRACTITIONER

## 2022-07-27 ASSESSMENT — ENCOUNTER SYMPTOMS
VOMITING: 0
ALLERGIC/IMMUNOLOGIC NEGATIVE: 1
DIARRHEA: 0
RESPIRATORY NEGATIVE: 1
EYES NEGATIVE: 1
CONSTIPATION: 1
GASTROINTESTINAL NEGATIVE: 1
ABDOMINAL PAIN: 0
NAUSEA: 0

## 2022-07-27 NOTE — PROGRESS NOTES
Chief Complaint   Patient presents with    Follow-up     Labs       Have you seen any other physician or provider since your last visit yes - Podiatrist Kidney dr    Have you had any other diagnostic tests since your last visit? no    Have you changed or stopped any medications since your last visit? no         Diabetic retinal exam completed this year? yes

## 2022-07-27 NOTE — PATIENT INSTRUCTIONS
The medication list included in this document is our record of what you are currently taking, including any changes that were made at today's visit. If you find any differences when compared to your medications at home, or have any questions that were not answered at your visit, please contact the office. Keep a list of your medicines with you. List all of the prescription medicines, nonprescription medicines, supplements, natural remedies, and vitamins that you take. Tell your healthcare providers who treat you about all of the products you are taking. Your provider can provide you with a form to keep track of them. Just ask. Follow the directions that come with your medicine, including information about food or alcohol. Make sure you know how and when to take your medicine. Do not take more or less than you are supposed to take. Keep all medicines out of the reach of children. Store medicines according to the directions on the label. Monitor yourself. Learn to know how your body reacts to your new medicine and keep track of how it makes you feel before attempting (If your provider has allowed you to do so) to drive or go to work. Seek emergency medical attention if you think you have used too much of this medicine. An overdose of any prescription medicine can be fatal. Overdose symptoms may include extreme drowsiness, muscle weakness, confusion, cold and clammy skin, pinpoint pupils, shallow breathing, slow heart rate, fainting, or coma. Don't share prescription medicines with others, even when they seem to have the same symptoms. What may be good for you may be harmful to others. If you are no longer taking a prescribed medication and you have pills left please take your pills out of their original containers. Mix crushed pills with an undesirable substance, such as cat litter or used coffee grounds.  Put the mixture into a disposable container with a lid, such as an empty margarine tub, or into a sealable

## 2022-07-27 NOTE — PROGRESS NOTES
SUBJECTIVE:    Elyse Davis is a 67 y.o. male    Anticoagulation: Patient here for followup of chronic anticoagulation. Indication: atrial fibrillation  Bleeding Signs/Symptoms:  None  Thromboembolic Signs/Symptoms:  None    Missed Coumadin Doses:  None  Medication Changes: none  Dietary Changes:  no  Bacterial/Viral Infection:  no    Other Concerns:  Pt is taking Coumadin 5mg/2.5 mg alternating days. Pt has lost 19 lbs since last visit on 06/23/2022. He feels it is due to improved swelling. Pt reports he had swelling up to his abdomen. Pt reports he has a good appetite. He is feeling well. Pt reports yesterday he had diarrhea and decreased appetite but symptoms have resolved. He also reports 1 episode of vomiting yesterday. Pt reports Pt has been evaluated by Dr Eric Plaza for new onset constipation. He is scheduled for colonoscopy. Pt was evaluated by hematology/oncology on 05/04/2022 for thrombocytopenia. Pt reports he was told everything was fine- he did not have to follow up unless needed. Pt feels weight loss is due to increasing lasix from 20 mg to 40 mg daily by Nephrology. He reports he has noticed significant improvement of lower extremity edema. He reports he increased medication 1 week ago after seeing nephrology- Dr Alix Morrow. He has a follow up in 1 month. He also has an order for labs to have drawn prior to 1 month follow up appt. Constipation  This is a new problem. The current episode started more than 1 month ago (6 months). The problem has been gradually worsening since onset. He Does not exercise regularly. There has Not been adequate water intake (on fluid restriction by nephrology). Pertinent negatives include no abdominal pain, diarrhea, fever, nausea or vomiting. Associated symptoms comments: Shortness of breath is worse when constipated. He has tried laxatives and stool softeners for the symptoms. The treatment provided mild relief.       Chief Complaint   Patient presents with    Follow-up     Labs        Review of Systems   Constitutional: Negative. Negative for fever. HENT: Negative. Eyes: Negative. Respiratory: Negative. Cardiovascular: Negative. Gastrointestinal: Negative. Positive for constipation. Negative for abdominal pain, diarrhea, nausea and vomiting. Endocrine: Negative. Genitourinary: Negative. Musculoskeletal: Negative. Skin: Negative. Allergic/Immunologic: Negative. Neurological: Negative. Hematological: Negative. Psychiatric/Behavioral: Negative. OBJECTIVE:    /66   Pulse 59   Temp 96.9 °F (36.1 °C) (Infrared)   Resp 18   Ht 6' 1\" (1.854 m)   Wt 176 lb 1.6 oz (79.9 kg)   SpO2 97% Comment: RA  BMI 23.23 kg/m²    Physical Exam  Vitals and nursing note reviewed. Constitutional:       Appearance: Normal appearance. He is well-developed. HENT:      Head: Normocephalic. Eyes:      Extraocular Movements: Extraocular movements intact. Conjunctiva/sclera: Conjunctivae normal.      Pupils: Pupils are equal, round, and reactive to light. Neck:      Thyroid: No thyromegaly. Vascular: No carotid bruit. Cardiovascular:      Rate and Rhythm: Normal rate. Rhythm regularly irregular. Heart sounds: Normal heart sounds. No murmur heard. Pulmonary:      Effort: Pulmonary effort is normal.      Breath sounds: Normal breath sounds. Skin:     General: Skin is warm and dry. Neurological:      Mental Status: He is alert and oriented to person, place, and time. Psychiatric:         Attention and Perception: Attention and perception normal.         Mood and Affect: Mood and affect normal.         Behavior: Behavior normal.         Thought Content: Thought content normal.         Judgment: Judgment normal.       ASSESSMENT/PLAN:   Oz Ahuja was seen today for follow-up. Diagnoses and all orders for this visit:    Weight loss  -     Comprehensive Metabolic Panel; Future  -     TSH with Reflex;  Future  - XR CHEST (2 VW); Future  -     CBC with Auto Differential; Future  Follow up in 1 week for weight check  Colonoscopy as scheduled with Dr Timmy Barcenas    Anticoagulated on Coumadin  -     POCT INR 2.2 The current medical regimen is effective;  continue present plan and medications. Return in about 1 week (around 8/3/2022), or if symptoms worsen or fail to improve.       Current Outpatient Medications on File Prior to Visit   Medication Sig Dispense Refill    amLODIPine (NORVASC) 5 MG tablet TAKE 1 TABLET BY MOUTH TWICE A DAY 90 tablet 2    docusate sodium (COLACE) 100 MG capsule Take 1 capsule by mouth 3 times daily 270 capsule 1    loratadine (CLARITIN) 10 MG tablet Take 1 tablet by mouth daily 30 tablet 5    ipratropium-albuterol (DUONEB) 0.5-2.5 (3) MG/3ML SOLN nebulizer solution INHALE 1 VIAL VIA NEBULIZER FOUR TIMES DAILY 360 mL 2    metoprolol succinate (TOPROL XL) 25 MG extended release tablet take 1 tablet by mouth once daily 90 tablet 1    allopurinol (ZYLOPRIM) 300 MG tablet TAKE 1 TABLET BY MOUTH EVERY DAY 90 tablet 2    lovastatin (MEVACOR) 20 MG tablet Take 1 tablet by mouth daily 90 tablet 2    digoxin (LANOXIN) 125 MCG tablet TAKE 1 TABLET BY MOUTH EVERY DAY OR AS DIRECTED 90 tablet 2    ferrous sulfate (IRON 325) 325 (65 Fe) MG tablet take 1 tablet by mouth twice a day 180 tablet 2    colchicine (COLCRYS) 0.6 MG tablet Take 1 tablet by mouth daily as needed for Pain 30 tablet 5    MITIGARE 0.6 MG capsule TAKE 1 CAPSULE BY MOUTH DAILY 30 capsule 1    glipiZIDE (GLUCOTROL) 10 MG tablet Take 2 tablets by mouth 2 times daily (before meals) (Patient taking differently: Take 10 mg by mouth in the morning.) 360 tablet 2    Calcium Polycarbophil (FIBER) 625 MG TABS Take 625 mg by mouth daily      Dulaglutide (TRULICITY) 1.5 IF/6.6NA SOPN Inject 1.5 mg into the skin once a week      ipratropium (ATROVENT) 0.02 % nebulizer solution Take 0.5 mg by nebulization every 4-6 hours as needed for Wheezing furosemide (LASIX) 40 MG tablet take 1 tablet by mouth once daily (Patient taking differently: 20 mg  in the morning. take 1 tablet by mouth once daily.) 30 tablet 5    COUMADIN 5 MG tablet take as directed (Patient taking differently: 5 mg daily Patient rotating between 5mg and 2.5mg.) 100 tablet 5    aspirin 81 MG tablet Take 81 mg by mouth daily. nitroGLYCERIN (NITROSTAT) 0.4 MG SL tablet place 1 tablet under the tongue if needed every 5 minutes for chest pain for 3 doses IF NO RELIEF AFTER 3RD DOSE CALL PRESCRIBER . (Patient not taking: No sig reported) 25 tablet 5     No current facility-administered medications on file prior to visit.

## 2022-07-28 DIAGNOSIS — E87.6 HYPOKALEMIA: Primary | ICD-10-CM

## 2022-07-28 RX ORDER — POTASSIUM CHLORIDE 750 MG/1
10 TABLET, EXTENDED RELEASE ORAL DAILY
Qty: 5 TABLET | Refills: 0 | Status: SHIPPED | OUTPATIENT
Start: 2022-07-28 | End: 2022-08-04 | Stop reason: SDUPTHER

## 2022-08-02 ENCOUNTER — OFFICE VISIT (OUTPATIENT)
Dept: FAMILY MEDICINE CLINIC | Age: 72
End: 2022-08-02
Payer: MEDICARE

## 2022-08-02 ENCOUNTER — HOSPITAL ENCOUNTER (OUTPATIENT)
Facility: HOSPITAL | Age: 72
Discharge: HOME | End: 2022-08-02
Payer: MEDICARE

## 2022-08-02 VITALS
TEMPERATURE: 97 F | RESPIRATION RATE: 18 BRPM | SYSTOLIC BLOOD PRESSURE: 106 MMHG | HEART RATE: 60 BPM | HEIGHT: 73 IN | WEIGHT: 178.6 LBS | DIASTOLIC BLOOD PRESSURE: 58 MMHG | BODY MASS INDEX: 23.67 KG/M2 | OXYGEN SATURATION: 98 %

## 2022-08-02 DIAGNOSIS — E87.6 HYPOKALEMIA: ICD-10-CM

## 2022-08-02 DIAGNOSIS — R19.7 DIARRHEA, UNSPECIFIED TYPE: Primary | ICD-10-CM

## 2022-08-02 LAB
ANION GAP SERPL CALCULATED.3IONS-SCNC: 14 MMOL/L (ref 3–16)
BUN BLDV-MCNC: 36 MG/DL (ref 6–20)
CALCIUM SERPL-MCNC: 8.8 MG/DL (ref 8.5–10.5)
CHLORIDE BLD-SCNC: 110 MMOL/L (ref 98–107)
CO2: 17 MMOL/L (ref 20–30)
CREAT SERPL-MCNC: 1.8 MG/DL (ref 0.4–1.2)
GFR AFRICAN AMERICAN: 45
GFR NON-AFRICAN AMERICAN: 37
GLUCOSE BLD-MCNC: 78 MG/DL (ref 74–106)
POTASSIUM SERPL-SCNC: 3 MMOL/L (ref 3.4–5.1)
SODIUM BLD-SCNC: 141 MMOL/L (ref 136–145)

## 2022-08-02 PROCEDURE — 80048 BASIC METABOLIC PNL TOTAL CA: CPT

## 2022-08-02 PROCEDURE — 99213 OFFICE O/P EST LOW 20 MIN: CPT | Performed by: NURSE PRACTITIONER

## 2022-08-02 PROCEDURE — G8427 DOCREV CUR MEDS BY ELIG CLIN: HCPCS | Performed by: NURSE PRACTITIONER

## 2022-08-02 PROCEDURE — 3017F COLORECTAL CA SCREEN DOC REV: CPT | Performed by: NURSE PRACTITIONER

## 2022-08-02 PROCEDURE — 1036F TOBACCO NON-USER: CPT | Performed by: NURSE PRACTITIONER

## 2022-08-02 PROCEDURE — 36415 COLL VENOUS BLD VENIPUNCTURE: CPT

## 2022-08-02 PROCEDURE — G8420 CALC BMI NORM PARAMETERS: HCPCS | Performed by: NURSE PRACTITIONER

## 2022-08-02 PROCEDURE — 1123F ACP DISCUSS/DSCN MKR DOCD: CPT | Performed by: NURSE PRACTITIONER

## 2022-08-02 RX ORDER — DICYCLOMINE HYDROCHLORIDE 10 MG/1
10 CAPSULE ORAL 4 TIMES DAILY
Qty: 120 CAPSULE | Refills: 0 | Status: SHIPPED | OUTPATIENT
Start: 2022-08-02 | End: 2022-09-30

## 2022-08-02 ASSESSMENT — ENCOUNTER SYMPTOMS
ABDOMINAL PAIN: 1
FLATUS: 0
CONSTIPATION: 0
SHORTNESS OF BREATH: 0
DIARRHEA: 1
COUGH: 0
BLOOD IN STOOL: 0
NAUSEA: 0
BLOATING: 0
VOMITING: 0

## 2022-08-02 NOTE — PROGRESS NOTES
SUBJECTIVE:    Corbin Lujan is a 67 y.o. male    Patient was instructed to return to clinic this date for weight check and repeat labs related to hypokalemia. Pt has been taking potassium 10 meq daily for 5 days. During nurse visit patient requested to speak with provider about IBS. Pt was previously referred to Dr Bia Sun for change in bowel habits- constipation. Pt was evaluated by Dr Bia Sun on 2022. He was started on Miralax daily per patient Since that time, Pt reports everything he eats causes him to have diarrhea. Pt reports one episode of diarrhea daily and abdominal cramping daily. Patient is scheduled for colonoscopy on Aug 11 with Dr Bia Sun. Diarrhea   This is a new problem. The current episode started 1 to 4 weeks ago. The problem has been unchanged. Associated symptoms include abdominal pain (generalized abd cramping before episode of diarrhea daily). Pertinent negatives include no arthralgias, bloating, chills, coughing, fever, headaches, increased  flatus, myalgias, sweats, URI or vomiting. Exacerbated by: eating. There are no known risk factors. He has tried nothing for the symptoms. Chief Complaint   Patient presents with    Diarrhea        Review of Systems   Constitutional: Negative. Negative for chills and fever. Respiratory:  Negative for cough and shortness of breath. Cardiovascular:  Negative for chest pain. Gastrointestinal:  Positive for abdominal pain (generalized abd cramping before episode of diarrhea daily) and diarrhea. Negative for bloating, blood in stool, constipation, flatus, nausea and vomiting. Musculoskeletal:  Negative for arthralgias and myalgias. Neurological:  Negative for headaches. OBJECTIVE:    BP (!) 106/58   Pulse 60   Temp 97 °F (36.1 °C) (Infrared)   Resp 18   Ht 6' 1\" (1.854 m)   Wt 178 lb 9.6 oz (81 kg)   SpO2 98% Comment: RA  BMI 23.56 kg/m²    Physical Exam  Vitals and nursing note reviewed.    Constitutional:       Appearance: Normal appearance. He is well-developed. HENT:      Head: Normocephalic. Eyes:      Extraocular Movements: Extraocular movements intact. Conjunctiva/sclera: Conjunctivae normal.      Pupils: Pupils are equal, round, and reactive to light. Neck:      Thyroid: No thyromegaly. Vascular: No carotid bruit. Cardiovascular:      Rate and Rhythm: Normal rate. Rhythm irregularly irregular. Heart sounds: Normal heart sounds. No murmur heard. Pulmonary:      Effort: Pulmonary effort is normal.      Breath sounds: Normal breath sounds. Skin:     General: Skin is warm and dry. Neurological:      Mental Status: He is alert and oriented to person, place, and time. Psychiatric:         Attention and Perception: Attention and perception normal.         Mood and Affect: Mood and affect normal.         Behavior: Behavior normal.         Thought Content: Thought content normal.         Judgment: Judgment normal.       ASSESSMENT/PLAN:   Mauricio Maxwell was seen today for diarrhea. Diagnoses and all orders for this visit:    Diarrhea, unspecified type  Pt advised to stop miralax for 2 days prior to starting bentyl. If symptoms resolve with discontinuing Miralax- do not start medication. Pt verbalized understanding. F/u with Dr Gita Noble as scheduled for colonoscopy. -     dicyclomine (BENTYL) 10 MG capsule; Take 1 capsule by mouth in the morning and 1 capsule at noon and 1 capsule in the evening and 1 capsule before bedtime. Hypokalemia  -     Basic Metabolic Panel; Future        Return in about 1 month (around 9/2/2022). Current Outpatient Medications on File Prior to Visit   Medication Sig Dispense Refill    potassium chloride (KLOR-CON M) 10 MEQ extended release tablet Take 1 tablet by mouth in the morning for 5 days.  5 tablet 0    amLODIPine (NORVASC) 5 MG tablet TAKE 1 TABLET BY MOUTH TWICE A DAY 90 tablet 2    docusate sodium (COLACE) 100 MG capsule Take 1 capsule by mouth 3 times daily 270 capsule 1 loratadine (CLARITIN) 10 MG tablet Take 1 tablet by mouth daily 30 tablet 5    ipratropium-albuterol (DUONEB) 0.5-2.5 (3) MG/3ML SOLN nebulizer solution INHALE 1 VIAL VIA NEBULIZER FOUR TIMES DAILY 360 mL 2    metoprolol succinate (TOPROL XL) 25 MG extended release tablet take 1 tablet by mouth once daily 90 tablet 1    allopurinol (ZYLOPRIM) 300 MG tablet TAKE 1 TABLET BY MOUTH EVERY DAY 90 tablet 2    lovastatin (MEVACOR) 20 MG tablet Take 1 tablet by mouth daily 90 tablet 2    digoxin (LANOXIN) 125 MCG tablet TAKE 1 TABLET BY MOUTH EVERY DAY OR AS DIRECTED 90 tablet 2    ferrous sulfate (IRON 325) 325 (65 Fe) MG tablet take 1 tablet by mouth twice a day 180 tablet 2    colchicine (COLCRYS) 0.6 MG tablet Take 1 tablet by mouth daily as needed for Pain 30 tablet 5    MITIGARE 0.6 MG capsule TAKE 1 CAPSULE BY MOUTH DAILY 30 capsule 1    glipiZIDE (GLUCOTROL) 10 MG tablet Take 2 tablets by mouth 2 times daily (before meals) (Patient taking differently: Take 10 mg by mouth in the morning.) 360 tablet 2    Calcium Polycarbophil (FIBER) 625 MG TABS Take 625 mg by mouth daily      Dulaglutide (TRULICITY) 1.5 EH/2.1TN SOPN Inject 1.5 mg into the skin once a week      ipratropium (ATROVENT) 0.02 % nebulizer solution Take 0.5 mg by nebulization every 4-6 hours as needed for Wheezing      furosemide (LASIX) 40 MG tablet take 1 tablet by mouth once daily (Patient taking differently: 20 mg  in the morning. take 1 tablet by mouth once daily.) 30 tablet 5    nitroGLYCERIN (NITROSTAT) 0.4 MG SL tablet place 1 tablet under the tongue if needed every 5 minutes for chest pain for 3 doses IF NO RELIEF AFTER 3RD DOSE CALL PRESCRIBER . (Patient not taking: No sig reported) 25 tablet 5    COUMADIN 5 MG tablet take as directed (Patient taking differently: 5 mg daily Patient rotating between 5mg and 2.5mg.) 100 tablet 5    aspirin 81 MG tablet Take 81 mg by mouth daily.          No current facility-administered medications on file

## 2022-08-02 NOTE — PROGRESS NOTES
Chief Complaint   Patient presents with    Diarrhea     Patient was instructed to return to clinic this date for weight check and repeat labs related to hypokalemia. During nurse visit patient requested to speak with provider about IBS. Patient reports he was having constipation but has had diarrhea that started two weeks ago. Patient reports everything he eats causes him to have diarrhea. Patient is scheduled for colonoscopy on Aug 11 with Dr Sundeep William.      Have you seen any other physician or provider since your last visit no    Have you had any other diagnostic tests since your last visit? no    Have you changed or stopped any medications since your last visit? no

## 2022-08-04 ENCOUNTER — TELEPHONE (OUTPATIENT)
Dept: FAMILY MEDICINE CLINIC | Age: 72
End: 2022-08-04

## 2022-08-04 DIAGNOSIS — E87.6 HYPOKALEMIA: ICD-10-CM

## 2022-08-04 RX ORDER — POTASSIUM CHLORIDE 750 MG/1
10 TABLET, EXTENDED RELEASE ORAL DAILY
Qty: 90 TABLET | Refills: 0 | Status: SHIPPED | OUTPATIENT
Start: 2022-08-04 | End: 2022-08-08 | Stop reason: SDUPTHER

## 2022-08-04 RX ORDER — POTASSIUM CHLORIDE 750 MG/1
10 TABLET, EXTENDED RELEASE ORAL DAILY
Qty: 30 TABLET | Refills: 0 | Status: SHIPPED | OUTPATIENT
Start: 2022-08-04 | End: 2022-08-04

## 2022-08-04 NOTE — TELEPHONE ENCOUNTER
Erlin Carroll with [address] is requesting a call back from the nurse in regards to Roshan's kidneys.

## 2022-08-04 NOTE — TELEPHONE ENCOUNTER
Requested Prescriptions     Signed Prescriptions Disp Refills    potassium chloride (KLOR-CON M) 10 MEQ extended release tablet 90 tablet 0     Sig: TAKE 1 TABLET BY MOUTH IN THE MORNING     Authorizing Provider: Yulia Thompson     Ordering User: Carlos Ann

## 2022-08-08 ENCOUNTER — HOSPITAL ENCOUNTER (OUTPATIENT)
Facility: HOSPITAL | Age: 72
Discharge: HOME | End: 2022-08-08
Payer: MEDICARE

## 2022-08-08 ENCOUNTER — OFFICE VISIT (OUTPATIENT)
Dept: FAMILY MEDICINE CLINIC | Age: 72
End: 2022-08-08
Payer: MEDICARE

## 2022-08-08 VITALS
WEIGHT: 169.2 LBS | TEMPERATURE: 98 F | HEART RATE: 50 BPM | BODY MASS INDEX: 22.32 KG/M2 | DIASTOLIC BLOOD PRESSURE: 50 MMHG | SYSTOLIC BLOOD PRESSURE: 94 MMHG

## 2022-08-08 DIAGNOSIS — R63.4 WEIGHT LOSS: ICD-10-CM

## 2022-08-08 DIAGNOSIS — L97.509 TYPE 2 DIABETES MELLITUS WITH FOOT ULCER, WITHOUT LONG-TERM CURRENT USE OF INSULIN (HCC): ICD-10-CM

## 2022-08-08 DIAGNOSIS — J44.9 CHRONIC OBSTRUCTIVE PULMONARY DISEASE, UNSPECIFIED COPD TYPE (HCC): ICD-10-CM

## 2022-08-08 DIAGNOSIS — E11.621 TYPE 2 DIABETES MELLITUS WITH FOOT ULCER, WITHOUT LONG-TERM CURRENT USE OF INSULIN (HCC): ICD-10-CM

## 2022-08-08 DIAGNOSIS — T14.8XXA BRUISE: ICD-10-CM

## 2022-08-08 DIAGNOSIS — M25.561 ACUTE PAIN OF RIGHT KNEE: Primary | ICD-10-CM

## 2022-08-08 DIAGNOSIS — E87.6 HYPOKALEMIA: ICD-10-CM

## 2022-08-08 DIAGNOSIS — B37.0 ORAL THRUSH: ICD-10-CM

## 2022-08-08 PROCEDURE — 36415 COLL VENOUS BLD VENIPUNCTURE: CPT

## 2022-08-08 PROCEDURE — 83036 HEMOGLOBIN GLYCOSYLATED A1C: CPT

## 2022-08-08 PROCEDURE — 3017F COLORECTAL CA SCREEN DOC REV: CPT | Performed by: NURSE PRACTITIONER

## 2022-08-08 PROCEDURE — 80053 COMPREHEN METABOLIC PANEL: CPT

## 2022-08-08 PROCEDURE — 84153 ASSAY OF PSA TOTAL: CPT

## 2022-08-08 PROCEDURE — 3023F SPIROM DOC REV: CPT | Performed by: NURSE PRACTITIONER

## 2022-08-08 PROCEDURE — 99214 OFFICE O/P EST MOD 30 MIN: CPT | Performed by: NURSE PRACTITIONER

## 2022-08-08 PROCEDURE — G8427 DOCREV CUR MEDS BY ELIG CLIN: HCPCS | Performed by: NURSE PRACTITIONER

## 2022-08-08 PROCEDURE — 85025 COMPLETE CBC W/AUTO DIFF WBC: CPT

## 2022-08-08 PROCEDURE — 1123F ACP DISCUSS/DSCN MKR DOCD: CPT | Performed by: NURSE PRACTITIONER

## 2022-08-08 PROCEDURE — 2022F DILAT RTA XM EVC RTNOPTHY: CPT | Performed by: NURSE PRACTITIONER

## 2022-08-08 PROCEDURE — 1036F TOBACCO NON-USER: CPT | Performed by: NURSE PRACTITIONER

## 2022-08-08 PROCEDURE — G8420 CALC BMI NORM PARAMETERS: HCPCS | Performed by: NURSE PRACTITIONER

## 2022-08-08 PROCEDURE — 3044F HG A1C LEVEL LT 7.0%: CPT | Performed by: NURSE PRACTITIONER

## 2022-08-08 RX ORDER — IPRATROPIUM BROMIDE AND ALBUTEROL SULFATE 2.5; .5 MG/3ML; MG/3ML
SOLUTION RESPIRATORY (INHALATION)
Qty: 360 ML | Refills: 2 | Status: SHIPPED | OUTPATIENT
Start: 2022-08-08

## 2022-08-08 RX ORDER — ALLOPURINOL 300 MG/1
TABLET ORAL
Qty: 90 TABLET | Refills: 2 | Status: SHIPPED | OUTPATIENT
Start: 2022-08-08

## 2022-08-08 RX ORDER — POTASSIUM CHLORIDE 750 MG/1
10 TABLET, EXTENDED RELEASE ORAL DAILY
Qty: 90 TABLET | Refills: 0 | Status: SHIPPED | OUTPATIENT
Start: 2022-08-08 | End: 2022-09-07

## 2022-08-08 ASSESSMENT — ENCOUNTER SYMPTOMS
EYES NEGATIVE: 1
NAUSEA: 0
SHORTNESS OF BREATH: 0
VOMITING: 0
DIARRHEA: 1
CHEST TIGHTNESS: 0
WHEEZING: 0
ABDOMINAL PAIN: 0
BLOOD IN STOOL: 0

## 2022-08-08 NOTE — PROGRESS NOTES
SUBJECTIVE:    Billy Hsieh is a 67 y.o. male    Patient to clinic for right knee pain. Pt reports he put his hand on his right knee approx 2 days ago and felt a knot on his right knee that was sore to touch. No pain with weight bearing. No pain with ROM. No surrounding erythema. No fever. Pt c/o pain right knee with palpation only or when his pants run the area. Patient has bruising to right knee and reports bruise has been present for several month to 1 year. Unknown cause. No known injury. He states it feels like there is something in his knee. He denies fall/injury. Pt is scheduled for colonoscopy on Thursday with Dr Parisa Davenport for change in bowel habits and weight loss. Pt is off coumadin at this time due to colonoscopy. Last dose on 08/06/2022. Pt noted to have continues weight loss. Pt has lost 9 lbs since 08/02/2022. Pt c/o a sore mouth. He stated \"I think I may have thrush\". Denies pain- c/o mouth soreness. Pt also reports he eats 3 meals per day and has a good appetite. Weight on 06/23/2022 was 195. Pt had chest xray ordered on 07/27/2022. He has not had xray done yet. Reprinted xray order. Pt reports he has been having 1 episode of diarrhea daily. He has not started Bentyl. He reports \"I'll Start it if it gets bad enough\". He has stopped Miralax. He is currently taking colace 100mg TID. Knee Pain   The incident occurred 3 to 5 days ago. There was no injury mechanism. The pain is present in the right knee. The pain is at a severity of 0/10 (only with palpation). Pertinent negatives include no inability to bear weight, loss of motion, loss of sensation, muscle weakness, numbness or tingling. Foreign body present: no known foreign body. The symptoms are aggravated by palpation. He has tried nothing for the symptoms. Chief Complaint   Patient presents with    Knee Pain        Review of Systems   Constitutional:  Positive for unexpected weight change.  Negative for activity change, appetite change, fatigue and fever. HENT:  Positive for mouth sores (denies lesions- c/o mouth soreness). Eyes: Negative. Respiratory:  Negative for chest tightness, shortness of breath and wheezing. Cardiovascular: Negative. Negative for chest pain, palpitations and leg swelling. Gastrointestinal:  Positive for diarrhea (1 episode of loose stool per day). Negative for abdominal pain, blood in stool, nausea and vomiting. Endocrine: Negative. Negative for cold intolerance, heat intolerance, polydipsia, polyphagia and polyuria. Genitourinary: Negative. Negative for decreased urine volume, difficulty urinating, dysuria and frequency. Neurological: Negative. Negative for dizziness, tingling and numbness. Hematological:  Negative for adenopathy. Bruises/bleeds easily. Psychiatric/Behavioral: Negative. OBJECTIVE:    BP (!) 94/50   Pulse 50   Temp 98 °F (36.7 °C) (Infrared)   Wt 169 lb 3.2 oz (76.7 kg)   BMI 22.32 kg/m²    Physical Exam  Vitals and nursing note reviewed. Constitutional:       General: He is not in acute distress. Appearance: Normal appearance. He is well-developed. He is not diaphoretic. HENT:      Head: Normocephalic. Eyes:      Extraocular Movements: Extraocular movements intact. Conjunctiva/sclera: Conjunctivae normal.      Pupils: Pupils are equal, round, and reactive to light. Neck:      Thyroid: No thyromegaly. Vascular: No carotid bruit. Cardiovascular:      Rate and Rhythm: Normal rate. Rhythm irregularly irregular. Heart sounds: Normal heart sounds. No murmur heard. Pulmonary:      Effort: Pulmonary effort is normal.      Breath sounds: Normal breath sounds. Musculoskeletal:      Cervical back: Normal range of motion. Legs:    Skin:     General: Skin is warm and dry. Neurological:      Mental Status: He is alert and oriented to person, place, and time.    Psychiatric:         Attention and Perception: Attention and perception normal.         Mood and Affect: Mood and affect normal.         Behavior: Behavior normal.         Thought Content: Thought content normal.         Judgment: Judgment normal.       ASSESSMENT/PLAN:   Roshan was seen today for knee pain. Diagnoses and all orders for this visit:    Acute pain of right knee-mild- only with palpation. -     XR KNEE RIGHT (3 VIEWS); Future  -Pt instructed to RTC or go to the ER if symptoms worsen. Chronic obstructive pulmonary disease, unspecified COPD type ([facility] [address]  -     ipratropium-albuterol (DUONEB) 0.5-2.5 (3) MG/3ML SOLN nebulizer solution; INHALE 1 VIAL VIA NEBULIZER FOUR TIMES DAILY    Hypokalemia-pt reports previously sent prescription  -     potassium chloride (KLOR-CON M) 10 MEQ extended release tablet; Take 1 tablet by mouth in the morning. Weight loss  -     XR KNEE RIGHT (3 VIEWS); Future  -     CBC with Auto Differential; Future  -     Comprehensive Metabolic Panel; Future  -     PSA, Prostatic Specific Antigen; Future  - consulted with Dr Salty Hinojosa- regarding unexplained weight loss. Pt encouraged to get previously ordered CXR as soon as possible. Re printed xray and gave order to patient. Colonoscopy on 08/11/2022 as scheduled with Dr Ameya Salgado for change in bowel habits and weight loss    Oral thrush  -     nystatin (MYCOSTATIN) 355462 UNIT/ML suspension; Take 5 mLs by mouth in the morning and 5 mLs at noon and 5 mLs in the evening and 5 mLs before bedtime. Type 2 diabetes mellitus with foot ulcer, without long-term current use of insulin (HCC)  -     Hemoglobin A1C; Future  -     allopurinol (ZYLOPRIM) 300 MG tablet; TAKE 1 TABLET BY MOUTH EVERY DAY        Return in about 1 month (around 9/8/2022). Current Outpatient Medications on File Prior to Visit   Medication Sig Dispense Refill    dicyclomine (BENTYL) 10 MG capsule Take 1 capsule by mouth in the morning and 1 capsule at noon and 1 capsule in the evening and 1 capsule before bedtime.  [address]

## 2022-08-08 NOTE — PRE-PROCEDURE INSTRUCTIONS
PAT phone history completed with pt for upcoming procedure on 8/11/22, with Dr. Garcias.     PAT PASS GIVEN/REVIEWED WITH PT.  VERBALIZED UNDERSTANDING OF THE FOLLOWING:  DO NOT EAT, DRINK, SMOKE, USE SMOKELESS TOBACCO OR CHEW GUM AFTER MIDNIGHT THE NIGHT BEFORE SURGERY.  THIS ALSO INCLUDES HARD CANDIES AND MINTS.    DO NOT SHAVE THE AREA TO BE OPERATED ON AT LEAST 48 HOURS PRIOR TO THE PROCEDURE.  DO NOT WEAR MAKE UP OR NAIL POLISH.  DO NOT LEAVE IN ANY PIERCING OR WEAR JEWELRY THE DAY OF SURGERY.      DO NOT USE ADHESIVES IF YOU WEAR DENTURES.    DO NOT WEAR EYE CONTACTS; BRING IN YOUR GLASSES.    ONLY TAKE MEDICATION THE MORNING OF YOUR PROCEDURE IF INSTRUCTED BY YOUR SURGEON WITH ENOUGH WATER TO SWALLOW THE MEDICATION.  IF YOUR SURGEON DID NOT SPECIFY WHICH MEDICATIONS TO TAKE, YOU WILL NEED TO CALL THEIR OFFICE FOR FURTHER INSTRUCTIONS AND DO AS THEY INSTRUCT.    LEAVE ANYTHING YOU CONSIDER VALUABLE AT HOME.    YOU WILL NEED TO ARRANGE FOR SOMEONE TO DRIVE YOU HOME AFTER SURGERY.  IT IS RECOMMENDED THAT YOU DO NOT DRIVE, WORK, DRINK ALCOHOL OR MAKE MAJOR DECISIONS FOR AT LEAST 24 HOURS AFTER YOUR PROCEDURE IS COMPLETE.      THE DAY OF YOUR PROCEDURE, BRING IN THE FOLLOWING IF APPLICABLE:   PICTURE ID AND INSURANCE/MEDICARE OR MEDICAID CARDS/ANY CO-PAY THAT MAY BE DUE   COPY OF ADVANCED DIRECTIVE/LIVING WILL/POWER OR    CPAP/BIPAP/INHALERS   SKIN PREP SHEET   YOUR PREADMISSION TESTING PASS (IF NOT A PHONE HISTORY)           COVID self-quarantine instructions reviewed with the pt.  Verbalized understanding.

## 2022-08-08 NOTE — PROGRESS NOTES
Chief Complaint   Patient presents with    Knee Pain     Patient to clinic for right knee pain. Patient has bruising to right knee. He reports the pain started approximately  two days ago. He states it feels like there is something in his knee. He denies fall/injury. Patient states he is not taking his warfarin at this time because he is scheduled to have a colonoscopy on Thursday with Dr Andres Ayala.

## 2022-08-09 ENCOUNTER — HOSPITAL ENCOUNTER (OUTPATIENT)
Facility: HOSPITAL | Age: 72
Discharge: HOME | End: 2022-08-09
Payer: MEDICARE

## 2022-08-09 ENCOUNTER — HOSPITAL ENCOUNTER (OUTPATIENT)
Dept: GENERAL RADIOLOGY | Facility: HOSPITAL | Age: 72
Discharge: HOME | End: 2022-08-09
Payer: MEDICARE

## 2022-08-09 DIAGNOSIS — M25.561 ACUTE PAIN OF RIGHT KNEE: ICD-10-CM

## 2022-08-09 DIAGNOSIS — R63.4 WEIGHT LOSS: ICD-10-CM

## 2022-08-09 LAB
A/G RATIO: 1.5 (ref 0.8–2)
ALBUMIN SERPL-MCNC: 4 G/DL (ref 3.4–4.8)
ALBUMIN SERPL-MCNC: 4.4 G/DL (ref 3.4–4.8)
ALP BLD-CCNC: 105 U/L (ref 25–100)
ALT SERPL-CCNC: 20 U/L (ref 4–36)
ANION GAP SERPL CALCULATED.3IONS-SCNC: 14 MMOL/L (ref 3–16)
ANION GAP SERPL CALCULATED.3IONS-SCNC: 15 MMOL/L (ref 3–16)
AST SERPL-CCNC: 18 U/L (ref 8–33)
BASOPHILS ABSOLUTE: 0.1 K/UL (ref 0–0.1)
BASOPHILS RELATIVE PERCENT: 0.9 %
BILIRUB SERPL-MCNC: 0.5 MG/DL (ref 0.3–1.2)
BUN BLDV-MCNC: 32 MG/DL (ref 6–20)
BUN BLDV-MCNC: 33 MG/DL (ref 6–20)
CALCIUM SERPL-MCNC: 9.1 MG/DL (ref 8.5–10.5)
CALCIUM SERPL-MCNC: 9.5 MG/DL (ref 8.5–10.5)
CHLORIDE BLD-SCNC: 106 MMOL/L (ref 98–107)
CHLORIDE BLD-SCNC: 107 MMOL/L (ref 98–107)
CO2: 18 MMOL/L (ref 20–30)
CO2: 21 MMOL/L (ref 20–30)
CREAT SERPL-MCNC: 2.2 MG/DL (ref 0.4–1.2)
CREAT SERPL-MCNC: 2.3 MG/DL (ref 0.4–1.2)
EOSINOPHILS ABSOLUTE: 0.1 K/UL (ref 0–0.4)
EOSINOPHILS RELATIVE PERCENT: 1.5 %
GFR AFRICAN AMERICAN: 34
GFR AFRICAN AMERICAN: 36
GFR NON-AFRICAN AMERICAN: 28
GFR NON-AFRICAN AMERICAN: 29
GLOBULIN: 2.7 G/DL
GLUCOSE BLD-MCNC: 156 MG/DL (ref 74–106)
GLUCOSE BLD-MCNC: 170 MG/DL (ref 74–106)
HBA1C MFR BLD: 5.3 %
HBA1C MFR BLD: 5.6 %
HCT VFR BLD CALC: 39.5 % (ref 40–54)
HCT VFR BLD CALC: 42.1 % (ref 40–54)
HEMOGLOBIN: 13 G/DL (ref 13–18)
HEMOGLOBIN: 13.5 G/DL (ref 13–18)
IMMATURE GRANULOCYTES #: 0.4 K/UL
IMMATURE GRANULOCYTES %: 4.5 % (ref 0–5)
LYMPHOCYTES ABSOLUTE: 1 K/UL (ref 1.5–4)
LYMPHOCYTES RELATIVE PERCENT: 11 %
MCH RBC QN AUTO: 28.9 PG (ref 27–32)
MCH RBC QN AUTO: 29.7 PG (ref 27–32)
MCHC RBC AUTO-ENTMCNC: 32.1 G/DL (ref 31–35)
MCHC RBC AUTO-ENTMCNC: 32.9 G/DL (ref 31–35)
MCV RBC AUTO: 90.1 FL (ref 80–100)
MCV RBC AUTO: 90.2 FL (ref 80–100)
MONOCYTES ABSOLUTE: 0.6 K/UL (ref 0.2–0.8)
MONOCYTES RELATIVE PERCENT: 6.9 %
NEUTROPHILS ABSOLUTE: 6.8 K/UL (ref 2–7.5)
NEUTROPHILS RELATIVE PERCENT: 75.2 %
PDW BLD-RTO: 17.4 % (ref 11–16)
PDW BLD-RTO: 17.6 % (ref 11–16)
PHOSPHORUS: 3.3 MG/DL (ref 2.5–4.5)
PLATELET # BLD: 126 K/UL (ref 150–400)
PLATELET # BLD: 136 K/UL (ref 150–400)
PMV BLD AUTO: 10.2 FL (ref 6–10)
PMV BLD AUTO: 12.8 FL (ref 6–10)
POTASSIUM SERPL-SCNC: 2.9 MMOL/L (ref 3.4–5.1)
POTASSIUM SERPL-SCNC: 3.3 MMOL/L (ref 3.4–5.1)
PROSTATE SPECIFIC ANTIGEN: 0.3 NG/ML (ref 0–4)
RBC # BLD: 4.38 M/UL (ref 4.5–6)
RBC # BLD: 4.67 M/UL (ref 4.5–6)
SODIUM BLD-SCNC: 140 MMOL/L (ref 136–145)
SODIUM BLD-SCNC: 141 MMOL/L (ref 136–145)
TOTAL PROTEIN: 6.7 G/DL (ref 6.4–8.3)
WBC # BLD: 9.1 K/UL (ref 4–11)
WBC # BLD: 9.8 K/UL (ref 4–11)

## 2022-08-09 PROCEDURE — 71046 X-RAY EXAM CHEST 2 VIEWS: CPT

## 2022-08-09 PROCEDURE — 83036 HEMOGLOBIN GLYCOSYLATED A1C: CPT

## 2022-08-09 PROCEDURE — 80069 RENAL FUNCTION PANEL: CPT

## 2022-08-09 PROCEDURE — 85027 COMPLETE CBC AUTOMATED: CPT

## 2022-08-09 PROCEDURE — 73562 X-RAY EXAM OF KNEE 3: CPT

## 2022-08-09 PROCEDURE — 36415 COLL VENOUS BLD VENIPUNCTURE: CPT

## 2022-08-10 DIAGNOSIS — M25.561 ACUTE PAIN OF RIGHT KNEE: Primary | ICD-10-CM

## 2022-08-11 NOTE — ANESTHESIA POSTPROCEDURE EVALUATION
Patient: Swapnil Lawson    Procedure Summary     Date: 08/11/22 Room / Location: [facility] ENDOSCOPY 2 / [facility] ENDOSCOPY    Anesthesia Start: 1006 Anesthesia Stop: 1035    Procedure: COLONOSCOPY, biopsy, polypectomy x1 and tattooing (N/A ) Diagnosis:       History of colon polyps      Chronic idiopathic constipation      (History of colon polyps [Z86.010])      (Chronic idiopathic constipation [K59.04])    Surgeons: Kenji Garcias MD Provider: Twan Grant CRNA    Anesthesia Type: MAC ASA Status: 3          Anesthesia Type: MAC    Vitals  No vitals data found for the desired time range.          Post Anesthesia Care and Evaluation    Patient location during evaluation: PHASE II  Patient participation: complete - patient participated  Level of consciousness: awake and alert  Pain score: 0  Pain management: satisfactory to patient    Airway patency: patent  Anesthetic complications: No anesthetic complications  PONV Status: none  Cardiovascular status: acceptable and stable  Respiratory status: acceptable  Hydration status: acceptable    Comments: Vitals signs as noted in nursing documentation as per protocol.

## 2022-08-11 NOTE — ANESTHESIA PREPROCEDURE EVALUATION
Anesthesia Evaluation     Patient summary reviewed and Nursing notes reviewed   no history of anesthetic complications:  NPO Solid Status: > 8 hours  NPO Liquid Status: > 8 hours           Airway   Mallampati: I  TM distance: >3 FB  Neck ROM: full  no difficulty expected  Dental - normal exam     Pulmonary - normal exam   (+) COPD, sleep apnea,   Cardiovascular - normal exam    (+) hypertension, past MI , CAD, dysrhythmias, hyperlipidemia,       Neuro/Psych- negative ROS  GI/Hepatic/Renal/Endo    (+)   diabetes mellitus,     Musculoskeletal (-) negative ROS    Abdominal    Substance History - negative use     OB/GYN negative ob/gyn ROS         Other - negative ROS                       Anesthesia Plan    ASA 3     MAC     intravenous induction     Anesthetic plan, risks, benefits, and alternatives have been provided, discussed and informed consent has been obtained with: patient.        CODE STATUS:

## 2022-08-17 ENCOUNTER — NURSE ONLY (OUTPATIENT)
Dept: FAMILY MEDICINE CLINIC | Age: 72
End: 2022-08-17

## 2022-08-17 ENCOUNTER — HOSPITAL ENCOUNTER (OUTPATIENT)
Facility: HOSPITAL | Age: 72
Discharge: HOME | End: 2022-08-17
Payer: MEDICARE

## 2022-08-17 DIAGNOSIS — E87.6 HYPOKALEMIA: Primary | ICD-10-CM

## 2022-08-17 DIAGNOSIS — E87.6 HYPOKALEMIA: ICD-10-CM

## 2022-08-17 LAB
ANION GAP SERPL CALCULATED.3IONS-SCNC: 11 MMOL/L (ref 3–16)
BUN BLDV-MCNC: 23 MG/DL (ref 6–20)
CALCIUM SERPL-MCNC: 9.4 MG/DL (ref 8.5–10.5)
CHLORIDE BLD-SCNC: 108 MMOL/L (ref 98–107)
CO2: 20 MMOL/L (ref 20–30)
CREAT SERPL-MCNC: 1.7 MG/DL (ref 0.4–1.2)
GFR AFRICAN AMERICAN: 48
GFR NON-AFRICAN AMERICAN: 40
GLUCOSE BLD-MCNC: 136 MG/DL (ref 74–106)
POTASSIUM SERPL-SCNC: 5 MMOL/L (ref 3.4–5.1)
SODIUM BLD-SCNC: 139 MMOL/L (ref 136–145)

## 2022-08-17 PROCEDURE — 80048 BASIC METABOLIC PNL TOTAL CA: CPT

## 2022-08-17 PROCEDURE — 36415 COLL VENOUS BLD VENIPUNCTURE: CPT

## 2022-08-17 NOTE — PROGRESS NOTES
Patient: Swapnil Lawson    YOB: 1950    Date: 08/19/2022    Primary Care Provider: Argenis Osborne APRN    Reason for Consultation: Follow-up colonoscopy    Chief Complaint   Patient presents with   • Follow-up     Colonoscopy       History of present illness:  I saw the patient in the office today as a followup from their recent colonoscopy with polypectomy, the pathology report did show fragments of tubular adenoma.  They state that they have done well and are having no complaints.  Patient had a very large 5 cm polyp in the proximal transverse colon, he will need to have extended right colectomy to remove the mass.    The following portions of the patient's history were reviewed and updated as appropriate: allergies, current medications, past family history, past medical history, past social history, past surgical history and problem list.    Review of Systems   Constitutional: Negative for activity change, chills, fever and unexpected weight change.   HENT: Negative for hearing loss, trouble swallowing and voice change.    Eyes: Negative for visual disturbance.   Respiratory: Negative for apnea, cough, chest tightness, shortness of breath and wheezing.    Cardiovascular: Negative for chest pain, palpitations and leg swelling.   Gastrointestinal: Positive for diarrhea. Negative for abdominal distention, abdominal pain, anal bleeding, blood in stool, constipation, nausea, rectal pain and vomiting.   Endocrine: Negative for cold intolerance and heat intolerance.   Genitourinary: Negative for difficulty urinating, dysuria, flank pain, scrotal swelling and testicular pain.   Musculoskeletal: Negative for back pain, gait problem and joint swelling.   Skin: Negative for color change, rash and wound.   Neurological: Negative for dizziness, syncope, speech difficulty, weakness, light-headedness, numbness and headaches.   Hematological: Negative for adenopathy. Does not bruise/bleed easily.    Psychiatric/Behavioral: Negative for confusion. The patient is not nervous/anxious.        Allergies:  No Known Allergies    Medications:    Current Outpatient Medications:   •  allopurinol (ZYLOPRIM) 300 MG tablet, Take 300 mg by mouth Daily., Disp: , Rfl: 0  •  amLODIPine (NORVASC) 5 MG tablet, Take 1 tablet by mouth 2 (Two) Times a Day., Disp: , Rfl:   •  aspirin 81 MG tablet, Take 81 mg by mouth Daily., Disp: , Rfl:   •  bisacodyl (bisacodyl) 5 MG EC tablet, Take 2 at 3pm and 2 at 7pm the day prior to colonoscopy., Disp: 4 tablet, Rfl: 0  •  calcium polycarbophil (FIBERCON) 625 MG tablet, Take 625 mg by mouth 2 (Two) Times a Day. Takes 2 tablets BID, Disp: , Rfl:   •  colchicine 0.6 MG tablet, Take 0.6 mg by mouth As Needed for Muscle / Joint Pain., Disp: , Rfl:   •  digoxin (LANOXIN) 125 MCG tablet, Take 0.5 tablets by mouth Daily., Disp: 15 tablet, Rfl: 1  •  diphenhydrAMINE (BENADRYL) 25 MG tablet, Take 25 mg by mouth Every 6 (Six) Hours As Needed for Itching., Disp: , Rfl:   •  diphenhydrAMINE (SOMINEX) 25 MG tablet, Take 25 mg by mouth Daily., Disp: , Rfl:   •  docusate sodium (COLACE) 100 MG capsule, Take 100 mg by mouth 2 (Two) Times a Day., Disp: , Rfl:   •  Dulaglutide (Trulicity) 1.5 MG/0.5ML solution pen-injector, Inject  under the skin into the appropriate area as directed 1 (One) Time Per Week., Disp: , Rfl:   •  ferrous sulfate 325 (65 FE) MG tablet, Take 325 mg by mouth 2 (Two) Times a Day., Disp: , Rfl:   •  furosemide (LASIX) 40 MG tablet, Take 40 mg by mouth 2 (Two) Times a Day., Disp: , Rfl:   •  ipratropium (ATROVENT) 0.02 % nebulizer solution, Take 0.5 mg by nebulization Every 4 (Four) Hours As Needed., Disp: , Rfl:   •  ipratropium-albuterol (DUO-NEB) 0.5-2.5 mg/3 ml nebulizer, 1.5 mL 4 (Four) Times a Day., Disp: , Rfl:   •  lovastatin (MEVACOR) 20 MG tablet, Take 20 mg by mouth Every Night., Disp: , Rfl:   •  metOLazone (ZAROXOLYN) 5 MG tablet, Take 5 mg by mouth Daily., Disp: , Rfl:   •  " metoprolol succinate XL (TOPROL-XL) 25 MG 24 hr tablet, Take 25 mg by mouth Daily., Disp: , Rfl:   •  nitroglycerin (NITROSTAT) 0.4 MG SL tablet, Place 0.4 mg under the tongue Every 5 (Five) Minutes As Needed for Chest Pain. Take no more than 3 doses in 15 minutes., Disp: , Rfl:   •  O2 (OXYGEN), Inhale 2 L/min Every Night., Disp: , Rfl:   •  polyethylene glycol (MIRALAX) 17 GM/SCOOP powder, Mix 238g powder with 64 oz of clear liquid. Starting at 5pm drink 80z every 10-15 minutes until consumed., Disp: 238 g, Rfl: 0  •  warfarin (COUMADIN) 5 MG tablet, Take 7.5 mg by mouth Daily., Disp: , Rfl:   •  glipiZIDE (GLUCOTROL) 10 MG tablet, Take 10 mg by mouth 3 (Three) Times a Day., Disp: , Rfl:     Vital Signs:  Vitals:    08/19/22 1454   BP: 120/58   Pulse: 65   Resp: 16   Temp: 97.8 °F (36.6 °C)   TempSrc: Temporal   SpO2: 98%   Height: 185.4 cm (73\")       Physical Exam:   General Appearance:    Alert, cooperative, in no acute distress   Abdomen:     no masses, no organomegaly, soft and tender in the epigastric region.  No abdominal masses or hernias.  Well-healed scar right upper quadrant.   Chest:      Clear to ausculation            Cor:     Regular rate and rhythm    Results Review:   I reviewed the patient's new clinical results.    Assessment / Plan:    1. Adenomatous polyp of transverse colon        Patient scheduled for laparoscopic hand-assisted right colectomy.  Risk of bleeding infection and the possibility of anastomotic leak discussed with patient agreeable.  Understand he will be admitted for 2 to 3 days following the procedure and to be isolated for discharge.  Patient agreeable wishes to proceed with the surgery.  He has no further questions at end of visit.    Electronically signed by Kenji Garcias MD  08/19/22                  "

## 2022-08-22 ENCOUNTER — OFFICE VISIT (OUTPATIENT)
Dept: FAMILY MEDICINE CLINIC | Age: 72
End: 2022-08-22
Payer: MEDICARE

## 2022-08-22 ENCOUNTER — HOSPITAL ENCOUNTER (OUTPATIENT)
Facility: HOSPITAL | Age: 72
Discharge: HOME | End: 2022-08-22
Payer: MEDICARE

## 2022-08-22 VITALS
SYSTOLIC BLOOD PRESSURE: 100 MMHG | DIASTOLIC BLOOD PRESSURE: 58 MMHG | OXYGEN SATURATION: 96 % | RESPIRATION RATE: 18 BRPM | BODY MASS INDEX: 22.74 KG/M2 | TEMPERATURE: 97 F | HEIGHT: 73 IN | HEART RATE: 83 BPM | WEIGHT: 171.6 LBS

## 2022-08-22 DIAGNOSIS — E87.6 HYPOKALEMIA: ICD-10-CM

## 2022-08-22 DIAGNOSIS — Z79.01 ANTICOAGULATED ON COUMADIN: Primary | ICD-10-CM

## 2022-08-22 LAB
ANION GAP SERPL CALCULATED.3IONS-SCNC: 12 MMOL/L (ref 3–16)
BUN BLDV-MCNC: 25 MG/DL (ref 6–20)
CALCIUM SERPL-MCNC: 9.4 MG/DL (ref 8.5–10.5)
CHLORIDE BLD-SCNC: 109 MMOL/L (ref 98–107)
CO2: 17 MMOL/L (ref 20–30)
CREAT SERPL-MCNC: 1.7 MG/DL (ref 0.4–1.2)
GFR AFRICAN AMERICAN: 48
GFR NON-AFRICAN AMERICAN: 40
GLUCOSE BLD-MCNC: 113 MG/DL (ref 74–106)
INTERNATIONAL NORMALIZATION RATIO, POC: 1.7
POTASSIUM SERPL-SCNC: 4.8 MMOL/L (ref 3.4–5.1)
PROTHROMBIN TIME, POC: 0
SODIUM BLD-SCNC: 138 MMOL/L (ref 136–145)

## 2022-08-22 PROCEDURE — G8420 CALC BMI NORM PARAMETERS: HCPCS | Performed by: NURSE PRACTITIONER

## 2022-08-22 PROCEDURE — 1036F TOBACCO NON-USER: CPT | Performed by: NURSE PRACTITIONER

## 2022-08-22 PROCEDURE — 99213 OFFICE O/P EST LOW 20 MIN: CPT | Performed by: NURSE PRACTITIONER

## 2022-08-22 PROCEDURE — G8427 DOCREV CUR MEDS BY ELIG CLIN: HCPCS | Performed by: NURSE PRACTITIONER

## 2022-08-22 PROCEDURE — 1123F ACP DISCUSS/DSCN MKR DOCD: CPT | Performed by: NURSE PRACTITIONER

## 2022-08-22 PROCEDURE — 36415 COLL VENOUS BLD VENIPUNCTURE: CPT

## 2022-08-22 PROCEDURE — 85610 PROTHROMBIN TIME: CPT | Performed by: NURSE PRACTITIONER

## 2022-08-22 PROCEDURE — 3017F COLORECTAL CA SCREEN DOC REV: CPT | Performed by: NURSE PRACTITIONER

## 2022-08-22 PROCEDURE — 80048 BASIC METABOLIC PNL TOTAL CA: CPT

## 2022-08-22 ASSESSMENT — ENCOUNTER SYMPTOMS
VOMITING: 0
ABDOMINAL PAIN: 0
NAUSEA: 0
SHORTNESS OF BREATH: 0
DIARRHEA: 1
COUGH: 0

## 2022-08-22 NOTE — PROGRESS NOTES
Chief Complaint   Patient presents with    Follow-up     POC PT for long term Coumadin therapy     Patient here for one month follow up for INR related to long term Coumadin therapy. Since last visit patient did have a colonoscopy with Dr Harshal Lee. Per Dr Elsa Wan note- Patrick Smith had a very large 5 cm polyp in the proximal transverse colon, he will need to have extended right colectomy to remove the mass. \" Patient states he is scheduled for surgery Sept 12. Have you seen any other physician or provider since your last visit yes - Dr Harshal Lee     Have you had any other diagnostic tests since your last visit? yes - see above     Have you changed or stopped any medications since your last visit?  yes - patient reports nephrology stopped Lasix

## 2022-08-22 NOTE — PROGRESS NOTES
SUBJECTIVE:    Neeta Waddell is a 67 y.o. male    Patient here for one month follow up for INR related to long term Coumadin therapy. Since last visit patient did have a colonoscopy with Dr Kirill Moreno. Per Dr Radha Sutton note- Nicolasa Abreu had a very large 5 cm polyp in the proximal transverse colon, he will need to have extended right colectomy to remove the mass. \" Patient states he is scheduled for laparoscopic hand-assisted right colectomy Sept 12 with Dr Kirill Moreno. Anticoagulation: Patient here for followup of chronic anticoagulation. Indication: atrial fibrillation  Bleeding Signs/Symptoms:  None  Thromboembolic Signs/Symptoms:  None    Missed Coumadin Doses:  Over one week ago -  Missed 4 doses due to colonoscopy on 8/11/2022  Medication Changes:  yes -Potassium increased to 20 meq daily by nephrology on 08/10/2022  Dietary Changes:  no  Bacterial/Viral Infection:  no    Other Concerns:  No concerns today. Pt reports he is feeling well today. He reports a good appetite and was noted to gain 2 lbs. Chief Complaint   Patient presents with    Follow-up     POC INR for long term Coumadin therapy        Review of Systems   Constitutional:  Positive for unexpected weight change (weight loss- stable since previous visit- gained 2 lbs.). Negative for activity change, appetite change and fatigue. Respiratory:  Negative for cough and shortness of breath. Cardiovascular:  Negative for chest pain. Gastrointestinal:  Positive for diarrhea. Negative for abdominal pain, nausea and vomiting. Hematological:  Negative for adenopathy. Does not bruise/bleed easily. OBJECTIVE:    BP (!) 100/58   Pulse 83   Temp 97 °F (36.1 °C) (Infrared)   Resp 18   Ht 6' 1\" (1.854 m)   Wt 171 lb 9.6 oz (77.8 kg)   SpO2 96% Comment: RA  BMI 22.64 kg/m²    Physical Exam  Vitals and nursing note reviewed. Constitutional:       Appearance: Normal appearance. He is well-developed. HENT:      Head: Normocephalic.    Eyes: Extraocular Movements: Extraocular movements intact. Conjunctiva/sclera: Conjunctivae normal.      Pupils: Pupils are equal, round, and reactive to light. Neck:      Thyroid: No thyromegaly. Vascular: No carotid bruit. Cardiovascular:      Rate and Rhythm: Normal rate. Rhythm irregularly irregular. Heart sounds: Normal heart sounds. No murmur heard. Pulmonary:      Effort: Pulmonary effort is normal.      Breath sounds: Normal breath sounds. Skin:     General: Skin is warm and dry. Neurological:      Mental Status: He is alert and oriented to person, place, and time. Psychiatric:         Attention and Perception: Attention and perception normal.         Mood and Affect: Mood and affect normal.         Behavior: Behavior normal.         Thought Content: Thought content normal.         Judgment: Judgment normal.       ASSESSMENT/PLAN:   Jillian Cowan was seen today for follow-up. Diagnoses and all orders for this visit:    Anticoagulated on Coumadin  -     POCT INR  Results for orders placed or performed in visit on 08/22/22   POCT INR   Result Value Ref Range    INR,(POC) 1.7     Prothrombin time,(POC) 0.0     Pt instructed to take Coumadin 5 mg for 2 days then resume 5 mg/2.5 mg alternating dose schedule. Repeat INR in 2 weeks. Hypokalemia  -     Basic Metabolic Panel; Future  Pt reports potassium was increased to 20 meq daily by nephrology. It is worried due to hx of hyperkalemia several years ago. Requested to have potassium checked. Pt reports he has been on 20 meq per day since 8/10/2022. Return in about 2 weeks (around 9/5/2022), or if symptoms worsen or fail to improve, for INR.     Current Outpatient Medications on File Prior to Visit   Medication Sig Dispense Refill    allopurinol (ZYLOPRIM) 300 MG tablet TAKE 1 TABLET BY MOUTH EVERY DAY 90 tablet 2    ipratropium-albuterol (DUONEB) 0.5-2.5 (3) MG/3ML SOLN nebulizer solution INHALE 1 VIAL VIA NEBULIZER FOUR TIMES DAILY 360 mL 2    potassium chloride (KLOR-CON M) 10 MEQ extended release tablet Take 1 tablet by mouth in the morning. 90 tablet 0    dicyclomine (BENTYL) 10 MG capsule Take 1 capsule by mouth in the morning and 1 capsule at noon and 1 capsule in the evening and 1 capsule before bedtime. 120 capsule 0    amLODIPine (NORVASC) 5 MG tablet TAKE 1 TABLET BY MOUTH TWICE A DAY 90 tablet 2    docusate sodium (COLACE) 100 MG capsule Take 1 capsule by mouth 3 times daily 270 capsule 1    loratadine (CLARITIN) 10 MG tablet Take 1 tablet by mouth daily 30 tablet 5    metoprolol succinate (TOPROL XL) 25 MG extended release tablet take 1 tablet by mouth once daily 90 tablet 1    lovastatin (MEVACOR) 20 MG tablet Take 1 tablet by mouth daily 90 tablet 2    digoxin (LANOXIN) 125 MCG tablet TAKE 1 TABLET BY MOUTH EVERY DAY OR AS DIRECTED 90 tablet 2    ferrous sulfate (IRON 325) 325 (65 Fe) MG tablet take 1 tablet by mouth twice a day 180 tablet 2    colchicine (COLCRYS) 0.6 MG tablet Take 1 tablet by mouth daily as needed for Pain 30 tablet 5    MITIGARE 0.6 MG capsule TAKE 1 CAPSULE BY MOUTH DAILY 30 capsule 1    Calcium Polycarbophil (FIBER) 625 MG TABS Take 625 mg by mouth daily      Dulaglutide (TRULICITY) 1.5 NK/8.8TN SOPN Inject 1.5 mg into the skin once a week      ipratropium (ATROVENT) 0.02 % nebulizer solution Take 0.5 mg by nebulization every 4-6 hours as needed for Wheezing      COUMADIN 5 MG tablet take as directed 100 tablet 5    aspirin 81 MG tablet Take 81 mg by mouth daily. nystatin (MYCOSTATIN) [id number] UNIT/ML suspension Take 5 mLs by mouth in the morning and 5 mLs at noon and 5 mLs in the evening and 5 mLs before bedtime.  200 mL 0    glipiZIDE (GLUCOTROL) 10 MG tablet Take 2 tablets by mouth 2 times daily (before meals) (Patient not taking: Reported on 8/22/2022) 360 tablet 2    furosemide (LASIX) 40 MG tablet take 1 tablet by mouth once daily (Patient not taking: Reported on 8/22/2022) 30 tablet 5    nitroGLYCERIN (NITROSTAT) 0.4 MG SL tablet place 1 tablet under the tongue if needed every 5 minutes for chest pain for 3 doses IF NO RELIEF AFTER 3RD DOSE CALL PRESCRIBER . (Patient not taking: No sig reported) 25 tablet 5     No current facility-administered medications on file prior to visit.

## 2022-08-24 PROBLEM — D12.3 ADENOMATOUS POLYP OF TRANSVERSE COLON: Status: ACTIVE | Noted: 2022-01-01

## 2022-08-25 ENCOUNTER — NURSE ONLY (OUTPATIENT)
Dept: FAMILY MEDICINE CLINIC | Age: 72
End: 2022-08-25

## 2022-08-25 ENCOUNTER — HOSPITAL ENCOUNTER (OUTPATIENT)
Facility: HOSPITAL | Age: 72
Discharge: HOME | End: 2022-08-25
Payer: MEDICARE

## 2022-08-25 DIAGNOSIS — E87.6 HYPOKALEMIA: Primary | ICD-10-CM

## 2022-08-25 PROCEDURE — 80048 BASIC METABOLIC PNL TOTAL CA: CPT

## 2022-08-26 LAB
ANION GAP SERPL CALCULATED.3IONS-SCNC: 14 MMOL/L (ref 3–16)
BUN BLDV-MCNC: 31 MG/DL (ref 6–20)
CALCIUM SERPL-MCNC: 9.4 MG/DL (ref 8.5–10.5)
CHLORIDE BLD-SCNC: 107 MMOL/L (ref 98–107)
CO2: 15 MMOL/L (ref 20–30)
CREAT SERPL-MCNC: 2 MG/DL (ref 0.4–1.2)
GFR AFRICAN AMERICAN: 40
GFR NON-AFRICAN AMERICAN: 33
GLUCOSE BLD-MCNC: 130 MG/DL (ref 74–106)
POTASSIUM SERPL-SCNC: 4.2 MMOL/L (ref 3.4–5.1)
SODIUM BLD-SCNC: 136 MMOL/L (ref 136–145)

## 2022-08-29 DIAGNOSIS — M25.561 ACUTE PAIN OF RIGHT KNEE: Primary | ICD-10-CM

## 2022-09-01 ENCOUNTER — OFFICE VISIT (OUTPATIENT)
Dept: FAMILY MEDICINE CLINIC | Age: 72
End: 2022-09-01
Payer: MEDICARE

## 2022-09-01 VITALS
OXYGEN SATURATION: 97 % | WEIGHT: 166.9 LBS | TEMPERATURE: 97 F | HEART RATE: 67 BPM | RESPIRATION RATE: 18 BRPM | SYSTOLIC BLOOD PRESSURE: 104 MMHG | HEIGHT: 73 IN | BODY MASS INDEX: 22.12 KG/M2 | DIASTOLIC BLOOD PRESSURE: 60 MMHG

## 2022-09-01 DIAGNOSIS — Z79.01 ANTICOAGULATED ON COUMADIN: Primary | ICD-10-CM

## 2022-09-01 LAB
INTERNATIONAL NORMALIZATION RATIO, POC: 2.1
PROTHROMBIN TIME, POC: 0

## 2022-09-01 PROCEDURE — G8420 CALC BMI NORM PARAMETERS: HCPCS | Performed by: NURSE PRACTITIONER

## 2022-09-01 PROCEDURE — G8427 DOCREV CUR MEDS BY ELIG CLIN: HCPCS | Performed by: NURSE PRACTITIONER

## 2022-09-01 PROCEDURE — 1123F ACP DISCUSS/DSCN MKR DOCD: CPT | Performed by: NURSE PRACTITIONER

## 2022-09-01 PROCEDURE — 1036F TOBACCO NON-USER: CPT | Performed by: NURSE PRACTITIONER

## 2022-09-01 PROCEDURE — 99212 OFFICE O/P EST SF 10 MIN: CPT | Performed by: NURSE PRACTITIONER

## 2022-09-01 PROCEDURE — 3017F COLORECTAL CA SCREEN DOC REV: CPT | Performed by: NURSE PRACTITIONER

## 2022-09-01 PROCEDURE — 85610 PROTHROMBIN TIME: CPT | Performed by: NURSE PRACTITIONER

## 2022-09-01 ASSESSMENT — ENCOUNTER SYMPTOMS
DIARRHEA: 0
VOMITING: 0
COUGH: 0
SHORTNESS OF BREATH: 0
ABDOMINAL PAIN: 0
NAUSEA: 0

## 2022-09-01 NOTE — PROGRESS NOTES
Chief Complaint   Patient presents with    Follow-up     Two week follow up for repeat INR      Patient here for two week follow up for recheck on INR. At last appt INR was 1.7 but patients coumadin had been on hold for several days related to colonoscopy. Have you seen any other physician or provider since your last visit no    Have you had any other diagnostic tests since your last visit? no    Have you changed or stopped any medications since your last visit?  yes - no

## 2022-09-01 NOTE — PROGRESS NOTES
SUBJECTIVE:    Samara Green is a 67 y.o. male    Patient here for two week follow up for recheck on INR. At last appt INR was 1.7 but patients coumadin had been on hold for several days related to colonoscopy. nticoagulation: Patient here for followup of chronic anticoagulation. Indication: atrial fibrillation  Bleeding Signs/Symptoms:  None  Thromboembolic Signs/Symptoms:  None    Missed Coumadin Doses:  None  Medication Changes: none- Pt reports he self adjusted medications. He reports reports he is now taking 1/2 Metoprolol 25 mg daily. Dose 12.5 mg per day. He also reports he decreased lasix 20 mg to 1/2 tablet daily. He reports he has been taking Lasix 10 mg daily for 1.5 months. He reports he was taking the same dose on most recent lab draw. Dietary Changes:  no  Bacterial/Viral Infection:  no    Other Concerns:  Pt is taking Coumadin 5mg/2.5 mg alternating days. He is scheduled for colon resection on 09/12/2022 with Dr Nestor Goodson,       Chief Complaint   Patient presents with    Follow-up     Two week follow up for repeat INR         Review of Systems   Constitutional:  Positive for unexpected weight change. Respiratory:  Negative for cough and shortness of breath. Cardiovascular:  Negative for chest pain. Gastrointestinal:  Negative for abdominal pain, diarrhea, nausea and vomiting. OBJECTIVE:    /60   Pulse 67   Temp 97 °F (36.1 °C) (Infrared)   Resp 18   Ht 6' 1\" (1.854 m)   Wt 166 lb 14.4 oz (75.7 kg)   SpO2 97% Comment: ra  BMI 22.02 kg/m²    Physical Exam  Vitals and nursing note reviewed. Constitutional:       Appearance: Normal appearance. He is well-developed. HENT:      Head: Normocephalic. Eyes:      Extraocular Movements: Extraocular movements intact. Conjunctiva/sclera: Conjunctivae normal.      Pupils: Pupils are equal, round, and reactive to light. Neck:      Thyroid: No thyromegaly. Vascular: No carotid bruit.    Cardiovascular:      Rate and Rhythm: Normal rate. Rhythm irregularly irregular. Heart sounds: Normal heart sounds. No murmur heard. Pulmonary:      Effort: Pulmonary effort is normal.      Breath sounds: Normal breath sounds. Skin:     General: Skin is warm and dry. Neurological:      Mental Status: He is alert and oriented to person, place, and time. Psychiatric:         Attention and Perception: Attention and perception normal.         Mood and Affect: Mood and affect normal.         Behavior: Behavior normal.         Thought Content: Thought content normal.         Judgment: Judgment normal.       ASSESSMENT/PLAN:   Valentino Kelley was seen today for follow-up. Diagnoses and all orders for this visit:    Anticoagulated on Coumadin  -     POCT INR- 2.1- The current medical regimen is effective;  continue present plan and medications. No follow-ups on file. Current Outpatient Medications on File Prior to Visit   Medication Sig Dispense Refill    allopurinol (ZYLOPRIM) 300 MG tablet TAKE 1 TABLET BY MOUTH EVERY DAY 90 tablet 2    ipratropium-albuterol (DUONEB) 0.5-2.5 (3) MG/3ML SOLN nebulizer solution INHALE 1 VIAL VIA NEBULIZER FOUR TIMES DAILY 360 mL 2    potassium chloride (KLOR-CON M) 10 MEQ extended release tablet Take 1 tablet by mouth in the morning. 90 tablet 0    nystatin (MYCOSTATIN) 667874 UNIT/ML suspension Take 5 mLs by mouth in the morning and 5 mLs at noon and 5 mLs in the evening and 5 mLs before bedtime. 200 mL 0    dicyclomine (BENTYL) 10 MG capsule Take 1 capsule by mouth in the morning and 1 capsule at noon and 1 capsule in the evening and 1 capsule before bedtime.  120 capsule 0    amLODIPine (NORVASC) 5 MG tablet TAKE 1 TABLET BY MOUTH TWICE A DAY 90 tablet 2    docusate sodium (COLACE) 100 MG capsule Take 1 capsule by mouth 3 times daily 270 capsule 1    loratadine (CLARITIN) 10 MG tablet Take 1 tablet by mouth daily 30 tablet 5    metoprolol succinate (TOPROL XL) 25 MG extended release tablet take 1

## 2022-09-01 NOTE — PATIENT INSTRUCTIONS
bag.  Cover up or remove any of your personal information on the empty containers by covering it with black permanent marker or duct tape. Place the sealed container with the mixture, and the empty drug containers, in the trash. If you use a medication that is in the form of a patch, dispose of used patches by folding them in half so that the sticky sides meet, and then flushing them down a toilet. They should not be placed in the household trash where children or pets can find them. If you have any questions, ask your provider or pharmacist for more information. Be sure to keep all appointments for provider visits or tests. We are committed to providing you with the best care possible. In order to help us achieve these goals please remember to bring all medications, herbal products, and over the counter supplements with you to each visit. If your provider has ordered testing for you, please be sure to follow up with our office if you have not received results within 7 days after the testing took place. *If you receive a survey after visiting one of our offices, please take time to share your experience concerning your physician office visit. These surveys are confidential and no health information about you is shared. We are eager to improve for you and we are counting on your feedback to help make that happen.

## 2022-09-09 NOTE — PAT
Patient seen for PAT appointment for upcoming procedure with Dr. Garcias on 9/12/22. Patient has a significant heart history consisting of 2 CABGs and 4 heart catheterizations. He denies any chest pain since his last CABG in 2013. He is in chronic atrial fibrillation according to his cardiologist's Dr Campbell's note. EKG obtained per protocol. EKG results and above information relayed to Wilber Hargrove CRNA. No new orders received at this time. May proceed.

## 2022-09-09 NOTE — DISCHARGE INSTRUCTIONS
PAT PASS GIVEN/REVIEWED WITH PT.  VERBALIZED UNDERSTANDING OF THE FOLLOWING:  DO NOT EAT, DRINK, SMOKE, USE SMOKELESS TOBACCO OR CHEW GUM AFTER MIDNIGHT THE NIGHT BEFORE SURGERY.  THIS ALSO INCLUDES HARD CANDIES AND MINTS.    DO NOT SHAVE THE AREA TO BE OPERATED ON AT LEAST 48 HOURS PRIOR TO THE PROCEDURE.  DO NOT WEAR MAKE UP OR NAIL POLISH.  DO NOT LEAVE IN ANY PIERCING OR WEAR JEWELRY THE DAY OF SURGERY.      DO NOT USE ADHESIVES IF YOU WEAR DENTURES.    DO NOT WEAR EYE CONTACTS; BRING IN YOUR GLASSES.    ONLY TAKE MEDICATION THE MORNING OF YOUR PROCEDURE IF INSTRUCTED BY YOUR SURGEON WITH ENOUGH WATER TO SWALLOW THE MEDICATION.  IF YOUR SURGEON DID NOT SPECIFY WHICH MEDICATIONS TO TAKE, YOU WILL NEED TO CALL THEIR OFFICE FOR FURTHER INSTRUCTIONS AND DO AS THEY INSTRUCT.    LEAVE ANYTHING YOU CONSIDER VALUABLE AT HOME.    YOU WILL NEED TO ARRANGE FOR SOMEONE TO DRIVE YOU HOME AFTER SURGERY.  IT IS RECOMMENDED THAT YOU DO NOT DRIVE, WORK, DRINK ALCOHOL OR MAKE MAJOR DECISIONS FOR AT LEAST 24 HOURS AFTER YOUR PROCEDURE IS COMPLETE.      THE DAY OF YOUR PROCEDURE, BRING IN THE FOLLOWING IF APPLICABLE:   PICTURE ID AND INSURANCE/MEDICARE OR MEDICAID CARDS/ANY CO-PAY THAT MAY BE DUE   COPY OF ADVANCED DIRECTIVE/LIVING WILL/POWER OR    CPAP/BIPAP/INHALERS   SKIN PREP SHEET   YOUR PREADMISSION TESTING PASS (IF NOT A PHONE HISTORY)      PREOPERATIVE BATHING INSTRUCTION SHEET GIVEN TO PT.  A BOTTLE OF  CHLORHEXIDINE GLUCONATE 2% SOLUTION ANTISEPTIC GIVEN TO PT AS WELL.  INSTRUCTION SHEET REVIEWED WITH PT.  PT VERBALIZED UNDERSTANDING.

## 2022-09-12 NOTE — ANESTHESIA PREPROCEDURE EVALUATION
Anesthesia Evaluation     Patient summary reviewed and Nursing notes reviewed   no history of anesthetic complications:  NPO Solid Status: > 8 hours  NPO Liquid Status: > 8 hours           Airway   Mallampati: I  TM distance: >3 FB  Neck ROM: full  Possible difficult intubation  Dental - normal exam     Pulmonary - normal exam   (+) COPD, sleep apnea,   Cardiovascular - normal exam    (+) hypertension, past MI , CAD, CABG, dysrhythmias, hyperlipidemia,       Neuro/Psych- negative ROS  GI/Hepatic/Renal/Endo    (+)   diabetes mellitus,     Musculoskeletal (-) negative ROS    Abdominal    Substance History - negative use     OB/GYN negative ob/gyn ROS         Other - negative ROS                         Anesthesia Plan    ASA 3     general with block     intravenous induction     Anesthetic plan, risks, benefits, and alternatives have been provided, discussed and informed consent has been obtained with: patient.  Pre-procedure education provided  Plan discussed with CRNA.        CODE STATUS:

## 2022-09-12 NOTE — ANESTHESIA POSTPROCEDURE EVALUATION
Patient: Swapnil Lawson    Procedure Summary     Date: 09/12/22 Room / Location: [facility] OR  /  [facility]    Anesthesia Start: 0730 Anesthesia Stop: 0924    Procedure: COLON RESECTION LAPAROSCOPIC HAND ASSISTED (Right Abdomen) Diagnosis:       Adenomatous polyp of transverse colon      (Adenomatous polyp of transverse colon [D12.3])    Surgeons: Kenji Garcias MD Provider: Jose Cruz Hargrove CRNA    Anesthesia Type: general with block ASA Status: 3          Anesthesia Type: general with block    Vitals  Vitals Value Taken Time   /100 09/12/22 0920   Temp     Pulse 82 09/12/22 0924   Resp     SpO2 99 % 09/12/22 0924   Vitals shown include unvalidated device data.        Post Anesthesia Care and Evaluation    Patient location during evaluation: PACU  Patient participation: complete - patient participated  Level of consciousness: awake and alert and awake  Pain score: 3  Pain management: adequate    Airway patency: patent  Anesthetic complications: No anesthetic complications  PONV Status: controlled  Cardiovascular status: acceptable, hemodynamically stable and stable  Respiratory status: acceptable and nasal cannula  Hydration status: acceptable    Comments: Vital signs as noted in nursing documentation as per protocol.

## 2022-09-12 NOTE — ANESTHESIA PROCEDURE NOTES
Airway  Urgency: elective    Date/Time: 9/12/2022 7:33 AM  Airway not difficult    General Information and Staff    Patient location during procedure: OR  CRNA/CAA: Jose Cruz Hargrove CRNA    Indications and Patient Condition  Indications for airway management: airway protection    Preoxygenated: yes  Mask difficulty assessment: 1 - vent by mask    Final Airway Details  Final airway type: endotracheal airway      Successful airway: ETT  Cuffed: yes   Successful intubation technique: direct laryngoscopy  Facilitating devices/methods: intubating stylet  Endotracheal tube insertion site: oral  Blade: Reese  Blade size: 2  ETT size (mm): 7.5  Cormack-Lehane Classification: grade I - full view of glottis  Placement verified by: chest auscultation and capnometry   Measured from: lips  ETT/EBT  to lips (cm): 22  Number of attempts at approach: 1  Assessment: lips, teeth, and gum same as pre-op and atraumatic intubation    Additional Comments  Airway placed without problems. Dentition and Lips as pre-induction. ETT cuff inflated to minimal occlusive pressure.

## 2022-09-12 NOTE — H&P
[facility]   HISTORY AND PHYSICAL      Name:  Swapnil Lawson   Age:  72 y.o.  Sex:  male  :  1950  MRN:  [id number]   Visit Number:  [id number]  Admission Date:  2022  Date Of Service:  22  Primary Care Physician:  Argenis Osborne APRN    Chief Complaint:     Abdominal pain.  Status post right hemicolectomy.    History Of Presenting Illness:      Swapnil Lawson is a 72-year-old male with past medical history of CAD, 2 L nasal cannula oxygen requirement at night, denied history of CPAP or BiPAP use, hypertension, paroxysmal atrial fibrillation on warfarin, diabetes, venous insufficiency, dyslipidemia, history colon polyps.  Admitted on 2022 following right hemicolectomy for resection of adenomatous polyp of transverse colon per General surgery-Dr. Garcias.  Polyp had originally been found on colonoscopy.  Following surgery and upon admission patient had only concern for some ongoing abdominal pain, described as a 7 out of 10.  Denied passing any gas yet.    Review Of Systems:    All systems were reviewed and negative except as mentioned in history of presenting illness, assessment and plan.    Past Medical History: Patient  has a past medical history of Childhood asthma, COPD (chronic obstructive pulmonary disease) ([facility]), Coronary artery disease (2016), Diabetes mellitus (HCC) (2016), DVT (deep venous thrombosis) (Formerly [facility]), Gout, Hepatitis, Hyperlipidemia (2016), Hypertension (2016), Kidney disease, Myocardial infarction (HCC), Paroxysmal atrial fibrillation (HCC) (2016), Pulmonary embolism (HCC), Sleep apnea, Venous insufficiency (2016), and Wears glasses.    Past Surgical History: Patient  has a past surgical history that includes Appendectomy (); Cholecystectomy (); Toe amputation (Left, ); Other surgical history (); Finger surgery; Colonoscopy; Cataract extraction w/ intraocular lens implant (Right,  06/25/2020); Vasectomy; Cataract extraction w/ intraocular lens implant (Left, 07/09/2020); Bunionectomy (Left); Colonoscopy (N/A, 08/11/2022); Coronary artery bypass graft; Coronary artery bypass graft (2013); and Cardiac catheterization.    Social History: Patient  reports that he quit smoking about 29 years ago. His smoking use included cigarettes. He smoked 1.00 pack per day. He has never used smokeless tobacco. He reports that he does not drink alcohol and does not use drugs.    Family History:  Patient's family history has been reviewed and found to be non-contributory.     Allergies:      Patient has no known allergies.    Home Medications:    Prior to Admission Medications     Prescriptions Last Dose Informant Patient Reported? Taking?    allopurinol (ZYLOPRIM) 300 MG tablet 9/11/2022 Self Yes Yes    Take 300 mg by mouth Daily.    amLODIPine (NORVASC) 2.5 MG tablet 9/11/2022  Yes Yes    Take 2.5 mg by mouth Daily.    aspirin 81 MG tablet 9/11/2022 Self Yes Yes    Take 81 mg by mouth Daily.    bisacodyl (bisacodyl) 5 MG EC tablet 9/11/2022  No Yes    Take 2 at 3pm and 2 at 7pm the day prior to colonoscopy.    calcium polycarbophil (FIBERCON) 625 MG tablet 9/11/2022 Self Yes Yes    Take 625 mg by mouth 2 (Two) Times a Day. Takes 2 tablets BID    colchicine 0.6 MG tablet Past Month Self Yes Yes    Take 0.6 mg by mouth As Needed for Muscle / Joint Pain.    digoxin (LANOXIN) 125 MCG tablet 9/11/2022 Self No Yes    Take 0.5 tablets by mouth Daily.    diphenhydrAMINE (BENADRYL) 25 MG tablet 9/11/2022 Self Yes Yes    Take 25 mg by mouth every night at bedtime.    docusate sodium (COLACE) 100 MG capsule Past Month Self Yes Yes    Take 300 mg by mouth Daily.    Dulaglutide (Trulicity) 1.5 MG/0.5ML solution pen-injector 9/11/2022 Self Yes Yes    Inject  under the skin into the appropriate area as directed 1 (One) Time Per Week.    ferrous sulfate 325 (65 FE) MG tablet 9/11/2022 Self Yes Yes    Take 325 mg by mouth 2  (Two) Times a Day.    furosemide (LASIX) 40 MG tablet 9/11/2022  Yes Yes    Take 20 mg by mouth Daily.    ipratropium (ATROVENT HFA) 17 MCG/ACT inhaler Past Month  Yes Yes    Inhale 2 puffs 4 (Four) Times a Day.    ipratropium (ATROVENT) 0.02 % nebulizer solution Past Month Self Yes Yes    Take 0.5 mg by nebulization Every 4 (Four) Hours As Needed.    lovastatin (MEVACOR) 20 MG tablet 9/11/2022 Self Yes Yes    Take 20 mg by mouth Every Night.    metoprolol succinate XL (TOPROL-XL) 25 MG 24 hr tablet 9/12/2022 Self Yes Yes    Take 25 mg by mouth Daily.    O2 (OXYGEN) Past Week Self Yes Yes    Inhale 2 L/min Every Night.    polyethylene glycol (MIRALAX) 17 GM/SCOOP powder 9/11/2022  No Yes    Mix 238g powder with 64 oz of clear liquid. Starting at 5pm drink 80z every 10-15 minutes until consumed.    potassium chloride (MICRO-K) 10 MEQ CR capsule 9/11/2022  Yes Yes    Take 20 mEq by mouth Daily.    SODIUM BICARBONATE PO 9/11/2022  Yes Yes    Take  by mouth 2 (Two) Times a Day. 10 gram tablet per patient report    warfarin (COUMADIN) 5 MG tablet 9/6/2022 Self Yes Yes    Take 5 mg by mouth Daily. 5mg every other day  2.5mg every other day alternating    nitroglycerin (NITROSTAT) 0.4 MG SL tablet More than a month Self Yes No    Place 0.4 mg under the tongue Every 5 (Five) Minutes As Needed for Chest Pain. Take no more than 3 doses in 15 minutes.   States he has not taken since 1993        ED Medications:    Medications   lactated ringers infusion 1,000 mL ( Intravenous Currently Infusing 9/12/22 0730)   metoprolol succinate XL (TOPROL-XL) 24 hr tablet 25 mg (25 mg Oral Not Given 9/12/22 1109)   ipratropium (ATROVENT) nebulizer solution 0.5 mg (has no administration in time range)   nitroglycerin (NITROSTAT) SL tablet 0.4 mg (has no administration in time range)   O2 (OXYGEN) (has no administration in time range)   furosemide (LASIX) tablet 20 mg (20 mg Oral Given 9/12/22 1110)   digoxin (LANOXIN) tablet 62.5 mcg (62.5  mcg Oral Given 9/12/22 1110)   potassium chloride (MICRO-K) CR capsule 20 mEq (20 mEq Oral Given 9/12/22 1109)   amLODIPine (NORVASC) tablet 2.5 mg (2.5 mg Oral Given 9/12/22 1109)   sodium chloride 0.9 % flush 3 mL (3 mL Intravenous Given 9/12/22 1111)   sodium chloride 0.9 % flush 3-10 mL (has no administration in time range)   dextrose 5 % and sodium chloride 0.45 % infusion (100 mL/hr Intravenous New Bag 9/12/22 1110)   heparin (porcine) 5000 UNIT/ML injection 5,000 Units (has no administration in time range)   HYDROcodone-acetaminophen (NORCO) 7.5-325 MG per tablet 1 tablet (has no administration in time range)   HYDROmorphone (DILAUDID) injection 0.5 mg (0.5 mg Intravenous Given 9/12/22 1108)     And   naloxone (NARCAN) injection 0.1 mg (has no administration in time range)   ondansetron (ZOFRAN) tablet 4 mg (has no administration in time range)     Or   ondansetron (ZOFRAN) injection 4 mg (has no administration in time range)   famotidine (PEPCID) injection 20 mg (20 mg Intravenous Given 9/12/22 1109)   ipratropium (ATROVENT) nebulizer solution 0.5 mg (0.5 mg Nebulization Given 9/12/22 1216)   ampicillin-sulbactam 3 g/100 mL 0.9% NS IVPB (3 g Intravenous Given 9/12/22 0740)   ondansetron (ZOFRAN) injection 4 mg (4 mg Intravenous Given 9/12/22 0957)     Vital Signs:  Temp:  [97.7 °F (36.5 °C)-97.9 °F (36.6 °C)] 97.7 °F (36.5 °C)  Heart Rate:  [74-86] 79  Resp:  [14-20] 18  BP: ()/() 125/74        09/12/22  1046   Weight: 73.4 kg (161 lb 13.1 oz)     Body mass index is 21.35 kg/m².    Physical Exam:     Most recent vital Signs: /74 (BP Location: Left arm, Patient Position: Lying)   Pulse 79   Temp 97.7 °F (36.5 °C) (Oral)   Resp 18   Wt 73.4 kg (161 lb 13.1 oz)   SpO2 97%   BMI 21.35 kg/m²     Physical Exam  HENT:      Mouth/Throat:      Mouth: Mucous membranes are moist.      Pharynx: Oropharynx is clear.   Eyes:      Conjunctiva/sclera: Conjunctivae normal.      Pupils: Pupils are  equal, round, and reactive to light.   Cardiovascular:      Rate and Rhythm: Normal rate. Rhythm irregular.      Pulses: Normal pulses.      Heart sounds: Normal heart sounds.   Pulmonary:      Effort: Pulmonary effort is normal.      Breath sounds: Normal breath sounds.   Abdominal:      Tenderness: There is abdominal tenderness.   Skin:     General: Skin is warm.      Capillary Refill: Capillary refill takes less than 2 seconds.   Neurological:      General: No focal deficit present.      Mental Status: He is alert and oriented to person, place, and time.   Psychiatric:         Mood and Affect: Mood normal.         Behavior: Behavior normal.         Laboratory data:    I have reviewed the labs done in the emergency room.    Results from last 7 days   Lab Units 09/09/22  1143   SODIUM mmol/L 137   POTASSIUM mmol/L 4.5   CHLORIDE mmol/L 108*   CO2 mmol/L 19.5*   BUN mg/dL 36*   CREATININE mg/dL 1.88*   CALCIUM mg/dL 9.7   GLUCOSE mg/dL 112*     Results from last 7 days   Lab Units 09/09/22  1143   WBC 10*3/mm3 8.95   HEMOGLOBIN g/dL 14.5   HEMATOCRIT % 44.1   PLATELETS 10*3/mm3 106*                                   Invalid input(s): USDES,  BLOODU, NITRITITE, BACT, EP    Pain Management Panel    There is no flowsheet data to display.         EKG:      None upon admission    Radiology:    XR Chest 1 View    Result Date: 9/9/2022  PROCEDURE: XR CHEST 1 VW-    HISTORY: pre op testing  COMPARISON: None.  FINDINGS: The heart is normal in size. The mediastinum is unremarkable. The patient is status post median sternotomy for CABG. There is aortic atherosclerosis. There is no focal airspace consolidation. There are blunting of the bilateral costophrenic angles. Postoperative changes with small bilateral effusions there is scattered subsegmental atelectasis. There is no pneumothorax. There are no acute osseous abnormalities.      Postoperative and chronic changes without definite acute process.    Images were reviewed,  interpreted, and dictated by KANA Mercado Transcribed by Evonne Elliott PA-C.  This report was signed and finalized on 9/9/2022 4:03 PM by KANA Forrest.    Peripheral Block    Result Date: 9/12/2022  Jack Fuentes CRNA     9/12/2022  9:26 AM Peripheral Block Pre-sedation assessment completed: 9/12/2022 9:10 AM Patient reassessed immediately prior to procedure Patient location during procedure: OR Stop time: 9/12/2022 9:15 AM Reason for block: at surgeon's request and post-op pain management Performed by LORETTA/CAA: Jack Fuentes CRNA Preanesthetic Checklist Completed: patient identified, IV checked, site marked, risks and benefits discussed, surgical consent, monitors and equipment checked, pre-op evaluation and timeout performed Prep: Pt Position: supine Sterile barriers:cap, gloves, sterile barriers and mask Prep: ChloraPrep Patient monitoring: blood pressure monitoring, continuous pulse oximetry and EKG Procedure Sedation: yes Performed under: general Guidance:ultrasound guided Images:still images obtained, printed/placed on chart Laterality:Bilateral Block Type:TAP Injection Technique:single-shot Needle Type:short-bevel and echogenic Needle Gauge:20 G Resistance on Injection: none Medications Used: ropivacaine (NAROPIN) injection 0.5 %, 30 mL Med administered at 9/12/2022 9:12 AM Medications Preservative Free Saline:30ml Comment:Block Injection:  LA dose divided between Right and Left block. Ropivacaine 0.5% 15 mL with PF Normal Saline 15mL and 5mg PF Decadron injected on each side. Post Assessment Injection Assessment: negative aspiration for heme, incremental injection and no paresthesia on injection Patient Tolerance:comfortable throughout block Complications:no Additional Notes   Under Ultrasound guidance, a BBraun 4inch 360 degree needle was advanced with Normal Saline hydro dissection of tissue.  The Internal Oblique and Transversus Abdominus muscles where visualized.  At or  before the aponeurosis of Internal Oblique, local anesthetic spread was visualized in the Transversus Abdominus Plane. Injection was made incrementally with aspiration every 5 mls.  There was no  intravascular injection,  injection pressure was normal, there was no neural injection, and the procedure was completed without difficulty.  Thank You.     Peripheral Block    Result Date: 9/12/2022  Jose Cruz Hargrove, CRNA     9/12/2022  7:48 AM Peripheral Block       Assessment/Plan:    Status post right hemicolectomy  History of colonic polyps  -Diet advancement per general surgery.   -Status post right hemicolectomy 9/12 per general surgery-Dr. Garcias.   -Tissue pathology from 9/12 pending.  -Pathology from 8/11 tubular adenoma transverse colon.    CAD  Hypertension  Paroxysmal atrial fibrillation  Diabetes  Venous insufficiency  Dyslipidemia    Continue home medications.    General surgery held lovastatin, sodium bicarbonate, iron supplement.    Risk Assessment: High  DVT Prophylaxis: Per general surgery  Code Status: Full code  Diet: Per general surgery    Advance Care Planning   ACP discussion was held with the patient during this visit. Patient does not have an advance directive, declines further assistance.           Brian Joseph Kerley, DO  09/12/22  16:03 EDT    Dictated utilizing Dragon dictation.

## 2022-09-12 NOTE — PLAN OF CARE
Goal Outcome Evaluation:              Outcome Evaluation: New patient from PACU this shift. VSS att. Maintaining on 2L nasal cannula. Complaint of pain, see MAR. In/out cathed patient this shift after bladder scan showed 513ml. Will continue to monitor.

## 2022-09-12 NOTE — OP NOTE
PATIENT:    Swapnil Lawson    DATE OF SURGERY:  9/12/2022    PHYSICIAN:    Kenji Garcias MD    REFERRING PHYSICIAN:  Kenji Garcias MD    YOB: 1950    PREOPERATIVE DIAGNOSIS: Proximal transverse colon mass    POSTOPERATIVE DIAGNOSIS: Same    PROCEDURE: Laparoscopic hand-assisted right hemicolectomy    Specimen: Right colon    EBL: 50 cc    ANESTHESIA:  General Anesthesia     Complications: None    OPERATIVE PROCEDURE:  The patient was taken to the operating room, placed in the supine position, and given general endotracheal anesthesia.  They were prepped and draped in the normal sterile fashion.  They did receive preoperative IV antibiotics.  The nursing staff did perform intraoperative timeout prior to the incision.    A periumbilical vertical incision was made.  Peritoneal cavity was entered.  GelPort was inserted.  Upper abdominal and left upper quadrant 5 mm ports were then also placed.  Initially omental adhesions were encountered to the gallbladder and liver and removed with Harmonic scalpel.  No gallstones were noted.  Gallbladder otherwise appeared normal.  I turned my attention to the colon.  Terminal ileum and right colon were mobilized by dividing its lateral attachments using the Harmonic scalpel along the white line of Toldt.  Patient has significant adhesion previous open cholecystectomy.  The tattooed area identified in the proximal transverse colon.  The ileocolic vessel was then isolated at its base and dissected free and an Endo YOLETTE stapler was used to divide the mesentery there.  No bleeding was seen at this mesenteric stump.  Hepatic flexure was then mobilized.  The gastrocolic ligament was divided proximally with the Harmonic scalpel.  At this time the right colon was delivered through the wound protector.  Distal ileum was divided using a YOLETTE stapler as was the proximal transverse colon after having made a small window in the mesentery.  There was good vasculature to both  of these sites.  The intervening remaining mesentery was divided between clamps and then ligated with silk ties and specimen removed.  A functional end-to-end anastomosis was then created.  Enterotomies were made and a YOLETTE stapler used to make the initial anastomosis.  TA stapler was used to close the remaining enterotomy.  The base of the anastomosis was reinforced using a silk stitch.  There was some minor bleeding from the mucosa at the anastomosis on both the colon and small bowel which was stopped with some light pressure.  Pulsatile flow in the mesentery in both the colon and small bowel was present.  Mesentery was reapproximated using a figure-of-eight silk suture.  I checked for hemostasis which had been well controlled.  There was no evidence of bleeding at conclusion.  Again the entire area was reinspected laparoscopically.  Fascia was then reapproximated using interrupted figure-of-eight PDS suture.  A running subcuticular PDS closure was used for the skin once the subcutaneous tissue was irrigated.  Dressing was applied.  There were no intraoperative complications.    The patient was stable at this point in time and subsequently transferred back to the recovery room in stable condition.    Kenji Garcias MD  9/12/2022  09:32 EDT

## 2022-09-12 NOTE — ANESTHESIA PROCEDURE NOTES
Peripheral Block    Pre-sedation assessment completed: 9/12/2022 9:10 AM    Patient reassessed immediately prior to procedure    Patient location during procedure: OR  Stop time: 9/12/2022 9:15 AM  Reason for block: at surgeon's request and post-op pain management  Performed by  CRNA/CAA: Jack Fuentes CRNA  Preanesthetic Checklist  Completed: patient identified, IV checked, site marked, risks and benefits discussed, surgical consent, monitors and equipment checked, pre-op evaluation and timeout performed  Prep:  Pt Position: supine  Sterile barriers:cap, gloves, sterile barriers and mask  Prep: ChloraPrep  Patient monitoring: blood pressure monitoring, continuous pulse oximetry and EKG  Procedure    Sedation: yes  Performed under: general  Guidance:ultrasound guided  Images:still images obtained, printed/placed on chart    Laterality:Bilateral  Block Type:TAP  Injection Technique:single-shot  Needle Type:short-bevel and echogenic  Needle Gauge:20 G  Resistance on Injection: none    Medications Used: ropivacaine (NAROPIN) injection 0.5 %, 30 mL  Med administered at 9/12/2022 9:12 AM      Medications  Preservative Free Saline:30ml  Comment:Block Injection:  LA dose divided between Right and Left block.    Ropivacaine 0.5% 15 mL with PF Normal Saline 15mL and 5mg PF Decadron injected on each side.        Post Assessment  Injection Assessment: negative aspiration for heme, incremental injection and no paresthesia on injection  Patient Tolerance:comfortable throughout block  Complications:no  Additional Notes      Under Ultrasound guidance, a BBraun 4inch 360 degree needle was advanced with Normal Saline hydro dissection of tissue.  The Internal Oblique and Transversus Abdominus muscles where visualized.  At or before the aponeurosis of Internal Oblique, local anesthetic spread was visualized in the Transversus Abdominus Plane. Injection was made incrementally with aspiration every 5 mls.  There was no   intravascular injection,  injection pressure was normal, there was no neural injection, and the procedure was completed without difficulty.  Thank You.

## 2022-09-13 PROBLEM — N18.30 CHRONIC KIDNEY DISEASE, STAGE III (MODERATE) (HCC): Status: ACTIVE | Noted: 2022-01-01

## 2022-09-13 NOTE — CASE MANAGEMENT/SOCIAL WORK
Discharge Planning Assessment  [facility]     Patient Name: Swapnil Lawson  MRN: [id number]  Today's Date: 9/13/2022    Admit Date: 9/12/2022     Discharge Needs Assessment     Row Name 09/13/22 1201       Living Environment    People in Home child(michelle), adult    Current Living Arrangements home    Primary Care Provided by self    Provides Primary Care For no one    Family Caregiver if Needed sibling(s);child(michelle), adult    Quality of Family Relationships involved    Able to Return to Prior Arrangements yes       Resource/Environmental Concerns    Resource/Environmental Concerns none    Transportation Concerns none       Transition Planning    Patient/Family Anticipates Transition to home    Patient/Family Anticipated Services at Transition none    Transportation Anticipated family or friend will provide       Discharge Needs Assessment    Readmission Within the Last 30 Days no previous admission in last 30 days    Equipment Currently Used at Home cane, straight;oxygen    Concerns to be Addressed discharge planning    Anticipated Changes Related to Illness none    Equipment Needed After Discharge other (see comments)  TBD    Discharge Facility/Level of Care Needs other (see comments)  TBD               Discharge Plan     Row Name 09/13/22 1202       Plan    Plan DCP    Patient/Family in Agreement with Plan yes    Plan Comments SW met with pt at bedside for discharge planning. Pt lives in [facility] with adult son (36, indep). Pt is independent with ADL’s and able to take care of self. Pt uses a cane at all times to help with mobility assistance. Pt drives himself. Pt receives home O2 at night 2L, but unsure of the company. Pt reports DME Company is through [employer]. SW will attempt to find out which company pt uses. Pt reports brother will be able to provide transportation at d/c. goal is to return home. Pt verified PCP and insurance. Pt denied any d/c needs at this time. SW/CM will continue to follow for d/c  needs.    Final Discharge Disposition Code 30 - still a patient              Continued Care and Services - Admitted Since 9/12/2022    Coordination has not been started for this encounter.       Expected Discharge Date and Time     Expected Discharge Date Expected Discharge Time    Sep 16, 2022          Demographic Summary     Row Name 09/13/22 1200       General Information    Admission Type inpatient    Required Notices Provided Important Message from Medicare    Expected Length of Stay (LOS) IMM given    Referral Source admission list    Reason for Consult discharge planning    Preferred Language English               Functional Status     Row Name 09/13/22 1200       Functional Status, IADL    Medications independent    Meal Preparation independent    Housekeeping independent    Laundry independent    Shopping independent       Mental Status Summary    Recent Changes in Mental Status/Cognitive Functioning no changes       Employment/    Employment Status retired               Psychosocial     Row Name 09/13/22 1200       Developmental Stage (Eriksson's)    Developmental Stage Stage 8 (65 years-death/Late Adulthood) Integrity vs. Despair               Abuse/Neglect    No documentation.                Legal    No documentation.                Substance Abuse    No documentation.                Patient Forms    No documentation.                   XIOMY Wade

## 2022-09-13 NOTE — CONSULTS
Patient: Swapnil Lawson  Procedure(s):  COLON RESECTION LAPAROSCOPIC HAND ASSISTED  Anesthesia type: general with block    Patient location: [facility] Surgical Floor  Last vitals:   Vitals:    09/13/22 1407   BP:    Pulse:    Resp:    Temp:    SpO2: 99%     Level of consciousness: awake, alert and oriented    Post-anesthesia pain: adequate analgesia  Airway patency: patent  Respiratory: unassisted  Cardiovascular: stable and blood pressure at baseline  Hydration: euvolemic    Anesthetic complications: no

## 2022-09-13 NOTE — PLAN OF CARE
VSS; afebrile.     IV: Infusing D5 1/2 NS @ 100ml/hr  RESP: Sats >90% on room air, no c/o SOA  CARDIO: No tele. Pulses equal bilaterally  SKIN: ABD sites covered. Minimal drainage noted. Generalized bruising; steven discoloration BLE.   MUSC: pt ambulates with standby assist; no c/o fatigue  NEURO: Alert and oriented x4  GI: Pt has had 2 medium sized BM today; both presented with bright red colored blood in toilet bowl. Amount of blood present in stool has decreased throughout the day. Physician aware      Will continue to monitor    _______________________________        Problem: Bleeding (Bowel Resection)  Goal: Absence of Bleeding  Outcome: Ongoing, Not Progressing     Problem: Adult Inpatient Plan of Care  Goal: Plan of Care Review  Outcome: Ongoing, Progressing     Problem: Fall Injury Risk  Goal: Absence of Fall and Fall-Related Injury  Outcome: Ongoing, Progressing  Intervention: Identify and Manage Contributors  Recent Flowsheet Documentation  Taken 9/13/2022 1407 by Guerda Cook RN  Medication Review/Management: medications reviewed  Taken 9/13/2022 0800 by Guerda Cook RN  Medication Review/Management: medications reviewed  Intervention: Promote Injury-Free Environment  Recent Flowsheet Documentation  Taken 9/13/2022 1407 by Guerda Cook RN  Safety Promotion/Fall Prevention: safety round/check completed  Taken 9/13/2022 1200 by Guerda Cook RN  Safety Promotion/Fall Prevention: safety round/check completed  Taken 9/13/2022 1001 by Guerda Cook RN  Safety Promotion/Fall Prevention: safety round/check completed  Taken 9/13/2022 0800 by Guerda Cook RN  Safety Promotion/Fall Prevention: safety round/check completed     Problem: Skin Injury Risk Increased  Goal: Skin Health and Integrity  Outcome: Ongoing, Progressing  Intervention: Optimize Skin Protection  Recent Flowsheet Documentation  Taken 9/13/2022 1200 by Guerda Cook RN  Head of Bed (HOB) Positioning: HOB at 20-30 degrees  Taken  9/13/2022 1001 by Guerda Cook, RN  Head of Bed (HOB) Positioning: HOB at 20-30 degrees  Taken 9/13/2022 0800 by Guerda Cook, LESY  Head of Bed (HOB) Positioning: HOB at 20 degrees   Goal Outcome Evaluation:

## 2022-09-13 NOTE — PROGRESS NOTES
LOS: 1 day   Patient Care Team:  Argenis Osborne APRN as PCP - General (Family Medicine)      Chief Complaint: Postop right colectomy      Interval History: Patient doing well, has some flatus and small bowel movement last night.  Still only 24 hours postop.    Patient Complaints: None    History taken from: patient    Vital Signs  Temp:  [97.6 °F (36.4 °C)-98.5 °F (36.9 °C)] 97.9 °F (36.6 °C)  Heart Rate:  [72-86] 81  Resp:  [14-20] 18  BP: ()/() 130/82    Physical Exam:     General Appearance:    Alert, cooperative, in no acute distress   Head:    Normocephalic, without obvious abnormality, atraumatic   Lungs:     Clear to auscultation,respirations regular, even and                  unlabored    Heart:    Regular rhythm and normal rate, normal S1 and S2, no            murmur, no gallop, no rub, no click   Abdomen:     Normal bowel sounds, no masses, no organomegaly, soft        non-tender, non-distended, no guarding, no rebound                Tenderness.  Wounds look good, no redness or drainage   Extremities:   Moves all extremities well, no edema, no cyanosis, no             redness   Pulses:   Pulses palpable and equal bilaterally   Skin:   No bleeding, bruising or rash        Results Review:       Lab Results (last 24 hours)     Procedure Component Value Units Date/Time    POC Glucose Once [[id number]]  (Abnormal) Collected: 09/12/22 1110    Specimen: Blood Updated: 09/12/22 1112     Glucose 180 mg/dL      Comment: Serial Number: [id number]:  [id number]       TISSUE EXAM, [facility] ([facility]) [[id number]] Collected: 09/12/22 0900    Specimen: Tissue from Large Intestine, Right / Ascending Colon Updated: 09/12/22 0930    POC Glucose Once [[id number]]  (Normal) Collected: 09/12/22 0622    Specimen: Blood Updated: 09/12/22 0627     Glucose 95 mg/dL      Comment: Serial Number: [id number]:  [id number]                 Assessment & Plan       Adenomatous polyp of transverse  colon      Postop, doing well.  We will continue clear liquids today.  Ambulate in hallway as well.  Pathology pending.  We will check labs today.      Kenji Garcias MD  09/13/22  06:18 EDT

## 2022-09-13 NOTE — PLAN OF CARE
Goal Outcome Evaluation:  Plan of Care Reviewed With: patient        Progress: no change  Outcome Evaluation: VSS; pt ambulating in room with assist; bleeding from upper lap site with ambulation noted-dressing placed over steri-strip; pain controlled with prn pain meds; pt tolerating clear liquid diet

## 2022-09-13 NOTE — PROGRESS NOTES
[facility]    Name:  Swapnil Lawson   Age:  72 y.o.  Sex:  male  :  1950  MRN:  [id number]   Visit Number:  [id number]  Admission Date:  2022  Date Of Service:  22  Primary Care Physician:  Argenis Osborne APRN     LOS: 1 day :    Chief Complaint:      Abdominal pain.    Subjective:    Mr. Lawson was seen and examined this morning.  He is currently sitting up by the side of the bed and is feeling better.  His abdominal pain has improved.  He has had some minimal bright red blood in stools since this morning.  It is however improving through the day.  Denies any nausea or vomiting.  He is currently tolerating clear liquid diet.  He is passing flatus.  Previous physician documentation, laboratory and imaging data have been reviewed.    Hospital Course:    Mr. Lawson is a 72-year-old male with past medical history of CAD, 2 L nasal cannula oxygen requirement at night, hypertension, paroxysmal atrial fibrillation on warfarin, diabetes type II, venous insufficiency, dyslipidemia, history colon polyps was admitted on 2022 following right hemicolectomy for resection of adenomatous polyp of transverse colon by Dr. Garcias.  Polyp had originally been found on colonoscopy.  Patient remained hemodynamically stable postoperatively but did require nasal cannula oxygen to maintain saturations above 90%.  He was continued on IV fluids and his home medications.  His warfarin was placed on hold due to some bright red blood per rectum following surgery.  He was placed on clear liquid diet postoperatively.    Review of Systems:     All systems were reviewed and negative except as mentioned in subjective, assessment and plan.    Vital Signs:    Temp:  [96.6 °F (35.9 °C)-98.5 °F (36.9 °C)] 96.6 °F (35.9 °C)  Heart Rate:  [72-99] 99  Resp:  [17-18] 17  BP: (113-132)/(59-82) 120/72    Intake and output:    I/O last 3 completed shifts:  In: 2573 [P.O.:240;  I.V.:2233; IV Piggyback:100]  Out: 550 [Urine:550]  I/O this shift:  In: 240 [P.O.:240]  Out: -     Physical Examination:    General Appearance:  Alert and cooperative.    Head:  Atraumatic and normocephalic.   Eyes: Conjunctivae and sclerae normal, no icterus. No pallor.   Throat: No oral lesions, no thrush, oral mucosa moist.   Neck: Supple, trachea midline, no thyromegaly.   Lungs:   Breath sounds heard bilaterally equally.  No wheezing or crackles. No Pleural rub or bronchial breathing.   Heart:  Normal S1 and S2, no murmur, no gallop, no rub. No JVD.  CABG scar noted.   Abdomen:   Normal bowel sounds, no masses, no organomegaly. Soft, nontender, nondistended, no rebound tenderness.  Midline surgical scar noted with surgical bandage.   Extremities: Supple, no edema, no cyanosis, no clubbing.  Ecchymotic patches noted in upper extremities.   Skin: No bleeding or rash.   Neurologic: Alert and oriented x 3. No facial asymmetry. Moves all four limbs. No tremors.      Laboratory results:    Results from last 7 days   Lab Units 09/13/22  0618 09/09/22  1143   SODIUM mmol/L 132* 137   POTASSIUM mmol/L 4.8 4.5   CHLORIDE mmol/L 103 108*   CO2 mmol/L 18.1* 19.5*   BUN mg/dL 30* 36*   CREATININE mg/dL 1.92* 1.88*   CALCIUM mg/dL 8.8 9.7   GLUCOSE mg/dL 233* 112*     Results from last 7 days   Lab Units 09/13/22  0618 09/09/22  1143   WBC 10*3/mm3 15.92* 8.95   HEMOGLOBIN g/dL 12.3* 14.5   HEMATOCRIT % 36.9* 44.1   PLATELETS 10*3/mm3 123* 106*     I have reviewed the patient's laboratory results.    Radiology results:    Peripheral Block    Result Date: 9/12/2022  Jack Fuentes CRNA     9/12/2022  9:26 AM Peripheral Block Pre-sedation assessment completed: 9/12/2022 9:10 AM Patient reassessed immediately prior to procedure Patient location during procedure: OR Stop time: 9/12/2022 9:15 AM Reason for block: at surgeon's request and post-op pain management Performed by LORETTA/CAA: Jack Fuentes, LORETTA Preanesthetic  Checklist Completed: patient identified, IV checked, site marked, risks and benefits discussed, surgical consent, monitors and equipment checked, pre-op evaluation and timeout performed Prep: Pt Position: supine Sterile barriers:cap, gloves, sterile barriers and mask Prep: ChloraPrep Patient monitoring: blood pressure monitoring, continuous pulse oximetry and EKG Procedure Sedation: yes Performed under: general Guidance:ultrasound guided Images:still images obtained, printed/placed on chart Laterality:Bilateral Block Type:TAP Injection Technique:single-shot Needle Type:short-bevel and echogenic Needle Gauge:20 G Resistance on Injection: none Medications Used: ropivacaine (NAROPIN) injection 0.5 %, 30 mL Med administered at 9/12/2022 9:12 AM Medications Preservative Free Saline:30ml Comment:Block Injection:  LA dose divided between Right and Left block. Ropivacaine 0.5% 15 mL with PF Normal Saline 15mL and 5mg PF Decadron injected on each side. Post Assessment Injection Assessment: negative aspiration for heme, incremental injection and no paresthesia on injection Patient Tolerance:comfortable throughout block Complications:no Additional Notes   Under Ultrasound guidance, a BBraun 4inch 360 degree needle was advanced with Normal Saline hydro dissection of tissue.  The Internal Oblique and Transversus Abdominus muscles where visualized.  At or before the aponeurosis of Internal Oblique, local anesthetic spread was visualized in the Transversus Abdominus Plane. Injection was made incrementally with aspiration every 5 mls.  There was no  intravascular injection,  injection pressure was normal, there was no neural injection, and the procedure was completed without difficulty.  Thank You.     Peripheral Block    Result Date: 9/12/2022  Jose Cruz Hargrove, CRNA     9/12/2022  7:48 AM Peripheral Block     I have reviewed the patient's radiology reports.    Medication Review:     I have reviewed the patient's active and  prn medications.     Problem List:      Adenomatous polyp of transverse colon    Coronary artery disease    Hypertension    Paroxysmal atrial fibrillation (HCC)    Type 2 diabetes mellitus with nephropathy (HCC)    Chronic kidney disease, stage III (moderate) (HCC)    Assessment:    1. Status post right hemicolectomy secondary to colonic polyp.  2. Coronary artery disease status post CABG.  3. Paroxysmal atrial fibrillation currently off warfarin.  4. Diabetes mellitus type 2 with nephropathy.  5. Chronic kidney disease stage III.  6. Chronic venous insufficiency.    Plan:    Mr. Lawson is currently doing better with regards to his abdominal pain and tolerating clear liquid diet.  He is having some bloody bowel movements likely secondary to the surgery itself.  His hemoglobin has remained stable and we will continue to monitor for further lower GI bleeding.  We will continue to hold Coumadin today.    I have strongly advised the patient is the incentive spirometry and walk around in the hallway as much as possible.  He states that he lives with his son and would like to go home when discharged.  Discussed with nursing staff and multidisciplinary team.    DVT Prophylaxis: Heparin  Code Status: Full  Diet: Clear liquids  Discharge Plan: Hopefully, home in 1 to 2 days.    Kishore Eng MD  09/13/22  14:15 EDT    Dictated utilizing Dragon dictation.

## 2022-09-14 NOTE — PLAN OF CARE
Goal Outcome Evaluation:  Plan of Care Reviewed With: patient        Progress: improving  Outcome Evaluation: VSS; pt reports no blood noted in BM; refused pain meds; continues to tolerate clear liquids

## 2022-09-14 NOTE — PROGRESS NOTES
[facility]    [facility]    Name:  Swapnil Lawson   Age:  72 y.o.  Sex:  male  :  1950  MRN:  [id number]   Visit Number:  [id number]  Admission Date:  2022  Date Of Service:  22  Primary Care Physician:  Argenis Osborne APRN     LOS: 2 days :    Chief Complaint:      Follow-up of colectomy.    Subjective:    Mr. Lawson was seen and examined this morning.  Patient is currently getting a sponge bath.  He states that he has been walking around in the room without any difficulty.  His abdominal pain has improved.  He has been tolerating clear liquid diet.  He still has some melanotic stools.  Denies any nausea or vomiting.  He did drop his hemoglobin over the last couple of days.  Denies any dizziness.  He was seen by Dr. Garcias this morning and he has advance him to soft diet.    Hospital Course:    Mr. Lawson is a 72-year-old male with past medical history of CAD, 2 L nasal cannula oxygen requirement at night, hypertension, paroxysmal atrial fibrillation on warfarin, diabetes type II, venous insufficiency, dyslipidemia, history colon polyps was admitted on 2022 following right hemicolectomy for resection of adenomatous polyp of transverse colon by Dr. Garcias.  Polyp had originally been found on colonoscopy.  Patient remained hemodynamically stable postoperatively but did require nasal cannula oxygen to maintain saturations above 90%.  He was continued on IV fluids and his home medications.  His warfarin was placed on hold due to some bright red blood per rectum following surgery.  He was placed on clear liquid diet postoperatively.    Patient did have postoperative hematochezia/melena possibly bleeding from the anastomotic site.  He did drop his hemoglobin postoperatively.  His Coumadin was continued to be held since admission.  He was initially on heparin for DVT prophylaxis which was also subsequently discontinued.  Patient did have slight bump in his  creatinine and he was placed on IV fluids.    Review of Systems:     All systems were reviewed and negative except as mentioned in subjective, assessment and plan.    Vital Signs:    Temp:  [96.6 °F (35.9 °C)-98.6 °F (37 °C)] 98 °F (36.7 °C)  Heart Rate:  [] 82  Resp:  [17-18] 18  BP: (101-126)/(62-93) 114/72    Intake and output:    I/O last 3 completed shifts:  In: 3853 [P.O.:720; I.V.:3133]  Out: 450 [Urine:450]  I/O this shift:  In: 639.3 [P.O.:240; I.V.:399.3]  Out: -     Physical Examination:    General Appearance:  Alert and cooperative.    Head:  Atraumatic and normocephalic.   Eyes: Conjunctivae and sclerae normal, no icterus. No pallor.   Throat: No oral lesions, no thrush, oral mucosa moist.   Neck: Supple, trachea midline, no thyromegaly.   Lungs:   Breath sounds heard bilaterally equally.  No wheezing or crackles. No Pleural rub or bronchial breathing.   Heart:  Normal S1 and S2, no murmur, no gallop, no rub. No JVD.  CABG scar noted.   Abdomen:   Normal bowel sounds, no masses, no organomegaly. Soft, nontender, nondistended, no rebound tenderness.  Midline surgical scar noted with surgical bandage.   Extremities: Supple, no edema, no cyanosis, no clubbing.  Ecchymotic patches noted in upper extremities.   Skin: No bleeding or rash.   Neurologic: Alert and oriented x 3. No facial asymmetry. Moves all four limbs. No tremors.      Laboratory results:    Results from last 7 days   Lab Units 09/14/22  0515 09/13/22  0618 09/09/22  1143   SODIUM mmol/L 132* 132* 137   POTASSIUM mmol/L 5.2 4.8 4.5   CHLORIDE mmol/L 102 103 108*   CO2 mmol/L 21.1* 18.1* 19.5*   BUN mg/dL 30* 30* 36*   CREATININE mg/dL 2.01* 1.92* 1.88*   CALCIUM mg/dL 9.0 8.8 9.7   GLUCOSE mg/dL 166* 233* 112*     Results from last 7 days   Lab Units 09/14/22  0515 09/13/22  0618 09/09/22  1143   WBC 10*3/mm3 12.41* 15.92* 8.95   HEMOGLOBIN g/dL 9.7* 12.3* 14.5   HEMATOCRIT % 28.5* 36.9* 44.1   PLATELETS 10*3/mm3 87* 123* 106*     I have  reviewed the patient's laboratory results.    Radiology results:    No radiology results from the last 24 hrs    Medication Review:     I have reviewed the patient's active and prn medications.     Problem List:      Adenomatous polyp of transverse colon    Coronary artery disease    Hypertension    Paroxysmal atrial fibrillation (HCC)    Type 2 diabetes mellitus with nephropathy (HCC)    Chronic kidney disease, stage III (moderate) (HCC)    Assessment:    1. Status post right hemicolectomy secondary to colonic polyp.  2. Coronary artery disease status post CABG.  3. Paroxysmal atrial fibrillation currently off warfarin.  4. Diabetes mellitus type 2 with nephropathy.  5. Chronic kidney disease stage III.  6. Chronic venous insufficiency.    Plan:    Mr. Lawson is currently doing better with regards to his abdominal pain and tolerating clear liquid diet.  He is having some bloody bowel movements likely secondary to oozing from the anastomotic site.  He has dropped his hemoglobin from 12 to 10.  His creatinine also has slightly bumped up.  We will continue to maintain him on IV fluids.    We will continue to hold the Coumadin at this time.  His INR is 1.5.  We will also discontinue the heparin today.  Patient also has mild thrombocytopenia as well.  He is being followed by Dr. Garcias from surgery and he has advanced his diet to GI soft diet.    I have strongly advised the patient is the incentive spirometry and walk around in the hallway as much as possible.  He states that he lives with his son and would like to go home when discharged.  Discussed with nursing staff and multidisciplinary team.    DVT Prophylaxis: SCDs  Code Status: Full  Diet: GI soft diet  Discharge Plan: Hopefully, home tomorrow.    Kishore Eng MD  09/14/22  11:03 EDT    Dictated utilizing Dragon dictation.

## 2022-09-14 NOTE — PROGRESS NOTES
LOS: 2 days   Patient Care Team:  Argenis Osborne APRN as PCP - General (Family Medicine)      Chief Complaint: Postop right colectomy      Interval History: Patient doing well, had bloody bowel movements and some bleeding from the wounds.  Coumadin has been held, also will start holding heparin today.  Hemoglobin dropped to 9.7, INR pending.  No nausea or vomiting.  Abdominal pain under control.    Patient Complaints: None    History taken from: patient    Vital Signs  Temp:  [96.6 °F (35.9 °C)-98.6 °F (37 °C)] 98 °F (36.7 °C)  Heart Rate:  [] 82  Resp:  [17-18] 18  BP: (101-126)/(62-93) 114/72    Physical Exam:     General Appearance:    Alert, cooperative, in no acute distress   Head:    Normocephalic, without obvious abnormality, atraumatic   Lungs:     Clear to auscultation,respirations regular, even and                  unlabored    Heart:    Regular rhythm and normal rate, normal S1 and S2, no            murmur, no gallop, no rub, no click   Abdomen:     Normal bowel sounds, no masses, no organomegaly, soft        non-tender, non-distended, no guarding, no rebound                Tenderness.  Wound looks good, no redness.  Stigmata of bleeding.   Extremities:   Moves all extremities well, no edema, no cyanosis, no             redness   Pulses:   Pulses palpable and equal bilaterally   Skin:   No bleeding, bruising or rash        Results Review:       Lab Results (last 24 hours)     Procedure Component Value Units Date/Time    Protime-INR [[id number]]  (Abnormal) Collected: 09/14/22 0515    Specimen: Blood Updated: 09/14/22 0743     Protime 18.5 Seconds      INR 1.48    Narrative:      Suggested INR therapeutic range for stable oral anticoagulant therapy:    Low Intensity therapy:   1.5-2.0  Moderate Intensity therapy:   2.0-3.0  High Intensity therapy:   2.5-4.0    Basic Metabolic Panel [[id number]]  (Abnormal) Collected: 09/14/22 0515    Specimen: Blood Updated: 09/14/22 0559     Glucose 166  mg/dL      BUN 30 mg/dL      Creatinine 2.01 mg/dL      Sodium 132 mmol/L      Potassium 5.2 mmol/L      Chloride 102 mmol/L      CO2 21.1 mmol/L      Calcium 9.0 mg/dL      BUN/Creatinine Ratio 14.9     Anion Gap 8.9 mmol/L      eGFR 34.4 mL/min/1.73      Comment: National Kidney Foundation and American Society of Nephrology (ASN) Task Force recommended calculation based on the Chronic Kidney Disease Epidemiology Collaboration (CKD-EPI) equation refit without adjustment for race.       Narrative:      GFR Normal >60  Chronic Kidney Disease <60  Kidney Failure <15      CBC (No Diff) [[id number]]  (Abnormal) Collected: 09/14/22 0515    Specimen: Blood Updated: 09/14/22 0544     WBC 12.41 10*3/mm3      RBC 3.19 10*6/mm3      Hemoglobin 9.7 g/dL      Hematocrit 28.5 %      MCV 89.3 fL      MCH 30.4 pg      MCHC 34.0 g/dL      RDW 15.5 %      RDW-SD 50.5 fl      MPV 9.9 fL      Platelets 87 10*3/mm3               Assessment & Plan       Adenomatous polyp of transverse colon    Coronary artery disease    Hypertension    Paroxysmal atrial fibrillation (HCC)    Type 2 diabetes mellitus with nephropathy (HCC)    Chronic kidney disease, stage III (moderate) (HCC)      Postop, doing well.  GI bleed still from surgical anastomosis most likely.  We will stop heparin and hold off on Coumadin as well.  Ambulated patient may shower.  Will advance to GI soft diet hopefully can discharge home tomorrow.      Kenji Garcias MD  09/14/22  08:03 EDT

## 2022-09-14 NOTE — PLAN OF CARE
Goal Outcome Evaluation:      Patient has had no acute events this shift. Pain well controlled. Ambulating in room independently.      Progress: improving

## 2022-09-15 NOTE — NURSING NOTE
H/H: 8.8/26.8  Pt denies having any BM's overnight/throughout shift. No evidence of tuan bleeding noted. Pt noted to ambulate w/ steady gait. Pt appears/remains in no acute distress. Will continue to monitor.

## 2022-09-15 NOTE — PLAN OF CARE
Problem: Adult Inpatient Plan of Care  Goal: Plan of Care Review  Outcome: Ongoing, Progressing  Goal: Patient-Specific Goal (Individualized)  Outcome: Ongoing, Progressing  Goal: Absence of Hospital-Acquired Illness or Injury  Outcome: Ongoing, Progressing  Intervention: Identify and Manage Fall Risk  Recent Flowsheet Documentation  Taken 9/14/2022 2000 by Silvio Paris RN  Safety Promotion/Fall Prevention: safety round/check completed  Intervention: Prevent Skin Injury  Recent Flowsheet Documentation  Taken 9/14/2022 2000 by Silvio Paris RN  Body Position: position changed independently  Intervention: Prevent and Manage VTE (Venous Thromboembolism) Risk  Recent Flowsheet Documentation  Taken 9/14/2022 2000 by Silvio Paris RN  Activity Management: activity adjusted per tolerance  Goal: Optimal Comfort and Wellbeing  Outcome: Ongoing, Progressing  Goal: Readiness for Transition of Care  Outcome: Ongoing, Progressing     Problem: Fall Injury Risk  Goal: Absence of Fall and Fall-Related Injury  Outcome: Ongoing, Progressing  Intervention: Promote Injury-Free Environment  Recent Flowsheet Documentation  Taken 9/14/2022 2000 by Silvio Paris RN  Safety Promotion/Fall Prevention: safety round/check completed     Problem: Asthma Comorbidity  Goal: Maintenance of Asthma Control  Outcome: Ongoing, Progressing     Problem: COPD (Chronic Obstructive Pulmonary Disease) Comorbidity  Goal: Maintenance of COPD Symptom Control  Outcome: Ongoing, Progressing     Problem: Diabetes Comorbidity  Goal: Blood Glucose Level Within Targeted Range  Outcome: Ongoing, Progressing     Problem: Hypertension Comorbidity  Goal: Blood Pressure in Desired Range  Outcome: Ongoing, Progressing     Problem: Skin Injury Risk Increased  Goal: Skin Health and Integrity  Outcome: Ongoing, Progressing     Problem: Bleeding (Bowel Resection)  Goal: Absence of Bleeding  Outcome: Ongoing, Progressing     Problem: Fluid and Electrolyte Imbalance  (Bowel Resection)  Goal: Fluid and Electrolyte Balance  Outcome: Ongoing, Progressing     Problem: Pain (Bowel Resection)  Goal: Acceptable Pain Control  Outcome: Ongoing, Progressing     Problem: Postoperative Urinary Retention (Bowel Resection)  Goal: Effective Urinary Elimination  Outcome: Ongoing, Progressing     Problem: Impaired Wound Healing  Goal: Optimal Wound Healing  Outcome: Ongoing, Progressing  Intervention: Promote Wound Healing  Recent Flowsheet Documentation  Taken 9/14/2022 2000 by Silvio Paris, RN  Activity Management: activity adjusted per tolerance     Problem: Pain Acute  Goal: Acceptable Pain Control and Functional Ability  Outcome: Ongoing, Progressing   Goal Outcome Evaluation:

## 2022-09-15 NOTE — PLAN OF CARE
Goal Outcome Evaluation:            Pt. To be discharged home today. Awaiting transportation from son. VSS. Pt. A&OX3. Discharge instructions reviewed with pt. All questions and concerns addressed. Pt. Voices no concerns at this time.

## 2022-09-15 NOTE — DISCHARGE SUMMARY
[facility]   DISCHARGE SUMMARY      Name:  Swapnil Lawson   Age:  72 y.o.  Sex:  male  :  1950  MRN:  [id number]   Visit Number:  [id number]    Admission Date:  2022  Date of Discharge:  9/15/2022  Primary Care Physician:  Argenis Osborne APRN    Important issues to note:    1.  Patient was admitted following right hemicolectomy for a large polyp.  Patient did have postoperative anemia but did not require any blood transfusions.  He also had mild worsening of his renal function which is also improving with hydration.  Patient has been advised to hold his Coumadin for another 4 days and restart back on Monday.  Due to anemia, he has been started on iron tablets.  2.  Follow-up with Dr. Garcias as scheduled on 2022.  3.  Follow-up with primary care provider in 2 weeks.  4.  Follow-up with primary nephrologist as scheduled for chronic kidney disease.    Discharge Diagnoses:     1. Status post right hemicolectomy secondary to colonic polyp.  2. Coronary artery disease status post CABG.  3. Paroxysmal atrial fibrillation currently off warfarin- restart back on 2022.  4. Postoperative anemia, did not require blood transfusions.  5. Diabetes mellitus type 2 with nephropathy.  6. Chronic kidney disease stage III.  7. Chronic venous insufficiency.    Problem List:     Active Hospital Problems    Diagnosis  POA   • **Adenomatous polyp of transverse colon [D12.3]  Yes   • Chronic kidney disease, stage III (moderate) (HCC) [N18.30]  Yes   • Coronary artery disease [I25.10]  Yes   • Type 2 diabetes mellitus with nephropathy (HCC) [E11.21]  Yes   • Hypertension [I10]  Yes   • Paroxysmal atrial fibrillation (HCC) [I48.0]  Yes      Resolved Hospital Problems   No resolved problems to display.     Consults:     General surgery: Dr. Garcias    Procedures Performed:    COLON RESECTION LAPAROSCOPIC HAND ASSISTED on 2022.    History of presenting illness/Hospital  Course:    Mr. Lawson is a 72-year-old male with past medical history of CAD, 2 L nasal cannula oxygen requirement at night, hypertension, paroxysmal atrial fibrillation on warfarin, diabetes type II, venous insufficiency, dyslipidemia, history colon polyps was admitted on 9/12/2022 following right hemicolectomy for resection of adenomatous polyp of transverse colon by Dr. Garcias.  Polyp had originally been found on colonoscopy.  Patient remained hemodynamically stable postoperatively but did require nasal cannula oxygen to maintain saturations above 90%.  He was continued on IV fluids and his home medications.  His warfarin was placed on hold due to some bright red blood per rectum following surgery.  He was placed on clear liquid diet postoperatively.     Patient did have postoperative hematochezia/melena possibly bleeding from the anastomotic site.  He did drop his hemoglobin postoperatively.  His Coumadin was continued to be held since admission.  He was initially on heparin for DVT prophylaxis which was also subsequently discontinued.  Patient did have slight bump in his creatinine and he was placed on IV fluids.  His renal function improved back to baseline.  He does have chronic kidney disease and follows up with nephrology as an outpatient.  Patient's hemoglobin did drop postoperatively but he did not require any blood transfusions.  He did have postoperative lower GI bleeding which has stopped at this time.  He is tolerating GI soft diet.  He has been walking around in the room without any difficulty.  Due to postoperative anemia, he has been advised to hold his Coumadin until 9/19/2022.  He is advised to follow-up with his primary care provider for INR checks and adjust the dose of Coumadin as per INR levels.  He is advised to follow-up with Dr. Garcias in 9/23/2022 and an appointment has already been made.  I have discussed the patient's discharge plan with Dr. Garcias over the phone.  Patient declined home  health services at this time.    Vital Signs:    Temp:  [97.3 °F (36.3 °C)-98.6 °F (37 °C)] 98.3 °F (36.8 °C)  Heart Rate:  [74-85] 76  Resp:  [16] 16  BP: (107-128)/(63-71) 107/71    Physical Exam:    General Appearance:  Alert and cooperative.   Head:  Atraumatic and normocephalic.   Eyes: Conjunctivae and sclerae normal, no icterus. No pallor.   Ears:  Ears with no abnormalities noted.   Throat: No oral lesions, no thrush, oral mucosa moist.   Neck: Supple, trachea midline, no thyromegaly.   Back:   No kyphoscoliosis present. No tenderness to palpation.   Lungs:   Breath sounds heard bilaterally equally.  No crackles or wheezing. No Pleural rub or bronchial breathing.   Heart:  Normal S1 and S2, no murmur, no gallop, no rub. No JVD.  CABG scar noted.   Abdomen:   Normal bowel sounds, no masses, no organomegaly. Soft, nontender, nondistended, no rebound tenderness.  Midline surgical scar noted and seems to be healing well.   Extremities: Supple, no edema, no cyanosis, no clubbing.  Ecchymotic patches noted on upper extremities.   Pulses: Pulses palpable bilaterally.   Skin: No bleeding or rash.   Neurologic: Alert and oriented x 3. No facial asymmetry. Moves all four limbs. No tremors.     Pertinent Lab Results:     Results from last 7 days   Lab Units 09/15/22  0511 09/14/22  0515 09/13/22  0618   SODIUM mmol/L 133* 132* 132*   POTASSIUM mmol/L 5.0 5.2 4.8   CHLORIDE mmol/L 105 102 103   CO2 mmol/L 24.0 21.1* 18.1*   BUN mg/dL 29* 30* 30*   CREATININE mg/dL 1.99* 2.01* 1.92*   CALCIUM mg/dL 8.7 9.0 8.8   GLUCOSE mg/dL 155* 166* 233*     Results from last 7 days   Lab Units 09/15/22  0511 09/14/22  0515 09/13/22  0618   WBC 10*3/mm3 7.76 12.41* 15.92*   HEMOGLOBIN g/dL 8.8* 9.7* 12.3*   HEMATOCRIT % 26.8* 28.5* 36.9*   PLATELETS 10*3/mm3 76* 87* 123*     Results from last 7 days   Lab Units 09/15/22  0511 09/14/22  0515   INR  1.55* 1.48*     Pertinent Radiology Results:    Imaging Results (All)     None         Condition on Discharge:      Stable.    Code status during the hospital stay:    Code Status and Medical Interventions:   Ordered at: 09/12/22 1044     Code Status (Patient has no pulse and is not breathing):    CPR (Attempt to Resuscitate)     Medical Interventions (Patient has pulse or is breathing):    Full Support     Release to patient:    Routine Release     Discharge Disposition:    Home or Self Care    Discharge Medications:       Discharge Medications      New Medications      Instructions Start Date   ferrous gluconate 324 MG tablet  Commonly known as: FERGON   324 mg, Oral, Daily With Breakfast      HYDROcodone-acetaminophen 7.5-325 MG per tablet  Commonly known as: NORCO   1 tablet, Oral, Every 6 Hours PRN         Changes to Medications      Instructions Start Date   warfarin 5 MG tablet  Commonly known as: COUMADIN  What changed: These instructions start on September 19, 2022. If you are unsure what to do until then, ask your doctor or other care provider.   5 mg, Oral, Daily Warfarin, 5mg every other day  2.5mg every other day alternating   Start Date: September 19, 2022        Continue These Medications      Instructions Start Date   allopurinol 300 MG tablet  Commonly known as: ZYLOPRIM   300 mg, Oral, Daily      amLODIPine 2.5 MG tablet  Commonly known as: NORVASC   2.5 mg, Oral, Daily      aspirin 81 MG tablet   81 mg, Oral, Daily      colchicine 0.6 MG tablet   0.6 mg, Oral, As Needed      digoxin 125 MCG tablet  Commonly known as: LANOXIN   62.5 mcg, Oral, Daily Digoxin      diphenhydrAMINE 25 MG tablet  Commonly known as: BENADRYL   25 mg, Oral, Every Night at Bedtime      docusate sodium 100 MG capsule  Commonly known as: COLACE   300 mg, Oral, 2 Times Daily PRN      ferrous sulfate 325 (65 FE) MG tablet   325 mg, Oral, 2 Times Daily      furosemide 40 MG tablet  Commonly known as: LASIX   Take 1/2-1 tablets by mouth daily      ipratropium 0.02 % nebulizer solution  Commonly known as:  ATROVENT   0.5 mg, Nebulization, Every 4 Hours PRN      ipratropium 17 MCG/ACT inhaler  Commonly known as: ATROVENT HFA   2 puffs, Inhalation, 4 Times Daily - RT      lovastatin 20 MG tablet  Commonly known as: MEVACOR   20 mg, Oral, Nightly      metoprolol succinate XL 25 MG 24 hr tablet  Commonly known as: TOPROL-XL   25 mg, Oral, Daily      nitroglycerin 0.4 MG SL tablet  Commonly known as: NITROSTAT   0.4 mg, Sublingual, Every 5 Minutes PRN, Take no more than 3 doses in 15 minutes.  States he has not taken since 1993      O2  Commonly known as: OXYGEN   2 L/min, Inhalation, Nightly      polycarbophil 625 MG tablet tablet   625 mg, Oral, 2 Times Daily, Takes 2 tablets BID      polyethylene glycol 17 GM/SCOOP powder  Commonly known as: MIRALAX   Mix 238g powder with 64 oz of clear liquid. Starting at 5pm drink 80z every 10-15 minutes until consumed.      potassium chloride 10 MEQ CR capsule  Commonly known as: MICRO-K   10 mEq, Oral, 2 Times Daily      SODIUM BICARBONATE PO   Oral, 2 Times Daily, 10 gram tablet per patient report      Trulicity 1.5 MG/0.5ML solution pen-injector  Generic drug: Dulaglutide   Subcutaneous, Weekly         Stop These Medications    bisacodyl 5 MG EC tablet  Generic drug: bisacodyl          Discharge Diet:     Diet Instructions     Diet: Cardiac, Soft Texture; Thin Liquids, No Restrictions; Whole      Discharge Diet:  Cardiac  Soft Texture       Fluid Consistency: Thin Liquids, No Restrictions    Soft Options: Whole        Activity at Discharge:     Activity Instructions     Activity as Tolerated          Follow-up Appointments:     Follow-up Information     Argenis Osborne APRN Follow up in 2 week(s).    Specialty: Family Medicine  Contact information:  [address] [facility] [address] [phone]             Kenji Garcias MD Follow up on 9/23/2022.    Specialty: General Surgery  Contact information:  [address] [address] [phone]                        Future Appointments   Date Time Provider Department Center   9/23/2022  1:50 PM Kenji Garcias MD [facility]   12/22/2022  9:30 AM Naseem Campbell III, MD [facility]     Test Results Pending at Discharge:    None.       Kishore Eng MD  09/15/22  08:48 EDT    Time: I spent 25 minutes on this discharge activity which included: face-to-face encounter with the patient, reviewing the data in the system, coordination of the care with the nursing staff as well as consultants, documentation, and entering orders.     Dictated utilizing Dragon dictation.

## 2022-09-15 NOTE — CASE MANAGEMENT/SOCIAL WORK
Case Management Discharge Note                Selected Continued Care - Discharged on 9/15/2022 Admission date: 9/12/2022 - Discharge disposition: Home or Self Care    Destination    No services have been selected for the patient.              Durable Medical Equipment    No services have been selected for the patient.              Dialysis/Infusion    No services have been selected for the patient.              Home Medical Care    No services have been selected for the patient.              Therapy    No services have been selected for the patient.              Community Resources    No services have been selected for the patient.              Community & DME    No services have been selected for the patient.                  Transportation Services  Private: Car    Final Discharge Disposition Code: 01 - home or self-care

## 2022-09-16 ENCOUNTER — CARE COORDINATION (OUTPATIENT)
Dept: CARE COORDINATION | Age: 72
End: 2022-09-16

## 2022-09-16 RX ORDER — DOXYCYCLINE HYCLATE 50 MG/1
324 CAPSULE, GELATIN COATED ORAL
COMMUNITY
Start: 2022-09-15

## 2022-09-16 RX ORDER — HYDROCODONE BITARTRATE AND ACETAMINOPHEN 7.5; 325 MG/1; MG/1
TABLET ORAL
COMMUNITY
Start: 2022-09-15

## 2022-09-16 NOTE — OUTREACH NOTE
Prep Survey    Flowsheet Row Responses   [facility] facility patient discharged from? [facility]   Is LACE score < 7 ? No   Emergency Room discharge w/ pulse ox? No   Eligibility Readm Mgmt   Discharge diagnosis s/p right hemicolectomy secondary to colonic polyp, Postoperative anemia   Does the patient have one of the following disease processes/diagnoses(primary or secondary)? General Surgery   Does the patient have Home health ordered? No   Is there a DME ordered? No   Prep survey completed? Yes          GA PEGUERO - Registered Nurse

## 2022-09-21 NOTE — OUTREACH NOTE
General Surgery Week 1 Survey    Flowsheet Row Responses   [facility] patient discharged from? [facility]   Does the patient have one of the following disease processes/diagnoses(primary or secondary)? General Surgery   Week 1 attempt successful? Yes   Call start time 1432   Call end time 1436   Discharge diagnosis s/p right hemicolectomy secondary to colonic polyp, Postoperative anemia   Is patient permission given to speak with other caregiver? Yes   List who call center can speak with Armando Santiago    Person spoke with today (if not patient) and relationship Son Jack    Meds reviewed with patient/caregiver? Yes   Is the patient having any side effects they believe may be caused by any medication additions or changes? Yes   Side effects comments  Hydrocodone made pt nauseous per son    Does the patient have all medications related to this admission filled (includes all antibiotics, pain medications, etc.) Yes   Is the patient taking all medications as directed (includes completed medication regime)? Yes   Does the patient have a follow up appointment scheduled with their surgeon? Yes  [9/23/22]   Has the patient kept scheduled appointments due by today? N/A   Psychosocial issues? No   Did the patient receive a copy of their discharge instructions? Yes   Nursing interventions Reviewed instructions with patient   What is the patient's perception of their health status since discharge? Worsening   Nursing interventions Nurse provided patient education   Is the patient /caregiver able to teach back basic post-op care? Keep incision areas clean,dry and protected   Is the patient/caregiver able to teach back signs and symptoms of incisional infection? Increased redness, swelling or pain at the incisonal site, Increased drainage or bleeding, Pus or odor from incision   Is the patient/caregiver able to teach back steps to recovery at home? Set small, achievable goals for return to baseline health, Rest and rebuild  strength, gradually increase activity   If the patient is a current smoker, are they able to teach back resources for cessation? Not a smoker   Is the patient/caregiver able to teach back the hierarchy of who to call/visit for symptoms/problems? PCP, Specialist, Home health nurse, Urgent Care, ED, 911 Yes   Week 1 call completed? Yes   Wrap up additional comments pt possibly being reevaulated for possible bowel obstruction per son          ANGELICA MCKEE - Registered Nurse

## 2022-09-23 NOTE — TELEPHONE ENCOUNTER
Pt's son called and states that the pt is in [facility].  He's been there a week after having colectomy and started vomiting.  He was diagnosed with a blockage from small bowel and large bowel.  Per Dr Garcias he should follow up with us after discharge.  pt's son notified.

## 2022-09-24 PROBLEM — K56.7 ILEUS (HCC): Status: ACTIVE | Noted: 2022-01-01

## 2022-09-25 PROBLEM — D62 ACUTE BLOOD LOSS ANEMIA: Status: ACTIVE | Noted: 2022-01-01

## 2022-09-25 NOTE — OUTREACH NOTE
General Surgery Week 2 Survey    Flowsheet Row Responses   [facility] facility patient discharged from? [facility]   Does the patient have one of the following disease processes/diagnoses(primary or secondary)? General Surgery   Week 2 attempt successful? No   Unsuccessful attempts Attempt 1   Revoke Readmitted          TERRIE HOLM - Registered Nurse

## 2022-09-28 PROBLEM — K56.7 ILEUS: Status: RESOLVED | Noted: 2022-01-01 | Resolved: 2022-01-01

## 2022-09-28 PROBLEM — D62 ACUTE BLOOD LOSS ANEMIA: Status: RESOLVED | Noted: 2022-01-01 | Resolved: 2022-01-01

## 2022-09-29 ENCOUNTER — CARE COORDINATION (OUTPATIENT)
Dept: CARE COORDINATION | Age: 72
End: 2022-09-29

## 2022-09-29 RX ORDER — PANTOPRAZOLE SODIUM 40 MG/1
TABLET, DELAYED RELEASE ORAL
COMMUNITY
Start: 2022-09-28

## 2022-09-29 NOTE — OUTREACH NOTE
Medical Week 1 Survey    Flowsheet Row Responses   [facility] patient discharged from? [facility]   Does the patient have one of the following disease processes/diagnoses(primary or secondary)? Other   Week 1 attempt successful? Yes   Call start time 1540   Call end time 1543   Discharge diagnosis prolonged postoperative ileus and GI bleeding likely from the anastomotic site   Person spoke with today (if not patient) and relationship patient   Medication alerts for this patient On home warfarin   Meds reviewed with patient/caregiver? Yes   Does the patient have all medications ordered at discharge? Yes   Is the patient taking all medications as directed (includes completed medication regime)? Yes   Comments regarding appointments Kenji Garcias MD oct 5th at 9:50am   Does the patient have a primary care provider?  Yes   Comments regarding PCP RADHA Sue oct 12th at 10:30am    Has the patient kept scheduled appointments due by today? N/A   Has home health visited the patient within 72 hours of discharge? N/A   Psychosocial issues? No   Did the patient receive a copy of their discharge instructions? Yes   Nursing interventions Reviewed instructions with patient   What is the patient's perception of their health status since discharge? Improving   Is the patient/caregiver able to teach back signs and symptoms related to disease process for when to call PCP? Yes   Is the patient/caregiver able to teach back signs and symptoms related to disease process for when to call 911? Yes   Is the patient/caregiver able to teach back the hierarchy of who to call/visit for symptoms/problems? PCP, Specialist, Home health nurse, Urgent Care, ED, 911 Yes   If the patient is a current smoker, are they able to teach back resources for cessation? Not a smoker   Week 1 call completed? Yes          MARTÍN FRANCIS - Registered Nurse

## 2022-09-29 NOTE — CARE COORDINATION
[facility] Care Transitions Initial Follow Up Call    Call within 2 business days of discharge: Yes    Care Transition Nurse contacted the patient by telephone to perform post hospital discharge assessment. Verified name and  with patient as identifiers. Provided introduction to self, and explanation of the Care Transition Nurse role. Patient: Nikhil Stephens Patient : 1950   MRN: [id number]  Reason for Admission: post op ileus and GI bleed  Discharge Date: 19 RARS: No data recorded    Last Discharge  [facility]       Date Complaint Diagnosis Description Type Department Provider    19   Admission (Discharged) Dawna Ritter MD            Was this an external facility discharge? Yes, 22  Discharge Facility: [address] [place] [address] Way to be reviewed by the provider   Additional needs identified to be addressed with provider: No  none               Method of communication with provider: Maria C High states that he is improving but remains somewhat weak. He did get his rolling walker. He was offered inpatient rehab but declined. Home health will be out next week. He does have his son at home for assistance. He denies any abdominal pain and had several bowel movements since getting home. He doesn't have much of appetite but is eating little bits along and we discussed staying hydrated. We went over med changes which includes an alternating dose of warfarin to start on . We changed his HFU appointment to a one week follow up. He has appt set with surgeon as well. Care Transition Nurse reviewed discharge instructions with patient who verbalized understanding. The patient was given an opportunity to ask questions and does not have any further questions or concerns at this time. Were discharge instructions available to patient? Yes. Reviewed appropriate site of care based on symptoms and resources available to patient including: PCP  Home health.  The patient

## 2022-09-29 NOTE — OUTREACH NOTE
Prep Survey    Flowsheet Row Responses   [facility] facility patient discharged from? [facility]   Is LACE score < 7 ? No   Emergency Room discharge w/ pulse ox? No   Eligibility Readm Mgmt   Discharge diagnosis prolonged postoperative ileus and GI bleeding likely from the anastomotic site   Does the patient have one of the following disease processes/diagnoses(primary or secondary)? Other   Does the patient have Home health ordered? No   Is there a DME ordered? No   Prep survey completed? Yes          CHRISTOPHER JOSHI - Registered Nurse

## 2022-09-30 DIAGNOSIS — R19.7 DIARRHEA, UNSPECIFIED TYPE: ICD-10-CM

## 2022-09-30 DIAGNOSIS — I10 ESSENTIAL HYPERTENSION, BENIGN: ICD-10-CM

## 2022-09-30 DIAGNOSIS — M1A.00X0 CHRONIC GOUTY ARTHROPATHY: ICD-10-CM

## 2022-09-30 RX ORDER — DICYCLOMINE HYDROCHLORIDE 10 MG/1
CAPSULE ORAL
Qty: 120 CAPSULE | Refills: 0 | Status: SHIPPED | OUTPATIENT
Start: 2022-09-30 | End: 2022-10-31

## 2022-09-30 RX ORDER — METOPROLOL SUCCINATE 25 MG/1
TABLET, EXTENDED RELEASE ORAL
Qty: 90 TABLET | Refills: 1 | Status: SHIPPED | OUTPATIENT
Start: 2022-09-30

## 2022-09-30 RX ORDER — COLCHICINE 0.6 MG/1
CAPSULE ORAL
Qty: 90 CAPSULE | Refills: 0 | Status: SHIPPED | OUTPATIENT
Start: 2022-09-30

## 2022-09-30 RX ORDER — AMLODIPINE BESYLATE 5 MG/1
TABLET ORAL
Qty: 90 TABLET | Refills: 2 | Status: SHIPPED | OUTPATIENT
Start: 2022-09-30

## 2022-09-30 NOTE — TELEPHONE ENCOUNTER
Requested Prescriptions     Signed Prescriptions Disp Refills    colchicine (MITIGARE) 0.6 MG capsule 90 capsule 0     Sig: TAKE 1 CAPSULE BY MOUTH DAILY AS NEEDED FOR PAIN     Authorizing Provider: Arsh RAMIREZ     Ordering User: Summer NICOLAS    metoprolol succinate (TOPROL XL) 25 MG extended release tablet 90 tablet 1     Sig: TAKE 1 TABLET BY MOUTH EVERY DAY     Authorizing Provider: PETR RAMIREZ     Ordering User: Summer NICOLAS    amLODIPine (NORVASC) 5 MG tablet 90 tablet 2     Sig: TAKE 1 TABLET BY MOUTH TWICE DAILY     Authorizing Provider: Fernanda Amaro     Ordering User: Summer NICOLAS    dicyclomine (BENTYL) 10 MG capsule 120 capsule 0     Sig: TAKE 1 CAPSULE BY MOUTH EVERY NIGHT IN THE MORNING AND AT NOON AND IN THE EVENING AND AT BEDTIME     Authorizing Provider: Fernanda Amaro     Ordering User: Morgan Martínez

## 2022-10-03 NOTE — TELEPHONE ENCOUNTER
Pt states that he is eating and having good bowel movements.  Per Dr Garcias the follow up appt will be cancelled and pt notified to call back if anything changes.

## 2022-10-03 NOTE — TELEPHONE ENCOUNTER
Patient called wanting to reschedule his appointment on 10/5/22 with Dr. Garcias. Stated he cant really get around easily, because he didn't really eat anything but ice while he was in the hospital for 10 days. Stated he is starting to eat again. Wants to push appointment out a couple weeks.

## 2022-10-04 ENCOUNTER — OFFICE VISIT (OUTPATIENT)
Dept: FAMILY MEDICINE CLINIC | Age: 72
End: 2022-10-04
Payer: MEDICARE

## 2022-10-04 ENCOUNTER — HOSPITAL ENCOUNTER (OUTPATIENT)
Facility: HOSPITAL | Age: 72
Discharge: HOME | End: 2022-10-04
Payer: MEDICARE

## 2022-10-04 VITALS
OXYGEN SATURATION: 98 % | RESPIRATION RATE: 20 BRPM | HEART RATE: 65 BPM | TEMPERATURE: 98.6 F | DIASTOLIC BLOOD PRESSURE: 60 MMHG | SYSTOLIC BLOOD PRESSURE: 108 MMHG

## 2022-10-04 DIAGNOSIS — N18.9 CHRONIC KIDNEY DISEASE, UNSPECIFIED CKD STAGE: ICD-10-CM

## 2022-10-04 DIAGNOSIS — Z09 HOSPITAL DISCHARGE FOLLOW-UP: ICD-10-CM

## 2022-10-04 DIAGNOSIS — Z79.01 ANTICOAGULATED ON COUMADIN: ICD-10-CM

## 2022-10-04 DIAGNOSIS — D62 POSTOPERATIVE ANEMIA DUE TO ACUTE BLOOD LOSS: Primary | ICD-10-CM

## 2022-10-04 PROCEDURE — 3017F COLORECTAL CA SCREEN DOC REV: CPT | Performed by: NURSE PRACTITIONER

## 2022-10-04 PROCEDURE — G8420 CALC BMI NORM PARAMETERS: HCPCS | Performed by: NURSE PRACTITIONER

## 2022-10-04 PROCEDURE — 1111F DSCHRG MED/CURRENT MED MERGE: CPT | Performed by: NURSE PRACTITIONER

## 2022-10-04 PROCEDURE — G8427 DOCREV CUR MEDS BY ELIG CLIN: HCPCS | Performed by: NURSE PRACTITIONER

## 2022-10-04 PROCEDURE — 85025 COMPLETE CBC W/AUTO DIFF WBC: CPT

## 2022-10-04 PROCEDURE — 99214 OFFICE O/P EST MOD 30 MIN: CPT | Performed by: NURSE PRACTITIONER

## 2022-10-04 PROCEDURE — 80053 COMPREHEN METABOLIC PANEL: CPT

## 2022-10-04 PROCEDURE — 1123F ACP DISCUSS/DSCN MKR DOCD: CPT | Performed by: NURSE PRACTITIONER

## 2022-10-04 PROCEDURE — 1036F TOBACCO NON-USER: CPT | Performed by: NURSE PRACTITIONER

## 2022-10-04 PROCEDURE — G8484 FLU IMMUNIZE NO ADMIN: HCPCS | Performed by: NURSE PRACTITIONER

## 2022-10-04 NOTE — PROGRESS NOTES
Chief Complaint   Patient presents with    Follow-Up from [address] At [facility] visit this date for hospital follow up. Have you seen any other physician or provider since your last visit yes - hospitalized at [facility] and [facility]     Have you had any other diagnostic tests since your last visit?  yes     Have you changed or stopped any medications since your last visit? no

## 2022-10-04 NOTE — PROGRESS NOTES
Post-Discharge Transitional Care Management Progress Note      Emelia Salguero   YOB: 1950    Date of Office Visit:  10/4/2022  Date of Hospital Admission: 09/17/2022- at [facility]- Transferred to [facility] on 09/24/2022  Date of Hospital Discharge: 09/28/2022    Care management risk score Rising risk (score 2-5) and Complex Care (Scores >=6): No Risk Score On File     Non face to face  following discharge, date last encounter closed (first attempt may have been earlier): 09/29/2022 09/29/2022    Call initiated 2 business days of discharge: Yes    ASSESSMENT/PLAN:   Postoperative anemia due to acute blood loss  -     CBC with Auto Differential; Future  Chronic kidney disease, unspecified CKD stage  -     Comprehensive Metabolic Panel; Future  Hospital discharge follow-up  -     MD DISCHARGE MEDS RECONCILED W/ CURRENT OUTPATIENT MED LIST  Anticoagulated on Coumadin  -     Protime-INR; Future      Medical Decision Making: moderate complexity  Return in 2 weeks (on 10/18/2022). On this date 10/4/2022 I have spent 40 minutes reviewing previous notes, test results and face to face with the patient discussing the diagnosis and importance of compliance with the treatment plan as well as documenting on the day of the visit. Subjective:   HPI:  Follow up of Hospital problems/diagnosis(es): Pt was admitted and treated for prolonged postoperative ileus and GI bleeding. He was treated with NG tube decompression, transfused 2 U of PRBC, IV protonix. S/P right hemicolectomy secondary to colonic polyp on 9/12/2022. Inpatient course: Discharge summary reviewed- see chart. Interval history/Current status: Pt continues to c/o generalized weakness that is improving every day. He is currently unable to leave his home due to weakness. Per hospital D/C summary he declined short- term rehab. He was referred to [facility] for PT and OT. Rebecca Chacon RN and I went to patient's home today for visit. Upon arrival to home, it was noted that patient does not have easy access in and out of his home. He lives in a trailer with porch, no steps, banisters or ramp. It is an approx 1.5-2 ft step up into home. Pt reports he had a telephone visit with Dr Sushma Stauffer yesterday and was told he did not have to follow up if he was doing well. Pt reports he is having normal BMs. Diarrhea has resolved. LBM approx 2 hours ago. He is wearing home oxygen at 2-3 L per NC- 3L at this time. Denies SOB. Spo2 98% on 3L per NC. He has a scheduled follow up with nephrology on 10/23/2022    Patient Active Problem List   Diagnosis    Atrial fibrillation ([facility]    Hypercholesterolemia    Diabetes mellitus ([facility]    Coronary atherosclerosis of native coronary artery    Chronic gouty arthropathy    Elevated serum creatinine    Essential hypertension, benign    DVT of lower extremity, bilateral (HCC)    Toe pain, left    Polyp of ascending colon    Polyp of transverse colon    Diverticulosis    Internal hemorrhoids without complication       Medications listed as ordered at the time of discharge from hospital     Medication List            Accurate as of October 4, 2022  4:23 PM. If you have any questions, ask your nurse or doctor. CHANGE how you take these medications      Coumadin 5 MG tablet  Generic drug: warfarin  take as directed  What changed: See the new instructions.             CONTINUE taking these medications      allopurinol 300 MG tablet  Commonly known as: ZYLOPRIM  TAKE 1 TABLET BY MOUTH EVERY DAY     amLODIPine 5 MG tablet  Commonly known as: NORVASC  TAKE 1 TABLET BY MOUTH TWICE DAILY     aspirin 81 MG tablet     dicyclomine 10 MG capsule  Commonly known as: BENTYL  TAKE 1 CAPSULE BY MOUTH EVERY NIGHT IN THE MORNING AND AT NOON AND IN THE EVENING AND AT BEDTIME     digoxin 125 MCG tablet  Commonly known as: LANOXIN  TAKE 1 TABLET BY MOUTH EVERY DAY OR AS DIRECTED     ferrous gluconate 324 (38 Fe) MG tablet  Commonly known as: FERGON     ferrous sulfate 325 (65 Fe) MG tablet  Commonly known as: IRON 325  take 1 tablet by mouth twice a day     Fiber 625 MG Tabs     furosemide 40 MG tablet  Commonly known as: LASIX  take 1 tablet by mouth once daily     glipiZIDE 10 MG tablet  Commonly known as: GLUCOTROL  Take 2 tablets by mouth 2 times daily (before meals)     HYDROcodone-acetaminophen 7.5-325 MG per tablet  Commonly known as: NORCO     ipratropium 0.02 % nebulizer solution  Commonly known as: ATROVENT     ipratropium-albuterol 0.5-2.5 (3) MG/3ML Soln nebulizer solution  Commonly known as: DUONEB  INHALE 1 VIAL VIA NEBULIZER FOUR TIMES DAILY     loratadine 10 MG tablet  Commonly known as: Claritin  Take 1 tablet by mouth daily     lovastatin 20 MG tablet  Commonly known as: MEVACOR  Take 1 tablet by mouth daily     metoprolol succinate 25 MG extended release tablet  Commonly known as: TOPROL XL  TAKE 1 TABLET BY MOUTH EVERY DAY     * Mitigare 0.6 MG capsule  Generic drug: colchicine  TAKE 1 CAPSULE BY MOUTH DAILY     * Mitigare 0.6 MG capsule  Generic drug: colchicine  TAKE 1 CAPSULE BY MOUTH DAILY AS NEEDED FOR PAIN     nitroGLYCERIN 0.4 MG SL tablet  Commonly known as: NITROSTAT  place 1 tablet under the tongue if needed every 5 minutes for chest pain for 3 doses IF NO RELIEF AFTER 3RD DOSE CALL PRESCRIBER .     nystatin 091951 UNIT/ML suspension  Commonly known as: MYCOSTATIN  Take 5 mLs by mouth in the morning and 5 mLs at noon and 5 mLs in the evening and 5 mLs before bedtime. pantoprazole 40 MG tablet  Commonly known as: PROTONIX     potassium chloride 10 MEQ extended release tablet  Commonly known as: KLOR-CON M  Take 1 tablet by mouth in the morning. Trulicity 1.5 LN/5.2HY Sopn  Generic drug: Dulaglutide           * This list has 2 medication(s) that are the same as other medications prescribed for you.  Read the directions carefully, and ask your doctor or other care provider to review them with you. Medications marked \"taking\" at this time  No outpatient medications have been marked as taking for the 10/4/22 encounter (Office Visit) with LANCE Chaudhary NP. Medications patient taking as of now reconciled against medications ordered at time of hospital discharge: Yes    A comprehensive review of systems was negative except for what was noted in the HPI. Objective:    /60   Pulse 65   Temp 98.6 °F (37 °C) (Infrared)   Resp 20   SpO2 98% Comment: 3L  General Appearance: alert and oriented to person, place and time, well developed and well- nourished, in no acute distress  Skin: warm and dry, no rash or erythema- pale  Head: normocephalic and atraumatic  Eyes: pupils equal, round, and reactive to light, extraocular eye movements intact, conjunctivae normal  Neck: supple and non-tender without mass, no thyromegaly or thyroid nodules, no cervical lymphadenopathy  Pulmonary/Chest: clear to auscultation bilaterally- no wheezes, rales or rhonchi, normal air movement, no respiratory distress  Cardiovascular: normal rate, irregularly irregular rhythm, normal S1 and S2, no murmurs, rubs, clicks, or gallops, distal pulses intact, no carotid bruits  Abdomen: soft, non-tender, non-distended, normal bowel sounds, no masses or organomegaly- surgical incisions healing well. No erythema. No drainage. Wound edges well approximated  Extremities: no cyanosis, clubbing or 2+ edema bilateral lower extremities  Musculoskeletal: normal range of motion, no joint swelling, deformity or tenderness  Neurologic: normal  coordination and speech normal. Pt is sitting on couch. Did not witness patient ambulating. Per son Jeffrey Gayle is able to walk with his cane without any trouble\"    An electronic signature was used to authenticate this note.   --LANCE Chaudhary NP

## 2022-10-05 DIAGNOSIS — D62 POSTOPERATIVE ANEMIA DUE TO ACUTE BLOOD LOSS: ICD-10-CM

## 2022-10-05 DIAGNOSIS — Z79.01 ANTICOAGULATED ON COUMADIN: ICD-10-CM

## 2022-10-05 DIAGNOSIS — N18.9 CHRONIC KIDNEY DISEASE, UNSPECIFIED CKD STAGE: ICD-10-CM

## 2022-10-05 LAB
A/G RATIO: 1 (ref 0.8–2)
ALBUMIN SERPL-MCNC: 2.6 G/DL (ref 3.4–4.8)
ALP BLD-CCNC: 80 U/L (ref 25–100)
ALT SERPL-CCNC: 9 U/L (ref 4–36)
ANION GAP SERPL CALCULATED.3IONS-SCNC: 8 MMOL/L (ref 3–16)
AST SERPL-CCNC: 16 U/L (ref 8–33)
BASOPHILS ABSOLUTE: 0 K/UL (ref 0–0.1)
BASOPHILS RELATIVE PERCENT: 0.7 %
BILIRUB SERPL-MCNC: <0.2 MG/DL (ref 0.3–1.2)
BUN BLDV-MCNC: 13 MG/DL (ref 6–20)
CALCIUM SERPL-MCNC: 8.4 MG/DL (ref 8.5–10.5)
CHLORIDE BLD-SCNC: 104 MMOL/L (ref 98–107)
CO2: 27 MMOL/L (ref 20–30)
CREAT SERPL-MCNC: 1.4 MG/DL (ref 0.4–1.2)
EOSINOPHILS ABSOLUTE: 0.1 K/UL (ref 0–0.4)
EOSINOPHILS RELATIVE PERCENT: 2.6 %
GFR AFRICAN AMERICAN: >59
GFR NON-AFRICAN AMERICAN: 50
GLOBULIN: 2.6 G/DL
GLUCOSE BLD-MCNC: 113 MG/DL (ref 74–106)
HCT VFR BLD CALC: 27.8 % (ref 40–54)
HEMOGLOBIN: 8.4 G/DL (ref 13–18)
IMMATURE GRANULOCYTES #: 0.1 K/UL
IMMATURE GRANULOCYTES %: 2 % (ref 0–5)
LYMPHOCYTES ABSOLUTE: 0.7 K/UL (ref 1.5–4)
LYMPHOCYTES RELATIVE PERCENT: 12.4 %
MCH RBC QN AUTO: 29.2 PG (ref 27–32)
MCHC RBC AUTO-ENTMCNC: 30.2 G/DL (ref 31–35)
MCV RBC AUTO: 96.5 FL (ref 80–100)
MONOCYTES ABSOLUTE: 0.6 K/UL (ref 0.2–0.8)
MONOCYTES RELATIVE PERCENT: 10.2 %
NEUTROPHILS ABSOLUTE: 4 K/UL (ref 2–7.5)
NEUTROPHILS RELATIVE PERCENT: 72.1 %
PDW BLD-RTO: 17.1 % (ref 11–16)
PLATELET # BLD: 152 K/UL (ref 150–400)
PMV BLD AUTO: 11.1 FL (ref 6–10)
POTASSIUM SERPL-SCNC: 4 MMOL/L (ref 3.4–5.1)
RBC # BLD: 2.88 M/UL (ref 4.5–6)
REASON FOR REJECTION: NORMAL
REJECTED TEST: NORMAL
SODIUM BLD-SCNC: 139 MMOL/L (ref 136–145)
TOTAL PROTEIN: 5.2 G/DL (ref 6.4–8.3)
WBC # BLD: 5.5 K/UL (ref 4–11)

## 2022-10-05 NOTE — OUTREACH NOTE
Medical Week 2 Survey    Flowsheet Row Responses   [facility] patient discharged from? [facility]   Does the patient have one of the following disease processes/diagnoses(primary or secondary)? Other   Week 2 attempt successful? Yes   Call start time 1437   Discharge diagnosis prolonged postoperative ileus and GI bleeding likely from the anastomotic site   Call end time 1438   Meds reviewed with patient/caregiver? Yes   Is the patient having any side effects they believe may be caused by any medication additions or changes? No   Does the patient have all medications ordered at discharge? Yes   Is the patient taking all medications as directed (includes completed medication regime)? Yes   Does the patient have a primary care provider?  Yes   Does the patient have an appointment with their PCP within 7 days of discharge? Yes   Has the patient kept scheduled appointments due by today? Yes   Has home health visited the patient within 72 hours of discharge? N/A   Psychosocial issues? No   Did the patient receive a copy of their discharge instructions? Yes   Nursing interventions Reviewed instructions with patient   What is the patient's perception of their health status since discharge? Improving   Is the patient/caregiver able to teach back signs and symptoms related to disease process for when to call PCP? Yes   Is the patient/caregiver able to teach back signs and symptoms related to disease process for when to call 911? Yes   Is the patient/caregiver able to teach back the hierarchy of who to call/visit for symptoms/problems? PCP, Specialist, Home health nurse, Urgent Care, ED, 911 Yes   Additional teach back comments states has no pain or discomfort, just weakness   Week 2 Call Completed? Yes          GIGI Bentley Registered Nurse   [facility] Progress Note  Patient: Precious Acharya Date: 2022   : 1945 Attending: Ronda Medel MD         Admission date: 2021  ICU admit date:  -, return   Intubation date:  N/A    Chief Complaint: SOB, hypoxia    Precious Acharya is a 76 year old female Precious Acharya is a 76 year old female with a long list of chronic medical issues including advanced MS (bedbound), DVT on coumadin (2020), PEG (21), chronic respiratory failure on home oxygen (3 L), altered mental status (POA activated), neurogenic bladder, obstructive uropathy s/p bilateral nephrostomy tubes (placed 2021, exchanged 21) and requiring HD (started 2021), and chronic sacral ulcer (s/p excisional debridement  Dr. Grant) with further development of multiple severe BLE pressure ulcers. She has had multiple and frequent readmissions for sepsis/worsening wounds over the last several months.       She presented to the ED  with weakness and lethargy, reports of diarrhea. Work up revealed septic/hypovolemic shock requiring pressors and fluid resuscitation. She was started on bipap and broad spectrum antibiotics, admitted to intensive care.  On  underwent debridement of LLE down to muscle (pale tissue) with smear positive Proteus mirabilis.  Underwent nephrostomy tube and permcath exchange  with initiation of CRRT on . Hemodynamics improved and she was eventually was transitioned back to her T//Sat iHD regimen. She was transferred out of the SICU on .      Patient is on 3L NC at baseline--she was noted to have worsening oxygen requirement up to 10L oxymask. She underwent iHD and was then given 1 U PRBC afterwards--she was noted to desaturate to 80% after transfusion. CXR showed volume overload. She was transitioned to 15L NRB but then started on BIPAP due to respiratory distress and was transferred to SICU.  IHD x3 with 9.3L off over 24  hours, subsequent resolution of acute hypoxia.       24hr Events: Worsening hypoxia    Impression:  -- Acute on chronic hypoxemic respiratory failure due to pulmonary edema after blood transfusion-resolved   -- PTA 3L home O2  -- Bilateral pleural effusions  -- Chronic anemia of ESRD  -- Bilateral LE pressure ulcers, unstageable   -- LLE wet gangrene with proteus mirabilis   -- LLE debridement 12/12  -- Chronic stage IV sacral wound   -- diverting colostomy deferred d/t Cdiff colonization  -- ESRD d/t obstructive uropathy   -- nephrostomy tubes since 7/2021, pseudomonas colonization   -- HD dependent since 8/2021  -- DVT on coumadin (7/2020)  -- Recent Cdiff colonization  -- Moderate protein calorie malnutrition  -- MS, impaired mobility    DISCUSSION/PLAN  Neuro: Obtunded, significant from baseline.  -- Continue cymbalta  -- Hold gabapentin due to somnolence  -- Continue melatonin at HS, Provigil  -- Continue scheduled acetaminophen    Pulmonary: Increased O2 needs overnight, now on 15L NRBM, did not wear BiPAP overnight.  Concern for possible mucous plugging although receiving nebs and chest PT.  LLL with more dense consolidation and unchanged bilateral pleural effusions on CXR.  Respirations chronically tachypnic and shallow due to chronic deconditioning/ MS associated weakness.  -- Place on BiPAP and re-evaluate  -- O2 for sat > 90%, baseline 3L NC  -- Chest PT, with mucomyst nebulizers (reordered)    CV/Renal: Normotensive, NSR.  Labs reviewed with worsening hyperkalemia, will benefit from dialysis.  -- Nephrology following  -- IHD today (1/5)    GI:   -- TF via PEG with po diet as able  -- Protonix    Heme: Anemia on ESRD.  Mild thrombocytosis, likely due to ongoing inflammatory state/ LE muscle/tissue necrosis.  LLE DVT (12/15/21)   -- DVT:  No systemic Heparin due to constant oozing from sacral wound  -- SQHeparin for VTE prophaylaxis  -- Continue epogen    Endocrine: Glucose with adequate intrinsic  control    ID: Afebrile, low grade leukocytosis persists.  LLE proteus mirabilis in wound, no longer purulence.  High risk for recurrent infection to both lower extremity wounds and coccyx.  -- ID signed off   -- Lifelong po amoxicillin  -- General surgery:   -- Recommendation for HAO ARTEAGA--family declines  -- Wound care following    Disposition:   -- Hold transfer, now on NIPPV  -- Ongoing GOC planning with HCPOA: Charline, ongoing conversation with [facility] to provide home Hosp[facility], however concerned with current mental status this might not be reasonable.    BEST PRACTICES:  - VTE prophylaxis: SCDs, SQHeparin  - SUP: PPI  - LDA: PIV, permcath 12/13   - Nutrition: TF   - Therapy/mobilization: bed bound  - Goals of care note documented: 12/12, 12/13, 12/22  DNR-DNI, MMS    ================================================================  Subjective: Unable to obtain due to mental status.    I/O last 3 completed shifts:  In: 1474 [I.V.:2; NG/GT:1472]  Out: 56 [Urine:56]  I/O this shift:  In: 340 [NG/GT:340]  Out: 11.5 [Urine:11.5]    Vital Last Value 24 Hour Range   Temperature 99.9 °F (37.7 °C) (01/05/22 1200) Temp  Min: 97.9 °F (36.6 °C)  Max: 100.7 °F (38.2 °C)   Pulse (!) 110 (01/05/22 1230) Pulse  Min: 94  Max: 111   Respiratory 18 (01/05/22 1200) Resp  Min: 18  Max: 18   Non-Invasive  Blood Pressure (!) 123/96 (01/05/22 1200) BP  Min: 113/53  Max: 144/63   Pulse Oximetry 98 % (01/05/22 1319) SpO2  Min: 80 %  Max: 100 %   Arterial   Blood Pressure   No data recorded      Physical Exam:  General:  Laying bed, appears chronically ill, NAD.  Neuro:  Obtunded, does not withdraw to pain.  ENT: Sclera clear, dry oral mucosa.  Neck: Supple  Chest:  Respirations moderately labored, shallow, tachypneic. Diminished throughout L>R.  Right permcath site dressed, dry and intact.  Heart: NSR/ST, Normal S1S2, no m/r/g.  Abdomen:  Soft, NT, ND, active bowel tones.  PEG site clean.  Bilateral nephrostomy tubes with yellow  urine/mucopurulent/slough, well secured.  Extremities: Warm and well perfused.  2 + bilateral radial pulses, Very faint 1 + bilateral DP pulses verified with doppler.   Integ:  Warm and dry.  LLE wound with some granulation tissue, large areas of eschar, visible necrotic tendon and muscle, small area anterior with 2+ edema and crepitus.  RLE with multiple unstageable ulcerations eschar.  Sacral wound very large with granulation tissue, continuous oozing from wound bed.  None of the wounds have exudate.    Pertinent Reviewed: Allergies, Medications, Labs, Imaging and Physician and Nursing Notes    ACCS Attestation  This patient is critically ill as documented above. I evaluated the patient and reviewed imaging and laboratory data. I discussed events and interventions with Ronda Medel MD  who has reviewed the diagnostic and treatment strategy.. Critical care services I provided [id number] > 40 minutes not including time allocated for procedures.    NOEL Sawyer  [facility]

## 2022-10-06 NOTE — TELEPHONE ENCOUNTER
Suresh Delatorre (Elza) called and states that the pt hasa HGB of 8.4 and is very pale and weak,  He also has 3+edema in his legs.  I spoke with Dr Garcias and he suggests a high protein diet, iron supplement and seeing a cardiologist.  When I talked with the patient he states that he can't ge emelyn العلي for a follow up visit.  I suggested he increase his protein and he is already on Ferrous sulfate 325 mg daily.  He has a follow up in 12/22/22 with Dr Campbell.  I suggested he call and see if they can get him in sooner.  He voiced understanding and said he would call if he can come in for a follow up.

## 2022-10-12 ENCOUNTER — TELEPHONE (OUTPATIENT)
Dept: FAMILY MEDICINE CLINIC | Age: 72
End: 2022-10-12

## 2022-10-12 NOTE — TELEPHONE ENCOUNTER
Patient son called requesting rocephin shot. After speaking to patient he stated he had had a productive cough, and some chest congestion. Patient states he has shortness of breath occasionally but has been using his home O2. He denies fever, body aches, chills. Patient also reports he has been ambulating without difficulty since his recent hospitalization for ileus and GI bleed. Patient states he did speak with Dr Jennifer De Jesus since last visit with PCP with labs and that he did not wish to see him at this time. Patient was agreeable to come into the office tomorrow for appointment with PCP. Patient states home health was coming to his house today to check on him.

## 2022-10-12 NOTE — TELEPHONE ENCOUNTER
Patient called asking if he can take a suppository for constipation. He states that no bowel movement in 3 days. He is taking ferrous sulfate and a gentle stool softner currently. I advised him to take a stool suppository and to call back if he continues to have constipation. He understood.

## 2022-10-13 ENCOUNTER — HOSPITAL ENCOUNTER (OUTPATIENT)
Facility: HOSPITAL | Age: 72
Discharge: HOME | End: 2022-10-13
Payer: MEDICARE

## 2022-10-13 ENCOUNTER — OFFICE VISIT (OUTPATIENT)
Dept: FAMILY MEDICINE CLINIC | Age: 72
End: 2022-10-13
Payer: MEDICARE

## 2022-10-13 VITALS
HEART RATE: 79 BPM | HEIGHT: 73 IN | DIASTOLIC BLOOD PRESSURE: 45 MMHG | WEIGHT: 183.44 LBS | BODY MASS INDEX: 24.31 KG/M2 | OXYGEN SATURATION: 92 % | RESPIRATION RATE: 20 BRPM | SYSTOLIC BLOOD PRESSURE: 98 MMHG | TEMPERATURE: 98.4 F

## 2022-10-13 DIAGNOSIS — J44.1 COPD EXACERBATION (HCC): ICD-10-CM

## 2022-10-13 LAB
A/G RATIO: 1 (ref 0.8–2)
ALBUMIN SERPL-MCNC: 2.9 G/DL (ref 3.4–4.8)
ALP BLD-CCNC: 106 U/L (ref 25–100)
ALT SERPL-CCNC: 10 U/L (ref 4–36)
ANION GAP SERPL CALCULATED.3IONS-SCNC: 10 MMOL/L (ref 3–16)
AST SERPL-CCNC: 24 U/L (ref 8–33)
BASOPHILS ABSOLUTE: 0 K/UL (ref 0–0.1)
BASOPHILS RELATIVE PERCENT: 0.5 %
BILIRUB SERPL-MCNC: 0.3 MG/DL (ref 0.3–1.2)
BUN BLDV-MCNC: 17 MG/DL (ref 6–20)
CALCIUM SERPL-MCNC: 8.2 MG/DL (ref 8.5–10.5)
CHLORIDE BLD-SCNC: 98 MMOL/L (ref 98–107)
CO2: 30 MMOL/L (ref 20–30)
CREAT SERPL-MCNC: 1.6 MG/DL (ref 0.4–1.2)
EOSINOPHILS ABSOLUTE: 0 K/UL (ref 0–0.4)
EOSINOPHILS RELATIVE PERCENT: 0.8 %
GFR AFRICAN AMERICAN: 51
GFR NON-AFRICAN AMERICAN: 43
GLOBULIN: 3 G/DL
GLUCOSE BLD-MCNC: 85 MG/DL (ref 74–106)
HCT VFR BLD CALC: 30.1 % (ref 40–54)
HEMOGLOBIN: 8.9 G/DL (ref 13–18)
IMMATURE GRANULOCYTES #: 0.1 K/UL
IMMATURE GRANULOCYTES %: 3.7 % (ref 0–5)
LYMPHOCYTES ABSOLUTE: 0.8 K/UL (ref 1.5–4)
LYMPHOCYTES RELATIVE PERCENT: 21.1 %
MCH RBC QN AUTO: 28.4 PG (ref 27–32)
MCHC RBC AUTO-ENTMCNC: 29.6 G/DL (ref 31–35)
MCV RBC AUTO: 96.2 FL (ref 80–100)
MONOCYTES ABSOLUTE: 0.5 K/UL (ref 0.2–0.8)
MONOCYTES RELATIVE PERCENT: 12.7 %
NEUTROPHILS ABSOLUTE: 2.3 K/UL (ref 2–7.5)
NEUTROPHILS RELATIVE PERCENT: 61.2 %
PDW BLD-RTO: 16.3 % (ref 11–16)
PLATELET # BLD: 120 K/UL (ref 150–400)
PMV BLD AUTO: 9.9 FL (ref 6–10)
POTASSIUM SERPL-SCNC: 3.9 MMOL/L (ref 3.4–5.1)
RBC # BLD: 3.13 M/UL (ref 4.5–6)
SODIUM BLD-SCNC: 138 MMOL/L (ref 136–145)
TOTAL PROTEIN: 5.9 G/DL (ref 6.4–8.3)
WBC # BLD: 3.8 K/UL (ref 4–11)

## 2022-10-13 PROCEDURE — 3023F SPIROM DOC REV: CPT | Performed by: NURSE PRACTITIONER

## 2022-10-13 PROCEDURE — 80053 COMPREHEN METABOLIC PANEL: CPT

## 2022-10-13 PROCEDURE — 36415 COLL VENOUS BLD VENIPUNCTURE: CPT

## 2022-10-13 PROCEDURE — 96372 THER/PROPH/DIAG INJ SC/IM: CPT | Performed by: NURSE PRACTITIONER

## 2022-10-13 PROCEDURE — 85025 COMPLETE CBC W/AUTO DIFF WBC: CPT

## 2022-10-13 PROCEDURE — G8484 FLU IMMUNIZE NO ADMIN: HCPCS | Performed by: NURSE PRACTITIONER

## 2022-10-13 PROCEDURE — 1036F TOBACCO NON-USER: CPT | Performed by: NURSE PRACTITIONER

## 2022-10-13 PROCEDURE — 1123F ACP DISCUSS/DSCN MKR DOCD: CPT | Performed by: NURSE PRACTITIONER

## 2022-10-13 PROCEDURE — 3017F COLORECTAL CA SCREEN DOC REV: CPT | Performed by: NURSE PRACTITIONER

## 2022-10-13 PROCEDURE — 99213 OFFICE O/P EST LOW 20 MIN: CPT | Performed by: NURSE PRACTITIONER

## 2022-10-13 PROCEDURE — G8420 CALC BMI NORM PARAMETERS: HCPCS | Performed by: NURSE PRACTITIONER

## 2022-10-13 PROCEDURE — G8427 DOCREV CUR MEDS BY ELIG CLIN: HCPCS | Performed by: NURSE PRACTITIONER

## 2022-10-13 RX ORDER — CEPHALEXIN 500 MG/1
500 CAPSULE ORAL 4 TIMES DAILY
Qty: 40 CAPSULE | Refills: 0 | Status: SHIPPED | OUTPATIENT
Start: 2022-10-13

## 2022-10-13 ASSESSMENT — ENCOUNTER SYMPTOMS
SHORTNESS OF BREATH: 0
COUGH: 1

## 2022-10-13 NOTE — PROGRESS NOTES
Patient scheduled with Dr Haleigh Shannon 10/14/22. Patient aware of appointment and most recent labs and office noted faxed. Patient was encouraged to go to ED for evaluation of worsening Hgb and congestion but refused.

## 2022-10-13 NOTE — PROGRESS NOTES
Administrations This Visit       cefTRIAXone (ROCEPHIN) 1,000 mg in lidocaine 1 % 2.86 mL IM Injection       Admin Date  10/13/2022 Action  Given Dose  1,000 mg Route  IntraMUSCular Administered By  Marco A Nair RN                    Patient tolerated injection well. Patient advised to wait 20 minutes in the office following the injection. No signs/symptoms of reaction noted after 20 minutes.

## 2022-10-13 NOTE — OUTREACH NOTE
Medical Week 3 Survey    Flowsheet Row Responses   [facility] patient discharged from? [facility]   Does the patient have one of the following disease processes/diagnoses(primary or secondary)? Other   Week 3 attempt successful? Yes   Call start time 1717   Call end time 1721   Discharge diagnosis prolonged postoperative ileus and GI bleeding likely from the anastomotic site   Person spoke with today (if not patient) and relationship patient   Meds reviewed with patient/caregiver? Yes   Is the patient having any side effects they believe may be caused by any medication additions or changes? No   Does the patient have all medications ordered at discharge? Yes   Is the patient taking all medications as directed (includes completed medication regime)? Yes   Does the patient have a primary care provider?  Yes   Does the patient have an appointment with their PCP within 7 days of discharge? Yes   Has the patient kept scheduled appointments due by today? Yes   Has home health visited the patient within 72 hours of discharge? N/A   Psychosocial issues? No   Did the patient receive a copy of their discharge instructions? Yes   Nursing interventions Reviewed instructions with patient   What is the patient's perception of their health status since discharge? Improving   Is the patient/caregiver able to teach back signs and symptoms related to disease process for when to call PCP? Yes   Is the patient/caregiver able to teach back signs and symptoms related to disease process for when to call 911? Yes   Is the patient/caregiver able to teach back the hierarchy of who to call/visit for symptoms/problems? PCP, Specialist, Home health nurse, Urgent Care, ED, 911 Yes   If the patient is a current smoker, are they able to teach back resources for cessation? Not a smoker   Additional teach back comments states has no pain or discomfort, just weakness   Week 3 Call Completed? Yes          DEANNA HERNANDEZ - Registered Nurse

## 2022-10-13 NOTE — PROGRESS NOTES
SUBJECTIVE:    Jade Davila is a 67 y.o. male    Patient to the clinic with c/o congestion since yesterday and hiccupping that never stops. Patient states the hiccups do not hurt or have any reflux or burning with it. Patient is also having trouble keeping BP levels up - son reports BP was 102/58 last night and O2 up. Pt reports the last time he had hiccups he was admitted with post op illus and GI bleed. Denies increase in fatigue. Denies blood in stool or abd pain. Pt reports other than congestion and hiccups he is feeling well. Lab Results       Component                Value               Date                       WBC                      5.5                 10/04/2022                 HGB                      8.4 (L)             10/04/2022                 HCT                      27.8 (L)            10/04/2022                 MCV                      96.5                10/04/2022                 PLT                      152                 10/04/2022                  Cough  This is a new problem. The current episode started yesterday. The problem has been unchanged. The cough is Productive of sputum (Productive of \"spit\" per patient). Associated symptoms include wheezing. Pertinent negatives include no chest pain, fever or shortness of breath. Associated symptoms comments: Cough and congestion. Nothing aggravates the symptoms. Chief Complaint   Patient presents with    Congestion        Review of Systems   Constitutional:  Positive for fatigue (stable). Negative for fever. HENT:  Positive for congestion. Eyes: Negative. Respiratory:  Positive for cough and wheezing. Negative for chest tightness and shortness of breath. Cardiovascular:  Positive for leg swelling (improving). Negative for chest pain and palpitations.    Gastrointestinal:  Negative for abdominal pain, anal bleeding, blood in stool, diarrhea, nausea and vomiting.        +hiccups      OBJECTIVE:    BP (!) 98/45 (Site: Left Upper Arm, Position: Sitting, Cuff Size: Medium Adult)   Pulse 79   Temp 98.4 °F (36.9 °C) (Infrared)   Resp 20   Ht 6' 1\" (1.854 m)   Wt 183 lb 7 oz (83.2 kg)   SpO2 92%   BMI 24.20 kg/m²    Physical Exam  Vitals and nursing note reviewed. Constitutional:       Appearance: Normal appearance. He is well-developed. HENT:      Head: Normocephalic. Eyes:      Extraocular Movements: Extraocular movements intact. Conjunctiva/sclera: Conjunctivae normal.      Pupils: Pupils are equal, round, and reactive to light. Neck:      Thyroid: No thyromegaly. Vascular: No carotid bruit. Cardiovascular:      Rate and Rhythm: Normal rate and regular rhythm. Heart sounds: Normal heart sounds. No murmur heard. Pulmonary:      Effort: Pulmonary effort is normal.      Breath sounds: Wheezing (expiratory wheezes throughout.) present. Skin:     General: Skin is warm and dry. Neurological:      Mental Status: He is alert and oriented to person, place, and time. Psychiatric:         Attention and Perception: Attention and perception normal.         Mood and Affect: Mood and affect normal.         Behavior: Behavior normal.         Thought Content: Thought content normal.         Judgment: Judgment normal.       ASSESSMENT/PLAN:   Roshan was seen today for congestion. Diagnoses and all orders for this visit:    COPD exacerbation ([address]  -     cefTRIAXone (ROCEPHIN) 1,000 mg in lidocaine 1 % 2.86 mL IM Injection  -     cephALEXin (KEFLEX) 500 MG capsule; Take 1 capsule by mouth 4 times daily  -     CBC with Auto Differential; Future  -     Comprehensive Metabolic Panel; Future  I have recommended patient to go the ER for worsening HGB, congestion, and hypotension. Pt refuses. Son stated \"I won't force him\". His son agrees to call an ambulance if he devleops worsening symptoms. Mayra Gibson RN called and scheduled patient a follow up with Dr Marilou Perales on 10/14/22.  Patient aware of appointment and most recent labs and office noted faxed. AMA form signed. Pt and son advised of the risk of worsening condition or death and both verbalized understanding. Return if symptoms worsen or fail to improve. Current Outpatient Medications on File Prior to Visit   Medication Sig Dispense Refill    metoprolol succinate (TOPROL XL) 25 MG extended release tablet TAKE 1 TABLET BY MOUTH EVERY DAY 90 tablet 1    amLODIPine (NORVASC) 5 MG tablet TAKE 1 TABLET BY MOUTH TWICE DAILY 90 tablet 2    pantoprazole (PROTONIX) 40 MG tablet       ferrous gluconate (FERGON) 324 (38 Fe) MG tablet Take 324 mg by mouth daily (with breakfast)      allopurinol (ZYLOPRIM) 300 MG tablet TAKE 1 TABLET BY MOUTH EVERY DAY 90 tablet 2    ipratropium-albuterol (DUONEB) 0.5-2.5 (3) MG/3ML SOLN nebulizer solution INHALE 1 VIAL VIA NEBULIZER FOUR TIMES DAILY 360 mL 2    loratadine (CLARITIN) 10 MG tablet Take 1 tablet by mouth daily 30 tablet 5    lovastatin (MEVACOR) 20 MG tablet Take 1 tablet by mouth daily 90 tablet 2    digoxin (LANOXIN) 125 MCG tablet TAKE 1 TABLET BY MOUTH EVERY DAY OR AS DIRECTED 90 tablet 2    ferrous sulfate (IRON 325) 325 (65 Fe) MG tablet take 1 tablet by mouth twice a day 180 tablet 2    MITIGARE 0.6 MG capsule TAKE 1 CAPSULE BY MOUTH DAILY 30 capsule 1    Calcium Polycarbophil (FIBER) 625 MG TABS Take 625 mg by mouth daily      Dulaglutide (TRULICITY) 1.5 HV/9.7NL SOPN Inject 1.5 mg into the skin once a week      ipratropium (ATROVENT) 0.02 % nebulizer solution Take 0.5 mg by nebulization every 4-6 hours as needed for Wheezing      nitroGLYCERIN (NITROSTAT) 0.4 MG SL tablet place 1 tablet under the tongue if needed every 5 minutes for chest pain for 3 doses IF NO RELIEF AFTER 3RD DOSE CALL PRESCRIBER . 25 tablet 5    aspirin 81 MG tablet Take 81 mg by mouth daily.         colchicine (MITIGARE) 0.6 MG capsule TAKE 1 CAPSULE BY MOUTH DAILY AS NEEDED FOR PAIN (Patient not taking: Reported on 10/13/2022) 90 capsule 0 dicyclomine (BENTYL) 10 MG capsule TAKE 1 CAPSULE BY MOUTH EVERY NIGHT IN THE MORNING AND AT NOON AND IN THE EVENING AND AT BEDTIME (Patient not taking: Reported on 10/13/2022) 120 capsule 0    HYDROcodone-acetaminophen (NORCO) 7.5-325 MG per tablet  (Patient not taking: Reported on 10/13/2022)      potassium chloride (KLOR-CON M) 10 MEQ extended release tablet Take 1 tablet by mouth in the morning. 90 tablet 0    nystatin (MYCOSTATIN) 178337 UNIT/ML suspension Take 5 mLs by mouth in the morning and 5 mLs at noon and 5 mLs in the evening and 5 mLs before bedtime. (Patient not taking: Reported on 10/13/2022) 200 mL 0    glipiZIDE (GLUCOTROL) 10 MG tablet Take 2 tablets by mouth 2 times daily (before meals) (Patient not taking: No sig reported) 360 tablet 2    furosemide (LASIX) 40 MG tablet take 1 tablet by mouth once daily (Patient not taking: Reported on 10/13/2022) 30 tablet 5    COUMADIN 5 MG tablet take as directed (Patient not taking: Reported on 10/13/2022) 100 tablet 5     No current facility-administered medications on file prior to visit.

## 2022-10-13 NOTE — PROGRESS NOTES
Chief Complaint   Patient presents with    Congestion     Patient to the clinic for a acute illness. Patient has been experiencing some congestion since yesterday and severe reoccurring hiccupping that never stops. Patient states that's what caused his last hospitalization was the hiccups that caused vomiting. After he came home from the hospital it started again and the doctor put him on a prot ine blocker and it stopped it. Now since yesterday it has flared back up. Patient states the hiccups do not hurt or have any reflux or burning with it. Patient is also having trouble keeping BP levels up and O2 up. He is sating in the upper 80's this am.     Have you seen any other physician or provider since your last visit? no    Have you had any other diagnostic tests since your last visit? no    Have you changed or stopped any medications since your last visit? no     I have recommended that this patient have a flu shot but he declines at this time. I have discussed the risks and benefits of this examination with him. The patient verbalizes understanding. Diabetic retinal exam completed this year?  No                       * If yes please have patient sign a records release to obtain record to update Health Maintenance                       * If no, please order referral for patient to be scheduled

## 2022-10-17 ASSESSMENT — ENCOUNTER SYMPTOMS
ABDOMINAL PAIN: 0
NAUSEA: 0
CHEST TIGHTNESS: 0
VOMITING: 0
WHEEZING: 1
ANAL BLEEDING: 0
BLOOD IN STOOL: 0
DIARRHEA: 0
EYES NEGATIVE: 1

## 2022-10-17 NOTE — PROGRESS NOTES
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  They state that they have done well and are having no complaints.  Patient is anemic.  Recent labs were done on 10-13-22.     The following portions of the patient's history were reviewed and updated as appropriate: allergies, current medications, past family history, past medical history, past social history, past surgical history and problem list.    Review of Systems   Constitutional: Negative for chills, fever and unexpected weight change.   HENT: Negative for trouble swallowing and voice change.    Eyes: Negative for visual disturbance.   Respiratory: Negative for apnea, cough, chest tightness, shortness of breath and wheezing.    Cardiovascular: Negative for chest pain, palpitations and leg swelling.   Gastrointestinal: Negative for abdominal distention, abdominal pain, anal bleeding, blood in stool, constipation, diarrhea, nausea, rectal pain and vomiting.   Endocrine: Negative for cold intolerance and heat intolerance.   Genitourinary: Negative for difficulty urinating, dysuria, flank pain, scrotal swelling and testicular pain.   Musculoskeletal: Negative for back pain, gait problem and joint swelling.   Skin: Negative for color change, rash and wound.   Neurological: Negative for dizziness, syncope, speech difficulty, weakness, numbness and headaches.   Hematological: Negative for adenopathy. Does not bruise/bleed easily.   Psychiatric/Behavioral: Negative for confusion. The patient is not nervous/anxious.        Allergies:  No Known Allergies    Medications:    Current  Outpatient Medications:   •  allopurinol (ZYLOPRIM) 300 MG tablet, Take 300 mg by mouth Daily., Disp: , Rfl: 0  •  amLODIPine (NORVASC) 2.5 MG tablet, Take 2.5 mg by mouth Daily., Disp: , Rfl:   •  aspirin 81 MG tablet, Take 81 mg by mouth Daily., Disp: , Rfl:   •  calcium polycarbophil (FIBERCON) 625 MG tablet, Take 625 mg by mouth 2 (Two) Times a Day. Takes 2 tablets BID, Disp: , Rfl:   •  colchicine (Mitigare) 0.6 MG capsule capsule, TAKE 1 CAPSULE BY MOUTH DAILY AS NEEDED FOR PAIN, Disp: , Rfl:   •  colchicine 0.6 MG tablet, Take 0.6 mg by mouth As Needed for Muscle / Joint Pain., Disp: , Rfl:   •  digoxin (LANOXIN) 125 MCG tablet, Take 0.5 tablets by mouth Daily., Disp: 15 tablet, Rfl: 1  •  diphenhydrAMINE (BENADRYL) 25 MG tablet, Take 25 mg by mouth every night at bedtime., Disp: , Rfl:   •  docusate sodium (COLACE) 100 MG capsule, Take 300 mg by mouth 2 (Two) Times a Day As Needed., Disp: , Rfl:   •  Dulaglutide (Trulicity) 1.5 MG/0.5ML solution pen-injector, Inject  under the skin into the appropriate area as directed 1 (One) Time Per Week., Disp: , Rfl:   •  ferrous gluconate (FERGON) 324 MG tablet, Take 1 tablet by mouth Daily With Breakfast., Disp: 30 tablet, Rfl: 0  •  ferrous sulfate 325 (65 FE) MG tablet, Take 325 mg by mouth 2 (Two) Times a Day., Disp: , Rfl:   •  furosemide (LASIX) 40 MG tablet, Take 1/2-1 tablets by mouth daily, Disp: , Rfl:   •  HYDROcodone-acetaminophen (NORCO) 7.5-325 MG per tablet, Take 1 tablet by mouth Every 6 (Six) Hours As Needed for Severe Pain., Disp: 20 tablet, Rfl: 0  •  ipratropium (ATROVENT HFA) 17 MCG/ACT inhaler, Inhale 2 puffs 4 (Four) Times a Day., Disp: , Rfl:   •  ipratropium (ATROVENT) 0.02 % nebulizer solution, Take 0.5 mg by nebulization Every 4 (Four) Hours As Needed., Disp: , Rfl:   •  lovastatin (MEVACOR) 20 MG tablet, Take 20 mg by mouth Every Night., Disp: , Rfl:   •  metoprolol succinate XL (TOPROL-XL) 25 MG 24 hr tablet, Take 25 mg by mouth Daily.,  "Disp: , Rfl:   •  nitroglycerin (NITROSTAT) 0.4 MG SL tablet, Place 0.4 mg under the tongue Every 5 (Five) Minutes As Needed for Chest Pain. Take no more than 3 doses in 15 minutes.  States he has not taken since 1993, Disp: , Rfl:   •  O2 (OXYGEN), Inhale 2 L/min Every Night., Disp: , Rfl:   •  pantoprazole (PROTONIX) 40 MG EC tablet, Take 1 tablet by mouth Every Morning., Disp: 30 tablet, Rfl: 0  •  polyethylene glycol (MIRALAX) 17 GM/SCOOP powder, Mix 238g powder with 64 oz of clear liquid. Starting at 5pm drink 80z every 10-15 minutes until consumed., Disp: 238 g, Rfl: 0  •  potassium chloride (MICRO-K) 10 MEQ CR capsule, Take 10 mEq by mouth 2 (Two) Times a Day., Disp: , Rfl:   •  SODIUM BICARBONATE PO, Take  by mouth 2 (Two) Times a Day. 10 grain tablet per patient report, Disp: , Rfl:   •  warfarin (COUMADIN) 5 MG tablet, Take 1 tablet by mouth Daily. 5mg every other day  2.5mg every other day alternating, Disp: , Rfl:   •  erythromycin base (E-MYCIN) 500 MG tablet, , Disp: , Rfl:   •  neomycin (MYCIFRADIN) 500 MG tablet, , Disp: , Rfl:   •  torsemide (DEMADEX) 20 MG tablet, TAKE 1 TABLET BY MOUTH EVERY MORNING 1 TO 2 HOURS AFTER TAKING MIDODRINE, Disp: , Rfl:     Vital Signs:  Vitals:    10/19/22 0921   BP: 94/60   Pulse: 104   Resp: 14   Temp: 97 °F (36.1 °C)   SpO2: 98%   Weight: 76.7 kg (169 lb)   Height: 185.4 cm (73\")       Physical Exam:   General Appearance:    Alert, cooperative, in no acute distress   Abdomen:     no masses, no organomegaly, soft non-tender, non-distended, no guarding, wounds are well healed, no evidence of recurrent hernia   Chest:      Clear to ausculation            Cor:     Regular rate and rhythm    Results Review:   I reviewed the patient's new clinical results.    Assessment / Plan:    1. Postoperative visit        I did discuss the situation with the patient today in the office and they have done well from their recent colonoscopy with polypectomy.  I have released the " patient back to normal activity.  I need to see the patient back in the office in 1 year and they will need to have repeat colonoscopy at that time.  Patient reassured that hemoglobin is increasing slowly, up to 8.9.  Continue to increase protein intake.  Follow-up with family physician about his COPD and oxygen use.    Electronically signed by Kenji Garcias MD  10/19/22

## 2022-10-19 ENCOUNTER — HOSPITAL ENCOUNTER (OUTPATIENT)
Facility: HOSPITAL | Age: 72
Discharge: HOME | End: 2022-10-19
Payer: MEDICARE

## 2022-10-19 LAB
A/G RATIO: 1 (ref 0.8–2)
ALBUMIN SERPL-MCNC: 3.1 G/DL (ref 3.4–4.8)
ALP BLD-CCNC: 103 U/L (ref 25–100)
ALT SERPL-CCNC: 9 U/L (ref 4–36)
ANION GAP SERPL CALCULATED.3IONS-SCNC: 10 MMOL/L (ref 3–16)
AST SERPL-CCNC: 22 U/L (ref 8–33)
BILIRUB SERPL-MCNC: 0.4 MG/DL (ref 0.3–1.2)
BUN BLDV-MCNC: 20 MG/DL (ref 6–20)
CALCIUM SERPL-MCNC: 8.5 MG/DL (ref 8.5–10.5)
CHLORIDE BLD-SCNC: 99 MMOL/L (ref 98–107)
CO2: 31 MMOL/L (ref 20–30)
CREAT SERPL-MCNC: 1.7 MG/DL (ref 0.4–1.2)
GFR SERPL CREATININE-BSD FRML MDRD: 42 ML/MIN/{1.73_M2}
GLOBULIN: 3.2 G/DL
GLUCOSE BLD-MCNC: 89 MG/DL (ref 74–106)
HBA1C MFR BLD: 5.6 %
HCT VFR BLD CALC: 31.2 % (ref 40–54)
HEMOGLOBIN: 9.7 G/DL (ref 13–18)
MCH RBC QN AUTO: 28.4 PG (ref 27–32)
MCHC RBC AUTO-ENTMCNC: 31.1 G/DL (ref 31–35)
MCV RBC AUTO: 91.5 FL (ref 80–100)
PDW BLD-RTO: 15.7 % (ref 11–16)
PLATELET # BLD: 177 K/UL (ref 150–400)
PMV BLD AUTO: 10.1 FL (ref 6–10)
POTASSIUM SERPL-SCNC: 3.8 MMOL/L (ref 3.4–5.1)
RBC # BLD: 3.41 M/UL (ref 4.5–6)
SODIUM BLD-SCNC: 140 MMOL/L (ref 136–145)
TOTAL PROTEIN: 6.3 G/DL (ref 6.4–8.3)
WBC # BLD: 6 K/UL (ref 4–11)

## 2022-10-19 PROCEDURE — 85027 COMPLETE CBC AUTOMATED: CPT

## 2022-10-19 PROCEDURE — 83036 HEMOGLOBIN GLYCOSYLATED A1C: CPT

## 2022-10-19 PROCEDURE — 80053 COMPREHEN METABOLIC PANEL: CPT

## 2022-10-19 PROCEDURE — 36415 COLL VENOUS BLD VENIPUNCTURE: CPT

## 2022-10-30 DIAGNOSIS — R19.7 DIARRHEA, UNSPECIFIED TYPE: ICD-10-CM

## 2022-10-31 RX ORDER — DICYCLOMINE HYDROCHLORIDE 10 MG/1
CAPSULE ORAL
Qty: 120 CAPSULE | Refills: 0 | Status: SHIPPED | OUTPATIENT
Start: 2022-10-31

## 2022-11-16 ENCOUNTER — HOSPITAL ENCOUNTER (OUTPATIENT)
Facility: HOSPITAL | Age: 72
Discharge: HOME | End: 2022-11-16
Payer: MEDICARE

## 2022-11-16 ENCOUNTER — OFFICE VISIT (OUTPATIENT)
Dept: FAMILY MEDICINE CLINIC | Age: 72
End: 2022-11-16
Payer: MEDICARE

## 2022-11-16 VITALS
HEART RATE: 83 BPM | SYSTOLIC BLOOD PRESSURE: 92 MMHG | TEMPERATURE: 97.3 F | WEIGHT: 156 LBS | BODY MASS INDEX: 20.58 KG/M2 | DIASTOLIC BLOOD PRESSURE: 54 MMHG

## 2022-11-16 DIAGNOSIS — R63.4 WEIGHT LOSS: Primary | ICD-10-CM

## 2022-11-16 DIAGNOSIS — R29.6 FREQUENT FALLS: ICD-10-CM

## 2022-11-16 DIAGNOSIS — R63.4 WEIGHT LOSS: ICD-10-CM

## 2022-11-16 PROBLEM — N18.30 CHRONIC KIDNEY DISEASE, STAGE III (MODERATE) (HCC): Status: ACTIVE | Noted: 2022-09-13

## 2022-11-16 PROBLEM — H25.10 NUCLEAR SCLEROTIC CATARACT: Status: ACTIVE | Noted: 2020-06-17

## 2022-11-16 PROBLEM — K59.04 CHRONIC IDIOPATHIC CONSTIPATION: Status: ACTIVE | Noted: 2022-01-01

## 2022-11-16 PROBLEM — H25.012 CORTICAL AGE-RELATED CATARACT OF LEFT EYE: Status: ACTIVE | Noted: 2020-07-06

## 2022-11-16 PROBLEM — D12.3 ADENOMATOUS POLYP OF TRANSVERSE COLON: Status: ACTIVE | Noted: 2022-08-24

## 2022-11-16 PROBLEM — D69.6 THROMBOCYTOPENIA (HCC): Status: ACTIVE | Noted: 2020-11-04

## 2022-11-16 LAB
A/G RATIO: 1.2 (ref 0.8–2)
ALBUMIN SERPL-MCNC: 3.8 G/DL (ref 3.4–4.8)
ALP BLD-CCNC: 104 U/L (ref 25–100)
ALT SERPL-CCNC: 10 U/L (ref 4–36)
ANION GAP SERPL CALCULATED.3IONS-SCNC: 9 MMOL/L (ref 3–16)
AST SERPL-CCNC: 17 U/L (ref 8–33)
BASOPHILS ABSOLUTE: 0.1 K/UL (ref 0–0.1)
BASOPHILS RELATIVE PERCENT: 0.8 %
BILIRUB SERPL-MCNC: 0.4 MG/DL (ref 0.3–1.2)
BUN BLDV-MCNC: 27 MG/DL (ref 6–20)
CALCIUM SERPL-MCNC: 9.5 MG/DL (ref 8.5–10.5)
CHLORIDE BLD-SCNC: 112 MMOL/L (ref 98–107)
CO2: 17 MMOL/L (ref 20–30)
CREAT SERPL-MCNC: 1.9 MG/DL (ref 0.4–1.2)
EOSINOPHILS ABSOLUTE: 0.2 K/UL (ref 0–0.4)
EOSINOPHILS RELATIVE PERCENT: 2.8 %
GFR SERPL CREATININE-BSD FRML MDRD: 37 ML/MIN/{1.73_M2}
GLOBULIN: 3.3 G/DL
GLUCOSE BLD-MCNC: 144 MG/DL (ref 74–106)
HCT VFR BLD CALC: 36.3 % (ref 40–54)
HEMOGLOBIN: 10.8 G/DL (ref 13–18)
IMMATURE GRANULOCYTES #: 0.1 K/UL
IMMATURE GRANULOCYTES %: 1.7 % (ref 0–5)
LYMPHOCYTES ABSOLUTE: 1.2 K/UL (ref 1.5–4)
LYMPHOCYTES RELATIVE PERCENT: 14.6 %
MCH RBC QN AUTO: 28.2 PG (ref 27–32)
MCHC RBC AUTO-ENTMCNC: 29.8 G/DL (ref 31–35)
MCV RBC AUTO: 94.8 FL (ref 80–100)
MONOCYTES ABSOLUTE: 0.7 K/UL (ref 0.2–0.8)
MONOCYTES RELATIVE PERCENT: 8.5 %
NEUTROPHILS ABSOLUTE: 6 K/UL (ref 2–7.5)
NEUTROPHILS RELATIVE PERCENT: 71.6 %
PDW BLD-RTO: 20.5 % (ref 11–16)
PLATELET # BLD: 156 K/UL (ref 150–400)
PMV BLD AUTO: 10.4 FL (ref 6–10)
POTASSIUM SERPL-SCNC: 4.3 MMOL/L (ref 3.4–5.1)
PROSTATE SPECIFIC ANTIGEN: 0.77 NG/ML (ref 0–4)
RBC # BLD: 3.83 M/UL (ref 4.5–6)
SODIUM BLD-SCNC: 138 MMOL/L (ref 136–145)
TOTAL PROTEIN: 7.1 G/DL (ref 6.4–8.3)
TSH REFLEX: 0.79 UIU/ML (ref 0.27–4.2)
WBC # BLD: 8.4 K/UL (ref 4–11)

## 2022-11-16 PROCEDURE — 1036F TOBACCO NON-USER: CPT | Performed by: NURSE PRACTITIONER

## 2022-11-16 PROCEDURE — 99213 OFFICE O/P EST LOW 20 MIN: CPT | Performed by: NURSE PRACTITIONER

## 2022-11-16 PROCEDURE — G8420 CALC BMI NORM PARAMETERS: HCPCS | Performed by: NURSE PRACTITIONER

## 2022-11-16 PROCEDURE — 3074F SYST BP LT 130 MM HG: CPT | Performed by: NURSE PRACTITIONER

## 2022-11-16 PROCEDURE — 84443 ASSAY THYROID STIM HORMONE: CPT

## 2022-11-16 PROCEDURE — 80053 COMPREHEN METABOLIC PANEL: CPT

## 2022-11-16 PROCEDURE — 3017F COLORECTAL CA SCREEN DOC REV: CPT | Performed by: NURSE PRACTITIONER

## 2022-11-16 PROCEDURE — 36415 COLL VENOUS BLD VENIPUNCTURE: CPT

## 2022-11-16 PROCEDURE — 3078F DIAST BP <80 MM HG: CPT | Performed by: NURSE PRACTITIONER

## 2022-11-16 PROCEDURE — 85025 COMPLETE CBC W/AUTO DIFF WBC: CPT

## 2022-11-16 PROCEDURE — G8427 DOCREV CUR MEDS BY ELIG CLIN: HCPCS | Performed by: NURSE PRACTITIONER

## 2022-11-16 PROCEDURE — G8484 FLU IMMUNIZE NO ADMIN: HCPCS | Performed by: NURSE PRACTITIONER

## 2022-11-16 PROCEDURE — 1123F ACP DISCUSS/DSCN MKR DOCD: CPT | Performed by: NURSE PRACTITIONER

## 2022-11-16 PROCEDURE — 84153 ASSAY OF PSA TOTAL: CPT

## 2022-11-16 RX ORDER — MIDODRINE HYDROCHLORIDE 5 MG/1
5 TABLET ORAL 2 TIMES DAILY
COMMUNITY
Start: 2022-10-11

## 2022-11-16 NOTE — PROGRESS NOTES
Chief Complaint   Patient presents with    Follow-up     Patient here for a follow up appointment. Patient last visit was 10/13/22- patient was seen for a sick visit for COPD exacerbation. Since that appointment patient is down 28lbs. Patient denies n/v or diarrhea. Patient has been following with Dr Winifred Olivares for a recent colectomy for tubular adenoma with focal high grade dysplasia per documentation. Patient reports his next visit with Dr Winifred Olivares is October of 2023. Patient reports he has not taken his coumadin since early October when his Hgb was low. Patient does states he has an appointment with Dr Nilton Lovett for December. Have you seen any other physician or provider since your last visit yes - Winifred Olivares     Have you had any other diagnostic tests since your last visit? no    Have you changed or stopped any medications since your last visit? yes - med list updated with several changes with assistance of patient and son.

## 2022-11-16 NOTE — PROGRESS NOTES
SUBJECTIVE:    Wendy Kaur is a 67 y.o. male    Patient here for a follow up appointment. Patient last visit was 10/13/22- patient was seen for a sick visit for COPD exacerbation- pt reports symptoms have resolved and he is feeling well. Since that appointment patient is down 28lbs. Patient denies n/v or diarrhea. Son reports he eats everything that is put in front of him. He is eating and drinking well. Patient has been following with Dr Latoya Garcia for a recent colectomy for tubular adenoma with focal high grade dysplasia per documentation. Patient reports his next visit with Dr Latoya Garcia is October of 2023. Patient reports he has not taken his coumadin since early October when his Hgb was low. pt reports he continues to c/o weakness. Pt reports he has physical therapy. Pt and son both reports strength is improving. He reports he has a couple of falls due to falling backwards. He reports his physical therapist feels it is due to weakness. He reports he feels like all of his muscles just release and he falls. Pt reports 2 falls this week. He reports he fell on 11/14/2022 in the bathroom. Denies injury other than an abrasion right arm. His son reports he sat down in the floor this morning without injury. Patient does states he has an appointment with Dr Vicki Laura for December. P       Chief Complaint   Patient presents with    Follow-up        Review of Systems   Constitutional:  Positive for fatigue and unexpected weight change (weight loss 28 lbs over the past month- peripheral edema has significantly improved. 10 lb weight loss since September 2022). Negative for activity change, appetite change (reports a \"good\" appetite), chills, diaphoresis and fever. HENT: Negative. Neurological:  Positive for weakness (generalized). Negative for dizziness, tremors, seizures, syncope, light-headedness and headaches. Hematological: Negative. Psychiatric/Behavioral: Negative.         OBJECTIVE:    BP (!) 92/54 Pulse 83   Temp 97.3 °F (36.3 °C) (Infrared)   Wt 156 lb (70.8 kg)   BMI 20.58 kg/m²    Physical Exam  Vitals and nursing note reviewed. Constitutional:       Appearance: Normal appearance. He is well-developed. HENT:      Head: Normocephalic. Eyes:      Extraocular Movements: Extraocular movements intact. Conjunctiva/sclera: Conjunctivae normal.      Pupils: Pupils are equal, round, and reactive to light. Neck:      Thyroid: No thyromegaly. Vascular: No carotid bruit. Cardiovascular:      Rate and Rhythm: Normal rate. Rhythm irregularly irregular. Heart sounds: Normal heart sounds. No murmur heard. Pulmonary:      Effort: Pulmonary effort is normal.      Breath sounds: Normal breath sounds. Skin:     General: Skin is warm and dry. Neurological:      Mental Status: He is alert and oriented to person, place, and time. Psychiatric:         Attention and Perception: Attention and perception normal.         Mood and Affect: Mood and affect normal.         Behavior: Behavior normal.         Thought Content: Thought content normal.         Judgment: Judgment normal.       ASSESSMENT/PLAN:   Bhavin Sandoval was seen today for follow-up. Diagnoses and all orders for this visit:    Weight loss  -     CBC with Auto Differential; Future  -     Comprehensive Metabolic Panel; Future  -     TSH with Reflex; Future  -     PSA, Prostatic Specific Antigen; Future  -     Cancel: CT CHEST WO CONTRAST; Future  -     CT ABDOMEN PELVIS WO CONTRAST Additional Contrast? None; Future    Frequent falls  Declines evaluation for reason for falls. Pt reports he will continue physical therapy at home. Declines order for a walker. I have advised patient that a walker may help prevent falls better than a cane. He declines. I have also recommended a referral to Neurology due to frequent falls. He declined. Son witnessed conversation and stated \"I'm not going to force him\".  I have recommended patient follow up with cardiology ASAP to discuss anticoagulant therapy. He has been off Coumadin since surgery and post op anemia. Pt has had 2 falls this week- discuss with cardiology prior to starting coumadin back due to falls. Return in about 2 weeks (around 11/30/2022) for weight check. Current Outpatient Medications on File Prior to Visit   Medication Sig Dispense Refill    midodrine (PROAMATINE) 5 MG tablet Take 5 mg by mouth in the morning and at bedtime      metoprolol succinate (TOPROL XL) 25 MG extended release tablet TAKE 1 TABLET BY MOUTH EVERY DAY (Patient taking differently: 1/2 tablet daily) 90 tablet 1    potassium chloride (KLOR-CON M) 10 MEQ extended release tablet Take 1 tablet by mouth in the morning. 90 tablet 0    digoxin (LANOXIN) 125 MCG tablet TAKE 1 TABLET BY MOUTH EVERY DAY OR AS DIRECTED 90 tablet 2    ferrous sulfate (IRON 325) 325 (65 Fe) MG tablet take 1 tablet by mouth twice a day 180 tablet 2    Calcium Polycarbophil (FIBER) 625 MG TABS Take 625 mg by mouth daily      Dulaglutide (TRULICITY) 1.5 YB/9.6EL SOPN Inject 1.5 mg into the skin once a week      furosemide (LASIX) 40 MG tablet take 1 tablet by mouth once daily (Patient taking differently: Take 20 mg by mouth daily take 1 tablet by mouth once daily) 30 tablet 5    aspirin 81 MG tablet Take 81 mg by mouth daily.         dicyclomine (BENTYL) 10 MG capsule TAKE 1 CAPSULE 4 TIMES A DAY-IN THE MORNING, AT NOON IN THE EVENING AND AT BEDTIME AS DIRECTED (Patient not taking: Reported on 11/16/2022) 120 capsule 0    colchicine (MITIGARE) 0.6 MG capsule TAKE 1 CAPSULE BY MOUTH DAILY AS NEEDED FOR PAIN (Patient not taking: No sig reported) 90 capsule 0    amLODIPine (NORVASC) 5 MG tablet TAKE 1 TABLET BY MOUTH TWICE DAILY (Patient not taking: Reported on 11/16/2022) 90 tablet 2    pantoprazole (PROTONIX) 40 MG tablet  (Patient not taking: Reported on 11/16/2022)      allopurinol (ZYLOPRIM) 300 MG tablet TAKE 1 TABLET BY MOUTH EVERY DAY (Patient not taking: Reported on 11/16/2022) 90 tablet 2    ipratropium-albuterol (DUONEB) 0.5-2.5 (3) MG/3ML SOLN nebulizer solution INHALE 1 VIAL VIA NEBULIZER FOUR TIMES DAILY (Patient not taking: Reported on 11/16/2022) 360 mL 2    nystatin (MYCOSTATIN) 564792 UNIT/ML suspension Take 5 mLs by mouth in the morning and 5 mLs at noon and 5 mLs in the evening and 5 mLs before bedtime. (Patient not taking: Reported on 10/13/2022) 200 mL 0    loratadine (CLARITIN) 10 MG tablet Take 1 tablet by mouth daily (Patient not taking: Reported on 11/16/2022) 30 tablet 5    MITIGARE 0.6 MG capsule TAKE 1 CAPSULE BY MOUTH DAILY 30 capsule 1    glipiZIDE (GLUCOTROL) 10 MG tablet Take 2 tablets by mouth 2 times daily (before meals) (Patient not taking: No sig reported) 360 tablet 2    nitroGLYCERIN (NITROSTAT) 0.4 MG SL tablet place 1 tablet under the tongue if needed every 5 minutes for chest pain for 3 doses IF NO RELIEF AFTER 3RD DOSE CALL PRESCRIBER . 25 tablet 5    COUMADIN 5 MG tablet take as directed (Patient not taking: No sig reported) 100 tablet 5     No current facility-administered medications on file prior to visit.

## 2022-11-18 NOTE — TELEPHONE ENCOUNTER
Patient notified to decrease digoxin to every other day,repeat dig level in 1 month. Verbalized understanding.   [Joint Pain] : joint pain [Joint Stiffness] : joint stiffness [Negative] : Heme/Lymph

## 2022-11-21 ENCOUNTER — HOSPITAL ENCOUNTER (OUTPATIENT)
Dept: CT IMAGING | Facility: HOSPITAL | Age: 72
Discharge: HOME | End: 2022-11-21
Payer: MEDICARE

## 2022-11-21 DIAGNOSIS — R63.4 WEIGHT LOSS: ICD-10-CM

## 2022-11-21 DIAGNOSIS — J42 CHRONIC BRONCHITIS, UNSPECIFIED CHRONIC BRONCHITIS TYPE (HCC): ICD-10-CM

## 2022-11-21 DIAGNOSIS — J98.8 CONGESTION OF RESPIRATORY TRACT: ICD-10-CM

## 2022-11-21 DIAGNOSIS — J44.1 CHRONIC OBSTRUCTIVE PULMONARY DISEASE WITH ACUTE EXACERBATION (HCC): ICD-10-CM

## 2022-11-21 PROCEDURE — 74176 CT ABD & PELVIS W/O CONTRAST: CPT

## 2022-11-21 PROCEDURE — 71250 CT THORAX DX C-: CPT

## 2022-11-22 DIAGNOSIS — R93.89 ABNORMAL CHEST CT: ICD-10-CM

## 2022-11-22 DIAGNOSIS — E78.00 HYPERCHOLESTEROLEMIA: ICD-10-CM

## 2022-11-22 DIAGNOSIS — R63.4 WEIGHT LOSS: Primary | ICD-10-CM

## 2022-11-22 DIAGNOSIS — R93.89 NODULAR RADIOLOGIC DENSITY: ICD-10-CM

## 2022-11-22 RX ORDER — LOVASTATIN 20 MG/1
20 TABLET ORAL DAILY
Qty: 90 TABLET | Refills: 2 | Status: SHIPPED | OUTPATIENT
Start: 2022-11-22

## 2022-12-19 ENCOUNTER — OFFICE VISIT (OUTPATIENT)
Dept: FAMILY MEDICINE CLINIC | Age: 72
End: 2022-12-19
Payer: MEDICARE

## 2022-12-19 VITALS
RESPIRATION RATE: 18 BRPM | OXYGEN SATURATION: 94 % | WEIGHT: 159.5 LBS | DIASTOLIC BLOOD PRESSURE: 62 MMHG | HEART RATE: 86 BPM | SYSTOLIC BLOOD PRESSURE: 110 MMHG | BODY MASS INDEX: 21.14 KG/M2 | HEIGHT: 73 IN

## 2022-12-19 DIAGNOSIS — R91.8 OPACITY OF LUNG ON IMAGING STUDY: Primary | ICD-10-CM

## 2022-12-19 DIAGNOSIS — I48.91 ATRIAL FIBRILLATION, UNSPECIFIED TYPE (HCC): ICD-10-CM

## 2022-12-19 DIAGNOSIS — Z23 NEED FOR INFLUENZA VACCINATION: ICD-10-CM

## 2022-12-19 DIAGNOSIS — J44.9 CHRONIC OBSTRUCTIVE PULMONARY DISEASE, UNSPECIFIED COPD TYPE (HCC): ICD-10-CM

## 2022-12-19 PROCEDURE — G8484 FLU IMMUNIZE NO ADMIN: HCPCS | Performed by: NURSE PRACTITIONER

## 2022-12-19 PROCEDURE — 3017F COLORECTAL CA SCREEN DOC REV: CPT | Performed by: NURSE PRACTITIONER

## 2022-12-19 PROCEDURE — 90694 VACC AIIV4 NO PRSRV 0.5ML IM: CPT | Performed by: NURSE PRACTITIONER

## 2022-12-19 PROCEDURE — 1036F TOBACCO NON-USER: CPT | Performed by: NURSE PRACTITIONER

## 2022-12-19 PROCEDURE — 3023F SPIROM DOC REV: CPT | Performed by: NURSE PRACTITIONER

## 2022-12-19 PROCEDURE — G0008 ADMIN INFLUENZA VIRUS VAC: HCPCS | Performed by: NURSE PRACTITIONER

## 2022-12-19 PROCEDURE — 99214 OFFICE O/P EST MOD 30 MIN: CPT | Performed by: NURSE PRACTITIONER

## 2022-12-19 PROCEDURE — 3074F SYST BP LT 130 MM HG: CPT | Performed by: NURSE PRACTITIONER

## 2022-12-19 PROCEDURE — 3078F DIAST BP <80 MM HG: CPT | Performed by: NURSE PRACTITIONER

## 2022-12-19 PROCEDURE — G8427 DOCREV CUR MEDS BY ELIG CLIN: HCPCS | Performed by: NURSE PRACTITIONER

## 2022-12-19 PROCEDURE — 1123F ACP DISCUSS/DSCN MKR DOCD: CPT | Performed by: NURSE PRACTITIONER

## 2022-12-19 PROCEDURE — G8420 CALC BMI NORM PARAMETERS: HCPCS | Performed by: NURSE PRACTITIONER

## 2022-12-19 RX ORDER — DOCUSATE SODIUM 100 MG/1
100 CAPSULE, LIQUID FILLED ORAL 3 TIMES DAILY PRN
COMMUNITY

## 2022-12-19 SDOH — ECONOMIC STABILITY: FOOD INSECURITY: WITHIN THE PAST 12 MONTHS, THE FOOD YOU BOUGHT JUST DIDN'T LAST AND YOU DIDN'T HAVE MONEY TO GET MORE.: NEVER TRUE

## 2022-12-19 SDOH — ECONOMIC STABILITY: FOOD INSECURITY: WITHIN THE PAST 12 MONTHS, YOU WORRIED THAT YOUR FOOD WOULD RUN OUT BEFORE YOU GOT MONEY TO BUY MORE.: NEVER TRUE

## 2022-12-19 ASSESSMENT — ENCOUNTER SYMPTOMS
SHORTNESS OF BREATH: 0
DIARRHEA: 0
BLOOD IN STOOL: 0
NAUSEA: 0
VOMITING: 0
COUGH: 0
ALLERGIC/IMMUNOLOGIC NEGATIVE: 1
RESPIRATORY NEGATIVE: 1
ABDOMINAL PAIN: 0
EYES NEGATIVE: 1

## 2022-12-19 ASSESSMENT — SOCIAL DETERMINANTS OF HEALTH (SDOH): HOW HARD IS IT FOR YOU TO PAY FOR THE VERY BASICS LIKE FOOD, HOUSING, MEDICAL CARE, AND HEATING?: NOT HARD AT ALL

## 2022-12-19 NOTE — PROGRESS NOTES
Chief Complaint   Patient presents with    Follow-up     Patient here for follow up. Since last visit patient has a PET scan at [facility]. See scanned results. Patient also reports his cardiology appointment was rescheduled for February. Have you seen any other physician or provider since your last visit no    Have you had any other diagnostic tests since your last visit?  yes - PET scan    Have you changed or stopped any medications since your last visit? no

## 2022-12-19 NOTE — PROGRESS NOTES
SUBJECTIVE:    Suzi Sarah is a 67 y.o. male    Pt presents for weight check and follow up. Pt reports his weight has stabalized. He reports for the past 2 weeks his weight has been between 155-160 lbs. During office visit on 11/16/2022- Pt reported 2 falls. He Declined evaluation for reason for falls. Pt reported he will continue physical therapy at home. Declined order for a walker. He also declined referral to Neurology due to frequent falls. He was also instructed to follow up with cardiology ASAP to discuss anticoagulant therapy. He has been off Coumadin since surgery and post op anemia. Pt reports he was scheduled with cardiology on 12/22/2022 however Dr Jesica Mabry office called and rescheduled for Feb 2022. Pt reports he has not had any additional falls. Denies blood in stool. Pt reports he is feeling well today. NO complaints. He had a CT of Chest, Abd and pelvis for evaluation of weight loss on 11/16/2022- impression below    CT CHEST:     Subpleural nodularity is seen in the left lung base which may represent rounded atelectasis. Mass lesion is not excluded. Follow-up with PET CT may be helpful. Bilateral pleural effusions and bibasilar scarring is noted. There is pericardial calcification visualized. CT ABDOMEN/PELVIS:     Small amount of free ascitic fluid, indeterminant. Extensive vascular calcification noted. Bilateral renal cysts. Prior partial right colectomy    Pt had PET CT scan on 12/05/2022 AT [facility].                  Chief Complaint   Patient presents with    Follow-up        Review of Systems   Constitutional: Negative. Negative for fatigue. Eyes: Negative. Respiratory: Negative. Negative for cough and shortness of breath. Cardiovascular:  Negative for chest pain. Gastrointestinal:  Negative for abdominal pain, blood in stool, diarrhea, nausea and vomiting. Endocrine: Negative. Genitourinary: Negative. Musculoskeletal: Negative. Allergic/Immunologic: Negative. Neurological:  Negative for weakness. Hematological: Negative. Psychiatric/Behavioral: Negative. OBJECTIVE:    /62   Pulse 86   Resp 18   Ht 6' 1\" (1.854 m)   Wt 159 lb 8 oz (72.3 kg)   SpO2 94% Comment: RA  BMI 21.04 kg/m²    Physical Exam  Vitals and nursing note reviewed. Constitutional:       Appearance: Normal appearance. He is well-developed. HENT:      Head: Normocephalic. Eyes:      Extraocular Movements: Extraocular movements intact. Conjunctiva/sclera: Conjunctivae normal.      Pupils: Pupils are equal, round, and reactive to light. Neck:      Thyroid: No thyromegaly. Vascular: No carotid bruit. Cardiovascular:      Rate and Rhythm: Normal rate. Rhythm irregularly irregular. Heart sounds: Normal heart sounds. No murmur heard. Pulmonary:      Effort: Pulmonary effort is normal.      Breath sounds: Normal breath sounds. Skin:     General: Skin is warm and dry. Neurological:      Mental Status: He is alert and oriented to person, place, and time. Psychiatric:         Attention and Perception: Attention and perception normal.         Mood and Affect: Mood and affect normal.         Behavior: Behavior normal.         Thought Content: Thought content normal.         Judgment: Judgment normal.       ASSESSMENT/PLAN:   Joseph Light was seen today for follow-up. Diagnoses and all orders for this visit:    Opacity of lung on imaging study  -     External Referral To Pulmonology    Chronic obstructive pulmonary disease, unspecified COPD type ([facility] [address]  -     External Referral To Pulmonology    Need for influenza vaccination  -     Influenza, FLUAD, (age 72 y+), IM, Preservative Free, 0.5 mL    Atrial fibrillation, unspecified type ([facility] [address]  - restart coumadin. Start at 2.5 mg daily . Check INR on 12/22/2022. Follow up with cardiology as scheduled. Monitor for s/s of abnormal bleeding or bruising.        Return in about 1 month (around 1/19/2023), or as needed. Current Outpatient Medications on File Prior to Visit   Medication Sig Dispense Refill    docusate sodium (COLACE) 100 MG capsule Take 100 mg by mouth 3 times daily as needed for Constipation      ipratropium (ATROVENT) 0.02 % nebulizer solution INHALE THE CONTENTS OF 1 VIAL VIA NEBULIZER EVERY 6 HOURS 875 mL 1    lovastatin (MEVACOR) 20 MG tablet TAKE 1 TABLET BY MOUTH DAILY 90 tablet 2    midodrine (PROAMATINE) 5 MG tablet Take 5 mg by mouth in the morning and at bedtime      metoprolol succinate (TOPROL XL) 25 MG extended release tablet TAKE 1 TABLET BY MOUTH EVERY DAY (Patient taking differently: 1/2 tablet daily) 90 tablet 1    allopurinol (ZYLOPRIM) 300 MG tablet TAKE 1 TABLET BY MOUTH EVERY DAY 90 tablet 2    ferrous sulfate (IRON 325) 325 (65 Fe) MG tablet take 1 tablet by mouth twice a day 180 tablet 2    MITIGARE 0.6 MG capsule TAKE 1 CAPSULE BY MOUTH DAILY 30 capsule 1    Calcium Polycarbophil (FIBER) 625 MG TABS Take 625 mg by mouth daily      Dulaglutide (TRULICITY) 1.5 ND/0.1EJ SOPN Inject 1.5 mg into the skin once a week      furosemide (LASIX) 40 MG tablet take 1 tablet by mouth once daily (Patient taking differently: Take 20 mg by mouth daily take 1 tablet by mouth once daily) 30 tablet 5    aspirin 81 MG tablet Take 81 mg by mouth daily. ipratropium-albuterol (DUONEB) 0.5-2.5 (3) MG/3ML SOLN nebulizer solution INHALE 1 VIAL VIA NEBULIZER FOUR TIMES DAILY (Patient not taking: Reported on 11/16/2022) 360 mL 2    potassium chloride (KLOR-CON M) 10 MEQ extended release tablet Take 1 tablet by mouth in the morning.  (Patient not taking: Reported on 12/19/2022) 90 tablet 0    nitroGLYCERIN (NITROSTAT) 0.4 MG SL tablet place 1 tablet under the tongue if needed every 5 minutes for chest pain for 3 doses IF NO RELIEF AFTER 3RD DOSE CALL PRESCRIBER . 25 tablet 5    COUMADIN 5 MG tablet take as directed (Patient not taking: No sig reported) 100 tablet 5     No current facility-administered medications on file prior to visit.

## 2022-12-22 ENCOUNTER — NURSE ONLY (OUTPATIENT)
Dept: FAMILY MEDICINE CLINIC | Age: 72
End: 2022-12-22
Payer: MEDICARE

## 2022-12-22 DIAGNOSIS — I48.91 ATRIAL FIBRILLATION, UNSPECIFIED TYPE (HCC): Primary | ICD-10-CM

## 2022-12-22 LAB
INTERNATIONAL NORMALIZATION RATIO, POC: 1.1
PROTHROMBIN TIME, POC: 0

## 2022-12-22 PROCEDURE — 85610 PROTHROMBIN TIME: CPT | Performed by: NURSE PRACTITIONER

## 2022-12-22 RX ORDER — WARFARIN SODIUM 1 MG/1
3 TABLET ORAL DAILY
Qty: 21 TABLET | Refills: 0 | Status: SHIPPED | OUTPATIENT
Start: 2022-12-22

## 2022-12-22 NOTE — PROGRESS NOTES
Patient here for INR check, 1.1. Increased to 3mg q day and recheck INR on Tuesday December 27, 2022. Hold previous dose of coumadin.

## 2022-12-27 ENCOUNTER — NURSE ONLY (OUTPATIENT)
Dept: FAMILY MEDICINE CLINIC | Age: 72
End: 2022-12-27
Payer: MEDICARE

## 2022-12-27 DIAGNOSIS — Z79.01 ANTICOAGULATED ON COUMADIN: Primary | ICD-10-CM

## 2022-12-27 LAB
INTERNATIONAL NORMALIZATION RATIO, POC: 1.2
PROTHROMBIN TIME, POC: 0

## 2022-12-27 PROCEDURE — 85610 PROTHROMBIN TIME: CPT | Performed by: NURSE PRACTITIONER

## 2022-12-27 NOTE — PROGRESS NOTES
Patient here for INR- 1.2. Patient was instructed by Regino LUCAS to take 5mg daily and return in one week for repeat INR.

## 2022-12-29 DIAGNOSIS — R19.7 DIARRHEA, UNSPECIFIED TYPE: ICD-10-CM

## 2022-12-29 DIAGNOSIS — D50.9 IRON DEFICIENCY ANEMIA, UNSPECIFIED IRON DEFICIENCY ANEMIA TYPE: ICD-10-CM

## 2022-12-29 RX ORDER — FERROUS SULFATE 325(65) MG
TABLET ORAL
Qty: 180 TABLET | Refills: 2 | OUTPATIENT
Start: 2022-12-29

## 2022-12-29 RX ORDER — DIGOXIN 125 MCG
TABLET ORAL
Qty: 90 TABLET | Refills: 2 | Status: SHIPPED | OUTPATIENT
Start: 2022-12-29

## 2022-12-29 RX ORDER — AMLODIPINE BESYLATE 5 MG/1
TABLET ORAL
Qty: 90 TABLET | Refills: 2 | OUTPATIENT
Start: 2022-12-29

## 2022-12-29 RX ORDER — DOCUSATE SODIUM 100 MG/1
CAPSULE, LIQUID FILLED ORAL
Qty: 270 CAPSULE | Refills: 0 | Status: SHIPPED | OUTPATIENT
Start: 2022-12-29

## 2022-12-29 RX ORDER — DICYCLOMINE HYDROCHLORIDE 10 MG/1
CAPSULE ORAL
Qty: 120 CAPSULE | Refills: 0 | OUTPATIENT
Start: 2022-12-29

## 2023-01-01 ENCOUNTER — TELEPHONE (OUTPATIENT)
Dept: CARDIOLOGY | Facility: CLINIC | Age: 73
End: 2023-01-01
Payer: MEDICARE

## 2023-01-01 ENCOUNTER — HOSPITAL ENCOUNTER (OUTPATIENT)
Dept: PHYSICAL THERAPY | Facility: HOSPITAL | Age: 73
Setting detail: THERAPIES SERIES
Discharge: HOME | End: 2023-02-06
Payer: MEDICARE

## 2023-01-01 ENCOUNTER — APPOINTMENT (OUTPATIENT)
Dept: CARDIOLOGY | Facility: HOSPITAL | Age: 73
End: 2023-01-01
Payer: MEDICARE

## 2023-01-01 ENCOUNTER — APPOINTMENT (OUTPATIENT)
Dept: PHYSICAL THERAPY | Facility: HOSPITAL | Age: 73
End: 2023-01-01
Payer: MEDICARE

## 2023-01-01 ENCOUNTER — HOSPITAL ENCOUNTER (OUTPATIENT)
Dept: PHYSICAL THERAPY | Facility: HOSPITAL | Age: 73
Setting detail: THERAPIES SERIES
Discharge: HOME | End: 2023-02-20
Payer: MEDICARE

## 2023-01-01 ENCOUNTER — APPOINTMENT (OUTPATIENT)
Dept: LAB | Facility: HOSPITAL | Age: 73
End: 2023-01-01
Payer: MEDICARE

## 2023-01-01 ENCOUNTER — PREP FOR SURGERY (OUTPATIENT)
Dept: OTHER | Facility: HOSPITAL | Age: 73
End: 2023-01-01
Payer: MEDICARE

## 2023-01-01 ENCOUNTER — HOSPITAL ENCOUNTER (EMERGENCY)
Facility: HOSPITAL | Age: 73
Discharge: HOME | End: 2023-03-07
Attending: EMERGENCY MEDICINE | Admitting: EMERGENCY MEDICINE
Payer: MEDICARE

## 2023-01-01 ENCOUNTER — APPOINTMENT (OUTPATIENT)
Dept: GENERAL RADIOLOGY | Facility: HOSPITAL | Age: 73
DRG: 239 | End: 2023-01-01
Payer: MEDICARE

## 2023-01-01 ENCOUNTER — TRANSCRIBE ORDERS (OUTPATIENT)
Dept: PHYSICAL THERAPY | Facility: HOSPITAL | Age: 73
End: 2023-01-01
Payer: MEDICARE

## 2023-01-01 ENCOUNTER — APPOINTMENT (OUTPATIENT)
Dept: INTERVENTIONAL RADIOLOGY/VASCULAR | Facility: HOSPITAL | Age: 73
DRG: 239 | End: 2023-01-01
Payer: MEDICARE

## 2023-01-01 ENCOUNTER — ANESTHESIA (OUTPATIENT)
Dept: PERIOP | Facility: HOSPITAL | Age: 73
DRG: 239 | End: 2023-01-01
Payer: MEDICARE

## 2023-01-01 ENCOUNTER — OFFICE VISIT (OUTPATIENT)
Dept: CARDIAC SURGERY | Facility: CLINIC | Age: 73
End: 2023-01-01
Payer: MEDICARE

## 2023-01-01 ENCOUNTER — HOSPITAL ENCOUNTER (INPATIENT)
Facility: HOSPITAL | Age: 73
LOS: 3 days | DRG: 239 | End: 2023-03-25
Attending: STUDENT IN AN ORGANIZED HEALTH CARE EDUCATION/TRAINING PROGRAM | Admitting: STUDENT IN AN ORGANIZED HEALTH CARE EDUCATION/TRAINING PROGRAM
Payer: MEDICARE

## 2023-01-01 ENCOUNTER — APPOINTMENT (OUTPATIENT)
Dept: CARDIOLOGY | Facility: HOSPITAL | Age: 73
DRG: 239 | End: 2023-01-01
Payer: MEDICARE

## 2023-01-01 ENCOUNTER — HOSPITAL ENCOUNTER (OUTPATIENT)
Dept: PHYSICAL THERAPY | Facility: HOSPITAL | Age: 73
Setting detail: THERAPIES SERIES
Discharge: HOME | End: 2023-03-09
Payer: MEDICARE

## 2023-01-01 ENCOUNTER — HOSPITAL ENCOUNTER (OUTPATIENT)
Dept: PHYSICAL THERAPY | Facility: HOSPITAL | Age: 73
Setting detail: THERAPIES SERIES
Discharge: HOME | End: 2023-03-02
Payer: MEDICARE

## 2023-01-01 ENCOUNTER — HOSPITAL ENCOUNTER (OUTPATIENT)
Dept: PHYSICAL THERAPY | Facility: HOSPITAL | Age: 73
Setting detail: THERAPIES SERIES
Discharge: HOME | End: 2023-02-13
Payer: MEDICARE

## 2023-01-01 ENCOUNTER — ANESTHESIA EVENT (OUTPATIENT)
Dept: PERIOP | Facility: HOSPITAL | Age: 73
DRG: 239 | End: 2023-01-01
Payer: MEDICARE

## 2023-01-01 ENCOUNTER — LAB (OUTPATIENT)
Dept: LAB | Facility: HOSPITAL | Age: 73
End: 2023-01-01
Payer: MEDICARE

## 2023-01-01 ENCOUNTER — OFFICE VISIT (OUTPATIENT)
Dept: SURGERY | Facility: CLINIC | Age: 73
End: 2023-01-01
Payer: MEDICARE

## 2023-01-01 ENCOUNTER — OFFICE VISIT (OUTPATIENT)
Dept: CARDIOLOGY | Facility: CLINIC | Age: 73
End: 2023-01-01
Payer: MEDICARE

## 2023-01-01 ENCOUNTER — APPOINTMENT (OUTPATIENT)
Dept: MRI IMAGING | Facility: HOSPITAL | Age: 73
DRG: 239 | End: 2023-01-01
Payer: MEDICARE

## 2023-01-01 ENCOUNTER — ANESTHESIA EVENT CONVERTED (OUTPATIENT)
Dept: ANESTHESIOLOGY | Facility: HOSPITAL | Age: 73
DRG: 239 | End: 2023-01-01
Payer: MEDICARE

## 2023-01-01 ENCOUNTER — HOSPITAL ENCOUNTER (OUTPATIENT)
Dept: PHYSICAL THERAPY | Facility: HOSPITAL | Age: 73
Setting detail: THERAPIES SERIES
Discharge: HOME | End: 2023-02-23
Payer: MEDICARE

## 2023-01-01 ENCOUNTER — DOCUMENTATION (OUTPATIENT)
Dept: PHYSICAL THERAPY | Facility: HOSPITAL | Age: 73
End: 2023-01-01
Payer: MEDICARE

## 2023-01-01 ENCOUNTER — APPOINTMENT (OUTPATIENT)
Dept: ULTRASOUND IMAGING | Facility: HOSPITAL | Age: 73
DRG: 239 | End: 2023-01-01
Payer: MEDICARE

## 2023-01-01 ENCOUNTER — HOSPITAL ENCOUNTER (OUTPATIENT)
Facility: HOSPITAL | Age: 73
Discharge: HOME | End: 2023-02-17
Attending: INTERNAL MEDICINE | Admitting: INTERNAL MEDICINE
Payer: MEDICARE

## 2023-01-01 ENCOUNTER — TELEPHONE (OUTPATIENT)
Dept: PHYSICAL THERAPY | Facility: HOSPITAL | Age: 73
End: 2023-01-01
Payer: MEDICARE

## 2023-01-01 VITALS
HEIGHT: 73 IN | HEART RATE: 94 BPM | WEIGHT: 167.4 LBS | DIASTOLIC BLOOD PRESSURE: 54 MMHG | SYSTOLIC BLOOD PRESSURE: 112 MMHG | OXYGEN SATURATION: 97 % | BODY MASS INDEX: 22.19 KG/M2

## 2023-01-01 VITALS
SYSTOLIC BLOOD PRESSURE: 103 MMHG | BODY MASS INDEX: 21.2 KG/M2 | WEIGHT: 160 LBS | OXYGEN SATURATION: 99 % | DIASTOLIC BLOOD PRESSURE: 59 MMHG | HEART RATE: 79 BPM | HEIGHT: 73 IN | TEMPERATURE: 98.2 F

## 2023-01-01 VITALS
WEIGHT: 161 LBS | OXYGEN SATURATION: 99 % | SYSTOLIC BLOOD PRESSURE: 72 MMHG | RESPIRATION RATE: 18 BRPM | TEMPERATURE: 93 F | DIASTOLIC BLOOD PRESSURE: 54 MMHG | BODY MASS INDEX: 21.34 KG/M2 | HEIGHT: 73 IN

## 2023-01-01 VITALS
SYSTOLIC BLOOD PRESSURE: 112 MMHG | BODY MASS INDEX: 22.13 KG/M2 | HEIGHT: 73 IN | HEART RATE: 92 BPM | DIASTOLIC BLOOD PRESSURE: 89 MMHG | TEMPERATURE: 97.3 F | WEIGHT: 167 LBS | OXYGEN SATURATION: 93 % | RESPIRATION RATE: 18 BRPM

## 2023-01-01 VITALS
SYSTOLIC BLOOD PRESSURE: 99 MMHG | WEIGHT: 162 LBS | RESPIRATION RATE: 18 BRPM | OXYGEN SATURATION: 100 % | HEIGHT: 73 IN | DIASTOLIC BLOOD PRESSURE: 52 MMHG | TEMPERATURE: 98.1 F | BODY MASS INDEX: 21.47 KG/M2 | HEART RATE: 89 BPM

## 2023-01-01 VITALS
HEART RATE: 83 BPM | WEIGHT: 161 LBS | BODY MASS INDEX: 21.34 KG/M2 | HEIGHT: 73 IN | TEMPERATURE: 97.9 F | SYSTOLIC BLOOD PRESSURE: 96 MMHG | DIASTOLIC BLOOD PRESSURE: 46 MMHG | OXYGEN SATURATION: 99 %

## 2023-01-01 DIAGNOSIS — L97.528 ULCER OF LEFT FOOT WITH OTHER SEVERITY: Primary | ICD-10-CM

## 2023-01-01 DIAGNOSIS — L97.412 DIABETIC ULCER OF RIGHT MIDFOOT ASSOCIATED WITH TYPE 2 DIABETES MELLITUS, WITH FAT LAYER EXPOSED: Primary | ICD-10-CM

## 2023-01-01 DIAGNOSIS — I87.2 VENOUS INSUFFICIENCY: ICD-10-CM

## 2023-01-01 DIAGNOSIS — L97.518 ULCER OF RIGHT FOOT WITH OTHER SEVERITY: ICD-10-CM

## 2023-01-01 DIAGNOSIS — E11.621 TYPE 2 DIABETES MELLITUS WITH FOOT ULCER, UNSPECIFIED WHETHER LONG TERM INSULIN USE: ICD-10-CM

## 2023-01-01 DIAGNOSIS — I48.0 PAROXYSMAL ATRIAL FIBRILLATION: Primary | ICD-10-CM

## 2023-01-01 DIAGNOSIS — E11.621 DIABETIC ULCER OF RIGHT MIDFOOT ASSOCIATED WITH TYPE 2 DIABETES MELLITUS, WITH FAT LAYER EXPOSED: Primary | ICD-10-CM

## 2023-01-01 DIAGNOSIS — I73.9 PVD (PERIPHERAL VASCULAR DISEASE): ICD-10-CM

## 2023-01-01 DIAGNOSIS — I10 ESSENTIAL HYPERTENSION, BENIGN: ICD-10-CM

## 2023-01-01 DIAGNOSIS — L97.521 FOOT ULCERATION, LEFT, LIMITED TO BREAKDOWN OF SKIN: Primary | ICD-10-CM

## 2023-01-01 DIAGNOSIS — L97.518 ULCER OF RIGHT FOOT WITH OTHER SEVERITY: Primary | ICD-10-CM

## 2023-01-01 DIAGNOSIS — I73.9 ASYMPTOMATIC PVD (PERIPHERAL VASCULAR DISEASE): ICD-10-CM

## 2023-01-01 DIAGNOSIS — I73.9 PVD (PERIPHERAL VASCULAR DISEASE): Primary | ICD-10-CM

## 2023-01-01 DIAGNOSIS — I96: ICD-10-CM

## 2023-01-01 DIAGNOSIS — K52.9 CHRONIC DIARRHEA: Primary | ICD-10-CM

## 2023-01-01 DIAGNOSIS — I73.9 PERIPHERAL VASCULAR DISEASE OF LOWER EXTREMITY: ICD-10-CM

## 2023-01-01 DIAGNOSIS — L97.422 DIABETIC ULCER OF LEFT MIDFOOT ASSOCIATED WITH TYPE 2 DIABETES MELLITUS, WITH FAT LAYER EXPOSED: ICD-10-CM

## 2023-01-01 DIAGNOSIS — L97.509 TYPE 2 DIABETES MELLITUS WITH FOOT ULCER, UNSPECIFIED WHETHER LONG TERM INSULIN USE: ICD-10-CM

## 2023-01-01 DIAGNOSIS — I25.10 CORONARY ARTERY DISEASE INVOLVING NATIVE CORONARY ARTERY OF NATIVE HEART WITHOUT ANGINA PECTORIS: ICD-10-CM

## 2023-01-01 DIAGNOSIS — L97.528 ULCER OF LEFT FOOT WITH OTHER SEVERITY: ICD-10-CM

## 2023-01-01 DIAGNOSIS — E11.621 DIABETIC ULCER OF LEFT MIDFOOT ASSOCIATED WITH TYPE 2 DIABETES MELLITUS, WITH FAT LAYER EXPOSED: ICD-10-CM

## 2023-01-01 DIAGNOSIS — N17.9 ACUTE KIDNEY INJURY SUPERIMPOSED ON CHRONIC KIDNEY DISEASE: ICD-10-CM

## 2023-01-01 DIAGNOSIS — L97.518: ICD-10-CM

## 2023-01-01 DIAGNOSIS — N18.9 ACUTE KIDNEY INJURY SUPERIMPOSED ON CHRONIC KIDNEY DISEASE: ICD-10-CM

## 2023-01-01 DIAGNOSIS — I73.9 ASYMPTOMATIC PVD (PERIPHERAL VASCULAR DISEASE): Primary | ICD-10-CM

## 2023-01-01 DIAGNOSIS — Z79.01 CHRONIC ANTICOAGULATION: ICD-10-CM

## 2023-01-01 DIAGNOSIS — N43.3 BILATERAL HYDROCELE: ICD-10-CM

## 2023-01-01 DIAGNOSIS — L97.509: ICD-10-CM

## 2023-01-01 DIAGNOSIS — E78.2 MIXED HYPERLIPIDEMIA: ICD-10-CM

## 2023-01-01 DIAGNOSIS — L97.528: ICD-10-CM

## 2023-01-01 DIAGNOSIS — I10 PRIMARY HYPERTENSION: ICD-10-CM

## 2023-01-01 DIAGNOSIS — N44.2 TESTICULAR CYST: Primary | ICD-10-CM

## 2023-01-01 DIAGNOSIS — I86.1 BILATERAL VARICOCELES: ICD-10-CM

## 2023-01-01 DIAGNOSIS — I95.9 HYPOTENSION, UNSPECIFIED HYPOTENSION TYPE: ICD-10-CM

## 2023-01-01 DIAGNOSIS — E11.621: ICD-10-CM

## 2023-01-01 LAB
ABO GROUP BLD: NORMAL
ABO GROUP BLD: NORMAL
ALBUMIN SERPL-MCNC: 2.7 G/DL (ref 3.5–5.2)
ALBUMIN SERPL-MCNC: 2.7 G/DL (ref 3.5–5.2)
ALBUMIN SERPL-MCNC: 2.8 G/DL (ref 3.5–5.2)
ALBUMIN SERPL-MCNC: 2.9 G/DL (ref 3.5–5.2)
ALBUMIN SERPL-MCNC: 3 G/DL (ref 3.5–5.2)
ALBUMIN SERPL-MCNC: 3.3 G/DL (ref 3.5–5.2)
ALBUMIN/GLOB SERPL: 1 G/DL
ALBUMIN/GLOB SERPL: 1.2 G/DL
ALBUMIN/GLOB SERPL: 1.2 G/DL
ALP SERPL-CCNC: 59 U/L (ref 39–117)
ALP SERPL-CCNC: 65 U/L (ref 39–117)
ALP SERPL-CCNC: 67 U/L (ref 39–117)
ALP SERPL-CCNC: 68 U/L (ref 39–117)
ALP SERPL-CCNC: 68 U/L (ref 39–117)
ALP SERPL-CCNC: 74 U/L (ref 39–117)
ALP SERPL-CCNC: 80 U/L (ref 39–117)
ALT SERPL W P-5'-P-CCNC: 6 U/L (ref 1–41)
ALT SERPL W P-5'-P-CCNC: <5 U/L (ref 1–41)
ANION GAP SERPL CALCULATED.3IONS-SCNC: 10.8 MMOL/L (ref 5–15)
ANION GAP SERPL CALCULATED.3IONS-SCNC: 11 MMOL/L (ref 5–15)
ANION GAP SERPL CALCULATED.3IONS-SCNC: 13 MMOL/L (ref 5–15)
ANION GAP SERPL CALCULATED.3IONS-SCNC: 20 MMOL/L (ref 5–15)
ANION GAP SERPL CALCULATED.3IONS-SCNC: 9 MMOL/L (ref 5–15)
ANION GAP SERPL CALCULATED.3IONS-SCNC: 9 MMOL/L (ref 5–15)
APTT PPP: 41.5 SECONDS (ref 60–90)
APTT PPP: 54.6 SECONDS (ref 22–39)
ARTERIAL PATENCY WRIST A: ABNORMAL
ARTERIAL PATENCY WRIST A: ABNORMAL
AST SERPL-CCNC: 11 U/L (ref 1–40)
AST SERPL-CCNC: 12 U/L (ref 1–40)
AST SERPL-CCNC: 13 U/L (ref 1–40)
AST SERPL-CCNC: 21 U/L (ref 1–40)
AST SERPL-CCNC: 9 U/L (ref 1–40)
ATMOSPHERIC PRESS: ABNORMAL MM[HG]
ATMOSPHERIC PRESS: ABNORMAL MM[HG]
BACTERIA SPEC AEROBE CULT: NO GROWTH
BACTERIA SPEC AEROBE CULT: NORMAL
BACTERIA UR QL AUTO: ABNORMAL /HPF
BASE EXCESS BLDA CALC-SCNC: -13.1 MMOL/L (ref 0–2)
BASE EXCESS BLDA CALC-SCNC: -4.4 MMOL/L (ref 0–2)
BASOPHILS # BLD AUTO: 0.02 10*3/MM3 (ref 0–0.2)
BASOPHILS # BLD AUTO: 0.02 10*3/MM3 (ref 0–0.2)
BASOPHILS # BLD AUTO: 0.03 10*3/MM3 (ref 0–0.2)
BASOPHILS # BLD AUTO: 0.04 10*3/MM3 (ref 0–0.2)
BASOPHILS # BLD AUTO: 0.07 10*3/MM3 (ref 0–0.2)
BASOPHILS NFR BLD AUTO: 0.1 % (ref 0–1.5)
BASOPHILS NFR BLD AUTO: 0.3 % (ref 0–1.5)
BASOPHILS NFR BLD AUTO: 0.4 % (ref 0–1.5)
BASOPHILS NFR BLD AUTO: 0.4 % (ref 0–1.5)
BASOPHILS NFR BLD AUTO: 0.5 % (ref 0–1.5)
BASOPHILS NFR BLD AUTO: 0.5 % (ref 0–1.5)
BASOPHILS NFR BLD AUTO: 0.7 % (ref 0–1.5)
BDY SITE: ABNORMAL
BDY SITE: ABNORMAL
BH BB BLOOD EXPIRATION DATE: NORMAL
BH BB BLOOD EXPIRATION DATE: NORMAL
BH BB BLOOD TYPE BARCODE: 6200
BH BB BLOOD TYPE BARCODE: 6200
BH BB DISPENSE STATUS: NORMAL
BH BB DISPENSE STATUS: NORMAL
BH BB PRODUCT CODE: NORMAL
BH BB PRODUCT CODE: NORMAL
BH BB UNIT NUMBER: NORMAL
BH BB UNIT NUMBER: NORMAL
BH CV ECHO MEAS - AO MAX PG: 6 MMHG
BH CV ECHO MEAS - AO MEAN PG: 3.3 MMHG
BH CV ECHO MEAS - AO ROOT DIAM: 3.8 CM
BH CV ECHO MEAS - AO V2 MAX: 122.5 CM/SEC
BH CV ECHO MEAS - AO V2 VTI: 19.4 CM
BH CV ECHO MEAS - EDV(CUBED): 64 ML
BH CV ECHO MEAS - EDV(MOD-SP2): 130 ML
BH CV ECHO MEAS - EDV(MOD-SP4): 63.9 ML
BH CV ECHO MEAS - EF(MOD-BP): 38 %
BH CV ECHO MEAS - EF(MOD-SP2): 39.1 %
BH CV ECHO MEAS - EF(MOD-SP4): 40.5 %
BH CV ECHO MEAS - ESV(CUBED): 42.9 ML
BH CV ECHO MEAS - ESV(MOD-SP2): 79.2 ML
BH CV ECHO MEAS - ESV(MOD-SP4): 38 ML
BH CV ECHO MEAS - FS: 12.5 %
BH CV ECHO MEAS - IVS/LVPW: 1 CM
BH CV ECHO MEAS - IVSD: 1.1 CM
BH CV ECHO MEAS - LA DIMENSION: 5.3 CM
BH CV ECHO MEAS - LAT PEAK E' VEL: 13.2 CM/SEC
BH CV ECHO MEAS - LV MASS(C)D: 145.6 GRAMS
BH CV ECHO MEAS - LV MAX PG: 1.86 MMHG
BH CV ECHO MEAS - LV MEAN PG: 1 MMHG
BH CV ECHO MEAS - LV V1 MAX: 68.1 CM/SEC
BH CV ECHO MEAS - LV V1 VTI: 11.7 CM
BH CV ECHO MEAS - LVIDD: 4 CM
BH CV ECHO MEAS - LVIDS: 3.5 CM
BH CV ECHO MEAS - LVPWD: 1.1 CM
BH CV ECHO MEAS - MED PEAK E' VEL: 7.3 CM/SEC
BH CV ECHO MEAS - MV DEC SLOPE: 545 CM/SEC2
BH CV ECHO MEAS - MV DEC TIME: 0.16 MSEC
BH CV ECHO MEAS - MV E MAX VEL: 127 CM/SEC
BH CV ECHO MEAS - MV MAX PG: 9.9 MMHG
BH CV ECHO MEAS - MV MEAN PG: 4 MMHG
BH CV ECHO MEAS - MV P1/2T: 76.9 MSEC
BH CV ECHO MEAS - MV V2 VTI: 27.3 CM
BH CV ECHO MEAS - MVA(P1/2T): 2.9 CM2
BH CV ECHO MEAS - PA ACC TIME: 0.09 SEC
BH CV ECHO MEAS - PA PR(ACCEL): 39.4 MMHG
BH CV ECHO MEAS - PI END-D VEL: 191 CM/SEC
BH CV ECHO MEAS - RAP SYSTOLE: 15 MMHG
BH CV ECHO MEAS - RVSP: 74 MMHG
BH CV ECHO MEAS - SV(MOD-SP2): 50.8 ML
BH CV ECHO MEAS - SV(MOD-SP4): 25.9 ML
BH CV ECHO MEAS - TAPSE (>1.6): 0.78 CM
BH CV ECHO MEAS - TR MAX PG: 59.4 MMHG
BH CV ECHO MEAS - TR MAX VEL: 385.4 CM/SEC
BH CV ECHO MEASUREMENTS AVERAGE E/E' RATIO: 12.39
BH CV LEA LEFT ANT TIBIAL A DISTAL PSV: 57.4 CM/S
BH CV LEA LEFT CFA PROX PSV: 129 CM/S
BH CV LEA LEFT DFA PROX PSV: 110 CM/S
BH CV LEA LEFT PERONEAL  MID PSV: 79.9 CM/S
BH CV LEA LEFT POPITEAL A  DISTAL PSV: 79.2 CM/S
BH CV LEA LEFT POPITEAL A  PROX PSV: 137 CM/S
BH CV LEA LEFT PTA DISTAL PSV: 99.5 CM/S
BH CV LEA LEFT PTA MID PSV: 80.6 CM/S
BH CV LEA LEFT SFA DISTAL PSV: 78.4 CM/S
BH CV LEA LEFT SFA MID PSV: 127 CM/S
BH CV LEA LEFT SFA PROX PSV: 141 CM/S
BH CV LEA LEFT TIBEOPERONEAL PSV: 84 CM/S
BH CV LEA RIGHT ANT TIBIAL A DISTAL PSV: 68.8 CM/S
BH CV LEA RIGHT CFA PROX PSV: 97 CM/S
BH CV LEA RIGHT DFA PROX PSV: 63 CM/S
BH CV LEA RIGHT PERONEAL  MID PSV: 44.8 CM/S
BH CV LEA RIGHT POPITEAL A  DISTAL PSV: 74.6 CM/S
BH CV LEA RIGHT POPITEAL A  PROX PSV: 44.7 CM/S
BH CV LEA RIGHT PTA DISTAL PSV: 56.6 CM/S
BH CV LEA RIGHT SFA DISTAL PSV: 75.3 CM/S
BH CV LEA RIGHT SFA MID PSV: 65.9 CM/S
BH CV LEA RIGHT SFA PROX PSV: 77.7 CM/S
BH CV LEA RIGHT TIBEOPERONEAL PSV: 60 CM/S
BH CV XLRA - RV BASE: 4.3 CM
BH CV XLRA - RV LENGTH: 6.4 CM
BH CV XLRA - RV MID: 3.6 CM
BH CV XLRA - TDI S': 3.8 CM/SEC
BILIRUB SERPL-MCNC: 0.2 MG/DL (ref 0–1.2)
BILIRUB SERPL-MCNC: 0.3 MG/DL (ref 0–1.2)
BILIRUB SERPL-MCNC: 0.5 MG/DL (ref 0–1.2)
BILIRUB UR QL STRIP: NEGATIVE
BLD GP AB SCN SERPL QL: NEGATIVE
BODY TEMPERATURE: 37 C
BODY TEMPERATURE: 37 C
BUN SERPL-MCNC: 26 MG/DL (ref 8–23)
BUN SERPL-MCNC: 29 MG/DL (ref 8–23)
BUN SERPL-MCNC: 35 MG/DL (ref 8–23)
BUN SERPL-MCNC: 36 MG/DL (ref 8–23)
BUN SERPL-MCNC: 36 MG/DL (ref 8–23)
BUN SERPL-MCNC: 41 MG/DL (ref 8–23)
BUN SERPL-MCNC: 42 MG/DL (ref 8–23)
BUN SERPL-MCNC: 43 MG/DL (ref 8–23)
BUN SERPL-MCNC: 45 MG/DL (ref 8–23)
BUN SERPL-MCNC: 49 MG/DL (ref 8–23)
BUN SERPL-MCNC: 54 MG/DL (ref 8–23)
BUN SERPL-MCNC: 58 MG/DL (ref 8–23)
BUN SERPL-MCNC: 58 MG/DL (ref 8–23)
BUN SERPL-MCNC: 63 MG/DL (ref 8–23)
BUN/CREAT SERPL: 13.9 (ref 7–25)
BUN/CREAT SERPL: 14 (ref 7–25)
BUN/CREAT SERPL: 14.1 (ref 7–25)
BUN/CREAT SERPL: 14.4 (ref 7–25)
BUN/CREAT SERPL: 14.8 (ref 7–25)
BUN/CREAT SERPL: 14.9 (ref 7–25)
BUN/CREAT SERPL: 15.1 (ref 7–25)
BUN/CREAT SERPL: 15.1 (ref 7–25)
BUN/CREAT SERPL: 15.2 (ref 7–25)
BUN/CREAT SERPL: 16.7 (ref 7–25)
BUN/CREAT SERPL: 17 (ref 7–25)
BUN/CREAT SERPL: 17.4 (ref 7–25)
BUN/CREAT SERPL: 17.4 (ref 7–25)
BUN/CREAT SERPL: 18.6 (ref 7–25)
BUN/CREAT SERPL: 18.8 (ref 7–25)
BUN/CREAT SERPL: 20.2 (ref 7–25)
CA-I SERPL ISE-MCNC: 1.15 MMOL/L (ref 1.12–1.32)
CA-I SERPL ISE-MCNC: 1.23 MMOL/L (ref 1.12–1.32)
CA-I SERPL ISE-MCNC: 1.28 MMOL/L (ref 1.12–1.32)
CALCIUM SPEC-SCNC: 7.8 MG/DL (ref 8.6–10.5)
CALCIUM SPEC-SCNC: 7.8 MG/DL (ref 8.6–10.5)
CALCIUM SPEC-SCNC: 8 MG/DL (ref 8.6–10.5)
CALCIUM SPEC-SCNC: 8.2 MG/DL (ref 8.6–10.5)
CALCIUM SPEC-SCNC: 8.3 MG/DL (ref 8.6–10.5)
CALCIUM SPEC-SCNC: 8.3 MG/DL (ref 8.6–10.5)
CALCIUM SPEC-SCNC: 8.4 MG/DL (ref 8.6–10.5)
CALCIUM SPEC-SCNC: 8.5 MG/DL (ref 8.6–10.5)
CALCIUM SPEC-SCNC: 8.6 MG/DL (ref 8.6–10.5)
CALCIUM SPEC-SCNC: 8.6 MG/DL (ref 8.6–10.5)
CALCIUM SPEC-SCNC: 8.7 MG/DL (ref 8.6–10.5)
CALCIUM SPEC-SCNC: 8.9 MG/DL (ref 8.6–10.5)
CALCIUM SPEC-SCNC: 9.7 MG/DL (ref 8.6–10.5)
CHLORIDE SERPL-SCNC: 102 MMOL/L (ref 98–107)
CHLORIDE SERPL-SCNC: 103 MMOL/L (ref 98–107)
CHLORIDE SERPL-SCNC: 105 MMOL/L (ref 98–107)
CHLORIDE SERPL-SCNC: 105 MMOL/L (ref 98–107)
CHLORIDE SERPL-SCNC: 106 MMOL/L (ref 98–107)
CHLORIDE SERPL-SCNC: 106 MMOL/L (ref 98–107)
CHLORIDE SERPL-SCNC: 108 MMOL/L (ref 98–107)
CHLORIDE SERPL-SCNC: 109 MMOL/L (ref 98–107)
CHLORIDE SERPL-SCNC: 110 MMOL/L (ref 98–107)
CHLORIDE SERPL-SCNC: 112 MMOL/L (ref 98–107)
CHOLEST SERPL-MCNC: 99 MG/DL (ref 0–200)
CK SERPL-CCNC: 18 U/L (ref 20–200)
CK SERPL-CCNC: 26 U/L (ref 20–200)
CK SERPL-CCNC: 44 U/L (ref 20–200)
CK SERPL-CCNC: 54 U/L (ref 20–200)
CLARITY UR: CLEAR
CO2 BLDA-SCNC: 15.2 MMOL/L (ref 22–33)
CO2 BLDA-SCNC: 22.9 MMOL/L (ref 22–33)
CO2 SERPL-SCNC: 12 MMOL/L (ref 22–29)
CO2 SERPL-SCNC: 16 MMOL/L (ref 22–29)
CO2 SERPL-SCNC: 16 MMOL/L (ref 22–29)
CO2 SERPL-SCNC: 17 MMOL/L (ref 22–29)
CO2 SERPL-SCNC: 18 MMOL/L (ref 22–29)
CO2 SERPL-SCNC: 18 MMOL/L (ref 22–29)
CO2 SERPL-SCNC: 18.2 MMOL/L (ref 22–29)
CO2 SERPL-SCNC: 19 MMOL/L (ref 22–29)
CO2 SERPL-SCNC: 21 MMOL/L (ref 22–29)
CO2 SERPL-SCNC: 22 MMOL/L (ref 22–29)
CO2 SERPL-SCNC: 22 MMOL/L (ref 22–29)
COHGB MFR BLD: 1.6 % (ref 0–2)
COHGB MFR BLD: 1.6 % (ref 0–2)
COLOR UR: YELLOW
CORTIS AM PEAK SERPL-MCNC: 17.27 MCG/DL
CORTIS SERPL-MCNC: 9.55 MCG/DL
CREAT SERPL-MCNC: 1.86 MG/DL (ref 0.76–1.27)
CREAT SERPL-MCNC: 1.94 MG/DL (ref 0.76–1.27)
CREAT SERPL-MCNC: 2.01 MG/DL (ref 0.76–1.27)
CREAT SERPL-MCNC: 2.09 MG/DL (ref 0.76–1.27)
CREAT SERPL-MCNC: 2.13 MG/DL (ref 0.76–1.27)
CREAT SERPL-MCNC: 2.38 MG/DL (ref 0.76–1.27)
CREAT SERPL-MCNC: 2.53 MG/DL (ref 0.76–1.27)
CREAT SERPL-MCNC: 2.59 MG/DL (ref 0.76–1.27)
CREAT SERPL-MCNC: 2.6 MG/DL (ref 0.76–1.27)
CREAT SERPL-MCNC: 2.79 MG/DL (ref 0.76–1.27)
CREAT SERPL-MCNC: 2.82 MG/DL (ref 0.76–1.27)
CREAT SERPL-MCNC: 2.84 MG/DL (ref 0.76–1.27)
CREAT SERPL-MCNC: 3.23 MG/DL (ref 0.76–1.27)
CREAT SERPL-MCNC: 3.89 MG/DL (ref 0.76–1.27)
CREAT SERPL-MCNC: 3.91 MG/DL (ref 0.76–1.27)
CREAT SERPL-MCNC: 4.47 MG/DL (ref 0.76–1.27)
CREAT UR-MCNC: 93.7 MG/DL
CROSSMATCH INTERPRETATION: NORMAL
CROSSMATCH INTERPRETATION: NORMAL
CRP SERPL-MCNC: 4.04 MG/DL (ref 0–0.5)
CYTO UR: NORMAL
D DIMER PPP FEU-MCNC: 1.79 MCGFEU/ML (ref 0–0.73)
D-LACTATE SERPL-SCNC: 0.6 MMOL/L (ref 0.5–2)
D-LACTATE SERPL-SCNC: 0.7 MMOL/L (ref 0.5–2)
D-LACTATE SERPL-SCNC: 1.2 MMOL/L (ref 0.5–2)
D-LACTATE SERPL-SCNC: 1.3 MMOL/L (ref 0.5–2)
D-LACTATE SERPL-SCNC: 1.9 MMOL/L (ref 0.5–2)
D-LACTATE SERPL-SCNC: 2.8 MMOL/L (ref 0.5–2)
DEPRECATED RDW RBC AUTO: 52.9 FL (ref 37–54)
DEPRECATED RDW RBC AUTO: 55.8 FL (ref 37–54)
DEPRECATED RDW RBC AUTO: 57 FL (ref 37–54)
DEPRECATED RDW RBC AUTO: 57.3 FL (ref 37–54)
DEPRECATED RDW RBC AUTO: 57.3 FL (ref 37–54)
DEPRECATED RDW RBC AUTO: 58.4 FL (ref 37–54)
DEPRECATED RDW RBC AUTO: 58.5 FL (ref 37–54)
DEPRECATED RDW RBC AUTO: 58.6 FL (ref 37–54)
DEPRECATED RDW RBC AUTO: 60.2 FL (ref 37–54)
EGFRCR SERPLBLD CKD-EPI 2021: 13.2 ML/MIN/1.73
EGFRCR SERPLBLD CKD-EPI 2021: 15.5 ML/MIN/1.73
EGFRCR SERPLBLD CKD-EPI 2021: 15.6 ML/MIN/1.73
EGFRCR SERPLBLD CKD-EPI 2021: 19.5 ML/MIN/1.73
EGFRCR SERPLBLD CKD-EPI 2021: 22.7 ML/MIN/1.73
EGFRCR SERPLBLD CKD-EPI 2021: 22.9 ML/MIN/1.73
EGFRCR SERPLBLD CKD-EPI 2021: 23.2 ML/MIN/1.73
EGFRCR SERPLBLD CKD-EPI 2021: 25.2 ML/MIN/1.73
EGFRCR SERPLBLD CKD-EPI 2021: 25.4 ML/MIN/1.73
EGFRCR SERPLBLD CKD-EPI 2021: 26.1 ML/MIN/1.73
EGFRCR SERPLBLD CKD-EPI 2021: 28.1 ML/MIN/1.73
EGFRCR SERPLBLD CKD-EPI 2021: 32.1 ML/MIN/1.73
EGFRCR SERPLBLD CKD-EPI 2021: 32.8 ML/MIN/1.73
EGFRCR SERPLBLD CKD-EPI 2021: 34.4 ML/MIN/1.73
EGFRCR SERPLBLD CKD-EPI 2021: 35.9 ML/MIN/1.73
EGFRCR SERPLBLD CKD-EPI 2021: 37.7 ML/MIN/1.73
EOSINOPHIL # BLD AUTO: 0 10*3/MM3 (ref 0–0.4)
EOSINOPHIL # BLD AUTO: 0 10*3/MM3 (ref 0–0.4)
EOSINOPHIL # BLD AUTO: 0.06 10*3/MM3 (ref 0–0.4)
EOSINOPHIL # BLD AUTO: 0.06 10*3/MM3 (ref 0–0.4)
EOSINOPHIL # BLD AUTO: 0.11 10*3/MM3 (ref 0–0.4)
EOSINOPHIL # BLD AUTO: 0.12 10*3/MM3 (ref 0–0.4)
EOSINOPHIL # BLD AUTO: 0.14 10*3/MM3 (ref 0–0.4)
EOSINOPHIL NFR BLD AUTO: 0 % (ref 0.3–6.2)
EOSINOPHIL NFR BLD AUTO: 0 % (ref 0.3–6.2)
EOSINOPHIL NFR BLD AUTO: 0.5 % (ref 0.3–6.2)
EOSINOPHIL NFR BLD AUTO: 1.1 % (ref 0.3–6.2)
EOSINOPHIL NFR BLD AUTO: 1.3 % (ref 0.3–6.2)
EOSINOPHIL NFR BLD AUTO: 1.4 % (ref 0.3–6.2)
EOSINOPHIL NFR BLD AUTO: 1.5 % (ref 0.3–6.2)
EOSINOPHIL SPEC QL MICRO: 0 % EOS/100 CELLS (ref 0–0)
EPAP: 0
EPAP: 0
ERYTHROCYTE [DISTWIDTH] IN BLOOD BY AUTOMATED COUNT: 16.7 % (ref 12.3–15.4)
ERYTHROCYTE [DISTWIDTH] IN BLOOD BY AUTOMATED COUNT: 17.3 % (ref 12.3–15.4)
ERYTHROCYTE [DISTWIDTH] IN BLOOD BY AUTOMATED COUNT: 17.3 % (ref 12.3–15.4)
ERYTHROCYTE [DISTWIDTH] IN BLOOD BY AUTOMATED COUNT: 17.4 % (ref 12.3–15.4)
ERYTHROCYTE [DISTWIDTH] IN BLOOD BY AUTOMATED COUNT: 17.4 % (ref 12.3–15.4)
ERYTHROCYTE [DISTWIDTH] IN BLOOD BY AUTOMATED COUNT: 17.5 % (ref 12.3–15.4)
ERYTHROCYTE [DISTWIDTH] IN BLOOD BY AUTOMATED COUNT: 17.6 % (ref 12.3–15.4)
ERYTHROCYTE [DISTWIDTH] IN BLOOD BY AUTOMATED COUNT: 17.8 % (ref 12.3–15.4)
ERYTHROCYTE [DISTWIDTH] IN BLOOD BY AUTOMATED COUNT: 18 % (ref 12.3–15.4)
ERYTHROCYTE [SEDIMENTATION RATE] IN BLOOD: 36 MM/HR (ref 0–20)
FERRITIN SERPL-MCNC: 191.8 NG/ML (ref 30–400)
FERRITIN SERPL-MCNC: 357.7 NG/ML (ref 30–400)
FOLATE SERPL-MCNC: 6.77 NG/ML (ref 4.78–24.2)
GEN 5 2HR TROPONIN T REFLEX: 254 NG/L
GLOBULIN UR ELPH-MCNC: 2.5 GM/DL
GLOBULIN UR ELPH-MCNC: 2.5 GM/DL
GLOBULIN UR ELPH-MCNC: 2.8 GM/DL
GLOBULIN UR ELPH-MCNC: 2.8 GM/DL
GLOBULIN UR ELPH-MCNC: 2.9 GM/DL
GLOBULIN UR ELPH-MCNC: 2.9 GM/DL
GLOBULIN UR ELPH-MCNC: 3.2 GM/DL
GLUCOSE BLDC GLUCOMTR-MCNC: 109 MG/DL (ref 70–130)
GLUCOSE BLDC GLUCOMTR-MCNC: 115 MG/DL (ref 70–130)
GLUCOSE BLDC GLUCOMTR-MCNC: 122 MG/DL (ref 70–130)
GLUCOSE BLDC GLUCOMTR-MCNC: 124 MG/DL (ref 70–130)
GLUCOSE BLDC GLUCOMTR-MCNC: 144 MG/DL (ref 70–130)
GLUCOSE BLDC GLUCOMTR-MCNC: 148 MG/DL (ref 70–130)
GLUCOSE BLDC GLUCOMTR-MCNC: 160 MG/DL (ref 70–130)
GLUCOSE BLDC GLUCOMTR-MCNC: 200 MG/DL (ref 70–130)
GLUCOSE BLDC GLUCOMTR-MCNC: 243 MG/DL (ref 70–130)
GLUCOSE SERPL-MCNC: 104 MG/DL (ref 65–99)
GLUCOSE SERPL-MCNC: 105 MG/DL (ref 65–99)
GLUCOSE SERPL-MCNC: 110 MG/DL (ref 65–99)
GLUCOSE SERPL-MCNC: 110 MG/DL (ref 65–99)
GLUCOSE SERPL-MCNC: 115 MG/DL (ref 65–99)
GLUCOSE SERPL-MCNC: 116 MG/DL (ref 65–99)
GLUCOSE SERPL-MCNC: 120 MG/DL (ref 65–99)
GLUCOSE SERPL-MCNC: 129 MG/DL (ref 65–99)
GLUCOSE SERPL-MCNC: 130 MG/DL (ref 65–99)
GLUCOSE SERPL-MCNC: 145 MG/DL (ref 65–99)
GLUCOSE SERPL-MCNC: 158 MG/DL (ref 65–99)
GLUCOSE SERPL-MCNC: 178 MG/DL (ref 65–99)
GLUCOSE SERPL-MCNC: 185 MG/DL (ref 65–99)
GLUCOSE SERPL-MCNC: 205 MG/DL (ref 65–99)
GLUCOSE SERPL-MCNC: 95 MG/DL (ref 65–99)
GLUCOSE SERPL-MCNC: 99 MG/DL (ref 65–99)
GLUCOSE UR STRIP-MCNC: NEGATIVE MG/DL
GRAM STN SPEC: NORMAL
HBA1C MFR BLD: 5.7 % (ref 4.8–5.6)
HBA1C MFR BLD: 5.8 % (ref 4.8–5.6)
HCO3 BLDA-SCNC: 14.1 MMOL/L (ref 20–26)
HCO3 BLDA-SCNC: 21.6 MMOL/L (ref 20–26)
HCT VFR BLD AUTO: 24.5 % (ref 37.5–51)
HCT VFR BLD AUTO: 29.3 % (ref 37.5–51)
HCT VFR BLD AUTO: 31 % (ref 37.5–51)
HCT VFR BLD AUTO: 31.4 % (ref 37.5–51)
HCT VFR BLD AUTO: 31.8 % (ref 37.5–51)
HCT VFR BLD AUTO: 32 % (ref 37.5–51)
HCT VFR BLD AUTO: 33.2 % (ref 37.5–51)
HCT VFR BLD AUTO: 33.5 % (ref 37.5–51)
HCT VFR BLD AUTO: 33.6 % (ref 37.5–51)
HCT VFR BLD AUTO: 35 % (ref 37.5–51)
HCT VFR BLD AUTO: 35.3 % (ref 37.5–51)
HCT VFR BLD CALC: 24.9 % (ref 38–51)
HCT VFR BLD CALC: 27.6 % (ref 38–51)
HDLC SERPL-MCNC: 51 MG/DL (ref 40–60)
HGB BLD-MCNC: 10.4 G/DL (ref 13–17.7)
HGB BLD-MCNC: 10.5 G/DL (ref 13–17.7)
HGB BLD-MCNC: 11.1 G/DL (ref 13–17.7)
HGB BLD-MCNC: 11.2 G/DL (ref 13–17.7)
HGB BLD-MCNC: 7.9 G/DL (ref 13–17.7)
HGB BLD-MCNC: 8.9 G/DL (ref 13–17.7)
HGB BLD-MCNC: 9.5 G/DL (ref 13–17.7)
HGB BLD-MCNC: 9.7 G/DL (ref 13–17.7)
HGB BLD-MCNC: 9.8 G/DL (ref 13–17.7)
HGB BLD-MCNC: 9.9 G/DL (ref 13–17.7)
HGB BLD-MCNC: 9.9 G/DL (ref 13–17.7)
HGB BLDA-MCNC: 8.1 G/DL (ref 13.5–17.5)
HGB BLDA-MCNC: 9 G/DL (ref 13.5–17.5)
HGB UR QL STRIP.AUTO: ABNORMAL
HYALINE CASTS UR QL AUTO: ABNORMAL /LPF
IMM GRANULOCYTES # BLD AUTO: 0.05 10*3/MM3 (ref 0–0.05)
IMM GRANULOCYTES # BLD AUTO: 0.07 10*3/MM3 (ref 0–0.05)
IMM GRANULOCYTES # BLD AUTO: 0.1 10*3/MM3 (ref 0–0.05)
IMM GRANULOCYTES # BLD AUTO: 0.11 10*3/MM3 (ref 0–0.05)
IMM GRANULOCYTES # BLD AUTO: 0.12 10*3/MM3 (ref 0–0.05)
IMM GRANULOCYTES # BLD AUTO: 0.14 10*3/MM3 (ref 0–0.05)
IMM GRANULOCYTES # BLD AUTO: 0.33 10*3/MM3 (ref 0–0.05)
IMM GRANULOCYTES NFR BLD AUTO: 0.9 % (ref 0–0.5)
IMM GRANULOCYTES NFR BLD AUTO: 1 % (ref 0–0.5)
IMM GRANULOCYTES NFR BLD AUTO: 1.2 % (ref 0–0.5)
IMM GRANULOCYTES NFR BLD AUTO: 1.3 % (ref 0–0.5)
IMM GRANULOCYTES NFR BLD AUTO: 1.3 % (ref 0–0.5)
IMM GRANULOCYTES NFR BLD AUTO: 1.5 % (ref 0–0.5)
IMM GRANULOCYTES NFR BLD AUTO: 2 % (ref 0–0.5)
INHALED O2 CONCENTRATION: 28 %
INHALED O2 CONCENTRATION: 28 %
INR PPP: 1.47 (ref 0.84–1.13)
INR PPP: 1.48 (ref 0.84–1.13)
INR PPP: 1.61 (ref 0.84–1.13)
INR PPP: 1.68 (ref 0.84–1.13)
INR PPP: 2.16 (ref 0.84–1.13)
INR PPP: 2.78 (ref 0.84–1.13)
INR PPP: 2.87 (ref 0.84–1.13)
INR PPP: 4.1 (ref 0.84–1.13)
IPAP: 0
IPAP: 0
IRON 24H UR-MRATE: 36 MCG/DL (ref 59–158)
IRON SATN MFR SERPL: 16 % (ref 20–50)
KETONES UR QL STRIP: NEGATIVE
LAB AP CASE REPORT: NORMAL
LAB AP CLINICAL INFORMATION: NORMAL
LDLC SERPL CALC-MCNC: 36 MG/DL (ref 0–100)
LDLC/HDLC SERPL: 0.75 {RATIO}
LEFT ATRIUM VOLUME INDEX: 33 ML/M2
LEFT GROIN CFA SYS: 129 CM/SEC
LEUKOCYTE ESTERASE UR QL STRIP.AUTO: ABNORMAL
LYMPHOCYTES # BLD AUTO: 0.22 10*3/MM3 (ref 0.7–3.1)
LYMPHOCYTES # BLD AUTO: 0.41 10*3/MM3 (ref 0.7–3.1)
LYMPHOCYTES # BLD AUTO: 0.47 10*3/MM3 (ref 0.7–3.1)
LYMPHOCYTES # BLD AUTO: 0.54 10*3/MM3 (ref 0.7–3.1)
LYMPHOCYTES # BLD AUTO: 0.56 10*3/MM3 (ref 0.7–3.1)
LYMPHOCYTES # BLD AUTO: 0.63 10*3/MM3 (ref 0.7–3.1)
LYMPHOCYTES # BLD AUTO: 0.68 10*3/MM3 (ref 0.7–3.1)
LYMPHOCYTES NFR BLD AUTO: 1.4 % (ref 19.6–45.3)
LYMPHOCYTES NFR BLD AUTO: 5.7 % (ref 19.6–45.3)
LYMPHOCYTES NFR BLD AUTO: 5.9 % (ref 19.6–45.3)
LYMPHOCYTES NFR BLD AUTO: 6.5 % (ref 19.6–45.3)
LYMPHOCYTES NFR BLD AUTO: 6.6 % (ref 19.6–45.3)
LYMPHOCYTES NFR BLD AUTO: 7.9 % (ref 19.6–45.3)
LYMPHOCYTES NFR BLD AUTO: 9.8 % (ref 19.6–45.3)
Lab: ABNORMAL
MAGNESIUM SERPL-MCNC: 2.2 MG/DL (ref 1.6–2.4)
MAGNESIUM SERPL-MCNC: 2.2 MG/DL (ref 1.6–2.4)
MAGNESIUM SERPL-MCNC: 2.3 MG/DL (ref 1.6–2.4)
MAGNESIUM SERPL-MCNC: 2.3 MG/DL (ref 1.6–2.4)
MAXIMAL PREDICTED HEART RATE: 147 BPM
MAXIMAL PREDICTED HEART RATE: 147 BPM
MCH RBC QN AUTO: 27.7 PG (ref 26.6–33)
MCH RBC QN AUTO: 27.9 PG (ref 26.6–33)
MCH RBC QN AUTO: 28 PG (ref 26.6–33)
MCH RBC QN AUTO: 28.1 PG (ref 26.6–33)
MCH RBC QN AUTO: 28.2 PG (ref 26.6–33)
MCH RBC QN AUTO: 28.3 PG (ref 26.6–33)
MCH RBC QN AUTO: 28.7 PG (ref 26.6–33)
MCHC RBC AUTO-ENTMCNC: 30.4 G/DL (ref 31.5–35.7)
MCHC RBC AUTO-ENTMCNC: 30.9 G/DL (ref 31.5–35.7)
MCHC RBC AUTO-ENTMCNC: 31.1 G/DL (ref 31.5–35.7)
MCHC RBC AUTO-ENTMCNC: 31.3 G/DL (ref 31.5–35.7)
MCHC RBC AUTO-ENTMCNC: 31.4 G/DL (ref 31.5–35.7)
MCHC RBC AUTO-ENTMCNC: 32 G/DL (ref 31.5–35.7)
MCHC RBC AUTO-ENTMCNC: 32.2 G/DL (ref 31.5–35.7)
MCV RBC AUTO: 87.8 FL (ref 79–97)
MCV RBC AUTO: 87.9 FL (ref 79–97)
MCV RBC AUTO: 89.1 FL (ref 79–97)
MCV RBC AUTO: 89.8 FL (ref 79–97)
MCV RBC AUTO: 90.2 FL (ref 79–97)
MCV RBC AUTO: 90.4 FL (ref 79–97)
MCV RBC AUTO: 90.9 FL (ref 79–97)
MCV RBC AUTO: 91.3 FL (ref 79–97)
MCV RBC AUTO: 91.8 FL (ref 79–97)
METHGB BLD QL: -0.3 % (ref 0–1.5)
METHGB BLD QL: 0.3 % (ref 0–1.5)
MODALITY: ABNORMAL
MODALITY: ABNORMAL
MONOCYTES # BLD AUTO: 0.19 10*3/MM3 (ref 0.1–0.9)
MONOCYTES # BLD AUTO: 0.4 10*3/MM3 (ref 0.1–0.9)
MONOCYTES # BLD AUTO: 0.54 10*3/MM3 (ref 0.1–0.9)
MONOCYTES # BLD AUTO: 0.58 10*3/MM3 (ref 0.1–0.9)
MONOCYTES # BLD AUTO: 0.66 10*3/MM3 (ref 0.1–0.9)
MONOCYTES # BLD AUTO: 0.76 10*3/MM3 (ref 0.1–0.9)
MONOCYTES # BLD AUTO: 0.8 10*3/MM3 (ref 0.1–0.9)
MONOCYTES NFR BLD AUTO: 3.7 % (ref 5–12)
MONOCYTES NFR BLD AUTO: 5 % (ref 5–12)
MONOCYTES NFR BLD AUTO: 5.1 % (ref 5–12)
MONOCYTES NFR BLD AUTO: 6.9 % (ref 5–12)
MONOCYTES NFR BLD AUTO: 7 % (ref 5–12)
MONOCYTES NFR BLD AUTO: 7.3 % (ref 5–12)
MONOCYTES NFR BLD AUTO: 8.1 % (ref 5–12)
NEUTROPHILS NFR BLD AUTO: 14.79 10*3/MM3 (ref 1.7–7)
NEUTROPHILS NFR BLD AUTO: 4.5 10*3/MM3 (ref 1.7–7)
NEUTROPHILS NFR BLD AUTO: 4.6 10*3/MM3 (ref 1.7–7)
NEUTROPHILS NFR BLD AUTO: 6.66 10*3/MM3 (ref 1.7–7)
NEUTROPHILS NFR BLD AUTO: 6.69 10*3/MM3 (ref 1.7–7)
NEUTROPHILS NFR BLD AUTO: 8.95 10*3/MM3 (ref 1.7–7)
NEUTROPHILS NFR BLD AUTO: 80.7 % (ref 42.7–76)
NEUTROPHILS NFR BLD AUTO: 82.1 % (ref 42.7–76)
NEUTROPHILS NFR BLD AUTO: 83.5 % (ref 42.7–76)
NEUTROPHILS NFR BLD AUTO: 85.1 % (ref 42.7–76)
NEUTROPHILS NFR BLD AUTO: 85.6 % (ref 42.7–76)
NEUTROPHILS NFR BLD AUTO: 86.7 % (ref 42.7–76)
NEUTROPHILS NFR BLD AUTO: 9.5 10*3/MM3 (ref 1.7–7)
NEUTROPHILS NFR BLD AUTO: 91.5 % (ref 42.7–76)
NITRITE UR QL STRIP: NEGATIVE
NOTE: ABNORMAL
NOTE: ABNORMAL
NOTIFIED BY: ABNORMAL
NOTIFIED WHO: ABNORMAL
NRBC BLD AUTO-RTO: 0 /100 WBC (ref 0–0.2)
NRBC BLD AUTO-RTO: 0.3 /100 WBC (ref 0–0.2)
NRBC BLD AUTO-RTO: 1.3 /100 WBC (ref 0–0.2)
OXYHGB MFR BLDV: 86.5 % (ref 94–99)
OXYHGB MFR BLDV: 96.4 % (ref 94–99)
PATH REPORT.FINAL DX SPEC: NORMAL
PATH REPORT.GROSS SPEC: NORMAL
PAW @ PEAK INSP FLOW SETTING VENT: 0 CMH2O
PAW @ PEAK INSP FLOW SETTING VENT: 0 CMH2O
PCO2 BLDA: 37 MM HG (ref 35–45)
PCO2 BLDA: 42.7 MM HG (ref 35–45)
PCO2 TEMP ADJ BLD: 37 MM HG (ref 35–48)
PCO2 TEMP ADJ BLD: 42.7 MM HG (ref 35–48)
PH BLDA: 7.19 PH UNITS (ref 7.35–7.45)
PH BLDA: 7.31 PH UNITS (ref 7.35–7.45)
PH UR STRIP.AUTO: 5.5 [PH] (ref 5–8)
PH, TEMP CORRECTED: 7.19 PH UNITS
PH, TEMP CORRECTED: 7.31 PH UNITS
PHOSPHATE SERPL-MCNC: 4.8 MG/DL (ref 2.5–4.5)
PHOSPHATE SERPL-MCNC: 4.9 MG/DL (ref 2.5–4.5)
PHOSPHATE SERPL-MCNC: 6.1 MG/DL (ref 2.5–4.5)
PHOSPHATE SERPL-MCNC: 6.4 MG/DL (ref 2.5–4.5)
PHOSPHATE SERPL-MCNC: 6.6 MG/DL (ref 2.5–4.5)
PLATELET # BLD AUTO: 107 10*3/MM3 (ref 140–450)
PLATELET # BLD AUTO: 107 10*3/MM3 (ref 140–450)
PLATELET # BLD AUTO: 108 10*3/MM3 (ref 140–450)
PLATELET # BLD AUTO: 114 10*3/MM3 (ref 140–450)
PLATELET # BLD AUTO: 117 10*3/MM3 (ref 140–450)
PLATELET # BLD AUTO: 133 10*3/MM3 (ref 140–450)
PLATELET # BLD AUTO: 137 10*3/MM3 (ref 140–450)
PLATELET # BLD AUTO: 138 10*3/MM3 (ref 140–450)
PLATELET # BLD AUTO: 142 10*3/MM3 (ref 140–450)
PMV BLD AUTO: 10.1 FL (ref 6–12)
PMV BLD AUTO: 10.1 FL (ref 6–12)
PMV BLD AUTO: 10.7 FL (ref 6–12)
PMV BLD AUTO: 11 FL (ref 6–12)
PMV BLD AUTO: 11.1 FL (ref 6–12)
PMV BLD AUTO: 11.2 FL (ref 6–12)
PMV BLD AUTO: 11.4 FL (ref 6–12)
PMV BLD AUTO: 9.8 FL (ref 6–12)
PMV BLD AUTO: 9.9 FL (ref 6–12)
PO2 BLDA: 55.4 MM HG (ref 83–108)
PO2 BLDA: 91.6 MM HG (ref 83–108)
PO2 TEMP ADJ BLD: 55.4 MM HG (ref 83–108)
PO2 TEMP ADJ BLD: 91.6 MM HG (ref 83–108)
POTASSIUM SERPL-SCNC: 3.5 MMOL/L (ref 3.5–5.2)
POTASSIUM SERPL-SCNC: 3.7 MMOL/L (ref 3.5–5.2)
POTASSIUM SERPL-SCNC: 3.9 MMOL/L (ref 3.5–5.2)
POTASSIUM SERPL-SCNC: 4.1 MMOL/L (ref 3.5–5.2)
POTASSIUM SERPL-SCNC: 4.1 MMOL/L (ref 3.5–5.2)
POTASSIUM SERPL-SCNC: 4.2 MMOL/L (ref 3.5–5.2)
POTASSIUM SERPL-SCNC: 4.3 MMOL/L (ref 3.5–5.2)
POTASSIUM SERPL-SCNC: 4.3 MMOL/L (ref 3.5–5.2)
POTASSIUM SERPL-SCNC: 4.4 MMOL/L (ref 3.5–5.2)
POTASSIUM SERPL-SCNC: 4.5 MMOL/L (ref 3.5–5.2)
POTASSIUM SERPL-SCNC: 4.8 MMOL/L (ref 3.5–5.2)
POTASSIUM SERPL-SCNC: 4.9 MMOL/L (ref 3.5–5.2)
PROCALCITONIN SERPL-MCNC: 0.18 NG/ML (ref 0–0.25)
PROCALCITONIN SERPL-MCNC: 0.18 NG/ML (ref 0–0.25)
PROT ?TM UR-MCNC: 86.9 MG/DL
PROT SERPL-MCNC: 5.4 G/DL (ref 6–8.5)
PROT SERPL-MCNC: 5.4 G/DL (ref 6–8.5)
PROT SERPL-MCNC: 5.5 G/DL (ref 6–8.5)
PROT SERPL-MCNC: 5.6 G/DL (ref 6–8.5)
PROT SERPL-MCNC: 5.8 G/DL (ref 6–8.5)
PROT SERPL-MCNC: 5.9 G/DL (ref 6–8.5)
PROT SERPL-MCNC: 6.5 G/DL (ref 6–8.5)
PROT UR QL STRIP: ABNORMAL
PROTHROMBIN TIME: 17.8 SECONDS (ref 11.4–14.4)
PROTHROMBIN TIME: 17.8 SECONDS (ref 11.4–14.4)
PROTHROMBIN TIME: 19.1 SECONDS (ref 11.4–14.4)
PROTHROMBIN TIME: 19.7 SECONDS (ref 11.4–14.4)
PROTHROMBIN TIME: 24.2 SECONDS (ref 11.4–14.4)
PROTHROMBIN TIME: 29.5 SECONDS (ref 11.4–14.4)
PROTHROMBIN TIME: 30.2 SECONDS (ref 11.4–14.4)
PROTHROMBIN TIME: 40.2 SECONDS (ref 11.4–14.4)
QT INTERVAL: 374 MS
QT INTERVAL: 386 MS
QTC INTERVAL: 472 MS
QTC INTERVAL: 487 MS
RBC # BLD AUTO: 2.79 10*6/MM3 (ref 4.14–5.8)
RBC # BLD AUTO: 3.21 10*6/MM3 (ref 4.14–5.8)
RBC # BLD AUTO: 3.48 10*6/MM3 (ref 4.14–5.8)
RBC # BLD AUTO: 3.5 10*6/MM3 (ref 4.14–5.8)
RBC # BLD AUTO: 3.54 10*6/MM3 (ref 4.14–5.8)
RBC # BLD AUTO: 3.66 10*6/MM3 (ref 4.14–5.8)
RBC # BLD AUTO: 3.68 10*6/MM3 (ref 4.14–5.8)
RBC # BLD AUTO: 3.93 10*6/MM3 (ref 4.14–5.8)
RBC # BLD AUTO: 3.98 10*6/MM3 (ref 4.14–5.8)
RBC # UR STRIP: ABNORMAL /HPF
REF LAB TEST METHOD: ABNORMAL
RETICS # AUTO: 0.08 10*6/MM3 (ref 0.02–0.13)
RETICS/RBC NFR AUTO: 2.34 % (ref 0.7–1.9)
RH BLD: POSITIVE
RH BLD: POSITIVE
RIGHT GROIN CFA SYS: 97.2 CM/SEC
SODIUM SERPL-SCNC: 135 MMOL/L (ref 136–145)
SODIUM SERPL-SCNC: 135 MMOL/L (ref 136–145)
SODIUM SERPL-SCNC: 136 MMOL/L (ref 136–145)
SODIUM SERPL-SCNC: 137 MMOL/L (ref 136–145)
SODIUM SERPL-SCNC: 138 MMOL/L (ref 136–145)
SODIUM SERPL-SCNC: 139 MMOL/L (ref 136–145)
SODIUM SERPL-SCNC: 140 MMOL/L (ref 136–145)
SODIUM SERPL-SCNC: 142 MMOL/L (ref 136–145)
SODIUM UR-SCNC: 20 MMOL/L
SP GR UR STRIP: 1.02 (ref 1–1.03)
SQUAMOUS #/AREA URNS HPF: ABNORMAL /HPF
STRESS TARGET HR: 125 BPM
STRESS TARGET HR: 125 BPM
T&S EXPIRATION DATE: NORMAL
TIBC SERPL-MCNC: 228 MCG/DL (ref 298–536)
TOTAL RATE: 0 BREATHS/MINUTE
TOTAL RATE: 0 BREATHS/MINUTE
TRANSFERRIN SERPL-MCNC: 153 MG/DL (ref 200–360)
TRIGL SERPL-MCNC: 49 MG/DL (ref 0–150)
TROPONIN T DELTA: -2 NG/L
TROPONIN T SERPL HS-MCNC: 256 NG/L
UNIT  ABO: NORMAL
UNIT  ABO: NORMAL
UNIT  RH: NORMAL
UNIT  RH: NORMAL
UROBILINOGEN UR QL STRIP: ABNORMAL
VIT B12 BLD-MCNC: 345 PG/ML (ref 211–946)
VLDLC SERPL-MCNC: 12 MG/DL (ref 5–40)
WBC # UR STRIP: ABNORMAL /HPF
WBC NRBC COR # BLD: 10.52 10*3/MM3 (ref 3.4–10.8)
WBC NRBC COR # BLD: 11.09 10*3/MM3 (ref 3.4–10.8)
WBC NRBC COR # BLD: 16.16 10*3/MM3 (ref 3.4–10.8)
WBC NRBC COR # BLD: 5.19 10*3/MM3 (ref 3.4–10.8)
WBC NRBC COR # BLD: 5.7 10*3/MM3 (ref 3.4–10.8)
WBC NRBC COR # BLD: 6.73 10*3/MM3 (ref 3.4–10.8)
WBC NRBC COR # BLD: 7.97 10*3/MM3 (ref 3.4–10.8)
WBC NRBC COR # BLD: 8.15 10*3/MM3 (ref 3.4–10.8)
WBC NRBC COR # BLD: 8.81 10*3/MM3 (ref 3.4–10.8)

## 2023-01-01 PROCEDURE — 74018 RADEX ABDOMEN 1 VIEW: CPT

## 2023-01-01 PROCEDURE — 25010000002 HYDROMORPHONE PER 4 MG: Performed by: STUDENT IN AN ORGANIZED HEALTH CARE EDUCATION/TRAINING PROGRAM

## 2023-01-01 PROCEDURE — 97605 NEG PRS WND THER DME<=50SQCM: CPT

## 2023-01-01 PROCEDURE — 25010000002 ALBUMIN HUMAN 5% PER 50 ML: Performed by: PEDIATRICS

## 2023-01-01 PROCEDURE — C1887 CATHETER, GUIDING: HCPCS | Performed by: INTERNAL MEDICINE

## 2023-01-01 PROCEDURE — A9270 NON-COVERED ITEM OR SERVICE: HCPCS | Performed by: PHYSICIAN ASSISTANT

## 2023-01-01 PROCEDURE — 99213 OFFICE O/P EST LOW 20 MIN: CPT | Performed by: NURSE PRACTITIONER

## 2023-01-01 PROCEDURE — 25010000002 HYDROCORTISONE SOD SUC (PF) 100 MG RECONSTITUTED SOLUTION: Performed by: INTERNAL MEDICINE

## 2023-01-01 PROCEDURE — 63710000001 METOPROLOL SUCCINATE XL 25 MG TABLET SUSTAINED-RELEASE 24 HOUR: Performed by: PHYSICIAN ASSISTANT

## 2023-01-01 PROCEDURE — 99283 EMERGENCY DEPT VISIT LOW MDM: CPT

## 2023-01-01 PROCEDURE — 84145 PROCALCITONIN (PCT): CPT | Performed by: EMERGENCY MEDICINE

## 2023-01-01 PROCEDURE — 94799 UNLISTED PULMONARY SVC/PX: CPT

## 2023-01-01 PROCEDURE — 93005 ELECTROCARDIOGRAM TRACING: CPT | Performed by: NURSE PRACTITIONER

## 2023-01-01 PROCEDURE — 85045 AUTOMATED RETICULOCYTE COUNT: CPT | Performed by: PHYSICIAN ASSISTANT

## 2023-01-01 PROCEDURE — 93925 LOWER EXTREMITY STUDY: CPT

## 2023-01-01 PROCEDURE — 85025 COMPLETE CBC W/AUTO DIFF WBC: CPT | Performed by: STUDENT IN AN ORGANIZED HEALTH CARE EDUCATION/TRAINING PROGRAM

## 2023-01-01 PROCEDURE — 37232 PR REVSC OPN/PRQ TIB/PERO W/ANGIOPLASTY UNI EA VSL: CPT | Performed by: STUDENT IN AN ORGANIZED HEALTH CARE EDUCATION/TRAINING PROGRAM

## 2023-01-01 PROCEDURE — A9270 NON-COVERED ITEM OR SERVICE: HCPCS | Performed by: STUDENT IN AN ORGANIZED HEALTH CARE EDUCATION/TRAINING PROGRAM

## 2023-01-01 PROCEDURE — 63710000001 MIDODRINE 5 MG TABLET: Performed by: PHYSICIAN ASSISTANT

## 2023-01-01 PROCEDURE — 3078F DIAST BP <80 MM HG: CPT | Performed by: STUDENT IN AN ORGANIZED HEALTH CARE EDUCATION/TRAINING PROGRAM

## 2023-01-01 PROCEDURE — 80048 BASIC METABOLIC PNL TOTAL CA: CPT | Performed by: STUDENT IN AN ORGANIZED HEALTH CARE EDUCATION/TRAINING PROGRAM

## 2023-01-01 PROCEDURE — 83735 ASSAY OF MAGNESIUM: CPT | Performed by: INTERNAL MEDICINE

## 2023-01-01 PROCEDURE — 84100 ASSAY OF PHOSPHORUS: CPT | Performed by: STUDENT IN AN ORGANIZED HEALTH CARE EDUCATION/TRAINING PROGRAM

## 2023-01-01 PROCEDURE — 25010000002 PIPERACILLIN SOD-TAZOBACTAM PER 1 G: Performed by: STUDENT IN AN ORGANIZED HEALTH CARE EDUCATION/TRAINING PROGRAM

## 2023-01-01 PROCEDURE — 87116 MYCOBACTERIA CULTURE: CPT | Performed by: STUDENT IN AN ORGANIZED HEALTH CARE EDUCATION/TRAINING PROGRAM

## 2023-01-01 PROCEDURE — 97597 DBRDMT OPN WND 1ST 20 CM/<: CPT

## 2023-01-01 PROCEDURE — 82330 ASSAY OF CALCIUM: CPT | Performed by: INTERNAL MEDICINE

## 2023-01-01 PROCEDURE — 76937 US GUIDE VASCULAR ACCESS: CPT | Performed by: STUDENT IN AN ORGANIZED HEALTH CARE EDUCATION/TRAINING PROGRAM

## 2023-01-01 PROCEDURE — 94761 N-INVAS EAR/PLS OXIMETRY MLT: CPT

## 2023-01-01 PROCEDURE — 0 IOPAMIDOL PER 1 ML: Performed by: INTERNAL MEDICINE

## 2023-01-01 PROCEDURE — 88304 TISSUE EXAM BY PATHOLOGIST: CPT | Performed by: STUDENT IN AN ORGANIZED HEALTH CARE EDUCATION/TRAINING PROGRAM

## 2023-01-01 PROCEDURE — 63710000001 CLOPIDOGREL 75 MG TABLET: Performed by: STUDENT IN AN ORGANIZED HEALTH CARE EDUCATION/TRAINING PROGRAM

## 2023-01-01 PROCEDURE — C1750 CATH, HEMODIALYSIS,LONG-TERM: HCPCS

## 2023-01-01 PROCEDURE — 82805 BLOOD GASES W/O2 SATURATION: CPT

## 2023-01-01 PROCEDURE — 99024 POSTOP FOLLOW-UP VISIT: CPT | Performed by: STUDENT IN AN ORGANIZED HEALTH CARE EDUCATION/TRAINING PROGRAM

## 2023-01-01 PROCEDURE — 25010000002 DAPTOMYCIN PER 1 MG: Performed by: NURSE PRACTITIONER

## 2023-01-01 PROCEDURE — 87102 FUNGUS ISOLATION CULTURE: CPT | Performed by: STUDENT IN AN ORGANIZED HEALTH CARE EDUCATION/TRAINING PROGRAM

## 2023-01-01 PROCEDURE — 37230 PR REVSC OPN/PRQ TIB/PERO W/STNT/ANGIOP SM VSL: CPT | Performed by: STUDENT IN AN ORGANIZED HEALTH CARE EDUCATION/TRAINING PROGRAM

## 2023-01-01 PROCEDURE — 25010000002 FENTANYL CITRATE (PF) 50 MCG/ML SOLUTION: Performed by: INTERNAL MEDICINE

## 2023-01-01 PROCEDURE — C1894 INTRO/SHEATH, NON-LASER: HCPCS | Performed by: STUDENT IN AN ORGANIZED HEALTH CARE EDUCATION/TRAINING PROGRAM

## 2023-01-01 PROCEDURE — 25010000002 FENTANYL CITRATE (PF) 100 MCG/2ML SOLUTION

## 2023-01-01 PROCEDURE — 25010000002 CEFEPIME PER 500 MG: Performed by: INTERNAL MEDICINE

## 2023-01-01 PROCEDURE — 25010000002 ALBUMIN HUMAN 25% PER 50 ML: Performed by: PHYSICIAN ASSISTANT

## 2023-01-01 PROCEDURE — 36556 INSERT NON-TUNNEL CV CATH: CPT | Performed by: NURSE PRACTITIONER

## 2023-01-01 PROCEDURE — 93925 LOWER EXTREMITY STUDY: CPT | Performed by: INTERNAL MEDICINE

## 2023-01-01 PROCEDURE — 25010000002 DAPTOMYCIN PER 1 MG: Performed by: STUDENT IN AN ORGANIZED HEALTH CARE EDUCATION/TRAINING PROGRAM

## 2023-01-01 PROCEDURE — 25010000002 MIDAZOLAM PER 1 MG: Performed by: INTERNAL MEDICINE

## 2023-01-01 PROCEDURE — 25010000002 PIPERACILLIN SOD-TAZOBACTAM PER 1 G

## 2023-01-01 PROCEDURE — 87205 SMEAR GRAM STAIN: CPT | Performed by: STUDENT IN AN ORGANIZED HEALTH CARE EDUCATION/TRAINING PROGRAM

## 2023-01-01 PROCEDURE — 85379 FIBRIN DEGRADATION QUANT: CPT | Performed by: INTERNAL MEDICINE

## 2023-01-01 PROCEDURE — 99232 SBSQ HOSP IP/OBS MODERATE 35: CPT | Performed by: PHYSICIAN ASSISTANT

## 2023-01-01 PROCEDURE — 99291 CRITICAL CARE FIRST HOUR: CPT | Performed by: INTERNAL MEDICINE

## 2023-01-01 PROCEDURE — C1894 INTRO/SHEATH, NON-LASER: HCPCS | Performed by: INTERNAL MEDICINE

## 2023-01-01 PROCEDURE — 82607 VITAMIN B-12: CPT | Performed by: PHYSICIAN ASSISTANT

## 2023-01-01 PROCEDURE — 86900 BLOOD TYPING SEROLOGIC ABO: CPT

## 2023-01-01 PROCEDURE — 25010000002 HEPARIN (PORCINE) PER 1000 UNITS: Performed by: STUDENT IN AN ORGANIZED HEALTH CARE EDUCATION/TRAINING PROGRAM

## 2023-01-01 PROCEDURE — 82962 GLUCOSE BLOOD TEST: CPT

## 2023-01-01 PROCEDURE — 25010000002 CEFAZOLIN IN DEXTROSE 2-4 GM/100ML-% SOLUTION: Performed by: PHYSICIAN ASSISTANT

## 2023-01-01 PROCEDURE — 63710000001 FERROUS SULFATE 325 (65 FE) MG TABLET: Performed by: PHYSICIAN ASSISTANT

## 2023-01-01 PROCEDURE — 97164 PT RE-EVAL EST PLAN CARE: CPT

## 2023-01-01 PROCEDURE — 93306 TTE W/DOPPLER COMPLETE: CPT

## 2023-01-01 PROCEDURE — 83036 HEMOGLOBIN GLYCOSYLATED A1C: CPT

## 2023-01-01 PROCEDURE — B548ZZA ULTRASONOGRAPHY OF SUPERIOR VENA CAVA, GUIDANCE: ICD-10-PCS | Performed by: NURSE PRACTITIONER

## 2023-01-01 PROCEDURE — 63710000001 METOPROLOL SUCCINATE XL 25 MG TABLET SUSTAINED-RELEASE 24 HOUR: Performed by: INTERNAL MEDICINE

## 2023-01-01 PROCEDURE — 25010000002 LORAZEPAM PER 2 MG: Performed by: FAMILY MEDICINE

## 2023-01-01 PROCEDURE — 87206 SMEAR FLUORESCENT/ACID STAI: CPT | Performed by: STUDENT IN AN ORGANIZED HEALTH CARE EDUCATION/TRAINING PROGRAM

## 2023-01-01 PROCEDURE — 82375 ASSAY CARBOXYHB QUANT: CPT

## 2023-01-01 PROCEDURE — 86901 BLOOD TYPING SEROLOGIC RH(D): CPT | Performed by: STUDENT IN AN ORGANIZED HEALTH CARE EDUCATION/TRAINING PROGRAM

## 2023-01-01 PROCEDURE — 37232 PR REVSC OPN/PRQ TIB/PERO W/ANGIOPLASTY UNI EA VSL: CPT | Performed by: PHYSICIAN ASSISTANT

## 2023-01-01 PROCEDURE — C1769 GUIDE WIRE: HCPCS | Performed by: STUDENT IN AN ORGANIZED HEALTH CARE EDUCATION/TRAINING PROGRAM

## 2023-01-01 PROCEDURE — P9047 ALBUMIN (HUMAN), 25%, 50ML: HCPCS | Performed by: NURSE PRACTITIONER

## 2023-01-01 PROCEDURE — 71045 X-RAY EXAM CHEST 1 VIEW: CPT

## 2023-01-01 PROCEDURE — 85652 RBC SED RATE AUTOMATED: CPT | Performed by: EMERGENCY MEDICINE

## 2023-01-01 PROCEDURE — A9270 NON-COVERED ITEM OR SERVICE: HCPCS | Performed by: ANESTHESIOLOGY

## 2023-01-01 PROCEDURE — 85610 PROTHROMBIN TIME: CPT | Performed by: NURSE PRACTITIONER

## 2023-01-01 PROCEDURE — 25010000002 FENTANYL CITRATE (PF) 50 MCG/ML SOLUTION: Performed by: RADIOLOGY

## 2023-01-01 PROCEDURE — 87015 SPECIMEN INFECT AGNT CONCNTJ: CPT | Performed by: STUDENT IN AN ORGANIZED HEALTH CARE EDUCATION/TRAINING PROGRAM

## 2023-01-01 PROCEDURE — 82570 ASSAY OF URINE CREATININE: CPT | Performed by: INTERNAL MEDICINE

## 2023-01-01 PROCEDURE — 25010000002 ALBUMIN HUMAN 25% PER 50 ML: Performed by: NURSE PRACTITIONER

## 2023-01-01 PROCEDURE — 63710000001 FAMOTIDINE 20 MG TABLET: Performed by: ANESTHESIOLOGY

## 2023-01-01 PROCEDURE — 36247 INS CATH ABD/L-EXT ART 3RD: CPT | Performed by: INTERNAL MEDICINE

## 2023-01-01 PROCEDURE — 63710000001 ASPIRIN 81 MG CHEWABLE TABLET: Performed by: STUDENT IN AN ORGANIZED HEALTH CARE EDUCATION/TRAINING PROGRAM

## 2023-01-01 PROCEDURE — P9041 ALBUMIN (HUMAN),5%, 50ML: HCPCS | Performed by: PEDIATRICS

## 2023-01-01 PROCEDURE — 63710000001 ASPIRIN 81 MG TABLET DELAYED-RELEASE: Performed by: INTERNAL MEDICINE

## 2023-01-01 PROCEDURE — C1894 INTRO/SHEATH, NON-LASER: HCPCS

## 2023-01-01 PROCEDURE — 80053 COMPREHEN METABOLIC PANEL: CPT | Performed by: EMERGENCY MEDICINE

## 2023-01-01 PROCEDURE — 99232 SBSQ HOSP IP/OBS MODERATE 35: CPT | Performed by: THORACIC SURGERY (CARDIOTHORACIC VASCULAR SURGERY)

## 2023-01-01 PROCEDURE — 85025 COMPLETE CBC W/AUTO DIFF WBC: CPT | Performed by: INTERNAL MEDICINE

## 2023-01-01 PROCEDURE — 25010000002 PROPOFOL 10 MG/ML EMULSION

## 2023-01-01 PROCEDURE — 99221 1ST HOSP IP/OBS SF/LOW 40: CPT | Performed by: PHYSICIAN ASSISTANT

## 2023-01-01 PROCEDURE — A9270 NON-COVERED ITEM OR SERVICE: HCPCS | Performed by: INTERNAL MEDICINE

## 2023-01-01 PROCEDURE — 80048 BASIC METABOLIC PNL TOTAL CA: CPT | Performed by: NURSE PRACTITIONER

## 2023-01-01 PROCEDURE — 83050 HGB METHEMOGLOBIN QUAN: CPT

## 2023-01-01 PROCEDURE — 85730 THROMBOPLASTIN TIME PARTIAL: CPT | Performed by: EMERGENCY MEDICINE

## 2023-01-01 PROCEDURE — 63710000001 PSYLLIUM 58.12 % PACK: Performed by: PHYSICIAN ASSISTANT

## 2023-01-01 PROCEDURE — 99233 SBSQ HOSP IP/OBS HIGH 50: CPT | Performed by: PHYSICIAN ASSISTANT

## 2023-01-01 PROCEDURE — 82550 ASSAY OF CK (CPK): CPT | Performed by: INTERNAL MEDICINE

## 2023-01-01 PROCEDURE — 94664 DEMO&/EVAL PT USE INHALER: CPT

## 2023-01-01 PROCEDURE — 0Y6N0ZF DETACHMENT AT LEFT FOOT, PARTIAL 5TH RAY, OPEN APPROACH: ICD-10-PCS | Performed by: STUDENT IN AN ORGANIZED HEALTH CARE EDUCATION/TRAINING PROGRAM

## 2023-01-01 PROCEDURE — 63710000001 DIPHENHYDRAMINE PER 50 MG: Performed by: PHYSICIAN ASSISTANT

## 2023-01-01 PROCEDURE — 99232 SBSQ HOSP IP/OBS MODERATE 35: CPT | Performed by: NURSE PRACTITIONER

## 2023-01-01 PROCEDURE — 25010000002 HEPARIN (PORCINE) PER 1000 UNITS

## 2023-01-01 PROCEDURE — 0Y6N0ZD DETACHMENT AT LEFT FOOT, PARTIAL 4TH RAY, OPEN APPROACH: ICD-10-PCS | Performed by: STUDENT IN AN ORGANIZED HEALTH CARE EDUCATION/TRAINING PROGRAM

## 2023-01-01 PROCEDURE — 99024 POSTOP FOLLOW-UP VISIT: CPT | Performed by: PHYSICIAN ASSISTANT

## 2023-01-01 PROCEDURE — 85610 PROTHROMBIN TIME: CPT | Performed by: PHYSICIAN ASSISTANT

## 2023-01-01 PROCEDURE — 63710000001 SENNOSIDES-DOCUSATE 8.6-50 MG TABLET: Performed by: PHYSICIAN ASSISTANT

## 2023-01-01 PROCEDURE — G0378 HOSPITAL OBSERVATION PER HR: HCPCS

## 2023-01-01 PROCEDURE — 93306 TTE W/DOPPLER COMPLETE: CPT | Performed by: INTERNAL MEDICINE

## 2023-01-01 PROCEDURE — 83605 ASSAY OF LACTIC ACID: CPT | Performed by: EMERGENCY MEDICINE

## 2023-01-01 PROCEDURE — 63710000001 ATORVASTATIN 10 MG TABLET: Performed by: PHYSICIAN ASSISTANT

## 2023-01-01 PROCEDURE — 84466 ASSAY OF TRANSFERRIN: CPT | Performed by: PHYSICIAN ASSISTANT

## 2023-01-01 PROCEDURE — 80053 COMPREHEN METABOLIC PANEL: CPT | Performed by: INTERNAL MEDICINE

## 2023-01-01 PROCEDURE — C1769 GUIDE WIRE: HCPCS | Performed by: INTERNAL MEDICINE

## 2023-01-01 PROCEDURE — 25010000002 CEFAZOLIN PER 500 MG: Performed by: STUDENT IN AN ORGANIZED HEALTH CARE EDUCATION/TRAINING PROGRAM

## 2023-01-01 PROCEDURE — 93005 ELECTROCARDIOGRAM TRACING: CPT | Performed by: STUDENT IN AN ORGANIZED HEALTH CARE EDUCATION/TRAINING PROGRAM

## 2023-01-01 PROCEDURE — 36415 COLL VENOUS BLD VENIPUNCTURE: CPT

## 2023-01-01 PROCEDURE — 25010000002 DIGOXIN PER 500 MCG: Performed by: INTERNAL MEDICINE

## 2023-01-01 PROCEDURE — 80061 LIPID PANEL: CPT | Performed by: HOSPITALIST

## 2023-01-01 PROCEDURE — 80053 COMPREHEN METABOLIC PANEL: CPT | Performed by: STUDENT IN AN ORGANIZED HEALTH CARE EDUCATION/TRAINING PROGRAM

## 2023-01-01 PROCEDURE — 25010000002 PHENYLEPHRINE 10 MG/ML SOLUTION 1 ML VIAL

## 2023-01-01 PROCEDURE — 3074F SYST BP LT 130 MM HG: CPT | Performed by: STUDENT IN AN ORGANIZED HEALTH CARE EDUCATION/TRAINING PROGRAM

## 2023-01-01 PROCEDURE — 86140 C-REACTIVE PROTEIN: CPT | Performed by: EMERGENCY MEDICINE

## 2023-01-01 PROCEDURE — 86901 BLOOD TYPING SEROLOGIC RH(D): CPT

## 2023-01-01 PROCEDURE — 84484 ASSAY OF TROPONIN QUANT: CPT | Performed by: INTERNAL MEDICINE

## 2023-01-01 PROCEDURE — 25010000002 DEXAMETHASONE PER 1 MG: Performed by: NURSE ANESTHETIST, CERTIFIED REGISTERED

## 2023-01-01 PROCEDURE — 84100 ASSAY OF PHOSPHORUS: CPT | Performed by: INTERNAL MEDICINE

## 2023-01-01 PROCEDURE — 75716 ARTERY X-RAYS ARMS/LEGS: CPT | Performed by: STUDENT IN AN ORGANIZED HEALTH CARE EDUCATION/TRAINING PROGRAM

## 2023-01-01 PROCEDURE — 85610 PROTHROMBIN TIME: CPT | Performed by: INTERNAL MEDICINE

## 2023-01-01 PROCEDURE — 83605 ASSAY OF LACTIC ACID: CPT | Performed by: NURSE PRACTITIONER

## 2023-01-01 PROCEDURE — C1887 CATHETER, GUIDING: HCPCS | Performed by: STUDENT IN AN ORGANIZED HEALTH CARE EDUCATION/TRAINING PROGRAM

## 2023-01-01 PROCEDURE — 63710000001 BISACODYL 5 MG TABLET DELAYED-RELEASE: Performed by: PHYSICIAN ASSISTANT

## 2023-01-01 PROCEDURE — 73718 MRI LOWER EXTREMITY W/O DYE: CPT

## 2023-01-01 PROCEDURE — 63710000001 HYDROCODONE-ACETAMINOPHEN 7.5-325 MG TABLET: Performed by: STUDENT IN AN ORGANIZED HEALTH CARE EDUCATION/TRAINING PROGRAM

## 2023-01-01 PROCEDURE — 28810 AMPUTATION TOE & METATARSAL: CPT | Performed by: STUDENT IN AN ORGANIZED HEALTH CARE EDUCATION/TRAINING PROGRAM

## 2023-01-01 PROCEDURE — S0260 H&P FOR SURGERY: HCPCS | Performed by: STUDENT IN AN ORGANIZED HEALTH CARE EDUCATION/TRAINING PROGRAM

## 2023-01-01 PROCEDURE — 25010000002 DEXAMETHASONE PER 1 MG

## 2023-01-01 PROCEDURE — 0Y6M0ZD DETACHMENT AT RIGHT FOOT, PARTIAL 4TH RAY, OPEN APPROACH: ICD-10-PCS | Performed by: STUDENT IN AN ORGANIZED HEALTH CARE EDUCATION/TRAINING PROGRAM

## 2023-01-01 PROCEDURE — 83540 ASSAY OF IRON: CPT | Performed by: PHYSICIAN ASSISTANT

## 2023-01-01 PROCEDURE — 87086 URINE CULTURE/COLONY COUNT: CPT | Performed by: PHYSICIAN ASSISTANT

## 2023-01-01 PROCEDURE — 75710 ARTERY X-RAYS ARM/LEG: CPT | Performed by: INTERNAL MEDICINE

## 2023-01-01 PROCEDURE — C1751 CATH, INF, PER/CENT/MIDLINE: HCPCS

## 2023-01-01 PROCEDURE — 85610 PROTHROMBIN TIME: CPT

## 2023-01-01 PROCEDURE — 63710000001 INSULIN LISPRO (HUMAN) PER 5 UNITS: Performed by: NURSE PRACTITIONER

## 2023-01-01 PROCEDURE — 11047 DBRDMT BONE EACH ADDL: CPT | Performed by: STUDENT IN AN ORGANIZED HEALTH CARE EDUCATION/TRAINING PROGRAM

## 2023-01-01 PROCEDURE — 85027 COMPLETE CBC AUTOMATED: CPT

## 2023-01-01 PROCEDURE — 25010000002 DAPTOMYCIN PER 1 MG: Performed by: INTERNAL MEDICINE

## 2023-01-01 PROCEDURE — 75625 CONTRAST EXAM ABDOMINL AORTA: CPT | Performed by: STUDENT IN AN ORGANIZED HEALTH CARE EDUCATION/TRAINING PROGRAM

## 2023-01-01 PROCEDURE — 63710000001 TAMSULOSIN 0.4 MG CAPSULE: Performed by: PHYSICIAN ASSISTANT

## 2023-01-01 PROCEDURE — 85014 HEMATOCRIT: CPT | Performed by: FAMILY MEDICINE

## 2023-01-01 PROCEDURE — 81001 URINALYSIS AUTO W/SCOPE: CPT | Performed by: PHYSICIAN ASSISTANT

## 2023-01-01 PROCEDURE — 77001 FLUOROGUIDE FOR VEIN DEVICE: CPT

## 2023-01-01 PROCEDURE — P9047 ALBUMIN (HUMAN), 25%, 50ML: HCPCS | Performed by: PHYSICIAN ASSISTANT

## 2023-01-01 PROCEDURE — 36600 WITHDRAWAL OF ARTERIAL BLOOD: CPT

## 2023-01-01 PROCEDURE — 63710000001: Performed by: PHYSICIAN ASSISTANT

## 2023-01-01 PROCEDURE — 63710000001 CLOPIDOGREL 75 MG TABLET: Performed by: PHYSICIAN ASSISTANT

## 2023-01-01 PROCEDURE — 63710000001 POLYETHYLENE GLYCOL 17 G PACK: Performed by: STUDENT IN AN ORGANIZED HEALTH CARE EDUCATION/TRAINING PROGRAM

## 2023-01-01 PROCEDURE — 83605 ASSAY OF LACTIC ACID: CPT | Performed by: INTERNAL MEDICINE

## 2023-01-01 PROCEDURE — 25010000002 PHENYLEPHRINE 10 MG/ML SOLUTION 1 ML VIAL: Performed by: NURSE ANESTHETIST, CERTIFIED REGISTERED

## 2023-01-01 PROCEDURE — 37230 PR REVSC OPN/PRQ TIB/PERO W/STNT/ANGIOP SM VSL: CPT | Performed by: PHYSICIAN ASSISTANT

## 2023-01-01 PROCEDURE — 37224 PR REVSC OPN/PRG FEM/POP W/ANGIOPLASTY UNI: CPT | Performed by: PHYSICIAN ASSISTANT

## 2023-01-01 PROCEDURE — 25010000002 HYDROMORPHONE PER 4 MG: Performed by: FAMILY MEDICINE

## 2023-01-01 PROCEDURE — 85730 THROMBOPLASTIN TIME PARTIAL: CPT

## 2023-01-01 PROCEDURE — 25010000002 CEFAZOLIN IN DEXTROSE 2-4 GM/100ML-% SOLUTION: Performed by: STUDENT IN AN ORGANIZED HEALTH CARE EDUCATION/TRAINING PROGRAM

## 2023-01-01 PROCEDURE — 75710 ARTERY X-RAYS ARM/LEG: CPT

## 2023-01-01 PROCEDURE — 25010000002 GENTAMICIN PER 80 MG: Performed by: STUDENT IN AN ORGANIZED HEALTH CARE EDUCATION/TRAINING PROGRAM

## 2023-01-01 PROCEDURE — 0 PHYTONADIONE 10 MG/ML SOLUTION 1 ML AMPULE: Performed by: PHYSICIAN ASSISTANT

## 2023-01-01 PROCEDURE — 84300 ASSAY OF URINE SODIUM: CPT | Performed by: INTERNAL MEDICINE

## 2023-01-01 PROCEDURE — 85610 PROTHROMBIN TIME: CPT | Performed by: STUDENT IN AN ORGANIZED HEALTH CARE EDUCATION/TRAINING PROGRAM

## 2023-01-01 PROCEDURE — 36558 INSERT TUNNELED CV CATH: CPT

## 2023-01-01 PROCEDURE — 82746 ASSAY OF FOLIC ACID SERUM: CPT | Performed by: PHYSICIAN ASSISTANT

## 2023-01-01 PROCEDURE — C1725 CATH, TRANSLUMIN NON-LASER: HCPCS | Performed by: STUDENT IN AN ORGANIZED HEALTH CARE EDUCATION/TRAINING PROGRAM

## 2023-01-01 PROCEDURE — 82550 ASSAY OF CK (CPK): CPT | Performed by: NURSE PRACTITIONER

## 2023-01-01 PROCEDURE — 87075 CULTR BACTERIA EXCEPT BLOOD: CPT | Performed by: STUDENT IN AN ORGANIZED HEALTH CARE EDUCATION/TRAINING PROGRAM

## 2023-01-01 PROCEDURE — 97161 PT EVAL LOW COMPLEX 20 MIN: CPT

## 2023-01-01 PROCEDURE — 25010000002 PROPOFOL 10 MG/ML EMULSION: Performed by: NURSE ANESTHETIST, CERTIFIED REGISTERED

## 2023-01-01 PROCEDURE — 97162 PT EVAL MOD COMPLEX 30 MIN: CPT

## 2023-01-01 PROCEDURE — 99214 OFFICE O/P EST MOD 30 MIN: CPT | Performed by: INTERNAL MEDICINE

## 2023-01-01 PROCEDURE — 93010 ELECTROCARDIOGRAM REPORT: CPT | Performed by: INTERNAL MEDICINE

## 2023-01-01 PROCEDURE — 76937 US GUIDE VASCULAR ACCESS: CPT

## 2023-01-01 PROCEDURE — 76937 US GUIDE VASCULAR ACCESS: CPT | Performed by: NURSE PRACTITIONER

## 2023-01-01 PROCEDURE — 99212 OFFICE O/P EST SF 10 MIN: CPT | Performed by: SURGERY

## 2023-01-01 PROCEDURE — 97530 THERAPEUTIC ACTIVITIES: CPT

## 2023-01-01 PROCEDURE — 5A1D90Z PERFORMANCE OF URINARY FILTRATION, CONTINUOUS, GREATER THAN 18 HOURS PER DAY: ICD-10-PCS | Performed by: INTERNAL MEDICINE

## 2023-01-01 PROCEDURE — 86923 COMPATIBILITY TEST ELECTRIC: CPT

## 2023-01-01 PROCEDURE — 87070 CULTURE OTHR SPECIMN AEROBIC: CPT | Performed by: STUDENT IN AN ORGANIZED HEALTH CARE EDUCATION/TRAINING PROGRAM

## 2023-01-01 PROCEDURE — C1760 CLOSURE DEV, VASC: HCPCS | Performed by: INTERNAL MEDICINE

## 2023-01-01 PROCEDURE — 94640 AIRWAY INHALATION TREATMENT: CPT

## 2023-01-01 PROCEDURE — 80048 BASIC METABOLIC PNL TOTAL CA: CPT

## 2023-01-01 PROCEDURE — 80048 BASIC METABOLIC PNL TOTAL CA: CPT | Performed by: HOSPITALIST

## 2023-01-01 PROCEDURE — 99152 MOD SED SAME PHYS/QHP 5/>YRS: CPT

## 2023-01-01 PROCEDURE — 99232 SBSQ HOSP IP/OBS MODERATE 35: CPT | Performed by: INTERNAL MEDICINE

## 2023-01-01 PROCEDURE — 85610 PROTHROMBIN TIME: CPT | Performed by: EMERGENCY MEDICINE

## 2023-01-01 PROCEDURE — 85018 HEMOGLOBIN: CPT | Performed by: FAMILY MEDICINE

## 2023-01-01 PROCEDURE — 85025 COMPLETE CBC W/AUTO DIFF WBC: CPT | Performed by: EMERGENCY MEDICINE

## 2023-01-01 PROCEDURE — 11044 DBRDMT BONE 1ST 20 SQ CM/<: CPT | Performed by: STUDENT IN AN ORGANIZED HEALTH CARE EDUCATION/TRAINING PROGRAM

## 2023-01-01 PROCEDURE — 85014 HEMATOCRIT: CPT | Performed by: NURSE PRACTITIONER

## 2023-01-01 PROCEDURE — 82728 ASSAY OF FERRITIN: CPT | Performed by: INTERNAL MEDICINE

## 2023-01-01 PROCEDURE — 88305 TISSUE EXAM BY PATHOLOGIST: CPT | Performed by: STUDENT IN AN ORGANIZED HEALTH CARE EDUCATION/TRAINING PROGRAM

## 2023-01-01 PROCEDURE — 25010000002 ONDANSETRON PER 1 MG: Performed by: NURSE ANESTHETIST, CERTIFIED REGISTERED

## 2023-01-01 PROCEDURE — 76775 US EXAM ABDO BACK WALL LIM: CPT

## 2023-01-01 PROCEDURE — 25010000002 ALTEPLASE 2 MG RECONSTITUTED SOLUTION

## 2023-01-01 PROCEDURE — 87176 TISSUE HOMOGENIZATION CULTR: CPT | Performed by: STUDENT IN AN ORGANIZED HEALTH CARE EDUCATION/TRAINING PROGRAM

## 2023-01-01 PROCEDURE — 85027 COMPLETE CBC AUTOMATED: CPT | Performed by: HOSPITALIST

## 2023-01-01 PROCEDURE — 63710000001 BISACODYL 10 MG SUPPOSITORY: Performed by: PHYSICIAN ASSISTANT

## 2023-01-01 PROCEDURE — 84145 PROCALCITONIN (PCT): CPT | Performed by: INTERNAL MEDICINE

## 2023-01-01 PROCEDURE — 86900 BLOOD TYPING SEROLOGIC ABO: CPT | Performed by: STUDENT IN AN ORGANIZED HEALTH CARE EDUCATION/TRAINING PROGRAM

## 2023-01-01 PROCEDURE — 84156 ASSAY OF PROTEIN URINE: CPT | Performed by: INTERNAL MEDICINE

## 2023-01-01 PROCEDURE — 87040 BLOOD CULTURE FOR BACTERIA: CPT | Performed by: EMERGENCY MEDICINE

## 2023-01-01 PROCEDURE — 80053 COMPREHEN METABOLIC PANEL: CPT | Performed by: PHYSICIAN ASSISTANT

## 2023-01-01 PROCEDURE — 85018 HEMOGLOBIN: CPT | Performed by: NURSE PRACTITIONER

## 2023-01-01 PROCEDURE — C1874 STENT, COATED/COV W/DEL SYS: HCPCS | Performed by: STUDENT IN AN ORGANIZED HEALTH CARE EDUCATION/TRAINING PROGRAM

## 2023-01-01 PROCEDURE — 88311 DECALCIFY TISSUE: CPT | Performed by: STUDENT IN AN ORGANIZED HEALTH CARE EDUCATION/TRAINING PROGRAM

## 2023-01-01 PROCEDURE — C1760 CLOSURE DEV, VASC: HCPCS | Performed by: STUDENT IN AN ORGANIZED HEALTH CARE EDUCATION/TRAINING PROGRAM

## 2023-01-01 PROCEDURE — 82533 TOTAL CORTISOL: CPT | Performed by: INTERNAL MEDICINE

## 2023-01-01 PROCEDURE — 99205 OFFICE O/P NEW HI 60 MIN: CPT | Performed by: STUDENT IN AN ORGANIZED HEALTH CARE EDUCATION/TRAINING PROGRAM

## 2023-01-01 PROCEDURE — 86850 RBC ANTIBODY SCREEN: CPT | Performed by: STUDENT IN AN ORGANIZED HEALTH CARE EDUCATION/TRAINING PROGRAM

## 2023-01-01 PROCEDURE — 82728 ASSAY OF FERRITIN: CPT | Performed by: PHYSICIAN ASSISTANT

## 2023-01-01 PROCEDURE — 72072 X-RAY EXAM THORAC SPINE 3VWS: CPT

## 2023-01-01 PROCEDURE — 87205 SMEAR GRAM STAIN: CPT | Performed by: INTERNAL MEDICINE

## 2023-01-01 PROCEDURE — 25010000002 ONDANSETRON PER 1 MG

## 2023-01-01 PROCEDURE — 02HV33Z INSERTION OF INFUSION DEVICE INTO SUPERIOR VENA CAVA, PERCUTANEOUS APPROACH: ICD-10-PCS | Performed by: NURSE PRACTITIONER

## 2023-01-01 PROCEDURE — 63710000001 ATORVASTATIN 10 MG TABLET: Performed by: INTERNAL MEDICINE

## 2023-01-01 PROCEDURE — 83036 HEMOGLOBIN GLYCOSYLATED A1C: CPT | Performed by: HOSPITALIST

## 2023-01-01 PROCEDURE — 37224 PR REVSC OPN/PRG FEM/POP W/ANGIOPLASTY UNI: CPT | Performed by: STUDENT IN AN ORGANIZED HEALTH CARE EDUCATION/TRAINING PROGRAM

## 2023-01-01 DEVICE — XIENCE SKYPOINT™ EVEROLIMUS ELUTING CORONARY STENT SYSTEM 2.25 MM X 15 MM / RAPID-EXCHANGE
Type: IMPLANTABLE DEVICE | Site: ARTERY FEMORAL | Status: FUNCTIONAL
Brand: XIENCE SKYPOINT™

## 2023-01-01 RX ORDER — PHENYLEPHRINE HCL IN 0.9% NACL 1 MG/10 ML
SYRINGE (ML) INTRAVENOUS AS NEEDED
Status: DISCONTINUED | OUTPATIENT
Start: 2023-01-01 | End: 2023-01-01 | Stop reason: SURG

## 2023-01-01 RX ORDER — IPRATROPIUM BROMIDE AND ALBUTEROL SULFATE 2.5; .5 MG/3ML; MG/3ML
3 SOLUTION RESPIRATORY (INHALATION) ONCE AS NEEDED
Status: DISCONTINUED | OUTPATIENT
Start: 2023-01-01 | End: 2023-01-01 | Stop reason: HOSPADM

## 2023-01-01 RX ORDER — METOPROLOL SUCCINATE 25 MG/1
12.5 TABLET, EXTENDED RELEASE ORAL DAILY
Status: DISCONTINUED | OUTPATIENT
Start: 2023-01-01 | End: 2023-01-01 | Stop reason: HOSPADM

## 2023-01-01 RX ORDER — ANTICOAGULANT CITRATE DEXTROSE SOLUTION FORMULA A 12.25; 11; 3.65 G/500ML; G/500ML; G/500ML
0-2 SOLUTION INTRAVENOUS AS NEEDED
Status: DISCONTINUED | OUTPATIENT
Start: 2023-01-01 | End: 2023-01-01

## 2023-01-01 RX ORDER — SODIUM CHLORIDE 9 MG/ML
40 INJECTION, SOLUTION INTRAVENOUS AS NEEDED
Status: DISCONTINUED | OUTPATIENT
Start: 2023-01-01 | End: 2023-01-01

## 2023-01-01 RX ORDER — MIDODRINE HYDROCHLORIDE 10 MG/1
10 TABLET ORAL
Status: DISCONTINUED | OUTPATIENT
Start: 2023-01-01 | End: 2023-01-01

## 2023-01-01 RX ORDER — NALOXONE HCL 0.4 MG/ML
0.4 VIAL (ML) INJECTION
Status: DISCONTINUED | OUTPATIENT
Start: 2023-01-01 | End: 2023-01-01 | Stop reason: HOSPADM

## 2023-01-01 RX ORDER — SODIUM CHLORIDE 0.9 % (FLUSH) 0.9 %
10 SYRINGE (ML) INJECTION AS NEEDED
Status: DISCONTINUED | OUTPATIENT
Start: 2023-01-01 | End: 2023-01-01 | Stop reason: HOSPADM

## 2023-01-01 RX ORDER — DEXAMETHASONE SODIUM PHOSPHATE 4 MG/ML
INJECTION, SOLUTION INTRA-ARTICULAR; INTRALESIONAL; INTRAMUSCULAR; INTRAVENOUS; SOFT TISSUE AS NEEDED
Status: DISCONTINUED | OUTPATIENT
Start: 2023-01-01 | End: 2023-01-01 | Stop reason: SURG

## 2023-01-01 RX ORDER — ASPIRIN 81 MG/1
81 TABLET ORAL DAILY
Status: DISCONTINUED | OUTPATIENT
Start: 2023-01-01 | End: 2023-01-01 | Stop reason: HOSPADM

## 2023-01-01 RX ORDER — HYDROCODONE BITARTRATE AND ACETAMINOPHEN 7.5; 325 MG/1; MG/1
1 TABLET ORAL EVERY 4 HOURS PRN
Status: DISPENSED | OUTPATIENT
Start: 2023-01-01 | End: 2023-01-01

## 2023-01-01 RX ORDER — ROCURONIUM BROMIDE 10 MG/ML
INJECTION, SOLUTION INTRAVENOUS AS NEEDED
Status: DISCONTINUED | OUTPATIENT
Start: 2023-01-01 | End: 2023-01-01 | Stop reason: SURG

## 2023-01-01 RX ORDER — ASPIRIN 81 MG/1
81 TABLET, CHEWABLE ORAL DAILY
Status: DISCONTINUED | OUTPATIENT
Start: 2023-01-01 | End: 2023-01-01

## 2023-01-01 RX ORDER — SODIUM CHLORIDE 0.9 % (FLUSH) 0.9 %
10 SYRINGE (ML) INJECTION EVERY 12 HOURS SCHEDULED
Status: DISCONTINUED | OUTPATIENT
Start: 2023-01-01 | End: 2023-01-01 | Stop reason: HOSPADM

## 2023-01-01 RX ORDER — ALBUMIN (HUMAN) 12.5 G/50ML
50 SOLUTION INTRAVENOUS ONCE
Status: COMPLETED | OUTPATIENT
Start: 2023-01-01 | End: 2023-01-01

## 2023-01-01 RX ORDER — NALOXONE HCL 0.4 MG/ML
0.4 VIAL (ML) INJECTION AS NEEDED
Status: DISCONTINUED | OUTPATIENT
Start: 2023-01-01 | End: 2023-01-01 | Stop reason: HOSPADM

## 2023-01-01 RX ORDER — FERROUS SULFATE 325(65) MG
325 TABLET ORAL
Status: DISCONTINUED | OUTPATIENT
Start: 2023-01-01 | End: 2023-01-01

## 2023-01-01 RX ORDER — HYDRALAZINE HYDROCHLORIDE 20 MG/ML
5 INJECTION INTRAMUSCULAR; INTRAVENOUS
Status: DISCONTINUED | OUTPATIENT
Start: 2023-01-01 | End: 2023-01-01 | Stop reason: HOSPADM

## 2023-01-01 RX ORDER — ACETAMINOPHEN 325 MG/1
650 TABLET ORAL EVERY 4 HOURS PRN
Status: DISCONTINUED | OUTPATIENT
Start: 2023-01-01 | End: 2023-01-01

## 2023-01-01 RX ORDER — DIPHENHYDRAMINE HCL 25 MG
25 CAPSULE ORAL NIGHTLY PRN
Status: DISCONTINUED | OUTPATIENT
Start: 2023-01-01 | End: 2023-01-01 | Stop reason: HOSPADM

## 2023-01-01 RX ORDER — HEPARIN SODIUM 1000 [USP'U]/ML
INJECTION, SOLUTION INTRAVENOUS; SUBCUTANEOUS
Status: COMPLETED
Start: 2023-01-01 | End: 2023-01-01

## 2023-01-01 RX ORDER — SODIUM CHLORIDE 9 MG/ML
3 INJECTION, SOLUTION INTRAVENOUS CONTINUOUS
Status: DISCONTINUED | OUTPATIENT
Start: 2023-01-01 | End: 2023-01-01

## 2023-01-01 RX ORDER — SODIUM CHLORIDE 9 MG/ML
9 INJECTION, SOLUTION INTRAVENOUS CONTINUOUS PRN
Status: DISCONTINUED | OUTPATIENT
Start: 2023-01-01 | End: 2023-01-01

## 2023-01-01 RX ORDER — CLOPIDOGREL BISULFATE 75 MG/1
75 TABLET ORAL DAILY
Status: DISCONTINUED | OUTPATIENT
Start: 2023-01-01 | End: 2023-01-01

## 2023-01-01 RX ORDER — CEFAZOLIN SODIUM 2 G/100ML
2 INJECTION, SOLUTION INTRAVENOUS EVERY 8 HOURS
Status: DISCONTINUED | OUTPATIENT
Start: 2023-01-01 | End: 2023-01-01

## 2023-01-01 RX ORDER — SODIUM CHLORIDE 9 MG/ML
40 INJECTION, SOLUTION INTRAVENOUS AS NEEDED
Status: DISCONTINUED | OUTPATIENT
Start: 2023-01-01 | End: 2023-01-01 | Stop reason: HOSPADM

## 2023-01-01 RX ORDER — TAMSULOSIN HYDROCHLORIDE 0.4 MG/1
0.4 CAPSULE ORAL DAILY
Status: DISCONTINUED | OUTPATIENT
Start: 2023-01-01 | End: 2023-01-01 | Stop reason: HOSPADM

## 2023-01-01 RX ORDER — FENTANYL CITRATE 50 UG/ML
50 INJECTION, SOLUTION INTRAMUSCULAR; INTRAVENOUS
Status: DISCONTINUED | OUTPATIENT
Start: 2023-01-01 | End: 2023-01-01 | Stop reason: HOSPADM

## 2023-01-01 RX ORDER — CHLORHEXIDINE GLUCONATE 500 MG/1
1 CLOTH TOPICAL EVERY 12 HOURS PRN
Status: CANCELLED | OUTPATIENT
Start: 2023-01-01

## 2023-01-01 RX ORDER — GLYCOPYRROLATE 0.2 MG/ML
0.4 INJECTION INTRAMUSCULAR; INTRAVENOUS EVERY 4 HOURS PRN
Status: DISCONTINUED | OUTPATIENT
Start: 2023-01-01 | End: 2023-01-01 | Stop reason: HOSPADM

## 2023-01-01 RX ORDER — LIDOCAINE HYDROCHLORIDE 10 MG/ML
INJECTION, SOLUTION EPIDURAL; INFILTRATION; INTRACAUDAL; PERINEURAL AS NEEDED
Status: DISCONTINUED | OUTPATIENT
Start: 2023-01-01 | End: 2023-01-01 | Stop reason: SURG

## 2023-01-01 RX ORDER — CLOPIDOGREL BISULFATE 75 MG/1
300 TABLET ORAL ONCE
Status: COMPLETED | OUTPATIENT
Start: 2023-01-01 | End: 2023-01-01

## 2023-01-01 RX ORDER — DROPERIDOL 2.5 MG/ML
0.62 INJECTION, SOLUTION INTRAMUSCULAR; INTRAVENOUS
Status: DISCONTINUED | OUTPATIENT
Start: 2023-01-01 | End: 2023-01-01 | Stop reason: HOSPADM

## 2023-01-01 RX ORDER — ONDANSETRON 2 MG/ML
4 INJECTION INTRAMUSCULAR; INTRAVENOUS ONCE AS NEEDED
Status: DISCONTINUED | OUTPATIENT
Start: 2023-01-01 | End: 2023-01-01 | Stop reason: HOSPADM

## 2023-01-01 RX ORDER — SODIUM CHLORIDE 9 MG/ML
INJECTION, SOLUTION INTRAVENOUS CONTINUOUS PRN
Status: DISCONTINUED | OUTPATIENT
Start: 2023-01-01 | End: 2023-01-01 | Stop reason: SURG

## 2023-01-01 RX ORDER — SODIUM CHLORIDE 0.9 % (FLUSH) 0.9 %
10 SYRINGE (ML) INJECTION AS NEEDED
Status: CANCELLED | OUTPATIENT
Start: 2023-01-01

## 2023-01-01 RX ORDER — ONDANSETRON 2 MG/ML
INJECTION INTRAMUSCULAR; INTRAVENOUS AS NEEDED
Status: DISCONTINUED | OUTPATIENT
Start: 2023-01-01 | End: 2023-01-01 | Stop reason: SURG

## 2023-01-01 RX ORDER — EPHEDRINE SULFATE 50 MG/ML
INJECTION INTRAVENOUS AS NEEDED
Status: DISCONTINUED | OUTPATIENT
Start: 2023-01-01 | End: 2023-01-01 | Stop reason: SURG

## 2023-01-01 RX ORDER — CALCIUM CHLORIDE, MAGNESIUM CHLORIDE, SODIUM CHLORIDE, SODIUM BICARBONATE, POTASSIUM CHLORIDE AND SODIUM PHOSPHATE DIBASIC DIHYDRATE 3.68; 3.05; 6.34; 3.09; .314; .187 G/L; G/L; G/L; G/L; G/L; G/L
1500 INJECTION INTRAVENOUS CONTINUOUS
Status: DISCONTINUED | OUTPATIENT
Start: 2023-01-01 | End: 2023-01-01

## 2023-01-01 RX ORDER — FENTANYL CITRATE 50 UG/ML
INJECTION, SOLUTION INTRAMUSCULAR; INTRAVENOUS
Status: DISPENSED
Start: 2023-01-01 | End: 2023-01-01

## 2023-01-01 RX ORDER — ALBUMIN, HUMAN INJ 5% 5 %
250 SOLUTION INTRAVENOUS ONCE
Status: COMPLETED | OUTPATIENT
Start: 2023-01-01 | End: 2023-01-01

## 2023-01-01 RX ORDER — SODIUM CHLORIDE 9 MG/ML
75 INJECTION, SOLUTION INTRAVENOUS CONTINUOUS
Status: ACTIVE | OUTPATIENT
Start: 2023-01-01 | End: 2023-01-01

## 2023-01-01 RX ORDER — LABETALOL HYDROCHLORIDE 5 MG/ML
5 INJECTION, SOLUTION INTRAVENOUS
Status: DISCONTINUED | OUTPATIENT
Start: 2023-01-01 | End: 2023-01-01 | Stop reason: HOSPADM

## 2023-01-01 RX ORDER — LIDOCAINE HYDROCHLORIDE 10 MG/ML
INJECTION, SOLUTION EPIDURAL; INFILTRATION; INTRACAUDAL; PERINEURAL
Status: DISCONTINUED | OUTPATIENT
Start: 2023-01-01 | End: 2023-01-01 | Stop reason: HOSPADM

## 2023-01-01 RX ORDER — SODIUM CHLORIDE 0.9 % (FLUSH) 0.9 %
3-10 SYRINGE (ML) INJECTION AS NEEDED
Status: DISCONTINUED | OUTPATIENT
Start: 2023-01-01 | End: 2023-01-01 | Stop reason: HOSPADM

## 2023-01-01 RX ORDER — AMOXICILLIN 250 MG
2 CAPSULE ORAL 2 TIMES DAILY
Status: DISCONTINUED | OUTPATIENT
Start: 2023-01-01 | End: 2023-01-01

## 2023-01-01 RX ORDER — PROPOFOL 10 MG/ML
VIAL (ML) INTRAVENOUS AS NEEDED
Status: DISCONTINUED | OUTPATIENT
Start: 2023-01-01 | End: 2023-01-01 | Stop reason: SURG

## 2023-01-01 RX ORDER — POLYETHYLENE GLYCOL 3350 17 G/17G
17 POWDER, FOR SOLUTION ORAL 2 TIMES DAILY WITH MEALS
Status: DISCONTINUED | OUTPATIENT
Start: 2023-01-01 | End: 2023-01-01

## 2023-01-01 RX ORDER — ONDANSETRON 2 MG/ML
4 INJECTION INTRAMUSCULAR; INTRAVENOUS EVERY 6 HOURS PRN
Status: DISCONTINUED | OUTPATIENT
Start: 2023-01-01 | End: 2023-01-01 | Stop reason: HOSPADM

## 2023-01-01 RX ORDER — PANTOPRAZOLE SODIUM 40 MG/1
40 TABLET, DELAYED RELEASE ORAL
Status: DISCONTINUED | OUTPATIENT
Start: 2023-01-01 | End: 2023-01-01

## 2023-01-01 RX ORDER — MIDODRINE HYDROCHLORIDE 5 MG/1
5 TABLET ORAL
Status: DISCONTINUED | OUTPATIENT
Start: 2023-01-01 | End: 2023-01-01

## 2023-01-01 RX ORDER — FENTANYL CITRATE 50 UG/ML
25 INJECTION, SOLUTION INTRAMUSCULAR; INTRAVENOUS
Status: DISCONTINUED | OUTPATIENT
Start: 2023-01-01 | End: 2023-01-01 | Stop reason: HOSPADM

## 2023-01-01 RX ORDER — ALBUMIN (HUMAN) 12.5 G/50ML
12.5 SOLUTION INTRAVENOUS ONCE
Status: COMPLETED | OUTPATIENT
Start: 2023-01-01 | End: 2023-01-01

## 2023-01-01 RX ORDER — SODIUM BICARBONATE 650 MG/1
1300 TABLET ORAL 2 TIMES DAILY
Status: DISCONTINUED | OUTPATIENT
Start: 2023-01-01 | End: 2023-01-01

## 2023-01-01 RX ORDER — NOREPINEPHRINE BIT/0.9 % NACL 8 MG/250ML
.02-.3 INFUSION BOTTLE (ML) INTRAVENOUS
Status: DISCONTINUED | OUTPATIENT
Start: 2023-01-01 | End: 2023-01-01

## 2023-01-01 RX ORDER — ESMOLOL HYDROCHLORIDE 10 MG/ML
INJECTION INTRAVENOUS AS NEEDED
Status: DISCONTINUED | OUTPATIENT
Start: 2023-01-01 | End: 2023-01-01 | Stop reason: SURG

## 2023-01-01 RX ORDER — METOPROLOL SUCCINATE 25 MG/1
12.5 TABLET, EXTENDED RELEASE ORAL DAILY
Status: DISCONTINUED | OUTPATIENT
Start: 2023-01-01 | End: 2023-01-01

## 2023-01-01 RX ORDER — MIDAZOLAM HYDROCHLORIDE 1 MG/ML
INJECTION INTRAMUSCULAR; INTRAVENOUS
Status: DISCONTINUED | OUTPATIENT
Start: 2023-01-01 | End: 2023-01-01 | Stop reason: HOSPADM

## 2023-01-01 RX ORDER — KETAMINE HCL IN NACL, ISO-OSM 100MG/10ML
SYRINGE (ML) INJECTION AS NEEDED
Status: DISCONTINUED | OUTPATIENT
Start: 2023-01-01 | End: 2023-01-01 | Stop reason: SURG

## 2023-01-01 RX ORDER — FENTANYL CITRATE 50 UG/ML
INJECTION, SOLUTION INTRAMUSCULAR; INTRAVENOUS
Status: DISCONTINUED | OUTPATIENT
Start: 2023-01-01 | End: 2023-01-01 | Stop reason: HOSPADM

## 2023-01-01 RX ORDER — HEPARIN SODIUM 10000 [USP'U]/100ML
12 INJECTION, SOLUTION INTRAVENOUS
Status: DISCONTINUED | OUTPATIENT
Start: 2023-01-01 | End: 2023-01-01

## 2023-01-01 RX ORDER — SODIUM CHLORIDE 9 MG/ML
40 INJECTION, SOLUTION INTRAVENOUS AS NEEDED
Status: CANCELLED | OUTPATIENT
Start: 2023-01-01

## 2023-01-01 RX ORDER — ASPIRIN 81 MG/1
324 TABLET, CHEWABLE ORAL ONCE
Status: COMPLETED | OUTPATIENT
Start: 2023-01-01 | End: 2023-01-01

## 2023-01-01 RX ORDER — SODIUM CHLORIDE 0.9 % (FLUSH) 0.9 %
3 SYRINGE (ML) INJECTION EVERY 12 HOURS SCHEDULED
Status: DISCONTINUED | OUTPATIENT
Start: 2023-01-01 | End: 2023-01-01 | Stop reason: HOSPADM

## 2023-01-01 RX ORDER — DIGOXIN 0.25 MG/ML
125 INJECTION INTRAMUSCULAR; INTRAVENOUS ONCE
Status: COMPLETED | OUTPATIENT
Start: 2023-01-01 | End: 2023-01-01

## 2023-01-01 RX ORDER — HEPARIN SODIUM 5000 [USP'U]/ML
5000 INJECTION, SOLUTION INTRAVENOUS; SUBCUTANEOUS EVERY 12 HOURS SCHEDULED
Status: DISCONTINUED | OUTPATIENT
Start: 2023-01-01 | End: 2023-01-01

## 2023-01-01 RX ORDER — LORAZEPAM 2 MG/ML
0.5 INJECTION INTRAMUSCULAR EVERY 4 HOURS PRN
Status: DISCONTINUED | OUTPATIENT
Start: 2023-01-01 | End: 2023-01-01 | Stop reason: HOSPADM

## 2023-01-01 RX ORDER — ONDANSETRON 4 MG/1
4 TABLET, FILM COATED ORAL EVERY 6 HOURS PRN
Status: DISCONTINUED | OUTPATIENT
Start: 2023-01-01 | End: 2023-01-01

## 2023-01-01 RX ORDER — ACETAMINOPHEN 325 MG/1
650 TABLET ORAL EVERY 4 HOURS PRN
Status: DISCONTINUED | OUTPATIENT
Start: 2023-01-01 | End: 2023-01-01 | Stop reason: HOSPADM

## 2023-01-01 RX ORDER — BISACODYL 10 MG
10 SUPPOSITORY, RECTAL RECTAL DAILY PRN
Status: DISCONTINUED | OUTPATIENT
Start: 2023-01-01 | End: 2023-01-01

## 2023-01-01 RX ORDER — DOCUSATE SODIUM 100 MG/1
100 CAPSULE, LIQUID FILLED ORAL 2 TIMES DAILY PRN
Status: DISCONTINUED | OUTPATIENT
Start: 2023-01-01 | End: 2023-01-01

## 2023-01-01 RX ORDER — SODIUM CHLORIDE 450 MG/100ML
50 INJECTION, SOLUTION INTRAVENOUS CONTINUOUS
Status: DISCONTINUED | OUTPATIENT
Start: 2023-01-01 | End: 2023-01-01

## 2023-01-01 RX ORDER — CEFAZOLIN SODIUM 2 G/100ML
2 INJECTION, SOLUTION INTRAVENOUS ONCE
Status: CANCELLED | OUTPATIENT
Start: 2023-01-01 | End: 2023-01-01

## 2023-01-01 RX ORDER — ASPIRIN 81 MG/1
324 TABLET, CHEWABLE ORAL ONCE
Status: CANCELLED | OUTPATIENT
Start: 2023-01-01 | End: 2023-01-01

## 2023-01-01 RX ORDER — METRONIDAZOLE 500 MG/1
500 TABLET ORAL EVERY 8 HOURS SCHEDULED
Status: DISCONTINUED | OUTPATIENT
Start: 2023-01-01 | End: 2023-01-01

## 2023-01-01 RX ORDER — CEFAZOLIN SODIUM 2 G/100ML
2 INJECTION, SOLUTION INTRAVENOUS ONCE
Status: COMPLETED | OUTPATIENT
Start: 2023-01-01 | End: 2023-01-01

## 2023-01-01 RX ORDER — FAMOTIDINE 20 MG/1
20 TABLET, FILM COATED ORAL
Status: COMPLETED | OUTPATIENT
Start: 2023-01-01 | End: 2023-01-01

## 2023-01-01 RX ORDER — VASOPRESSIN 20 U/ML
INJECTION PARENTERAL AS NEEDED
Status: DISCONTINUED | OUTPATIENT
Start: 2023-01-01 | End: 2023-01-01 | Stop reason: SURG

## 2023-01-01 RX ORDER — SODIUM CHLORIDE 0.9 % (FLUSH) 0.9 %
10 SYRINGE (ML) INJECTION EVERY 12 HOURS SCHEDULED
Status: CANCELLED | OUTPATIENT
Start: 2023-01-01

## 2023-01-01 RX ORDER — PROMETHAZINE HYDROCHLORIDE 25 MG/1
25 TABLET ORAL ONCE AS NEEDED
Status: DISCONTINUED | OUTPATIENT
Start: 2023-01-01 | End: 2023-01-01 | Stop reason: HOSPADM

## 2023-01-01 RX ORDER — METRONIDAZOLE 500 MG/100ML
500 INJECTION, SOLUTION INTRAVENOUS EVERY 8 HOURS
Status: DISCONTINUED | OUTPATIENT
Start: 2023-01-01 | End: 2023-01-01

## 2023-01-01 RX ORDER — HYDROCODONE BITARTRATE AND ACETAMINOPHEN 5; 325 MG/1; MG/1
1 TABLET ORAL ONCE AS NEEDED
Status: DISCONTINUED | OUTPATIENT
Start: 2023-01-01 | End: 2023-01-01 | Stop reason: HOSPADM

## 2023-01-01 RX ORDER — SODIUM CHLORIDE 9 MG/ML
100 INJECTION, SOLUTION INTRAVENOUS CONTINUOUS
Status: ACTIVE | OUTPATIENT
Start: 2023-01-01 | End: 2023-01-01

## 2023-01-01 RX ORDER — ASPIRIN 81 MG/1
81 TABLET ORAL DAILY
Status: CANCELLED | OUTPATIENT
Start: 2023-01-01

## 2023-01-01 RX ORDER — ALBUMIN (HUMAN) 12.5 G/50ML
12.5 SOLUTION INTRAVENOUS AS NEEDED
Status: DISCONTINUED | OUTPATIENT
Start: 2023-01-01 | End: 2023-01-01

## 2023-01-01 RX ORDER — FAMOTIDINE 10 MG/ML
20 INJECTION, SOLUTION INTRAVENOUS
Status: COMPLETED | OUTPATIENT
Start: 2023-01-01 | End: 2023-01-01

## 2023-01-01 RX ORDER — MIDODRINE HYDROCHLORIDE 10 MG/1
7.5 TABLET ORAL
Status: DISCONTINUED | OUTPATIENT
Start: 2023-01-01 | End: 2023-01-01

## 2023-01-01 RX ORDER — POLYETHYLENE GLYCOL 3350 17 G/17G
17 POWDER, FOR SOLUTION ORAL 2 TIMES DAILY
Status: DISCONTINUED | OUTPATIENT
Start: 2023-01-01 | End: 2023-01-01

## 2023-01-01 RX ORDER — MIDODRINE HYDROCHLORIDE 5 MG/1
2.5 TABLET ORAL ONCE
Status: COMPLETED | OUTPATIENT
Start: 2023-01-01 | End: 2023-01-01

## 2023-01-01 RX ORDER — PROMETHAZINE HYDROCHLORIDE 25 MG/1
25 SUPPOSITORY RECTAL ONCE AS NEEDED
Status: DISCONTINUED | OUTPATIENT
Start: 2023-01-01 | End: 2023-01-01 | Stop reason: HOSPADM

## 2023-01-01 RX ORDER — ATORVASTATIN CALCIUM 10 MG/1
10 TABLET, FILM COATED ORAL NIGHTLY
Status: DISCONTINUED | OUTPATIENT
Start: 2023-01-01 | End: 2023-01-01 | Stop reason: HOSPADM

## 2023-01-01 RX ORDER — DIPHENHYDRAMINE HCL 25 MG
25 CAPSULE ORAL NIGHTLY PRN
Status: DISCONTINUED | OUTPATIENT
Start: 2023-01-01 | End: 2023-01-01

## 2023-01-01 RX ORDER — HYDROMORPHONE HYDROCHLORIDE 1 MG/ML
0.5 INJECTION, SOLUTION INTRAMUSCULAR; INTRAVENOUS; SUBCUTANEOUS
Status: DISCONTINUED | OUTPATIENT
Start: 2023-01-01 | End: 2023-01-01 | Stop reason: HOSPADM

## 2023-01-01 RX ORDER — CHLORHEXIDINE GLUCONATE 500 MG/1
1 CLOTH TOPICAL EVERY 12 HOURS PRN
Status: DISCONTINUED | OUTPATIENT
Start: 2023-01-01 | End: 2023-01-01

## 2023-01-01 RX ORDER — FENTANYL CITRATE 50 UG/ML
INJECTION, SOLUTION INTRAMUSCULAR; INTRAVENOUS AS NEEDED
Status: DISCONTINUED | OUTPATIENT
Start: 2023-01-01 | End: 2023-01-01 | Stop reason: SURG

## 2023-01-01 RX ORDER — BISACODYL 5 MG/1
5 TABLET, DELAYED RELEASE ORAL DAILY PRN
Status: DISCONTINUED | OUTPATIENT
Start: 2023-01-01 | End: 2023-01-01

## 2023-01-01 RX ORDER — NITROGLYCERIN 0.4 MG/1
0.4 TABLET SUBLINGUAL
Status: DISCONTINUED | OUTPATIENT
Start: 2023-01-01 | End: 2023-01-01 | Stop reason: HOSPADM

## 2023-01-01 RX ORDER — INSULIN LISPRO 100 [IU]/ML
0-9 INJECTION, SOLUTION INTRAVENOUS; SUBCUTANEOUS
Status: DISCONTINUED | OUTPATIENT
Start: 2023-01-01 | End: 2023-01-01

## 2023-01-01 RX ORDER — FENTANYL CITRATE 50 UG/ML
INJECTION, SOLUTION INTRAMUSCULAR; INTRAVENOUS AS NEEDED
Status: COMPLETED | OUTPATIENT
Start: 2023-01-01 | End: 2023-01-01

## 2023-01-01 RX ORDER — HYDROMORPHONE HYDROCHLORIDE 1 MG/ML
0.5 INJECTION, SOLUTION INTRAMUSCULAR; INTRAVENOUS; SUBCUTANEOUS
Status: DISPENSED | OUTPATIENT
Start: 2023-01-01 | End: 2023-01-01

## 2023-01-01 RX ORDER — POLYETHYLENE GLYCOL 3350 17 G/17G
17 POWDER, FOR SOLUTION ORAL DAILY
Status: DISCONTINUED | OUTPATIENT
Start: 2023-01-01 | End: 2023-01-01

## 2023-01-01 RX ORDER — MIDAZOLAM HYDROCHLORIDE 1 MG/ML
0.5 INJECTION INTRAMUSCULAR; INTRAVENOUS
Status: DISCONTINUED | OUTPATIENT
Start: 2023-01-01 | End: 2023-01-01 | Stop reason: HOSPADM

## 2023-01-01 RX ORDER — DROPERIDOL 2.5 MG/ML
0.62 INJECTION, SOLUTION INTRAMUSCULAR; INTRAVENOUS ONCE AS NEEDED
Status: DISCONTINUED | OUTPATIENT
Start: 2023-01-01 | End: 2023-01-01 | Stop reason: HOSPADM

## 2023-01-01 RX ORDER — ATORVASTATIN CALCIUM 10 MG/1
10 TABLET, FILM COATED ORAL NIGHTLY
Status: DISCONTINUED | OUTPATIENT
Start: 2023-01-01 | End: 2023-01-01

## 2023-01-01 RX ADMIN — PANTOPRAZOLE SODIUM 40 MG: 40 TABLET, DELAYED RELEASE ORAL at 06:48

## 2023-01-01 RX ADMIN — HEPARIN SODIUM 5000 UNITS: 5000 INJECTION INTRAVENOUS; SUBCUTANEOUS at 08:32

## 2023-01-01 RX ADMIN — METOPROLOL SUCCINATE 12.5 MG: 25 TABLET, EXTENDED RELEASE ORAL at 08:44

## 2023-01-01 RX ADMIN — TAMSULOSIN HYDROCHLORIDE 0.4 MG: 0.4 CAPSULE ORAL at 08:14

## 2023-01-01 RX ADMIN — ALBUMIN (HUMAN) 250 ML: 12.5 INJECTION, SOLUTION INTRAVENOUS at 01:46

## 2023-01-01 RX ADMIN — ROCURONIUM BROMIDE 50 MG: 10 INJECTION INTRAVENOUS at 14:40

## 2023-01-01 RX ADMIN — EPHEDRINE SULFATE 15 MG: 50 INJECTION INTRAVENOUS at 14:09

## 2023-01-01 RX ADMIN — HYDROCORTISONE SODIUM SUCCINATE 50 MG: 100 INJECTION, POWDER, FOR SOLUTION INTRAMUSCULAR; INTRAVENOUS at 00:44

## 2023-01-01 RX ADMIN — SODIUM CHLORIDE, POTASSIUM CHLORIDE, SODIUM LACTATE AND CALCIUM CHLORIDE 1000 ML: 600; 310; 30; 20 INJECTION, SOLUTION INTRAVENOUS at 14:09

## 2023-01-01 RX ADMIN — Medication 1 PATCH: at 12:00

## 2023-01-01 RX ADMIN — HYDROMORPHONE HYDROCHLORIDE 0.5 MG: 1 INJECTION, SOLUTION INTRAMUSCULAR; INTRAVENOUS; SUBCUTANEOUS at 11:06

## 2023-01-01 RX ADMIN — DAPTOMYCIN 450 MG: 500 INJECTION, POWDER, LYOPHILIZED, FOR SOLUTION INTRAVENOUS at 13:20

## 2023-01-01 RX ADMIN — HYDROCORTISONE SODIUM SUCCINATE 50 MG: 100 INJECTION, POWDER, FOR SOLUTION INTRAMUSCULAR; INTRAVENOUS at 07:16

## 2023-01-01 RX ADMIN — CALCIUM CHLORIDE, MAGNESIUM CHLORIDE, SODIUM CHLORIDE, SODIUM BICARBONATE, POTASSIUM CHLORIDE AND SODIUM PHOSPHATE DIBASIC DIHYDRATE 1500 ML/HR: 3.68; 3.05; 6.34; 3.09; .314; .187 INJECTION INTRAVENOUS at 17:11

## 2023-01-01 RX ADMIN — TAMSULOSIN HYDROCHLORIDE 0.4 MG: 0.4 CAPSULE ORAL at 08:45

## 2023-01-01 RX ADMIN — POLYETHYLENE GLYCOL 3350 17 G: 17 POWDER, FOR SOLUTION ORAL at 08:32

## 2023-01-01 RX ADMIN — ASPIRIN 81 MG 81 MG: 81 TABLET ORAL at 09:15

## 2023-01-01 RX ADMIN — DAPTOMYCIN 450 MG: 500 INJECTION, POWDER, LYOPHILIZED, FOR SOLUTION INTRAVENOUS at 11:08

## 2023-01-01 RX ADMIN — DEXAMETHASONE SODIUM PHOSPHATE 4 MG: 4 INJECTION, SOLUTION INTRAMUSCULAR; INTRAVENOUS at 13:20

## 2023-01-01 RX ADMIN — Medication 200 MCG: at 13:25

## 2023-01-01 RX ADMIN — FAMOTIDINE 20 MG: 20 TABLET ORAL at 13:13

## 2023-01-01 RX ADMIN — SENNOSIDES AND DOCUSATE SODIUM 2 TABLET: 8.6; 5 TABLET ORAL at 08:46

## 2023-01-01 RX ADMIN — LIDOCAINE HYDROCHLORIDE 8 ML: 10; .005 INJECTION, SOLUTION EPIDURAL; INFILTRATION; INTRACAUDAL; PERINEURAL at 15:42

## 2023-01-01 RX ADMIN — CALCIUM CHLORIDE, MAGNESIUM CHLORIDE, SODIUM CHLORIDE, SODIUM BICARBONATE, POTASSIUM CHLORIDE AND SODIUM PHOSPHATE DIBASIC DIHYDRATE 1500 ML/HR: 3.68; 3.05; 6.34; 3.09; .314; .187 INJECTION INTRAVENOUS at 23:55

## 2023-01-01 RX ADMIN — CEFAZOLIN SODIUM 2 G: 2 INJECTION, SOLUTION INTRAVENOUS at 22:07

## 2023-01-01 RX ADMIN — LIDOCAINE HYDROCHLORIDE 50 MG: 10 INJECTION, SOLUTION EPIDURAL; INFILTRATION; INTRACAUDAL; PERINEURAL at 13:20

## 2023-01-01 RX ADMIN — IPRATROPIUM BROMIDE 0.5 MG: 0.5 SOLUTION RESPIRATORY (INHALATION) at 06:21

## 2023-01-01 RX ADMIN — HEPARIN SODIUM 5000 UNITS: 5000 INJECTION INTRAVENOUS; SUBCUTANEOUS at 21:01

## 2023-01-01 RX ADMIN — CALCIUM CHLORIDE, MAGNESIUM CHLORIDE, SODIUM CHLORIDE, SODIUM BICARBONATE, POTASSIUM CHLORIDE AND SODIUM PHOSPHATE DIBASIC DIHYDRATE 1500 ML/HR: 3.68; 3.05; 6.34; 3.09; .314; .187 INJECTION INTRAVENOUS at 09:30

## 2023-01-01 RX ADMIN — LIDOCAINE HYDROCHLORIDE 50 MG: 10 INJECTION, SOLUTION EPIDURAL; INFILTRATION; INTRACAUDAL; PERINEURAL at 14:40

## 2023-01-01 RX ADMIN — ANTICOAGULANT CITRATE DEXTROSE SOLUTION FORMULA A 2 ML: 12.25; 11; 3.65 SOLUTION INTRAVENOUS at 14:08

## 2023-01-01 RX ADMIN — BISACODYL 5 MG: 5 TABLET, COATED ORAL at 16:27

## 2023-01-01 RX ADMIN — FENTANYL CITRATE 25 MCG: 50 INJECTION, SOLUTION INTRAMUSCULAR; INTRAVENOUS at 15:22

## 2023-01-01 RX ADMIN — LORAZEPAM 0.5 MG: 2 INJECTION INTRAMUSCULAR; INTRAVENOUS at 11:01

## 2023-01-01 RX ADMIN — Medication 100 MCG: at 14:45

## 2023-01-01 RX ADMIN — TAZOBACTAM SODIUM AND PIPERACILLIN SODIUM 3.38 G: 375; 3 INJECTION, SOLUTION INTRAVENOUS at 00:27

## 2023-01-01 RX ADMIN — ONDANSETRON 4 MG: 2 INJECTION INTRAMUSCULAR; INTRAVENOUS at 14:21

## 2023-01-01 RX ADMIN — SENNOSIDES AND DOCUSATE SODIUM 2 TABLET: 8.6; 5 TABLET ORAL at 16:21

## 2023-01-01 RX ADMIN — PHENYLEPHRINE HYDROCHLORIDE 1 MCG/KG/MIN: 10 INJECTION INTRAVENOUS at 14:47

## 2023-01-01 RX ADMIN — DULAGLUTIDE 1.5 MG: 1.5 INJECTION, SOLUTION SUBCUTANEOUS at 12:08

## 2023-01-01 RX ADMIN — CEFEPIME 2 G: 2 INJECTION, POWDER, FOR SOLUTION INTRAVENOUS at 00:10

## 2023-01-01 RX ADMIN — Medication 30 MG: at 13:45

## 2023-01-01 RX ADMIN — ATORVASTATIN CALCIUM 10 MG: 10 TABLET, FILM COATED ORAL at 20:45

## 2023-01-01 RX ADMIN — HEPARIN SODIUM 5000 UNITS: 5000 INJECTION INTRAVENOUS; SUBCUTANEOUS at 08:28

## 2023-01-01 RX ADMIN — CLOPIDOGREL BISULFATE 75 MG: 75 TABLET ORAL at 09:14

## 2023-01-01 RX ADMIN — SENNOSIDES AND DOCUSATE SODIUM 2 TABLET: 8.6; 5 TABLET ORAL at 08:14

## 2023-01-01 RX ADMIN — TAZOBACTAM SODIUM AND PIPERACILLIN SODIUM 3.38 G: 375; 3 INJECTION, SOLUTION INTRAVENOUS at 15:01

## 2023-01-01 RX ADMIN — MIDODRINE HYDROCHLORIDE 2.5 MG: 5 TABLET ORAL at 09:40

## 2023-01-01 RX ADMIN — EPHEDRINE SULFATE 10 MG: 50 INJECTION INTRAVENOUS at 13:28

## 2023-01-01 RX ADMIN — ESMOLOL HYDROCHLORIDE 15 MG: 10 INJECTION, SOLUTION INTRAVENOUS at 13:27

## 2023-01-01 RX ADMIN — METOPROLOL SUCCINATE 12.5 MG: 25 TABLET, EXTENDED RELEASE ORAL at 09:15

## 2023-01-01 RX ADMIN — ASPIRIN 81 MG 81 MG: 81 TABLET ORAL at 08:28

## 2023-01-01 RX ADMIN — METOPROLOL SUCCINATE 12.5 MG: 25 TABLET, EXTENDED RELEASE ORAL at 08:45

## 2023-01-01 RX ADMIN — FAMOTIDINE 20 MG: 10 INJECTION INTRAVENOUS at 12:52

## 2023-01-01 RX ADMIN — ALBUMIN (HUMAN) 12.5 G: 0.25 INJECTION, SOLUTION INTRAVENOUS at 13:45

## 2023-01-01 RX ADMIN — HEPARIN SODIUM 5000 UNITS: 5000 INJECTION INTRAVENOUS; SUBCUTANEOUS at 09:16

## 2023-01-01 RX ADMIN — METRONIDAZOLE 500 MG: 500 TABLET ORAL at 07:16

## 2023-01-01 RX ADMIN — CEFEPIME 2 G: 2 INJECTION, POWDER, FOR SOLUTION INTRAVENOUS at 11:23

## 2023-01-01 RX ADMIN — ANTICOAGULANT CITRATE DEXTROSE SOLUTION FORMULA A 2 ML: 12.25; 11; 3.65 SOLUTION INTRAVENOUS at 07:29

## 2023-01-01 RX ADMIN — HYDROCORTISONE SODIUM SUCCINATE 50 MG: 100 INJECTION, POWDER, FOR SOLUTION INTRAMUSCULAR; INTRAVENOUS at 11:40

## 2023-01-01 RX ADMIN — CLOPIDOGREL BISULFATE 75 MG: 75 TABLET ORAL at 08:14

## 2023-01-01 RX ADMIN — SODIUM CHLORIDE 500 ML: 9 INJECTION, SOLUTION INTRAVENOUS at 01:33

## 2023-01-01 RX ADMIN — SENNOSIDES AND DOCUSATE SODIUM 2 TABLET: 8.6; 5 TABLET ORAL at 21:01

## 2023-01-01 RX ADMIN — SUGAMMADEX 200 MG: 100 INJECTION, SOLUTION INTRAVENOUS at 14:07

## 2023-01-01 RX ADMIN — TAZOBACTAM SODIUM AND PIPERACILLIN SODIUM 3.38 G: 375; 3 INJECTION, SOLUTION INTRAVENOUS at 14:45

## 2023-01-01 RX ADMIN — HEPARIN SODIUM 4.1 UNITS: 1000 INJECTION, SOLUTION INTRAVENOUS; SUBCUTANEOUS at 15:41

## 2023-01-01 RX ADMIN — Medication: at 12:00

## 2023-01-01 RX ADMIN — IPRATROPIUM BROMIDE 0.5 MG: 0.5 SOLUTION RESPIRATORY (INHALATION) at 01:59

## 2023-01-01 RX ADMIN — ASPIRIN 81 MG 81 MG: 81 TABLET ORAL at 09:30

## 2023-01-01 RX ADMIN — MIDODRINE HYDROCHLORIDE 10 MG: 10 TABLET ORAL at 17:22

## 2023-01-01 RX ADMIN — CALCIUM CHLORIDE, MAGNESIUM CHLORIDE, SODIUM CHLORIDE, SODIUM BICARBONATE, POTASSIUM CHLORIDE AND SODIUM PHOSPHATE DIBASIC DIHYDRATE 1500 ML/HR: 3.68; 3.05; 6.34; 3.09; .314; .187 INJECTION INTRAVENOUS at 20:28

## 2023-01-01 RX ADMIN — HYDROMORPHONE HYDROCHLORIDE 0.5 MG: 1 INJECTION, SOLUTION INTRAMUSCULAR; INTRAVENOUS; SUBCUTANEOUS at 12:29

## 2023-01-01 RX ADMIN — FENTANYL CITRATE 50 MCG: 50 INJECTION, SOLUTION INTRAMUSCULAR; INTRAVENOUS at 13:20

## 2023-01-01 RX ADMIN — HEPARIN SODIUM 5000 UNITS: 5000 INJECTION INTRAVENOUS; SUBCUTANEOUS at 20:37

## 2023-01-01 RX ADMIN — FENTANYL CITRATE 50 MCG: 50 INJECTION, SOLUTION INTRAMUSCULAR; INTRAVENOUS at 13:54

## 2023-01-01 RX ADMIN — METRONIDAZOLE 500 MG: 500 TABLET ORAL at 13:12

## 2023-01-01 RX ADMIN — METOPROLOL SUCCINATE 12.5 MG: 25 TABLET, EXTENDED RELEASE ORAL at 17:05

## 2023-01-01 RX ADMIN — SUGAMMADEX 200 MG: 100 INJECTION, SOLUTION INTRAVENOUS at 16:47

## 2023-01-01 RX ADMIN — CEFEPIME 2 G: 2 INJECTION, POWDER, FOR SOLUTION INTRAVENOUS at 17:23

## 2023-01-01 RX ADMIN — DAPTOMYCIN 450 MG: 500 INJECTION, POWDER, LYOPHILIZED, FOR SOLUTION INTRAVENOUS at 12:13

## 2023-01-01 RX ADMIN — IPRATROPIUM BROMIDE 0.5 MG: 0.5 SOLUTION RESPIRATORY (INHALATION) at 17:50

## 2023-01-01 RX ADMIN — DIPHENHYDRAMINE HYDROCHLORIDE 25 MG: 25 CAPSULE ORAL at 22:03

## 2023-01-01 RX ADMIN — MIDODRINE HYDROCHLORIDE 7.5 MG: 10 TABLET ORAL at 08:33

## 2023-01-01 RX ADMIN — CALCIUM CHLORIDE, MAGNESIUM CHLORIDE, SODIUM CHLORIDE, SODIUM BICARBONATE, POTASSIUM CHLORIDE AND SODIUM PHOSPHATE DIBASIC DIHYDRATE 1500 ML/HR: 3.68; 3.05; 6.34; 3.09; .314; .187 INJECTION INTRAVENOUS at 03:18

## 2023-01-01 RX ADMIN — ATORVASTATIN CALCIUM 10 MG: 10 TABLET, FILM COATED ORAL at 20:59

## 2023-01-01 RX ADMIN — EPHEDRINE SULFATE 15 MG: 50 INJECTION INTRAVENOUS at 13:27

## 2023-01-01 RX ADMIN — CEFAZOLIN SODIUM 2 G: 2 INJECTION, SOLUTION INTRAVENOUS at 14:42

## 2023-01-01 RX ADMIN — PSYLLIUM HUSK 1 PACKET: 3.4 POWDER ORAL at 09:16

## 2023-01-01 RX ADMIN — IPRATROPIUM BROMIDE 0.5 MG: 0.5 SOLUTION RESPIRATORY (INHALATION) at 06:19

## 2023-01-01 RX ADMIN — SODIUM BICARBONATE 150 MEQ: 84 INJECTION INTRAVENOUS at 21:15

## 2023-01-01 RX ADMIN — ROCURONIUM BROMIDE 50 MG: 10 INJECTION INTRAVENOUS at 13:21

## 2023-01-01 RX ADMIN — PIPERACILLIN AND TAZOBACTAM 2.25 G: 2; .25 INJECTION, POWDER, LYOPHILIZED, FOR SOLUTION INTRAVENOUS at 18:37

## 2023-01-01 RX ADMIN — SODIUM CHLORIDE 40 ML: 9 INJECTION, SOLUTION INTRAVENOUS at 14:34

## 2023-01-01 RX ADMIN — SODIUM CHLORIDE: 9 INJECTION, SOLUTION INTRAVENOUS at 13:19

## 2023-01-01 RX ADMIN — IPRATROPIUM BROMIDE 0.5 MG: 0.5 SOLUTION RESPIRATORY (INHALATION) at 11:54

## 2023-01-01 RX ADMIN — POLYETHYLENE GLYCOL 3350 17 G: 17 POWDER, FOR SOLUTION ORAL at 08:45

## 2023-01-01 RX ADMIN — HEPARIN SODIUM 5000 UNITS: 5000 INJECTION INTRAVENOUS; SUBCUTANEOUS at 20:55

## 2023-01-01 RX ADMIN — TAZOBACTAM SODIUM AND PIPERACILLIN SODIUM 3.38 G: 375; 3 INJECTION, SOLUTION INTRAVENOUS at 23:20

## 2023-01-01 RX ADMIN — PROPOFOL 100 MG: 10 INJECTION, EMULSION INTRAVENOUS at 14:40

## 2023-01-01 RX ADMIN — POLYETHYLENE GLYCOL 3350 17 G: 17 POWDER, FOR SOLUTION ORAL at 08:15

## 2023-01-01 RX ADMIN — ATORVASTATIN CALCIUM 10 MG: 10 TABLET, FILM COATED ORAL at 20:37

## 2023-01-01 RX ADMIN — PANTOPRAZOLE SODIUM 40 MG: 40 TABLET, DELAYED RELEASE ORAL at 07:16

## 2023-01-01 RX ADMIN — HEPARIN SODIUM 5000 UNITS: 5000 INJECTION INTRAVENOUS; SUBCUTANEOUS at 08:15

## 2023-01-01 RX ADMIN — ASPIRIN 81 MG 243 MG: 81 TABLET ORAL at 12:13

## 2023-01-01 RX ADMIN — ALBUMIN (HUMAN) 50 G: 0.25 INJECTION, SOLUTION INTRAVENOUS at 09:14

## 2023-01-01 RX ADMIN — HYDROCORTISONE SODIUM SUCCINATE 50 MG: 100 INJECTION, POWDER, FOR SOLUTION INTRAMUSCULAR; INTRAVENOUS at 23:04

## 2023-01-01 RX ADMIN — SENNOSIDES AND DOCUSATE SODIUM 2 TABLET: 8.6; 5 TABLET ORAL at 08:33

## 2023-01-01 RX ADMIN — Medication 200 MCG: at 16:49

## 2023-01-01 RX ADMIN — IPRATROPIUM BROMIDE 0.5 MG: 0.5 SOLUTION RESPIRATORY (INHALATION) at 12:08

## 2023-01-01 RX ADMIN — TAMSULOSIN HYDROCHLORIDE 0.4 MG: 0.4 CAPSULE ORAL at 08:28

## 2023-01-01 RX ADMIN — IPRATROPIUM BROMIDE 0.5 MG: 0.5 SOLUTION RESPIRATORY (INHALATION) at 19:36

## 2023-01-01 RX ADMIN — HYDROMORPHONE HYDROCHLORIDE 0.5 MG: 1 INJECTION, SOLUTION INTRAMUSCULAR; INTRAVENOUS; SUBCUTANEOUS at 21:00

## 2023-01-01 RX ADMIN — CLOPIDOGREL BISULFATE 75 MG: 75 TABLET ORAL at 08:28

## 2023-01-01 RX ADMIN — BISACODYL 10 MG: 10 SUPPOSITORY RECTAL at 05:35

## 2023-01-01 RX ADMIN — IPRATROPIUM BROMIDE 0.5 MG: 0.5 SOLUTION RESPIRATORY (INHALATION) at 17:37

## 2023-01-01 RX ADMIN — METRONIDAZOLE 500 MG: 500 INJECTION, SOLUTION INTRAVENOUS at 16:24

## 2023-01-01 RX ADMIN — SODIUM CHLORIDE 50 ML/HR: 4.5 INJECTION, SOLUTION INTRAVENOUS at 20:56

## 2023-01-01 RX ADMIN — SENNOSIDES AND DOCUSATE SODIUM 2 TABLET: 8.6; 5 TABLET ORAL at 22:02

## 2023-01-01 RX ADMIN — IPRATROPIUM BROMIDE 0.5 MG: 0.5 SOLUTION RESPIRATORY (INHALATION) at 00:46

## 2023-01-01 RX ADMIN — GLYCOPYRROLATE 0.4 MG: 0.2 INJECTION INTRAMUSCULAR; INTRAVENOUS at 15:59

## 2023-01-01 RX ADMIN — CLOPIDOGREL BISULFATE 300 MG: 75 TABLET ORAL at 20:55

## 2023-01-01 RX ADMIN — CLOPIDOGREL BISULFATE 75 MG: 75 TABLET ORAL at 08:46

## 2023-01-01 RX ADMIN — HEPARIN SODIUM 5000 UNITS: 5000 INJECTION INTRAVENOUS; SUBCUTANEOUS at 21:21

## 2023-01-01 RX ADMIN — ESMOLOL HYDROCHLORIDE 20 MG: 10 INJECTION, SOLUTION INTRAVENOUS at 13:58

## 2023-01-01 RX ADMIN — SENNOSIDES AND DOCUSATE SODIUM 2 TABLET: 8.6; 5 TABLET ORAL at 20:59

## 2023-01-01 RX ADMIN — SODIUM BICARBONATE 150 MEQ: 84 INJECTION INTRAVENOUS at 01:43

## 2023-01-01 RX ADMIN — Medication 200 MCG: at 13:26

## 2023-01-01 RX ADMIN — PHYTONADIONE 10 MG: 10 INJECTION, EMULSION INTRAMUSCULAR; INTRAVENOUS; SUBCUTANEOUS at 15:16

## 2023-01-01 RX ADMIN — TAZOBACTAM SODIUM AND PIPERACILLIN SODIUM 3.38 G: 375; 3 INJECTION, SOLUTION INTRAVENOUS at 09:12

## 2023-01-01 RX ADMIN — FERROUS SULFATE TAB 325 MG (65 MG ELEMENTAL FE) 325 MG: 325 (65 FE) TAB at 16:21

## 2023-01-01 RX ADMIN — DEXAMETHASONE SODIUM PHOSPHATE 4 MG: 4 INJECTION, SOLUTION INTRAMUSCULAR; INTRAVENOUS at 14:57

## 2023-01-01 RX ADMIN — HYDROMORPHONE HYDROCHLORIDE 0.5 MG: 1 INJECTION, SOLUTION INTRAMUSCULAR; INTRAVENOUS; SUBCUTANEOUS at 17:58

## 2023-01-01 RX ADMIN — ASPIRIN 81 MG: 81 TABLET, COATED ORAL at 08:44

## 2023-01-01 RX ADMIN — FERROUS SULFATE TAB 325 MG (65 MG ELEMENTAL FE) 325 MG: 325 (65 FE) TAB at 08:15

## 2023-01-01 RX ADMIN — TAMSULOSIN HYDROCHLORIDE 0.4 MG: 0.4 CAPSULE ORAL at 09:14

## 2023-01-01 RX ADMIN — FERROUS SULFATE TAB 325 MG (65 MG ELEMENTAL FE) 325 MG: 325 (65 FE) TAB at 08:33

## 2023-01-01 RX ADMIN — TAZOBACTAM SODIUM AND PIPERACILLIN SODIUM 3.38 G: 375; 3 INJECTION, SOLUTION INTRAVENOUS at 08:45

## 2023-01-01 RX ADMIN — ATORVASTATIN CALCIUM 10 MG: 10 TABLET, FILM COATED ORAL at 22:02

## 2023-01-01 RX ADMIN — CALCIUM CHLORIDE, MAGNESIUM CHLORIDE, SODIUM CHLORIDE, SODIUM BICARBONATE, POTASSIUM CHLORIDE AND SODIUM PHOSPHATE DIBASIC DIHYDRATE 1500 ML/HR: 3.68; 3.05; 6.34; 3.09; .314; .187 INJECTION INTRAVENOUS at 09:28

## 2023-01-01 RX ADMIN — ALTEPLASE: 2.2 INJECTION, POWDER, LYOPHILIZED, FOR SOLUTION INTRAVENOUS at 07:29

## 2023-01-01 RX ADMIN — SODIUM CHLORIDE 3 ML/KG/HR: 9 INJECTION, SOLUTION INTRAVENOUS at 12:10

## 2023-01-01 RX ADMIN — ASPIRIN 81 MG 81 MG: 81 TABLET ORAL at 08:45

## 2023-01-01 RX ADMIN — HYDROCODONE BITARTRATE AND ACETAMINOPHEN 1 TABLET: 7.5; 325 TABLET ORAL at 20:55

## 2023-01-01 RX ADMIN — ANTICOAGULANT CITRATE DEXTROSE SOLUTION FORMULA A 2 ML: 12.25; 11; 3.65 SOLUTION INTRAVENOUS at 14:07

## 2023-01-01 RX ADMIN — DIGOXIN 125 MCG: 0.25 INJECTION INTRAMUSCULAR; INTRAVENOUS at 11:40

## 2023-01-01 RX ADMIN — FERROUS SULFATE TAB 325 MG (65 MG ELEMENTAL FE) 325 MG: 325 (65 FE) TAB at 08:46

## 2023-01-01 RX ADMIN — CLOPIDOGREL BISULFATE 75 MG: 75 TABLET ORAL at 09:29

## 2023-01-01 RX ADMIN — VASOPRESSIN 1 UNITS: 20 INJECTION INTRAVENOUS at 16:52

## 2023-01-01 RX ADMIN — CEFAZOLIN SODIUM 2 G: 2 INJECTION, SOLUTION INTRAVENOUS at 08:28

## 2023-01-01 RX ADMIN — CEFEPIME 2 G: 2 INJECTION, POWDER, FOR SOLUTION INTRAVENOUS at 10:06

## 2023-01-01 RX ADMIN — ATORVASTATIN CALCIUM 10 MG: 10 TABLET, FILM COATED ORAL at 20:55

## 2023-01-01 RX ADMIN — IPRATROPIUM BROMIDE 0.5 MG: 0.5 SOLUTION RESPIRATORY (INHALATION) at 06:35

## 2023-01-01 RX ADMIN — SODIUM CHLORIDE 100 ML/HR: 9 INJECTION, SOLUTION INTRAVENOUS at 08:52

## 2023-01-01 RX ADMIN — PSYLLIUM HUSK 1 PACKET: 3.4 POWDER ORAL at 17:07

## 2023-01-01 RX ADMIN — MIDODRINE HYDROCHLORIDE 7.5 MG: 10 TABLET ORAL at 17:09

## 2023-01-01 RX ADMIN — FERROUS SULFATE TAB 325 MG (65 MG ELEMENTAL FE) 325 MG: 325 (65 FE) TAB at 09:14

## 2023-01-01 RX ADMIN — DAPTOMYCIN 450 MG: 500 INJECTION, POWDER, LYOPHILIZED, FOR SOLUTION INTRAVENOUS at 17:27

## 2023-01-01 RX ADMIN — TAZOBACTAM SODIUM AND PIPERACILLIN SODIUM 3.38 G: 375; 3 INJECTION, SOLUTION INTRAVENOUS at 08:14

## 2023-01-01 RX ADMIN — ASPIRIN 81 MG 81 MG: 81 TABLET ORAL at 08:32

## 2023-01-01 RX ADMIN — ONDANSETRON 4 MG: 2 INJECTION INTRAMUSCULAR; INTRAVENOUS at 16:08

## 2023-01-01 RX ADMIN — HYDROCORTISONE SODIUM SUCCINATE 50 MG: 100 INJECTION, POWDER, FOR SOLUTION INTRAMUSCULAR; INTRAVENOUS at 17:48

## 2023-01-01 RX ADMIN — CEFEPIME 2 G: 2 INJECTION, POWDER, FOR SOLUTION INTRAVENOUS at 16:23

## 2023-01-01 RX ADMIN — METRONIDAZOLE 500 MG: 500 INJECTION, SOLUTION INTRAVENOUS at 06:48

## 2023-01-01 RX ADMIN — MIDODRINE HYDROCHLORIDE 5 MG: 5 TABLET ORAL at 08:47

## 2023-01-01 RX ADMIN — SODIUM CHLORIDE 100 ML/HR: 9 INJECTION, SOLUTION INTRAVENOUS at 15:02

## 2023-01-01 RX ADMIN — SODIUM BICARBONATE 150 MEQ: 84 INJECTION INTRAVENOUS at 11:46

## 2023-01-01 RX ADMIN — POLYETHYLENE GLYCOL 3350 17 G: 17 POWDER, FOR SOLUTION ORAL at 18:37

## 2023-01-01 RX ADMIN — HYDROMORPHONE HYDROCHLORIDE 0.5 MG: 1 INJECTION, SOLUTION INTRAMUSCULAR; INTRAVENOUS; SUBCUTANEOUS at 16:15

## 2023-01-01 RX ADMIN — VASOPRESSIN 1 UNITS: 20 INJECTION INTRAVENOUS at 13:29

## 2023-01-01 RX ADMIN — Medication 200 MCG: at 13:28

## 2023-01-01 RX ADMIN — SODIUM CHLORIDE 1000 ML: 9 INJECTION, SOLUTION INTRAVENOUS at 22:09

## 2023-01-01 RX ADMIN — MIDODRINE HYDROCHLORIDE 10 MG: 10 TABLET ORAL at 13:11

## 2023-01-01 RX ADMIN — EPHEDRINE SULFATE 5 MG: 50 INJECTION INTRAVENOUS at 16:51

## 2023-01-01 RX ADMIN — METRONIDAZOLE 500 MG: 500 TABLET ORAL at 21:21

## 2023-01-01 RX ADMIN — DAPTOMYCIN 450 MG: 500 INJECTION, POWDER, LYOPHILIZED, FOR SOLUTION INTRAVENOUS at 11:04

## 2023-01-01 RX ADMIN — INSULIN LISPRO 4 UNITS: 100 INJECTION, SOLUTION INTRAVENOUS; SUBCUTANEOUS at 17:48

## 2023-01-01 RX ADMIN — PIPERACILLIN AND TAZOBACTAM 2.25 G: 2; .25 INJECTION, POWDER, LYOPHILIZED, FOR SOLUTION INTRAVENOUS at 00:20

## 2023-01-01 RX ADMIN — HYDROCORTISONE SODIUM SUCCINATE 50 MG: 100 INJECTION, POWDER, FOR SOLUTION INTRAMUSCULAR; INTRAVENOUS at 07:29

## 2023-01-01 RX ADMIN — TAMSULOSIN HYDROCHLORIDE 0.4 MG: 0.4 CAPSULE ORAL at 08:32

## 2023-01-01 RX ADMIN — HYDROMORPHONE HYDROCHLORIDE 0.5 MG: 1 INJECTION, SOLUTION INTRAMUSCULAR; INTRAVENOUS; SUBCUTANEOUS at 22:36

## 2023-01-01 RX ADMIN — DAPTOMYCIN 450 MG: 500 INJECTION, POWDER, LYOPHILIZED, FOR SOLUTION INTRAVENOUS at 12:12

## 2023-01-01 RX ADMIN — ASPIRIN 81 MG 81 MG: 81 TABLET ORAL at 08:14

## 2023-01-01 RX ADMIN — CALCIUM CHLORIDE, MAGNESIUM CHLORIDE, SODIUM CHLORIDE, SODIUM BICARBONATE, POTASSIUM CHLORIDE AND SODIUM PHOSPHATE DIBASIC DIHYDRATE 1500 ML/HR: 3.68; 3.05; 6.34; 3.09; .314; .187 INJECTION INTRAVENOUS at 09:26

## 2023-01-01 RX ADMIN — CLOPIDOGREL BISULFATE 75 MG: 75 TABLET ORAL at 08:33

## 2023-01-01 RX ADMIN — MIDODRINE HYDROCHLORIDE 5 MG: 5 TABLET ORAL at 08:14

## 2023-01-01 RX ADMIN — DAPTOMYCIN 450 MG: 500 INJECTION, POWDER, LYOPHILIZED, FOR SOLUTION INTRAVENOUS at 14:36

## 2023-01-01 RX ADMIN — LORAZEPAM 0.5 MG: 2 INJECTION INTRAMUSCULAR; INTRAVENOUS at 15:05

## 2023-01-01 RX ADMIN — ATORVASTATIN CALCIUM 10 MG: 10 TABLET, FILM COATED ORAL at 21:21

## 2023-01-01 RX ADMIN — Medication 100 MCG: at 14:42

## 2023-01-01 RX ADMIN — MIDODRINE HYDROCHLORIDE 5 MG: 5 TABLET ORAL at 16:42

## 2023-01-01 RX ADMIN — TAZOBACTAM SODIUM AND PIPERACILLIN SODIUM 3.38 G: 375; 3 INJECTION, SOLUTION INTRAVENOUS at 08:59

## 2023-01-01 RX ADMIN — HEPARIN SODIUM 5000 UNITS: 5000 INJECTION INTRAVENOUS; SUBCUTANEOUS at 09:30

## 2023-01-01 RX ADMIN — Medication 0.02 MCG/KG/MIN: at 07:17

## 2023-01-01 RX ADMIN — METRONIDAZOLE 500 MG: 500 INJECTION, SOLUTION INTRAVENOUS at 23:04

## 2023-01-01 RX ADMIN — IPRATROPIUM BROMIDE 0.5 MG: 0.5 SOLUTION RESPIRATORY (INHALATION) at 00:49

## 2023-01-01 RX ADMIN — ATORVASTATIN CALCIUM 10 MG: 10 TABLET, FILM COATED ORAL at 21:02

## 2023-01-01 RX ADMIN — TAZOBACTAM SODIUM AND PIPERACILLIN SODIUM 3.38 G: 375; 3 INJECTION, SOLUTION INTRAVENOUS at 23:17

## 2023-01-01 RX ADMIN — TAZOBACTAM SODIUM AND PIPERACILLIN SODIUM 3.38 G: 375; 3 INJECTION, SOLUTION INTRAVENOUS at 15:38

## 2023-01-01 RX ADMIN — SENNOSIDES AND DOCUSATE SODIUM 2 TABLET: 8.6; 5 TABLET ORAL at 21:21

## 2023-01-01 RX ADMIN — PSYLLIUM HUSK 1 PACKET: 3.4 POWDER ORAL at 08:47

## 2023-01-01 RX ADMIN — POLYETHYLENE GLYCOL 3350 17 G: 17 POWDER, FOR SOLUTION ORAL at 17:22

## 2023-01-01 RX ADMIN — MIDODRINE HYDROCHLORIDE 5 MG: 5 TABLET ORAL at 12:56

## 2023-01-01 RX ADMIN — PHENYLEPHRINE HYDROCHLORIDE 1 MCG/KG/MIN: 10 INJECTION INTRAVENOUS at 13:40

## 2023-01-01 RX ADMIN — SODIUM CHLORIDE: 9 INJECTION, SOLUTION INTRAVENOUS at 14:33

## 2023-01-01 RX ADMIN — PROPOFOL 80 MG: 10 INJECTION, EMULSION INTRAVENOUS at 13:20

## 2023-01-03 ENCOUNTER — NURSE ONLY (OUTPATIENT)
Dept: FAMILY MEDICINE CLINIC | Age: 73
End: 2023-01-03
Payer: MEDICARE

## 2023-01-03 DIAGNOSIS — Z79.01 ANTICOAGULATED ON COUMADIN: Primary | ICD-10-CM

## 2023-01-03 LAB
INTERNATIONAL NORMALIZATION RATIO, POC: 2
PROTHROMBIN TIME, POC: 0

## 2023-01-03 PROCEDURE — 85610 PROTHROMBIN TIME: CPT | Performed by: NURSE PRACTITIONER

## 2023-01-03 NOTE — PROGRESS NOTES
Patient reports he had been taking 5mg coumdin daily. INR 2.0. Per Mirian Tyson patient was instructed to continue current dose and return in one month. Patient verbalized understanding.

## 2023-01-19 ENCOUNTER — HOSPITAL ENCOUNTER (OUTPATIENT)
Facility: HOSPITAL | Age: 73
Discharge: HOME | End: 2023-01-19
Payer: MEDICARE

## 2023-01-19 LAB
ALBUMIN SERPL-MCNC: 3.4 G/DL (ref 3.4–4.8)
ANION GAP SERPL CALCULATED.3IONS-SCNC: 12 MMOL/L (ref 3–16)
BUN BLDV-MCNC: 30 MG/DL (ref 6–20)
CALCIUM SERPL-MCNC: 8.9 MG/DL (ref 8.5–10.5)
CHLORIDE BLD-SCNC: 108 MMOL/L (ref 98–107)
CO2: 17 MMOL/L (ref 20–30)
CREAT SERPL-MCNC: 1.9 MG/DL (ref 0.4–1.2)
FERRITIN: 174.4 NG/ML (ref 22–322)
GFR SERPL CREATININE-BSD FRML MDRD: 37 ML/MIN/{1.73_M2}
GLUCOSE BLD-MCNC: 90 MG/DL (ref 74–106)
HCT VFR BLD CALC: 36 % (ref 40–54)
HEMOGLOBIN: 11.3 G/DL (ref 13–18)
IRON: 22 UG/DL (ref 59–158)
MCH RBC QN AUTO: 28 PG (ref 27–32)
MCHC RBC AUTO-ENTMCNC: 31.4 G/DL (ref 31–35)
MCV RBC AUTO: 89.1 FL (ref 80–100)
PDW BLD-RTO: 17.3 % (ref 11–16)
PHOSPHORUS: 3.3 MG/DL (ref 2.5–4.5)
PLATELET # BLD: 96 K/UL (ref 150–400)
PMV BLD AUTO: 10.8 FL (ref 6–10)
POTASSIUM SERPL-SCNC: 4 MMOL/L (ref 3.4–5.1)
RBC # BLD: 4.04 M/UL (ref 4.5–6)
SODIUM BLD-SCNC: 137 MMOL/L (ref 136–145)
TOTAL IRON BINDING CAPACITY: 190 UG/DL (ref 250–450)
WBC # BLD: 9.4 K/UL (ref 4–11)

## 2023-01-19 PROCEDURE — 85027 COMPLETE CBC AUTOMATED: CPT

## 2023-01-19 PROCEDURE — 80069 RENAL FUNCTION PANEL: CPT

## 2023-01-19 PROCEDURE — 36415 COLL VENOUS BLD VENIPUNCTURE: CPT

## 2023-01-19 PROCEDURE — 83540 ASSAY OF IRON: CPT

## 2023-01-19 PROCEDURE — 83550 IRON BINDING TEST: CPT

## 2023-01-19 PROCEDURE — 82728 ASSAY OF FERRITIN: CPT

## 2023-01-25 ENCOUNTER — OFFICE VISIT (OUTPATIENT)
Dept: FAMILY MEDICINE CLINIC | Age: 73
End: 2023-01-25
Payer: MEDICARE

## 2023-01-25 ENCOUNTER — HOSPITAL ENCOUNTER (OUTPATIENT)
Facility: HOSPITAL | Age: 73
Discharge: HOME | End: 2023-01-25
Payer: MEDICARE

## 2023-01-25 VITALS
TEMPERATURE: 98.1 F | SYSTOLIC BLOOD PRESSURE: 94 MMHG | HEART RATE: 56 BPM | WEIGHT: 158.3 LBS | HEIGHT: 73 IN | RESPIRATION RATE: 18 BRPM | OXYGEN SATURATION: 96 % | BODY MASS INDEX: 20.98 KG/M2 | DIASTOLIC BLOOD PRESSURE: 50 MMHG

## 2023-01-25 DIAGNOSIS — M1A.00X0 CHRONIC GOUTY ARTHROPATHY: ICD-10-CM

## 2023-01-25 DIAGNOSIS — Z79.01 ANTICOAGULATED ON COUMADIN: Primary | ICD-10-CM

## 2023-01-25 DIAGNOSIS — R19.7 DIARRHEA, UNSPECIFIED TYPE: ICD-10-CM

## 2023-01-25 DIAGNOSIS — M25.522 LEFT ELBOW PAIN: ICD-10-CM

## 2023-01-25 DIAGNOSIS — M77.8 LEFT ELBOW TENDONITIS: ICD-10-CM

## 2023-01-25 LAB
A/G RATIO: 1.2 (ref 0.8–2)
ALBUMIN SERPL-MCNC: 3.6 G/DL (ref 3.4–4.8)
ALP BLD-CCNC: 86 U/L (ref 25–100)
ALT SERPL-CCNC: 9 U/L (ref 4–36)
ANION GAP SERPL CALCULATED.3IONS-SCNC: 16 MMOL/L (ref 3–16)
AST SERPL-CCNC: 12 U/L (ref 8–33)
BASOPHILS ABSOLUTE: 0.1 K/UL (ref 0–0.1)
BASOPHILS RELATIVE PERCENT: 0.5 %
BILIRUB SERPL-MCNC: 0.5 MG/DL (ref 0.3–1.2)
BUN BLDV-MCNC: 34 MG/DL (ref 6–20)
C-REACTIVE PROTEIN: 76.4 MG/L (ref 0–5.1)
CALCIUM SERPL-MCNC: 9.2 MG/DL (ref 8.5–10.5)
CHLORIDE BLD-SCNC: 105 MMOL/L (ref 98–107)
CO2: 16 MMOL/L (ref 20–30)
CREAT SERPL-MCNC: 2.5 MG/DL (ref 0.4–1.2)
EOSINOPHILS ABSOLUTE: 0.1 K/UL (ref 0–0.4)
EOSINOPHILS RELATIVE PERCENT: 0.9 %
GFR SERPL CREATININE-BSD FRML MDRD: 26 ML/MIN/{1.73_M2}
GLOBULIN: 2.9 G/DL
GLUCOSE BLD-MCNC: 117 MG/DL (ref 74–106)
HCT VFR BLD CALC: 38.2 % (ref 40–54)
HEMOGLOBIN: 12.3 G/DL (ref 13–18)
IMMATURE GRANULOCYTES #: 0.1 K/UL
IMMATURE GRANULOCYTES %: 0.7 % (ref 0–5)
INTERNATIONAL NORMALIZATION RATIO, POC: 4
LYMPHOCYTES ABSOLUTE: 0.9 K/UL (ref 1.5–4)
LYMPHOCYTES RELATIVE PERCENT: 6.7 %
MCH RBC QN AUTO: 27.8 PG (ref 27–32)
MCHC RBC AUTO-ENTMCNC: 32.2 G/DL (ref 31–35)
MCV RBC AUTO: 86.4 FL (ref 80–100)
MONOCYTES ABSOLUTE: 1.2 K/UL (ref 0.2–0.8)
MONOCYTES RELATIVE PERCENT: 8.4 %
NEUTROPHILS ABSOLUTE: 11.6 K/UL (ref 2–7.5)
NEUTROPHILS RELATIVE PERCENT: 82.8 %
PDW BLD-RTO: 17.2 % (ref 11–16)
PLATELET # BLD: 131 K/UL (ref 150–400)
PMV BLD AUTO: 10.8 FL (ref 6–10)
POTASSIUM SERPL-SCNC: 3.5 MMOL/L (ref 3.4–5.1)
PROTHROMBIN TIME, POC: 0
RBC # BLD: 4.42 M/UL (ref 4.5–6)
SEDIMENTATION RATE, ERYTHROCYTE: 25 MM/HR (ref 0–20)
SODIUM BLD-SCNC: 137 MMOL/L (ref 136–145)
TOTAL PROTEIN: 6.5 G/DL (ref 6.4–8.3)
URIC ACID, SERUM: 4.3 MG/DL (ref 3.7–8.6)
WBC # BLD: 14 K/UL (ref 4–11)

## 2023-01-25 PROCEDURE — 3078F DIAST BP <80 MM HG: CPT | Performed by: NURSE PRACTITIONER

## 2023-01-25 PROCEDURE — 1036F TOBACCO NON-USER: CPT | Performed by: NURSE PRACTITIONER

## 2023-01-25 PROCEDURE — 84550 ASSAY OF BLOOD/URIC ACID: CPT

## 2023-01-25 PROCEDURE — 85610 PROTHROMBIN TIME: CPT | Performed by: NURSE PRACTITIONER

## 2023-01-25 PROCEDURE — G8420 CALC BMI NORM PARAMETERS: HCPCS | Performed by: NURSE PRACTITIONER

## 2023-01-25 PROCEDURE — 80053 COMPREHEN METABOLIC PANEL: CPT

## 2023-01-25 PROCEDURE — 85025 COMPLETE CBC W/AUTO DIFF WBC: CPT

## 2023-01-25 PROCEDURE — 3074F SYST BP LT 130 MM HG: CPT | Performed by: NURSE PRACTITIONER

## 2023-01-25 PROCEDURE — 86140 C-REACTIVE PROTEIN: CPT

## 2023-01-25 PROCEDURE — G8484 FLU IMMUNIZE NO ADMIN: HCPCS | Performed by: NURSE PRACTITIONER

## 2023-01-25 PROCEDURE — G8427 DOCREV CUR MEDS BY ELIG CLIN: HCPCS | Performed by: NURSE PRACTITIONER

## 2023-01-25 PROCEDURE — 96372 THER/PROPH/DIAG INJ SC/IM: CPT | Performed by: NURSE PRACTITIONER

## 2023-01-25 PROCEDURE — 3017F COLORECTAL CA SCREEN DOC REV: CPT | Performed by: NURSE PRACTITIONER

## 2023-01-25 PROCEDURE — 99213 OFFICE O/P EST LOW 20 MIN: CPT | Performed by: NURSE PRACTITIONER

## 2023-01-25 PROCEDURE — 1123F ACP DISCUSS/DSCN MKR DOCD: CPT | Performed by: NURSE PRACTITIONER

## 2023-01-25 PROCEDURE — 85652 RBC SED RATE AUTOMATED: CPT

## 2023-01-25 RX ORDER — METHYLPREDNISOLONE ACETATE 80 MG/ML
120 INJECTION, SUSPENSION INTRA-ARTICULAR; INTRALESIONAL; INTRAMUSCULAR; SOFT TISSUE ONCE
Status: COMPLETED | OUTPATIENT
Start: 2023-01-25 | End: 2023-01-25

## 2023-01-25 RX ADMIN — METHYLPREDNISOLONE ACETATE 120 MG: 80 INJECTION, SUSPENSION INTRA-ARTICULAR; INTRALESIONAL; INTRAMUSCULAR; SOFT TISSUE at 10:34

## 2023-01-25 ASSESSMENT — ENCOUNTER SYMPTOMS
NAUSEA: 0
ABDOMINAL PAIN: 0
COUGH: 0
VOMITING: 0
DIARRHEA: 0
SHORTNESS OF BREATH: 0

## 2023-01-25 NOTE — PROGRESS NOTES
Chief Complaint   Patient presents with    Elbow Pain     Patient here with complaints of left elbow pain. Patient reports the pain has been ongoing for 2-3 days. He reports he has not tried anything from pain but rates the rain an 8 on scale of 1-10. He denies any known injury. Patient does have scattered abrasion and bruising to bilateral upper extremities but takes warfarin as well as asa asa daily. Patient reports ongoing diarrhea. Patient states this has been going on since he was discharged from the hospital with a bowel obstruction. Patient does report he takes colace 100mg daily and 200mg nightly. Administrations This Visit       methylPREDNISolone acetate (DEPO-MEDROL) injection 120 mg       Admin Date  01/25/2023 Action  Given Dose  120 mg Route  [facility] Administered By  Connie Vargas RN                  Patient tolerated injection well. Patient advised to wait 20 minutes in the office following the injection. No signs/symptoms of reaction noted after 20 minutes.

## 2023-01-25 NOTE — PROGRESS NOTES
SUBJECTIVE:    Joya Moffett is a 67 y.o. male    Patient here with complaints of left elbow pain. Patient reports the pain has been ongoing for 2-3 days. Symptoms are unchanged since onset. He reports he has not tried anything from pain but rates the rain an 8 on scale of 1-10. He denies any known injury. Pt reports he woke up with left elbow pain and decreased ROM. Patient does have scattered abrasion and bruising to bilateral upper extremities but takes warfarin and Aspirin daily. Current dose is Coumadin 5 mg daily. Patient reports ongoing diarrhea since colon resection September 2022. Patient does report he takes colace 100mg daily and 200mg nightly. Pt reports he discussed this with Dr Nicole De Souza and was told Diarrhea was \"normal\". Symptoms are worsening and stool contains mucous. Denies blood in stool. Denies fever, chills or malaise. Elbow Pain  This is a new problem. The current episode started in the past 7 days (2-3 days). The problem has been unchanged. Associated symptoms include arthralgias (left elbow pain). Pertinent negatives include no abdominal pain, chest pain, coughing, fatigue, fever, nausea or vomiting. Associated symptoms comments: Decreased  strength. . Exacerbated by: movement, palpation. Chief Complaint   Patient presents with    Elbow Pain        Review of Systems   Constitutional:  Positive for unexpected weight change. Negative for fatigue and fever. Respiratory:  Negative for cough and shortness of breath. Cardiovascular:  Negative for chest pain. Gastrointestinal:  Negative for abdominal pain, diarrhea, nausea and vomiting. Musculoskeletal:  Positive for arthralgias (left elbow pain). Hematological:  Bruises/bleeds easily. OBJECTIVE:    BP (!) 94/50   Pulse 56   Temp 98.1 °F (36.7 °C) (Infrared)   Resp 18   Ht 6' 1\" (1.854 m)   Wt 158 lb 4.8 oz (71.8 kg)   SpO2 96% Comment: RA  BMI 20.89 kg/m²    Physical Exam  Vitals and nursing note reviewed. Constitutional:       Appearance: Normal appearance. He is well-developed. HENT:      Head: Normocephalic. Eyes:      Extraocular Movements: Extraocular movements intact. Conjunctiva/sclera: Conjunctivae normal.      Pupils: Pupils are equal, round, and reactive to light. Neck:      Thyroid: No thyromegaly. Vascular: No carotid bruit. Cardiovascular:      Rate and Rhythm: Normal rate and regular rhythm. Heart sounds: Normal heart sounds. No murmur heard. Pulmonary:      Effort: Pulmonary effort is normal.      Breath sounds: Normal breath sounds. Musculoskeletal:      Left elbow: Swelling (mild) present. No deformity, effusion or lacerations. Decreased range of motion (unable to fully extend left elbow). Tenderness (generalized tenderness. Tenderness left lateral epicondyle) present in lateral epicondyle. Skin:     General: Skin is warm and dry. Neurological:      Mental Status: He is alert and oriented to person, place, and time. Psychiatric:         Attention and Perception: Attention and perception normal.         Mood and Affect: Mood and affect normal.         Behavior: Behavior normal.         Thought Content: Thought content normal.         Judgment: Judgment normal.       ASSESSMENT/PLAN:   Roshan was seen today for elbow pain. Diagnoses and all orders for this visit:    Anticoagulated on Coumadin  -     POCT INR-4.0. Skip coumadin tonight and tomorrow. Then start 5mg/2.5 mg alternating doses. Repeat INR in 2 weeks. Left elbow pain  -Depomedrol 120 mg IM, NSAIDs contraindicated due to renal impairment. -     XR ELBOW LEFT (MIN 3 VIEWS); Future  -     CBC with Auto Differential; Future  -     Comprehensive Metabolic Panel; Future  -     Uric Acid; Future  -     C-Reactive Protein; Future  -     Sedimentation rate, automated; Future  -Pt agrees to call our office tomorrow if symptoms have worsened or have not improved for referral to ortho.  He reports he has pain medication at home if needed. Chronic gouty arthropathy  -     Uric Acid; Future    Diarrhea, unspecified type  Decrease Colace to 1 tablet daily. If symptoms do not improve. Stop colace  Pt instructed to call and schedule follow up with Dr Maame Stout for diarrhea since Colon resection. Pt c/o mucous in stool since colon resection. Pt agrees to call today to schedule appt with Dr Maame Stout      Return in about 2 weeks (around 2/8/2023), or if symptoms worsen or fail to improve. Current Outpatient Medications on File Prior to Visit   Medication Sig Dispense Refill    docusate sodium (COLACE) 100 MG capsule TAKE 1 CAPSULE BY MOUTH THREE TIMES DAILY 270 capsule 0    digoxin (LANOXIN) 125 MCG tablet TAKE 1 TABLET BY MOUTH EVERY DAY OR AS DIRECTED 90 tablet 2    midodrine (PROAMATINE) 5 MG tablet Take 5 mg by mouth in the morning and at bedtime      metoprolol succinate (TOPROL XL) 25 MG extended release tablet TAKE 1 TABLET BY MOUTH EVERY DAY (Patient taking differently: 1/2 tablet daily) 90 tablet 1    allopurinol (ZYLOPRIM) 300 MG tablet TAKE 1 TABLET BY MOUTH EVERY DAY 90 tablet 2    ferrous sulfate (IRON 325) 325 (65 Fe) MG tablet take 1 tablet by mouth twice a day 180 tablet 2    Calcium Polycarbophil (FIBER) 625 MG TABS Take 625 mg by mouth daily      Dulaglutide (TRULICITY) 1.5 FP/8.1IA SOPN Inject 1.5 mg into the skin once a week      COUMADIN 5 MG tablet take as directed 100 tablet 5    aspirin 81 MG tablet Take 81 mg by mouth daily.         ipratropium (ATROVENT) 0.02 % nebulizer solution INHALE THE CONTENTS OF 1 VIAL VIA NEBULIZER EVERY 6 HOURS 875 mL 1    lovastatin (MEVACOR) 20 MG tablet TAKE 1 TABLET BY MOUTH DAILY (Patient not taking: Reported on 1/25/2023) 90 tablet 2    ipratropium-albuterol (DUONEB) 0.5-2.5 (3) MG/3ML SOLN nebulizer solution INHALE 1 VIAL VIA NEBULIZER FOUR TIMES DAILY (Patient not taking: Reported on 11/16/2022) 360 mL 2    potassium chloride (KLOR-CON M) 10 MEQ extended release tablet Take 1 tablet by mouth in the morning. (Patient not taking: Reported on 12/19/2022) 90 tablet 0    MITIGARE 0.6 MG capsule TAKE 1 CAPSULE BY MOUTH DAILY 30 capsule 1    furosemide (LASIX) 40 MG tablet take 1 tablet by mouth once daily (Patient not taking: Reported on 1/25/2023) 30 tablet 5    nitroGLYCERIN (NITROSTAT) 0.4 MG SL tablet place 1 tablet under the tongue if needed every 5 minutes for chest pain for 3 doses IF NO RELIEF AFTER 3RD DOSE CALL PRESCRIBER . 25 tablet 5     No current facility-administered medications on file prior to visit.

## 2023-01-25 NOTE — PATIENT INSTRUCTIONS
The medication list included in this document is our record of what you are currently taking, including any changes that were made at today's visit. If you find any differences when compared to your medications at home, or have any questions that were not answered at your visit, please contact the office. Keep a list of your medicines with you. List all of the prescription medicines, nonprescription medicines, supplements, natural remedies, and vitamins that you take. Tell your healthcare providers who treat you about all of the products you are taking. Your provider can provide you with a form to keep track of them. Just ask. Follow the directions that come with your medicine, including information about food or alcohol. Make sure you know how and when to take your medicine. Do not take more or less than you are supposed to take. Keep all medicines out of the reach of children. Store medicines according to the directions on the label. Monitor yourself. Learn to know how your body reacts to your new medicine and keep track of how it makes you feel before attempting (If your provider has allowed you to do so) to drive or go to work. Seek emergency medical attention if you think you have used too much of this medicine. An overdose of any prescription medicine can be fatal. Overdose symptoms may include extreme drowsiness, muscle weakness, confusion, cold and clammy skin, pinpoint pupils, shallow breathing, slow heart rate, fainting, or coma. Don't share prescription medicines with others, even when they seem to have the same symptoms. What may be good for you may be harmful to others. If you are no longer taking a prescribed medication and you have pills left please take your pills out of their original containers. Mix crushed pills with an undesirable substance, such as cat litter or used coffee grounds.  Put the mixture into a disposable container with a lid, such as an empty margarine tub, or into a sealable bag.  Cover up or remove any of your personal information on the empty containers by covering it with black permanent marker or duct tape.  Place the sealed container with the mixture, and the empty drug containers, in the trash.   If you use a medication that is in the form of a patch, dispose of used patches by folding them in half so that the sticky sides meet, and then flushing them down a toilet. They should not be placed in the household trash where children or pets can find them.  If you have any questions, ask your provider or pharmacist for more information.   Be sure to keep all appointments for provider visits or tests. We are committed to providing you with the best care possible.   In order to help us achieve these goals please remember to bring all medications, herbal products, and over the counter supplements with you to each visit.     If your provider has ordered testing for you, please be sure to follow up with our office if you have not received results within 7 days after the testing took place.     *If you receive a survey after visiting one of our offices, please take time to share your experience concerning your physician office visit. These surveys are confidential and no health information about you is shared.  We are eager to improve for you and we are counting on your feedback to help make that happen.

## 2023-01-25 NOTE — PROGRESS NOTES
[facility] to check on status of referral. No answer, message left. Mikayla returned my call and stated that patient was seen in office on 1/11/22 and has follow up scheduled for July of this year. They are to send office note.

## 2023-01-26 ENCOUNTER — TELEPHONE (OUTPATIENT)
Dept: FAMILY MEDICINE CLINIC | Age: 73
End: 2023-01-26

## 2023-01-26 DIAGNOSIS — R79.82 ELEVATED C-REACTIVE PROTEIN (CRP): ICD-10-CM

## 2023-01-26 DIAGNOSIS — D72.829 LEUKOCYTOSIS, UNSPECIFIED TYPE: ICD-10-CM

## 2023-01-26 DIAGNOSIS — M25.522 LEFT ELBOW PAIN: Primary | ICD-10-CM

## 2023-01-26 NOTE — TELEPHONE ENCOUNTER
Patient reports he saw nephrology today- Dr Alfreda Paiz at [facility] and was instructed to stop taking furosemide. I instructed patient to monitor weight and swelling and contact out office if he noticed increase in either. Patient verbalized understanding. I requested office note from [facility] at Dr Gloria Conrad office. She stated she would send when note was complete.

## 2023-01-26 NOTE — TELEPHONE ENCOUNTER
Contacted patient and informed him to be sure and get xray of elbow as soon as possible. Patient reports he has swelling to elbow but denies worsening pain. Instructed patient to go to ED if pain or swelling worsened or if patient developed fever. Understanding verbalized.

## 2023-01-27 ENCOUNTER — HOSPITAL ENCOUNTER (OUTPATIENT)
Dept: GENERAL RADIOLOGY | Facility: HOSPITAL | Age: 73
Discharge: HOME | End: 2023-01-27
Payer: MEDICARE

## 2023-01-27 ENCOUNTER — HOSPITAL ENCOUNTER (OUTPATIENT)
Facility: HOSPITAL | Age: 73
Discharge: HOME | End: 2023-01-27
Payer: MEDICARE

## 2023-01-27 ENCOUNTER — TELEPHONE (OUTPATIENT)
Dept: FAMILY MEDICINE CLINIC | Age: 73
End: 2023-01-27

## 2023-01-27 DIAGNOSIS — M25.522 LEFT ELBOW PAIN: Primary | ICD-10-CM

## 2023-01-27 DIAGNOSIS — M25.522 LEFT ELBOW PAIN: ICD-10-CM

## 2023-01-27 PROCEDURE — 73070 X-RAY EXAM OF ELBOW: CPT

## 2023-01-27 RX ORDER — CLINDAMYCIN HYDROCHLORIDE 300 MG/1
300 CAPSULE ORAL 4 TIMES DAILY
Qty: 40 CAPSULE | Refills: 0 | Status: SHIPPED | OUTPATIENT
Start: 2023-01-27 | End: 2023-02-06

## 2023-01-27 NOTE — TELEPHONE ENCOUNTER
Attempted to call both [facility] and [facility] to schedule patient appointment. Left message with both referral clerks. Waiting on call back.

## 2023-01-28 DIAGNOSIS — D50.9 IRON DEFICIENCY ANEMIA, UNSPECIFIED IRON DEFICIENCY ANEMIA TYPE: ICD-10-CM

## 2023-01-30 RX ORDER — FERROUS SULFATE 325(65) MG
TABLET ORAL
Qty: 180 TABLET | Refills: 2 | Status: SHIPPED | OUTPATIENT
Start: 2023-01-30

## 2023-02-06 NOTE — THERAPY EVALUATION
Outpatient Rehabilitation - Wound/Debridement Initial [place]     Patient Name: Swapnil Lawson  : 1950  MRN: [id number]  Today's Date: 2023              Pre-debridement       Post-debridement with silver nitrate debris      Admit Date: 2023    Visit Dx:    ICD-10-CM ICD-9-CM   1. Ulcer of left foot with other severity ([facility])  L97.528 707.15   2. Peripheral vascular disease of lower extremity ([facility])  I73.9 443.9   3. Type 2 diabetes mellitus with foot ulcer, unspecified whether long term insulin use ([facility])  E11.621 250.80    L97.509 707.15       Patient Active Problem List   Diagnosis   • Coronary artery disease   • Hypertension   • Paroxysmal atrial fibrillation (HCC)   • Type 2 diabetes mellitus with nephropathy ([facility])   • Venous insufficiency   • Hyperlipidemia   • Nuclear sclerotic cataract of right eye   • Cortical age-related cataract of right eye   • Nuclear sclerotic cataract of left eye   • Cortical age-related cataract of left eye   • Thrombocytopenia ([facility])   • History of colon polyps   • Chronic idiopathic constipation   • Adenomatous polyp of transverse colon   • Chronic kidney disease, stage III (moderate) ([facility])        Past Medical History:   Diagnosis Date   • Childhood asthma    • COPD (chronic obstructive pulmonary disease) ([facility])    • Coronary artery disease 2016    CABG, , Abimael Tomlin MD. CABG, 2013, [facility].   • Diabetes mellitus (HCC) 2016    type II   • DVT (deep venous thrombosis) ([facility])     RLE   • Gout    • Hepatitis     1970s unsure of which one- states it was caused from drinking contaminated water   • Hyperlipidemia 2016   • Hypertension 2016   • Kidney disease    • Myocardial infarction (HCC)      in  and  prior to CABG.   • Paroxysmal atrial fibrillation (HCC) 2016   • Pulmonary embolism (HCC)    • Sleep apnea    • Venous insufficiency 2016   • Wears glasses     for reading        Past  Surgical History:   Procedure Laterality Date   • APPENDECTOMY  1981   • BUNIONECTOMY Left    • CARDIAC CATHETERIZATION      x4, no stents   • CATARACT EXTRACTION W/ INTRAOCULAR LENS IMPLANT Right 06/25/2020    Procedure: CATARACT PHACO EXTRACTION WITH INTRAOCULAR LENS IMPLANT RIGHT;  Surgeon: Omar Blood MD;  Location: [facility] OR;  Service: Ophthalmology;  Laterality: Right;   • CATARACT EXTRACTION W/ INTRAOCULAR LENS IMPLANT Left 07/09/2020    Procedure: CATARACT PHACO EXTRACTION WITH INTRAOCULAR LENS IMPLANT LEFT;  Surgeon: Omar Blood MD;  Location: [facility] OR;  Service: Ophthalmology;  Laterality: Left;   • CHOLECYSTECTOMY  2002   • COLON RESECTION Right 9/12/2022    Procedure: COLON RESECTION LAPAROSCOPIC HAND ASSISTED;  Surgeon: Kenji Garcias MD;  Location: [facility] OR;  Service: General;  Laterality: Right;   • COLONOSCOPY     • COLONOSCOPY N/A 08/11/2022    Procedure: COLONOSCOPY, biopsy, polypectomy x1 and tattooing;  Surgeon: Kenji Garcias MD;  Location: [facility] ENDOSCOPY;  Service: Gastroenterology;  Laterality: N/A;   • CORONARY ARTERY BYPASS GRAFT      1993 triple bypass   • CORONARY ARTERY BYPASS GRAFT  2013    double bypass   • FINGER SURGERY      Rt index (second finger)   • OTHER SURGICAL HISTORY  2010    Bone spur removal   • TOE AMPUTATION Left 2009    4th toe   • VASECTOMY          Patient History     Row Name 02/06/23 1400             History    Chief Complaint Ulcer, wound or other skin conditions  -      Brief Description of Current Complaint Pt reports getting new shoes/orthotics to help the area on his R foot, which is healing well. He believes the new shoe/insert caused the blister on his L foot about 3-4 weeks ago, which developed into the wound he's having assessed today. He reports having a partial colectomy late last year with notable weight loss and overall loss of strength and function. His son is still available to assist with dressing changes as needed. He has  tried a knee scooter and crutches in the past without success due to the small confines of his home.  -      Patient/Caregiver Goals Heal wound;Relieve pain;Know what to do to help the symptoms  -      Patient's Rating of General Health Fair  -      Occupation/sports/leisure activities Retired state worker  -      Patient seeing anyone else for problem(s)? Podiatrist  -      How has patient tried to help current problem? simple foam dressings. Still wearing compression hose for venous insufficiency  -      Related/Recent Hospitalizations No  -         Pain     Pain Location Foot  -      [place] FACES Pain Rating  4  -         Services    Are you currently receiving Home Health services No  -      Do you plan to receive Home Health services in the near future No  -         Daily Activities    Primary Language English  -      Are you able to read Yes  -      Are you able to write Yes  -      How does patient learn best? Listening;Demonstration  -      Teaching needs identified Management of Condition  -      Patient is concerned about/has problems with Transfers (getting out of a chair, bed);Walking;Standing;Difficulty with self care (i.e. bathing, dressing, toileting:;Performing home management (household chores, shopping, care of dependents);Other (comment)  wound mgmt  -      Barriers to learning None  -      Pt Participated in POC and Goals Yes  -         Safety    Are you being hurt, hit, or frightened by anyone at home or in your life? No  -      Are you being neglected by a caregiver No  -            User Key  (r) = Recorded By, (t) = Taken By, (c) = Cosigned By    Initials Name Provider Type    Morena Rajan PT Physical Therapist                EVALUATION   PT Ortho     Row Name 02/06/23 1400       Subjective Pain    Able to rate subjective pain? yes  -MC    Pre-Treatment Pain Level 4  -MC    Post-Treatment Pain Level 4  -    Subjective Pain Comment FACES  " -       Transfers    Comment, (Transfers) remained seated in transport chair for tx  -          User Key  (r) = Recorded By, (t) = Taken By, (c) = Cosigned By    Initials Name Provider Type    Morena Rajan PT Physical Therapist               LDA Wound     Row Name 02/06/23 1400             Wound 02/06/23 1220 Left lateral foot Pressure Injury    Wound - Properties Group Placement Date: 02/06/23  - Placement Time: 1220  - Present on Hospital Admission: Y  - Side: Left  - Orientation: lateral  - Location: foot  - Primary Wound Type: Pressure inj  -MC    Wound Image Images linked: 2  -      Pressure Injury Stage U  -      Dressing Appearance intact;no drainage  -      Base necrotic;purple;red;moist;black eschar  scant soft eschar remaining after debridement, moist red/purple mixed necrosis. Wound bed is thin, with bone palpable through the soft tissue.  -      Periwound dry;pink;other (see comments)  small bud of hypergranulated, pink tissue on plantar surface of foot, treated with silver nitrate  -      Periwound Temperature warm  -      Periwound Skin Turgor soft  -      Edges irregular;open  -      Wound Length (cm) 2.2 cm  -      Wound Width (cm) 3 cm  -      Wound Depth (cm) --  obscured  -      Wound Surface Area (cm^2) 6.6 cm^2  -      Drainage Characteristics/Odor serosanguineous  -      Drainage Amount scant  -      Care, Wound cleansed with;wound cleanser;debrided;honey applied  -      Dressing Care dressing applied;silver impregnated;low-adherent;foam;border dressing  mepilex Ag, 4\" optifoam, personal compression stocking  -      Periwound Care cleansed with pH balanced cleanser;dry periwound area maintained  -      Retired Wound - Properties Group Placement Date: 02/06/23  - Placement Time: 1220  - Present on Hospital Admission: Y  - Side: Left  - Orientation: lateral  - Location: foot  - Primary Wound Type: Pressure inj  -MC    " Retired Wound - Properties Group Date first assessed: 02/06/23  - Time first assessed: 1220  - Present on Hospital Admission: Y  - Side: Left  - Location: foot  - Primary Wound Type: Pressure inj  -          User Key  (r) = Recorded By, (t) = Taken By, (c) = Cosigned By    Initials Name Provider Type    Morena Rajan, PT Physical Therapist                  WOUND DEBRIDEMENT  Total area of Debridement: 8 cm2  Debridement Site 1  Location- Site 1: L foot wound and periwound  Selective Debridement- Site 1: Wound Surface <20cmsq  Instruments- Site 1: #15, scapel, tweezers, scissors  Excised Tissue Description- Site 1: moderate, slough, eschar, necrotic, minimum, other (comment) (nonviable blistered skin)  Bleeding- Site 1: moderate, held pressure, 3-5 minutes, AgNitrate sticks              Therapy Education     Row Name 02/06/23 1400             Therapy Education    Education Details Reviewed PT role in wound care and s/sx of infection that would warrant presentation to ER. Keep dressing intact and dry between changes. Change every 2-3 days with therahoney and mepilex Ag. Issued size L offloading shoe for patient to try at home.  -      Given Bandaging/dressing change;Symptoms/condition management  -      Program New  -MC      How Provided Verbal;Demonstration;Written  -MC      Provided to Patient  -      Level of Understanding Teach back education performed;Verbalized  -            User Key  (r) = Recorded By, (t) = Taken By, (c) = Cosigned By    Initials Name Provider Type    Morena Rajan, PT Physical Therapist                Recommendation and Plan   PT Assessment/Plan     Row Name 02/06/23 1400          PT Assessment    Functional Limitations Decreased safety during functional activities;Limitations in functional capacity and performance;Performance in self-care ADL;Other (comment)  wound mgmt  -     Impairments Integumentary integrity;Pain;Impaired venous circulation  -      Assessment Comments Pt presents with an unstageable pressure injury to the L lateral/plantar foot s/p starting use of new orthotic footwear about one month ago. After debridement of the thick, soft eschar covering the area, the wound base is still mostly necrotic, with spongy red/purple tissue that is somewhat thin over the bony prominence. PT cautioned patient that any potential bone exposure could necessitate presentation to the ED for further assessment, but the bone is not exposed as of today's treatment. Pt will greatly benefit from skilled PT wound care for debridement, advanced dressings management, and other appropriate interventions to promote healing. PT also issued an offloading shoe for the patient to attempt to wear.  -     Rehab Potential Fair  -     Patient/caregiver participated in establishment of treatment plan and goals Yes  -     Patient would benefit from skilled therapy intervention Yes  -        PT Plan    PT Frequency 1x/week  -     Predicted Duration of Therapy Intervention (PT) 20 visits  -     Planned CPT's? PT EVAL LOW COMPLEXITY: 63780;PT SELF CARE/MGMT/TRAIN 15 MIN: 02602;PT NONSELECT DEBRIDE 15 MIN: 38417;PT JOSS DEBRIDE OPEN WOUND UP TO 20 CM: 75325;PT JOSS DEBRIDE OPEN WOUND EA ADD 20 CM: 63096;PT NLFU MIST: 76618;PT THER SUPP EA 15 MIN  -     Physical Therapy Interventions (Optional Details) wound care;patient/family education  -     PT Plan Comments debridement, dressings management, offloading/education  -           User Key  (r) = Recorded By, (t) = Taken By, (c) = Cosigned By    Initials Name Provider Type     Morena Jordan, PT Physical Therapist                  Goals   PT OP Goals     Row Name 02/06/23 1400          PT Short Term Goals    STG 1 Pt/ son independent with clean home dressing changes to promote moist, bacteriostatic healing environement.  -     STG 1 Progress New  -     STG 2 Pt /son able to verbalize s/s of infection and when to seek  urgent or emergency care.  -     STG 2 Progress New  -     STG 3 Wound dimensions to decrease at least 25% to demonstrate wound healing.  -     STG 3 Progress New  -        Long Term Goals    LTG 1 Wound dimensions to decrease at least 75% to demonstrate wound healing.  -     LTG 1 Progress New  -     LTG 2 Pt will demonstrate >75% granulation tissue coverage to indicate healing progress.  -     LTG 2 Progress New  -        Time Calculation    PT Goal Re-Cert Due Date 05/07/23  -           User Key  (r) = Recorded By, (t) = Taken By, (c) = Cosigned By    Initials Name Provider Type     Morena Jordan, PT Physical Therapist                Time Calculation: Start Time: 1220  Untimed Charges  PT Eval/Re-eval Minutes: 55  Wound Care: 37062 Selective debridement  [id number]-[id number] debridement: 15  Total Minutes  Untimed Charges Total Minutes: 70   Total Minutes: 70  Therapy Charges for Today     Code Description Service Date Service Provider Modifiers Qty    93337131950 HC PT EVAL LOW COMPLEXITY 4 2/6/2023 Morena Jordan, PT GP 1    27321628244 HC JOSS DEBRIDE OPEN WOUND UP TO 20CM 2/6/2023 Morena Jordan, PT GP 1                Morena Jordan, PT  2/6/2023

## 2023-02-08 ENCOUNTER — OFFICE VISIT (OUTPATIENT)
Dept: FAMILY MEDICINE CLINIC | Age: 73
End: 2023-02-08
Payer: MEDICARE

## 2023-02-08 ENCOUNTER — HOSPITAL ENCOUNTER (OUTPATIENT)
Facility: HOSPITAL | Age: 73
Discharge: HOME | End: 2023-02-08
Payer: MEDICARE

## 2023-02-08 VITALS
DIASTOLIC BLOOD PRESSURE: 52 MMHG | BODY MASS INDEX: 21.72 KG/M2 | HEIGHT: 73 IN | SYSTOLIC BLOOD PRESSURE: 94 MMHG | HEART RATE: 87 BPM | RESPIRATION RATE: 18 BRPM | TEMPERATURE: 97.3 F | OXYGEN SATURATION: 95 % | WEIGHT: 163.9 LBS

## 2023-02-08 DIAGNOSIS — Z79.01 ANTICOAGULATED ON COUMADIN: Primary | ICD-10-CM

## 2023-02-08 DIAGNOSIS — E11.621 TYPE 2 DIABETES MELLITUS WITH FOOT ULCER, WITHOUT LONG-TERM CURRENT USE OF INSULIN (HCC): ICD-10-CM

## 2023-02-08 DIAGNOSIS — L97.509 TYPE 2 DIABETES MELLITUS WITH FOOT ULCER, WITHOUT LONG-TERM CURRENT USE OF INSULIN (HCC): ICD-10-CM

## 2023-02-08 DIAGNOSIS — E78.00 HYPERCHOLESTEROLEMIA: ICD-10-CM

## 2023-02-08 LAB
A/G RATIO: 1.3 (ref 0.8–2)
ALBUMIN SERPL-MCNC: 3.4 G/DL (ref 3.4–4.8)
ALP BLD-CCNC: 89 U/L (ref 25–100)
ALT SERPL-CCNC: 25 U/L (ref 4–36)
ANION GAP SERPL CALCULATED.3IONS-SCNC: 11 MMOL/L (ref 3–16)
AST SERPL-CCNC: 18 U/L (ref 8–33)
BASOPHILS ABSOLUTE: 0.1 K/UL (ref 0–0.1)
BASOPHILS RELATIVE PERCENT: 0.7 %
BILIRUB SERPL-MCNC: 0.3 MG/DL (ref 0.3–1.2)
BUN BLDV-MCNC: 40 MG/DL (ref 6–20)
CALCIUM SERPL-MCNC: 9.1 MG/DL (ref 8.5–10.5)
CHLORIDE BLD-SCNC: 108 MMOL/L (ref 98–107)
CHOLESTEROL, TOTAL: 104 MG/DL (ref 0–200)
CO2: 21 MMOL/L (ref 20–30)
CREAT SERPL-MCNC: 1.9 MG/DL (ref 0.4–1.2)
EOSINOPHILS ABSOLUTE: 0.1 K/UL (ref 0–0.4)
EOSINOPHILS RELATIVE PERCENT: 1.5 %
GFR SERPL CREATININE-BSD FRML MDRD: 37 ML/MIN/{1.73_M2}
GLOBULIN: 2.7 G/DL
GLUCOSE BLD-MCNC: 161 MG/DL (ref 74–106)
HBA1C MFR BLD: 5.7 %
HCT VFR BLD CALC: 36.8 % (ref 40–54)
HDLC SERPL-MCNC: 47 MG/DL (ref 40–60)
HEMOGLOBIN: 11.6 G/DL (ref 13–18)
IMMATURE GRANULOCYTES #: 0.2 K/UL
IMMATURE GRANULOCYTES %: 2.2 % (ref 0–5)
INTERNATIONAL NORMALIZATION RATIO, POC: 2
LDL CHOLESTEROL CALCULATED: 46 MG/DL
LYMPHOCYTES ABSOLUTE: 0.8 K/UL (ref 1.5–4)
LYMPHOCYTES RELATIVE PERCENT: 9.6 %
MCH RBC QN AUTO: 28.1 PG (ref 27–32)
MCHC RBC AUTO-ENTMCNC: 31.5 G/DL (ref 31–35)
MCV RBC AUTO: 89.1 FL (ref 80–100)
MONOCYTES ABSOLUTE: 0.5 K/UL (ref 0.2–0.8)
MONOCYTES RELATIVE PERCENT: 6.3 %
NEUTROPHILS ABSOLUTE: 6.5 K/UL (ref 2–7.5)
NEUTROPHILS RELATIVE PERCENT: 79.7 %
PDW BLD-RTO: 19.1 % (ref 11–16)
PLATELET # BLD: 126 K/UL (ref 150–400)
PMV BLD AUTO: 9.5 FL (ref 6–10)
POTASSIUM SERPL-SCNC: 4.6 MMOL/L (ref 3.4–5.1)
PROTHROMBIN TIME, POC: 0
RBC # BLD: 4.13 M/UL (ref 4.5–6)
SODIUM BLD-SCNC: 140 MMOL/L (ref 136–145)
TOTAL PROTEIN: 6.1 G/DL (ref 6.4–8.3)
TRIGL SERPL-MCNC: 57 MG/DL (ref 0–249)
VLDLC SERPL CALC-MCNC: 11 MG/DL
WBC # BLD: 8.2 K/UL (ref 4–11)

## 2023-02-08 PROCEDURE — 1123F ACP DISCUSS/DSCN MKR DOCD: CPT | Performed by: NURSE PRACTITIONER

## 2023-02-08 PROCEDURE — 3074F SYST BP LT 130 MM HG: CPT | Performed by: NURSE PRACTITIONER

## 2023-02-08 PROCEDURE — 3046F HEMOGLOBIN A1C LEVEL >9.0%: CPT | Performed by: NURSE PRACTITIONER

## 2023-02-08 PROCEDURE — G8484 FLU IMMUNIZE NO ADMIN: HCPCS | Performed by: NURSE PRACTITIONER

## 2023-02-08 PROCEDURE — 36415 COLL VENOUS BLD VENIPUNCTURE: CPT

## 2023-02-08 PROCEDURE — 2022F DILAT RTA XM EVC RTNOPTHY: CPT | Performed by: NURSE PRACTITIONER

## 2023-02-08 PROCEDURE — 80061 LIPID PANEL: CPT

## 2023-02-08 PROCEDURE — 1036F TOBACCO NON-USER: CPT | Performed by: NURSE PRACTITIONER

## 2023-02-08 PROCEDURE — 85610 PROTHROMBIN TIME: CPT | Performed by: NURSE PRACTITIONER

## 2023-02-08 PROCEDURE — 3017F COLORECTAL CA SCREEN DOC REV: CPT | Performed by: NURSE PRACTITIONER

## 2023-02-08 PROCEDURE — 80053 COMPREHEN METABOLIC PANEL: CPT

## 2023-02-08 PROCEDURE — G8420 CALC BMI NORM PARAMETERS: HCPCS | Performed by: NURSE PRACTITIONER

## 2023-02-08 PROCEDURE — G8427 DOCREV CUR MEDS BY ELIG CLIN: HCPCS | Performed by: NURSE PRACTITIONER

## 2023-02-08 PROCEDURE — 99214 OFFICE O/P EST MOD 30 MIN: CPT | Performed by: NURSE PRACTITIONER

## 2023-02-08 PROCEDURE — 83036 HEMOGLOBIN GLYCOSYLATED A1C: CPT

## 2023-02-08 PROCEDURE — 85025 COMPLETE CBC W/AUTO DIFF WBC: CPT

## 2023-02-08 PROCEDURE — 3078F DIAST BP <80 MM HG: CPT | Performed by: NURSE PRACTITIONER

## 2023-02-08 ASSESSMENT — PATIENT HEALTH QUESTIONNAIRE - PHQ9
SUM OF ALL RESPONSES TO PHQ9 QUESTIONS 1 & 2: 0
SUM OF ALL RESPONSES TO PHQ QUESTIONS 1-9: 0
10. IF YOU CHECKED OFF ANY PROBLEMS, HOW DIFFICULT HAVE THESE PROBLEMS MADE IT FOR YOU TO DO YOUR WORK, TAKE CARE OF THINGS AT HOME, OR GET ALONG WITH OTHER PEOPLE: 0
1. LITTLE INTEREST OR PLEASURE IN DOING THINGS: 0
2. FEELING DOWN, DEPRESSED OR HOPELESS: 0
8. MOVING OR SPEAKING SO SLOWLY THAT OTHER PEOPLE COULD HAVE NOTICED. OR THE OPPOSITE, BEING SO FIGETY OR RESTLESS THAT YOU HAVE BEEN MOVING AROUND A LOT MORE THAN USUAL: 0
3. TROUBLE FALLING OR STAYING ASLEEP: 0
7. TROUBLE CONCENTRATING ON THINGS, SUCH AS READING THE NEWSPAPER OR WATCHING TELEVISION: 0
5. POOR APPETITE OR OVEREATING: 0
4. FEELING TIRED OR HAVING LITTLE ENERGY: 0
6. FEELING BAD ABOUT YOURSELF - OR THAT YOU ARE A FAILURE OR HAVE LET YOURSELF OR YOUR FAMILY DOWN: 0
9. THOUGHTS THAT YOU WOULD BE BETTER OFF DEAD, OR OF HURTING YOURSELF: 0
SUM OF ALL RESPONSES TO PHQ QUESTIONS 1-9: 0

## 2023-02-08 ASSESSMENT — ENCOUNTER SYMPTOMS
NAUSEA: 0
VOMITING: 0
COUGH: 0
DIARRHEA: 0
SHORTNESS OF BREATH: 0
EYES NEGATIVE: 1
WHEEZING: 0
ABDOMINAL PAIN: 0

## 2023-02-08 NOTE — PROGRESS NOTES
SUBJECTIVE:    Beth Joel is a 67 y.o. male    Anticoagulation: Patient here for followup of chronic anticoagulation. Indication: atrial fibrillation  Bleeding Signs/Symptoms:  None  Thromboembolic Signs/Symptoms:  None    Missed Coumadin Doses:  None  Medication Changes:  no  Dietary Changes:  no  Bacterial/Viral Infection:  no    Other Concerns:  Current dose 5mg/2.5 mg alternating days    Patient here for follow up and repeat INR. Patient reports left elbow pain is improving and range of motion has improved. Pt reports he has full ROM and he complains of mild aching left elbow with ROM. Rates pain as a 2 out of 10. Patient is scheduled to see ortho today. Patient also reports he follow up with Dr Salma Hammond since last visit and was told the frequent mucus in his stool was normal. Patient is scheduled to return to see him in October. Pt is scheduled to see Cardiology, Dr Claybon Canavan at 3:00 tomorrow. On 1/25/2023- INR was 4 Coumadin was decreased to  5mg/2.5 mg alternating doses after skipping 2 doses. Chief Complaint   Patient presents with    Follow-up     INR          Review of Systems   Constitutional:  Negative for activity change, appetite change and fatigue. HENT: Negative. Eyes: Negative. Respiratory:  Negative for cough, shortness of breath and wheezing. Cardiovascular:  Negative for chest pain, palpitations and leg swelling (denies- nephrology stopped lasix a few weeks ago. Pt reports edema is well managed with elevating his legs more often. ). Gastrointestinal:  Negative for abdominal pain, diarrhea, nausea and vomiting. Endocrine: Negative for cold intolerance, heat intolerance, polydipsia, polyphagia and polyuria. OBJECTIVE:    BP (!) 94/52   Pulse 87   Temp 97.3 °F (36.3 °C) (Infrared)   Resp 18   Ht 6' 1\" (1.854 m)   Wt 163 lb 14.4 oz (74.3 kg)   SpO2 95% Comment: RA  BMI 21.62 kg/m²    Physical Exam  Vitals and nursing note reviewed.    Constitutional: Appearance: Normal appearance. He is well-developed. HENT:      Head: Normocephalic. Eyes:      Extraocular Movements: Extraocular movements intact. Conjunctiva/sclera: Conjunctivae normal.      Pupils: Pupils are equal, round, and reactive to light. Neck:      Thyroid: No thyromegaly. Vascular: No carotid bruit. Cardiovascular:      Rate and Rhythm: Normal rate. Rhythm irregularly irregular. Heart sounds: Normal heart sounds. No murmur heard. Pulmonary:      Effort: Pulmonary effort is normal.      Breath sounds: Normal breath sounds. Skin:     General: Skin is warm and dry. Capillary Refill: Capillary refill takes less than 2 seconds. Neurological:      Mental Status: He is alert and oriented to person, place, and time. Psychiatric:         Attention and Perception: Attention and perception normal.         Mood and Affect: Mood and affect normal.         Behavior: Behavior normal.         Thought Content: Thought content normal.         Judgment: Judgment normal.       ASSESSMENT/PLAN:   Tito Jackson was seen today for follow-up. Diagnoses and all orders for this visit:    Anticoagulated on Coumadin  -     POCT INR 2.0. Continue Coumadin 5mg/2.5 mg alternating days. Repeat INR in 1 month and PRN. Type 2 diabetes mellitus with foot ulcer, without long-term current use of insulin (HCC)  -     Comprehensive Metabolic Panel; Future  -     CBC with Auto Differential; Future  -     Hemoglobin A1C; Future  -Pt has eye exam scheduled this month. Hypercholesterolemia  -     Lipid Panel; Future        Return in about 1 month (around 3/8/2023), or as needed.     Current Outpatient Medications on File Prior to Visit   Medication Sig Dispense Refill    ferrous sulfate (FEROSUL) 325 (65 Fe) MG tablet TAKE 1 TABLET BY MOUTH TWICE DAILY 180 tablet 2    docusate sodium (COLACE) 100 MG capsule TAKE 1 CAPSULE BY MOUTH THREE TIMES DAILY 270 capsule 0    digoxin (LANOXIN) 125 MCG tablet TAKE 1 TABLET BY MOUTH EVERY DAY OR AS DIRECTED 90 tablet 2    ipratropium (ATROVENT) 0.02 % nebulizer solution INHALE THE CONTENTS OF 1 VIAL VIA NEBULIZER EVERY 6 HOURS 875 mL 1    lovastatin (MEVACOR) 20 MG tablet TAKE 1 TABLET BY MOUTH DAILY (Patient not taking: Reported on 1/25/2023) 90 tablet 2    midodrine (PROAMATINE) 5 MG tablet Take 5 mg by mouth in the morning and at bedtime      metoprolol succinate (TOPROL XL) 25 MG extended release tablet TAKE 1 TABLET BY MOUTH EVERY DAY (Patient taking differently: 1/2 tablet daily) 90 tablet 1    allopurinol (ZYLOPRIM) 300 MG tablet TAKE 1 TABLET BY MOUTH EVERY DAY 90 tablet 2    ipratropium-albuterol (DUONEB) 0.5-2.5 (3) MG/3ML SOLN nebulizer solution INHALE 1 VIAL VIA NEBULIZER FOUR TIMES DAILY (Patient not taking: Reported on 11/16/2022) 360 mL 2    potassium chloride (KLOR-CON M) 10 MEQ extended release tablet Take 1 tablet by mouth in the morning. (Patient not taking: Reported on 12/19/2022) 90 tablet 0    MITIGARE 0.6 MG capsule TAKE 1 CAPSULE BY MOUTH DAILY 30 capsule 1    Calcium Polycarbophil (FIBER) 625 MG TABS Take 625 mg by mouth daily      Dulaglutide (TRULICITY) 1.5 EO/2.4AO SOPN Inject 1.5 mg into the skin once a week      nitroGLYCERIN (NITROSTAT) 0.4 MG SL tablet place 1 tablet under the tongue if needed every 5 minutes for chest pain for 3 doses IF NO RELIEF AFTER 3RD DOSE CALL PRESCRIBER . 25 tablet 5    COUMADIN 5 MG tablet take as directed 100 tablet 5    aspirin 81 MG tablet Take 81 mg by mouth daily. No current facility-administered medications on file prior to visit.

## 2023-02-08 NOTE — PROGRESS NOTES
Chief Complaint   Patient presents with    Follow-up     INR       Patient here for follow up and repeat INR. Patient reports elbow pain is improving as well as his range of motion. Patient is scheduled to see ortho today. Patient also reports he follow up with Dr Aneta Welsh since last visit and was told the frequent mucus in his stool was normal. Patient is scheduled to return to see him in October. [facility]TH[facility] Cardiology to verify patients appointment with Dr Rose Orellana and he is scheduled to see him at the [facility] office at 3:00 tomorrow. Patient aware and agreeable.

## 2023-02-09 NOTE — PROGRESS NOTES
[facility]  Office visit  Swapnil Lawson  1950  [phone]    VISIT DATE:  2/9/2023      PCP: Argenis Osborne, APRN  [id number] [id number]    CC:  Chief Complaint   Patient presents with   • Coronary artery disease involving native coronary artery of       PROBLEM LIST:  1. Coronary artery disease:  a. CABG, 1993, Abimael Tomlin MD.  b. CABG, August 2013, [facility].  c. January 2018 echo - Ejection fraction is visually estimated to be 40-45 %.   mild concentric left ventricular hypertrophy.   Pseudonormal filling pattern noted.   Akinesis of the mid posterolateral, and basal to mid inferior walls   consistent with previous myocardial infarction.  2. Hypertension.  3. Paroxysmal atrial fibrillation.  4. Diabetes mellitus.   5. Venous insufficiency.   6.  Surgical history:  a.  Appendectomy, 1981.  b. Cholecystectomy, 2002.  c. Toe amputation, 2009.  d. Bone spur removal, 2010.  7. Previous history of myocardial infarction in 1992 and 1993 prior to CABG.  8. Hyperlipidemia.      Cardiac studies and procedures:  February 2019:Transthoracic echo - EF of 50%, moderately dilated left ventricle, moderately dilated right ventricle with normal function, moderate to severe left atrial enlargement, dilated IVC    September 2021 bilateral carotid duplex  1. Right internal carotid artery estimated diameter reduction: 20-49 %.   2. Left internal carotid artery estimated diameter reduction: 20-49 %.   3. Vertebral arteries demonstrate bilateral antegrade flow.     March 2022  Venous reflux study  · Chronic post thrombotic changes are noted in the left femoral and popliteal veins.  · Reflux is detected in the left common femoral, femoral, popliteal, posterior tibial, and peroneal veins.  Bilateral lower extremity arterial duplex  · Greater than 70% distal right SFA stenosis  · Greater than 50% right proximal posterior tibial artery stenosis  · Otherwise mild  "diffuse nonobstructive atherosclerosis bilaterally.        ASSESSMENT:   Diagnosis Plan   1. Paroxysmal atrial fibrillation (HCC)        2. Primary hypertension        3. Mixed hyperlipidemia        4. Coronary artery disease involving native coronary artery of native heart without angina pectoris            PLAN:  Congestive heart failure, diastolic, chronic: Currently euvolemic and compensated.  Continue current medical therapy.      Coronary artery disease: Currently stable and asymptomatic.  Continue aspirin, statin and afterload reduction    Hypertension: Goal less than 130/80 mmHg.  Controlled based on home blood pressure readings.  Continue current medications.    Venous insufficiency, left: Currently well controlled.  Continue compression stockings and elevating feet when possible.    Hyperlipidemia: Goal LDL <100, ideally less than 70.  Continue statin.    Persistent atrial fibrillation: Currently asymptomatic.  Continue stroke prophylaxis with Coumadin, goal INR 2-3.  Continue rate control with metoprolol.      Peripheral vascular disease: Potential high-grade right SFA disease.  Currently asymptomatic.  Continue current medical therapy.    Subjective  Denies chest pain, palpitations or dyspnea.  Reports lower extremity edema has been well controlled.  Blood pressures running less than 120/80 mmHg.  Recent left foot surgery.    PHYSICAL EXAMINATION:  Vitals:    02/09/23 1408   BP: 112/54   BP Location: Left arm   Patient Position: Sitting   Pulse: 94   SpO2: 97%   Weight: 75.9 kg (167 lb 6.4 oz)   Height: 185.4 cm (73\")     General Appearance:    Alert, cooperative, no distress, appears stated age   Head:    Normocephalic, without obvious abnormality, atraumatic   Eyes:    conjunctiva/corneas clear   Nose:   Nares normal, septum midline, mucosa normal, no drainage   Throat:   Lips, teeth and gums normal   Neck:   Supple, symmetrical, trachea midline, no carotid    bruit or JVD   Lungs:     Minimal right " basilar inspiratory rales, respirations unlabored   Chest Wall:    No tenderness or deformity    Heart:   Irregularly irregular, S1 and S2 normal, no murmur, rub   or gallop, normal carotid impulse bilaterally without bruit.   Abdomen:     Soft, non-tender   Extremities:   Extremities normal, atraumatic, no cyanosis, trivial pretibial edema bilaterally, compression stockings in place    Pulses:   2+ and symmetric all extremities   Skin:   Skin color, texture, turgor normal, no rashes or lesions       Diagnostic Data:  Procedures  Lab Results   Component Value Date    CHLPL 104 02/08/2023    TRIG 57 02/08/2023    HDL 47 02/08/2023     Lab Results   Component Value Date    GLUCOSE 63 (L) 09/28/2022    BUN 20 09/28/2022    CREATININE 1.91 (H) 09/28/2022     (L) 09/28/2022    K 3.9 09/28/2022     (H) 09/28/2022    CO2 16.7 (L) 09/28/2022     Lab Results   Component Value Date    HGBA1C 5.7 02/08/2023     Lab Results   Component Value Date    WBC 8.2 02/08/2023    HGB 11.6 (L) 02/08/2023    HCT 36.8 (L) 02/08/2023     (L) 02/08/2023       Allergies  No Known Allergies    Current Medications    Current Outpatient Medications:   •  amLODIPine (NORVASC) 2.5 MG tablet, Take 2.5 mg by mouth Daily., Disp: , Rfl:   •  aspirin 81 MG tablet, Take 81 mg by mouth Daily., Disp: , Rfl:   •  calcium polycarbophil (FIBERCON) 625 MG tablet, Take 625 mg by mouth 2 (Two) Times a Day. Takes 2 tablets BID, Disp: , Rfl:   •  colchicine 0.6 MG capsule capsule, TAKE 1 CAPSULE BY MOUTH DAILY AS NEEDED FOR PAIN, Disp: , Rfl:   •  diphenhydrAMINE (BENADRYL) 25 MG tablet, Take 25 mg by mouth every night at bedtime., Disp: , Rfl:   •  docusate sodium (COLACE) 100 MG capsule, Take 300 mg by mouth 2 (Two) Times a Day As Needed., Disp: , Rfl:   •  Dulaglutide (Trulicity) 1.5 MG/0.5ML solution pen-injector, Inject  under the skin into the appropriate area as directed 1 (One) Time Per Week., Disp: , Rfl:   •  ferrous gluconate  (FERGON) 324 MG tablet, Take 1 tablet by mouth Daily With Breakfast., Disp: 30 tablet, Rfl: 0  •  ipratropium (ATROVENT HFA) 17 MCG/ACT inhaler, Inhale 2 puffs As Needed., Disp: , Rfl:   •  ipratropium (ATROVENT) 0.02 % nebulizer solution, Take 0.5 mg by nebulization Every 4 (Four) Hours As Needed., Disp: , Rfl:   •  lovastatin (MEVACOR) 20 MG tablet, Take 20 mg by mouth Every Night., Disp: , Rfl:   •  metoprolol succinate XL (TOPROL-XL) 25 MG 24 hr tablet, Take 12.5 mg by mouth Daily., Disp: , Rfl:   •  nitroglycerin (NITROSTAT) 0.4 MG SL tablet, Place 0.4 mg under the tongue Every 5 (Five) Minutes As Needed for Chest Pain. Take no more than 3 doses in 15 minutes.  States he has not taken since , Disp: , Rfl:   •  O2 (OXYGEN), Inhale 2 L/min Every Night., Disp: , Rfl:   •  polyethylene glycol (MIRALAX) 17 GM/SCOOP powder, Mix 238g powder with 64 oz of clear liquid. Starting at 5pm drink 80z every 10-15 minutes until consumed., Disp: 238 g, Rfl: 0  •  warfarin (COUMADIN) 5 MG tablet, Take 1 tablet by mouth Daily. 5mg every other day  2.5mg every other day alternating, Disp: , Rfl:           ROS  Review of Systems   Cardiovascular: Positive for dyspnea on exertion, irregular heartbeat and leg swelling. Negative for chest pain and palpitations.   Respiratory: Negative for cough.        SOCIAL HX  Social History     Socioeconomic History   • Marital status:    Tobacco Use   • Smoking status: Former     Packs/day: 1.00     Years: 28.00     Pack years: 28.00     Types: Cigarettes     Start date: 1964     Quit date: 1993     Years since quittin.1   • Smokeless tobacco: Never   • Tobacco comments:     Wish I'd never started   Vaping Use   • Vaping Use: Never used   Substance and Sexual Activity   • Alcohol use: No   • Drug use: No   • Sexual activity: Not Currently     Partners: Female     Birth control/protection: Other, Vasectomy, Birth control pill       FAMILY HX  Family History   Problem  Relation Age of Onset   • COPD Mother    • Heart attack Father    • Asthma Father              Naseem Campbell III, MD, FACC

## 2023-02-13 NOTE — THERAPY WOUND CARE TREATMENT
Outpatient Rehabilitation - Wound/Debridement Treatment Note  [facility]     Patient Name: Swapnil Lawson  : 1950  MRN: [id number]  Today's Date: 2023                 Admit Date: 2023    Visit Dx:    ICD-10-CM ICD-9-CM   1. Ulcer of left foot with other severity (formerly [facility])  L97.528 707.15   2. Peripheral vascular disease of lower extremity (formerly [facility])  I73.9 443.9   3. Type 2 diabetes mellitus with foot ulcer, unspecified whether long term insulin use (formerly [facility])  E11.621 250.80    L97.509 707.15   4. Venous insufficiency  I87.2 459.81       Patient Active Problem List   Diagnosis   • Coronary artery disease   • Hypertension   • Paroxysmal atrial fibrillation (HCC)   • Type 2 diabetes mellitus with nephropathy (formerly [facility])   • Venous insufficiency   • Hyperlipidemia   • Nuclear sclerotic cataract of right eye   • Cortical age-related cataract of right eye   • Nuclear sclerotic cataract of left eye   • Cortical age-related cataract of left eye   • Thrombocytopenia (formerly [facility])   • History of colon polyps   • Chronic idiopathic constipation   • Adenomatous polyp of transverse colon   • Chronic kidney disease, stage III (moderate) (formerly [facility])        Past Medical History:   Diagnosis Date   • Abnormal ECG    • Childhood asthma    • COPD (chronic obstructive pulmonary disease) (formerly [facility])    • Coronary artery disease 2016    CABG, , Abimael Tomlin MD. CABG, 2013, [facility].   • Diabetes mellitus (HCC) 2016    type II   • DVT (deep venous thrombosis) (formerly [facility])     RLE   • Gout    • Hepatitis     1970s unsure of which one- states it was caused from drinking contaminated water   • Hyperlipidemia 2016   • Hypertension 2016   • Kidney disease    • Myocardial infarction (HCC)      in  and  prior to CABG.   • Paroxysmal atrial fibrillation (HCC) 2016   • Pulmonary embolism (HCC)    • Sleep apnea    • Venous insufficiency 2016   • Wears glasses     for reading        Past Surgical  History:   Procedure Laterality Date   • APPENDECTOMY  1981   • BUNIONECTOMY Left    • CARDIAC CATHETERIZATION      x4, no stents   • CATARACT EXTRACTION W/ INTRAOCULAR LENS IMPLANT Right 06/25/2020    Procedure: CATARACT PHACO EXTRACTION WITH INTRAOCULAR LENS IMPLANT RIGHT;  Surgeon: Omar Blood MD;  Location: [facility] OR;  Service: Ophthalmology;  Laterality: Right;   • CATARACT EXTRACTION W/ INTRAOCULAR LENS IMPLANT Left 07/09/2020    Procedure: CATARACT PHACO EXTRACTION WITH INTRAOCULAR LENS IMPLANT LEFT;  Surgeon: Omar Blood MD;  Location: [facility] OR;  Service: Ophthalmology;  Laterality: Left;   • CHOLECYSTECTOMY  2002   • COLON RESECTION Right 09/12/2022    Procedure: COLON RESECTION LAPAROSCOPIC HAND ASSISTED;  Surgeon: Kenji Garcias MD;  Location: [facility] OR;  Service: General;  Laterality: Right;   • COLONOSCOPY     • COLONOSCOPY N/A 08/11/2022    Procedure: COLONOSCOPY, biopsy, polypectomy x1 and tattooing;  Surgeon: Kenji Garcias MD;  Location: [facility] ENDOSCOPY;  Service: Gastroenterology;  Laterality: N/A;   • CORONARY ARTERY BYPASS GRAFT      1993 triple bypass   • CORONARY ARTERY BYPASS GRAFT  2013    double bypass   • FINGER SURGERY      Rt index (second finger)   • OTHER SURGICAL HISTORY  2010    Bone spur removal   • TOE AMPUTATION Left 2009    4th toe   • VASECTOMY           EVALUATION   PT Ortho     Row Name 02/13/23 0800       Subjective Comments    Subjective Comments Pt c/o pain when walking, asking if the offloading shoe can be cut down any further.  -JM       Subjective Pain    Able to rate subjective pain? yes  -JM    Pre-Treatment Pain Level 4  -JM    Post-Treatment Pain Level 4  -JM       Transfers    Comment, (Transfers) seated in transport chair for tx  -          User Key  (r) = Recorded By, (t) = Taken By, (c) = Cosigned By    Initials Name Provider Type    Cheryl Hua PT Physical Therapist                 [facility] Wound     Row Name 02/13/23 0800           "   Wound 02/06/23 1220 Left lateral foot Pressure Injury    Wound - Properties Group Placement Date: 02/06/23  - Placement Time: 1220  - Present on Hospital Admission: Y  - Side: Left  - Orientation: lateral  - Location: foot  - Primary Wound Type: Pressure inj  -MC    Wound Image Images linked: 1  -      Dressing Appearance intact;moist drainage  -      Base necrotic;purple;red;moist;black eschar  -      Periwound dry;pink;other (see comments)  -      Periwound Temperature warm  -      Periwound Skin Turgor soft  -      Edges irregular;open  -      Wound Length (cm) 3 cm  -      Wound Width (cm) 3.3 cm  -      Wound Depth (cm) --  obscured  -      Wound Surface Area (cm^2) 9.9 cm^2  -      Drainage Characteristics/Odor serosanguineous  -      Drainage Amount scant  -      Care, Wound cleansed with;wound cleanser;debrided;honey applied  -      Dressing Care dressing applied;silver impregnated;foam;silicone;border dressing  mepilex ag, 4\" optifoam  -      Periwound Care cleansed with pH balanced cleanser;dry periwound area maintained  -      Retired Wound - Properties Group Placement Date: 02/06/23  [facility] Placement Time: 1220  - Present on Hospital Admission: Y  - Side: Left  - Orientation: lateral  - Location: foot  - Primary Wound Type: Pressure inj  -MC    Retired Wound - Properties Group Date first assessed: 02/06/23  - Time first assessed: 1220  - Present on Hospital Admission: Y  - Side: Left  - Location: foot  - Primary Wound Type: Pressure inj  -          User Key  (r) = Recorded By, (t) = Taken By, (c) = Cosigned By    Initials Name Provider Type    Morena Rajan, PT Physical Therapist    hCeryl Hua, PT Physical Therapist                  WOUND DEBRIDEMENT  Total area of Debridement: 2cmsq  Debridement Site 1  Location- Site 1: L foot wound and periwound  Selective Debridement- Site 1: Wound Surface <20cmsq  Instruments- Site " 1: #15, scapel, tweezers, scissors  Excised Tissue Description- Site 1: minimum, slough, eschar  Bleeding- Site 1: none              Therapy Education     Row Name 02/13/23 0800             Therapy Education    Education Details Discussed potential risk of bone exposure, pt needs to be strict NWB L foot.  -      Given Bandaging/dressing change;Symptoms/condition management  -      Program Reinforced  -      How Provided Verbal;Demonstration;Written  -      Provided to Patient  -      Level of Understanding Teach back education performed;Verbalized  -            User Key  (r) = Recorded By, (t) = Taken By, (c) = Cosigned By    Initials Name Provider Type    Cheryl Hua, PT Physical Therapist                Recommendation and Plan   PT Assessment/Plan     Row Name 02/13/23 0800          PT Assessment    Functional Limitations Decreased safety during functional activities;Limitations in functional capacity and performance;Performance in self-care ADL;Other (comment)  wound mgmt  -     Impairments Integumentary integrity;Pain;Impaired venous circulation  -     Assessment Comments Pt with increased wound dimensions and eschar with dry fibrous tissues at base, likely fascia/capsule.  PT recommended to continue dressings and NWB.  PT will contact pt's providers to discuss extent of wound, and clarify vascular status to L foot.  Pt may need amputation of L metatarsal, and PT recommends referral to vascular sx./Dr. [place].  -        PT Plan    PT Frequency 1x/week  -     Physical Therapy Interventions (Optional Details) patient/family education;wound care  -     PT Plan Comments dressings management, ?limited debridement until vascular status clarified  -           User Key  (r) = Recorded By, (t) = Taken By, (c) = Cosigned By    Initials Name Provider Type    Cheryl Hua, PT Physical Therapist                Goals   PT OP Goals     Row Name 02/13/23 0800          Time Calculation     PT Goal Re-Cert Due Date 05/07/23  -MARICEL           User Key  (r) = Recorded By, (t) = Taken By, (c) = Cosigned By    Initials Name Provider Type    Cheryl Hua, PT Physical Therapist                PT Goal Re-Cert Due Date: 05/07/23            Time Calculation: Start Time: 0800  Untimed Charges  [id number] debridement: 20  Total Minutes  Untimed Charges Total Minutes: 20   Total Minutes: 20  Therapy Charges for Today     Code Description Service Date Service Provider Modifiers Qty    [id number] HC JOSS DEBRIDE OPEN WOUND UP TO 20CM 2/13/2023 Cheryl Washington, PT GP 1                  Cheryl Washington, PT  2/13/2023

## 2023-02-13 NOTE — TELEPHONE ENCOUNTER
Order entered for angiogram left extremity. Patient contacted to explain recommendation and answer questions. Verbalized understanding and agrees with plan of care.    Per Dr. Campbell: Needs angiogram of left lower extremity, as soon as possible with interventional cardiology.  Gangrenous left foot ulcer which may require amputation.

## 2023-02-14 PROBLEM — I73.9 ASYMPTOMATIC PVD (PERIPHERAL VASCULAR DISEASE): Status: ACTIVE | Noted: 2023-01-01

## 2023-02-14 PROBLEM — L97.509 FOOT ULCER (HCC): Status: ACTIVE | Noted: 2023-01-01

## 2023-02-15 ENCOUNTER — OFFICE VISIT (OUTPATIENT)
Dept: FAMILY MEDICINE CLINIC | Age: 73
End: 2023-02-15
Payer: MEDICARE

## 2023-02-15 ENCOUNTER — HOSPITAL ENCOUNTER (OUTPATIENT)
Facility: HOSPITAL | Age: 73
Discharge: HOME | End: 2023-02-15
Payer: MEDICARE

## 2023-02-15 VITALS
RESPIRATION RATE: 18 BRPM | TEMPERATURE: 97.4 F | SYSTOLIC BLOOD PRESSURE: 92 MMHG | BODY MASS INDEX: 22.46 KG/M2 | DIASTOLIC BLOOD PRESSURE: 56 MMHG | HEART RATE: 87 BPM | WEIGHT: 169.5 LBS | OXYGEN SATURATION: 94 % | HEIGHT: 73 IN

## 2023-02-15 DIAGNOSIS — I48.91 ATRIAL FIBRILLATION, UNSPECIFIED TYPE (HCC): Primary | ICD-10-CM

## 2023-02-15 DIAGNOSIS — I48.91 ATRIAL FIBRILLATION, UNSPECIFIED TYPE (HCC): ICD-10-CM

## 2023-02-15 LAB
INR BLD: 1.31 (ref 0.87–1.11)
PROTHROMBIN TIME: 16.3 SEC (ref 12–14.4)

## 2023-02-15 PROCEDURE — 3074F SYST BP LT 130 MM HG: CPT | Performed by: NURSE PRACTITIONER

## 2023-02-15 PROCEDURE — 3078F DIAST BP <80 MM HG: CPT | Performed by: NURSE PRACTITIONER

## 2023-02-15 PROCEDURE — G8484 FLU IMMUNIZE NO ADMIN: HCPCS | Performed by: NURSE PRACTITIONER

## 2023-02-15 PROCEDURE — 3017F COLORECTAL CA SCREEN DOC REV: CPT | Performed by: NURSE PRACTITIONER

## 2023-02-15 PROCEDURE — 99212 OFFICE O/P EST SF 10 MIN: CPT | Performed by: NURSE PRACTITIONER

## 2023-02-15 PROCEDURE — G8427 DOCREV CUR MEDS BY ELIG CLIN: HCPCS | Performed by: NURSE PRACTITIONER

## 2023-02-15 PROCEDURE — G8420 CALC BMI NORM PARAMETERS: HCPCS | Performed by: NURSE PRACTITIONER

## 2023-02-15 PROCEDURE — 1036F TOBACCO NON-USER: CPT | Performed by: NURSE PRACTITIONER

## 2023-02-15 PROCEDURE — 85610 PROTHROMBIN TIME: CPT

## 2023-02-15 PROCEDURE — 1123F ACP DISCUSS/DSCN MKR DOCD: CPT | Performed by: NURSE PRACTITIONER

## 2023-02-15 ASSESSMENT — ENCOUNTER SYMPTOMS
VOMITING: 0
COUGH: 0
NAUSEA: 0
SHORTNESS OF BREATH: 0
DIARRHEA: 0
ABDOMINAL PAIN: 0

## 2023-02-15 NOTE — PROGRESS NOTES
SUBJECTIVE:    Roshan Feliciano is a 72 y.o. male    Anticoagulation: Patient here for followup of chronic anticoagulation.  Indication: atrial fibrillation  Bleeding Signs/Symptoms:  None  Thromboembolic Signs/Symptoms:  None    Missed Coumadin Doses:  None  Medication Changes:  no  Dietary Changes:  no  Bacterial/Viral Infection:  wound left foot- possible gangrene per Dr tellez's noted- under the care of podiatry and wound care. Will request records.     Other Concerns:  Current dose 5mg/2.5 mg alternating days       Pt reports he has been under the Care or Dr Medeiros, Podiatry in [facility]. He was referred to wound care for a wound on left foot 3 weeks ago. He has a follow up with wound care on Monday.  He has an appt for an angiogram tomorrow for interventional cardiology and request an INR today.        Chief Complaint   Patient presents with    Other     INR           Review of Systems   Constitutional: Negative.    Respiratory:  Negative for cough and shortness of breath.    Cardiovascular:  Negative for chest pain.   Gastrointestinal:  Negative for abdominal pain, diarrhea, nausea and vomiting.   Skin:  Positive for wound (left foot, dressing CDI and wearing a walking boot.).   Hematological:  Does not bruise/bleed easily.      OBJECTIVE:    BP (!) 92/56   Pulse 87   Temp 97.4 °F (36.3 °C) (Infrared)   Resp 18   Ht 6' 1\" (1.854 m)   Wt 169 lb 8 oz (76.9 kg)   SpO2 94% Comment: RA  BMI 22.36 kg/m²    Physical Exam  Vitals and nursing note reviewed.   Constitutional:       Appearance: Normal appearance. He is well-developed.   HENT:      Head: Normocephalic.   Eyes:      Extraocular Movements: Extraocular movements intact.      Conjunctiva/sclera: Conjunctivae normal.      Pupils: Pupils are equal, round, and reactive to light.   Neck:      Thyroid: No thyromegaly.      Vascular: No carotid bruit.   Cardiovascular:      Rate and Rhythm: Normal rate. Rhythm irregularly irregular.      Heart sounds:  Normal heart sounds. No murmur heard. Pulmonary:      Effort: Pulmonary effort is normal.      Breath sounds: Normal breath sounds. Skin:     General: Skin is warm and dry. Capillary Refill: Capillary refill takes less than 2 seconds. Neurological:      Mental Status: He is alert and oriented to person, place, and time. Psychiatric:         Attention and Perception: Attention and perception normal.         Mood and Affect: Mood and affect normal.         Behavior: Behavior normal.         Thought Content: Thought content normal.         Judgment: Judgment normal.       ASSESSMENT/PLAN:   Beth Nunn was seen today for other. Diagnoses and all orders for this visit:    [facility]  -     Protime-INR; Future        Return in about 1 month (around 3/15/2023).     Current Outpatient Medications on File Prior to Visit   Medication Sig Dispense Refill    ferrous sulfate (FEROSUL) 325 (65 Fe) MG tablet TAKE 1 TABLET BY MOUTH TWICE DAILY 180 tablet 2    docusate sodium (COLACE) 100 MG capsule TAKE 1 CAPSULE BY MOUTH THREE TIMES DAILY 270 capsule 0    digoxin (LANOXIN) 125 MCG tablet TAKE 1 TABLET BY MOUTH EVERY DAY OR AS DIRECTED 90 tablet 2    ipratropium (ATROVENT) 0.02 % nebulizer solution INHALE THE CONTENTS OF 1 VIAL VIA NEBULIZER EVERY 6 HOURS 875 mL 1    lovastatin (MEVACOR) 20 MG tablet TAKE 1 TABLET BY MOUTH DAILY (Patient not taking: Reported on 1/25/2023) 90 tablet 2    midodrine (PROAMATINE) 5 MG tablet Take 5 mg by mouth in the morning and at bedtime      metoprolol succinate (TOPROL XL) 25 MG extended release tablet TAKE 1 TABLET BY MOUTH EVERY DAY (Patient taking differently: 1/2 tablet daily) 90 tablet 1    allopurinol (ZYLOPRIM) 300 MG tablet TAKE 1 TABLET BY MOUTH EVERY DAY 90 tablet 2    ipratropium-albuterol (DUONEB) 0.5-2.5 (3) MG/3ML SOLN nebulizer solution INHALE 1 VIAL VIA NEBULIZER FOUR TIMES DAILY (Patient not taking: Reported on 11/16/2022) 360 mL 2 potassium chloride (KLOR-CON M) 10 MEQ extended release tablet Take 1 tablet by mouth in the morning. (Patient not taking: Reported on 12/19/2022) 90 tablet 0    MITIGARE 0.6 MG capsule TAKE 1 CAPSULE BY MOUTH DAILY 30 capsule 1    Calcium Polycarbophil (FIBER) 625 MG TABS Take 625 mg by mouth daily      Dulaglutide (TRULICITY) 1.5 IA/1.3AS SOPN Inject 1.5 mg into the skin once a week      nitroGLYCERIN (NITROSTAT) 0.4 MG SL tablet place 1 tablet under the tongue if needed every 5 minutes for chest pain for 3 doses IF NO RELIEF AFTER 3RD DOSE CALL PRESCRIBER . 25 tablet 5    COUMADIN 5 MG tablet take as directed 100 tablet 5    aspirin 81 MG tablet Take 81 mg by mouth daily. No current facility-administered medications on file prior to visit.

## 2023-02-15 NOTE — PROGRESS NOTES
Chief Complaint   Patient presents with    Other     INR        Patient reports he is here today for INR prior to angiogram of left lower extremity that is scheduled for tomorrow with Dr Colleen Faust. Patient reports his last dose of coumadin was 2.5mg yesterday. Per cardiology note patient was scheduled asap for procedure related to gangrenous left foot ulcer, venous insufficiency, and PVD. [facility] and also podiatry. Since last visit with PCP patient also saw ortho for elbow. Will request note.

## 2023-02-16 NOTE — INTERVAL H&P NOTE
H&P updated. The patient was examined and the following changes are noted:  :     No changes to the physical exam        Active Hospital Problems    Diagnosis    • **PVD (peripheral vascular disease) ([facility])      · Arterial duplex (3/16/2022):Greater than 70% distal right SFA stenosis.  Greater than 50% right proximal posterior tibial stenosis.  Mild diffuse nonobstructive atherosclerosis bilaterally.     • Foot ulcer (HCC)    • Chronic kidney disease, stage III (moderate) ([facility])    • Type 2 diabetes mellitus with nephropathy ([facility])    • Hyperlipidemia    • Paroxysmal atrial fibrillation (HCC)      · [id number]=3, on Coumadin     • Hypertension    Patient is a 72-year-old gentleman who is being referred by Dr. Naseem Campbell to undergo a peripheral angiogram and arterial duplex study for his peripheral vascular disease.  He had an arterial duplex in March of last year that showed a greater than 70% distal right SFA stenosis and greater than 50% right proximal posterior tibial stenosis.  He has nonhealing bilateral foot ulcers.  The risks and benefits of the procedure were discussed and the patient is agreeable to proceed.  Of note he is chronically anticoagulated with Coumadin.  His last dose was Tuesday and he has been holding it in preparation for today's procedure.  INR is 1.58.  Of note he has chronic kidney disease and creatinine was 1.94    • Proceed with bilateral arterial duplex study prior to procedure  • After arterial duplex proceed with peripheral angiogram with possible intervention.  Access will be via the right femoral approach  • Minimize contrast usage due to high risk of YOHANNES  • Further recommendations to follow    RADHA Rebollar

## 2023-02-17 PROBLEM — I73.9 ASYMPTOMATIC PVD (PERIPHERAL VASCULAR DISEASE): Status: ACTIVE | Noted: 2023-01-01

## 2023-02-17 NOTE — DISCHARGE SUMMARY
Discharge Summary  [place], [place]    Patient Name: Swapnil Lawson  : 1950  MRN: [id number]    Date of Admission: 2023  Date of Discharge:  2023    IDENTIFICATION:  Swapnil Lawson is a 72 y.o. male who lives in [place], [place]    Active Hospital Problems    Diagnosis  POA   • **PVD (peripheral vascular disease) (HCC) [I73.9]  Yes     Priority: High     · Arterial duplex (3/16/2022):Greater than 70% distal right SFA stenosis.  Greater than 50% right proximal posterior tibial stenosis.  Mild diffuse nonobstructive atherosclerosis bilaterally.  · Peripheral angiogram with Dr. Reed Zaldivar (2023): No significant left iliofemoral or popliteal stenoses. 80% proximal left posterior tibial artery stenosis, 50% proximal left peroneal lesions, and 90% proximal left anterior tibial artery stenosis.  Consider antegrade femoral access for PTA of tibial vessels but benefit to the patient's current wounds with angioplasty is uncertain.  The distal tibial vessels had biphasic waveforms on arterial duplex.  Aggressive lifestyle risk factor modification recommended  · Arterial duplex (2023): Biphasic waveforms throughout bilaterally.  Right unable to calculate DEBBIE.  Mild diffuse calcific atherosclerosis.  Previously noted SFA and PTA lesions not noted.  Left unable to calculate DEBBIE with exception of the monophasic proximal PTA with stenosis greater than 50%.       • Foot ulcer (HCC) [L97.509]  Yes     Priority: High   • Chronic kidney disease, stage III (moderate) (HCC) [N18.30]  Yes     Priority: High   • Type 2 diabetes mellitus with nephropathy (HCC) [E11.21]  Yes     Priority: High   • Hyperlipidemia LDL goal <70 [E78.5]  Yes     Priority: Medium   • Paroxysmal atrial fibrillation (HCC) [I48.0]  Yes     Priority: Medium     · [id number]=8, on Coumadin     • Hypertension [I10]  Yes     Priority: Medium   • Coronary artery disease [I25.10]  Yes     Priority:  Medium     · CABG, 1993, Abimael Tomlin MD.  · CABG, August 2013, [facility].  · Echo (1/2018): LVEF 40-45 %.  Akinesis of mid posterior lateral and basal to mid inferior walls consistent with prior MI     • Asymptomatic PVD (peripheral vascular disease) (HCC) [I73.9]  Yes       Summary of Hospitalization:  Patient is a 72-year-old gentleman with a history of coronary artery disease, paroxysmal atrial fibrillation, type 2 diabetes mellitus, stage III chronic kidney disease and peripheral vascular disease who presented on 2/16/2023 for scheduled peripheral angiogram and arterial duplex study.  Results of peripheral angiogram showed no significant left iliofemoral or popliteal stenosis.  There was an 80% proximal left posterior tibial artery stenosis and 50% proximal left peroneal lesions as well as 90% proximal left anterior tibial artery stenosis.  Medical management and aggressive lifestyle risk factor modification was recommended.  A future antegrade femoral access for PTA of tibial vessels could be considered but it was uncertain if intervention would provide any benefit to his foot ulcer.  The patient was observed overnight on the telemetry floor.  His right groin access site has remained stable without bleeding or hematoma.  He did have some low blood pressures that was treated with 500 mL IV fluid bolus.  He has chronic kidney disease and creatinine on arrival was 1.94 which is his baseline.  Repeat creatinine this morning was 2.01.  He is hemodynamically stable and ready for discharge home.  He will follow-up with his primary cardiologist in 4 weeks.    Procedures Performed  Procedure(s):  Peripheral angiography  Left lower extremity angiogram [facility] interventional Cardiologist         Pertinent Test Results:  Results from last 7 days   Lab Units 02/17/23  0844 02/16/23  1039   WBC 10*3/mm3  --  8.81   HEMOGLOBIN g/dL  --  10.5*   HEMATOCRIT %  --  33.6*   PLATELETS 10*3/mm3  --  137*   SODIUM  mmol/L 142 137   POTASSIUM mmol/L 4.3 3.7   CHLORIDE mmol/L 112* 108*   CO2 mmol/L 19.0* 18.0*   BUN mg/dL 35* 36*   CREATININE mg/dL 2.01* 1.94*   GLUCOSE mg/dL 115* 129*   CALCIUM mg/dL 8.3* 8.9     Results from last 7 days   Lab Units 02/16/23  1039   PROTIME Seconds 17.8*   INR  1.48*      Results from last 7 days   Lab Units 02/16/23  1039   CHOLESTEROL mg/dL 99   TRIGLYCERIDES mg/dL 49   HDL CHOL mg/dL 51   LDL CHOL mg/dL 36     Results from last 7 days   Lab Units 02/16/23  1039   HEMOGLOBIN A1C % 5.70*       XR ELBOW LEFT (2 VIEWS)    Result Date: 1/27/2023  Impression: 2 views demonstrate no visualized fracture. There is elevation of the anterior fat pad compatible with an elbow effusion. There is some soft tissue swelling overlying the olecranon. Arterial calcification is noted. Mild elbow arthritis is present. If there is history of trauma and continued pain then follow-up radiograph is recommended 5-7 days.              Physical Exam at Discharge    Vital Signs  Temp:  [97.5 °F (36.4 °C)-98.6 °F (37 °C)] 97.5 °F (36.4 °C)  Heart Rate:  [79-99] 99  Resp:  [18] 18  BP: (104-124)/(58-80) 114/66    Constitutional:       Appearance: Healthy appearance. Well-developed.   Eyes:      General: Lids are normal. No scleral icterus.     Conjunctiva/sclera: Conjunctivae normal.   HENT:      Head: Normocephalic and atraumatic.   Neck:      Thyroid: No thyromegaly.      Vascular: No carotid bruit or JVD.   Pulmonary:      Effort: Pulmonary effort is normal.      Breath sounds: Normal breath sounds. No wheezing. No rhonchi. No rales.   Cardiovascular:      Normal rate. Regular rhythm.      Murmurs: There is no murmur.      No gallop. No rub.   Pulses:     Decreased pulses.   Edema:     Peripheral edema absent.   Abdominal:      General: There is no distension.      Palpations: Abdomen is soft. There is no abdominal mass.   Musculoskeletal:      Cervical back: Normal range of motion. Skin:     General: Skin is warm and  dry.      Findings: No rash.   Neurological:      General: No focal deficit present.      Mental Status: Alert and oriented to person, place, and time.      Gait: Gait is intact.   Psychiatric:         Attention and Perception: Attention normal.         Mood and Affect: Mood normal.         Behavior: Behavior normal.          Discharge Disposition    Home           Discharge Medications      Continue These Medications      Instructions Start Date   aspirin 81 MG tablet   81 mg, Oral, Daily      colchicine 0.6 MG capsule capsule   TAKE 1 CAPSULE BY MOUTH DAILY AS NEEDED FOR PAIN      diphenhydrAMINE 25 MG tablet  Commonly known as: BENADRYL   25 mg, Oral, Every Night at Bedtime      docusate sodium 100 MG capsule  Commonly known as: COLACE   300 mg, Oral, 2 Times Daily PRN      ferrous gluconate 324 MG tablet  Commonly known as: FERGON   324 mg, Oral, Daily With Breakfast      ipratropium 0.02 % nebulizer solution  Commonly known as: ATROVENT   0.5 mg, Nebulization, Every 4 Hours PRN      ipratropium 17 MCG/ACT inhaler  Commonly known as: ATROVENT HFA   2 puffs, Inhalation, As Needed      lovastatin 20 MG tablet  Commonly known as: MEVACOR   20 mg, Oral, Nightly      metoprolol succinate XL 25 MG 24 hr tablet  Commonly known as: TOPROL-XL   12.5 mg, Oral, Daily      nitroglycerin 0.4 MG SL tablet  Commonly known as: NITROSTAT   0.4 mg, Sublingual, Every 5 Minutes PRN, Take no more than 3 doses in 15 minutes.  States he has not taken since 1993      O2  Commonly known as: OXYGEN   2 L/min, Inhalation, Nightly      polycarbophil 625 MG tablet tablet   625 mg, Oral, 2 Times Daily, Takes 2 tablets BID      Trulicity 1.5 MG/0.5ML solution pen-injector  Generic drug: Dulaglutide   Subcutaneous, Weekly      warfarin 5 MG tablet  Commonly known as: COUMADIN   5 mg, Oral, Daily Warfarin, 5mg every other day  2.5mg every other day alternating             Discharge Diet: Healthy heart/consistent low carbohydrate    Activity at  Discharge: No heavy lifting, bending, pushing or pulling for 72-hour    Future Appointments   Date Time Provider Department Center   2/20/2023  8:00 AM Cheryl Washington PT [facility]   8/10/2023  9:45 AM Naseem Campbell III, MD [facility]   10/19/2023  9:50 AM Kenji Garcias MD [facility]            Electronically signed by RADHA Luque, 02/17/23, 8:24 AM EST.    Time: Discharge 25 min

## 2023-02-17 NOTE — TELEPHONE ENCOUNTER
----- Message from Naseem Campbell III, MD sent at 2/17/2023 10:11 AM EST -----  Needs referral to Dr. Maciel to consider surgical options for nonhealing left foot ulcer.

## 2023-02-17 NOTE — PLAN OF CARE
Problem: Adult Inpatient Plan of Care  Goal: Plan of Care Review  2/17/2023 0537 by Mendy Hernandez RN  Outcome: Ongoing, Progressing  Flowsheets (Taken 2/17/2023 0537)  Plan of Care Reviewed With:   patient   spouse  Outcome Evaluation: Pt. remain alert and conscious, no complain. requested o2 N/C 2 l as he would use at home in the nights.Dressing to right groin remain dry and intact, no swelling or abnormality noted or voiced by pt. Pt. noted throughout the night to have low blood pressures, No symptomatic effect noted or voiced by pt. , PA informed and was medicated as order with 500 mls bolus fluid over 3 hours. SBP then maintined in the 100s. Care and observation continue.  2/17/2023 0537 by Mendy Hernandez RN  Outcome: Ongoing, Not Progressing  Flowsheets (Taken 2/17/2023 0537)  Plan of Care Reviewed With:   patient   spouse  Outcome Evaluation: Pt. remain alert and conscious, no complain. requested o2 N/C 2 l as he would use at home in the nights.Dressing to right groin remain dry and intact, no swelling or abnormality noted or voiced by pt. Pt. noted throughout the night to have low blood pressures, No symptomatic effect noted or voiced by pt. , PA informed and was medicated as order with 500 mls bolus fluid over 3 hours. SBP then maintined in the 100s. Care and observation continue.  Goal: Patient-Specific Goal (Individualized)  2/17/2023 0537 by Mendy Hernandez RN  Outcome: Ongoing, Progressing  2/17/2023 0537 by Mendy Hernandez RN  Outcome: Ongoing, Not Progressing  Goal: Absence of Hospital-Acquired Illness or Injury  2/17/2023 0537 by Mendy Hernandez RN  Outcome: Ongoing, Progressing  2/17/2023 0537 by Mendy Hernandez RN  Outcome: Ongoing, Not Progressing  Intervention: Identify and Manage Fall Risk  Recent Flowsheet Documentation  Taken 2/17/2023 0533 by Mendy Hernandez RN  Safety Promotion/Fall Prevention:   clutter free environment maintained   fall prevention program  maintained   safety round/check completed  Taken 2/17/2023 0400 by Mendy Hernandez RN  Safety Promotion/Fall Prevention: safety round/check completed  Taken 2/17/2023 0000 by Mendy Hernandez RN  Safety Promotion/Fall Prevention:   activity supervised   clutter free environment maintained   lighting adjusted   safety round/check completed  Taken 2/16/2023 2225 by Mendy Hernandez RN  Safety Promotion/Fall Prevention:   safety round/check completed   clutter free environment maintained  Taken 2/16/2023 2015 by Mendy Hernandez RN  Safety Promotion/Fall Prevention:   activity supervised   assistive device/personal items within reach   clutter free environment maintained   fall prevention program maintained   lighting adjusted   muscle strengthening facilitated   nonskid shoes/slippers when out of bed   room organization consistent   safety round/check completed  Intervention: Prevent Skin Injury  Recent Flowsheet Documentation  Taken 2/17/2023 0533 by Mendy Hernandez RN  Body Position: position changed independently  Taken 2/17/2023 0400 by Mendy Hernandez RN  Body Position: position changed independently  Skin Protection:   adhesive use limited   tubing/devices free from skin contact  Taken 2/17/2023 0000 by Mendy Hernandez RN  Body Position:   right   head facing, right   position changed independently  Taken 2/16/2023 2225 by Mendy Hernandez RN  Body Position:   left   side-lying  Taken 2/16/2023 2015 by Mendy Hernandez RN  Body Position: position changed independently  Intervention: Prevent and Manage VTE (Venous Thromboembolism) Risk  Recent Flowsheet Documentation  Taken 2/17/2023 0533 by Mendy Hernandez RN  Activity Management: activity adjusted per tolerance  Taken 2/17/2023 0000 by Mendy Hernandez RN  Activity Management: activity adjusted per tolerance  Taken 2/16/2023 2225 by Mendy Hernandez RN  Activity Management: activity adjusted per tolerance  Taken 2/16/2023 2015 by  Mendy Hernandez RN  Activity Management: activity adjusted per tolerance  Intervention: Prevent Infection  Recent Flowsheet Documentation  Taken 2/17/2023 0533 by Mendy Hernandez RN  Infection Prevention:   hand hygiene promoted   environmental surveillance performed  Taken 2/17/2023 0400 by Mendy Hernandez RN  Infection Prevention: rest/sleep promoted  Taken 2/16/2023 2225 by Mendy Hernandez RN  Infection Prevention:   hand hygiene promoted   environmental surveillance performed   rest/sleep promoted  Taken 2/16/2023 2015 by eMndy Hernandez RN  Infection Prevention:   environmental surveillance performed   hand hygiene promoted   personal protective equipment utilized   rest/sleep promoted  Goal: Optimal Comfort and Wellbeing  2/17/2023 0537 by Mendy Hernandez RN  Outcome: Ongoing, Progressing  2/17/2023 0537 by Mendy Hernandez RN  Outcome: Ongoing, Not Progressing  Intervention: Provide Person-Centered Care  Recent Flowsheet Documentation  Taken 2/16/2023 2015 by Mendy Hernandez RN  Trust Relationship/Rapport:   care explained   emotional support provided   questions answered   questions encouraged   thoughts/feelings acknowledged  Goal: Readiness for Transition of Care  2/17/2023 0537 by Mendy Hernandez RN  Outcome: Ongoing, Progressing  2/17/2023 0537 by Mendy Hernandez RN  Outcome: Ongoing, Not Progressing     Problem: Activity Intolerance  Goal: Enhanced Capacity and Energy  2/17/2023 0537 by Mendy Hernandez RN  Outcome: Ongoing, Progressing  2/17/2023 0537 by Mendy Hernandez RN  Outcome: Ongoing, Not Progressing  Intervention: Optimize Activity Tolerance  Recent Flowsheet Documentation  Taken 2/17/2023 0533 by Mendy Hernandez RN  Activity Management: activity adjusted per tolerance  Taken 2/17/2023 0000 by Mendy Hernandez RN  Activity Management: activity adjusted per tolerance  Taken 2/16/2023 2225 by Mendy Hernandez RN  Activity Management: activity adjusted per  tolerance  Self-Care Promotion: BADL personal objects within reach  Taken 2/16/2023 2015 by Mendy Heranndez, RN  Activity Management: activity adjusted per tolerance   Goal Outcome Evaluation:  Plan of Care Reviewed With: patient, spouse           Outcome Evaluation: Pt. remain alert and conscious, no complain. requested o2 N/C 2 l as he would use at home in the nights.Dressing to right groin remain dry and intact, no swelling or abnormality noted or voiced by pt. Pt. noted throughout the night to have low blood pressures, No symptomatic effect noted or voiced by pt. , PA informed and was medicated as order with 500 mls bolus fluid over 3 hours. SBP then maintined in the 100s. Care and observation continue.

## 2023-02-20 NOTE — THERAPY WOUND CARE TREATMENT
Outpatient Rehabilitation - Wound/Debridement Treatment Note  [facility]     Patient Name: Swapnil Lawson  : 1950  MRN: [id number]  Today's Date: 2023                 Admit Date: 2023    Visit Dx:    ICD-10-CM ICD-9-CM   1. Ulcer of left foot with other severity (HCC)  L97.528 707.15   2. Ulcer of right foot with other severity (HCC)  L97.518 707.15   3. Peripheral vascular disease of lower extremity ([facility])  I73.9 443.9   4. Type 2 diabetes mellitus with foot ulcer, unspecified whether long term insulin use ([facility])  E11.621 250.80    L97.509 707.15   5. Venous insufficiency  I87.2 459.81   L lateral foot:    R plantar wound (pre-debridement):    R plantar wound (post-debridement):      Patient Active Problem List   Diagnosis   • Coronary artery disease   • Hypertension   • Paroxysmal atrial fibrillation (HCC)   • Type 2 diabetes mellitus with nephropathy (HCC)   • Venous insufficiency   • Hyperlipidemia LDL goal <70   • Nuclear sclerotic cataract of right eye   • Cortical age-related cataract of right eye   • Nuclear sclerotic cataract of left eye   • Cortical age-related cataract of left eye   • Thrombocytopenia (HCC)   • History of colon polyps   • Chronic idiopathic constipation   • Adenomatous polyp of transverse colon   • Chronic kidney disease, stage III (moderate) (HCC)   • PVD (peripheral vascular disease) (HCC)   • Foot ulcer (HCC)   • Asymptomatic PVD (peripheral vascular disease) (HCC)        Past Medical History:   Diagnosis Date   • Abnormal ECG    • Childhood asthma    • COPD (chronic obstructive pulmonary disease) (HCC)    • Coronary artery disease 2016    CABG, , Abimael Tomlin MD. CABG, 2013, [facility].   • Diabetes mellitus (HCC) 2016    type II   • DVT (deep venous thrombosis) (HCC)     RLE   • Gout    • Hepatitis     1970s unsure of which one- states it was caused from drinking contaminated water   • Hyperlipidemia 2016   •  Hypertension 12/14/2016   • Kidney disease    • Myocardial infarction (HCC)      in 1992 and 1993 prior to CABG.   • Paroxysmal atrial fibrillation (HCC) 12/14/2016   • Pulmonary embolism (HCC)    • Sleep apnea    • Venous insufficiency 12/14/2016   • Wears glasses     for reading        Past Surgical History:   Procedure Laterality Date   • APPENDECTOMY  1981   • BUNIONECTOMY Left    • CARDIAC CATHETERIZATION      x4, no stents   • CARDIAC CATHETERIZATION N/A 2/16/2023    Procedure: Peripheral angiography  Left lower extremity angiogram [facility] interventional Cardiologist;  Surgeon: Reed Zaldivar MD;  Location: [facility] CATH INVASIVE LOCATION;  Service: Cardiovascular;  Laterality: N/A;  Left lower extremity angiogram with interventional cardioligist.   • CATARACT EXTRACTION W/ INTRAOCULAR LENS IMPLANT Right 06/25/2020    Procedure: CATARACT PHACO EXTRACTION WITH INTRAOCULAR LENS IMPLANT RIGHT;  Surgeon: Omar Blood MD;  Location: [facility] OR;  Service: Ophthalmology;  Laterality: Right;   • CATARACT EXTRACTION W/ INTRAOCULAR LENS IMPLANT Left 07/09/2020    Procedure: CATARACT PHACO EXTRACTION WITH INTRAOCULAR LENS IMPLANT LEFT;  Surgeon: Omar Blood MD;  Location: [facility] OR;  Service: Ophthalmology;  Laterality: Left;   • CHOLECYSTECTOMY  2002   • COLON RESECTION Right 09/12/2022    Procedure: COLON RESECTION LAPAROSCOPIC HAND ASSISTED;  Surgeon: Kenji Garcias MD;  Location: [facility] OR;  Service: General;  Laterality: Right;   • COLONOSCOPY     • COLONOSCOPY N/A 08/11/2022    Procedure: COLONOSCOPY, biopsy, polypectomy x1 and tattooing;  Surgeon: Kenji Garcias MD;  Location: [facility] ENDOSCOPY;  Service: Gastroenterology;  Laterality: N/A;   • CORONARY ARTERY BYPASS GRAFT      1993 triple bypass   • CORONARY ARTERY BYPASS GRAFT  2013    double bypass   • FINGER SURGERY      Rt index (second finger)   • OTHER SURGICAL HISTORY  2010    Bone spur removal   • TOE AMPUTATION Left 2009    4th toe   •  "VASECTOMY           EVALUATION   PT Ortho     Row Name 02/20/23 0800       Subjective Comments    Subjective Comments Pt now s/p vascular studies with noted partial occlusion in LLE arteries (see report).  Pt states he has a follow-up with Dr. Campbell at end of March, is not aware if he has a referral to Dr. Oviedo.  States he has pain in left foot wound \"when I walk\", not able to maintain NWB in home.  Also requesting PT look at a wound on right foot that has been draining, present for unknown duration.  -       Subjective Pain    Able to rate subjective pain? yes  -JM    Pre-Treatment Pain Level 5  -JM    Post-Treatment Pain Level 5  -JM    Subjective Pain Comment facial scale, extremely tender plantar aspect L foot wound  -       Transfers    Comment, (Transfers) seated in transport chair  -          User Key  (r) = Recorded By, (t) = Taken By, (c) = Cosigned By    Initials Name Provider Type    Cheryl Hua, PT Physical Therapist                 [facility] Wound     Row Name 02/20/23 0800             Wound 02/16/23 1040 Right lateral plantar Venous Ulcer    Wound - Properties Group Placement Date: 02/16/23  - Placement Time: 1040  -KH Present on Hospital Admission: Y  -KH Side: Right  - Orientation: lateral  - Location: plantar  - Primary Wound Type: Venous ulcer  -    Wound Image Images linked: 2  -JM      Pressure Injury Stage U  -JM      Dressing Appearance intact;moist drainage  nonstick pad and tape  -JM      Base exposed structure;moist;white;subcutaneous;slough;yellow  exposed CT/capsule  -      Periwound intact;moist;redness  -      Periwound Temperature warm  -      Periwound Skin Turgor soft  -      Edges callused;open  -JM      Wound Length (cm) 0.4 cm  -JM      Wound Width (cm) 1 cm  -JM      Wound Depth (cm) 0.5 cm  full depth obscured, to CT/fascia  -JM      Wound Surface Area (cm^2) 0.4 cm^2  -JM      Wound Volume (cm^3) 0.2 cm^3  -JM      Drainage Characteristics/Odor " "serosanguineous  -      Drainage Amount small  -      Care, Wound cleansed with;wound cleanser;debrided  -      Dressing Care dressing applied;silver impregnated;foam;border dressing  mepilex ag, 4\" optifoam  -JM      Periwound Care cleansed with pH balanced cleanser;dry periwound area maintained  -JM      Retired Wound - Properties Group Placement Date: 02/16/23  -KH Placement Time: 1040  -KH Present on Hospital Admission: Y  -KH Side: Right  -KH Orientation: lateral  -KH Location: plantar  -KH Primary Wound Type: Venous ulcer  -KH    Retired Wound - Properties Group Date first assessed: 02/16/23  -KH Time first assessed: 1040  -KH Present on Hospital Admission: Y  -KH Side: Right  -KH Location: plantar  -KH Primary Wound Type: Venous ulcer  -KH       Wound 02/06/23 1220 Left lateral foot Pressure Injury    Wound - Properties Group Placement Date: 02/06/23  - Placement Time: 1220  - Present on Hospital Admission: Y  - Side: Left  - Orientation: lateral  - Location: foot  - Primary Wound Type: Pressure inj  -    Wound Image Images linked: 1  -      Pressure Injury Stage U  -      Dressing Appearance intact;moist drainage  -      Base necrotic;red;moist;black eschar;yellow;subcutaneous;other (see comments)  necrotic fascia/CT  -      Periwound pink;moist;macerated  -      Periwound Temperature warm  -      Periwound Skin Turgor soft  -      Edges irregular;open  -      Wound Length (cm) 3.3 cm  -      Wound Width (cm) 3.4 cm  -      Wound Depth (cm) --  obscured  -      Wound Surface Area (cm^2) 11.22 cm^2  -      Drainage Characteristics/Odor serosanguineous  -      Drainage Amount small  -      Care, Wound cleansed with;wound cleanser;debrided;honey applied  -      Dressing Care dressing applied;silver impregnated;foam;border dressing  mepilex ag, 4\" optifoam  -      Periwound Care cleansed with pH balanced cleanser;dry periwound area maintained  -      Retired " Wound - Properties Group Placement Date: 02/06/23  [facility] [place] 1220  - Present on Hospital Admission: Y  - Side: Left  - Orientation: lateral  - Location: foot  - Primary Wound Type: Pressure inj  -MC    Retired Wound - Properties Group Date first assessed: 02/06/23  - Time first assessed: 1220  - Present on Hospital Admission: Y  - Side: Left  - Location: foot  -MC Primary Wound Type: Pressure inj  -MC          User Key  (r) = Recorded By, (t) = Taken By, (c) = Cosigned By    Initials Name Provider Type    Morena Rajan, PT Physical Therapist    Fátima Armando RN Registered Nurse    Cheryl Hua, PT Physical Therapist                  WOUND DEBRIDEMENT  Total area of Debridement: 6cmsq  Debridement Site 1  Location- Site 1: L foot wound and periwound  Selective Debridement- Site 1: Wound Surface <20cmsq  Instruments- Site 1: #15, scapel, tweezers  Excised Tissue Description- Site 1: minimum, slough, eschar, necrotic  Bleeding- Site 1: none   Debridement Site 2  Location- Site 2: R plantar wound  Selective Debridement- Site 2: Wound Surface <20cmsq  Instruments- Site 2: #15, scapel, tweezers  Excised Tissue Description- Site 2: moderate, other (comment) (callous, loose macerated tissues)  Bleeding- Site 2: none          Therapy Education     Row Name 02/20/23 0900             Therapy Education    Education Details Reinforced that wounds will not heal if pt is weight-bearing, even in offloading shoe.  Pt also needs offloading of right plantar wound.  Recommended offloading shoe but pt high risk for falls.  Pt may be able to obtain custom shoe inserts thru his podiatrist.  Also discussed potential benefit of MIST, and pt agreeable with 2x/week tx.  -JM      Given Bandaging/dressing change;Symptoms/condition management  -MARICEL      Program Reinforced  -MARICEL      How Provided Verbal;Demonstration;Written  -MARICEL      Provided to Patient  -MARICEL      Level of Understanding  Teach back education performed;Verbalized  -            User Key  (r) = Recorded By, (t) = Taken By, (c) = Cosigned By    Initials Name Provider Type    Cheryl Hua, PT Physical Therapist                Recommendation and Plan   PT Assessment/Plan     Row Name 02/20/23 0800          PT Assessment    Functional Limitations Decreased safety during functional activities;Limitations in functional capacity and performance;Performance in self-care ADL;Other (comment)  wound mgmt  -     Impairments Integumentary integrity;Pain;Impaired venous circulation  -     Assessment Comments Pt with increased wound dimensions and continued black necrotic tissue to left foot wound, with pt unable to maintain NWB L foot.  Unfortunately, pt also with wound to right plantar surface initially obscured by callous, but noted to have depth to fascia/CT after debridement.  PT recommends trial of MIST to both foot wounds to help maximize healing potential and increase local blood flow for healing, and pt agreeable to increase frequency to 2x/week.  Pt will continue to require debridement and advanced dressings, but anticipate limited healing with pt continuing to weight-bear on wounds.  Pt also awaiting consult with Dr. Oviedo for vascular surgery options.  -        PT Plan    PT Frequency 2x/week  -     Physical Therapy Interventions (Optional Details) patient/family education;wound care  -     PT Plan Comments start MIST, debridement  -           User Key  (r) = Recorded By, (t) = Taken By, (c) = Cosigned By    Initials Name Provider Type    Cheryl Hua, PT Physical Therapist                Goals   PT OP Goals     Row Name 02/20/23 0800          Time Calculation    PT Goal Re-Cert Due Date 05/07/23  -           User Key  (r) = Recorded By, (t) = Taken By, (c) = Cosigned By    Initials Name Provider Type    Cheryl Hua, PT Physical Therapist                PT Goal Re-Cert Due Date: 05/07/23             Time Calculation: Start Time: 0800  Untimed Charges  95570-Tldayhbsj debridement: 30  Total Minutes  Untimed Charges Total Minutes: 30   Total Minutes: 30  Therapy Charges for Today     Code Description Service Date Service Provider Modifiers Qty    [id number]  JOSS DEBRIDE OPEN WOUND UP TO 20CM 2/20/2023 Cheryl Washington, PT GP 1                  Cheryl Washington, PT  2/20/2023

## 2023-02-20 NOTE — TELEPHONE ENCOUNTER
"Patient was seen at PT wound care today, requested we assess new wound on right foot that he states is a \"blood blister\" that drains.  Right plantar area now presenting with open wound with exposed fascia/CT (see photos in his treatment note from today).  Just want to keep you updated, and to make sure if Dr. Oviedo sees the patient, he is also aware that patient has bilateral foot wounds.  Patient is unable to maintain NWB of wounds, so I anticipate limited progress at this time considering his other medical comorbidities.  Thank you.  Cheryl Washington, PT 2/20/2023 09:18 EST      "

## 2023-02-23 NOTE — THERAPY WOUND CARE TREATMENT
Outpatient Rehabilitation - Wound/Debridement Treatment Note  [facility]     Patient Name: Swapnil Lawson  : 1950  MRN: [id number]  Today's Date: 2023             L lateral foot      Admit Date: 2023    Visit Dx:    ICD-10-CM ICD-9-CM   1. Ulcer of left foot with other severity (HCC)  L97.528 707.15   2. Ulcer of right foot with other severity (HCC)  L97.518 707.15   3. Peripheral vascular disease of lower extremity (HCC)  I73.9 443.9   4. Type 2 diabetes mellitus with foot ulcer, unspecified whether long term insulin use (HCC)  E11.621 250.80    L97.509 707.15   5. Venous insufficiency  I87.2 459.81       Patient Active Problem List   Diagnosis   • Coronary artery disease   • Hypertension   • Paroxysmal atrial fibrillation (HCC)   • Type 2 diabetes mellitus with nephropathy (HCC)   • Venous insufficiency   • Hyperlipidemia LDL goal <70   • Nuclear sclerotic cataract of right eye   • Cortical age-related cataract of right eye   • Nuclear sclerotic cataract of left eye   • Cortical age-related cataract of left eye   • Thrombocytopenia (HCC)   • History of colon polyps   • Chronic idiopathic constipation   • Adenomatous polyp of transverse colon   • Chronic kidney disease, stage III (moderate) (HCC)   • PVD (peripheral vascular disease) (HCC)   • Foot ulcer (HCC)   • Asymptomatic PVD (peripheral vascular disease) (HCC)        Past Medical History:   Diagnosis Date   • Abnormal ECG    • Childhood asthma    • COPD (chronic obstructive pulmonary disease) (HCC)    • Coronary artery disease 2016    CABG, , Abimael Tomlin MD. CABG, 2013, [facility].   • Diabetes mellitus (HCC) 2016    type II   • DVT (deep venous thrombosis) (HCC)     RLE   • Gout    • Hepatitis     1970s unsure of which one- states it was caused from drinking contaminated water   • Hyperlipidemia 2016   • Hypertension 2016   • Kidney disease    • Myocardial infarction (HCC)      in  1992 and 1993 prior to CABG.   • Paroxysmal atrial fibrillation (HCC) 12/14/2016   • Pulmonary embolism (HCC)    • Sleep apnea    • Venous insufficiency 12/14/2016   • Wears glasses     for reading        Past Surgical History:   Procedure Laterality Date   • APPENDECTOMY  1981   • BUNIONECTOMY Left    • CARDIAC CATHETERIZATION      x4, no stents   • CARDIAC CATHETERIZATION N/A 2/16/2023    Procedure: Peripheral angiography  Left lower extremity angiogram [facility] interventional Cardiologist;  Surgeon: Reed Zaldivar MD;  Location: [facility] CATH INVASIVE LOCATION;  Service: Cardiovascular;  Laterality: N/A;  Left lower extremity angiogram with interventional cardioligist.   • CATARACT EXTRACTION W/ INTRAOCULAR LENS IMPLANT Right 06/25/2020    Procedure: CATARACT PHACO EXTRACTION WITH INTRAOCULAR LENS IMPLANT RIGHT;  Surgeon: Omar Blood MD;  Location: [facility] OR;  Service: Ophthalmology;  Laterality: Right;   • CATARACT EXTRACTION W/ INTRAOCULAR LENS IMPLANT Left 07/09/2020    Procedure: CATARACT PHACO EXTRACTION WITH INTRAOCULAR LENS IMPLANT LEFT;  Surgeon: Omar Blood MD;  Location: [facility] OR;  Service: Ophthalmology;  Laterality: Left;   • CHOLECYSTECTOMY  2002   • COLON RESECTION Right 09/12/2022    Procedure: COLON RESECTION LAPAROSCOPIC HAND ASSISTED;  Surgeon: Kenji Garcias MD;  Location: [facility] OR;  Service: General;  Laterality: Right;   • COLONOSCOPY     • COLONOSCOPY N/A 08/11/2022    Procedure: COLONOSCOPY, biopsy, polypectomy x1 and tattooing;  Surgeon: Kenji Garcias MD;  Location: [facility] ENDOSCOPY;  Service: Gastroenterology;  Laterality: N/A;   • CORONARY ARTERY BYPASS GRAFT      1993 triple bypass   • CORONARY ARTERY BYPASS GRAFT  2013    double bypass   • FINGER SURGERY      Rt index (second finger)   • OTHER SURGICAL HISTORY  2010    Bone spur removal   • TOE AMPUTATION Left 2009    4th toe   • VASECTOMY           EVALUATION   PT Ortho     Row Name 02/23/23 1000       Subjective  "Comments    Subjective Comments No complaints or changes, pt didn't realize he had placed mepilex Ag incorrectly (upside down). He is hesitant to go to the ER today for assessment, would like to wait until Monday to see how his wounds are.  -       Subjective Pain    Able to rate subjective pain? yes  -MC    Pre-Treatment Pain Level 4  -MC    Post-Treatment Pain Level 4  -MC    Subjective Pain Comment FACES  -       Transfers    Comment, (Transfers) remained seated in transport chair for tx  -          User Key  (r) = Recorded By, (t) = Taken By, (c) = Cosigned By    Initials Name Provider Type     Morena Jordan PT Physical Therapist                 [facility] Wound     Row Name 02/23/23 1000             Wound 02/16/23 1040 Right lateral plantar Venous Ulcer    Wound - Properties Group Placement Date: 02/16/23 -KH Placement Time: 1040 -KH Present on Hospital Admission: Y  - Side: Right  - Orientation: lateral  - Location: plantar  - Primary Wound Type: Venous ulcer  -    Dressing Appearance intact;moist drainage  mepilex Ag upside down  -      Base exposed structure;moist;white;subcutaneous;slough;yellow  exposed CT/capsule  -      Periwound intact;moist;redness  -      Periwound Temperature warm  -      Periwound Skin Turgor soft  -      Edges callused;open  -      Drainage Characteristics/Odor serosanguineous  -      Drainage Amount small  -      Care, Wound cleansed with;wound cleanser;debrided  -      Dressing Care dressing applied;silver impregnated;low-adherent;foam;border dressing  mepilex Ag, 4\" optifoam  -      Periwound Care cleansed with pH balanced cleanser;dry periwound area maintained  -      Retired Wound - Properties Group Placement Date: 02/16/23 -KH Placement Time: 1040 -KH Present on Hospital Admission: Y  -KH Side: Right  - Orientation: lateral  - Location: plantar  - Primary Wound Type: Venous ulcer  -    Retired Wound - Properties Group Date " "first assessed: 02/16/23  - Time first assessed: 1040  -KH Present on Hospital Admission: Y  -KH Side: Right  -KH Location: plantar  -KH Primary Wound Type: Venous ulcer  -KH       Wound 02/06/23 1220 Left lateral foot Pressure Injury    Wound - Properties Group Placement Date: 02/06/23  - Placement Time: 1220  -MC Present on Hospital Admission: Y  -MC Side: Left  -MC Orientation: lateral  - Location: foot  -MC Primary Wound Type: Pressure inj  -MC    Wound Image Images linked: 1  -MC      Dressing Appearance intact;moist drainage  mepilex Ag upside down  -      Base necrotic;red;moist;black eschar;yellow;subcutaneous;other (see comments);exposed structure   necrotic fascia/CT. Bone palpable through necrosis but not visible  -      Periwound pink;moist;macerated  -      Periwound Temperature warm  -      Periwound Skin Turgor soft  -      Edges irregular;open  -      Wound Length (cm) 3.5 cm  -      Wound Width (cm) 3.5 cm  -      Wound Depth (cm) --  obscured  -      Wound Surface Area (cm^2) 12.25 cm^2  -      Drainage Characteristics/Odor serosanguineous  -      Drainage Amount small  -      Care, Wound cleansed with;wound cleanser;debrided  -      Dressing Care dressing applied;silver impregnated;low-adherent;foam  mepilex Ag, 4\" optifoam  -      Periwound Care cleansed with pH balanced cleanser;dry periwound area maintained  -      Retired Wound - Properties Group Placement Date: 02/06/23  - Placement Time: 1220  - Present on Hospital Admission: Y  -MC Side: Left  - Orientation: lateral  - Location: foot  -MC Primary Wound Type: Pressure inj  -MC    Retired Wound - Properties Group Date first assessed: 02/06/23  - Time first assessed: 1220  - Present on Hospital Admission: Y  -MC Side: Left  - Location: foot  -MC Primary Wound Type: Pressure inj  -MC          User Key  (r) = Recorded By, (t) = Taken By, (c) = Cosigned By    Initials Name Provider Type     " Morena Jordan, PT Physical Therapist    Fátima Armando RN Registered Nurse                  WOUND DEBRIDEMENT  Total area of Debridement: 5 cm2  Debridement Site 1  Location- Site 1: L foot wound and periwound  Selective Debridement- Site 1: Wound Surface <20cmsq  Instruments- Site 1: #15, scapel, tweezers  Excised Tissue Description- Site 1: moderate, eschar  Bleeding- Site 1: none   Debridement Site 2  Location- Site 2: R plantar wound  Selective Debridement- Site 2: Wound Surface <20cmsq  Instruments- Site 2: tweezers  Excised Tissue Description- Site 2: minimum, slough  Bleeding- Site 2: none          Therapy Education     Row Name 02/23/23 1000             Therapy Education    Education Details Reviewed placement of mepilex Ag with sticky side DOWN toward the wound bed. Recommended pt present to the ER today for assessment due to potential bone exposure and continual degradation of the viable tissue in the L foot wound.  -MC      Given Bandaging/dressing change;Symptoms/condition management  -MC      Program Reinforced  -MC      How Provided Verbal;Demonstration;Written  -MC      Provided to Patient  -MC      Level of Understanding Teach back education performed;Verbalized  -MC            User Key  (r) = Recorded By, (t) = Taken By, (c) = Cosigned By    Initials Name Provider Type    Morena Rajan, PT Physical Therapist                Recommendation and Plan   PT Assessment/Plan     Row Name 02/23/23 1000          PT Assessment    Functional Limitations Decreased safety during functional activities;Limitations in functional capacity and performance;Performance in self-care ADL;Other (comment)  wound mgmt  -MC     Impairments Integumentary integrity;Pain;Impaired venous circulation  -MC     Assessment Comments Pt with slight increase in L foot wound dimensions. The base remains necrotic, with bone palpable but not visible through the thin layer of necrotic tissue. The R foot wound is  unchanged, with fascia/joint capsule visible in the base. Due to the patient's known vascular compromise, inability to remain NWB, and continual degradation of viable tissue in the L foot, PT recommended PT present to the ER for immediate assessment of his wounds to determine if these wounds are likely to heal with advanced wound care or if further medical intervention is required. Pt declined today, but is willing to consider it after his next appt.  -     Rehab Potential Fair  -     Patient/caregiver participated in establishment of treatment plan and goals Yes  -     Patient would benefit from skilled therapy intervention Yes  -        PT Plan    PT Frequency 2x/week;1x/week  -     Physical Therapy Interventions (Optional Details) wound care;patient/family education  -     PT Plan Comments debridement, dressings. Start MIST if pt not willing to go to the ER for assessment.  -           User Key  (r) = Recorded By, (t) = Taken By, (c) = Cosigned By    Initials Name Provider Type    Morena Rajan, PT Physical Therapist                Goals   PT OP Goals     Row Name 02/23/23 1024          Time Calculation    PT Goal Re-Cert Due Date 05/07/23  -           User Key  (r) = Recorded By, (t) = Taken By, (c) = Cosigned By    Initials Name Provider Type    Morena Rajan, PT Physical Therapist                PT Goal Re-Cert Due Date: 05/07/23            Time Calculation: Start Time: 0805  Untimed Charges  05860-Thwttddyw debridement: 25  Total Minutes  Untimed Charges Total Minutes: 25   Total Minutes: 25  Therapy Charges for Today     Code Description Service Date Service Provider Modifiers Qty    [id number]  JOSS DEBRIDE OPEN WOUND UP TO 20CM 2/23/2023 Morena Jordan, PT GP 1                  Morena Jordan, PT  2/23/2023

## 2023-02-27 ENCOUNTER — OFFICE VISIT (OUTPATIENT)
Dept: FAMILY MEDICINE CLINIC | Age: 73
End: 2023-02-27

## 2023-02-27 VITALS
HEART RATE: 94 BPM | DIASTOLIC BLOOD PRESSURE: 60 MMHG | HEIGHT: 73 IN | TEMPERATURE: 97.7 F | BODY MASS INDEX: 22.93 KG/M2 | OXYGEN SATURATION: 91 % | SYSTOLIC BLOOD PRESSURE: 88 MMHG | WEIGHT: 173 LBS | RESPIRATION RATE: 18 BRPM

## 2023-02-27 DIAGNOSIS — N50.89 SWELLING OF LEFT TESTICLE: Primary | ICD-10-CM

## 2023-02-27 NOTE — PROGRESS NOTES
Chief Complaint   Patient presents with    Fall    Testicle Pain     Patient here with complaints of testicle pain and difficulty urinating after fall. Patient reports he bent down to  something off the floor got tangled up and fell straight backwards landing on his buttocks. Patient denies hitting his head or LOC. He reports this fall happened one week ago today. Patient states his testicles swelled up and he had difficulty urinating as well. He states the only way he could urinate was to sit down on the toilet to have a BM. Patient denies any hematuria and denies bruising to the area. Patient reports he is now able to urinate but is having pain rated a 5 to his left groin. He reports this pain started around Wednesday of last week.

## 2023-02-27 NOTE — PROGRESS NOTES
SUBJECTIVE:    Beth Joel is a 67 y.o. male    Patient here with complaints of testicle pain and difficulty urinating after fall. Pt reports 7 days ago he bent down to  something off the floor got tangled up and fell straight backwards landing on his buttocks. Patient denies hitting his head or LOC. Denies pain at time of fall. Patient states his left testicles swelled up 5 days ago, denies pain and he had difficulty urinating as well. Pt reports difficulty urinating last 2-3 days. He states the only way he could urinate was to sit down on the toilet to have a BM. Patient denies any hematuria and denies bruising to the area. Pt reports he is having no difficulty urinating at the time. Pt reports swelling left testicle has improved but has not fully resolved. He c/o left groin pain. Pt reports he felt pain was due to constipation. He reports he took dulcolax yesterday and has been up most of the night with diarrhea. Pt reports since that time left groin pain has resolved. He reports he is unable to give a urine sample at this time due to urinating prior to appt. Chief Complaint   Patient presents with    Fall    Testicle Pain        Review of Systems   Constitutional: Negative. HENT: Negative. Eyes: Negative. Respiratory: Negative. Cardiovascular: Negative. Gastrointestinal: Negative. Endocrine: Negative. Genitourinary:  Positive for difficulty urinating (has resolved) and scrotal swelling (improving). Negative for decreased urine volume, frequency, hematuria, penile discharge, penile pain, penile swelling and testicular pain. Musculoskeletal: Negative. Skin: Negative. Allergic/Immunologic: Negative. Neurological: Negative. Hematological: Negative. Psychiatric/Behavioral: Negative. All other systems reviewed and are negative.      OBJECTIVE:    BP 88/60   Pulse 94   Temp 97.7 °F (36.5 °C) (Infrared)   Resp 18   Ht 6' 1\" (1.854 m)   Wt 173 lb (78.5 kg)   SpO2 91% Comment: RA  BMI 22.82 kg/m²    Physical Exam  Vitals and nursing note reviewed. Exam conducted with a chaperone present. Constitutional:       Appearance: Normal appearance. He is well-developed. HENT:      Head: Normocephalic. Eyes:      Extraocular Movements: Extraocular movements intact. Conjunctiva/sclera: Conjunctivae normal.      Pupils: Pupils are equal, round, and reactive to light. Neck:      Thyroid: No thyromegaly. Vascular: No carotid bruit. Cardiovascular:      Rate and Rhythm: Normal rate and regular rhythm. Heart sounds: Normal heart sounds. No murmur heard. Pulmonary:      Effort: Pulmonary effort is normal.      Breath sounds: Normal breath sounds. Abdominal:      Hernia: There is no hernia in the left inguinal area or right inguinal area. Genitourinary:     Pubic Area: No rash or pubic lice. Penis: Circumcised. No erythema. Testes:         Right: Swelling present. Mass or tenderness not present. Right testis is descended. Left: Swelling present. Mass or tenderness not present. Left testis is descended. Epididymis:      Right: Normal.      Comments: Minimal bilateral testicular swelling. Lymphadenopathy:      Lower Body: No right inguinal adenopathy. No left inguinal adenopathy. Skin:     General: Skin is warm and dry. Neurological:      Mental Status: He is alert and oriented to person, place, and time. Psychiatric:         Attention and Perception: Attention and perception normal.         Mood and Affect: Mood and affect normal.         Behavior: Behavior normal.         Thought Content: Thought content normal.         Judgment: Judgment normal.       ASSESSMENT/PLAN:   Trish Valencia was seen today for fall and testicle pain. Diagnoses and all orders for this visit:    Swelling of left testicle  -     US SCROTUM AND TESTICLES; Future  - Pt requested to schedule US tomorrow.    Pt given urine cup to bring urine sample in for evaluation. -pt advised to follow up if symptoms do not resolve for referral to urology. Return if symptoms worsen or fail to improve. Current Outpatient Medications on File Prior to Visit   Medication Sig Dispense Refill    ferrous sulfate (FEROSUL) 325 (65 Fe) MG tablet TAKE 1 TABLET BY MOUTH TWICE DAILY 180 tablet 2    docusate sodium (COLACE) 100 MG capsule TAKE 1 CAPSULE BY MOUTH THREE TIMES DAILY 270 capsule 0    digoxin (LANOXIN) 125 MCG tablet TAKE 1 TABLET BY MOUTH EVERY DAY OR AS DIRECTED 90 tablet 2    ipratropium (ATROVENT) 0.02 % nebulizer solution INHALE THE CONTENTS OF 1 VIAL VIA NEBULIZER EVERY 6 HOURS 875 mL 1    lovastatin (MEVACOR) 20 MG tablet TAKE 1 TABLET BY MOUTH DAILY (Patient not taking: Reported on 1/25/2023) 90 tablet 2    midodrine (PROAMATINE) 5 MG tablet Take 5 mg by mouth in the morning and at bedtime      metoprolol succinate (TOPROL XL) 25 MG extended release tablet TAKE 1 TABLET BY MOUTH EVERY DAY (Patient taking differently: 1/2 tablet daily) 90 tablet 1    allopurinol (ZYLOPRIM) 300 MG tablet TAKE 1 TABLET BY MOUTH EVERY DAY 90 tablet 2    ipratropium-albuterol (DUONEB) 0.5-2.5 (3) MG/3ML SOLN nebulizer solution INHALE 1 VIAL VIA NEBULIZER FOUR TIMES DAILY (Patient not taking: Reported on 11/16/2022) 360 mL 2    potassium chloride (KLOR-CON M) 10 MEQ extended release tablet Take 1 tablet by mouth in the morning.  (Patient not taking: Reported on 12/19/2022) 90 tablet 0    MITIGARE 0.6 MG capsule TAKE 1 CAPSULE BY MOUTH DAILY 30 capsule 1    Calcium Polycarbophil (FIBER) 625 MG TABS Take 625 mg by mouth daily      Dulaglutide (TRULICITY) 1.5 ZX/7.0DS SOPN Inject 1.5 mg into the skin once a week      nitroGLYCERIN (NITROSTAT) 0.4 MG SL tablet place 1 tablet under the tongue if needed every 5 minutes for chest pain for 3 doses IF NO RELIEF AFTER 3RD DOSE CALL PRESCRIBER . 25 tablet 5    COUMADIN 5 MG tablet take as directed 100 tablet 5    aspirin 81 MG tablet Take 81 mg by mouth daily. No current facility-administered medications on file prior to visit.

## 2023-02-28 ENCOUNTER — HOSPITAL ENCOUNTER (OUTPATIENT)
Dept: ULTRASOUND IMAGING | Facility: HOSPITAL | Age: 73
Discharge: HOME | End: 2023-02-28
Payer: MEDICARE

## 2023-02-28 DIAGNOSIS — N50.89 SWELLING OF LEFT TESTICLE: ICD-10-CM

## 2023-02-28 PROCEDURE — 76870 US EXAM SCROTUM: CPT

## 2023-03-02 NOTE — THERAPY WOUND CARE TREATMENT
Outpatient Rehabilitation - Wound/Debridement Treatment Note   [place]     Patient Name: Swapnil Lawson  : 1950  MRN: [id number]  Today's Date: 3/2/2023                 Admit Date: 3/2/2023    Visit Dx:    ICD-10-CM ICD-9-CM   1. Ulcer of left foot with other severity (HCC)  L97.528 707.15   2. Ulcer of right foot with other severity (HCC)  L97.518 707.15   3. Peripheral vascular disease of lower extremity ([facility])  I73.9 443.9   4. Type 2 diabetes mellitus with foot ulcer, unspecified whether long term insulin use ([facility])  E11.621 250.80    L97.509 707.15   5. Venous insufficiency  I87.2 459.81   lat L foot:    R plantar 4th MH:      Patient Active Problem List   Diagnosis   • Coronary artery disease   • Hypertension   • Paroxysmal atrial fibrillation (HCC)   • Type 2 diabetes mellitus with nephropathy (HCC)   • Venous insufficiency   • Hyperlipidemia LDL goal <70   • Nuclear sclerotic cataract of right eye   • Cortical age-related cataract of right eye   • Nuclear sclerotic cataract of left eye   • Cortical age-related cataract of left eye   • Thrombocytopenia (HCC)   • History of colon polyps   • Chronic idiopathic constipation   • Adenomatous polyp of transverse colon   • Chronic kidney disease, stage III (moderate) (HCC)   • PVD (peripheral vascular disease) (HCC)   • Foot ulcer (HCC)   • Asymptomatic PVD (peripheral vascular disease) (HCC)        Past Medical History:   Diagnosis Date   • Abnormal ECG    • Childhood asthma    • COPD (chronic obstructive pulmonary disease) (HCC)    • Coronary artery disease 2016    CABG, , Abimael Tomlin MD. CABG, 2013, [facility].   • Diabetes mellitus (HCC) 2016    type II   • DVT (deep venous thrombosis) (HCC)     RLE   • Gout    • Hepatitis     1970s unsure of which one- states it was caused from drinking contaminated water   • Hyperlipidemia 2016   • Hypertension 2016   • Kidney disease    • Myocardial infarction  (HCC)      in 1992 and 1993 prior to CABG.   • Paroxysmal atrial fibrillation (HCC) 12/14/2016   • Pulmonary embolism (HCC)    • Sleep apnea    • Venous insufficiency 12/14/2016   • Wears glasses     for reading        Past Surgical History:   Procedure Laterality Date   • APPENDECTOMY  1981   • BUNIONECTOMY Left    • CARDIAC CATHETERIZATION      x4, no stents   • CARDIAC CATHETERIZATION N/A 2/16/2023    Procedure: Peripheral angiography  Left lower extremity angiogram wit interventional Cardiologist;  Surgeon: Reed Zaldivar MD;  Location: [facility] CATH INVASIVE LOCATION;  Service: Cardiovascular;  Laterality: N/A;  Left lower extremity angiogram with interventional cardioligist.   • CATARACT EXTRACTION W/ INTRAOCULAR LENS IMPLANT Right 06/25/2020    Procedure: CATARACT PHACO EXTRACTION WITH INTRAOCULAR LENS IMPLANT RIGHT;  Surgeon: Omar Blood MD;  Location: [facility] OR;  Service: Ophthalmology;  Laterality: Right;   • CATARACT EXTRACTION W/ INTRAOCULAR LENS IMPLANT Left 07/09/2020    Procedure: CATARACT PHACO EXTRACTION WITH INTRAOCULAR LENS IMPLANT LEFT;  Surgeon: Omar Blood MD;  Location: [facility] OR;  Service: Ophthalmology;  Laterality: Left;   • CHOLECYSTECTOMY  2002   • COLON RESECTION Right 09/12/2022    Procedure: COLON RESECTION LAPAROSCOPIC HAND ASSISTED;  Surgeon: Kenji Garcias MD;  Location: [facility] OR;  Service: General;  Laterality: Right;   • COLONOSCOPY     • COLONOSCOPY N/A 08/11/2022    Procedure: COLONOSCOPY, biopsy, polypectomy x1 and tattooing;  Surgeon: Kenji Garcias MD;  Location: [facility] ENDOSCOPY;  Service: Gastroenterology;  Laterality: N/A;   • CORONARY ARTERY BYPASS GRAFT      1993 triple bypass   • CORONARY ARTERY BYPASS GRAFT  2013    double bypass   • FINGER SURGERY      Rt index (second finger)   • OTHER SURGICAL HISTORY  2010    Bone spur removal   • TOE AMPUTATION Left 2009    4th toe   • VASECTOMY           EVALUATION   PT Ortho     Row Name 03/02/23 0800        Subjective Comments    Subjective Comments Pt states he fell at home last week and was sore/swollen so was not able to come for tx.  States he doesn't see the vascular surgeon until 3/16/23.  States main reason for not going to ED is because the wounds haven't been hurting.  Declined to go to ED today, but states he will go home and prepare for an admission, then will come to the ED, though he might wait and see how he looks at his tx Monday, he says.  -JM       Subjective Pain    Able to rate subjective pain? yes  -JM    Pre-Treatment Pain Level 2  -JM    Post-Treatment Pain Level 2  -JM       Transfers    Comment, (Transfers) remained in transport chair for tx  -JM          User Key  (r) = Recorded By, (t) = Taken By, (c) = Cosigned By    Initials Name Provider Type    Cheryl Hua, PT Physical Therapist                 LDA Wound     Row Name 03/02/23 0800             Wound 02/16/23 1040 Right lateral plantar Venous Ulcer    Wound - Properties Group Placement Date: 02/16/23  - Placement Time: 1040  -KH Present on Hospital Admission: Y  -KH Side: Right  - Orientation: lateral  - Location: plantar  -KH Primary Wound Type: Venous ulcer  -    Wound Image Images linked: 1  -JM      Dressing Appearance intact;moist drainage  -JM      Base exposed structure;moist;white;subcutaneous;slough;yellow  exposed CT/capsule  -      Periwound intact;moist;redness  -      Periwound Temperature warm  -      Periwound Skin Turgor soft  -JM      Edges callused;open  -JM      Wound Length (cm) 1.5 cm  -JM      Wound Width (cm) 1.7 cm  -JM      Wound Depth (cm) 0.5 cm  depth to bone/capsule  -JM      Wound Surface Area (cm^2) 2.55 cm^2  -JM      Wound Volume (cm^3) 1.275 cm^3  -JM      Drainage Characteristics/Odor serosanguineous  -JM      Drainage Amount small  -JM      Care, Wound cleansed with;wound cleanser;debrided  -JM      Dressing Care dressing applied;collagen;foam;silver impregnated;silicone;border  "dressing  tez, mepilex ag, 4\" optifoam  -      Periwound Care cleansed with pH balanced cleanser;dry periwound area maintained  -      Retired Wound - Properties Group Placement Date: 02/16/23  - Placement Time: 1040  -KH Present on Hospital Admission: Y  -KH Side: Right  -KH Orientation: lateral  -KH Location: plantar  -KH Primary Wound Type: Venous ulcer  -KH    Retired Wound - Properties Group Date first assessed: 02/16/23  -KH Time first assessed: 1040  -KH Present on Hospital Admission: Y  -KH Side: Right  -KH Location: plantar  -KH Primary Wound Type: Venous ulcer  -KH       Wound 02/06/23 1220 Left lateral foot Pressure Injury    Wound - Properties Group Placement Date: 02/06/23  - Placement Time: 1220  - Present on Hospital Admission: Y  - Side: Left  - Orientation: lateral  - Location: foot  - Primary Wound Type: Pressure inj  -    Wound Image Images linked: 2  -JM      Dressing Appearance intact;moist drainage  -      Base necrotic;red;moist;black eschar;yellow;subcutaneous;other (see comments);exposed structure   necrotic fascia/CT. Bone palpable through necrosis but not visible  -      Periwound pink;moist;macerated;redness;warm  min erythema  -      Periwound Temperature warm  -      Periwound Skin Turgor soft  -      Edges irregular;open  -      Wound Length (cm) 3.7 cm  -      Wound Width (cm) 3.5 cm  -      Wound Depth (cm) --  obscured by necrotic tissue, depth to fascia/bone  -      Wound Surface Area (cm^2) 12.95 cm^2  -      Drainage Characteristics/Odor serosanguineous;brown  -      Drainage Amount small  -      Care, Wound cleansed with;wound cleanser;debrided;honey applied  -      Dressing Care dressing applied;silver impregnated;foam;low-adherent;border dressing  mepilex ag, 4\" optifoam  -      Periwound Care cleansed with pH balanced cleanser;dry periwound area maintained  -      Retired Wound - Properties Group Placement Date: 02/06/23  " - Placement Time: 1220  - Present on Hospital Admission: Y  - Side: Left  - Orientation: lateral  - Location: foot  -MC Primary Wound Type: Pressure inj  -MC    Retired Wound - Properties Group Date first assessed: 02/06/23  - Time first assessed: 1220  - Present on Hospital Admission: Y  - Side: Left  - Location: foot  - Primary Wound Type: Pressure inj  -MC          User Key  (r) = Recorded By, (t) = Taken By, (c) = Cosigned By    Initials Name Provider Type    Morena Rajan, PT Physical Therapist    Fátima Armando RN Registered Nurse    Cheryl Hua, PT Physical Therapist                  WOUND DEBRIDEMENT  Total area of Debridement: 5cmsq  Debridement Site 1  Location- Site 1: L foot wound and periwound  Selective Debridement- Site 1: Wound Surface <20cmsq  Instruments- Site 1: #15, scapel, tweezers  Excised Tissue Description- Site 1: moderate, eschar, necrotic, slough  Bleeding- Site 1: seeping, held pressure, 1 minute   Debridement Site 2  Location- Site 2: R plantar wound  Selective Debridement- Site 2: Wound Surface <20cmsq  Instruments- Site 2: tweezers  Excised Tissue Description- Site 2: minimum, slough  Bleeding- Site 2: none          Therapy Education     Row Name 03/02/23 0800             Therapy Education    Education Details PT again stressed urgency of going to ED for assessment as pt likely will need L 5th toe amputation, and is at risk of R 4th toe amputation due to exposed bone, stated that waiting until Monday is not going to change the potential need for surgery, and that the longer patient waits, the more he is at risk of losing more of his foot or leg, or getting sepsis that can be fatal.  Pt expressed understanding, but states he cannot go today, will need to go home an prepare for a hospital stay.  PT reinforced immediate medial attention if pt starts having fever/swelling/streaking redness or color changes of foot/toes.  -MARICEL      Given  Bandaging/dressing change;Symptoms/condition management  -      Program Reinforced  -      How Provided Verbal;Demonstration;Written  -      Provided to Patient  -      Level of Understanding Verbalized  -            User Key  (r) = Recorded By, (t) = Taken By, (c) = Cosigned By    Initials Name Provider Type    Cheryl Hua, PT Physical Therapist                Recommendation and Plan   PT Assessment/Plan     Row Name 03/02/23 0800          PT Assessment    Functional Limitations Decreased safety during functional activities;Limitations in functional capacity and performance;Performance in self-care ADL;Other (comment)  wound mgmt  -     Impairments Integumentary integrity;Pain;Impaired venous circulation  -     Assessment Comments Continue decline in bilateral foot wounds with bone exposure and necrotic tissue, erythema of L foot.  PT again advised pt to go to ED for assessment, but pt declines due to stating he needs to go home and arrange things for a possible hospital stay, and will come back to the ED, but also mentions waiting until Monday's tx to see how his foot looks.  PT advised against this and recommended pt go to the ED as soon as possible.  -     Rehab Potential Poor  -        PT Plan    PT Frequency 1x/week  -     Physical Therapy Interventions (Optional Details) patient/family education;wound care  -     PT Plan Comments ?take pt to ED next tx if agreeable  -           User Key  (r) = Recorded By, (t) = Taken By, (c) = Cosigned By    Initials Name Provider Type    Cheryl Hua, PT Physical Therapist                Goals   PT OP Goals     Row Name 03/02/23 0800          Time Calculation    PT Goal Re-Cert Due Date 05/07/23  -           User Key  (r) = Recorded By, (t) = Taken By, (c) = Cosigned By    Initials Name Provider Type    Cheryl Hua, PT Physical Therapist                PT Goal Re-Cert Due Date: 05/07/23            Time Calculation:  Start Time: 0800  Untimed Charges  18054-Cispsktjo debridement: 30  Total Minutes  Untimed Charges Total Minutes: 30   Total Minutes: 30  Therapy Charges for Today     Code Description Service Date Service Provider Modifiers Qty    [id number]  JOSS DEBRIDE OPEN WOUND UP TO 20CM 3/2/2023 Cheryl Washington, PT GP 1                  Cheryl Washington, PT  3/2/2023

## 2023-03-07 NOTE — ED PROVIDER NOTES
Subjective   History of Present Illness  Pt is a 72-year-old white male with a PMH of Type 2 diabetes mellitus, DVT, CAD, COPD, PE, venous insufficiency, gout, HTN, kidney disease, and HLD who presents today for bilateral foot wounds. Pt states he has an appointment in two days with his podiatrist, but that his PCP didn't want him to wait any longer and told him to come to the ED for expedited treatment. Pt states he has full sensation in both feet and that he is able to walk and perform his ADLs with the help of his cane. Pt denies abdominal pain, chest pain, or SOA. Pt states the pain in his feet is moderate, but most of the time he is able to ignore it. Pt states he is currently on Warfarin. Pt also admits to bilateral leg swelling which is pretty normal for him. Pt presents today for evaluation and treatment.    History provided by:  Patient and medical records      Review of Systems    Past Medical History:   Diagnosis Date   • Abnormal ECG    • Childhood asthma    • COPD (chronic obstructive pulmonary disease) (HCC)    • Coronary artery disease 12/14/2016    CABG, 1993, Abimael Tomlin MD. CABG, August 2013, [facility].   • Diabetes mellitus (HCC) 12/14/2016    type II   • DVT (deep venous thrombosis) (HCC)     RLE   • Gout    • Hepatitis     1970s unsure of which one- states it was caused from drinking contaminated water   • Hyperlipidemia 12/14/2016   • Hypertension 12/14/2016   • Kidney disease    • Myocardial infarction (HCC)      in 1992 and 1993 prior to CABG.   • Paroxysmal atrial fibrillation (HCC) 12/14/2016   • Pulmonary embolism (HCC)    • Sleep apnea    • Venous insufficiency 12/14/2016   • Wears glasses     for reading       No Known Allergies    Past Surgical History:   Procedure Laterality Date   • APPENDECTOMY  1981   • BUNIONECTOMY Left    • CARDIAC CATHETERIZATION      x4, no stents   • CARDIAC CATHETERIZATION N/A 2/16/2023    Procedure: Peripheral angiography  Left lower extremity  angiogram [facility] interventional Cardiologist;  Surgeon: Reed Zaldivar MD;  Location: [facility] CATH INVASIVE LOCATION;  Service: Cardiovascular;  Laterality: N/A;  Left lower extremity angiogram with interventional cardioligist.   • CATARACT EXTRACTION W/ INTRAOCULAR LENS IMPLANT Right 2020    Procedure: CATARACT PHACO EXTRACTION WITH INTRAOCULAR LENS IMPLANT RIGHT;  Surgeon: Omar Blood MD;  Location: [facility] OR;  Service: Ophthalmology;  Laterality: Right;   • CATARACT EXTRACTION W/ INTRAOCULAR LENS IMPLANT Left 2020    Procedure: CATARACT PHACO EXTRACTION WITH INTRAOCULAR LENS IMPLANT LEFT;  Surgeon: Omar Blood MD;  Location: [facility] OR;  Service: Ophthalmology;  Laterality: Left;   • CHOLECYSTECTOMY     • COLON RESECTION Right 2022    Procedure: COLON RESECTION LAPAROSCOPIC HAND ASSISTED;  Surgeon: Kenji Garcias MD;  Location: [facility] OR;  Service: General;  Laterality: Right;   • COLONOSCOPY     • COLONOSCOPY N/A 2022    Procedure: COLONOSCOPY, biopsy, polypectomy x1 and tattooing;  Surgeon: Kenji Garcias MD;  Location: [facility] ENDOSCOPY;  Service: Gastroenterology;  Laterality: N/A;   • CORONARY ARTERY BYPASS GRAFT       triple bypass   • CORONARY ARTERY BYPASS GRAFT      double bypass   • FINGER SURGERY      Rt index (second finger)   • OTHER SURGICAL HISTORY      Bone spur removal   • TOE AMPUTATION Left     4th toe   • VASECTOMY         Family History   Problem Relation Age of Onset   • COPD Mother    • Heart attack Father    • Asthma Father        Social History     Socioeconomic History   • Marital status:    Tobacco Use   • Smoking status: Former     Packs/day: 1.00     Years: 28.00     Pack years: 28.00     Types: Cigarettes     Start date: 1964     Quit date: 1993     Years since quittin.1   • Smokeless tobacco: Never   • Tobacco comments:     Wish I'd never started   Vaping Use   • Vaping Use: Never used   Substance and  "Sexual Activity   • Alcohol use: No   • Drug use: No   • Sexual activity: Not Currently     Partners: Female     Birth control/protection: Other, Vasectomy, Birth control pill           Objective   Physical Exam  Vitals and nursing note reviewed.   Constitutional:       Appearance: Normal appearance.   HENT:      Head: Normocephalic and atraumatic.   Cardiovascular:      Rate and Rhythm: Normal rate and regular rhythm.      Pulses: Normal pulses.      Heart sounds: Normal heart sounds.   Pulmonary:      Effort: Pulmonary effort is normal.      Breath sounds: Normal breath sounds.   Abdominal:      General: Abdomen is flat.      Palpations: Abdomen is soft.   Musculoskeletal:      Cervical back: Normal range of motion and neck supple.   Skin:     General: Skin is warm.      Comments: 2.5 x 1.5 cm ulceration to plantar aspect of R foot without surrounding erythema or discharge    3 x 3.2cm necrotic ulceration to lateral aspect of L foot with eschar    No drainage or indication of infection    Skin changes consistent with venous stasis to LE bilaterally    Neurological:      General: No focal deficit present.      Mental Status: He is alert and oriented to person, place, and time.   Psychiatric:         Mood and Affect: Mood normal.         Behavior: Behavior normal.         Thought Content: Thought content normal.         Judgment: Judgment normal.         Procedures           ED Course  ED Course as of 03/07/23 1537   Tue Mar 07, 2023   1119 Piece of documentation from assessment in the wound care clinic on March 2.    \"Pt states he fell at home last week and was sore/swollen so was not able to come for tx.  States he doesn't see the vascular surgeon until 3/16/23.  States main reason for not going to ED is because the wounds haven't been hurting.  Declined to go to ED today, but states he will go home and prepare for an admission, then will come to the ED, though he might wait and see how he looks at his tx Monday, " "he says.  -JM\" [RS]   1308 Creatinine(!): 2.53  Progressively worsening renal function.  Chronic kidney disease with associated acute kidney injury. [RS]   1347 I noted that the patient had a arterial duplex done on 16 February which did demonstrate some restriction of flow especially on the left lower extremity with some monophasic distal flow.  Rigid vessels were noted.  See the documentation for details [RS]   1349 CT/vascular surgery paged. [RS]   1556 Discussed the case with Dr. Miranda.  He advised that he recommends discharging the patient to follow-up with Dr. Maciel in 2 days as scheduled.  With the patient's chronic kidney disease, he feels the patient will likely need an OR based CO2 arteriogram.  He advises no other intervention is necessary at this time.  We will advised the patient on these recommendations.  We will get him some fluids prior to discharge. I had a discussion with the patient/family regarding diagnosis, diagnostic results, treatment plan, and medications.  The patient/family indicated understanding of these instructions.  I spent adequate time at the bedside prior to discharge necessary to discuss the aftercare instructions, giving patient education, providing explanations of the results of our evaluations/findings, and my decision making to assure that the patient/family understand the plan of care.  Time was allotted to answer questions at that time and throughout the ED course.  Emphasis was placed on timely follow-up after discharge.  I also discussed the potential for the development of an acute emergent condition requiring further evaluation, return to the ER, admission, or even surgical intervention. I discussed that we found nothing during the visit today indicating the need for further ER workup at this time, admission to the hospital, or the presence of an acute unstable medical condition.  I encouraged the patient to return to the emergency department immediately for ANY " concerns, worsening, new complaints, or if symptoms persist and unable to seek follow-up in a timely fashion.  The patient/family expressed understanding and agreement with this plan.  [RS]      ED Course User Index  [RS] Silvio Angel MD                                           Medical Decision Making  Acute kidney injury superimposed on chronic kidney disease (HCC): acute illness or injury  Chronic anticoagulation: chronic illness or injury  Controlled type 2 diabetes mellitus with foot ulcer, unspecified whether long term insulin use (HCC): chronic illness or injury  PVD (peripheral vascular disease) (HCC): chronic illness or injury  Ulcer of both feet with other severity (HCC): acute illness or injury  Amount and/or Complexity of Data Reviewed  External Data Reviewed: labs, radiology and notes.  Labs: ordered. Decision-making details documented in ED Course.      Risk  Prescription drug management.          Final diagnoses:   PVD (peripheral vascular disease) (HCC)   Ulcer of both feet with other severity (HCC)   Acute kidney injury superimposed on chronic kidney disease (HCC)   Chronic anticoagulation   Controlled type 2 diabetes mellitus with foot ulcer, unspecified whether long term insulin use (HCC)       ED Disposition  ED Disposition     ED Disposition   Discharge    Condition   Stable    Comment   --             Argenis Osborne, APRN  [address] [address]  [phone]          [facility] Emergency Department  [address] [facility] [id number] [phone]    As needed, If symptoms worsen or ANY concerns.    Ben Painter MD  [address] [facility]  [id number]  [place] [address]  [phone]      As soon as possible.    Oseas Oviedo MD  [address] [facility]  [address]  [place] [place] [facility] [address]  [phone]    Call in 1 day  As scheduled.         Medication List      No changes were made to your prescriptions during this visit.           Silvio Angel MD  03/07/23 1533

## 2023-03-08 ENCOUNTER — OFFICE VISIT (OUTPATIENT)
Dept: FAMILY MEDICINE CLINIC | Age: 73
End: 2023-03-08

## 2023-03-08 VITALS
TEMPERATURE: 97.9 F | OXYGEN SATURATION: 92 % | RESPIRATION RATE: 18 BRPM | SYSTOLIC BLOOD PRESSURE: 90 MMHG | HEART RATE: 89 BPM | DIASTOLIC BLOOD PRESSURE: 54 MMHG | WEIGHT: 169.1 LBS | BODY MASS INDEX: 22.41 KG/M2 | HEIGHT: 73 IN

## 2023-03-08 DIAGNOSIS — L97.529 ISCHEMIC ULCER OF BOTH FEET, UNSPECIFIED ULCER STAGE (HCC): ICD-10-CM

## 2023-03-08 DIAGNOSIS — L97.519 ISCHEMIC ULCER OF BOTH FEET, UNSPECIFIED ULCER STAGE (HCC): ICD-10-CM

## 2023-03-08 DIAGNOSIS — I73.9 PVD (PERIPHERAL VASCULAR DISEASE) (HCC): ICD-10-CM

## 2023-03-08 DIAGNOSIS — Z79.01 ANTICOAGULATED ON COUMADIN: Primary | ICD-10-CM

## 2023-03-08 LAB
INTERNATIONAL NORMALIZATION RATIO, POC: 2.3
PROTHROMBIN TIME, POC: 0

## 2023-03-08 ASSESSMENT — ENCOUNTER SYMPTOMS
DIARRHEA: 0
ABDOMINAL PAIN: 0
SHORTNESS OF BREATH: 0
COUGH: 0
NAUSEA: 0
VOMITING: 0

## 2023-03-08 NOTE — PROGRESS NOTES
SUBJECTIVE:    Karen Valenzuela is a 67 y.o. male    Anticoagulation: Patient here for followup of chronic anticoagulation. Indication: atrial fibrillation  Bleeding Signs/Symptoms:  None  Thromboembolic Signs/Symptoms:  None    Missed Coumadin Doses:  None  Medication Changes:  no  Dietary Changes:  no  Bacterial/Viral Infection:  wound left foot-under the care of podiatry and wound care. Other Concerns:  Current dose 5mg/2.5 mg alternating days     Appt scheduled today for INR . POC INR today 2.3 Patient reports he has been taking Coumadin 5mg and 2.5mg alternating doses. Patient is under the care of the wound clinic for a chronic wound on his left foot and reports he now has a wound on the bottom of his right foot that they are treating. Patient reports he was sent to the [facility] ED in [place] by the wound clinic yesterday. Patient states they had hopes that he would get into see Dr Halle Jones, vascular surgery sooner if he went there. Patient states they discharged him and he has a follow up with Dr Halle Jones next Thursday 3/16. Patient reports he has wound clinic appointment tomorrow and will continue seeing them on Monday and Thursdays. Pt reports he has dressing changes and wound cleanings at wound care. Pt has an appt with wound care tomorrow. Pt reports he saw \"[facility]\" stopped Miralax yesterday because of diarrhea. Pt reports diarrhea has resolved. Pt reports he had diarrhea on 03/06/2023. Chief Complaint   Patient presents with    Follow-up     INR     [place] [facility] [place]7/23        Review of Systems   Constitutional: Negative. Respiratory:  Negative for cough and shortness of breath. Cardiovascular:  Negative for chest pain. Gastrointestinal:  Negative for abdominal pain, diarrhea, nausea and vomiting. Skin:  Wound: bilateral feet- Dressing CDI. Hematological:  Does not bruise/bleed easily.       OBJECTIVE:    BP (!) 90/54   Pulse 89   Temp 97.9 °F (36.6 °C) (Infrared)   Resp 18   Ht 6' 1\" (1.854 m)   Wt 169 lb 1.6 oz (76.7 kg)   SpO2 92% Comment: RA  BMI 22.31 kg/m²    Physical Exam  Vitals and nursing note reviewed. Constitutional:       Appearance: Normal appearance. He is well-developed. HENT:      Head: Normocephalic. Eyes:      Extraocular Movements: Extraocular movements intact. Conjunctiva/sclera: Conjunctivae normal.      Pupils: Pupils are equal, round, and reactive to light. Neck:      Thyroid: No thyromegaly. Vascular: No carotid bruit. Cardiovascular:      Rate and Rhythm: Normal rate. Rhythm irregularly irregular. Heart sounds: Normal heart sounds. No murmur heard. Pulmonary:      Effort: Pulmonary effort is normal.      Breath sounds: Normal breath sounds. Skin:     General: Skin is warm and dry. Capillary Refill: Capillary refill takes less than 2 seconds. Neurological:      Mental Status: He is alert and oriented to person, place, and time. Psychiatric:         Attention and Perception: Attention and perception normal.         Mood and Affect: Mood and affect normal.         Behavior: Behavior normal.         Thought Content: Thought content normal.         Judgment: Judgment normal.       ASSESSMENT/PLAN:   Katarina Montilla was seen today for follow-up and follow-up from hospital.    Diagnoses and all orders for this visit:    Anticoagulated on Coumadin  -     POCT PWQ4. 3 The current medical regimen is effective;  continue present plan and medications. PVD- with ulcers bilateral feet. Follow up with wound care tomorrow as scheduled. Go to the ER if wounds worsen        Return in about 1 month (around 4/8/2023), or if symptoms worsen or fail to improve.     Current Outpatient Medications on File Prior to Visit   Medication Sig Dispense Refill    ferrous sulfate (FEROSUL) 325 (65 Fe) MG tablet TAKE 1 TABLET BY MOUTH TWICE DAILY 180 tablet 2    docusate sodium (COLACE) 100 MG capsule TAKE 1 CAPSULE BY MOUTH THREE TIMES DAILY 270 capsule 0    digoxin (LANOXIN) 125 MCG tablet TAKE 1 TABLET BY MOUTH EVERY DAY OR AS DIRECTED 90 tablet 2    ipratropium (ATROVENT) 0.02 % nebulizer solution INHALE THE CONTENTS OF 1 VIAL VIA NEBULIZER EVERY 6 HOURS 875 mL 1    lovastatin (MEVACOR) 20 MG tablet TAKE 1 TABLET BY MOUTH DAILY (Patient not taking: Reported on 1/25/2023) 90 tablet 2    midodrine (PROAMATINE) 5 MG tablet Take 5 mg by mouth in the morning and at bedtime      metoprolol succinate (TOPROL XL) 25 MG extended release tablet TAKE 1 TABLET BY MOUTH EVERY DAY (Patient taking differently: 1/2 tablet daily) 90 tablet 1    allopurinol (ZYLOPRIM) 300 MG tablet TAKE 1 TABLET BY MOUTH EVERY DAY 90 tablet 2    ipratropium-albuterol (DUONEB) 0.5-2.5 (3) MG/3ML SOLN nebulizer solution INHALE 1 VIAL VIA NEBULIZER FOUR TIMES DAILY (Patient not taking: Reported on 11/16/2022) 360 mL 2    potassium chloride (KLOR-CON M) 10 MEQ extended release tablet Take 1 tablet by mouth in the morning. (Patient not taking: Reported on 12/19/2022) 90 tablet 0    MITIGARE 0.6 MG capsule TAKE 1 CAPSULE BY MOUTH DAILY 30 capsule 1    Calcium Polycarbophil (FIBER) 625 MG TABS Take 625 mg by mouth daily      Dulaglutide (TRULICITY) 1.5 UB/0.5VT SOPN Inject 1.5 mg into the skin once a week      nitroGLYCERIN (NITROSTAT) 0.4 MG SL tablet place 1 tablet under the tongue if needed every 5 minutes for chest pain for 3 doses IF NO RELIEF AFTER 3RD DOSE CALL PRESCRIBER . 25 tablet 5    COUMADIN 5 MG tablet take as directed 100 tablet 5    aspirin 81 MG tablet Take 81 mg by mouth daily. No current facility-administered medications on file prior to visit.

## 2023-03-08 NOTE — PROGRESS NOTES
Chief Complaint   Patient presents with    Follow-up     INR     [place] [facility] [place] on 3/7/23     Patient originally here for INR realted to coumadin therapy. POC INR this date 2.3 Patient reports he has been taking rotating between 5mg and 2.5mg. Patient is under the care of the wound clinic for a chronic wound on his left foot and reports he now has a wound on the bottom of his right foot that they are treating. Patient reports he was sent to the [facility] ED in [place] by the wound clinic yesterday. Patient states they had hopes that he would get into see Dr Ty Vargas, vascular surgery sooner if he went there. Patient states they discharged him and he has a follow up with Dr Ty Vargas next Thursday 3/16. Patient reports he has wound clinic appointment tomorrow and will continue seeing them on Monday and Thursdays. Have you seen any other physician or provider since your last visit yes - see above     Have you had any other diagnostic tests since your last visit?  yes - see above     Have you changed or stopped any medications since your last visit? no

## 2023-03-09 NOTE — THERAPY PROGRESS REPORT/RE-CERT
Outpatient Rehabilitation - Wound/Debridement Progress Note   [place]     Patient Name: Swapnil Lawson  : 1950  MRN: [id number]  Today's Date: 3/9/2023                 Admit Date: 3/9/2023    Visit Dx:    ICD-10-CM ICD-9-CM   1. Ulcer of left foot with other severity (HCC)  L97.528 707.15   2. Ulcer of right foot with other severity (HCC)  L97.518 707.15   3. Peripheral vascular disease of lower extremity ([facility])  I73.9 443.9   4. Type 2 diabetes mellitus with foot ulcer, unspecified whether long term insulin use ([facility])  E11.621 250.80    L97.509 707.15   5. Venous insufficiency  I87.2 459.81   Lat L foot:    R plantar wound:      Patient Active Problem List   Diagnosis   • Coronary artery disease   • Hypertension   • Paroxysmal atrial fibrillation (HCC)   • Type 2 diabetes mellitus with nephropathy (HCC)   • Venous insufficiency   • Hyperlipidemia LDL goal <70   • Nuclear sclerotic cataract of right eye   • Cortical age-related cataract of right eye   • Nuclear sclerotic cataract of left eye   • Cortical age-related cataract of left eye   • Thrombocytopenia (HCC)   • History of colon polyps   • Chronic idiopathic constipation   • Adenomatous polyp of transverse colon   • Chronic kidney disease, stage III (moderate) (HCC)   • PVD (peripheral vascular disease) (HCC)   • Foot ulcer (HCC)   • Asymptomatic PVD (peripheral vascular disease) (HCC)        Past Medical History:   Diagnosis Date   • Abnormal ECG    • Childhood asthma    • COPD (chronic obstructive pulmonary disease) (HCC)    • Coronary artery disease 2016    CABG, , Abimael Tomlin MD. CABG, 2013, [facility].   • Diabetes mellitus (HCC) 2016    type II   • DVT (deep venous thrombosis) (HCC)     RLE   • Gout    • Hepatitis     1970s unsure of which one- states it was caused from drinking contaminated water   • Hyperlipidemia 2016   • Hypertension 2016   • Kidney disease    • Myocardial infarction  (HCC)      in 1992 and 1993 prior to CABG.   • Paroxysmal atrial fibrillation (HCC) 12/14/2016   • Pulmonary embolism (HCC)    • Sleep apnea    • Venous insufficiency 12/14/2016   • Wears glasses     for reading        Past Surgical History:   Procedure Laterality Date   • APPENDECTOMY  1981   • BUNIONECTOMY Left    • CARDIAC CATHETERIZATION      x4, no stents   • CARDIAC CATHETERIZATION N/A 2/16/2023    Procedure: Peripheral angiography  Left lower extremity angiogram wit interventional Cardiologist;  Surgeon: Reed Zaldivar MD;  Location: [facility] CATH INVASIVE LOCATION;  Service: Cardiovascular;  Laterality: N/A;  Left lower extremity angiogram with interventional cardioligist.   • CATARACT EXTRACTION W/ INTRAOCULAR LENS IMPLANT Right 06/25/2020    Procedure: CATARACT PHACO EXTRACTION WITH INTRAOCULAR LENS IMPLANT RIGHT;  Surgeon: Omar Blood MD;  Location: [facility] OR;  Service: Ophthalmology;  Laterality: Right;   • CATARACT EXTRACTION W/ INTRAOCULAR LENS IMPLANT Left 07/09/2020    Procedure: CATARACT PHACO EXTRACTION WITH INTRAOCULAR LENS IMPLANT LEFT;  Surgeon: Omar Blood MD;  Location: [facility] OR;  Service: Ophthalmology;  Laterality: Left;   • CHOLECYSTECTOMY  2002   • COLON RESECTION Right 09/12/2022    Procedure: COLON RESECTION LAPAROSCOPIC HAND ASSISTED;  Surgeon: Kenji Garcias MD;  Location: [facility] OR;  Service: General;  Laterality: Right;   • COLONOSCOPY     • COLONOSCOPY N/A 08/11/2022    Procedure: COLONOSCOPY, biopsy, polypectomy x1 and tattooing;  Surgeon: Kenji Garcias MD;  Location: [facility] ENDOSCOPY;  Service: Gastroenterology;  Laterality: N/A;   • CORONARY ARTERY BYPASS GRAFT      1993 triple bypass   • CORONARY ARTERY BYPASS GRAFT  2013    double bypass   • FINGER SURGERY      Rt index (second finger)   • OTHER SURGICAL HISTORY  2010    Bone spur removal   • TOE AMPUTATION Left 2009    4th toe   • VASECTOMY           EVALUATION   PT Ortho     Row Name 03/09/23 0800        "Subjective Comments    Subjective Comments Pt reports he went to ED at his PCP's urging, but was told to keep his follow-up with vascular sx.  -JM       Subjective Pain    Able to rate subjective pain? yes  -    Pre-Treatment Pain Level 0  -    Post-Treatment Pain Level 0  -    Subjective Pain Comment denies pain today  -       Transfers    Comment, (Transfers) seated in transport chair for tx  -JM          User Key  (r) = Recorded By, (t) = Taken By, (c) = Cosigned By    Initials Name Provider Type    Cheryl Hua, PT Physical Therapist                 LDA Wound     Row Name 03/09/23 0800             Wound 02/16/23 1040 Right lateral plantar Venous Ulcer    Wound - Properties Group Placement Date: 02/16/23 -KH Placement Time: 1040 -KH Present on Hospital Admission: Y  - Side: Right  - Orientation: lateral  - Location: plantar  - Primary Wound Type: Venous ulcer  -    Wound Image Images linked: 1  -JM      Dressing Appearance intact;moist drainage  -      Base exposed structure;moist;white;subcutaneous;slough;yellow  exposed CT/capsule  -      Periwound intact;moist;redness;macerated;pale white  significant maceration  -      Periwound Temperature warm  -      Periwound Skin Turgor soft  -      Edges callused;open  -      Wound Length (cm) 1.5 cm  -      Wound Width (cm) 1 cm  -      Wound Depth (cm) 0.4 cm  -      Wound Surface Area (cm^2) 1.5 cm^2  -JM      Wound Volume (cm^3) 0.6 cm^3  -JM      Drainage Characteristics/Odor serosanguineous;yellow  -      Drainage Amount moderate  -      Care, Wound cleansed with;wound cleanser;debrided  -JM      Dressing Care dressing applied;collagen;silver impregnated;foam;border dressing  tez, mepilex ag, 4\" optifoam  -      Periwound Care cleansed with pH balanced cleanser;dry periwound area maintained  -      Retired Wound - Properties Group Placement Date: 02/16/23 - Placement Time: 1040 -KH Present on Hospital " "Admission: Y  -KH Side: Right  -KH Orientation: lateral  -KH Location: plantar  -KH Primary Wound Type: Venous ulcer  -KH    Retired Wound - Properties Group Date first assessed: 02/16/23  - Time first assessed: 1040  -KH Present on Hospital Admission: Y  -KH Side: Right  -KH Location: plantar  -KH Primary Wound Type: Venous ulcer  -KH       Wound 02/06/23 1220 Left lateral foot Pressure Injury    Wound - Properties Group Placement Date: 02/06/23  - Placement Time: 1220  - Present on Hospital Admission: Y  - Side: Left  - Orientation: lateral  - Location: foot  -MC Primary Wound Type: Pressure inj  -    Wound Image Images linked: 1  -      Dressing Appearance intact;moist drainage  -      Base necrotic;red;moist;black eschar;yellow;subcutaneous;other (see comments);exposed structure  necrotic fascia/CT. Bone palpable through thin necrotic tissue  -      Periwound pink;moist;macerated;redness;warm;pale white  min erythema, mod maceration  -      Periwound Temperature warm  -      Periwound Skin Turgor soft  -      Edges irregular;open  -      Wound Length (cm) 3.5 cm  -      Wound Width (cm) 3.5 cm  -      Wound Depth (cm) --  obscured  -      Wound Surface Area (cm^2) 12.25 cm^2  -      Drainage Characteristics/Odor serosanguineous;brown  -      Drainage Amount moderate  -      Care, Wound cleansed with;wound cleanser;debrided  -      Dressing Care dressing applied;silver impregnated;foam;border dressing  mepilex ag, 4\" optifoam  -      Periwound Care cleansed with pH balanced cleanser;dry periwound area maintained  -      Retired Wound - Properties Group Placement Date: 02/06/23  - Placement Time: 1220  - Present on Hospital Admission: Y  - Side: Left  - Orientation: lateral  - Location: foot  - Primary Wound Type: Pressure inj  -    Retired Wound - Properties Group Date first assessed: 02/06/23  - Time first assessed: 1220  - Present on Hospital " Admission: Y  - Side: Left  - Location: foot  - Primary Wound Type: Pressure inj  -          User Key  (r) = Recorded By, (t) = Taken By, (c) = Cosigned By    Initials Name Provider Type    Morena Rajan, PT Physical Therapist    Fátima Armando, RN Registered Nurse    Cheryl Hua, PT Physical Therapist                  WOUND DEBRIDEMENT  Total area of Debridement: 6cmsq  Debridement Site 1  Location- Site 1: L foot wound and periwound  Selective Debridement- Site 1: Wound Surface <20cmsq  Instruments- Site 1: #15, scapel, tweezers  Excised Tissue Description- Site 1: minimum, slough, eschar, necrotic  Bleeding- Site 1: seeping, held pressure, 1 minute   Debridement Site 2  Location- Site 2: R plantar wound  Selective Debridement- Site 2: Wound Surface <20cmsq  Instruments- Site 2: tweezers, #15, scapel  Excised Tissue Description- Site 2: minimum, slough, necrotic, other (comment) (loose fibrous tissues)  Bleeding- Site 2: none          Therapy Education     Row Name 03/09/23 0800             Therapy Education    Education Details Recommended no therahoney this week due to maceration of skin around wounds.  Continue with tez to R foot and mepilex ag to both wounds.  Plan to continue once/week tx, but may adjust plan of tx pending MD recommendations next week.  -JM      Given Bandaging/dressing change;Symptoms/condition management  -MARICEL      Program Reinforced  -MARICEL      How Provided Verbal;Demonstration;Written  -MARICEL      Provided to Patient  -MARICEL      Level of Understanding Verbalized  -MARICEL            User Key  (r) = Recorded By, (t) = Taken By, (c) = Cosigned By    Initials Name Provider Type    Cherly Hua, PT Physical Therapist                Recommendation and Plan   PT Assessment/Plan     Row Name 03/09/23 0800          PT Assessment    Functional Limitations Decreased safety during functional activities;Limitations in functional capacity and  performance;Performance in self-care ADL;Other (comment)  wound mgmt  -     Impairments Integumentary integrity;Pain;Impaired venous circulation  -     Assessment Comments Pt's wounds relatively unchanged, except for increased maceration of periwound tissues.  Wound beds still with exposed CT/fascia, with L lateral foot centrally black and necrotic with bone palpable under thin layer of necrotic CT.  Pt may benefit from MIST to assist with local blood flow and granulation formation, but pt awaiting workup with vascular surgeon, may require amputation or other surgical interventions.  PT will plan to continue with current POC and adjust pending MD recommendations next week.  Anticipate limited healing due to h/o DM and vascular impairment, and pt limited ability to offload wounds.  Continue current goals and POC.  -     Rehab Potential Poor  -     Patient/caregiver participated in establishment of treatment plan and goals Yes  -     Patient would benefit from skilled therapy intervention Yes  -        PT Plan    PT Frequency 1x/week;2x/week  2x/week if MIST initiated  -     Predicted Duration of Therapy Intervention (PT) 20 visits  -     Planned CPT's? PT JOSS DEBRIDE OPEN WOUND UP TO 20 CM: 78230;PT NLFU MIST: 46712;PT NONSELECT DEBRIDE 15 MIN: 63507;PT SELF CARE/MGMT/TRAIN 15 MIN: 63491  -     Physical Therapy Interventions (Optional Details) patient/family education;wound care  -     PT Plan Comments debridement, dressings, ?add MIST pending MD visit 3/16  -           User Key  (r) = Recorded By, (t) = Taken By, (c) = Cosigned By    Initials Name Provider Type    Cheryl Hua, PT Physical Therapist                Goals   PT OP Goals     Row Name 03/09/23 0800          PT Short Term Goals    STG Date to Achieve 04/30/22  -     STG 1 Pt/ son independent with clean home dressing changes to promote moist, bacteriostatic healing environement.  -     STG 1 Progress Met  -     STG 2 Pt  /son able to verbalize s/s of infection and when to seek urgent or emergency care.  -     STG 2 Progress Met  -     STG 3 Wound dimensions to decrease at least 25% to demonstrate wound healing.  -     STG 3 Progress Ongoing  -        Long Term Goals    LTG Date to Achieve 04/30/22  -     LTG 1 Wound dimensions to decrease at least 75% to demonstrate wound healing.  -     LTG 1 Progress Ongoing  -     LTG 2 Pt will demonstrate >75% granulation tissue coverage to indicate healing progress.  -     LTG 2 Progress Ongoing  -        Time Calculation    PT Goal Re-Cert Due Date 05/07/23  -           User Key  (r) = Recorded By, (t) = Taken By, (c) = Cosigned By    Initials Name Provider Type    Cheryl Hua, PT Physical Therapist                PT Goal Re-Cert Due Date: 05/07/23  PT Short Term Goals  STG Date to Achieve: 04/30/22  STG 1: Pt/ son independent with clean home dressing changes to promote moist, bacteriostatic healing environement.  STG 1 Progress: Met  STG 2: Pt /son able to verbalize s/s of infection and when to seek urgent or emergency care.  STG 2 Progress: Met  STG 3: Wound dimensions to decrease at least 25% to demonstrate wound healing.  STG 3 Progress: Ongoing  Long Term Goals  LTG Date to Achieve: 04/30/22  LTG 1: Wound dimensions to decrease at least 75% to demonstrate wound healing.  LTG 1 Progress: Ongoing  LTG 2: Pt will demonstrate >75% granulation tissue coverage to indicate healing progress.  LTG 2 Progress: Ongoing      Time Calculation: Start Time: 0800  Untimed Charges  37837-[id number] debridement: 25  Total Minutes  Untimed Charges Total Minutes: 25   Total Minutes: 25  Therapy Charges for Today     Code Description Service Date Service Provider Modifiers Qty    [id number]  JOSS DEBRIDE OPEN WOUND UP TO 20CM 3/9/2023 Cheryl Washington, PT GP 1                  Cheryl Washington, PT  3/9/2023

## 2023-03-14 ENCOUNTER — TELEPHONE (OUTPATIENT)
Dept: FAMILY MEDICINE CLINIC | Age: 73
End: 2023-03-14

## 2023-03-14 NOTE — TELEPHONE ENCOUNTER
Called patient regarding his appointment at [facility] urology in [place] on the 28 th of march at 9:40 am.

## 2023-03-16 ENCOUNTER — TELEPHONE (OUTPATIENT)
Dept: FAMILY MEDICINE CLINIC | Age: 73
End: 2023-03-16

## 2023-03-16 DIAGNOSIS — J44.9 CHRONIC OBSTRUCTIVE PULMONARY DISEASE, UNSPECIFIED COPD TYPE (HCC): ICD-10-CM

## 2023-03-16 RX ORDER — IPRATROPIUM BROMIDE AND ALBUTEROL SULFATE 2.5; .5 MG/3ML; MG/3ML
SOLUTION RESPIRATORY (INHALATION)
Qty: 360 ML | Refills: 2 | Status: SHIPPED | OUTPATIENT
Start: 2023-03-16

## 2023-03-16 RX ORDER — DULAGLUTIDE 1.5 MG/.5ML
1.5 INJECTION, SOLUTION SUBCUTANEOUS WEEKLY
Qty: 0.5 ML | Refills: 3 | Status: SHIPPED | OUTPATIENT
Start: 2023-03-16

## 2023-03-16 NOTE — TELEPHONE ENCOUNTER
Contacted patients to clarify refills.      Requested Prescriptions     Signed Prescriptions Disp Refills    ipratropium-albuterol (DUONEB) 0.5-2.5 (3) MG/3ML SOLN nebulizer solution 360 mL 2     Sig: INHALE 1 VIAL VIA NEBULIZER FOUR TIMES DAILY     Authorizing Provider: PETR RAMIREZ     Ordering User: Kylah NICOLAS    dulaglutide (TRULICITY) 1.5 KACI/2.1ZP SC injection 0.5 mL 3     Sig: Inject 0.5 mLs into the skin once a week     Authorizing Provider: Jonathan Maddox     Ordering User: Teresa Garcia

## 2023-03-16 NOTE — PROGRESS NOTES
03/16/2023  Patient Information  Swapnil Lawson                                                                                          PO   [facility] [address]   1950  'PCP/Referring Physician'  DylonArgenis, APRN  [phone]  Naseem Campbell III, MD  [phone]  Chief Complaint   Patient presents with   • Foot Ulcer     Np referred for a non-healing left foot ulcer.       History of Present Illness: 72-year-old man with history of chronic kidney disease, diabetes, heart failure, coronary artery disease status post bypass, paroxysmal atrial fibrillation on warfarin, peripheral arterial disease with known left tibial artery severe stenosis who presents to the outpatient cardiovascular surgery clinic as referral from Naseem Campbell MD interventional cardiologist for evaluation and management of nonhealing wounds of the bilateral feet.  The patient reports that he has been experiencing a nonhealing diabetic foot ulcer at the lateral aspect of his left foot for approximately 6 months, and has just recently in the past several days developed a wound on the plantar aspect of the right lateral forefoot.  He was previously seen and evaluated by Dr. Reed Zaldivar interventional cardiologist who performed arteriography of the left lower extremity revealing severe stenosis at the proximal tibial arteries.    Patient Active Problem List   Diagnosis   • Coronary artery disease   • Hypertension   • Paroxysmal atrial fibrillation (HCC)   • Type 2 diabetes mellitus with nephropathy (HCC)   • Venous insufficiency   • Hyperlipidemia LDL goal <70   • Nuclear sclerotic cataract of right eye   • Cortical age-related cataract of right eye   • Nuclear sclerotic cataract of left eye   • Cortical age-related cataract of left eye   • Thrombocytopenia (HCC)   • History of colon polyps   • Chronic idiopathic constipation   • Adenomatous polyp of transverse colon   • Chronic kidney disease, stage III (moderate) ([facility])   •  PVD (peripheral vascular disease) (HCC)   • Foot ulcer (HCC)   • Asymptomatic PVD (peripheral vascular disease) (HCC)     Past Medical History:   Diagnosis Date   • Abnormal ECG    • Childhood asthma    • COPD (chronic obstructive pulmonary disease) (HCC)    • Coronary artery disease 12/14/2016    CABG, 1993, Abimael Tomlin MD. CABG, August 2013, [facility].   • Diabetes mellitus (HCC) 12/14/2016    type II   • DVT (deep venous thrombosis) (HCC)     RLE   • Gout    • Hepatitis     1970s unsure of which one- states it was caused from drinking contaminated water   • Hyperlipidemia 12/14/2016   • Hypertension 12/14/2016   • Kidney disease    • Myocardial infarction (HCC)      in 1992 and 1993 prior to CABG.   • Paroxysmal atrial fibrillation (HCC) 12/14/2016   • Pulmonary embolism (HCC)    • Sleep apnea    • Venous insufficiency 12/14/2016   • Wears glasses     for reading     Past Surgical History:   Procedure Laterality Date   • APPENDECTOMY  1981   • BUNIONECTOMY Left    • CARDIAC CATHETERIZATION      x4, no stents   • CARDIAC CATHETERIZATION N/A 2/16/2023    Procedure: Peripheral angiography  Left lower extremity angiogram [facility] interventional Cardiologist;  Surgeon: Reed Zaldivar MD;  Location: [facility] CATH INVASIVE LOCATION;  Service: Cardiovascular;  Laterality: N/A;  Left lower extremity angiogram with interventional cardioligist.   • CATARACT EXTRACTION W/ INTRAOCULAR LENS IMPLANT Right 06/25/2020    Procedure: CATARACT PHACO EXTRACTION WITH INTRAOCULAR LENS IMPLANT RIGHT;  Surgeon: Omar Blood MD;  Location: [facility] OR;  Service: Ophthalmology;  Laterality: Right;   • CATARACT EXTRACTION W/ INTRAOCULAR LENS IMPLANT Left 07/09/2020    Procedure: CATARACT PHACO EXTRACTION WITH INTRAOCULAR LENS IMPLANT LEFT;  Surgeon: Omar Blood MD;  Location: [facility] OR;  Service: Ophthalmology;  Laterality: Left;   • CHOLECYSTECTOMY  2002   • COLON RESECTION Right 09/12/2022    Procedure: COLON  RESECTION LAPAROSCOPIC HAND ASSISTED;  Surgeon: Kenji Garcias MD;  Location: [facility] OR;  Service: General;  Laterality: Right;   • COLONOSCOPY     • COLONOSCOPY N/A 08/11/2022    Procedure: COLONOSCOPY, biopsy, polypectomy x1 and tattooing;  Surgeon: Kenji Garcias MD;  Location: [facility] ENDOSCOPY;  Service: Gastroenterology;  Laterality: N/A;   • CORONARY ARTERY BYPASS GRAFT      1993 triple bypass   • CORONARY ARTERY BYPASS GRAFT  2013    double bypass   • FINGER SURGERY      Rt index (second finger)   • OTHER SURGICAL HISTORY  2010    Bone spur removal   • TOE AMPUTATION Left 2009    4th toe   • VASECTOMY         Current Outpatient Medications:   •  aspirin 81 MG tablet, Take 1 tablet by mouth Daily., Disp: , Rfl:   •  calcium polycarbophil (FIBERCON) 625 MG tablet, Take 1 tablet by mouth 2 (Two) Times a Day. Takes 2 tablets BID, Disp: , Rfl:   •  colchicine 0.6 MG capsule capsule, TAKE 1 CAPSULE BY MOUTH DAILY AS NEEDED FOR PAIN, Disp: , Rfl:   •  diphenhydrAMINE (BENADRYL) 25 MG tablet, Take 1 tablet by mouth every night at bedtime., Disp: , Rfl:   •  docusate sodium (COLACE) 100 MG capsule, Take 3 capsules by mouth 2 (Two) Times a Day As Needed., Disp: , Rfl:   •  Dulaglutide (Trulicity) 1.5 MG/0.5ML solution pen-injector, Inject  under the skin into the appropriate area as directed 1 (One) Time Per Week., Disp: , Rfl:   •  ferrous gluconate (FERGON) 324 MG tablet, Take 1 tablet by mouth Daily With Breakfast., Disp: 30 tablet, Rfl: 0  •  ipratropium (ATROVENT HFA) 17 MCG/ACT inhaler, Inhale 2 puffs As Needed., Disp: , Rfl:   •  ipratropium (ATROVENT) 0.02 % nebulizer solution, Take 2.5 mL by nebulization Every 4 (Four) Hours As Needed., Disp: , Rfl:   •  lovastatin (MEVACOR) 20 MG tablet, Take 1 tablet by mouth Every Night., Disp: , Rfl:   •  metoprolol succinate XL (TOPROL-XL) 25 MG 24 hr tablet, Take 12.5 mg by mouth Daily., Disp: , Rfl:   •  nitroglycerin (NITROSTAT) 0.4 MG SL tablet, Place 1 tablet  under the tongue Every 5 (Five) Minutes As Needed for Chest Pain. Take no more than 3 doses in 15 minutes.  States he has not taken since , Disp: , Rfl:   •  O2 (OXYGEN), Inhale 2 L/min Every Night., Disp: , Rfl:   •  warfarin (COUMADIN) 5 MG tablet, Take 1 tablet by mouth Daily. 5mg every other day  2.5mg every other day alternating, Disp: , Rfl:   No Known Allergies  Social History     Socioeconomic History   • Marital status:    Tobacco Use   • Smoking status: Former     Packs/day: 1.00     Years: 28.00     Pack years: 28.00     Types: Cigarettes     Start date: 1964     Quit date: 1993     Years since quittin.2   • Smokeless tobacco: Never   • Tobacco comments:     Wish I'd never started   Vaping Use   • Vaping Use: Never used   Substance and Sexual Activity   • Alcohol use: No   • Drug use: No   • Sexual activity: Not Currently     Partners: Female     Birth control/protection: Other, Vasectomy, Birth control pill     Family History   Problem Relation Age of Onset   • COPD Mother    • Heart attack Father    • Asthma Father      Review of Systems   Constitutional: Positive for malaise/fatigue. Negative for chills, fever, night sweats and weight loss.   HENT: Negative.  Negative for hearing loss, odynophagia and sore throat.    Cardiovascular: Positive for dyspnea on exertion and leg swelling. Negative for chest pain, orthopnea and palpitations.   Respiratory: Negative.  Negative for cough and hemoptysis.    Endocrine: Negative for cold intolerance, heat intolerance, polydipsia, polyphagia and polyuria.   Hematologic/Lymphatic: Bruises/bleeds easily.   Skin: Positive for color change and poor wound healing. Negative for itching and rash.   Musculoskeletal: Positive for falls. Negative for joint pain, joint swelling and myalgias.   Gastrointestinal: Positive for diarrhea. Negative for abdominal pain, constipation, hematemesis, hematochezia, melena, nausea and vomiting.   Genitourinary:  "Negative for dysuria, frequency and hematuria.   Neurological: Negative for focal weakness, headaches, numbness and seizures.   Psychiatric/Behavioral: Negative.  Negative for suicidal ideas.   All other systems reviewed and are negative.    Vitals:    03/16/23 1335   BP: 96/46   BP Location: Left arm   Patient Position: Sitting   Pulse: 83   Temp: 97.9 °F (36.6 °C)   SpO2: 99%   Weight: 73 kg (161 lb)   Height: 185.4 cm (73\")      Physical Exam   General no acute distress, interactive, sitting in wheelchair, frail  Head normocephalic, atraumatic  Eyes clear sclerae  ENT no discharge, neck supple  Mouth mucous membranes moist  Cardiovascular evidence of malperfusion of bilateral lower extremities, irregularly irregular heart rhythm  Pulmonary lungs clear to auscultation bilaterally  Abdomen soft nontender nondistended  Lymphatic 1+ edema bilateral lower extremities  Neurological grossly intact sensation preserved to lower extremities and feet  Psychological appropriate  Dermatological there are 2 large wounds at the feet, the first on the left lateral aspect of the midfoot that is malodorous and approximately 3 x 2 x 0.5 cm moist desquamation, the second at the right lateral aspect of the plantar forefoot that is malodorous and approximately 2 x 2 x 1 cm moist desquamation with hypertrophied skin and soft tissue at the borders, there is ruborous discoloration and venous engorgement at the bilateral legs and feet  Musculoskeletal low bulk, temporal wasting        The ROS, past medical history, surgical history, family history, social history and vitals were reviewed by myself and corrected as needed.      Labs/Imaging:  I reviewed the images from his arteriogram by Dr. Reed Zaldivar approximately 1 month ago as described in HPI    Assessment/Plan: 72-year-old man with history of chronic kidney disease, diabetes, heart failure, coronary artery disease status post bypass, paroxysmal atrial fibrillation on warfarin, " peripheral arterial disease with known left tibial artery severe stenosis who presents with nonhealing diabetic foot wounds of the bilateral feet.  I instructed the patient to present to the hospital for admission or to the emergency room today and he refused this AGAINST MEDICAL ADVICE.  He reluctantly informed me that he would return to the hospital tomorrow for CO2 arteriogram of the bilateral lower extremities and foot wound debridements bilaterally, with DEBBIE PVR ideally obtained in the preoperative area.    I would like to thank you for the opportunity to participate in the care of this patient.    Jose Cruz Etienne M.D., R.P.V.I.  Cardiothoracic and Vascular Surgeon  [facility]      Patient Active Problem List   Diagnosis   • Coronary artery disease   • Hypertension   • Paroxysmal atrial fibrillation (HCC)   • Type 2 diabetes mellitus with nephropathy (HCC)   • Venous insufficiency   • Hyperlipidemia LDL goal <70   • Nuclear sclerotic cataract of right eye   • Cortical age-related cataract of right eye   • Nuclear sclerotic cataract of left eye   • Cortical age-related cataract of left eye   • Thrombocytopenia (HCC)   • History of colon polyps   • Chronic idiopathic constipation   • Adenomatous polyp of transverse colon   • Chronic kidney disease, stage III (moderate) (HCC)   • PVD (peripheral vascular disease) (HCC)   • Foot ulcer (HCC)   • Asymptomatic PVD (peripheral vascular disease) (HCC)

## 2023-03-17 NOTE — OP NOTE
CARDIOTHORACIC AND VASCULAR SURGERY OPERATIVE NOTE    Date of Procedure:   March 17, 2023    Preoperative Diagnosis:   Peripheral arterial disease with critical limb ischemia, diabetic foot wounds of the bilateral lower extremities with ischemic gangrene       Postoperative Diagnosis:   Same, no appreciable flow-limiting stenosis of the right lower extremity arteries, flow-limiting stenosis at the left anterior and posterior tibial arteries luminal gain after angioplasty and stenting    Procedure(s):   1.  Ultrasound and fluoroscopic guided right common femoral artery needle access and placement of a 6 French sheath  2.  Aortogram with bilateral lower extremity runoffs using carbon dioxide  3.  Third order selective catheterization of the left anterior and posterior tibial arteries  4.  Angioplasty of left anterior tibial artery using 2 mm balloon  5.  Angioplasty of left posterior tibial artery using 2 mm balloon  6.  Angioplasty of left popliteal artery using 2 mm balloon  7.  Placement of 2.5 mm x 15 mm Xience drug-eluting stent at the proximal left anterior tibial artery  8.  Sharp debridement of left lateral midfoot wound down to the level of the bone  9.  Sharp debridement of the right lateral forefoot wound down to the level of the bone  10.  The right common femoral artery arteriotomy site was closed with a Perclose ProGlide standard technique.    Surgeon:   Jose Cruz Etienne M.D., R.P.V.I.    Assistant(s):   SEB Elias, SEB Doshi  The presence and participation of the physician's assistant was necessary for the successful completion of the case, and duties included positioning, suctioning, retracting, stitching, dressing, holding intracorporeal devices such as a wire or camera and/or harvesting vessels for bypass as needed.     Anesthesia:   General endotracheal anesthesia with 1% lidocaine infiltration of bilateral foot wounds    Estimated Blood Loss:  Minimal    Complications:   None    Total  fluoroscopy time 17 minutes    Total radiation delivered 151 mGy    Total contrast delivered 25 mL of Visipaque    Indications:   72-year-old male with history of coronary artery disease and severe congestive heart failure, kidney failure with baseline creatinine atrial fibrillation and history of DVT therapy is anticoagulated with warfarin with INR 2.8, who presented to the outpatient cardiovascular surgery clinic on March 16, 2023 with complaint of left foot wound.  I discovered his right foot wound on the plantar aspect of his right foot.  I advised the patient to be admitted to the hospital directly or through the emergency room and he refused AGAINST MEDICAL ADVICE.  He presented today for emergency left lower extremity revascularization, diagnostic arteriography of the right lower extremity, and debridement of his bilateral foot wounds.  The patient was evaluated by myself and Dr. Oseas Oviedo in the preoperative area.  I described to him the risks, benefits, and alternatives of the planned procedure including risk of bleeding, infection, permanent disability, amputations, heart attack, stroke, permanent renal failure requiring dialysis, and even death.  The patient demonstrated good understanding and signed written consent.    Operative Findings:   No appreciable flow-limiting stenosis of the right lower extremity arteries, flow-limiting stenosis at the left anterior and posterior tibial arteries luminal gain after angioplasty and stenting    Procedure in Detail:   The patient was identified in the preoperative area, taken to the operating room, and laid in the supine position on the operating room table. A safety pause was performed in the presence of the operating room staff.  Systemic antibiotics were administered.  General anesthesia was induced, and an endotracheal tube was placed.  The bilateral groins and bilateral feet and lower extremities were prepped and draped in the standard fashion.  The right  common femoral artery was identified by ultrasound and fluoroscopic guidance and accessed using a micropuncture kit and the modified Salinger technique.  A Glidewire advantage was advanced into the aorta and an Omni Flush catheter was utilized to perform CO2 aortography which revealed no flow-limiting stenosis at the level of the aorta and the bilateral iliac arteries.  The Omni Flush catheter, Navicross catheter and Glidewire advantage were utilized to catheterize the left tibial arteries, and Command wires were placed in both the anterior tibial and posterior tibial arteries.  Both anterior and posterior tibial arteries were noted to have 80 to 90% critical stenosis in the proximal aspects.  The proximal tibial arteries were balloon angioplastied with a 2 mm balloon and residual stenosis of the proximal anterior tibial artery was treated with a drug-eluting stent which resulted in adequate luminal gain.  There was two-vessel runoff to the level of the foot bilaterally on completion arteriography.  The right common femoral artery arteriotomy site was closed with a Perclose ProGlide standard technique. Manual pressure was held at the right arteriotomy site for approximately 30 minutes. Next attention was turned to the bilateral foot wounds which were debrided sharply to the level of the bone bilaterally.  The wound at the left lateral midfoot measured approximately 3 x 4 x 2 cm and involves the midportion of the left fifth metatarsal bone which was transected and debrided sharply and with electrocautery.  A bone culture specimen was sent to microbiology.  The wound at the right lateral forefoot plantar surface measured approximately 3 x 3 x 1 cm and probes deeply to the level of the fascia and possibly the bone.  This was debrided sharply and with electrocautery.  The wounds were packed and dressed. All needle, sponge, and instrument counts were correct at the completion of the procedure. I was present for the  critical portions of the procedure.     Jose Cruz Etienne M.D., R.P.V.I.  Cardiothoracic and Vascular Surgeon  [facility]

## 2023-03-17 NOTE — LETTER
[facility]  [address] [address]  [phone]        March 24, 2023      Patient: Swapnil Lawson  YOB: 1950  Date of Visit: 3/16/2023      For [facility] inpatient admission      Referring: Dr. Serge King  Attending: Lorena GARCIA  Certifying: Dr. Belia Almazan, RN

## 2023-03-17 NOTE — ANESTHESIA PREPROCEDURE EVALUATION
Anesthesia Evaluation     Patient summary reviewed and Nursing notes reviewed   no history of anesthetic complications:  NPO Solid Status: > 8 hours  NPO Liquid Status: > 2 hours           Airway   Mallampati: II  TM distance: >3 FB  Neck ROM: full  No difficulty expected  Dental    (+) poor dentition    Pulmonary - normal exam   (+) pulmonary embolism (>10y ago, on coumadin since), a smoker Former, COPD (2.5L QHS), asthma,sleep apnea,   Cardiovascular     ECG reviewed  PT is on anticoagulation therapy  Patient on routine beta blocker and Beta blocker given within 24 hours of surgery  Rhythm: irregular  Rate: normal    (+) hypertension, past MI , CABG (2014), dysrhythmias Atrial Fib, PVD, DVT, hyperlipidemia,       Neuro/Psych  (-) seizures, TIA  GI/Hepatic/Renal/Endo    (+)   renal disease CRI, diabetes mellitus,     Musculoskeletal     Abdominal    Substance History - negative use     OB/GYN          Other        ROS/Med Hx Other: trulicity  Gout  warfarin 3/16/23  [id number]  hgb 11.2 k 4.8 plt 133  Cr 2.8       Phys Exam Other: Multiple missing teeth              Anesthesia Plan    ASA 4     general   Rapid sequence  (Risks and benefits of general anesthesia discussed with patient (including MI, CVA, death, recall, aspiration, oropharyngeal/dental damage), questions answered, agreeable to proceed.        glutide RX -- RSI)  intravenous induction     Anesthetic plan, risks, benefits, and alternatives have been provided, discussed and informed consent has been obtained with: patient.    Use of blood products discussed with patient  Consented to blood products.   Plan discussed with CRNA.        CODE STATUS:

## 2023-03-17 NOTE — BRIEF OP NOTE
Swapnil Lawson  3/17/2023    Pre-op Diagnosis:   Peripheral arterial disease with critical limb ischemia, diabetic foot wounds of the bilateral lower extremities with ischemic gangrene       Post-Op Diagnosis Codes:  Same, no appreciable flow-limiting stenosis of the right lower extremity arteries, flow-limiting stenosis at the left anterior and posterior tibial arteries luminal gain after angioplasty and stenting    Procedure(s):  1.  Ultrasound and fluoroscopic guided right common femoral artery needle access and placement of a 6 Korean sheath  2.  Aortogram with bilateral lower extremity runoffs using carbon dioxide  3.  Third order selective catheterization of the left anterior and posterior tibial arteries  4.  Angioplasty of left anterior tibial artery using 2 mm balloon  5.  Angioplasty of left posterior tibial artery using 2 mm balloon  6.  Angioplasty of left popliteal artery using 2 mm balloon  7.  Placement of 2.5 mm x 15 mm Xience drug-eluting stent at the proximal left anterior tibial artery  8.  Sharp debridement of left lateral midfoot wound down to the level of the bone  9.  Sharp debridement of the right lateral forefoot wound down to the level of the bone          Surgeon(s):  Jose Cruz Etienne MD    Anesthesia: General    Staff:   Circulator: Amarilys Amin RN  Radiology Technologist: Abimael Jenkins  Scrub Person: Reed Perez  Nursing Assistant: Mayuri Munoz         Estimated Blood Loss: minimal    Urine Voided: * No values recorded between 3/17/2023  2:25 PM and 3/17/2023  4:48 PM *    Specimens:                          Drains: * No LDAs found *    Findings: See above        Complications: None          Jose Cruz Etienne MD     Date: 3/17/2023  Time: 16:48 EDT

## 2023-03-17 NOTE — ANESTHESIA PROCEDURE NOTES
Arterial Line      Patient reassessed immediately prior to procedure    Patient location during procedure: pre-op   Line placed for hemodynamic monitoring.  Performed By   Anesthesiologist: Anny Hall DO   Preanesthetic Checklist  Completed: patient identified, IV checked, site marked, risks and benefits discussed, surgical consent, monitors and equipment checked, pre-op evaluation and timeout performed  Arterial Line Prep    Sterile Tech: cap, gloves and sterile barriers  Prep: ChloraPrep  Patient monitoring: blood pressure monitoring, continuous pulse oximetry and EKG  Arterial Line Procedure   Laterality:right  Location:  radial artery  Catheter size: 20 G   Guidance: ultrasound guided and palpation technique  Number of attempts: 1  Successful placement: yes   Post Assessment   Dressing Type: line sutured, occlusive dressing applied, secured with tape and wrist guard applied.   Complications no  Circ/Move/Sens Assessment: normal and unchanged.   Patient Tolerance: patient tolerated the procedure well with no apparent complications

## 2023-03-17 NOTE — ANESTHESIA PROCEDURE NOTES
Airway  Urgency: elective    Date/Time: 3/17/2023 2:41 PM  Airway not difficult    General Information and Staff    Patient location during procedure: OR  CRNA/CAA: Nelson Wyatt CRNA    Indications and Patient Condition  Indications for airway management: airway protection    Preoxygenated: yes  MILS not maintained throughout  Mask difficulty assessment: 1 - vent by mask    Final Airway Details  Final airway type: endotracheal airway      Successful airway: ETT  Cuffed: yes   Successful intubation technique: direct laryngoscopy and RSI  Facilitating devices/methods: cricoid pressure  Endotracheal tube insertion site: oral  Blade: Munir  Blade size: 3  ETT size (mm): 7.5  Cormack-Lehane Classification: grade I - full view of glottis  Placement verified by: chest auscultation and capnometry   Measured from: lips  ETT/EBT  to lips (cm): 20  Number of attempts at approach: 1  Assessment: lips, teeth, and gum same as pre-op and atraumatic intubation    Additional Comments  Negative epigastric sounds, Breath sound equal bilaterally with symmetric chest rise and fall

## 2023-03-17 NOTE — ANESTHESIA POSTPROCEDURE EVALUATION
Patient: Swapnil Lawson    Procedure Summary     Date: 03/17/23 Room / Location:  [facility] /  [facility]    Anesthesia Start: 1433 Anesthesia Stop:     Procedures:       AORTAGRAM WITH  RUNOFFS with STENT placement      DEBRIDEMENT FOOT (Bilateral) Diagnosis:       PVD (peripheral vascular disease) (Former[facility])      (PVD (peripheral vascular disease) (Formerly [facility]) [I73.9])    Surgeons: Jose Cruz Etienne MD Provider: Anny Hall DO    Anesthesia Type: general ASA Status: 4          Anesthesia Type: general    Vitals  Vitals Value Taken Time   BP     Temp     Pulse 87 03/17/23 1711   Resp     SpO2     Vitals shown include unvalidated device data.        Post Anesthesia Care and Evaluation    Patient location during evaluation: PACU  Patient participation: complete - patient participated  Level of consciousness: responsive to physical stimuli  Pain score: 0  Pain management: adequate    Airway patency: patent  Anesthetic complications: No anesthetic complications  PONV Status: none  Cardiovascular status: hemodynamically stable and acceptable  Respiratory status: nonlabored ventilation, acceptable, nasal cannula and spontaneous ventilation  Hydration status: acceptable    Comments: VSS  No anesthesia care post op

## 2023-03-18 PROBLEM — E43 SEVERE MALNUTRITION: Status: ACTIVE | Noted: 2023-01-01

## 2023-03-18 NOTE — PROGRESS NOTES
Malnutrition Severity Assessment    Patient Name:  Swapnil Lawson  YOB: 1950  MRN: [id number]  Admit Date:  3/17/2023    Patient meets criteria for : Severe Malnutrition (PO intakes <75% est. needs and loss 15.3% body weight past 6 months, severe muscle wasting, and severe subcutaneous fat loss.)    Malnutrition Severity Assessment  Malnutrition Type: Chronic Disease - Related Malnutrition  Malnutrition Type (last 8 hours)     Malnutrition Severity Assessment     Row Name 03/18/23 1506       Malnutrition Severity Assessment    Malnutrition Type Chronic Disease - Related Malnutrition    Row Name 03/18/23 1506       Insufficient Energy Intake     Insufficient Energy Intake Findings Severe  PO intakes <75% est. needs past 6 months    Insufficient Energy Intake  <75% of est. energy requirement for > or equal to 3 months    Row Name 03/18/23 1506       Unintentional Weight Loss     Unintentional Weight Loss Findings Severe  loss 15.3% body weight past 6 months    Unintentional Weight Loss  Weight loss greater than 10% in six months    Row Name 03/18/23 1506       Muscle Loss    Loss of Muscle Mass Findings Severe    Gordon Region Severe - deep hollowing/scooping, lack of muscle to touch, facial bones well defined    Clavicle Bone Region Severe - protruding prominent bone    Acromion Bone Region Severe - squared shoulders, bones, and acromion process protrusion prominent    Scapular Bone Region Severe - prominent bones, depressions easily visible between ribs, scapula, spine, shoulders    Dorsal Hand Region Severe - prominent depression    Patellar Region Severe - prominent bone, square looking, very little muscle definition    Anterior Thigh Region Severe - line/depression along thigh, obviously thin    Posterior Calf Region --  CLARK    Row Name 03/18/23 1506       Fat Loss    Subcutaneous Fat Loss Findings Severe    Orbital Region  Severe - pronounced hollowness/depression, dark circles, loose saggy  skin    Upper Arm Region Severe - mostly skin, very little space between folds, fingers touch    Thoracic & Lumbar Region Severe - ribs visible with prominent depressions, iliac crest very prominent    Row Name 03/18/23 1506       Criteria Met (Must meet criteria for severity in at least 2 of these categories: M Wasting, Fat Loss, Fluid, Secondary Signs, Wt. Status, Intake)    Patient meets criteria for  Severe Malnutrition  PO intakes <75% est. needs and loss 15.3% body weight past 6 months, severe muscle wasting, and severe subcutaneous fat loss.                Electronically signed by:  Ruth Kim RD  03/18/23 15:07 EDT

## 2023-03-18 NOTE — PROGRESS NOTES
1 Day Post-Op Procedure(s):   1.  Ultrasound and fluoroscopic guided right common femoral artery needle access and placement of a 6 [place] sheath  2.  Aortogram with bilateral lower extremity runoffs using carbon dioxide  3.  Third order selective catheterization of the left anterior and posterior tibial arteries  4.  Angioplasty of left anterior tibial artery using 2 mm balloon  5.  Angioplasty of left posterior tibial artery using 2 mm balloon  6.  Angioplasty of left popliteal artery using 2 mm balloon  7.  Placement of 2.5 mm x 15 mm Xience drug-eluting stent at the proximal left anterior tibial artery  8.  Sharp debridement of left lateral midfoot wound down to the level of the bone  9.  Sharp debridement of the right lateral forefoot wound down to the level of the bone  10.  The right common femoral artery arteriotomy site was closed with a Perclose ProGlide standard technique.       LOS: 0 days   Patient Care Team:  Argenis Osborne APRN as PCP - General (Family Medicine)  Kenji Garcias MD as Consulting Physician (General Surgery)  Naseem Campbell III, MD as Cardiologist (Cardiology)    Chief complaint:    Subjective   Denies chest pain, denies shortness of breath    Objective    Vital Signs  Temp:  [95.6 °F (35.3 °C)-97.6 °F (36.4 °C)] 97.4 °F (36.3 °C)  Heart Rate:  [61-91] 82  Resp:  [14-20] 16  BP: ()/(56-76) 99/56    Physical Exam:   General Appearance: alert, appears stated age and cooperative   Lungs: clear bilaterally   Heart: Regular rate and rhythm   Skin:  I wounds are dressed they did have Xeroform packing in bilateral foot wounds from debridement yesterday   Results   Results from last 7 days   Lab Units 03/18/23  0643   WBC 10*3/mm3 5.19   HEMOGLOBIN g/dL 10.4*   HEMATOCRIT % 33.2*   PLATELETS 10*3/mm3 114*     Results from last 7 days   Lab Units 03/18/23  0643   SODIUM mmol/L 136   POTASSIUM mmol/L 4.4   CHLORIDE mmol/L 110*   CO2 mmol/L 17.0*   BUN mg/dL 43*   CREATININE mg/dL  2.13*   GLUCOSE mg/dL 116*   CALCIUM mg/dL 8.2*               Assessment      PVD (peripheral vascular disease) (HCC)  Status  post day 1 tibial stenting  And bilateral foot debridement    Plan   Consult ID  Plan to surgery scheduled for partial bilateral foot amputations on Monday or Tuesday  We will get a second opinion from Oseas Steven PA-C  03/18/23  07:47 EDT

## 2023-03-18 NOTE — CONSULTS
[facility] Medicine Services  CONSULT NOTE      Patient Name: Swapnil Lawson  : 1950  MRN: [id number]    Primary Care Physician: Argenis Osborne APRN  Provider requesting consultation: No ref. provider found    Subjective     Reason for Consultation: medical management, assume care of patient     HPI:  Swapnil Lawson is a 72 y.o. male with PMH significant for CAD s/p CABG, chronic diastolic CHF, atrial fibrillation (Warfarin), prior DVT/PE, COPD, non-insulin dependent DMII, CKD III, venous insufficiency and PAD. Evaluated by Dr. Etienne on 3/16/2023 for non-healing ulcer on the lateral aspect of his L foot, present for 6 months as well as a fairly recently new ulceration on the plantar aspect of the R lateral forefoot. Pedal pulses were not palpable. Previously evaluated by Dr. Zaldivar - arteriography of LLE revealed severe stenosis at proximal tibial artery.  Dr. Etienne recommended hospital admission but the patient declined, opting to return to the hospital on 3/17/23, at which time he underwent aortagram with run off with angioplasty of L anterior tibial, L posterior tibial, L popliteal arteries, stent placement at proximal L anterior tibial artery as well as sharp debridement of L lateral midfoot wound down to level of bone and sharp debridement of R lateral forefoot wound down to level of bone. He was admitted to CT surgery service. Hospital medicine and ID consulted to assist with care. ID has initiated Daptomycin / Zosyn. Dr. Oviedo to evaluate for consideration of amputation in the upcoming days.     At time of my exam, Mr. Lawson was sitting on the side of his bed in no acute distress. He denies pain, dyspnea, abdominal pain, nausea or vomiting. He has been having difficulty urinating since admission and has required I&O cath. He denies a history of BPH and has not seen a urologist in the past, nor does he know if he has been diagnosed with BPH. He reports since a  bowel resection last fall, he has had more difficulty urinating than before. He generally has to sit on the toilet because he often strains so hard he has a bowel movement.     Review of Systems   Constitutional: Negative.    HENT: Negative.    Respiratory: Negative.    Cardiovascular: Negative.    Gastrointestinal: Negative.    Genitourinary: Positive for difficulty urinating.   Musculoskeletal: Negative.    Skin: Positive for wound.   Neurological: Negative.    Psychiatric/Behavioral: Negative.      All other systems reviewed and are negative.     Personal History     Past Medical History:   Diagnosis Date   • Abnormal ECG    • Cataracts, bilateral    • CHF (congestive heart failure) (HCC)    • Childhood asthma    • COPD (chronic obstructive pulmonary disease) (HCC)    • Coronary artery disease 12/14/2016    CABG, 1993, Abimael Tomlin MD. CABG, August 2013, [facility].   • Diabetes mellitus (HCC) 12/14/2016    type II   • DVT (deep venous thrombosis) ([facility])     RLE   • Gout    • Hepatitis     1970s unsure of which one- states it was caused from drinking contaminated water   • Hyperlipidemia 12/14/2016   • Hypertension 12/14/2016   • Kidney disease    • Myocardial infarction (HCC)      in 1992 and 1993 prior to CABG.   • Paroxysmal atrial fibrillation (HCC) 12/14/2016   • Pulmonary embolism (HCC)    • Sleep apnea    • Venous insufficiency 12/14/2016   • Wears glasses     for reading     Past Surgical History:   Procedure Laterality Date   • APPENDECTOMY  1981   • BUNIONECTOMY Left    • CARDIAC CATHETERIZATION      x4, no stents   • CARDIAC CATHETERIZATION N/A 02/16/2023    Procedure: Peripheral angiography  Left lower extremity angiogram [facility] interventional Cardiologist;  Surgeon: Reed Zaldivar MD;  Location: [facility] INVASIVE LOCATION;  Service: Cardiovascular;  Laterality: N/A;  Left lower extremity angiogram with interventional cardioligist.   • CATARACT EXTRACTION W/ INTRAOCULAR LENS IMPLANT  Right 06/25/2020    Procedure: CATARACT PHACO EXTRACTION WITH INTRAOCULAR LENS IMPLANT RIGHT;  Surgeon: Omar Blood MD;  Location: [facility] OR;  Service: Ophthalmology;  Laterality: Right;   • CATARACT EXTRACTION W/ INTRAOCULAR LENS IMPLANT Left 07/09/2020    Procedure: CATARACT PHACO EXTRACTION WITH INTRAOCULAR LENS IMPLANT LEFT;  Surgeon: Omar Blood MD;  Location: [facility] OR;  Service: Ophthalmology;  Laterality: Left;   • CHOLECYSTECTOMY  2002   • COLON RESECTION Right 09/12/2022    Procedure: COLON RESECTION LAPAROSCOPIC HAND ASSISTED;  Surgeon: Kenji Garcias MD;  Location: [facility] OR;  Service: General;  Laterality: Right;   • COLON RESECTION N/A    • COLONOSCOPY     • COLONOSCOPY N/A 08/11/2022    Procedure: COLONOSCOPY, biopsy, polypectomy x1 and tattooing;  Surgeon: Kenji Garcias MD;  Location: [facility] ENDOSCOPY;  Service: Gastroenterology;  Laterality: N/A;   • CORONARY ARTERY BYPASS GRAFT      1993 triple bypass   • CORONARY ARTERY BYPASS GRAFT  2013    double bypass   • FINGER SURGERY      Rt index (second finger)   • OTHER SURGICAL HISTORY  2010    Bone spur removal   • TOE AMPUTATION Left 2009    4th toe   • VASECTOMY       Family History: family history includes Asthma in his father; COPD in his mother; Heart attack in his father. Otherwise pertinent FHx was reviewed and unremarkable.     Social History:  reports that he quit smoking about 30 years ago. His smoking use included cigarettes. He started smoking about 58 years ago. He has a 28.00 pack-year smoking history. He has never used smokeless tobacco. He reports that he does not drink alcohol and does not use drugs.    Medications:  Dulaglutide, O2, aspirin, colchicine, diphenhydrAMINE, docusate sodium, ferrous gluconate, ipratropium, lovastatin, metoprolol succinate XL, nitroglycerin, polycarbophil, and warfarin    Scheduled Meds:aspirin, 81 mg, Oral, Daily  clopidogrel, 75 mg, Oral, Daily  DAPTOmycin, 6 mg/kg, Intravenous,  Q24H  heparin (porcine), 5,000 Units, Subcutaneous, Q12H  piperacillin-tazobactam, 3.375 g, Intravenous, Q8H  tamsulosin, 0.4 mg, Oral, Daily      Continuous Infusions:niCARdipine, 5-15 mg/hr, Last Rate: Stopped (03/17/23 2057)      PRN Meds:.•  acetaminophen  •  acetaminophen  •  Chlorhexidine Gluconate Cloth  •  HYDROcodone-acetaminophen  •  HYDROmorphone **AND** naloxone    No Known Allergies    Objective     Vital Signs:   Temp:  [95.6 °F (35.3 °C)-97.6 °F (36.4 °C)] 97.6 °F (36.4 °C)  Heart Rate:  [61-85] 79  Resp:  [14-20] 16  BP: ()/(56-76) 103/68  Flow (L/min):  [2-4] 2    Physical Exam  Constitutional: Awake, alert and conversant. Thin and chronically ill appearing  Eyes: PERRLA, sclerae anicteric, no conjunctival injection.   HENT: NCAT, mucous membranes moist. Poor dentition   Neck: Supple, no thyromegaly, no lymphadenopathy, trachea midline  Respiratory: Clear to auscultation bilaterally, nonlabored respirations   Cardiovascular: Irregular rhythm, rate 90's without m/r/g  Gastrointestinal: Positive bowel sounds, soft, nontender, nondistended  Musculoskeletal: Trace to 1+ pitting bilateral ankle edema. Red/purplish discoloration to lower legs and feet  Psychiatric: Appropriate affect, cooperative  Neurologic: Oriented x 3, moves all extremities spontaneously without focal deficits, speech clear    Result Review:  I have personally reviewed the results from the time of admission and agree with these findings:  [x]  Laboratory  [x]  Radiology  [x]  EKG/Telemetry   []  Pathology  [x]  Old records    LAB RESULTS:      Lab 03/18/23  0643 03/17/23  1340 03/16/23  1611   WBC 5.19  --  6.73   HEMOGLOBIN 10.4*  --  11.2*   HEMATOCRIT 33.2*  --  35.0*   PLATELETS 114*  --  133*   NEUTROS ABS 4.50  --   --    IMMATURE GRANS (ABS) 0.07*  --   --    LYMPHS ABS 0.41*  --   --    MONOS ABS 0.19  --   --    EOS ABS 0.00  --   --    MCV 90.2  --  87.9   PROTIME  --  30.2* 29.5*   APTT  --   --  54.6*         Lab  03/18/23  0643 03/16/23  1611   SODIUM 136 139   POTASSIUM 4.4 4.8   CHLORIDE 110* 110*   CO2 17.0* 18.2*   ANION GAP 9.0 10.8   BUN 43* 43*   CREATININE 2.13* 2.82*   EGFR 32.1* 22.9*   GLUCOSE 116* 105*   CALCIUM 8.2* 9.7   HEMOGLOBIN A1C  --  5.80*         Lab 03/17/23  1340 03/16/23  1611   PROTIME 30.2* 29.5*   INR 2.87* 2.78*         Lab 03/17/23  1352 03/17/23  1258   ABO TYPING A A   RH TYPING Positive Positive   ANTIBODY SCREEN  --  Negative         Brief Urine Lab Results  (Last result in the past 365 days)      Color   Clarity   Blood   Leuk Est   Nitrite   Protein   CREAT   Urine HCG        03/18/23 1044 Yellow   Clear   Moderate (2+)   Small (1+)   Negative   30 mg/dL (1+)               Microbiology Results (last 10 days)     Procedure Component Value - Date/Time    Tissue / Bone Culture - Bone, Foot, Left [[id number]] Collected: 03/17/23 1649    Lab Status: Preliminary result Specimen: Bone from Foot, Left Updated: 03/18/23 0721     Tissue Culture No growth     Gram Stain Few (2+) WBCs seen      No organisms seen    AFB Culture - Bone, Foot, Left [[id number]] Collected: 03/17/23 1649    Lab Status: Preliminary result Specimen: Bone from Foot, Left Updated: 03/18/23 1238     AFB Stain No acid fast bacilli seen on concentrated smear        Arterial Line    Result Date: 3/17/2023  Anny Hall DO     3/17/2023  2:19 PM Arterial Line Patient reassessed immediately prior to procedure Patient location during procedure: pre-op Line placed for hemodynamic monitoring. Performed By Anesthesiologist: Anny Hall DO Preanesthetic Checklist Completed: patient identified, IV checked, site marked, risks and benefits discussed, surgical consent, monitors and equipment checked, pre-op evaluation and timeout performed Arterial Line Prep  Sterile Tech: cap, gloves and sterile barriers Prep: ChloraPrep Patient monitoring: blood pressure monitoring, continuous pulse oximetry and EKG Arterial Line Procedure  Laterality:right Location:  radial artery Catheter size: 20 G Guidance: ultrasound guided and palpation technique Number of attempts: 1 Successful placement: yes Post Assessment Dressing Type: line sutured, occlusive dressing applied, secured with tape and wrist guard applied. Complications no Circ/Move/Sens Assessment: normal and unchanged. Patient Tolerance: patient tolerated the procedure well with no apparent complications     Assessment & Plan     Active Hospital Problems    Diagnosis  POA   • **PVD (peripheral vascular disease) (Former[facility]) [I73.9]  Unknown      Resolved Hospital Problems   No resolved problems to display.     Swapnil Lawson is a 72 y.o. male with PMH significant for CAD s/p CABG, chronic diastolic CHF, atrial fibrillation (Warfarin), prior DVT/PE, COPD, non-insulin dependent DMII, CKD III, venous insufficiency and PAD. Evaluated by Dr. Etienne on 3/16/2023 for non-healing ulcer on the lateral aspect of his L foot, present for 6 months as well as a fairly recently new ulceration on the plantar aspect of the R lateral forefoot. Pedal pulses were not palpable. Previously evaluated by Dr. Zaldivar - arteriography of LLE revealed severe stenosis at proximal tibial artery.  Dr. Etienne recommended hospital admission but the patient declined, opting to return to the hospital on 3/17/23, at which time he underwent aortagram with run off with angioplasty of L anterior tibial, L posterior tibial, L popliteal arteries, stent placement at proximal L anterior tibial artery as well as sharp debridement of L lateral midfoot wound down to level of bone and sharp debridement of R lateral forefoot wound down to level of bone. He was admitted to CT surgery service. Hospital medicine and ID consulted to assist with care. ID has initiated Daptomycin / Zosyn. Dr. Oviedo to evaluate for consideration of amputation in the upcoming days.     Foot ulcerations / gangrene  Peripheral arterial disease / critical limb ischemia  - s/p  angiogram with runoff with angioplasty and stent placement by Dr. Etienne on 3/17/23  - per CT surgery note, Dr. Oviedo to see for consideration of partial bilateral foot amputations on Monday or Tuesday  - ID following - now on IV Daptomycin/Zosyn  - CRP 4.04, ESR not ordered   - Continue ASA and Plavix     Atrial fibrillation   Chronic anticoagulation   - Chronically anticoagulated with Warfarin. GOAL INR 2-3   - Warfarin currently on hold for possible surgical intervention   - Restart home Metoprolol with hold parameters     Chronic diastolic CHF   - No ECHO on file. Per Dr. Campbell 5/2022 note, February 2019 ECHO with EF 50%, moderately dilated LV, moderately dilated RV with normal function, moderate to severe L atrial enlargement and dilated IVC  - Appears euvolemic. Known history of venous insufficiency which contributes to lower extremity edema     DEMARIO on CKD III  - Baseline creatinine appears to be 1.6-2.0  - Creatinine 2.81 on 3/16, improved to 2.13 today  - Monitor renal function closely     Non-insulin dependent DMII  - Hgb A1c 5.8%   - Can use home Trulicity tomorrow  - DC accuchecks and SSI. Not requiring sliding scale insulin     Thank you for allowing [facility] Medicine Service to provide consultative care for your patient, we will continue to follow while clinically appropriate.    Khadijah Chiu PA-C  03/18/23

## 2023-03-18 NOTE — CONSULTS
INFECTIOUS DISEASE CONSULT/INITIAL HOSPITAL VISIT    Swapnil Lawson  1950  [id number]    Date of Consult: 3/18/2023    Admission Date: 3/17/2023      Requesting Provider: Jose Cruz Etienne MD  Evaluating Physician: RADHA Iyer    Reason for Consultation: bilateral foot infections/gangrene.      History of present illness:    Patient is a 72 y.o. male, with PMH COPD, CHF, CKD, CAD, DM, PAD, seen today for bilateral foot infections.  Admitted on 3/17/23 undergoing aortagram with stent placement/angioplasty of bilateral lower extremities with sharp debridement of both feet, plans for partial bilateral foot amputations early next week.  He has noted nonhealing foot ulcers for at least the past 6 months.  He was referred to CTS service and admitted 3/17/2023 for above. He has been afebrile without leukocytosis. Admitting labs with Scr 2.82, WBC 6.73, ESR 36, CRP 4.04. PCT 0.18.  Wound culture with no organisms, few WBCs, no growth.  He received a dose of Cefazolin and we were consulted for evaluation and treatment.  The patient denies immunocompromise status, TB, HIV, zoonotic exposures, ill contacts, or significant travel history.    Past Medical History:   Diagnosis Date   • Abnormal ECG    • Cataracts, bilateral    • CHF (congestive heart failure) (HCC)    • Childhood asthma    • COPD (chronic obstructive pulmonary disease) ([facility])    • Coronary artery disease 12/14/2016    CABG, 1993, Abimael Tomlin MD. CABG, August 2013, [facility].   • Diabetes mellitus (HCC) 12/14/2016    type II   • DVT (deep venous thrombosis) ([facility])     RLE   • Gout    • Hepatitis     1970s unsure of which one- states it was caused from drinking contaminated water   • Hyperlipidemia 12/14/2016   • Hypertension 12/14/2016   • Kidney disease    • Myocardial infarction (HCC)      in 1992 and 1993 prior to CABG.   • Paroxysmal atrial fibrillation (HCC) 12/14/2016   • Pulmonary embolism (HCC)    • Sleep apnea    • Venous  insufficiency 12/14/2016   • Wears glasses     for reading       Past Surgical History:   Procedure Laterality Date   • APPENDECTOMY  1981   • BUNIONECTOMY Left    • CARDIAC CATHETERIZATION      x4, no stents   • CARDIAC CATHETERIZATION N/A 02/16/2023    Procedure: Peripheral angiography  Left lower extremity angiogram [facility] interventional Cardiologist;  Surgeon: Reed Zaldivar MD;  Location: [facility] CATH INVASIVE LOCATION;  Service: Cardiovascular;  Laterality: N/A;  Left lower extremity angiogram with interventional cardioligist.   • CATARACT EXTRACTION W/ INTRAOCULAR LENS IMPLANT Right 06/25/2020    Procedure: CATARACT PHACO EXTRACTION WITH INTRAOCULAR LENS IMPLANT RIGHT;  Surgeon: Omar Blood MD;  Location: [facility] OR;  Service: Ophthalmology;  Laterality: Right;   • CATARACT EXTRACTION W/ INTRAOCULAR LENS IMPLANT Left 07/09/2020    Procedure: CATARACT PHACO EXTRACTION WITH INTRAOCULAR LENS IMPLANT LEFT;  Surgeon: Omar Blood MD;  Location: [facility] OR;  Service: Ophthalmology;  Laterality: Left;   • CHOLECYSTECTOMY  2002   • COLON RESECTION Right 09/12/2022    Procedure: COLON RESECTION LAPAROSCOPIC HAND ASSISTED;  Surgeon: Kenji Garcias MD;  Location: [facility] OR;  Service: General;  Laterality: Right;   • COLON RESECTION N/A    • COLONOSCOPY     • COLONOSCOPY N/A 08/11/2022    Procedure: COLONOSCOPY, biopsy, polypectomy x1 and tattooing;  Surgeon: Kenji Garcias MD;  Location: [facility] ENDOSCOPY;  Service: Gastroenterology;  Laterality: N/A;   • CORONARY ARTERY BYPASS GRAFT      1993 triple bypass   • CORONARY ARTERY BYPASS GRAFT  2013    double bypass   • FINGER SURGERY      Rt index (second finger)   • OTHER SURGICAL HISTORY  2010    Bone spur removal   • TOE AMPUTATION Left 2009    4th toe   • VASECTOMY         Family History   Problem Relation Age of Onset   • COPD Mother    • Heart attack Father    • Asthma Father      Social history lives with son.  Social History     Socioeconomic History    • Marital status:    • Number of children: 1   Tobacco Use   • Smoking status: Former     Packs/day: 1.00     Years: 28.00     Pack years: 28.00     Types: Cigarettes     Start date: 1964     Quit date: 1993     Years since quittin.2   • Smokeless tobacco: Never   • Tobacco comments:     Wish I'd never started   Vaping Use   • Vaping Use: Never used   Substance and Sexual Activity   • Alcohol use: No   • Drug use: No   • Sexual activity: Not Currently     Partners: Female     Birth control/protection: Other, Vasectomy, Birth control pill       No Known Allergies      Medication:    Current Facility-Administered Medications:   •  acetaminophen (TYLENOL) tablet 650 mg, 650 mg, Oral, Q4H PRN, Jose Cruz Etienne MD  •  acetaminophen (TYLENOL) tablet 650 mg, 650 mg, Oral, Q4H PRN, Jose Cruz Etienne MD  •  aspirin chewable tablet 81 mg, 81 mg, Oral, Daily, Jose Cruz Etienne MD  •  ceFAZolin in dextrose (ANCEF) IVPB solution 2 g, 2 g, Intravenous, Q8H, Jose Cruz Etienne MD, 2 g at 23  •  Chlorhexidine Gluconate Cloth 2 % pads 1 application, 1 application, Topical, Q12H PRN, Rolo Lawler PA  •  clopidogrel (PLAVIX) tablet 75 mg, 75 mg, Oral, Daily, Jose Cruz Etienne MD  •  heparin (porcine) 5000 UNIT/ML injection 5,000 Units, 5,000 Units, Subcutaneous, Q12H, Jose Cruz Etienne MD  •  HYDROcodone-acetaminophen (NORCO) 7.5-325 MG per tablet 1 tablet, 1 tablet, Oral, Q4H PRN, Jose Cruz Etienne MD, 1 tablet at 23  •  HYDROmorphone (DILAUDID) injection 0.5 mg, 0.5 mg, Intravenous, Q2H PRN **AND** naloxone (NARCAN) injection 0.4 mg, 0.4 mg, Intravenous, Q5 Min PRN, Jose Cruz Etienne MD  •  niCARdipine (CARDENE) 25mg in 250mL NS infusion, 5-15 mg/hr, Intravenous, Titrated, Jose Cruz Etienne MD, Held at 23  •  tamsulosin (FLOMAX) 24 hr capsule 0.4 mg, 0.4 mg, Oral, Daily, Emmanuel Steven PA-C    Antibiotics:  Anti-Infectives (From admission, onward)    Ordered     Dose/Rate Route Frequency  Start Stop    23  ceFAZolin in dextrose (ANCEF) IVPB solution 2 g        Ordering Provider: Jose Cruz Etienne MD    2 g  over 30 Minutes Intravenous Every 8 Hours 23 2300 23 1459    23 1153  ceFAZolin in dextrose (ANCEF) IVPB solution 2 g        Ordering Provider: Rolo Lawler PA    2 g  over 30 Minutes Intravenous Once 23 1155 23 1442        Review of Systems:  Constitutional-- No Fever, chills or sweats.  Appetite good, and no malaise. No fatigue.  HEENT-- No new vision, hearing or throat complaints.  No epistaxis or oral sores.  Denies odynophagia or dysphagia. No headache, photophobia or neck stiffness.  CV-- No chest pain, palpitation or syncope  Resp-- No SOB/cough/Hemoptysis  GI- No nausea, vomiting, or diarrhea.  No hematochezia, melena, or hematemesis. Denies jaundice or chronic liver disease.  -- No dysuria, hematuria, or flank pain.  Denies hesitancy, urgency or flank pain.  Lymph- no swollen lymph nodes in neck/axilla or groin.   Heme- No active bruising or bleeding; no Hx of DVT or PE.  MS-- no swelling or pain in the bones or joints of arms/legs.  No new back pain.  Neuro-- No acute focal weakness or numbness in the arms or legs.  No seizures.  Skin--No rashes   Bilateral foot ulcers as per HPI.    Physical Exam:   Vital Signs  Temp (24hrs), Av.1 °F (36.2 °C), Min:95.6 °F (35.3 °C), Max:97.6 °F (36.4 °C)    Temp  Min: 95.6 °F (35.3 °C)  Max: 97.6 °F (36.4 °C)  BP  Min: 99/56  Max: 123/72  Pulse  Min: 61  Max: 91  Resp  Min: 14  Max: 20  SpO2  Min: 95 %  Max: 100 %    GENERAL: Awake and alert, in moderate distress. Appears older than stated age, cachectic, resting in bed.  HEENT: Normocephalic, atraumatic.  PERRL. EOMI. No conjunctival injection. No icterus. Oropharynx clear without evidence of thrush or exudate. No evidence of peridontal disease.    NECK: Supple   HEART: RRR; No murmur, rubs, gallops.  No JVD.  LUNGS: Clear to auscultation bilaterally  without wheezing, rales, rhonchi. Normal respiratory effort. Nonlabored.  ABDOMEN: Soft, nontender, nondistended. Positive bowel sounds. No rebound or guarding. NO mass or HSM.  EXT:  No cyanosis, clubbing or edema. No cord.  :  Without Dueñas catheter.  MSK: No joint effusions or erythema  SKIN: Warm and dry  Left lateral foot wound approximately 4cm in diameter with slough, some granulation, approx 0.5cm deep, probes to bone.  Right th MTP ulcer, with necrosis, approx 5cm in diamter 0.5 cm deep.  Venous discoloration noted bilateral lower extremities   NEURO: Oriented to PPT.  Motor 5/5 strength, cranial nerves II through XII intact, sensory decreased light touch lower extremities, cerebellar and gait not tested  PSYCHIATRIC: Normal insight and judgment. Cooperative with PE            Laboratory Data    Results from last 7 days           Lab Units 03/18/23  0643 03/16/23  1611   WBC 10*3/mm3 5.19 6.73   HEMOGLOBIN g/dL 10.4* 11.2*   HEMATOCRIT % 33.2* 35.0*   PLATELETS 10*3/mm3 114* 133*     Results from last 7 days   Lab Units 03/18/23  0643   SODIUM mmol/L 136   POTASSIUM mmol/L 4.4   CHLORIDE mmol/L 110*   CO2 mmol/L 17.0*   BUN mg/dL 43*   CREATININE mg/dL 2.13*   GLUCOSE mg/dL 116*   CALCIUM mg/dL 8.2*                             Estimated Creatinine Clearance: 31.9 mL/min (A) (by C-G formula based on SCr of 2.13 mg/dL (H)).      Microbiology:  No results found for: ACANTHNAEG, AFBCX, BPERTUSSISCX, BLOODCX  No results found for: BCIDPCR, CXREFLEX, CSFCX, CULTURETIS  No results found for: CULTURES, HSVCX, URCX  No results found for: EYECULTURE, GCCX, HSVCULTURE, LABHSV  No results found for: LEGIONELLA, MRSACX, MUMPSCX, MYCOPLASCX  No results found for: NOCARDIACX, STOOLCX  No results found for: THROATCX, UNSTIMCULT, URINECX, CULTURE, VZVCULTUR  No results found for: VIRALCULTU, WOUNDCX        Radiology:  Imaging Results (Last 72 Hours)     Procedure Component Value Units Date/Time    XR [facility] OR  Procedure [[id number]] Resulted: 23     Updated: 23            Impression:   - Bilateral foot infections, s/p sharp debridement with plans for partial amputation early next week- culture no growth so far from 3/17/2023, AFB and fungal also pending, with likely osteomyelitis underneath.  Wounds are likely related to diabetes mellitus type 2, peripheral vascular disease including microvascular disease, and neuropathy.  - PVD s/p angioplasty/stent  3/17  - Diabetes mellitus, type 2  - Chronic kidney disease, creatinine 2.13 on 3/18.  Acute on chronic.  -COPD  -Hypocalcemia 8.2.  -Anemia, chronic disease 10.4.  -Thrombocytopenia, related to above issues and medications.    PLAN/RECOMMENDATIONS:   Thank you for asking us to see Swapnil Kalin Lawson, I recommend the followin.  Diagnostically, monitor blood cultures, physical examination, radiology imaging, CBC, CMP, ESR, CRP, lactic acid, and procalcitonin wound cultures, tissue culture 3/17/2023.  Previous blood cultures 3/7 were negative.  2.  Therapeutically, add Daptomycin, and Zosyn for gram positive, gram negative and anaerobic coverage,  duration pending cultures and clinical improvement.  Duration to be determined.  Likely to be 6 weeks after resection of his bones.  Await surgical disposition.  3.  Continue supportive care    I discussed the patients findings and my recommendations with patient.    Thank you for asking me to see Mr. Lawson.  Our group would be pleased to follow this patient over the course of their hospitalization and assist with outpatient antimicrobial therapy, as indicated.  Further recommendations depend on the results of the cultures and clinical course.   Dr. Barksdale  has obtained the history, performed the physical exam and formulated the above treatment plan.  Side effects were discussed with the patient.  Increased risk for adverse drug reactions, complications of IV access, readmission, loss of limb.    Cheli  Rain, APRN  3/18/2023  08:03 EDT

## 2023-03-18 NOTE — H&P
CARDIOTHORACIC AND VASCULAR SURGERY HISTORY AND PHYSICAL NOTE    Chief Complaint/Reason for Consultation:   Bilateral foot wounds, PAD    HPI:   72-year-old man with history of Afib on Warfarin, DM, CABG, CHF, CKD, and PAD who presented to the outpatient Cardiovascular Surgery clinic with bilateral diabetic gangrenous foot ulcers concerning for chronic limb-threatening ischemia.     Location - As above  Severity - Severe  Timing - Several weeks  Duration - Several weeks  Radiation - None  Context - As above  Modifying factors - None   Associated symptoms - None    Review of Systems   General ROS: negative  Psychological ROS: negative  Ophthalmic ROS: negative  ENT ROS: negative  Allergy and Immunology ROS: negative  Hematological and Lymphatic ROS: negative  Endocrine ROS: negative  Breast ROS: negative  Respiratory ROS: negative  Cardiovascular ROS: negative  Gastrointestinal ROS: negative  Genito-Urinary ROS: negative  Musculoskeletal ROS: negative  Neurological ROS: negative  Dermatological ROS: negative    Past Medical History:   Diagnosis Date   • Abnormal ECG    • Cataracts, bilateral    • CHF (congestive heart failure) (HCC)    • Childhood asthma    • COPD (chronic obstructive pulmonary disease) (HCC)    • Coronary artery disease 12/14/2016    CABG, 1993, Abimael Tomlin MD. CABG, August 2013, [facility].   • Diabetes mellitus (HCC) 12/14/2016    type II   • DVT (deep venous thrombosis) ([facility])     RLE   • Gout    • Hepatitis     1970s unsure of which one- states it was caused from drinking contaminated water   • Hyperlipidemia 12/14/2016   • Hypertension 12/14/2016   • Kidney disease    • Myocardial infarction (HCC)      in 1992 and 1993 prior to CABG.   • Paroxysmal atrial fibrillation (HCC) 12/14/2016   • Pulmonary embolism (HCC)    • Sleep apnea    • Venous insufficiency 12/14/2016   • Wears glasses     for reading       Past Surgical History:   Procedure Laterality Date   • APPENDECTOMY  1981    • BUNIONECTOMY Left    • CARDIAC CATHETERIZATION      x4, no stents   • CARDIAC CATHETERIZATION N/A 02/16/2023    Procedure: Peripheral angiography  Left lower extremity angiogram wit interventional Cardiologist;  Surgeon: Reed Zaldivar MD;  Location: [facility] CATH INVASIVE LOCATION;  Service: Cardiovascular;  Laterality: N/A;  Left lower extremity angiogram with interventional cardioligist.   • CATARACT EXTRACTION W/ INTRAOCULAR LENS IMPLANT Right 06/25/2020    Procedure: CATARACT PHACO EXTRACTION WITH INTRAOCULAR LENS IMPLANT RIGHT;  Surgeon: Omar Blood MD;  Location: [facility] OR;  Service: Ophthalmology;  Laterality: Right;   • CATARACT EXTRACTION W/ INTRAOCULAR LENS IMPLANT Left 07/09/2020    Procedure: CATARACT PHACO EXTRACTION WITH INTRAOCULAR LENS IMPLANT LEFT;  Surgeon: Omar Blood MD;  Location: [facility] OR;  Service: Ophthalmology;  Laterality: Left;   • CHOLECYSTECTOMY  2002   • COLON RESECTION Right 09/12/2022    Procedure: COLON RESECTION LAPAROSCOPIC HAND ASSISTED;  Surgeon: Kenji Garcias MD;  Location: [facility] OR;  Service: General;  Laterality: Right;   • COLON RESECTION N/A    • COLONOSCOPY     • COLONOSCOPY N/A 08/11/2022    Procedure: COLONOSCOPY, biopsy, polypectomy x1 and tattooing;  Surgeon: Kenji Garcias MD;  Location: [facility] ENDOSCOPY;  Service: Gastroenterology;  Laterality: N/A;   • CORONARY ARTERY BYPASS GRAFT      1993 triple bypass   • CORONARY ARTERY BYPASS GRAFT  2013    double bypass   • FINGER SURGERY      Rt index (second finger)   • OTHER SURGICAL HISTORY  2010    Bone spur removal   • TOE AMPUTATION Left 2009    4th toe   • VASECTOMY         Family History   Problem Relation Age of Onset   • COPD Mother    • Heart attack Father    • Asthma Father        Social History     Socioeconomic History   • Marital status:    • Number of children: 1   Tobacco Use   • Smoking status: Former     Packs/day: 1.00     Years: 28.00     Pack years: 28.00     Types:  Cigarettes     Start date: 1964     Quit date: 1993     Years since quittin.2   • Smokeless tobacco: Never   • Tobacco comments:     Wish I'd never started   Vaping Use   • Vaping Use: Never used   Substance and Sexual Activity   • Alcohol use: No   • Drug use: No   • Sexual activity: Not Currently     Partners: Female     Birth control/protection: Other, Vasectomy, Birth control pill       No Known Allergies      OBJECTIVE  Patient Vitals for the past 24 hrs:   BP Temp Temp src Pulse Resp SpO2   23 0700 99/56 97.4 °F (36.3 °C) Oral 82 16 98 %   23 0633 -- 96.5 °F (35.8 °C) Rectal -- -- --   23 0300 101/71 95.6 °F (35.3 °C) Rectal 71 16 100 %   23 1853 105/65 97.4 °F (36.3 °C) Oral 81 16 99 %   23 1849 -- 97.4 °F (36.3 °C) Oral -- 16 --   23 1810 110/68 97.3 °F (36.3 °C) Temporal 81 17 100 %   23 1755 111/70 -- -- 78 20 100 %   23 1740 116/73 -- -- 85 18 100 %   23 1725 118/76 97 °F (36.1 °C) Temporal 83 20 100 %   23 1720 123/72 -- -- 83 18 97 %   23 1715 120/69 -- -- 63 16 100 %   23 1710 116/74 97.5 °F (36.4 °C) Temporal 61 14 99 %   23 1246 110/71 97.6 °F (36.4 °C) Temporal 91 18 95 %     There were no vitals filed for this visit.  S RR:  [6-11] 6    Exam  General no acute distress, pleasant, interactive  Head normocephalic, atraumatic  Eyes clear sclerae  ENT no discharge, neck supple  Mouth mucous membranes moist  Cardiac AFib  Vascular non palpable pedal pulses  Pulmonary lungs clear to auscultation bilaterally  Abdomen soft nontender nondistended  Lymphatic 2+ edema bilateral lower extremities  Neurological grossly intact  Psychological appropriate  Dermatological open wounds at bilateral feet   Musculoskeletal low bulk       Diet Orders (active) (From admission, onward)     Start     Ordered    23  Diet: Cardiac Diets, Diabetic Diets, Renal Diets; Healthy Heart (2-3 Na+); Consistent Carbohydrate; Low Sodium  (2-3g), Low Potassium, Low Phosphorus; Texture: Regular Texture (IDDSI 7); Fluid Consistency: Thin (IDDSI 0)  Diet Effective Now         03/17/23 2001                  CBC  WBC   Date Value Ref Range Status   03/18/2023 5.19 3.40 - 10.80 10*3/mm3 Final     RBC   Date Value Ref Range Status   03/18/2023 3.68 (L) 4.14 - 5.80 10*6/mm3 Final     Hemoglobin   Date Value Ref Range Status   03/18/2023 10.4 (L) 13.0 - 17.7 g/dL Final     Hematocrit   Date Value Ref Range Status   03/18/2023 33.2 (L) 37.5 - 51.0 % Final     MCV   Date Value Ref Range Status   03/18/2023 90.2 79.0 - 97.0 fL Final     MCH   Date Value Ref Range Status   03/18/2023 28.3 26.6 - 33.0 pg Final     MCHC   Date Value Ref Range Status   03/18/2023 31.3 (L) 31.5 - 35.7 g/dL Final     RDW   Date Value Ref Range Status   03/18/2023 17.6 (H) 12.3 - 15.4 % Final     RDW-SD   Date Value Ref Range Status   03/18/2023 58.4 (H) 37.0 - 54.0 fl Final     MPV   Date Value Ref Range Status   03/18/2023 11.2 6.0 - 12.0 fL Final     Platelets   Date Value Ref Range Status   03/18/2023 114 (L) 140 - 450 10*3/mm3 Final     Neutrophil %   Date Value Ref Range Status   03/18/2023 86.7 (H) 42.7 - 76.0 % Final     Lymphocyte %   Date Value Ref Range Status   03/18/2023 7.9 (L) 19.6 - 45.3 % Final     Monocyte %   Date Value Ref Range Status   03/18/2023 3.7 (L) 5.0 - 12.0 % Final     Eosinophil %   Date Value Ref Range Status   03/18/2023 0.0 (L) 0.3 - 6.2 % Final     Basophil %   Date Value Ref Range Status   03/18/2023 0.4 0.0 - 1.5 % Final     Immature Grans %   Date Value Ref Range Status   03/18/2023 1.3 (H) 0.0 - 0.5 % Final     Neutrophils, Absolute   Date Value Ref Range Status   03/18/2023 4.50 1.70 - 7.00 10*3/mm3 Final     Lymphocytes, Absolute   Date Value Ref Range Status   03/18/2023 0.41 (L) 0.70 - 3.10 10*3/mm3 Final     Monocytes, Absolute   Date Value Ref Range Status   03/18/2023 0.19 0.10 - 0.90 10*3/mm3 Final     Eosinophils, Absolute   Date Value  Ref Range Status   03/18/2023 0.00 0.00 - 0.40 10*3/mm3 Final     Basophils, Absolute   Date Value Ref Range Status   03/18/2023 0.02 0.00 - 0.20 10*3/mm3 Final     Immature Grans, Absolute   Date Value Ref Range Status   03/18/2023 0.07 (H) 0.00 - 0.05 10*3/mm3 Final     nRBC   Date Value Ref Range Status   03/18/2023 0.0 0.0 - 0.2 /100 WBC Final     Basic Metabolic Panel    Sodium Sodium   Date Value Ref Range Status   03/18/2023 136 136 - 145 mmol/L Final   03/16/2023 139 136 - 145 mmol/L Final      Potassium Potassium   Date Value Ref Range Status   03/18/2023 4.4 3.5 - 5.2 mmol/L Final   03/16/2023 4.8 3.5 - 5.2 mmol/L Final      Chloride Chloride   Date Value Ref Range Status   03/18/2023 110 (H) 98 - 107 mmol/L Final   03/16/2023 110 (H) 98 - 107 mmol/L Final      Bicarbonate No results found for: PLASMABICARB   BUN BUN   Date Value Ref Range Status   03/18/2023 43 (H) 8 - 23 mg/dL Final   03/16/2023 43 (H) 8 - 23 mg/dL Final      Creatinine Creatinine   Date Value Ref Range Status   03/18/2023 2.13 (H) 0.76 - 1.27 mg/dL Final   03/16/2023 2.82 (H) 0.76 - 1.27 mg/dL Final      Calcium Calcium   Date Value Ref Range Status   03/18/2023 8.2 (L) 8.6 - 10.5 mg/dL Final   03/16/2023 9.7 8.6 - 10.5 mg/dL Final      Glucose      No components found for: GLUCOSE.*       Current Facility-Administered Medications:   •  acetaminophen (TYLENOL) tablet 650 mg, 650 mg, Oral, Q4H PRN, Jose Cruz Etienne MD  •  acetaminophen (TYLENOL) tablet 650 mg, 650 mg, Oral, Q4H PRN, Jose Cruz Etienne MD  •  aspirin chewable tablet 81 mg, 81 mg, Oral, Daily, Jose Cruz Etienne MD  •  ceFAZolin in dextrose (ANCEF) IVPB solution 2 g, 2 g, Intravenous, Q8H, Jose Cruz Etienne MD, 2 g at 03/17/23 220  •  Chlorhexidine Gluconate Cloth 2 % pads 1 application, 1 application, Topical, Q12H PRN, Rolo Lawler PA  •  clopidogrel (PLAVIX) tablet 75 mg, 75 mg, Oral, Daily, Jose Cruz Etienne MD  •  heparin (porcine) 5000 UNIT/ML injection 5,000 Units, 5,000  Units, Subcutaneous, Q12H, Jose Cruz Etienne MD  •  HYDROcodone-acetaminophen (NORCO) 7.5-325 MG per tablet 1 tablet, 1 tablet, Oral, Q4H PRN, Jose Cruz Etienne MD, 1 tablet at 03/17/23 2055  •  HYDROmorphone (DILAUDID) injection 0.5 mg, 0.5 mg, Intravenous, Q2H PRN **AND** naloxone (NARCAN) injection 0.4 mg, 0.4 mg, Intravenous, Q5 Min PRN, Jose Cruz Etienne MD  •  niCARdipine (CARDENE) 25mg in 250mL NS infusion, 5-15 mg/hr, Intravenous, Titrated, Jose Cruz Etienne MD, Held at 03/17/23 2057  •  tamsulosin (FLOMAX) 24 hr capsule 0.4 mg, 0.4 mg, Oral, Daily, Emmanuel Steven PA-C  Current Outpatient Medications   Medication Instructions   • aspirin 81 mg, Oral, Daily   • colchicine 0.6 MG capsule capsule TAKE 1 CAPSULE BY MOUTH DAILY AS NEEDED FOR PAIN   • diphenhydrAMINE (BENADRYL) 25 mg, Oral, Every Night at Bedtime   • docusate sodium (COLACE) 300 mg, Oral, 2 Times Daily PRN   • Dulaglutide (Trulicity) 1.5 MG/0.5ML solution pen-injector Subcutaneous, Weekly   • ferrous gluconate (FERGON) 324 mg, Oral, Daily With Breakfast   • ipratropium (ATROVENT HFA) 17 MCG/ACT inhaler 2 puffs, Inhalation, As Needed   • ipratropium (ATROVENT) 0.5 mg, Nebulization, Every 4 Hours PRN   • lovastatin (MEVACOR) 20 mg, Oral, Nightly   • metoprolol succinate XL (TOPROL-XL) 12.5 mg, Oral, Daily   • nitroglycerin (NITROSTAT) 0.4 mg, Sublingual, Every 5 Minutes PRN, Take no more than 3 doses in 15 minutes. <BR>States he has not taken since 1993   • O2 (OXYGEN) 2 L/min, Inhalation, Nightly   • polycarbophil 625 mg, Oral, 2 Times Daily, Takes 2 tablets BID   • warfarin (COUMADIN) 5 mg, Oral, Daily Warfarin, 5mg every other day  2.5mg every other day alternating        IMAGING     Left tibial artery severe stenosis noted on arteriogram by Dr. Zaldivar last month    ASSESSMENT    DFU   Concern for limb loss without intervention      PVD (peripheral vascular disease) ([facility])        PLAN  Arteriograms with CO2  Wound debridements    *Complex medical  decision making*    Jose Cruz Etienne M.D., R.P.V.I.  Cardiothoracic and Vascular Surgeon  [facility]    Jose Cruz Etienne MD  03/18/23  08:09 EDT

## 2023-03-18 NOTE — PLAN OF CARE
Problem: Adult Inpatient Plan of Care  Goal: Plan of Care Review  Outcome: Ongoing, Progressing  Flowsheets (Taken 3/18/2023 0250)  Progress: no change  Plan of Care Reviewed With: patient  Goal: Patient-Specific Goal (Individualized)  Outcome: Ongoing, Progressing  Goal: Absence of Hospital-Acquired Illness or Injury  Outcome: Ongoing, Progressing  Intervention: Identify and Manage Fall Risk  Recent Flowsheet Documentation  Taken 3/17/2023 2140 by Gloria Flores, RN  Safety Promotion/Fall Prevention:   fall prevention program maintained   safety round/check completed  Intervention: Prevent Skin Injury  Recent Flowsheet Documentation  Taken 3/17/2023 2140 by Gloria Flores, RN  Body Position: weight shifting  Intervention: Prevent Infection  Recent Flowsheet Documentation  Taken 3/17/2023 2140 by Gloria Flores, RN  Infection Prevention:   rest/sleep promoted   single patient room provided  Goal: Optimal Comfort and Wellbeing  Outcome: Ongoing, Progressing  Intervention: Provide Person-Centered Care  Recent Flowsheet Documentation  Taken 3/17/2023 2140 by Gloria Flores, RN  Trust Relationship/Rapport: care explained  Goal: Readiness for Transition of Care  Outcome: Ongoing, Progressing   Goal Outcome Evaluation:  Plan of Care Reviewed With: patient        Progress: no change

## 2023-03-18 NOTE — PROGRESS NOTES
"                  Clinical Nutrition     Nutrition Support Assessment  Reason for Visit: MST score 2+, Unintentional weight loss      Patient Name: Swapnil Lawson  YOB: 1950  MRN: [id number]  Date of Encounter: 03/18/23 14:36 EDT  Admission date: 3/17/2023    Pt meets criteria severe malnutrition in the context of chronic illness, see [facility] for full assessment.    Pt reports chronic diarrhea \"every 4 hours.\" He has discussed with PCP, unsure treatment plan. Of note he has hx colectomy in 9/22 with significant weight loss and decreased appetite following, may benefit from GI consult.     Nutrition Assessment   Admission Diagnosis:  PVD (peripheral vascular disease) ([facility]) [I73.9]      Problem List:    PVD (peripheral vascular disease) ([facility])        PMH:   He  has a past medical history of Abnormal ECG, Cataracts, bilateral, CHF (congestive heart failure) ([facility]), Childhood asthma, COPD (chronic obstructive pulmonary disease) ([facility]), Coronary artery disease (12/14/2016), Diabetes mellitus ([facility]) (12/14/2016), DVT (deep venous thrombosis) ([facility]), Gout, Hepatitis, Hyperlipidemia (12/14/2016), Hypertension (12/14/2016), Kidney disease, Myocardial infarction ([facility]), Paroxysmal atrial fibrillation ([facility]) (12/14/2016), Pulmonary embolism ([facility]), Sleep apnea, Venous insufficiency (12/14/2016), and Wears glasses.    PSH:  He  has a past surgical history that includes Appendectomy (1981); Cholecystectomy (2002); Toe amputation (Left, 2009); Other surgical history (2010); Finger surgery; Colonoscopy; Cataract extraction w/ intraocular lens implant (Right, 06/25/2020); Vasectomy; Cataract extraction w/ intraocular lens implant (Left, 07/09/2020); Bunionectomy (Left); Colonoscopy (N/A, 08/11/2022); Coronary artery bypass graft; Coronary artery bypass graft (2013); Cardiac catheterization; Colectomy (Right, 09/12/2022); Cardiac catheterization (N/A, 02/16/2023); and Bowel resection (N/A).      Applicable Nutrition Concerns: " "  Skin:  Oral:  GI: chronic diarrhea    Applicable Interval History:       Reported/Observed/Food/Nutrition Related History:     Pt s/p aortagram with runoffs/stent placement and debridement DM foot wounds yesterday. Pt sitting up in bed in NAD, pleasant and open to assessment. He tells me he \"doesn't eat much\" and has lost a lot of weight in the past 6 months. He tells me he has issues with chronic diarrhea reporting BM frequency \"every 4 hours.\" He states has discussed this with MD but unable to tell me treatment plan or if there has been any improvement. Of note he reports hx colectomy in 9/22 and tells me he \"had a tube down my throat for 2 weeks\" at that time. He states bulk of weight loss occurred at that time and has continued, this is c/w EMR weight hx timeline. He tells me decreased appetite/intakes started around that time. He denies chronic N/V/pain but states he just doesn't feel like eating as much r/t his diarrhea in addition to his decreased appetite. He tells me he does eat regular meals, just not much at them. He states he does keep ONS stocked at home but drinks \"every other day or so.\" He was receptive and appreciative of diet education/counseling. He was open to ONS while here in the hospital, anything but chocolate. He denied further dietary needs/prefererences, NKFA.     Labs    Labs Reviewed: Yes     Results from last 7 days   Lab Units 03/18/23  0643 03/16/23  1611   GLUCOSE mg/dL 116* 105*   BUN mg/dL 43* 43*   CREATININE mg/dL 2.13* 2.82*   SODIUM mmol/L 136 139   CHLORIDE mmol/L 110* 110*   POTASSIUM mmol/L 4.4 4.8             Invalid input(s): PLAT    Results from last 7 days   Lab Units 03/18/23  1259 03/17/23  1732 03/17/23  1256   GLUCOSE mg/dL 144* 122 115     Lab Results   Lab Value Date/Time    HGBA1C 5.80 (H) 03/16/2023 1611    HGBA1C 5.70 (H) 02/16/2023 1039    HGBA1C 5.7 02/08/2023 1007    HGBA1C 5.6 10/19/2022 1041               Medications    Medications Reviewed: " Yes  Pertinent: heparin, abx  Infusion: cardene   PRN: norco    Intake/Ouptut 24 hrs (0701 - 0700)   I&O's Reviewed: Yes   Intake & Output (last day)       03/17 0701 03/18 0700 03/18 0701 03/19 0700    P.O.  120    I.V. 700     IV Piggyback 100     Total Intake 800 120    Urine 990     Total Output 990     Net -190 +120                Anthropometrics     Height:  73 in  Last Filed Weight:  161 lb   Method:  PERNELL  BMI:  21.24  BMI classification: Normal: 18.5-24.9kg/m2  IBW:  176 lb    UBW: 190 lb in 10/22 =>161 lb now per pt, confirmed by EMR:  Last 15 Recorded Weights  View Complete Flowsheet  Weight Weight (kg) Weight (lbs) Weight Method VISIT REPORT   3/16/2023 73.029 kg 161 lb - Report   3/7/2023 73.483 kg 162 lb Stated -   3/7/2023 73.483 kg 162 lb Stated -   2/16/2023 75.751 kg 167 lb - -   2/16/2023 76.023 kg 167 lb 9.6 oz Standing scale -   2/9/2023 75.932 kg 167 lb 6.4 oz - Report   2/2/2023 72.576 kg 160 lb - Report   10/19/2022 76.658 kg 169 lb - Report   9/28/2022 86.3 kg 190 lb 4.1 oz Bed scale -   9/27/2022 84.5 kg 186 lb 4.6 oz Bed scale -   9/26/2022 82.7 kg 182 lb 5.1 oz Bed scale -   9/25/2022 80.9 kg 178 lb 5.6 oz Bed scale -   9/24/2022 71.668 kg 158 lb Stated  -   9/15/2022 78.1 kg 172 lb 2.9 oz Bed scale -   9/14/2022 75 kg 165 lb 5.5 oz Bed scale    7/14/22   190 lb   MDOV    Weight change: pt had significant weight loss of 21 lb / 11.1% body weight within the month of 10/22 and has continued losing since. He has had a total significant weight loss of 29 lb  / 15.3% body weight past 6 months    Nutrition Focused Physical Exam     Date: 3/18    Patient meets criteria for malnutrition diagnosis, see MSA note.    Current Nutrition Prescription     PO: Diet: Cardiac Diets, Diabetic Diets, Renal Diets; Healthy Heart (2-3 Na+); Consistent Carbohydrate; Low Sodium (2-3g), Low Potassium, Low Phosphorus; Texture: Regular Texture (IDDSI 7); Fluid Consistency: Thin (IDDSI 0)  Oral Nutrition  Supplement:   Intake: 50% X 1 meal documented    Nutrition Diagnosis   Date:  Updated:   Problem Malnutrition severe/chronic   Etiology Energy needs>energy intake 2/2 decreased appetite in setting of chronic diarrhea of unclear etiology s/p colectomy 6 months ago   Signs/Symptoms PO intakes <75% est. needs and loss 15.3% body weight past 6 months, severe muscle wasting, and severe subcutaneous fat loss.    Status:     Date:   Updated:  Problem Altered GI function   Etiology Chronic diarrhea of unclear etiology, s/p colectomy 6 months ago   Signs/Symptoms Pt report, med hx   Status:     Goal:   General: Nutrition to support treatment  PO: Increase intake  EN/PN: N/A    Nutrition Intervention      Follow treatment progress, Care plan reviewed, Advise alternate selection, Advised available snacks, Interview for preferences, Menu provided, Encourage intake, Supplement provided, Education provided for malnutrition, managing adequate intakes with diarrhea    Boost+ B/D    Monitoring/Evaluation:   Per protocol, I&O, PO intake, Supplement intake, Pertinent labs, Weight, Skin status, GI status, Symptoms, POC/GOC      Ruth Kim RD  Time Spent: 45m

## 2023-03-18 NOTE — SIGNIFICANT NOTE
Called MD due to low b/p of 98/58. Per MD  would like RN to place an order for albumin. When asked of dose MD was unsure and stated that they were not  here in the facility  to put in order. MD stated that they wanted the albumin that comes in 250ml bags. Per pharmacy only 5% albumin is available in that solution.Or placed via phone read back order. Will continue monitoring.

## 2023-03-19 NOTE — PROGRESS NOTES
INFECTIOUS DISEASE  Progress note     Swapnil Lawson  1950  [id number]      Admission Date: 3/17/2023      Requesting Provider: No ref. provider found  Evaluating Physician: RADHA Iyer    Reason for Consultation: bilateral foot infections/gangrene.      History of present illness:    Patient is a 72 y.o. male, with PMH COPD, CHF, CKD, CAD, DM, PAD, seen today for bilateral foot infections.  Admitted on 3/17/23 undergoing aortagram with stent placement/angioplasty of bilateral lower extremities with sharp debridement of both feet, plans for partial bilateral foot amputations early next week.  He has noted nonhealing foot ulcers for at least the past 6 months.  He was referred to CTS service and admitted 3/17/2023 for above. He has been afebrile without leukocytosis. Admitting labs with Scr 2.82, WBC 6.73, ESR 36, CRP 4.04. PCT 0.18.  Wound culture with no organisms, few WBCs, no growth.  He received a dose of Cefazolin and we were consulted for evaluation and treatment.  The patient denies immunocompromise status, TB, HIV, zoonotic exposures, ill contacts, or significant travel history.    3/19/23: History reviewed.  Visiting with his son and friend.  He remains afebrile. Tissue cultures no growth so far. Dr. Oviedo changed foot dressings earlier today.  No n/v/d. Having some urinary retention, required I & O catheterizations.      Past Medical History:   Diagnosis Date   • Abnormal ECG    • Cataracts, bilateral    • CHF (congestive heart failure) ([facility])    • Childhood asthma    • COPD (chronic obstructive pulmonary disease) ([facility])    • Coronary artery disease 12/14/2016    CABG, 1993, Abimael Tomlin MD. CABG, August 2013, [facility].   • Diabetes mellitus ([facility]) 12/14/2016    type II   • DVT (deep venous thrombosis) ([facility])     RLE   • Gout    • Hepatitis     1970s unsure of which one- states it was caused from drinking contaminated water   • Hyperlipidemia 12/14/2016   • Hypertension  12/14/2016   • Kidney disease    • Myocardial infarction ([facility])      in 1992 and 1993 prior to CABG.   • Paroxysmal atrial fibrillation ([facility]) 12/14/2016   • Pulmonary embolism ([facility])    • Sleep apnea    • Venous insufficiency 12/14/2016   • Wears glasses     for reading       Past Surgical History:   Procedure Laterality Date   • APPENDECTOMY  1981   • BUNIONECTOMY Left    • CARDIAC CATHETERIZATION      x4, no stents   • CARDIAC CATHETERIZATION N/A 02/16/2023    Procedure: Peripheral angiography  Left lower extremity angiogram [facility] interventional Cardiologist;  Surgeon: Reed Zaldivar MD;  Location: [facility] CATH INVASIVE [facility];  Service: Cardiovascular;  Laterality: N/A;  Left lower extremity angiogram with interventional cardioligist.   • CATARACT EXTRACTION W/ INTRAOCULAR LENS IMPLANT Right 06/25/2020    Procedure: CATARACT PHACO EXTRACTION WITH INTRAOCULAR LENS IMPLANT RIGHT;  Surgeon: Omar Blood MD;  Location: [facility] OR;  Service: Ophthalmology;  Laterality: Right;   • CATARACT EXTRACTION W/ INTRAOCULAR LENS IMPLANT Left 07/09/2020    Procedure: CATARACT PHACO EXTRACTION WITH INTRAOCULAR LENS IMPLANT LEFT;  Surgeon: Omar Blood MD;  Location: [facility] OR;  Service: Ophthalmology;  Laterality: Left;   • CHOLECYSTECTOMY  2002   • COLON RESECTION Right 09/12/2022    Procedure: COLON RESECTION LAPAROSCOPIC HAND ASSISTED;  Surgeon: Kenji Garcias MD;  Location: [facility] OR;  Service: General;  Laterality: Right;   • COLON RESECTION N/A    • COLONOSCOPY     • COLONOSCOPY N/A 08/11/2022    Procedure: COLONOSCOPY, biopsy, polypectomy x1 and tattooing;  Surgeon: Kenji Garcias MD;  Location: [facility] ENDOSCOPY;  Service: Gastroenterology;  Laterality: N/A;   • CORONARY ARTERY BYPASS GRAFT      1993 triple bypass   • CORONARY ARTERY BYPASS GRAFT  2013    double bypass   • FINGER SURGERY      Rt index (second finger)   • OTHER SURGICAL HISTORY  2010    Bone spur removal   • TOE AMPUTATION Left 2009    4th  toe   • VASECTOMY         Family History   Problem Relation Age of Onset   • COPD Mother    • Heart attack Father    • Asthma Father      Social history lives with son.  Social History     Socioeconomic History   • Marital status:    • Number of children: 1   Tobacco Use   • Smoking status: Former     Packs/day: 1.00     Years: 28.00     Pack years: 28.00     Types: Cigarettes     Start date: 1964     Quit date: 1993     Years since quittin.2   • Smokeless tobacco: Never   • Tobacco comments:     Wish I'd never started   Vaping Use   • Vaping Use: Never used   Substance and Sexual Activity   • Alcohol use: No   • Drug use: No   • Sexual activity: Not Currently     Partners: Female     Birth control/protection: Other, Vasectomy, Birth control pill       No Known Allergies      Medication:      Antibiotics:  Anti-Infectives (From admission, onward)    Ordered     Dose/Rate Route Frequency Start Stop    23 0841  piperacillin-tazobactam (ZOSYN) 3.375 g in iso-osmotic dextrose 50 ml (premix)        Ordering Provider: Jack Kidd, PharmD    3.375 g  over 4 Hours Intravenous Every 8 Hours 23 1530 23 1529    23 0837  DAPTOmycin (CUBICIN) 450 mg in sodium chloride 0.9 % 50 mL IVPB        Ordering Provider: Cheli Rodrigez APRN    6 mg/kg × 73 kg  100 mL/hr over 30 Minutes Intravenous Every 24 Hours 23 1000 23 0959    23 0841  piperacillin-tazobactam (ZOSYN) 3.375 g in iso-osmotic dextrose 50 ml (premix)        Ordering Provider: Jack Kidd, PharmD    3.375 g  over 30 Minutes Intravenous Once 23 0930 23 0930    23 1153  ceFAZolin in dextrose (ANCEF) IVPB solution 2 g        Ordering Provider: Rolo Lawler PA    2 g  over 30 Minutes Intravenous Once 23 1155 23 1442        Review of Systems:  Constitutional-- No Fever, chills or sweats.  Appetite good, and no malaise. No fatigue.  HEENT-- No new vision, hearing  or throat complaints.  No epistaxis or oral sores.  Denies odynophagia or dysphagia. No headache, photophobia or neck stiffness.  CV-- No chest pain, palpitation or syncope  Resp-- No SOB/cough/Hemoptysis, no wheezes  GI- No nausea, vomiting, or diarrhea.  No hematochezia, melena, or hematemesis. Denies jaundice or chronic liver disease.  -- No dysuria, hematuria, or flank pain.  Denies hesitancy, urgency or flank pain.  Lymph- no swollen lymph nodes in neck/axilla or groin.   Heme- No active bruising or bleeding; no Hx of DVT or PE.  MS-- no swelling or pain in the bones or joints of arms/legs.  No new back pain.  Neuro-- No acute focal weakness or numbness in the arms or legs.  No seizures.  Skin--No rashes   Bilateral foot ulcers as per HPI.    Physical Exam:   Vital Signs  Temp (24hrs), Av.9 °F (36.6 °C), Min:97.6 °F (36.4 °C), Max:98.6 °F (37 °C)    Temp  Min: 97.6 °F (36.4 °C)  Max: 98.6 °F (37 °C)  BP  Min: 101/57  Max: 105/71  Pulse  Min: 78  Max: 86  Resp  Min: 16  Max: 18  SpO2  Min: 92 %  Max: 100 %    GENERAL: Awake and alert, in minimal distress. Appears older than stated age, cachectic, resting in bed.  HEENT: Normocephalic, atraumatic.  EOMI. No conjunctival injection. No icterus. Oropharynx clear without evidence of thrush or exudate. No evidence of peridontal disease.    NECK: Supple   HEART: RRR; No murmur, rubs, gallops.    LUNGS: Clear to auscultation bilaterally without wheezing, rales, rhonchi. Normal respiratory effort. Nonlabored.  ABDOMEN: Soft, nontender, nondistended. Positive bowel sounds. No rebound or guarding. NO mass or HSM.  EXT:  No cyanosis, clubbing or edema. No cord.  :  Without Dueñas catheter.  MSK: No joint effusions or erythema  SKIN: Warm and dry  Left lateral foot wound approximately 4cm in diameter with slough, some granulation, approx 0.5cm deep, probes to bone.  Right th MTP ulcer, with necrosis, approx 5cm in diamter 0.5 cm deep.  Venous discoloration noted  bilateral lower extremities - feet dressed. Did not disturb   NEURO: Oriented to PPT.  Motor 5/5 strength, cranial nerves II through XII intact, sensory decreased light touch lower extremities, cerebellar and gait not tested  PSYCHIATRIC: Normal insight and judgment. Cooperative with PE            Laboratory Data    Results from last 7 days   Lab Units 03/19/23  0543 03/18/23  0643 03/16/23  1611   WBC 10*3/mm3 5.70 5.19 6.73   HEMOGLOBIN g/dL 9.7* 10.4* 11.2*   HEMATOCRIT % 31.0* 33.2* 35.0*   PLATELETS 10*3/mm3 117* 114* 133*     Results from last 7 days   Lab Units 03/18/23  0643   SODIUM mmol/L 136   POTASSIUM mmol/L 4.4   CHLORIDE mmol/L 110*   CO2 mmol/L 17.0*   BUN mg/dL 43*   CREATININE mg/dL 2.13*   GLUCOSE mg/dL 116*   CALCIUM mg/dL 8.2*                     Results from last 7 days   Lab Units 03/18/23  0643   CK TOTAL U/L 26         Estimated Creatinine Clearance: 31.9 mL/min (A) (by C-G formula based on SCr of 2.13 mg/dL (H)).      Microbiology:  No results found for: ACANTHNAEG, AFBCX, BPERTUSSISCX, BLOODCX  No results found for: BCIDPCR, CXREFLEX, CSFCX, CULTURETIS  No results found for: CULTURES, HSVCX, URCX  No results found for: EYECULTURE, GCCX, HSVCULTURE, LABHSV  No results found for: LEGIONELLA, MRSACX, MUMPSCX, MYCOPLASCX  No results found for: NOCARDIACX, STOOLCX  No results found for: THROATCX, UNSTIMCULT, URINECX, CULTURE, VZVCULTUR  No results found for: VIRALCULTU, WOUNDCX        Radiology:  Imaging Results (Last 72 Hours)     Procedure Component Value Units Date/Time    XR [place] OR Procedure [[id number]] Resulted: 03/17/23 1636     Updated: 03/17/23 1636            Impression:   - Bilateral foot infections, s/p sharp debridement with plans for partial amputation early next week- culture no growth so far from 3/17/2023, AFB and fungal also pending, with likely osteomyelitis underneath.  Wounds are likely related to diabetes mellitus type 2, peripheral vascular disease including  microvascular disease, and neuropathy.  - PVD s/p angioplasty/stent  3/17  - Diabetes mellitus, type 2  - Chronic kidney disease, creatinine 2.13 on 3/18.  Acute on chronic.  -COPD  -Hypocalcemia 8.2.  -Anemia, chronic disease worse.  -Thrombocytopenia, related to above issues and medications.    Surgical disposition still under discussion.    PLAN/RECOMMENDATIONS:     1.  Diagnostically, monitor blood cultures, physical examination, radiology imaging, CBC, CMP, ESR, CRP, lactic acid, and procalcitonin wound cultures, tissue culture 3/17/2023.  Previous blood cultures 3/7 were negative.  2.  Therapeutically, Daptomycin, and Zosyn for gram positive, gram negative and anaerobic coverage,  duration pending cultures and clinical improvement.  Duration to be determined.  Likely to be 6 weeks after resection of his bones.  Await surgical disposition.  3.  Continue supportive care    I discussed the patients findings and my recommendations with patient, and his son.    Our group would be pleased to follow this patient over the course of their hospitalization and assist with outpatient antimicrobial therapy, as indicated.  Further recommendations depend on the results of the cultures and clinical course.   Dr. Barksdale  has obtained the history, performed the physical exam and formulated the above treatment plan.  Side effects were discussed with the patient.  Increased risk for adverse drug reactions, complications of IV access, readmission, loss of limb.    Cheli Rodrigez, RADHA  3/19/2023  07:21 EDT

## 2023-03-19 NOTE — PROGRESS NOTES
[facility]  PROGRESS NOTE    Patient Name: Swapnil Lawson  : 1950  MRN: [id number]    Date of Admission: 3/17/2023  Primary Care Physician: Argenis Osborne, RADHA    Subjective     CC: f/u PAD / foot ulcerations     HPI:  In bed following MRIs. Son, brother and friend at bedside. Feeling okay today with no new complaints. Waiting to hear from surgery regarding possible partial amputations this admission. INR elevated today. Has been off of Warfarin    ROS:  Gen- No fevers, chills  CV- No chest pain, palpitations  Resp- No cough, dyspnea  GI- No N/V/D, abd pain    Objective     Vital Signs:   Temp:  [97.6 °F (36.4 °C)-98.6 °F (37 °C)] 98.6 °F (37 °C)  Heart Rate:  [78-87] 78  Resp:  [16-18] 16  BP: (101-105)/(57-71) 103/61  Flow (L/min):  [2] 2     Physical Exam:  Constitutional: No acute distress, awake, alert and conversant. Thin and chronically ill appearing   HENT: NCAT, mucous membranes moist. Poor dentition   Respiratory: Clear to auscultation bilaterally, respiratory effort normal   Cardiovascular: IRR, rate 80's without m/r/g   Gastrointestinal: Positive bowel sounds, soft, nontender, nondistended  Musculoskeletal: Trace to 1+ pitting bilateral ankle edema. Red/purplish discoloration to lower legs and feet. Feet dressed - did not remove dressings for exam   Psychiatric: Appropriate affect, cooperative  Neurologic: Oriented x 3, moves all extremities spontaneously without focal deficits, speech clear    Results Reviewed:  LAB RESULTS:      Lab 23  0543 23  0643 23  1340 23  1611   WBC 5.70 5.19  --  6.73   HEMOGLOBIN 9.7* 10.4*  --  11.2*   HEMATOCRIT 31.0* 33.2*  --  35.0*   PLATELETS 117* 114*  --  133*   NEUTROS ABS 4.60 4.50  --   --    IMMATURE GRANS (ABS) 0.05 0.07*  --   --    LYMPHS ABS 0.56* 0.41*  --   --    MONOS ABS 0.40 0.19  --   --    EOS ABS 0.06 0.00  --   --    MCV 89.1 90.2  --  87.9   LACTATE 0.7  --   --    --    PROTIME 40.2*  --  30.2* 29.5*   APTT  --   --   --  54.6*         Lab 03/19/23  0543 03/18/23  0643 03/16/23  1611   SODIUM 136 136 139   POTASSIUM 3.9 4.4 4.8   CHLORIDE 108* 110* 110*   CO2 19.0* 17.0* 18.2*   ANION GAP 9.0 9.0 10.8   BUN 45* 43* 43*   CREATININE 2.59* 2.13* 2.82*   EGFR 25.4* 32.1* 22.9*   GLUCOSE 99 116* 105*   CALCIUM 8.4* 8.2* 9.7   HEMOGLOBIN A1C  --   --  5.80*         Lab 03/19/23  0543   TOTAL PROTEIN 5.6*   ALBUMIN 2.8*   GLOBULIN 2.8   ALT (SGPT) <5   AST (SGOT) 9   BILIRUBIN 0.2   ALK PHOS 68         Lab 03/19/23  0543 03/17/23  1340 03/16/23  1611   PROTIME 40.2* 30.2* 29.5*   INR 4.10* 2.87* 2.78*             Lab 03/19/23  0930 03/17/23  1352 03/17/23  1258   FOLATE 6.77  --   --    ABO TYPING  --  A A   RH TYPING  --  Positive Positive   ANTIBODY SCREEN  --   --  Negative         Brief Urine Lab Results  (Last result in the past 365 days)      Color   Clarity   Blood   Leuk Est   Nitrite   Protein   CREAT   Urine HCG        03/18/23 1044 Yellow   Clear   Moderate (2+)   Small (1+)   Negative   30 mg/dL (1+)               Microbiology Results Abnormal     Procedure Component Value - Date/Time    Urine Culture - Urine, Straight Cath [[id number]]  (Normal) Collected: 03/18/23 1044    Lab Status: Final result Specimen: Urine from Straight Cath Updated: 03/19/23 1257     Urine Culture No growth    Tissue / Bone Culture - Bone, Foot, Left [[id number]] Collected: 03/17/23 1649    Lab Status: Preliminary result Specimen: Bone from Foot, Left Updated: 03/19/23 0822     Tissue Culture No growth at 2 days     Gram Stain Few (2+) WBCs seen      No organisms seen    AFB Culture - Bone, Foot, Left [[id number]] Collected: 03/17/23 1649    Lab Status: Preliminary result Specimen: Bone from Foot, Left Updated: 03/18/23 1238     AFB Stain No acid fast bacilli seen on concentrated smear        Arterial Line    Result Date: 3/17/2023  Anny Hall DO     3/17/2023  2:19 PM Arterial Line Patient  reassessed immediately prior to procedure Patient location during procedure: pre-op Line placed for hemodynamic monitoring. Performed By Anesthesiologist: Anny Hall DO Preanesthetic Checklist Completed: patient identified, IV checked, site marked, risks and benefits discussed, surgical consent, monitors and equipment checked, pre-op evaluation and timeout performed Arterial Line Prep  Sterile Tech: cap, gloves and sterile barriers Prep: ChloraPrep Patient monitoring: blood pressure monitoring, continuous pulse oximetry and EKG Arterial Line Procedure Laterality:right Location:  radial artery Catheter size: 20 G Guidance: ultrasound guided and palpation technique Number of attempts: 1 Successful placement: yes Post Assessment Dressing Type: line sutured, occlusive dressing applied, secured with tape and wrist guard applied. Complications no Circ/Move/Sens Assessment: normal and unchanged. Patient Tolerance: patient tolerated the procedure well with no apparent complications     Current medications:  Scheduled Meds:aspirin, 81 mg, Oral, Daily  atorvastatin, 10 mg, Oral, Nightly  clopidogrel, 75 mg, Oral, Daily  DAPTOmycin, 6 mg/kg, Intravenous, Q24H  Dulaglutide, 1.5 mg, Subcutaneous, Weekly  ferrous sulfate, 325 mg, Oral, Daily With Breakfast  heparin (porcine), 5,000 Units, Subcutaneous, Q12H  metoprolol succinate XL, 12.5 mg, Oral, Daily  piperacillin-tazobactam, 3.375 g, Intravenous, Q8H  psyllium, 1 packet, Oral, BID With Meals  tamsulosin, 0.4 mg, Oral, Daily      Continuous Infusions:   PRN Meds:.•  acetaminophen  •  acetaminophen  •  Chlorhexidine Gluconate Cloth  •  diphenhydrAMINE  •  HYDROcodone-acetaminophen  •  HYDROmorphone **AND** naloxone  •  ipratropium    Assessment & Plan     Active Hospital Problems    Diagnosis  POA   • **PVD (peripheral vascular disease) ([facility]) [I73.9]  Unknown   • Severe malnutrition ([facility]) [E43]  Yes      Resolved Hospital Problems   No resolved problems to display.      Brief Hospital Course to date:  Swapnil Lawson is a 72 y.o. male with PMH significant for CAD s/p CABG, chronic diastolic CHF, atrial fibrillation (Warfarin), prior DVT/PE, COPD, non-insulin dependent DMII, CKD III, venous insufficiency and PAD. Evaluated by Dr. Etienne on 3/16/2023 for non-healing ulcer on the lateral aspect of his L foot, present for 6 months as well as a fairly recently new ulceration on the plantar aspect of the R lateral forefoot. Pedal pulses were not palpable. Previously evaluated by Dr. Zaldivar - arteriography of LLE revealed severe stenosis at proximal tibial artery.  Dr. Etienne recommended hospital admission but the patient declined, opting to return to the hospital on 3/17/23, at which time he underwent aortagram with run off with angioplasty of L anterior tibial, L posterior tibial, L popliteal arteries, stent placement at proximal L anterior tibial artery as well as sharp debridement of L lateral midfoot wound down to level of bone and sharp debridement of R lateral forefoot wound down to level of bone. He was admitted to CT surgery service. Hospital medicine and ID consulted to assist with care. ID has initiated Daptomycin / Zosyn. Dr. Oviedo to evaluate for consideration of amputation in the upcoming days.      Foot ulcerations / gangrene  Peripheral arterial disease / critical limb ischemia  - s/p angiogram with runoff with angioplasty and stent placement by Dr. Etienne on 3/17/23  - Per CT surgery note, Dr. Oviedo to see for consideration of partial bilateral foot amputations on Monday or Tuesday  - ID following - now on IV Daptomycin/Zosyn  - CRP 4.04, ESR not ordered  - MRI of both feet obtained - results pending    - Continue ASA and Plavix      Atrial fibrillation   Chronic anticoagulation   - Chronically anticoagulated with Warfarin. GOAL INR 2-3   - Warfarin currently on hold for possible surgical intervention   - Continue Metoprolol with hold parameters   - INR 4.1 today - give  10mg Vitamin K in anticipation of OR tomorrow or Tuesday      Chronic diastolic CHF   - No ECHO on file. Per Dr. Campbell 5/2022 note, February 2019 ECHO with EF 50%, moderately dilated LV, moderately dilated RV with normal function, moderate to severe L atrial enlargement and dilated IVC  - Appears euvolemic. Known history of venous insufficiency which contributes to lower extremity edema      DEMARIO on CKD III  - Baseline creatinine appears to be 1.6-2.0  - Creatinine 2.81 on 3/16, improved to 2.13 today  - Creatinine 2.59 today - start gentle IV fluids   - Monitor renal function closely      Non-insulin dependent DMII  - Hgb A1c 5.8%   - Took home Trulicity today  - Stopped accuchecks and SSI. Not requiring sliding scale insulin     Expected Discharge Location and Transportation: home vs rehab?  Expected Discharge   Expected Discharge Date and Time     Expected Discharge Date Expected Discharge Time    Mar 24, 2023         DVT prophylaxis:  Medical DVT prophylaxis orders are present.     AM-PAC 6 Clicks Score (PT): 10 (03/18/23 0840)    CODE STATUS:   Code Status and Medical Interventions:   Ordered at: 03/17/23 2001     Level Of Support Discussed With:    Patient     Code Status (Patient has no pulse and is not breathing):    CPR (Attempt to Resuscitate)     Medical Interventions (Patient has pulse or is breathing):    Full Support     Release to patient:    Routine Release     Kahdijah Chiu PA-C  03/19/23

## 2023-03-19 NOTE — PLAN OF CARE
Problem: Adult Inpatient Plan of Care  Goal: Plan of Care Review  Outcome: Ongoing, Progressing  Flowsheets  Taken 3/19/2023 0251  Plan of Care Reviewed With: patient  Taken 3/18/2023 0250  Progress: no change  Goal: Patient-Specific Goal (Individualized)  Outcome: Ongoing, Progressing  Goal: Absence of Hospital-Acquired Illness or Injury  Outcome: Ongoing, Progressing  Intervention: Identify and Manage Fall Risk  Recent Flowsheet Documentation  Taken 3/19/2023 0001 by Gloria Flores RN  Safety Promotion/Fall Prevention:   fall prevention program maintained   safety round/check completed  Taken 3/18/2023 2045 by Gloria Flores RN  Safety Promotion/Fall Prevention:   fall prevention program maintained   safety round/check completed  Intervention: Prevent Skin Injury  Recent Flowsheet Documentation  Taken 3/19/2023 0001 by Gloria Flores RN  Body Position: position changed independently  Taken 3/18/2023 2045 by Gloria Flores RN  Body Position: position changed independently  Intervention: Prevent and Manage VTE (Venous Thromboembolism) Risk  Recent Flowsheet Documentation  Taken 3/19/2023 0001 by Gloria Flores RN  Activity Management: activity adjusted per tolerance  Taken 3/18/2023 2045 by Gloria Flores RN  Activity Management: activity adjusted per tolerance  Intervention: Prevent Infection  Recent Flowsheet Documentation  Taken 3/19/2023 0001 by Gloria Flores RN  Infection Prevention:   rest/sleep promoted   single patient room provided  Taken 3/18/2023 2045 by Gloria Flores RN  Infection Prevention: rest/sleep promoted  Goal: Optimal Comfort and Wellbeing  Outcome: Ongoing, Progressing  Goal: Readiness for Transition of Care  Outcome: Ongoing, Progressing   Goal Outcome Evaluation:  Plan of Care Reviewed With: patient        Progress: no change

## 2023-03-19 NOTE — PROGRESS NOTES
2 Days Post-Op Procedure(s):   1.  Ultrasound and fluoroscopic guided right common femoral artery needle access and placement of a 6 [place] citizen sheath  2.  Aortogram with bilateral lower extremity runoffs using carbon dioxide  3.  Third order selective catheterization of the left anterior and posterior tibial arteries  4.  Angioplasty of left anterior tibial artery using 2 mm balloon  5.  Angioplasty of left posterior tibial artery using 2 mm balloon  6.  Angioplasty of left popliteal artery using 2 mm balloon  7.  Placement of 2.5 mm x 15 mm Xience drug-eluting stent at the proximal left anterior tibial artery  8.  Sharp debridement of left lateral midfoot wound down to the level of the bone  9.  Sharp debridement of the right lateral forefoot wound down to the level of the bone  10.  The right common femoral artery arteriotomy site was closed with a Perclose ProGlide standard technique.       LOS: 0 days   Patient Care Team:  Argenis Osborne APRN as PCP - General (Family Medicine)  Kenji Garcias MD as Consulting Physician (General Surgery)  Naseem Campbell III, MD as Cardiologist (Cardiology)    Chief complaint:    Subjective   Denies chest pain, denies shortness of breath    Objective    Vital Signs  Temp:  [97.6 °F (36.4 °C)-98.6 °F (37 °C)] 98.6 °F (37 °C)  Heart Rate:  [78-86] 82  Resp:  [16-18] 16  BP: (101-105)/(57-71) 103/61    Physical Exam:   General Appearance: alert, appears stated age and cooperative   Lungs: clear bilaterally   Heart: Regular rate and rhythm   Skin:  I wounds are dressed they did have Xeroform packing in bilateral foot wounds from debridement yesterday   Results     Results from last 7 days   Lab Units 03/19/23  0543   WBC 10*3/mm3 5.70   HEMOGLOBIN g/dL 9.7*   HEMATOCRIT % 31.0*   PLATELETS 10*3/mm3 117*     Results from last 7 days   Lab Units 03/19/23  0543   SODIUM mmol/L 136   POTASSIUM mmol/L 3.9   CHLORIDE mmol/L 108*   CO2 mmol/L 19.0*   BUN mg/dL 45*   CREATININE mg/dL  2.59*   GLUCOSE mg/dL 99   CALCIUM mg/dL 8.4*               Assessment      PVD (peripheral vascular disease) ([facility])    Severe malnutrition ([facility])  Status  post day 2 tibial stenting  And bilateral foot debridement  ID following  Plan   Biotics per ID  Plan to surgery scheduled for partial bilateral foot amputations on Monday or Tuesday  We will get a second opinion from Oseas Steven PA-C  03/19/23  08:12 EDT

## 2023-03-20 PROBLEM — L97.518: Status: ACTIVE | Noted: 2023-01-01

## 2023-03-20 NOTE — SIGNIFICANT NOTE
This nurse enter room to assess patient after she heard noise from the room,. Room curtain  was closed due to privacy  provided during urination. Found patient laying on the floor next to the bed, with tech  next to his side. Assess patient, vital signs stable. Noted bruise to the middle of his back, skin tears to right upper arm. Patient denied any pain at that time. Assist patient to the bed x 2 staff members. notified, no new orders at that time. Charge nurse notified.  Fall precaution in place, call light and urinal in reach.

## 2023-03-20 NOTE — CASE MANAGEMENT/SOCIAL WORK
Continued Stay Note  [facility]     Patient Name: Swapnil Lawson  MRN: [id number]  Today's Date: 3/20/2023    Admit Date: 3/17/2023    Plan: home   Discharge Plan     Row Name 03/20/23 1019       Plan    Plan home    Patient/Family in Agreement with Plan yes    Plan Comments Pt reports he lives with his adult son in [facility]. He reports he is independent with ADLs and uses a cane for mobility. He is followed by  his PCP and has drug coverage. At this time his plan for discharge is to return home. Cm to follow for any discharge needs.    Final Discharge Disposition Code 01 - home or self-care               Discharge Codes    No documentation.               Expected Discharge Date and Time     Expected Discharge Date Expected Discharge Time    Mar 24, 2023             Gail Sanchez RN

## 2023-03-20 NOTE — PROGRESS NOTES
CTS Progress Note         LOS: 0 days     POD #3 s/p aortogram, angioplasty of left AT, left PT, placement of Xience drug eluting stent at proximal left AT, debridement of left midfott wound, right lateral forefoot wound    Subjective  No acute events overnight. VSS on 2L NC.    Objective    Vital Signs  Temp:  [97 °F (36.1 °C)-97.5 °F (36.4 °C)] 97.5 °F (36.4 °C)  Heart Rate:  [] 83  Resp:  [16-18] 16  BP: ()/(51-67) 93/65    Physical Exam:   General Appearance: alert, appears stated age and cooperative   Lungs: clear to auscultation    Heart: regular rhythm & normal rate, normal S1, S2 and no murmur, no gallop, no rub   Skin: Bilateral foot wounds with Xeroform packing     Results   Results from last 7 days   Lab Units 03/19/23  0543   WBC 10*3/mm3 5.70   HEMOGLOBIN g/dL 9.7*   HEMATOCRIT % 31.0*   PLATELETS 10*3/mm3 117*     Results from last 7 days   Lab Units 03/19/23  0543   SODIUM mmol/L 136   POTASSIUM mmol/L 3.9   CHLORIDE mmol/L 108*   CO2 mmol/L 19.0*   BUN mg/dL 45*   CREATININE mg/dL 2.59*   GLUCOSE mg/dL 99   CALCIUM mg/dL 8.4*       Imaging Results (Last 24 Hours)     Procedure Component Value Units Date/Time    MRI Foot Right Without Contrast [[id number]] Collected: 03/20/23 0821     Updated: 03/20/23 0827    Narrative:        MRI FOOT RIGHT WO CONTRAST    Date of Exam: 3/19/2023 10:45 AM EDT    Indication: Osteomyelitis, foot.     Comparison: None available.    Technique:  Routine multiplanar/multisequence sequence images of the right foot were obtained without contrast administration.    Findings:   Mild diffuse soft tissue swelling is present. A wound is seen along the plantar aspect of the 4 foot region (series 9 image 13). This extends along the plantar aspect of the fourth metatarsal with wound likely extending to the underlying bone (series 9   image 13 and 14). Patchy edema is present within the fourth metatarsal head as well as the base of the fourth proximal phalanx. Slightly  limited evaluation of the remaining phalanges due to incomplete fat saturation. No definite drainable fluid   collections identified. Mild to moderate degenerative changes are present. Edema seen within the plantar musculature likely related to denervation. No significant tenosynovitis identified.      Impression:      Impression:   Wound seen along the plantar aspect of the fourth metatarsal head with adjacent osseous edema present within the fourth metatarsal head extending to the base of the fourth proximal phalanx likely related to osteomyelitis and septic arthritis. No definite   drainable collections identified. No significant effusion. No definite additional osseous involvement. Increased T2 signal seen within the phalanges is likely artifactual and related to incomplete fat saturation. No suspicious tenosynovitis.    Electronically Signed: Masha Lu    3/20/2023 8:24 AM EDT    Workstation ID: [id number]    MRI Foot Left Without Contrast [[id number]] Collected: 03/20/23 0816     Updated: 03/20/23 0824    Narrative:        MRI FOOT LEFT WO CONTRAST    Date of Exam: 3/19/2023 10:34 AM EDT    Indication: Osteomyelitis, foot.     Comparison: None available.    Technique:  Routine multiplanar/multisequence sequence images of the left foot were obtained without contrast administration.    Findings:   Susceptibility artifact is seen within the lateral midfoot region with overlying dressing present. Patchy edema is present within the fifth metatarsal base extending to the fifth metatarsal diaphysis along the midportion (series 11 image 14). No   additional suspicious osseous edema identified. Increased T2 signal seen within the phalanges is likely related to incomplete fat saturation. No definite drainable fluid collection identified. A small amount of edema seen within the adjacent subcutaneous   tissues along the lateral mid foot wound. No suspicious tenosynovitis identified. No significant joint effusion  identified. Mild to moderate degenerative changes are present within the interphalangeal joints as well as the midfoot. No erosions   identified. No definite periostitis.      Impression:      Impression:   Wound along the lateral midfoot region with patchy edema seen within the fifth metatarsal base extending to the mid fifth metatarsal diaphysis consistent with osteomyelitis. No drainable collections identified. No additional osseous involvement. No   suspicious tenosynovitis identified.    Electronically Signed: Masha Lu    3/20/2023 8:21 AM EDT    Workstation ID: [id number]    XR Spine Thoracic 3 View [[id number]] Collected: 03/19/23 1853     Updated: 03/19/23 2058    Narrative:      3 views thoracic spine    Indication:  Fall, pain    Comparison:  None    Findings:    There are median sternotomy wires. No acute fracture or listhesis seen in the thoracic spine. There are mild multilevel degenerative changes in the spine. There appear to be bilateral pleural fluid collections on the lateral image.      Impression:      Impression:    No visible acute fracture or listhesis in the thoracic spine by radiograph.    Nonacute findings as above.    Likely bilateral pleural fluid collections. This could be further assessed with dedicated chest imaging.    American College of Radiology Appropriateness Criteria in the setting of Suspected Spine Trauma:   ??? Multidetector CT with sagittal and coronal reconstructions is the recommended screening imaging procedure in adult patients with high-risk criteria by NEXUS or CCR.    Electronically signed by:  Jose Cruz Stephenson M.D.    3/19/2023 6:57 PM Mountain Time          Assessment    PVD (peripheral vascular disease) (HCC)    Severe malnutrition (HCC)      Plan   Follow up INR-lab pending  Bilateral foot debridement tomorrow in OR with Dr. Etienne and Dr. Preet Nguyen PA-C  03/20/23  08:34 EDT

## 2023-03-20 NOTE — PROGRESS NOTES
INFECTIOUS DISEASE  Progress note     Swapnil Lawson  1950  [id number]      Admission Date: 3/17/2023      Requesting Provider: No ref. provider found  Evaluating Physician: Edgar Barksdale MD    Reason for Consultation: bilateral foot infections/gangrene.      History of present illness:    Patient is a 72 y.o. male, with PMH COPD, CHF, CKD, CAD, DM, PAD, seen today for bilateral foot infections.  Admitted on 3/17/23 undergoing aortagram with stent placement/angioplasty of bilateral lower extremities with sharp debridement of both feet, plans for partial bilateral foot amputations early next week.  He has noted nonhealing foot ulcers for at least the past 6 months.  He was referred to CTS service and admitted 3/17/2023 for above. He has been afebrile without leukocytosis. Admitting labs with Scr 2.82, WBC 6.73, ESR 36, CRP 4.04. PCT 0.18.  Wound culture with no organisms, few WBCs, no growth.  He received a dose of Cefazolin and we were consulted for evaluation and treatment.  The patient denies immunocompromise status, TB, HIV, zoonotic exposures, ill contacts, or significant travel history.    3/19/23: History reviewed.  Visiting with his son and friend.  He remains afebrile. Tissue cultures no growth so far. Dr. Oviedo changed foot dressings earlier today.  No n/v/d. Having some urinary retention, required I & O catheterizations.     3/20/2023 history reviewed.  No high fevers or chills.  Being treated for bilateral foot infections and gangrene.     Past Medical History:   Diagnosis Date   • Abnormal ECG    • Cataracts, bilateral    • CHF (congestive heart failure) (HCC)    • Childhood asthma    • COPD (chronic obstructive pulmonary disease) (HCC)    • Coronary artery disease 12/14/2016    CABG, 1993, Abimael Tomlin MD. CABG, August 2013, [facility].   • Diabetes mellitus (HCC) 12/14/2016    type II   • DVT (deep venous thrombosis) ([facility])     RLE   • Gout    • Hepatitis     1970s unsure  of which one- states it was caused from drinking contaminated water   • Hyperlipidemia 12/14/2016   • Hypertension 12/14/2016   • Kidney disease    • Myocardial infarction (HCC)      in 1992 and 1993 prior to CABG.   • Paroxysmal atrial fibrillation (HCC) 12/14/2016   • Pulmonary embolism (HCC)    • Sleep apnea    • Venous insufficiency 12/14/2016   • Wears glasses     for reading       Past Surgical History:   Procedure Laterality Date   • APPENDECTOMY  1981   • BUNIONECTOMY Left    • CARDIAC CATHETERIZATION      x4, no stents   • CARDIAC CATHETERIZATION N/A 02/16/2023    Procedure: Peripheral angiography  Left lower extremity angiogram [facility] interventional Cardiologist;  Surgeon: Reed Zaldivar MD;  Location: [facility] CATH INVASIVE LOCATION;  Service: Cardiovascular;  Laterality: N/A;  Left lower extremity angiogram with interventional cardioligist.   • CATARACT EXTRACTION W/ INTRAOCULAR LENS IMPLANT Right 06/25/2020    Procedure: CATARACT PHACO EXTRACTION WITH INTRAOCULAR LENS IMPLANT RIGHT;  Surgeon: Omar Blood MD;  Location: [facility] OR;  Service: Ophthalmology;  Laterality: Right;   • CATARACT EXTRACTION W/ INTRAOCULAR LENS IMPLANT Left 07/09/2020    Procedure: CATARACT PHACO EXTRACTION WITH INTRAOCULAR LENS IMPLANT LEFT;  Surgeon: Omar Blood MD;  Location: [facility] OR;  Service: Ophthalmology;  Laterality: Left;   • CHOLECYSTECTOMY  2002   • COLON RESECTION Right 09/12/2022    Procedure: COLON RESECTION LAPAROSCOPIC HAND ASSISTED;  Surgeon: Kenji Garcias MD;  Location: [facility] OR;  Service: General;  Laterality: Right;   • COLON RESECTION N/A    • COLONOSCOPY     • COLONOSCOPY N/A 08/11/2022    Procedure: COLONOSCOPY, biopsy, polypectomy x1 and tattooing;  Surgeon: Kenji Garcias MD;  Location: [facility] ENDOSCOPY;  Service: Gastroenterology;  Laterality: N/A;   • CORONARY ARTERY BYPASS GRAFT      1993 triple bypass   • CORONARY ARTERY BYPASS GRAFT  2013    double bypass   • FINGER SURGERY       Rt index (second finger)   • OTHER SURGICAL HISTORY  2010    Bone spur removal   • TOE AMPUTATION Left 2009    4th toe   • VASECTOMY         Family History   Problem Relation Age of Onset   • COPD Mother    • Heart attack Father    • Asthma Father      Social history lives with son.  Social History     Socioeconomic History   • Marital status:    • Number of children: 1   Tobacco Use   • Smoking status: Former     Packs/day: 1.00     Years: 28.00     Pack years: 28.00     Types: Cigarettes     Start date: 1964     Quit date: 1993     Years since quittin.2   • Smokeless tobacco: Never   • Tobacco comments:     Wish I'd never started   Vaping Use   • Vaping Use: Never used   Substance and Sexual Activity   • Alcohol use: No   • Drug use: No   • Sexual activity: Not Currently     Partners: Female     Birth control/protection: Other, Vasectomy, Birth control pill       No Known Allergies      Medication:      Antibiotics:  Anti-Infectives (From admission, onward)    Ordered     Dose/Rate Route Frequency Start Stop    23 0841  piperacillin-tazobactam (ZOSYN) 3.375 g in iso-osmotic dextrose 50 ml (premix)        Ordering Provider: Jack Kidd, PharmD    3.375 g  over 4 Hours Intravenous Every 8 Hours 23 1530 23 1529    23 0837  DAPTOmycin (CUBICIN) 450 mg in sodium chloride 0.9 % 50 mL IVPB        Ordering Provider: Cheli Rodrigez APRN    6 mg/kg × 73 kg  100 mL/hr over 30 Minutes Intravenous Every 24 Hours 23 1000 23 0959    23 0841  piperacillin-tazobactam (ZOSYN) 3.375 g in iso-osmotic dextrose 50 ml (premix)        Ordering Provider: Jack Kidd, PharmD    3.375 g  over 30 Minutes Intravenous Once 23 0930 23 0930    23 1153  ceFAZolin in dextrose (ANCEF) IVPB solution 2 g        Ordering Provider: Rolo Lawler PA    2 g  over 30 Minutes Intravenous Once 23 1155 23 1442        Review of  Systems:  Constitutional-- No Fever, chills or sweats.  Appetite good, and no malaise. No fatigue.  HEENT-- No new vision, hearing or throat complaints.  No epistaxis or oral sores.  Denies odynophagia or dysphagia. No headache, photophobia or neck stiffness.  CV-- No chest pain, palpitation or syncope  Resp-- No SOB/cough/Hemoptysis, no wheezes  GI- No nausea, vomiting, or diarrhea.  No hematochezia, melena, or hematemesis. Denies jaundice or chronic liver disease.  -- No dysuria, hematuria, or flank pain.  Denies hesitancy, urgency or flank pain.  Lymph- no swollen lymph nodes in neck/axilla or groin.   Heme- No active bruising or bleeding; no Hx of DVT or PE.  MS-- no swelling or pain in the bones or joints of arms/legs.  No new back pain.  Neuro-- No acute focal weakness or numbness in the arms or legs.  No seizures.  Skin--No rashes   Bilateral foot ulcers as per HPI.  No nodules.    Physical Exam:   Vital Signs  Temp (24hrs), Av.2 °F (36.2 °C), Min:97 °F (36.1 °C), Max:97.4 °F (36.3 °C)    Temp  Min: 97 °F (36.1 °C)  Max: 97.4 °F (36.3 °C)  BP  Min: 76/51  Max: 104/67  Pulse  Min: 78  Max: 100  Resp  Min: 16  Max: 18  SpO2  Min: 93 %  Max: 99 %    GENERAL: Awake and alert, in minor distress. Appears older than stated age, cachectic, resting in bed.  HEENT: Normocephalic, atraumatic.  EOMI. No conjunctival injection. No icterus. Oropharynx clear without evidence of thrush or exudate. No evidence of peridontal disease.    NECK: Supple   HEART: RRR; No murmur, rubs, gallops.    LUNGS: Clear to auscultation bilaterally without wheezing, rales, rhonchi. Normal respiratory effort. Nonlabored.  ABDOMEN: Soft, nontender, nondistended. Positive bowel sounds. No rebound or guarding. NO mass or HSM.  EXT:  No cyanosis, clubbing or edema. No cord.  :  Without Dueñas catheter.  MSK: No joint effusions or erythema  SKIN: Warm and dry  Left lateral foot wound approximately 4cm in diameter with slough, some  granulation, approx 0.5cm deep, probes to bone.  Right th MTP ulcer, with necrosis, approx 5cm in diamter 0.5 cm deep.  Venous discoloration noted bilateral lower extremities - feet dressed. Did not disturb   NEURO: Oriented to PPT.  Motor 5/5 strength, cranial nerves II through XII intact, sensory decreased light touch lower extremities, cerebellar and gait not tested  PSYCHIATRIC: Normal insight and judgment. Cooperative with PE        Micro: Tissue and urine culture 3/17 and 3/18 negative.  Blood cultures 3/7 negative.    Laboratory Data    Results from last 7 days   Lab Units 03/19/23  0543 03/18/23  0643 03/16/23  1611   WBC 10*3/mm3 5.70 5.19 6.73   HEMOGLOBIN g/dL 9.7* 10.4* 11.2*   HEMATOCRIT % 31.0* 33.2* 35.0*   PLATELETS 10*3/mm3 117* 114* 133*     Results from last 7 days   Lab Units 03/19/23  0543   SODIUM mmol/L 136   POTASSIUM mmol/L 3.9   CHLORIDE mmol/L 108*   CO2 mmol/L 19.0*   BUN mg/dL 45*   CREATININE mg/dL 2.59*   GLUCOSE mg/dL 99   CALCIUM mg/dL 8.4*     Results from last 7 days   Lab Units 03/19/23  0543   ALK PHOS U/L 68   BILIRUBIN mg/dL 0.2   ALT (SGPT) U/L <5   AST (SGOT) U/L 9             Results from last 7 days   Lab Units 03/19/23  0543   LACTATE mmol/L 0.7     Results from last 7 days   Lab Units 03/19/23  0543 03/18/23  0643   CK TOTAL U/L 18* 26         Estimated Creatinine Clearance: 26.2 mL/min (A) (by C-G formula based on SCr of 2.59 mg/dL (H)).      Microbiology:  No results found for: ACANTHNAEG, AFBCX, BPERTUSSISCX, BLOODCX  No results found for: BCIDPCR, CXREFLEX, CSFCX, CULTURETIS  No results found for: CULTURES, HSVCX, URCX  No results found for: EYECULTURE, GCCX, HSVCULTURE, LABHSV  No results found for: LEGIONELLA, MRSACX, MUMPSCX, MYCOPLASCX  No results found for: NOCARDIACX, STOOLCX  No results found for: THROATCX, UNSTIMCULT, URINECX, CULTURE, VZVCULTUR  No results found for: VIRALCULTU, WOUNDCX        Radiology:  Imaging Results (Last 72 Hours)     Procedure  Component Value Units Date/Time    XR Spine Thoracic 3 View [[id number]] Collected: 03/19/23 1853     Updated: 03/19/23 2058    Narrative:      3 views thoracic spine    Indication:  Fall, pain    Comparison:  None    Findings:    There are median sternotomy wires. No acute fracture or listhesis seen in the thoracic spine. There are mild multilevel degenerative changes in the spine. There appear to be bilateral pleural fluid collections on the lateral image.      Impression:      Impression:    No visible acute fracture or listhesis in the thoracic spine by radiograph.    Nonacute findings as above.    Likely bilateral pleural fluid collections. This could be further assessed with dedicated chest imaging.    American College of Radiology Appropriateness Criteria in the setting of Suspected Spine Trauma:   ??? Multidetector CT with sagittal and coronal reconstructions is the recommended screening imaging procedure in adult patients with high-risk criteria by NEXUS or CCR.    Electronically signed by:  Jose Cruz Stephenson M.D.    3/19/2023 6:57 PM Mountain Time    MRI Foot Right Without Contrast [[id number]] Resulted: 03/19/23 1105     Updated: 03/19/23 1112    MRI Foot Left Without Contrast [[id number]] Resulted: 03/19/23 1104     Updated: 03/19/23 1104    XR [place] OR Procedure [[id number]] Resulted: 03/17/23 1636     Updated: 03/17/23 1636            Impression:   - Bilateral foot infections, s/p sharp debridement with plans for partial amputation early next week- culture no growth so far from 3/17/2023, AFB and fungal also pending, with likely osteomyelitis underneath.  Wounds are likely related to diabetes mellitus type 2, peripheral vascular disease including microvascular disease, and neuropathy.  Tissue culture 3/17 negative.  - PVD s/p angioplasty/stent  3/17/23, lower extremities  - Diabetes mellitus, type 2, with increased risk for infection.  - Chronic kidney disease, creatinine 2.13 on 3/18.  Acute on  chronic.  -COPD  -Hypocalcemia 8.2.  -Anemia, chronic disease worse.  -Thrombocytopenia, related to above issues and medications.    Surgical disposition still under discussion.  Awaiting second opinion from Dr. Oviedo.    PLAN/RECOMMENDATIONS:     1.  Diagnostically, monitor blood cultures, physical examination, radiology imaging, CBC, CMP, ESR, CRP, lactic acid, and procalcitonin wound cultures, tissue culture 3/17/2023.  Previous blood cultures 3/7 were negative.  2.  Therapeutically, Daptomycin, and Zosyn for gram positive, gram negative and anaerobic coverage,  duration pending cultures and clinical improvement.  Duration to be determined.  Likely to be 6 weeks after resection of his bones.  Await surgical disposition.  3.  Continue supportive care    I discussed the patients findings and my recommendations with patient, and his son.    Our group would be pleased to follow this patient over the course of their hospitalization and assist with outpatient antimicrobial therapy, as indicated.  Further recommendations depend on the results of the cultures and clinical course.   Dr. Barksdale  has obtained the history, performed the physical exam and formulated the above treatment plan.  Side effects were discussed with the patient.  Increased risk for adverse drug reactions, complications of IV access, readmission, loss of limb.    Edgar Barksdale MD  3/20/2023  08:15 EDT

## 2023-03-20 NOTE — PROGRESS NOTES
[facility] Medicine Services  PROGRESS NOTE    Patient Name: Swapnil Lawson  : 1950  MRN: [id number]    Date of Admission: 3/17/2023  Primary Care Physician: Argenis Osborne, RADHA    Subjective     CC: f/u PAD / foot ulcerations     HPI:  In bed. No family at bedside. Fell in his room yesterday. Did not hit his head. He is sore today - notes mild back pain, skin tears and bruising. He understands plan for OR tomorrow     ROS:  Gen- No fevers, chills  CV- No chest pain, palpitations  Resp- No cough, dyspnea  GI- No N/V/D, abd pain    Objective     Vital Signs:   Temp:  [97 °F (36.1 °C)-97.5 °F (36.4 °C)] 97.2 °F (36.2 °C)  Heart Rate:  [] 81  Resp:  [16-18] 16  BP: ()/(51-67) 100/58  Flow (L/min):  [1-2] 2     Physical Exam:  Constitutional: No acute distress, awake, alert and conversant. Thin and chronically ill appearing   HENT: NCAT, mucous membranes moist. Poor dentition   Respiratory: Clear to auscultation bilaterally, respiratory effort normal   Cardiovascular: IRR, rate 80's without m/r/g   Gastrointestinal: Positive bowel sounds, soft, nontender, nondistended  Musculoskeletal: Trace to 1+ pitting bilateral ankle edema. Red/purplish discoloration to lower legs and feet. Feet dressed - did not remove dressings for exam. New ecchymosis to elbows and arms bilaterally   Psychiatric: Appropriate affect, cooperative  Neurologic: Oriented x 3, moves all extremities spontaneously without focal deficits, speech clear    Results Reviewed:  LAB RESULTS:      Lab 23  0839 23  0543 23  0643 23  1340 23  1611   WBC  --  5.70 5.19  --  6.73   HEMOGLOBIN  --  9.7* 10.4*  --  11.2*   HEMATOCRIT  --  31.0* 33.2*  --  35.0*   PLATELETS  --  117* 114*  --  133*   NEUTROS ABS  --  4.60 4.50  --   --    IMMATURE GRANS (ABS)  --  0.05 0.07*  --   --    LYMPHS ABS  --  0.56* 0.41*  --   --    MONOS ABS  --  0.40 0.19  --   --    EOS ABS  --  0.06 0.00   --   --    MCV  --  89.1 90.2  --  87.9   LACTATE  --  0.7  --   --   --    PROTIME 19.7* 40.2*  --  30.2* 29.5*   APTT  --   --   --   --  54.6*         Lab 03/20/23  0839 03/19/23  0543 03/18/23  0643 03/16/23  1611   SODIUM 139 136 136 139   POTASSIUM 4.1 3.9 4.4 4.8   CHLORIDE 110* 108* 110* 110*   CO2 18.0* 19.0* 17.0* 18.2*   ANION GAP 11.0 9.0 9.0 10.8   BUN 49* 45* 43* 43*   CREATININE 2.60* 2.59* 2.13* 2.82*   EGFR 25.2* 25.4* 32.1* 22.9*   GLUCOSE 130* 99 116* 105*   CALCIUM 8.5* 8.4* 8.2* 9.7   HEMOGLOBIN A1C  --   --   --  5.80*         Lab 03/19/23  0543   TOTAL PROTEIN 5.6*   ALBUMIN 2.8*   GLOBULIN 2.8   ALT (SGPT) <5   AST (SGOT) 9   BILIRUBIN 0.2   ALK PHOS 68         Lab 03/20/23  0839 03/19/23  0543 03/17/23  1340 03/16/23  1611   PROTIME 19.7* 40.2* 30.2* 29.5*   INR 1.68* 4.10* 2.87* 2.78*         Lab 03/20/23  0839 03/19/23  0930 03/17/23  1352 03/17/23  1258   IRON 36*  --   --   --    IRON SATURATION 16*  --   --   --    TIBC 228*  --   --   --    TRANSFERRIN 153*  --   --   --    FERRITIN 191.80  --   --   --    FOLATE  --  6.77  --   --    VITAMIN B 12 345  --   --   --    ABO TYPING  --   --  A A   RH TYPING  --   --  Positive Positive   ANTIBODY SCREEN  --   --   --  Negative     Brief Urine Lab Results  (Last result in the past 365 days)      Color   Clarity   Blood   Leuk Est   Nitrite   Protein   CREAT   Urine HCG        03/18/23 1044 Yellow   Clear   Moderate (2+)   Small (1+)   Negative   30 mg/dL (1+)               Microbiology Results Abnormal     Procedure Component Value - Date/Time    Tissue / Bone Culture - Bone, Foot, Left [[id number]] Collected: 03/17/23 1649    Lab Status: Final result Specimen: Bone from Foot, Left Updated: 03/20/23 0834     Tissue Culture No growth at 3 days     Gram Stain Few (2+) WBCs seen      No organisms seen    Urine Culture - Urine, Straight Cath [[id number]]  (Normal) Collected: 03/18/23 1044    Lab Status: Final result Specimen: Urine from Straight  Cath Updated: 03/19/23 1257     Urine Culture No growth    AFB Culture - Bone, Foot, Left [[id number]] Collected: 03/17/23 1649    Lab Status: Preliminary result Specimen: Bone from Foot, Left Updated: 03/18/23 1238     AFB Stain No acid fast bacilli seen on concentrated smear        XR Spine Thoracic 3 View    Result Date: 3/19/2023  3 views thoracic spine Indication:  Fall, pain Comparison:  None Findings: There are median sternotomy wires. No acute fracture or listhesis seen in the thoracic spine. There are mild multilevel degenerative changes in the spine. There appear to be bilateral pleural fluid collections on the lateral image.     Impression: Impression: No visible acute fracture or listhesis in the thoracic spine by radiograph. Nonacute findings as above. Likely bilateral pleural fluid collections. This could be further assessed with dedicated chest imaging. American College of Radiology Appropriateness Criteria in the setting of Suspected Spine Trauma: ??? Multidetector CT with sagittal and coronal reconstructions is the recommended screening imaging procedure in adult patients with high-risk criteria by NEXUS or CCR. Electronically signed by:  Jose Cruz Stephenson M.D.  3/19/2023 6:57 PM Mountain Time    MRI Foot Right Without Contrast    Result Date: 3/20/2023  MRI FOOT RIGHT WO CONTRAST Date of Exam: 3/19/2023 10:45 AM EDT Indication: Osteomyelitis, foot.  Comparison: None available. Technique:  Routine multiplanar/multisequence sequence images of the right foot were obtained without contrast administration. Findings: Mild diffuse soft tissue swelling is present. A wound is seen along the plantar aspect of the 4 foot region (series 9 image 13). This extends along the plantar aspect of the fourth metatarsal with wound likely extending to the underlying bone (series 9 image 13 and 14). Patchy edema is present within the fourth metatarsal head as well as the base of the fourth proximal phalanx. Slightly  limited evaluation of the remaining phalanges due to incomplete fat saturation. No definite drainable fluid collections identified. Mild to moderate degenerative changes are present. Edema seen within the plantar musculature likely related to denervation. No significant tenosynovitis identified.     Impression: Impression: Wound seen along the plantar aspect of the fourth metatarsal head with adjacent osseous edema present within the fourth metatarsal head extending to the base of the fourth proximal phalanx likely related to osteomyelitis and septic arthritis. No definite  drainable collections identified. No significant effusion. No definite additional osseous involvement. Increased T2 signal seen within the phalanges is likely artifactual and related to incomplete fat saturation. No suspicious tenosynovitis. Electronically Signed: Masha Lu  3/20/2023 8:24 AM EDT  Workstation ID: [id number]    MRI Foot Left Without Contrast    Result Date: 3/20/2023  MRI FOOT LEFT WO CONTRAST Date of Exam: 3/19/2023 10:34 AM EDT Indication: Osteomyelitis, foot.  Comparison: None available. Technique:  Routine multiplanar/multisequence sequence images of the left foot were obtained without contrast administration. Findings: Susceptibility artifact is seen within the lateral midfoot region with overlying dressing present. Patchy edema is present within the fifth metatarsal base extending to the fifth metatarsal diaphysis along the midportion (series 11 image 14). No additional suspicious osseous edema identified. Increased T2 signal seen within the phalanges is likely related to incomplete fat saturation. No definite drainable fluid collection identified. A small amount of edema seen within the adjacent subcutaneous  tissues along the lateral mid foot wound. No suspicious tenosynovitis identified. No significant joint effusion identified. Mild to moderate degenerative changes are present within the interphalangeal joints as well as  the midfoot. No erosions identified. No definite periostitis.     Impression: Impression: Wound along the lateral midfoot region with patchy edema seen within the fifth metatarsal base extending to the mid fifth metatarsal diaphysis consistent with osteomyelitis. No drainable collections identified. No additional osseous involvement. No suspicious tenosynovitis identified. Electronically Signed: Masha Lu  3/20/2023 8:21 AM EDT  Workstation ID: [id number]    Current medications:  Scheduled Meds:aspirin, 81 mg, Oral, Daily  atorvastatin, 10 mg, Oral, Nightly  clopidogrel, 75 mg, Oral, Daily  DAPTOmycin, 6 mg/kg, Intravenous, Q24H  Dulaglutide, 1.5 mg, Subcutaneous, Weekly  ferrous sulfate, 325 mg, Oral, Daily With Breakfast  heparin (porcine), 5,000 Units, Subcutaneous, Q12H  metoprolol succinate XL, 12.5 mg, Oral, Daily  midodrine, 5 mg, Oral, TID AC  piperacillin-tazobactam, 3.375 g, Intravenous, Q8H  polyethylene glycol, 17 g, Oral, BID With Meals  senna-docusate sodium, 2 tablet, Oral, BID  tamsulosin, 0.4 mg, Oral, Daily      Continuous Infusions:sodium chloride, 100 mL/hr, Last Rate: 100 mL/hr (03/20/23 0852)      PRN Meds:.•  acetaminophen  •  acetaminophen  •  senna-docusate sodium **AND** bisacodyl **AND** bisacodyl  •  Chlorhexidine Gluconate Cloth  •  diphenhydrAMINE  •  HYDROcodone-acetaminophen  •  HYDROmorphone **AND** naloxone  •  ipratropium    Assessment & Plan     Active Hospital Problems    Diagnosis  POA   • **PVD (peripheral vascular disease) ([facility]) [I73.9]  Unknown   • Severe malnutrition ([facility]) [E43]  Yes   • Foot ulcer ([facility]) [L97.509]  Unknown      Resolved Hospital Problems   No resolved problems to display.     Brief Hospital Course to date:  Swapnil Lawson is a 72 y.o. male with PMH significant for CAD s/p CABG, chronic diastolic CHF, atrial fibrillation (Warfarin), prior DVT/PE, COPD, non-insulin dependent DMII, CKD III, venous insufficiency and PAD. Evaluated by Dr. Etienne on 3/16/2023 for  non-healing ulcer on the lateral aspect of his L foot, present for 6 months as well as a fairly recently new ulceration on the plantar aspect of the R lateral forefoot. Pedal pulses were not palpable. Previously evaluated by Dr. Zaldivar - arteriography of LLE revealed severe stenosis at proximal tibial artery.  Dr. Etienne recommended hospital admission but the patient declined, opting to return to the hospital on 3/17/23, at which time he underwent aortagram with run off with angioplasty of L anterior tibial, L posterior tibial, L popliteal arteries, stent placement at proximal L anterior tibial artery as well as sharp debridement of L lateral midfoot wound down to level of bone and sharp debridement of R lateral forefoot wound down to level of bone. He was admitted to CT surgery service. ID consulted. Hospital medicine asked to assume care. ID has initiated Daptomycin / Zosyn. Dr. Oviedo to evaluate for consideration of amputation in the upcoming days.      Foot ulcerations / gangrene  Osteomyelitis of L 5th metatarsal and R 4th metatarsal and proximal phalanx   Peripheral arterial disease / critical limb ischemia  - s/p angiogram with runoff with angioplasty and stent placement by Dr. Etienne on 3/17/23  - Per CT surgery note, Dr. Oviedo to see for consideration of partial bilateral foot amputations on Monday or Tuesday  - ID following - now on IV Daptomycin/Zosyn  - CRP 4.04, ESR not ordered  - MRI of both feet obtained - results pending    - Continue ASA and Plavix      Atrial fibrillation   Chronic anticoagulation   - Chronically anticoagulated with Warfarin. GOAL INR 2-3   - Warfarin currently on hold for possible surgical intervention   - Continue Metoprolol with hold parameters   - INR 4.1 on 3/19 - s/p Vitamin K 10mg  - INR 1.68 today     Chronic diastolic CHF   - No ECHO on file. Per Dr. Campbell 5/2022 note, February 2019 ECHO with EF 50%, moderately dilated LV, moderately dilated RV with normal function, moderate  to severe L atrial enlargement and dilated IVC  - Appears euvolemic. Known history of venous insufficiency which contributes to lower extremity edema     Hypotension  - BP low. Added Midodrine 5mg TID today. Per patient, he takes this as needed at home (not on home med list)      DEMARIO on CKD III  - Baseline creatinine appears to be 1.6-2.0  - Creatinine 2.81 on 3/16, improved to 2.13 today  - Creatinine 2.60 today - continue gentle IV fluids   - Monitor renal function closely     Anemia  - Iron studies consistent with ACOD / mild iron deficiency  - Continue PO iron supplement      Non-insulin dependent DMII  - Hgb A1c 5.8%   - Took home Trulicity today  - Stopped accuchecks and SSI. Not requiring sliding scale insulin     Expected Discharge Location and Transportation: home vs rehab?  Expected Discharge   Expected Discharge Date and Time     Expected Discharge Date Expected Discharge Time    Mar 24, 2023         DVT prophylaxis:Medical DVT prophylaxis orders are present.     AM-PAC 6 Clicks Score (PT): 20 (03/20/23 0656)    CODE STATUS:   Code Status and Medical Interventions:   Ordered at: 03/17/23 2001     Level Of Support Discussed With:    Patient     Code Status (Patient has no pulse and is not breathing):    CPR (Attempt to Resuscitate)     Medical Interventions (Patient has pulse or is breathing):    Full Support     Release to patient:    Routine Release     Khadijah Chiu PA-C  03/20/23

## 2023-03-21 NOTE — ANESTHESIA POSTPROCEDURE EVALUATION
Patient: Swapnil Lawson    Procedure Summary     Date: 03/21/23 Room / Location:  [facility] [address] [place] [address] [facility]    Anesthesia Start: 1316 Anesthesia Stop: 1447    Procedure: LEFT FOOT RAY TRANSMETATARSAL AMPUTATION LATERAL, RIGHT FOOT TRANSMETATARSAL AMPUTATION (Bilateral) Diagnosis:       Ulcer of right foot with other severity (HCC)      (Ulcer of right foot with other severity (HCC) [L97.518])    Surgeons: Jose Cruz Etienne MD Provider: Twan Hardy MD    Anesthesia Type: general ASA Status: 4          Anesthesia Type: general    Vitals  Vitals Value Taken Time   /61 03/21/23 1447   Temp 97.7 °F (36.5 °C) 03/21/23 1447   Pulse 118 03/21/23 1447   Resp 14 03/21/23 1447   SpO2 94 % 03/21/23 1447           Post Anesthesia Care and Evaluation    Patient location during evaluation: PACU  Patient participation: complete - patient participated  Level of consciousness: responsive to light touch  Pain score: 0  Pain management: adequate    Airway patency: patent  Anesthetic complications: No anesthetic complications  PONV Status: none  Cardiovascular status: hemodynamically stable and acceptable  Respiratory status: nonlabored ventilation, acceptable, nasal cannula and oral airway  Hydration status: acceptable    Comments: Upon entering PACU pt Sp02 was 89-90. It appeared the patient had labored breathing, sugammadex 200mg was given. Labored breathing haulted, and Sp02 was 94.

## 2023-03-21 NOTE — PLAN OF CARE
Goal Outcome Evaluation:   Patient is resting in bed, alert and orientedx4. Keep felling asleep but wake up on voice. VS stable. Spoke to  on the floor, he wanted bladder scan patient if greater 200 may f/c him. Bladder scanned patient, noted 260 ml, patient denied any discomfort or pain. Patient refused to insert f/c at that time, he wanted to urinate on his own. Call light in reach

## 2023-03-21 NOTE — PROGRESS NOTES
Asked to see patient for abdominal distention.  KUB suggested SBO.  Patient reports that he had several BMs and feels much better  Evonne Collier MD 03/21/23 02:58 EDT

## 2023-03-21 NOTE — ANESTHESIA PREPROCEDURE EVALUATION
Anesthesia Evaluation                  Airway   Mallampati: I  TM distance: >3 FB  Neck ROM: full  No difficulty expected  Dental      Pulmonary    (+) pulmonary embolism, COPD, asthma,sleep apnea,   Cardiovascular     ECG reviewed    (+) hypertension, past MI , CAD, CABG, dysrhythmias, CHF , PVD, hyperlipidemia,       Neuro/Psych  GI/Hepatic/Renal/Endo    (+)   liver disease, renal disease, diabetes mellitus,     Musculoskeletal     Abdominal    Substance History      OB/GYN          Other                        Anesthesia Plan    ASA 4     general     intravenous induction     Anesthetic plan, risks, benefits, and alternatives have been provided, discussed and informed consent has been obtained with: patient.    Plan discussed with CRNA.        CODE STATUS:    Level Of Support Discussed With: Patient  Code Status (Patient has no pulse and is not breathing): CPR (Attempt to Resuscitate)  Medical Interventions (Patient has pulse or is breathing): Full Support  Release to patient: Routine Release

## 2023-03-21 NOTE — ANESTHESIA PROCEDURE NOTES
Airway  Urgency: elective    Date/Time: 3/21/2023 1:22 PM  Airway not difficult    General Information and Staff    Patient location during procedure: OR  CRNA/CAA: Twan Live CRNA    Indications and Patient Condition  Indications for airway management: airway protection    Preoxygenated: yes  MILS not maintained throughout  Mask difficulty assessment: 1 - vent by mask    Final Airway Details  Final airway type: endotracheal airway      Successful airway: ETT  Cuffed: yes   Successful intubation technique: direct laryngoscopy  Facilitating devices/methods: intubating stylet  Endotracheal tube insertion site: oral  Blade: Munir  Blade size: 3  ETT size (mm): 7.5  Cormack-Lehane Classification: grade I - full view of glottis  Placement verified by: chest auscultation and capnometry   Cuff volume (mL): 10  Measured from: lips  ETT/EBT  to lips (cm): 22  Number of attempts at approach: 1  Assessment: lips, teeth, and gum same as pre-op and atraumatic intubation    Additional Comments  Negative epigastric sounds, Breath sound equal bilaterally with symmetric chest rise and fall

## 2023-03-21 NOTE — PROGRESS NOTES
[facility] Medicine Services  PROGRESS NOTE    Patient Name: Swapnil Lawson  : 1950  MRN: [id number]    Date of Admission: 3/17/2023  Primary Care Physician: Argenis Osborne, RADHA    Subjective     CC: f/u PAD / foot ulcerations     HPI:  Had several BMs yesterday. Abdomen feels better. No other complaints. Renal function worsening. To OR today.     ROS:  Gen- No fevers, chills  CV- No chest pain, palpitations  Resp- No cough, dyspnea  GI- No N/V/D, abd pain    Objective     Vital Signs:   Temp:  [97.1 °F (36.2 °C)-97.5 °F (36.4 °C)] 97.5 °F (36.4 °C)  Heart Rate:  [81-99] 90  Resp:  [16-20] 20  BP: ()/(55-65) 99/60  Flow (L/min):  [2] 2     Physical Exam:  Constitutional: No acute distress, awake, alert and conversant. Thin and chronically ill appearing   HENT: NCAT, mucous membranes moist. Poor dentition   Respiratory: Clear to auscultation bilaterally, respiratory effort normal   Cardiovascular: IRR, rate 80's without m/r/g   Gastrointestinal: Positive bowel sounds, soft, nontender, nondistended  Musculoskeletal: Trace to 1+ pitting bilateral ankle edema. Red/purplish discoloration to lower legs and feet. Feet dressed - did not remove dressings for exam. New ecchymosis to elbows and arms bilaterally   Psychiatric: Appropriate affect, cooperative  Neurologic: Oriented x 3, moves all extremities spontaneously without focal deficits, speech clear  No change from 3/20/23 exam    Results Reviewed:  LAB RESULTS:      Lab 23  0645 23  2216 23  0839 23  0543 23  0643 23  1340 23  1611   WBC 8.15 7.97  --  5.70 5.19  --  6.73   HEMOGLOBIN 9.9* 9.9*  --  9.7* 10.4*  --  11.2*   HEMATOCRIT 31.8* 32.0*  --  31.0* 33.2*  --  35.0*   PLATELETS 108* 107*  --  117* 114*  --  133*   NEUTROS ABS 6.69 6.66  --  4.60 4.50  --   --    IMMATURE GRANS (ABS) 0.10* 0.12*  --  0.05 0.07*  --   --    LYMPHS ABS 0.54* 0.47*  --  0.56* 0.41*  --   --     MONOS ABS 0.66 0.58  --  0.40 0.19  --   --    EOS ABS 0.12 0.11  --  0.06 0.00  --   --    MCV 90.9 90.4  --  89.1 90.2  --  87.9   PROCALCITONIN  --  0.18  --   --   --   --   --    LACTATE  --  1.2  --  0.7  --   --   --    PROTIME 19.1*  --  19.7* 40.2*  --  30.2* 29.5*   APTT  --   --   --   --   --   --  54.6*   D DIMER QUANT  --  1.79*  --   --   --   --   --          Lab 03/21/23 0645 03/20/23 2216 03/20/23  0839 03/19/23  0543 03/18/23  0643 03/16/23  1611   SODIUM 137 136 139 136 136 139   POTASSIUM 4.3 4.1 4.1 3.9 4.4 4.8   CHLORIDE 109* 106 110* 108* 110* 110*   CO2 17.0* 19.0* 18.0* 19.0* 17.0* 18.2*   ANION GAP 11.0 11.0 11.0 9.0 9.0 10.8   BUN 41* 42* 49* 45* 43* 43*   CREATININE 2.84* 2.79* 2.60* 2.59* 2.13* 2.82*   EGFR 22.7* 23.2* 25.2* 25.4* 32.1* 22.9*   GLUCOSE 95 120* 130* 99 116* 105*   CALCIUM 8.5* 8.5* 8.5* 8.4* 8.2* 9.7   HEMOGLOBIN A1C  --   --   --   --   --  5.80*         Lab 03/21/23 0645 03/20/23 2216 03/19/23  0543   TOTAL PROTEIN 5.4* 5.4* 5.6*   ALBUMIN 2.9* 2.9* 2.8*   GLOBULIN 2.5 2.5 2.8   ALT (SGPT) <5 <5 <5   AST (SGOT) 12 11 9   BILIRUBIN 0.5 0.5 0.2   ALK PHOS 67 68 68         Lab 03/21/23 0645 03/20/23  0839 03/19/23  0543 03/17/23  1340 03/16/23  1611   PROTIME 19.1* 19.7* 40.2* 30.2* 29.5*   INR 1.61* 1.68* 4.10* 2.87* 2.78*         Lab 03/20/23  0839 03/19/23  0930 03/17/23  1352 03/17/23  1258   IRON 36*  --   --   --    IRON SATURATION 16*  --   --   --    TIBC 228*  --   --   --    TRANSFERRIN 153*  --   --   --    FERRITIN 191.80  --   --   --    FOLATE  --  6.77  --   --    VITAMIN B 12 345  --   --   --    ABO TYPING  --   --  A A   RH TYPING  --   --  Positive Positive   ANTIBODY SCREEN  --   --   --  Negative     Brief Urine Lab Results  (Last result in the past 365 days)      Color   Clarity   Blood   Leuk Est   Nitrite   Protein   CREAT   Urine HCG        03/18/23 1044 Yellow   Clear   Moderate (2+)   Small (1+)   Negative   30 mg/dL (1+)                Microbiology Results Abnormal     Procedure Component Value - Date/Time    Tissue / Bone Culture - Bone, Foot, Left [[id number]] Collected: 03/17/23 1649    Lab Status: Final result Specimen: Bone from Foot, Left Updated: 03/20/23 0834     Tissue Culture No growth at 3 days     Gram Stain Few (2+) WBCs seen      No organisms seen    Urine Culture - Urine, Straight Cath [[id number]]  (Normal) Collected: 03/18/23 1044    Lab Status: Final result Specimen: Urine from Straight Cath Updated: 03/19/23 1257     Urine Culture No growth    AFB Culture - Bone, Foot, Left [[id number]] Collected: 03/17/23 1649    Lab Status: Preliminary result Specimen: Bone from Foot, Left Updated: 03/18/23 1238     AFB Stain No acid fast bacilli seen on concentrated smear        XR Spine Thoracic 3 View    Result Date: 3/19/2023  3 views thoracic spine Indication:  Fall, pain Comparison:  None Findings: There are median sternotomy wires. No acute fracture or listhesis seen in the thoracic spine. There are mild multilevel degenerative changes in the spine. There appear to be bilateral pleural fluid collections on the lateral image.     Impression: Impression: No visible acute fracture or listhesis in the thoracic spine by radiograph. Nonacute findings as above. Likely bilateral pleural fluid collections. This could be further assessed with dedicated chest imaging. American College of Radiology Appropriateness Criteria in the setting of Suspected Spine Trauma: ??? Multidetector CT with sagittal and coronal reconstructions is the recommended screening imaging procedure in adult patients with high-risk criteria by NEXUS or CCR. Electronically signed by:  Jose Cruz Stephenson M.D.  3/19/2023 6:57 PM Mountain Time    MRI Foot Right Without Contrast    Result Date: 3/20/2023  MRI FOOT RIGHT WO CONTRAST Date of Exam: 3/19/2023 10:45 AM EDT Indication: Osteomyelitis, foot.  Comparison: None available. Technique:  Routine multiplanar/multisequence  sequence images of the right foot were obtained without contrast administration. Findings: Mild diffuse soft tissue swelling is present. A wound is seen along the plantar aspect of the 4 foot region (series 9 image 13). This extends along the plantar aspect of the fourth metatarsal with wound likely extending to the underlying bone (series 9 image 13 and 14). Patchy edema is present within the fourth metatarsal head as well as the base of the fourth proximal phalanx. Slightly limited evaluation of the remaining phalanges due to incomplete fat saturation. No definite drainable fluid collections identified. Mild to moderate degenerative changes are present. Edema seen within the plantar musculature likely related to denervation. No significant tenosynovitis identified.     Impression: Impression: Wound seen along the plantar aspect of the fourth metatarsal head with adjacent osseous edema present within the fourth metatarsal head extending to the base of the fourth proximal phalanx likely related to osteomyelitis and septic arthritis. No definite  drainable collections identified. No significant effusion. No definite additional osseous involvement. Increased T2 signal seen within the phalanges is likely artifactual and related to incomplete fat saturation. No suspicious tenosynovitis. Electronically Signed: Masha Lu  3/20/2023 8:24 AM EDT  Workstation ID: [id number]    MRI Foot Left Without Contrast    Result Date: 3/20/2023  MRI FOOT LEFT WO CONTRAST Date of Exam: 3/19/2023 10:34 AM EDT Indication: Osteomyelitis, foot.  Comparison: None available. Technique:  Routine multiplanar/multisequence sequence images of the left foot were obtained without contrast administration. Findings: Susceptibility artifact is seen within the lateral midfoot region with overlying dressing present. Patchy edema is present within the fifth metatarsal base extending to the fifth metatarsal diaphysis along the midportion (series 11 image  14). No additional suspicious osseous edema identified. Increased T2 signal seen within the phalanges is likely related to incomplete fat saturation. No definite drainable fluid collection identified. A small amount of edema seen within the adjacent subcutaneous  tissues along the lateral mid foot wound. No suspicious tenosynovitis identified. No significant joint effusion identified. Mild to moderate degenerative changes are present within the interphalangeal joints as well as the midfoot. No erosions identified. No definite periostitis.     Impression: Impression: Wound along the lateral midfoot region with patchy edema seen within the fifth metatarsal base extending to the mid fifth metatarsal diaphysis consistent with osteomyelitis. No drainable collections identified. No additional osseous involvement. No suspicious tenosynovitis identified. Electronically Signed: Masha Lu  3/20/2023 8:21 AM EDT  Workstation ID: [id number]    XR Abdomen KUB    Result Date: 3/20/2023  XR ABDOMEN KUB Date of Exam: 3/20/2023 1:47 PM EDT Indication: Abdominal pain and distention.. Comparison: 9/24/2022. Findings: There is increased gas throughout the stomach, colon, and small bowel. Several small bowel loops measure up to 4.8 cm. This is concerning for a small bowel obstruction. There is no pneumatosis or free air. There are vascular calcifications within the abdomen or pelvis. There are degenerative changes within the thoracolumbar spine, hip, and sacroiliac joints. There are bilateral basilar pleural effusions with atelectasis.     Impression: Impression: Abnormal bowel gas pattern. This is concerning for a small bowel obstruction. There is no pneumatosis or free air. Electronically Signed: Feroz Mcghee  3/20/2023 2:25 PM EDT  Workstation ID: [id number]    Current medications:  Scheduled Meds:aspirin, 81 mg, Oral, Daily  atorvastatin, 10 mg, Oral, Nightly  clopidogrel, 75 mg, Oral, Daily  DAPTOmycin, 6 mg/kg, Intravenous,  Q24H  Dulaglutide, 1.5 mg, Subcutaneous, Weekly  ferrous sulfate, 325 mg, Oral, Daily With Breakfast  heparin (porcine), 5,000 Units, Subcutaneous, Q12H  metoprolol succinate XL, 12.5 mg, Oral, Daily  midodrine, 7.5 mg, Oral, TID AC  piperacillin-tazobactam, 3.375 g, Intravenous, Q8H  polyethylene glycol, 17 g, Oral, BID With Meals  senna-docusate sodium, 2 tablet, Oral, BID  tamsulosin, 0.4 mg, Oral, Daily      Continuous Infusions:   PRN Meds:.•  acetaminophen  •  acetaminophen  •  senna-docusate sodium **AND** bisacodyl **AND** bisacodyl  •  Chlorhexidine Gluconate Cloth  •  diphenhydrAMINE  •  HYDROcodone-acetaminophen  •  HYDROmorphone **AND** naloxone  •  ipratropium    Assessment & Plan     Active Hospital Problems    Diagnosis  POA   • **PVD (peripheral vascular disease) ([facility]) [I73.9]  Unknown   • Ulcer of right foot with other severity ([facility]) [L97.518]  Yes   • Severe malnutrition ([facility]) [E43]  Yes   • Foot ulcer ([facility]) [L97.509]  Unknown      Resolved Hospital Problems   No resolved problems to display.     Brief Hospital Course to date:  Swapnil Lawson is a 72 y.o. male with PMH significant for CAD s/p CABG, chronic diastolic CHF, atrial fibrillation (Warfarin), prior DVT/PE, COPD, non-insulin dependent DMII, CKD III, venous insufficiency and PAD. Evaluated by Dr. Etienne on 3/16/2023 for non-healing ulcer on the lateral aspect of his L foot, present for 6 months as well as a fairly recently new ulceration on the plantar aspect of the R lateral forefoot. Pedal pulses were not palpable. Previously evaluated by Dr. Zaldivar - arteriography of LLE revealed severe stenosis at proximal tibial artery.  Dr. Etienne recommended hospital admission but the patient declined, opting to return to the hospital on 3/17/23, at which time he underwent aortagram with run off with angioplasty of L anterior tibial, L posterior tibial, L popliteal arteries, stent placement at proximal L anterior tibial artery as well as sharp  debridement of L lateral midfoot wound down to level of bone and sharp debridement of R lateral forefoot wound down to level of bone. He was admitted to CT surgery service. ID consulted. Hospital medicine asked to assume care. ID has initiated Daptomycin / Zosyn. Dr. Oviedo to evaluate for consideration of amputation in the upcoming days.      Foot ulcerations / gangrene  Osteomyelitis of L 5th metatarsal and R 4th metatarsal and proximal phalanx   Peripheral arterial disease / critical limb ischemia  - s/p angiogram with runoff with angioplasty and stent placement by Dr. Etienne on 3/17/23  - Continue ASA and Plavix   - ID following - now on IV Daptomycin/Zosyn  - CRP 4.04, ESR not ordered  - MRI of both feet obtained and show osteomyelitis of L 5th metatarsal and R 4th metatarsal and proximal phalanx    - To OR today for bilateral foot debridement / partial amputation    - Will need PT/OT evals after OR      Atrial fibrillation   Chronic anticoagulation   - Chronically anticoagulated with Warfarin. GOAL INR 2-3   - Warfarin currently on hold for possible surgical intervention   - Continue Metoprolol with hold parameters   - INR 4.1 on 3/19 - s/p Vitamin K 10mg  - INR 1.61 today     Chronic diastolic CHF   - No ECHO on file. Per Dr. Campbell 5/2022 note, February 2019 ECHO with EF 50%, moderately dilated LV, moderately dilated RV with normal function, moderate to severe L atrial enlargement and dilated IVC  - Appears euvolemic. Known history of venous insufficiency which contributes to lower extremity edema     Hypotension  - BP low. Increase Midodrine to 7.5mg TID  - Per patient, he takes this as needed at home (not on home med list)      DEMARIO on CKD III  Metabolic acidosis   - Baseline creatinine appears to be 1.6-2.0  - Creatinine 2.81 on 3/16, improved to 2.13 today  - Creatinine 2.84 today - will ask nephrology to see    Anemia  - Iron studies consistent with ACOD / mild iron deficiency  - Continue PO iron supplement       Non-insulin dependent DMII  - Hgb A1c 5.8%   - Took home Trulicity on Seth 3/19  - Stopped accuchecks and SSI. Not requiring sliding scale insulin     COPD  - Wears 2L NC QHS at baseline. Says wears O2 PRN during the day     Expected Discharge Location and Transportation: home vs rehab?  Expected Discharge   Expected Discharge Date and Time     Expected Discharge Date Expected Discharge Time    Mar 24, 2023         DVT prophylaxis:Medical DVT prophylaxis orders are present.     AM-PAC 6 Clicks Score (PT): 20 (03/20/23 0656)    CODE STATUS:   Code Status and Medical Interventions:   Ordered at: 03/17/23 2001     Level Of Support Discussed With:    Patient     Code Status (Patient has no pulse and is not breathing):    CPR (Attempt to Resuscitate)     Medical Interventions (Patient has pulse or is breathing):    Full Support     Release to patient:    Routine Release     Khadijah Chiu PA-C  03/21/23

## 2023-03-21 NOTE — CONSULTS
NAL Consult Note    Swapnil Lawson  1950  [id number]    Date of Admit:  3/17/2023    Date of Consult: 3/21/2023        Requesting Provider: No ref. provider found  Evaluating Physician: Dakota Kaiser MD        Reason for Consultation:  DEMARIO ON CKD  History of present illness:    Patient is a 72 y.o.  Yr old male WITH MULTIPLE MEDICAL PROBLEMS INCLUDING CKD ( DETAILS NOT KNOWN. ? IF HE SEES A NEPHROLOGIST. IS NOT IN THE NAL SYSTEM ), PVD WITH AMPUTATION OF TOES TODAY ), CAD ( S/P CABG ), CHF, A FIB, H/O DVT/PE, AND DM WHO WE ARE ASKED TO SEE FOR WORSE RENAL FXN ( CREAT 2.83-2.13-2.6-2.84 OVER THE PAST 5 DAYS ). HAD A CO2 LOWER EXT ANGIOGRAM ON 3/19/23 ). PATIENT SEEN IN THE RECOVERY ROOM AND IS SEDATED AND UNABLE TO PROVIDE ANY H/O. NO FAMILY MEMBERS PRESENT. FE SAT 16%. HYPOTENSIVE ( SYS BP 90'S ) AT TIMES     Past Medical History:   Diagnosis Date   • Abnormal ECG    • Cataracts, bilateral    • CHF (congestive heart failure) (HCC)    • Childhood asthma    • COPD (chronic obstructive pulmonary disease) (HCC)    • Coronary artery disease 12/14/2016    CABG, 1993, Abimael Tomlin MD. CABG, August 2013, [facility].   • Diabetes mellitus (HCC) 12/14/2016    type II   • DVT (deep venous thrombosis) (HCC)     RLE   • Gout    • Hepatitis     1970s unsure of which one- states it was caused from drinking contaminated water   • Hyperlipidemia 12/14/2016   • Hypertension 12/14/2016   • Kidney disease    • Myocardial infarction (HCC)      in 1992 and 1993 prior to CABG.   • Paroxysmal atrial fibrillation (HCC) 12/14/2016   • Pulmonary embolism (HCC)    • Sleep apnea    • Venous insufficiency 12/14/2016   • Wears glasses     for reading       Past Surgical History:   Procedure Laterality Date   • AORTAGRAM N/A 3/17/2023    Procedure: AORTAGRAM WITH  RUNOFFS WITH STENT PLACEMENT;  Surgeon: Jose Cruz Etienne MD;  Location: [facility];  Service: Vascular;  Laterality: N/A;   fluoro  dose  contrast     • APPENDECTOMY  1981   • BUNIONECTOMY Left    • CARDIAC CATHETERIZATION      x4, no stents   • CARDIAC CATHETERIZATION N/A 02/16/2023    Procedure: Peripheral angiography  Left lower extremity angiogram wit interventional Cardiologist;  Surgeon: Reed Zaldivar MD;  Location: [facility] CATH INVASIVE LOCATION;  Service: Cardiovascular;  Laterality: N/A;  Left lower extremity angiogram with interventional cardioligist.   • CATARACT EXTRACTION W/ INTRAOCULAR LENS IMPLANT Right 06/25/2020    Procedure: CATARACT PHACO EXTRACTION WITH INTRAOCULAR LENS IMPLANT RIGHT;  Surgeon: Omar Blood MD;  Location: [facility] OR;  Service: Ophthalmology;  Laterality: Right;   • CATARACT EXTRACTION W/ INTRAOCULAR LENS IMPLANT Left 07/09/2020    Procedure: CATARACT PHACO EXTRACTION WITH INTRAOCULAR LENS IMPLANT LEFT;  Surgeon: Omar Blood MD;  Location: [facility] OR;  Service: Ophthalmology;  Laterality: Left;   • CHOLECYSTECTOMY  2002   • COLON RESECTION Right 09/12/2022    Procedure: COLON RESECTION LAPAROSCOPIC HAND ASSISTED;  Surgeon: Kenji Garcias MD;  Location: [facility] OR;  Service: General;  Laterality: Right;   • COLON RESECTION N/A    • COLONOSCOPY     • COLONOSCOPY N/A 08/11/2022    Procedure: COLONOSCOPY, biopsy, polypectomy x1 and tattooing;  Surgeon: Kenji Garcias MD;  Location: [facility] ENDOSCOPY;  Service: Gastroenterology;  Laterality: N/A;   • CORONARY ARTERY BYPASS GRAFT      1993 triple bypass   • CORONARY ARTERY BYPASS GRAFT  2013    double bypass   • FINGER SURGERY      Rt index (second finger)   • OTHER SURGICAL HISTORY  2010    Bone spur removal   • TOE AMPUTATION Left 2009    4th toe   • VASECTOMY     • WOUND DEBRIDEMENT Bilateral 3/17/2023    Procedure: DEBRIDEMENT FOOT BILATERAL;  Surgeon: Jose Cruz Etienne MD;  Location: [facility] HYBRID [facility];  Service: Vascular;  Laterality: Bilateral;       Social History     Socioeconomic History   • Marital status:    • Number of children: 1   Tobacco Use    • Smoking status: Former     Packs/day: 1.00     Years: 28.00     Pack years: 28.00     Types: Cigarettes     Start date: 1964     Quit date: 1993     Years since quittin.2   • Smokeless tobacco: Never   • Tobacco comments:     Wish I'd never started   Vaping Use   • Vaping Use: Never used   Substance and Sexual Activity   • Alcohol use: No   • Drug use: No   • Sexual activity: Not Currently     Partners: Female     Birth control/protection: Other, Vasectomy, Birth control pill       family history includes Asthma in his father; COPD in his mother; Heart attack in his father.    No Known Allergies    Medication:      Medications Prior to Admission   Medication Sig Dispense Refill Last Dose   • aspirin 81 MG tablet Take 1 tablet by mouth Daily.   3/17/2023 at 0900   • Dulaglutide (Trulicity) 1.5 MG/0.5ML solution pen-injector Inject  under the skin into the appropriate area as directed 1 (One) Time Per Week.   Past Week   • ipratropium (ATROVENT HFA) 17 MCG/ACT inhaler Inhale 2 puffs As Needed.   Past Week   • ipratropium (ATROVENT) 0.02 % nebulizer solution Take 2.5 mL by nebulization Every 4 (Four) Hours As Needed.   Past Week   • metoprolol succinate XL (TOPROL-XL) 25 MG 24 hr tablet Take 12.5 mg by mouth Daily.   3/17/2023 at 0900   • O2 (OXYGEN) Inhale 2 L/min Every Night.   3/16/2023   • warfarin (COUMADIN) 5 MG tablet Take 1 tablet by mouth Daily. 5mg every other day  2.5mg every other day alternating   3/16/2023 at 0900   • calcium polycarbophil (FIBERCON) 625 MG tablet Take 1 tablet by mouth 2 (Two) Times a Day. Takes 2 tablets BID      • colchicine 0.6 MG capsule capsule TAKE 1 CAPSULE BY MOUTH DAILY AS NEEDED FOR PAIN      • diphenhydrAMINE (BENADRYL) 25 MG tablet Take 1 tablet by mouth every night at bedtime.      • docusate sodium (COLACE) 100 MG capsule Take 3 capsules by mouth 2 (Two) Times a Day As Needed.      • ferrous gluconate (FERGON) 324 MG tablet Take 1 tablet by mouth Daily With  "Breakfast. 30 tablet 0    • lovastatin (MEVACOR) 20 MG tablet Take 1 tablet by mouth Every Night.      • nitroglycerin (NITROSTAT) 0.4 MG SL tablet Place 1 tablet under the tongue Every 5 (Five) Minutes As Needed for Chest Pain. Take no more than 3 doses in 15 minutes.   States he has not taken since 1993   More than a month       Review of Systems: UNOBTAINABLE    Physical Exam:   Vital Signs   Blood pressure 92/54, pulse 108, temperature 98.5 °F (36.9 °C), temperature source Temporal, resp. rate 16, height 185.4 cm (73\"), weight 73 kg (161 lb), SpO2 97 %.     GENERAL: VERY LETHARGIC, in no acute distress. THIN AND CHRONICALLY ILL-APPEARING.   HEENT: Normocephalic, atraumatic.  PER.  No conjunctivitis. No icterus. Oropharynx clear without evidence of thrush or exudate. No evidence of peridontal disease.    NECK: Supple without nuchal rigidity. No mass.  LYMPH: No painful cervical, axillary or inguinal lymphadenopathy.  HEART: RRR; No murmur, rubs, gallops. No bruits in neck, abdomen, or groins, no edema  LUNGS: Normal respiratory effort. Nonlabored. No dullness.  No crepitus.  Clear to auscultation bilaterally without wheezing, rales, rhonchi.  ABDOMEN: Soft, nontender, nondistended. Positive bowel sounds. No rebound or guarding. NO mass or HSM.  JOINTS:  Full range of motion, no redness or tenderness.  EXT:  No cyanosis/clubbing. WOUND VAC AND AMPUTATION OF TOES R FOOT.  :  BLADDER NOT DISTENDED.   SKIN: Warm and dry without rash  NEURO: LETHARGIC  PSYCHIATRIC: UNOBTAINABLE    Laboratory Data  Results from last 7 days   Lab Units 03/21/23  0645 03/20/23 2216 03/19/23  0543   HEMOGLOBIN g/dL 9.9* 9.9* 9.7*   HEMATOCRIT % 31.8* 32.0* 31.0*     Results from last 7 days   Lab Units 03/21/23  0645 03/20/23 2216 03/20/23  0839 03/19/23  0543   SODIUM mmol/L 137 136 139 136   POTASSIUM mmol/L 4.3 4.1 4.1 3.9   CHLORIDE mmol/L 109* 106 110* 108*   CO2 mmol/L 17.0* 19.0* 18.0* 19.0*   BUN mg/dL 41* 42* 49* 45* "   CREATININE mg/dL 2.84* 2.79* 2.60* 2.59*   CALCIUM mg/dL 8.5* 8.5* 8.5* 8.4*   ALBUMIN g/dL 2.9* 2.9*  --  2.8*     Results from last 7 days   Lab Units 03/21/23  0645   GLUCOSE mg/dL 95     Results from last 7 days   Lab Units 03/18/23  1044   COLOR UA  Yellow   CLARITY UA  Clear   PH, URINE  5.5   SPECIFIC GRAVITY, URINE  1.018   GLUCOSE UA  Negative   KETONES UA  Negative   BILIRUBIN UA  Negative   PROTEIN UA  30 mg/dL (1+)*   BLOOD UA  Moderate (2+)*   LEUKOCYTES UA  Small (1+)*   NITRITE UA  Negative     Results from last 7 days   Lab Units 03/21/23  0645   ALK PHOS U/L 67   BILIRUBIN mg/dL 0.5   ALT (SGPT) U/L <5   AST (SGOT) U/L 12     Estimated Creatinine Clearance: 23.9 mL/min (A) (by C-G formula based on SCr of 2.84 mg/dL (H)).    Radiology:        Impression: DMEARIO ON CKD. ? ATN FROM HYPOTENSION. ANEMIA. PVD AND S/P AMPUTATION OF TOES.  CAD. DM. HYPOTENSION ON MIDODRINE.       PLAN: Thank you for asking us to see Swapnil Lawson, I recommend the following: WILL CHECK RENAL U/S AND BLADDER SCAN. CHECK URINE STUDIES, CPK AND FE STUDIES. MONITOR GFR/LYTES. HOPEFULLY RENAL FXN WILL IMPROVE PRIOR TO PATIENT NEEDING RRT AND DIALYSIS ACCESS PLACEMENT. WILL FOLLOW.       Dakota Kaiser MD  3/21/2023  15:52 EDT

## 2023-03-21 NOTE — THERAPY DISCHARGE NOTE
Outpatient Rehabilitation - Wound/Debridement D/C Summary        Patient Name: Swapnil Lawson  : 1950  MRN: [id number]  Today's Date: 3/21/2023                  Admit Date: (Not on file)    Visit Dx:  No diagnosis found.    Patient Active Problem List   Diagnosis   • Coronary artery disease   • Hypertension   • Paroxysmal atrial fibrillation (HCC)   • Type 2 diabetes mellitus with nephropathy (HCC)   • Venous insufficiency   • Hyperlipidemia LDL goal <70   • Nuclear sclerotic cataract of right eye   • Cortical age-related cataract of right eye   • Nuclear sclerotic cataract of left eye   • Cortical age-related cataract of left eye   • Thrombocytopenia (HCC)   • History of colon polyps   • Chronic idiopathic constipation   • Adenomatous polyp of transverse colon   • Chronic kidney disease, stage III (moderate) (HCC)   • PVD (peripheral vascular disease) (HCC)   • Foot ulcer (HCC)   • Asymptomatic PVD (peripheral vascular disease) (HCC)   • Severe malnutrition (HCC)   • Ulcer of right foot with other severity (HCC)        Past Medical History:   Diagnosis Date   • Abnormal ECG    • Cataracts, bilateral    • CHF (congestive heart failure) (HCC)    • Childhood asthma    • COPD (chronic obstructive pulmonary disease) (HCC)    • Coronary artery disease 2016    CABG, , Abimael Tomlin MD. CABG, 2013, [facility].   • Diabetes mellitus (HCC) 2016    type II   • DVT (deep venous thrombosis) (HCC)     RLE   • Gout    • Hepatitis     1970s unsure of which one- states it was caused from drinking contaminated water   • Hyperlipidemia 2016   • Hypertension 2016   • Kidney disease    • Myocardial infarction (HCC)      in  and  prior to CABG.   • Paroxysmal atrial fibrillation (HCC) 2016   • Pulmonary embolism (HCC)    • Sleep apnea    • Venous insufficiency 2016   • Wears glasses     for reading        Past Surgical History:   Procedure Laterality Date    • AORTAGRAM N/A 3/17/2023    Procedure: AORTAGRAM WITH  RUNOFFS WITH STENT PLACEMENT;  Surgeon: Jose Cruz Etienne MD;  Location: [facility] [place];  Service: Vascular;  Laterality: N/A;   fluoro  dose  contrast    • APPENDECTOMY  1981   • BUNIONECTOMY Left    • CARDIAC CATHETERIZATION      x4, no stents   • CARDIAC CATHETERIZATION N/A 02/16/2023    Procedure: Peripheral angiography  Left lower extremity angiogram wit interventional Cardiologist;  Surgeon: Reed Zaldivar MD;  Location: [facility] CATH INVASIVE LOCATION;  Service: Cardiovascular;  Laterality: N/A;  Left lower extremity angiogram with interventional cardioligist.   • CATARACT EXTRACTION W/ INTRAOCULAR LENS IMPLANT Right 06/25/2020    Procedure: CATARACT PHACO EXTRACTION WITH INTRAOCULAR LENS IMPLANT RIGHT;  Surgeon: Omar Blood MD;  Location: [facility] OR;  Service: Ophthalmology;  Laterality: Right;   • CATARACT EXTRACTION W/ INTRAOCULAR LENS IMPLANT Left 07/09/2020    Procedure: CATARACT PHACO EXTRACTION WITH INTRAOCULAR LENS IMPLANT LEFT;  Surgeon: Omar Blood MD;  Location: [facility] OR;  Service: Ophthalmology;  Laterality: Left;   • CHOLECYSTECTOMY  2002   • COLON RESECTION Right 09/12/2022    Procedure: COLON RESECTION LAPAROSCOPIC HAND ASSISTED;  Surgeon: Kenji Garcias MD;  Location: [facility] OR;  Service: General;  Laterality: Right;   • COLON RESECTION N/A    • COLONOSCOPY     • COLONOSCOPY N/A 08/11/2022    Procedure: COLONOSCOPY, biopsy, polypectomy x1 and tattooing;  Surgeon: Kenji Garcias MD;  Location: [facility] ENDOSCOPY;  Service: Gastroenterology;  Laterality: N/A;   • CORONARY ARTERY BYPASS GRAFT      1993 triple bypass   • CORONARY ARTERY BYPASS GRAFT  2013    double bypass   • FINGER SURGERY      Rt index (second finger)   • OTHER SURGICAL HISTORY  2010    Bone spur removal   • TOE AMPUTATION Left 2009    4th toe   • VASECTOMY     • WOUND DEBRIDEMENT Bilateral 3/17/2023    Procedure: DEBRIDEMENT FOOT BILATERAL;  Surgeon:  Jose Cruz Etienne MD;  [facility]: [facility];  Service: Vascular;  Laterality: Bilateral;         EVALUATION                WOUND DEBRIDEMENT                            Recommendation and Plan      Goals   PT OP Goals     Row Name 03/21/23 0800          PT Short Term Goals    STG 1 Pt/ son independent with clean home dressing changes to promote moist, bacteriostatic healing environement.  -     STG 1 Progress Met  -     STG 2 Pt /son able to verbalize s/s of infection and when to seek urgent or emergency care.  -     STG 2 Progress Met  -     STG 3 Wound dimensions to decrease at least 25% to demonstrate wound healing.  -     STG 3 Progress Not Met  -        Long Term Goals    LTG 1 Wound dimensions to decrease at least 75% to demonstrate wound healing.  -     LTG 1 Progress Not Met  -     LTG 2 Pt will demonstrate >75% granulation tissue coverage to indicate healing progress.  -     LTG 2 Progress Not Met  -           User Key  (r) = Recorded By, (t) = Taken By, (c) = Cosigned By    Initials Name Provider Type    Morena Rajan, PT Physical Therapist                Time Calculation:               OP Discharge Summary     Row Name 03/21/23 0828             OP PT Discharge Summary    Date of Discharge 03/21/23  -      Reason for Discharge Transfer to other facility/level of care  -      Outcomes Achieved Patient able to partially acheive established goals  -      Discharge Destination Hospital admission  -      Discharge Instructions/Additional Comments pt hospitalized, anticipating surgical debridement to wound areas. Will need new referral to return to OP PT wound care upon discharge if appropriate.  -            User Key  (r) = Recorded By, (t) = Taken By, (c) = Cosigned By    Initials Name Provider Type    Morena Rajan, PT Physical Therapist                Morena Jordan, PT  3/21/2023

## 2023-03-21 NOTE — PROGRESS NOTES
INFECTIOUS DISEASE  Progress note     Swapnil Lawson  1950  [id number]      Admission Date: 3/17/2023      Requesting Provider: No ref. provider found  Evaluating Physician: Edgar Barksdale MD    Reason for Consultation: bilateral foot infections/gangrene.  Right fourth proximal phalanx osteomyelitis, left fifth metatarsal osteomyelitis    History of present illness:    Patient is a 72 y.o. male, with PMH COPD, CHF, CKD, CAD, DM, PAD, seen today for bilateral foot infections.  Admitted on 3/17/23 undergoing aortagram with stent placement/angioplasty of bilateral lower extremities with sharp debridement of both feet, plans for partial bilateral foot amputations early next week.  He has noted nonhealing foot ulcers for at least the past 6 months.  He was referred to CTS service and admitted 3/17/2023 for above. He has been afebrile without leukocytosis. Admitting labs with Scr 2.82, WBC 6.73, ESR 36, CRP 4.04. PCT 0.18.  Wound culture with no organisms, few WBCs, no growth.  He received a dose of Cefazolin and we were consulted for evaluation and treatment.  The patient denies immunocompromise status, TB, HIV, zoonotic exposures, ill contacts, or significant travel history.    3/19/23: History reviewed.  Visiting with his son and friend.  He remains afebrile. Tissue cultures no growth so far. Dr. Oviedo changed foot dressings earlier today.  No n/v/d. Having some urinary retention, required I & O catheterizations.     3/20/2023 history reviewed.  No high fevers or chills.  Being treated for bilateral foot infections and gangrene.     3/21/2023 history reviewed.  Tolerating daptomycin and piperacillin tazobactam awaiting disposition of his feet.  Duration of antibiotics to be determined.  Likely 6 weeks.    Past Medical History:   Diagnosis Date   • Abnormal ECG    • Cataracts, bilateral    • CHF (congestive heart failure) ([facility])    • Childhood asthma    • COPD (chronic obstructive pulmonary disease) ([facility])     • Coronary artery disease 12/14/2016    CABG, 1993, Abimael Tomlin MD. CABG, August 2013, [facility].   • Diabetes mellitus (HCC) 12/14/2016    type II   • DVT (deep venous thrombosis) (HCC)     RLE   • Gout    • Hepatitis     1970s unsure of which one- states it was caused from drinking contaminated water   • Hyperlipidemia 12/14/2016   • Hypertension 12/14/2016   • Kidney disease    • Myocardial infarction (HCC)      in 1992 and 1993 prior to CABG.   • Paroxysmal atrial fibrillation (HCC) 12/14/2016   • Pulmonary embolism (HCC)    • Sleep apnea    • Venous insufficiency 12/14/2016   • Wears glasses     for reading       Past Surgical History:   Procedure Laterality Date   • AORTAGRAM N/A 3/17/2023    Procedure: AORTAGRAM WITH  RUNOFFS WITH STENT PLACEMENT;  Surgeon: Jose Cruz Etienne MD;  Location: [facility] [place];  Service: Vascular;  Laterality: N/A;   fluoro  dose  contrast    • APPENDECTOMY  1981   • BUNIONECTOMY Left    • CARDIAC CATHETERIZATION      x4, no stents   • CARDIAC CATHETERIZATION N/A 02/16/2023    Procedure: Peripheral angiography  Left lower extremity angiogram [facility] interventional Cardiologist;  Surgeon: Reed Zaldivar MD;  Location:  [facility] CATH INVASIVE LOCATION;  Service: Cardiovascular;  Laterality: N/A;  Left lower extremity angiogram with interventional cardioligist.   • CATARACT EXTRACTION W/ INTRAOCULAR LENS IMPLANT Right 06/25/2020    Procedure: CATARACT PHACO EXTRACTION WITH INTRAOCULAR LENS IMPLANT RIGHT;  Surgeon: Omar Blood MD;  Location: [facility] OR;  Service: Ophthalmology;  Laterality: Right;   • CATARACT EXTRACTION W/ INTRAOCULAR LENS IMPLANT Left 07/09/2020    Procedure: CATARACT PHACO EXTRACTION WITH INTRAOCULAR LENS IMPLANT LEFT;  Surgeon: Omar Blood MD;  Location: [facility] OR;  Service: Ophthalmology;  Laterality: Left;   • CHOLECYSTECTOMY  2002   • COLON RESECTION Right 09/12/2022    Procedure: COLON RESECTION LAPAROSCOPIC HAND ASSISTED;   Surgeon: Kenji Garcias MD;  Location: [facility] OR;  Service: General;  Laterality: Right;   • COLON RESECTION N/A    • COLONOSCOPY     • COLONOSCOPY N/A 2022    Procedure: COLONOSCOPY, biopsy, polypectomy x1 and tattooing;  Surgeon: Kenji Garcias MD;  Location: [facility] ENDOSCOPY;  Service: Gastroenterology;  Laterality: N/A;   • CORONARY ARTERY BYPASS GRAFT       triple bypass   • CORONARY ARTERY BYPASS GRAFT      double bypass   • FINGER SURGERY      Rt index (second finger)   • OTHER SURGICAL HISTORY      Bone spur removal   • TOE AMPUTATION Left     4th toe   • VASECTOMY     • WOUND DEBRIDEMENT Bilateral 3/17/2023    Procedure: DEBRIDEMENT FOOT BILATERAL;  Surgeon: Jose Cruz Etienne MD;  Location: [facility];  Service: Vascular;  Laterality: Bilateral;       Family History   Problem Relation Age of Onset   • COPD Mother    • Heart attack Father    • Asthma Father      Social history lives with son.  Social History     Socioeconomic History   • Marital status:    • Number of children: 1   Tobacco Use   • Smoking status: Former     Packs/day: 1.00     Years: 28.00     Pack years: 28.00     Types: Cigarettes     Start date: 1964     Quit date: 1993     Years since quittin.2   • Smokeless tobacco: Never   • Tobacco comments:     Wish I'd never started   Vaping Use   • Vaping Use: Never used   Substance and Sexual Activity   • Alcohol use: No   • Drug use: No   • Sexual activity: Not Currently     Partners: Female     Birth control/protection: Other, Vasectomy, Birth control pill       No Known Allergies      Medication:      Antibiotics:  Anti-Infectives (From admission, onward)    Ordered     Dose/Rate Route Frequency Start Stop    23 0841  piperacillin-tazobactam (ZOSYN) 3.375 g in iso-osmotic dextrose 50 ml (premix)        Ordering Provider: Jack Kidd, PharmD    3.375 g  over 4 Hours Intravenous Every 8 Hours 23 1530 23 1529    23  0837  DAPTOmycin (CUBICIN) 450 mg in sodium chloride 0.9 % 50 mL IVPB        Ordering Provider: Cheli Rodrigez APRN    6 mg/kg × 73 kg  100 mL/hr over 30 Minutes Intravenous Every 24 Hours 23 1000 23 0959    23 0841  piperacillin-tazobactam (ZOSYN) 3.375 g in iso-osmotic dextrose 50 ml (premix)        Ordering Provider: Jack Kidd PharmD    3.375 g  over 30 Minutes Intravenous Once 23 0930 23 0930    23 1153  ceFAZolin in dextrose (ANCEF) IVPB solution 2 g        Ordering Provider: Rolo Lawler PA    2 g  over 30 Minutes Intravenous Once 23 1155 23 1442        Review of Systems:  Constitutional-- No Fever, chills or sweats.  Appetite good, and no malaise. No fatigue.  HEENT-- No new vision, hearing or throat complaints.  No epistaxis or oral sores.  Denies odynophagia or dysphagia. No headache, photophobia or neck stiffness.  CV-- No chest pain, palpitation or syncope  Resp-- No SOB/cough/Hemoptysis, no wheezes  GI- No nausea, vomiting, or diarrhea.  No hematochezia, melena, or hematemesis. Denies jaundice or chronic liver disease.  -- No dysuria, hematuria, or flank pain.  Denies hesitancy, urgency or flank pain.  Lymph- no swollen lymph nodes in neck/axilla or groin.   Heme- No active bruising or bleeding; no Hx of DVT or PE.  MS-- no swelling or pain in the bones or joints of arms/legs.  No new back pain.  Neuro-- No acute focal weakness or numbness in the arms or legs.  No seizures.  Skin--No rashes   Bilateral foot ulcers as per HPI.      Physical Exam:   Vital Signs  Temp (24hrs), Av.3 °F (36.3 °C), Min:97.1 °F (36.2 °C), Max:97.5 °F (36.4 °C)    Temp  Min: 97.1 °F (36.2 °C)  Max: 97.5 °F (36.4 °C)  BP  Min: 71/58  Max: 103/65  Pulse  Min: 81  Max: 99  Resp  Min: 16  Max: 20  SpO2  Min: 90 %  Max: 100 %    GENERAL: Awake and alert, in minimal distress. Appears older than stated age, cachectic, resting in bed.  HEENT: Normocephalic,  atraumatic.  EOMI. No conjunctival injection. No icterus. Oropharynx clear without evidence of thrush or exudate. No evidence of peridontal disease.    NECK: Supple   HEART: RRR; No murmur, rubs, gallops.  No JVD.  LUNGS: Clear to auscultation bilaterally without wheezing, rales, rhonchi. Normal respiratory effort. Nonlabored.  ABDOMEN: Soft, nontender, minimal distention. Positive bowel sounds. No rebound or guarding. NO mass or HSM.  EXT:  No cyanosis, clubbing or edema. No cord.  :  Without Dueñas catheter.  MSK: No joint effusions or erythema  SKIN: Warm and dry  Left lateral foot wound approximately 4cm in diameter with slough, some granulation, approx 0.5cm deep, probes to bone.  Right th MTP ulcer, with necrosis, approx 5cm in diamter 0.5 cm deep.  Venous discoloration noted bilateral lower extremities - feet dressed. Did not disturb   NEURO: Oriented to PPT.  Nonfocal  PSYCHIATRIC: Normal insight and judgment. Cooperative with PE        Micro: Tissue and urine culture 3/17 and 3/18 negative.  Blood cultures 3/7 negative.    Laboratory Data    Results from last 7 days   Lab Units 03/21/23  0645 03/20/23 2216 03/19/23  0543   WBC 10*3/mm3 8.15 7.97 5.70   HEMOGLOBIN g/dL 9.9* 9.9* 9.7*   HEMATOCRIT % 31.8* 32.0* 31.0*   PLATELETS 10*3/mm3 108* 107* 117*     Results from last 7 days   Lab Units 03/21/23  0645   SODIUM mmol/L 137   POTASSIUM mmol/L 4.3   CHLORIDE mmol/L 109*   CO2 mmol/L 17.0*   BUN mg/dL 41*   CREATININE mg/dL 2.84*   GLUCOSE mg/dL 95   CALCIUM mg/dL 8.5*     Results from last 7 days   Lab Units 03/21/23  0645   ALK PHOS U/L 67   BILIRUBIN mg/dL 0.5   ALT (SGPT) U/L <5   AST (SGOT) U/L 12             Results from last 7 days   Lab Units 03/20/23 2216   LACTATE mmol/L 1.2     Results from last 7 days   Lab Units 03/19/23  0543 03/18/23  0643   CK TOTAL U/L 18* 26         Estimated Creatinine Clearance: 23.9 mL/min (A) (by C-G formula based on SCr of 2.84 mg/dL (H)).      Microbiology:  No  results found for: ACANTHNAEG, AFBCX, BPERTUSSISCX, BLOODCX  No results found for: BCIDPCR, CXREFLEX, CSFCX, CULTURETIS  No results found for: CULTURES, HSVCX, URCX  No results found for: EYECULTURE, GCCX, HSVCULTURE, LABHSV  No results found for: LEGIONELLA, MRSACX, MUMPSCX, MYCOPLASCX  No results found for: NOCARDIACX, STOOLCX  No results found for: THROATCX, UNSTIMCULT, URINECX, CULTURE, VZVCULTUR  No results found for: VIRALCULTU, WOUNDCX        Radiology:  Imaging Results (Last 72 Hours)     Procedure Component Value Units Date/Time    XR Abdomen KUB [[id number]] Collected: 03/20/23 1421     Updated: 03/20/23 1428    Narrative:      XR ABDOMEN KUB    Date of Exam: 3/20/2023 1:47 PM EDT    Indication: Abdominal pain and distention..    Comparison: 9/24/2022.    Findings:  There is increased gas throughout the stomach, colon, and small bowel. Several small bowel loops measure up to 4.8 cm. This is concerning for a small bowel obstruction. There is no pneumatosis or free air. There are vascular calcifications within the   abdomen or pelvis. There are degenerative changes within the thoracolumbar spine, hip, and sacroiliac joints. There are bilateral basilar pleural effusions with atelectasis.      Impression:      Impression:  Abnormal bowel gas pattern. This is concerning for a small bowel obstruction. There is no pneumatosis or free air.    Electronically Signed: Feroz Mcghee    3/20/2023 2:25 PM EDT    Workstation ID: [id number]    MRI Foot Right Without Contrast [[id number]] Collected: 03/20/23 0821     Updated: 03/20/23 0827    Narrative:        MRI FOOT RIGHT WO CONTRAST    Date of Exam: 3/19/2023 10:45 AM EDT    Indication: Osteomyelitis, foot.     Comparison: None available.    Technique:  Routine multiplanar/multisequence sequence images of the right foot were obtained without contrast administration.    Findings:   Mild diffuse soft tissue swelling is present. A wound is seen along the plantar aspect of the 4  foot region (series 9 image 13). This extends along the plantar aspect of the fourth metatarsal with wound likely extending to the underlying bone (series 9   image 13 and 14). Patchy edema is present within the fourth metatarsal head as well as the base of the fourth proximal phalanx. Slightly limited evaluation of the remaining phalanges due to incomplete fat saturation. No definite drainable fluid   collections identified. Mild to moderate degenerative changes are present. Edema seen within the plantar musculature likely related to denervation. No significant tenosynovitis identified.      Impression:      Impression:   Wound seen along the plantar aspect of the fourth metatarsal head with adjacent osseous edema present within the fourth metatarsal head extending to the base of the fourth proximal phalanx likely related to osteomyelitis and septic arthritis. No definite   drainable collections identified. No significant effusion. No definite additional osseous involvement. Increased T2 signal seen within the phalanges is likely artifactual and related to incomplete fat saturation. No suspicious tenosynovitis.    Electronically Signed: Masha Lu    3/20/2023 8:24 AM EDT    Workstation ID: [id number]    MRI Foot Left Without Contrast [[id number]] Collected: 03/20/23 0816     Updated: 03/20/23 0824    Narrative:        MRI FOOT LEFT WO CONTRAST    Date of Exam: 3/19/2023 10:34 AM EDT    Indication: Osteomyelitis, foot.     Comparison: None available.    Technique:  Routine multiplanar/multisequence sequence images of the left foot were obtained without contrast administration.    Findings:   Susceptibility artifact is seen within the lateral midfoot region with overlying dressing present. Patchy edema is present within the fifth metatarsal base extending to the fifth metatarsal diaphysis along the midportion (series 11 image 14). No   additional suspicious osseous edema identified. Increased T2 signal seen within the  phalanges is likely related to incomplete fat saturation. No definite drainable fluid collection identified. A small amount of edema seen within the adjacent subcutaneous   tissues along the lateral mid foot wound. No suspicious tenosynovitis identified. No significant joint effusion identified. Mild to moderate degenerative changes are present within the interphalangeal joints as well as the midfoot. No erosions   identified. No definite periostitis.      Impression:      Impression:   Wound along the lateral midfoot region with patchy edema seen within the fifth metatarsal base extending to the mid fifth metatarsal diaphysis consistent with osteomyelitis. No drainable collections identified. No additional osseous involvement. No   suspicious tenosynovitis identified.    Electronically Signed: Masha Lu    3/20/2023 8:21 AM EDT    Workstation ID: [id number]    XR Spine Thoracic 3 View [[id number]] Collected: 03/19/23 1853     Updated: 03/19/23 2058    Narrative:      3 views thoracic spine    Indication:  Fall, pain    Comparison:  None    Findings:    There are median sternotomy wires. No acute fracture or listhesis seen in the thoracic spine. There are mild multilevel degenerative changes in the spine. There appear to be bilateral pleural fluid collections on the lateral image.      Impression:      Impression:    No visible acute fracture or listhesis in the thoracic spine by radiograph.    Nonacute findings as above.    Likely bilateral pleural fluid collections. This could be further assessed with dedicated chest imaging.    American College of Radiology Appropriateness Criteria in the setting of Suspected Spine Trauma:   ??? Multidetector CT with sagittal and coronal reconstructions is the recommended screening imaging procedure in adult patients with high-risk criteria by NEXUS or CCR.    Electronically signed by:  Jose Cruz Stephenson M.D.    3/19/2023 6:57 PM Mountain Time            Impression:   - Bilateral  foot infections, s/p sharp debridement with plans for partial amputation early next week- culture no growth so far from 3/17/2023, AFB and fungal also pending, with likely osteomyelitis underneath at left fifth metatarsal, right fourth proximal per MRI 3/20.  Wounds are likely related to diabetes mellitus type 2, peripheral vascular disease including microvascular disease, and neuropathy.  Tissue culture 3/17 negative.  - PVD s/p angioplasty/stent  3/17/23, lower extremities  - Diabetes mellitus, type 2, with increased risk for infection.  - Chronic kidney disease, creatinine 2.13 on 3/18.  Acute on chronic.  2.84 on 3/21, worse.  -COPD  -Hypocalcemia 8.4.  -Anemia, chronic disease worse.  -Thrombocytopenia, related to above issues and medications.  Resolved.    Surgical disposition still under discussion.      PLAN/RECOMMENDATIONS:     1.  Diagnostically, monitor blood cultures, physical examination, radiology imaging, CBC, CMP, ESR, CRP, lactic acid, and procalcitonin wound cultures, tissue culture 3/17/2023.  Previous blood cultures 3/7 were negative.  2.  Therapeutically, Daptomycin, and Zosyn for gram positive, gram negative and anaerobic coverage,  duration pending cultures and clinical improvement.  Duration to be determined.  Likely to be 6 weeks after resection of his bones.  Await surgical disposition.  3.  Continue supportive care    I discussed the patients findings and my recommendations with patient, and his son.    Our group would be pleased to follow this patient over the course of their hospitalization and assist with outpatient antimicrobial therapy, as indicated.  Further recommendations depend on the results of the cultures and clinical course.   Dr. Barksdale  has obtained the history, performed the physical exam and formulated the above treatment plan.  Side effects were discussed with the patient.  Increased risk for adverse drug reactions, complications of IV access, readmission, loss of  limb.    Edgar Barksdale MD  3/21/2023  11:00 EDT

## 2023-03-22 PROBLEM — I95.9 HYPOTENSION: Status: ACTIVE | Noted: 2023-01-01

## 2023-03-22 PROBLEM — N18.30 CKD (CHRONIC KIDNEY DISEASE) STAGE 3, GFR 30-59 ML/MIN (HCC): Chronic | Status: ACTIVE | Noted: 2022-01-01

## 2023-03-22 PROBLEM — I96 ISCHEMIC GANGRENE (HCC): Status: ACTIVE | Noted: 2023-01-01

## 2023-03-22 PROBLEM — Z79.01 CHRONIC ANTICOAGULATION: Chronic | Status: ACTIVE | Noted: 2023-01-01

## 2023-03-22 PROBLEM — N18.9 ACUTE KIDNEY INJURY SUPERIMPOSED ON CHRONIC KIDNEY DISEASE (HCC): Status: ACTIVE | Noted: 2023-01-01

## 2023-03-22 PROBLEM — Z79.01 CHRONIC ANTICOAGULATION: Status: ACTIVE | Noted: 2023-01-01

## 2023-03-22 PROBLEM — I50.21 ACUTE HFREF (HEART FAILURE WITH REDUCED EJECTION FRACTION) (HCC): Status: ACTIVE | Noted: 2023-01-01

## 2023-03-22 PROBLEM — N17.9 ACUTE KIDNEY INJURY SUPERIMPOSED ON CHRONIC KIDNEY DISEASE (HCC): Status: ACTIVE | Noted: 2023-01-01

## 2023-03-22 NOTE — BRIEF OP NOTE
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, pressure ulcer with Charcot deformity at the right lateral forefoot    Postoperative diagnosis  Same    Procedure(s):  LEFT FOOT RAY TRANSMETATARSAL AMPUTATION LATERAL, RIGHT FOOT TRANSMETATARSAL AMPUTATION fifth metatarsal and toe, wound VAC placement      Surgeon(s):  Jose Cruz Etienne MD    Anesthesia: Choice    Staff:   Circulator: Gina Mathis RN; Anastasiya Stevens RN; Leola John RN  Scrub Person: Reed Perez Megan  Nursing Assistant: Glory Duong PCT  Assistant: Gloria Sherman PA-C  Assistant: Gloria Sherman PA-C      Estimated Blood Loss: 70 mL    Urine Voided: * No values recorded between 3/21/2023  1:13 PM and 3/21/2023  2:36 PM *    Specimens:                          Drains: * No LDAs found *    Findings: No obvious sign of infection at the proximal wounds bilaterally        Complications: None    Assistant: Gloria Sherman PA-C  was responsible for performing the following activities: Retraction, Suction, Irrigation, Suturing, Closing and Placing Dressing and their skilled assistance was necessary for the success of this case.    Jose Cruz Etienne MD     Date: 3/21/2023  Time: 20:05 EDT

## 2023-03-22 NOTE — PROGRESS NOTES
PULMONARY NOTE     Hospital Day: 0    Mr. Swapnil Lawson, 72 y.o. male is followed for:   Hypotension        SUBJECTIVE     Swapnil Lawson is a 72 y.o. male who is being transferred to ICU for higher level of care and hemodynamic support.     Patient has a PMH of former smoker, HTN, T2DM, dyslipidemia, CAD s/p CABG (1993; redo 2013), PAF on Coumadin, and PAD with known severe left tibial artery stenosis.  He completed the Moderna COVID-19 vaccination series + booster dose on 11/9/21.    He was initially admitted on 3/16/23 underwent elective aortogram with extensive lower extremity revascularization (angioplasty of left anterior tibial, left posterior tibial, and left popliteal artery with ALVA to proximal left anterior tibial) with sharp debridement of both feet per Dr. Etienne for critical limb ischemia / bilateral foot infections with ischemic gangrene. He was placed on Daptomycin + Zosyn per ID and was returned to OR on 3/21 underwent partial amputation of left 4th +5th toes and right 4th toe.     Postoperatively while on telemetry unit patient developed worsening renal function (Nephrology consulted placed on PO Bicarb) and hypotension despite initiation and increase of midodrine. Ultimately TTE was performed today which showed new HFrEF (EF 38%) with mod-severe TR and RVSP >55 mmHg. He was subsequently transferred to ICU for further management and hemodynamic support.     Time spent: 15 minutes   Electronically signed by Andressa Cody DNP, APRN, 03/22/23, 8:19 PM EDT.         The patient's relevant past medical, surgical and social history were reviewed and updated in Epic as appropriate.        OBJECTIVE     Vital Sign Min/Max for last 24 hours  Temp  Min: 97 °F (36.1 °C)  Max: 97.4 °F (36.3 °C)   BP  Min: 77/49  Max: 93/50   Pulse  Min: 94  Max: 111   Resp  Min: 14  Max: 14   SpO2  Min: 94 %  Max: 100 %   Flow (L/min)  Min: 2  Max: 3   Weight  Min: 73 kg (161 lb)  Max: 73 kg (161 lb)      Intake/Output  "Summary (Last 24 hours) at 3/22/2023 1917  Last data filed at 3/22/2023 1725  Gross per 24 hour   Intake 730 ml   Output 150 ml   Net 580 ml      Flowsheet Rows    Flowsheet Row First Filed Value   Admission Height 185.4 cm (73\") Documented at 03/21/2023 1254   Admission Weight 73 kg (161 lb) Documented at 03/21/2023 1254        Body mass index is 21.24 kg/m².     03/21/23  1254 03/22/23  1159   Weight: 73 kg (161 lb) 73 kg (161 lb)       norepinephrine, 0.02-0.3 mcg/kg/min         Objective  General Appearance: Lethargic but wakes up, in no acute distress on 2LPM NC  Head:   Pupils reactive & symmetrical B/L.  Neck:   Supple   Lungs:   B/L Breath sounds present with decreased breath sounds on bases, no wheezing heard, no crackles.   Heart: S1 and S2 present, no murmur, occ PVC  Abdomen: Soft, nontender, no guarding or rigidity, bowel sounds positive.  Extremities: Bilateral feet with gangrenous changes, bilateral wound vacs in place and draining bloody fluid.  On the left leg wound VAC there is some blood dripping outside and not a good seal. +edema, left fourth and fifth toe amputated as well as right fourth toe.  Neurologic:  Moving all four extremities.  No obvious focal deficit   psychological: Normal affect, Cooperative            I reviewed the patient's results and images, including reviewing images and reports.    Medications reviewed.      Scheduled Meds:aspirin, 81 mg, Oral, Daily  atorvastatin, 10 mg, Oral, Nightly  cefepime, 2 g, Intravenous, Q24H  clopidogrel, 75 mg, Oral, Daily  DAPTOmycin, 6 mg/kg, Intravenous, Q48H  Dulaglutide, 1.5 mg, Subcutaneous, Weekly  ferrous sulfate, 325 mg, Oral, Daily With Breakfast  heparin (porcine), 5,000 Units, Subcutaneous, Q12H  metroNIDAZOLE, 500 mg, Oral, Q8H  midodrine, 10 mg, Oral, TID AC  polyethylene glycol, 17 g, Oral, BID With Meals  senna-docusate sodium, 2 tablet, Oral, BID  sodium bicarbonate, 1,300 mg, Oral, BID  tamsulosin, 0.4 mg, Oral, " Daily        Echo cardiogram report reviewed.   Interpretation Summary       •  Left ventricular systolic function is moderately decreased. Calculated left ventricular EF = 38%  •  The following left ventricular wall segments are hypokinetic: apical inferior and basal inferior. The following left ventricular wall segments are akinetic: apical lateral, mid inferolateral and mid inferior.  •  The right ventricular cavity is mildly dilated.  •  The right atrial cavity is moderately  dilated.  •  Moderate to severe tricuspid valve regurgitation is present.  •  Estimated right ventricular systolic pressure from tricuspid regurgitation is markedly elevated (>55 mmHg).  •  There is a large left pleural effusion.       Chest x-ray obtained tonight and showed bilateral pleural effusions with bilateral patchy opacities suggestive of pulmonary edema pattern.  Patient is s/p median sternotomy.  Significant gastric distention noted as well.      Lab Results   Component Value Date    WBC 8.15 03/21/2023    HGB 9.5 (L) 03/22/2023    HCT 31.4 (L) 03/22/2023    MCV 90.9 03/21/2023     (L) 03/21/2023       Lab Results   Component Value Date    GLUCOSE 145 (H) 03/22/2023    CALCIUM 8.7 03/22/2023     03/22/2023    K 4.9 03/22/2023    CO2 12.0 (L) 03/22/2023     (H) 03/22/2023    BUN 54 (H) 03/22/2023    CREATININE 3.23 (H) 03/22/2023    EGFR 19.5 (L) 03/22/2023    BCR 16.7 03/22/2023    ANIONGAP 20.0 (H) 03/22/2023         Assessment & Plan   ASSESSMENT        Acute HFrEF    CAD s/p CABG (1993; redo 2013)    HTN (hypertension)    PAF    T2DM    Dyslipidemia    Stage III CKD    PVD    Ischemic gangrene    Chronic anticoagulation on Coumadin    DEMARIO superimposed on CKD    Hypotension        RECOMMENDATIONS     72-year-old male with past medical history of smoking, hypertension, type 2 diabetes, dyslipidemia, coronary disease s/p CABG x2 with redo CABG in 2013, paroxysmal atrial fibrillation on anticoagulation with  warfarin and PAD.  Patient was initially admitted on March 16 with critical limb ischemia and underwent aortogram with angioplasties of multiple blood vessels and left leg and sharp debridement of left lateral midfoot wound as well as right lateral forefoot wound.  Patient was seen by infectious disease and placed on antibiotics with daptomycin and Zosyn initially.  Patient was taken back to operating room on March 21 for additional debridement and amputation of fourth and fifth mid metatarsal bones with wound VAC on the left and debridement and amputation of right fourth metatarsal bone with wound VAC placed on right side as well.  Antibiotics were changed to daptomycin, cefepime and Flagyl.  Course has been complicated by worsening renal failure and nephrology team has been involved.  CK levels have been acceptable.  Patient started helping hypotension so echocardiogram was done today which showed ejection fraction of 38% and significantly elevated right ventricular pressures.  No previous echocardiogram or cardiac history available.  Due to ongoing hemodynamic instability patient was transferred to ICU for further management.  Patient has been on very high dose of midodrine and for past few days been started on oral bicarb today per nephrology.    Patient appears lethargic on arrival to ICU.  Mean arterial pressure is 62 currently.  He is lethargic but wakes up and mentating well.  Denies any chest pain, shortness of breath.  Denies any GI complaints.  Denies any cough or sputum production.  Denies any urinary complaints and urine output has been dropping off for past 48 hours.  Only complaint is pain in bilateral feet.    Arterial blood gas obtained and patient is severely acidemic with mostly metabolic acidosis.  Will check lactate level.  Abdominal exam is benign.    1.  Transfer to ICU for closer hemodynamic monitoring.  2.  Complicated patient with multiple possible etiologies of hypotension and hemodynamic  instability.  Underlying infection is a possibility.  Significant systemic acidosis either from renal failure or ischemia could be playing a role.  Low cardiac output state leading to intestinal ischemia is another possibility though abdominal exam is benign.  Chest x-ray shows significant gastric distention which points towards ileus.  We will check lactic acid level to sort this out.  Will place patient on bicarb supplementation through IV at 75 mill per hour infusion to see if he can counteract the systemic acidosis.  Patient is vasculopath and high risk of intestinal ischemia.  Will stop midodrine.  3.  Will start patient on low-dose norepinephrine to maintain mean arterial pressure 65 or better and see if he can get better  cardiac output and improved renal function with that.  Check cortisol level.  4.  Patient is fluid overloaded with bilateral pleural effusions.  Not that hypoxic currently.  We will be watching his fluid status closely since we are starting him on IV infusion.  If renal function does not improve in next 10 to 12 hours will likely need renal replacement therapy.  Nephrology team is already following.  Likely would benefit from CRRT if renal function is not showing any improvement with improving hemodynamics.  Unclear etiology of decline but again cardiorenal syndrome is a possibility.  Ultrasound did not show any obstructive pathology.  5.  Continue antibiotics per infectious disease.  Multiple cultures are negative to date however.  6.  Continue aspirin, statin, Plavix.  7.  Continue management of bilateral lower extremity gangrenous ulcers and significant peripheral vascular disease per vascular surgery.  Discussed case with Dr. Fraser.  8.  Cardiology team has been consulted.  Will await their recommendations.  If things do not improve will consider more invasive monitoring and cardiac inotropes.  9.  DVT prophylaxis to continue.  Will GI prophylaxis.  10.  Judicious pain control. Bowel  regimen.       Complex patient with multiorgan failure.  We will try to support.  High risk of decline.    I have seen and examined patient, performing a face-to-face diagnostic evaluation with plan of care reviewed and developed with APRN and nursing staff. I have addended and modified the above history of present illness, conducted physical examination and documented as above, and assessment and plan to reflect my findings and impressions.     Time spent Critical care 35 min (exclusive of procedure time)  including high complexity decision making to assess, manipulate, and support vital organ system failure in this individual who has impairment of one or more vital organ systems such that there is a high probability of imminent or life threatening deterioration in the patient’s condition.      Dimas Caceres MD, [facility]  Pulmonary and Critical Care Medicine  03/22/23 21:45 EDT

## 2023-03-22 NOTE — CASE MANAGEMENT/SOCIAL WORK
Continued Stay Note  [facility]     Patient Name: Swapnil Lawson  MRN: [id number]  Today's Date: 3/22/2023    Admit Date: 3/17/2023    Plan: Ongoing   Discharge Plan     Row Name 03/22/23 1309       Plan    Plan Ongoing    Patient/Family in Agreement with Plan other (see comments)    Plan Comments Pt was discussed in Medical Rounds today. Pt had surgery yesterday. Infectious Disease put in IV Antibiotic orders for discharge. PT worked with pt today. PT had to use a Lift to move pt from bed to chair. Dr. Genao put in for OT consult. D/C plan is ongoing.  will continue to follow.    Final Discharge Disposition Code 30 - still a patient               Discharge Codes    No documentation.               Expected Discharge Date and Time     Expected Discharge Date Expected Discharge Time    Mar 27, 2023             Charlette Mcknight RN

## 2023-03-22 NOTE — OP NOTE
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  Debridement of gangrene at left midfoot with partial amputation of left fourth and fifth mid-metatarsal bones with application of wound VAC  2.  Debridement of gangrene and amputation of right fourth metatarsal bone with application of wound VAC    Surgeon:   Jose Cruz Etienne M.D., R.P.V.I.    Assistant(s):   Omar GRIGSBY  The presence and participation of the physician's assistant was necessary for the successful completion of the case, and duties included positioning, suctioning, retracting, stitching, dressing, holding intracorporeal devices such as a wire or camera and/or harvesting vessels for bypass as needed.     Anesthesia:   General endotracheal anesthesia    Estimated Blood Loss:  100 mL    Complications:   None    Indications:   72-year-old man with multiple medical comorbidities including heart failure, chronic kidney disease stage III, diabetes, atrial fibrillation on warfarin, possible DVT and/or venous insufficiency at the lower extremities, and peripheral arterial disease who recently underwent endovascular revascularization of the left lower extremity who presented to [facility] with gangrene of the bilateral feet.  He underwent partial debridement with a planned return to the operating room recently, and now returns to the OR for completion debridement and amputations.  I discussed the risks, benefits, and alternatives of the planned procedure with the patient including risk of bleeding, infection, permanent disability including amputations, heart attack, stroke, permanent debility, and death.  The patient demonstrated good understanding and signed written consent.    Operative  Findings:   No appreciable gangrene at the left lateral midfoot or proximal right fourth metatarsal bone and soft tissue at case completion    Procedure in Detail:   The patient was identified in the preoperative area, taken to the operating room, and laid in the supine position on the operating room table. A safety pause was performed in the presence of the operating room staff.  Systemic antibiotics were administered.  General anesthesia was induced, and an endotracheal tube was placed.  The bilateral feet and distal legs were prepped and draped in standard fashion.  The left lateral midfoot wound was debrided again sharply past the level of the bone of the mid fourth and fifth metatarsal bones, which were debrided and resected using rongeurs.  There appeared to be healthy bleeding at this site which was encouraging given his recent revascularization.  Small bleeders were managed with manual pressure and judicious electrocautery.  A specimen was sent to microbiology for analysis from the proximal resection site prior to antibiotic irrigation.  Attention was then turned to the right plantar wound at the lateral forefoot immediately inferior to the fourth metatarsal.  The pressure wound was outlined and sharply incised in a V shape extending back to an apex point at the proximal fourth metatarsal bone which was amputated and debrided.  The wound was also noted to have appropriate bleeding which was encouraging, and this was managed with manual pressure and judicious electrocautery.  The wounds were irrigated copiously with pulse lavage antibiotic solution and then dressed with a black sponge wound VAC.  The wound on the right foot measured approximately 2 x 2 x 5 cm.  The wound on the left foot measured approximately 3 x 2 x 3 cm. All needle, sponge, and instrument counts were correct at the completion of the procedure. I was present for the critical portions of the procedure.     Jose Cruz Etienne M.D.,  YAYA  Cardiothoracic and Vascular Surgeon  [facility]

## 2023-03-22 NOTE — THERAPY WOUND CARE TREATMENT
Acute Care - Wound/Debridement Treatment Note  [facility]     Patient Name: Swapnil Lawson  : 1950  MRN: [id number]  Today's Date: 3/22/2023                Admit Date: 3/17/2023    Visit Dx:    ICD-10-CM ICD-9-CM   1. Ulcer of right foot with other severity (HCC)  L97.518 707.15   2. PVD (peripheral vascular disease) (HCC)  I73.9 443.9       Patient Active Problem List   Diagnosis   • Coronary artery disease   • Hypertension   • Paroxysmal atrial fibrillation (HCC)   • Type 2 diabetes mellitus with nephropathy (HCC)   • Venous insufficiency   • Hyperlipidemia LDL goal <70   • Nuclear sclerotic cataract of right eye   • Cortical age-related cataract of right eye   • Nuclear sclerotic cataract of left eye   • Cortical age-related cataract of left eye   • Thrombocytopenia (HCC)   • History of colon polyps   • Chronic idiopathic constipation   • Adenomatous polyp of transverse colon   • Chronic kidney disease, stage III (moderate) (HCC)   • PVD (peripheral vascular disease) (HCC)   • Foot ulcer (HCC)   • Asymptomatic PVD (peripheral vascular disease) (HCC)   • Severe malnutrition (HCC)   • Ulcer of right foot with other severity (HCC)        Past Medical History:   Diagnosis Date   • Abnormal ECG    • Cataracts, bilateral    • CHF (congestive heart failure) (HCC)    • Childhood asthma    • COPD (chronic obstructive pulmonary disease) (HCC)    • Coronary artery disease 2016    CABG, , Abimael Tomlin MD. CABG, 2013, [facility].   • Diabetes mellitus (HCC) 2016    type II   • DVT (deep venous thrombosis) (HCC)     RLE   • Gout    • Hepatitis     1970s unsure of which one- states it was caused from drinking contaminated water   • Hyperlipidemia 2016   • Hypertension 2016   • Kidney disease    • Myocardial infarction (HCC)      in  and  prior to CABG.   • Paroxysmal atrial fibrillation (HCC) 2016   • Pulmonary embolism (HCC)    • Sleep apnea    • Venous  insufficiency 12/14/2016   • Wears glasses     for reading        Past Surgical History:   Procedure Laterality Date   • AMPUTATION FOOT Bilateral 3/21/2023    Procedure: FOOT RAY TRANSMETATARSAL AMPUTATION LATERAL LEFT, RIGHT FOOT TRANSMETATARSAL AMPUTATION;  Surgeon: Jose Cruz Etienne MD;  Location: [facility] OR;  Service: Vascular;  Laterality: Bilateral;   • AORTAGRAM N/A 3/17/2023    Procedure: AORTAGRAM WITH  RUNOFFS WITH STENT PLACEMENT;  Surgeon: Jose Cruz Etienne MD;  Location: [facility] HYBRID [place];  Service: Vascular;  Laterality: N/A;   fluoro  dose  contrast    • APPENDECTOMY  1981   • BUNIONECTOMY Left    • CARDIAC CATHETERIZATION      x4, no stents   • CARDIAC CATHETERIZATION N/A 02/16/2023    Procedure: Peripheral angiography  Left lower extremity angiogram [facility] interventional Cardiologist;  Surgeon: Reed Zaldivar MD;  Location: [facility] CATH INVASIVE LOCATION;  Service: Cardiovascular;  Laterality: N/A;  Left lower extremity angiogram with interventional cardioligist.   • CATARACT EXTRACTION W/ INTRAOCULAR LENS IMPLANT Right 06/25/2020    Procedure: CATARACT PHACO EXTRACTION WITH INTRAOCULAR LENS IMPLANT RIGHT;  Surgeon: Omar Blood MD;  Location: [facility] OR;  Service: Ophthalmology;  Laterality: Right;   • CATARACT EXTRACTION W/ INTRAOCULAR LENS IMPLANT Left 07/09/2020    Procedure: CATARACT PHACO EXTRACTION WITH INTRAOCULAR LENS IMPLANT LEFT;  Surgeon: Omar Blood MD;  Location: [facility] OR;  Service: Ophthalmology;  Laterality: Left;   • CHOLECYSTECTOMY  2002   • COLON RESECTION Right 09/12/2022    Procedure: COLON RESECTION LAPAROSCOPIC HAND ASSISTED;  Surgeon: Kenji Garcias MD;  Location: [facility] OR;  Service: General;  Laterality: Right;   • COLON RESECTION N/A    • COLONOSCOPY     • COLONOSCOPY N/A 08/11/2022    Procedure: COLONOSCOPY, biopsy, polypectomy x1 and tattooing;  Surgeon: Kenji Garcias MD;  Location: [facility] ENDOSCOPY;  Service: Gastroenterology;  Laterality: N/A;   •  CORONARY ARTERY BYPASS GRAFT      1993 triple bypass   • CORONARY ARTERY BYPASS GRAFT  2013    double bypass   • FINGER SURGERY      Rt index (second finger)   • OTHER SURGICAL HISTORY  2010    Bone spur removal   • TOE AMPUTATION Left 2009    4th toe   • VASECTOMY     • WOUND DEBRIDEMENT Bilateral 3/17/2023    Procedure: DEBRIDEMENT FOOT BILATERAL;  Surgeon: Jose Cruz Etienne MD;  Location: [facility];  Service: Vascular;  Laterality: Bilateral;           Wound 02/16/23 1040 Left lateral plantar Venous Ulcer (Active)   Dressing Appearance dry;intact 03/21/23 2015   Closure Adhesive bandage 03/22/23 0648   Drainage Characteristics/Odor sanguineous 03/22/23 0648   Drainage Amount moderate 03/22/23 0648       Wound 02/16/23 1040 Right lateral plantar Venous Ulcer (Active)   Closure Adhesive bandage 03/22/23 0648   Drainage Characteristics/Odor sanguineous 03/22/23 0648   Drainage Amount small 03/22/23 0648       Wound 02/16/23 1040 Right lower leg Abrasion (Active)   Closure Adhesive bandage 03/22/23 0648   Drainage Characteristics/Odor sanguineous 03/22/23 0648   Drainage Amount small 03/22/23 0648       Wound Right anterior groin Puncture (Active)   Dressing Appearance dry;intact 03/22/23 0800   Base dressing in place, unable to visualize 03/22/23 0800   Drainage Amount none 03/22/23 0800   Dressing Care gauze;transparent film 03/22/23 0800   Periwound Care dry periwound area maintained 03/22/23 0800       Wound 03/19/23 1715 Right upper arm Skin Tear (Active)   Closure Adhesive bandage 03/22/23 0648   Base maroon/purple 03/22/23 0648       Wound 03/19/23 0750 Right lower arm Skin Tear (Active)   Closure Adhesive bandage 03/22/23 0648   Base maroon/purple 03/22/23 0648       Wound 03/19/23 1715 Right distal arm Skin Tear (Active)   Closure Adhesive bandage 03/22/23 0648   Base maroon/purple 03/22/23 0648       Wound 03/21/23 Right anterior fourth toe Amputation stump (Active)   Dressing Appearance intact;moist  drainage 03/22/23 1130   Closure Other (Comment) 03/21/23 1800   Base moist;red;subcutaneous;exposed structure 03/22/23 1130   Periwound intact;macerated 03/22/23 1130   Periwound Temperature warm 03/22/23 1130   Periwound Skin Turgor soft 03/22/23 1130   Edges irregular 03/22/23 1130   Wound Length (cm) 6 cm 03/22/23 1130   Wound Width (cm) 3 cm 03/22/23 1130   Wound Depth (cm) 2.5 cm 03/22/23 1130   Wound Surface Area (cm^2) 18 cm^2 03/22/23 1130   Wound Volume (cm^3) 45 cm^3 03/22/23 1130   Drainage Characteristics/Odor sanguineous 03/22/23 1130   Drainage Amount large 03/22/23 1130   Care, Wound irrigated with;sterile normal saline;negative pressure wound therapy 03/22/23 1130   Dressing Care dressing changed 03/22/23 1130       Wound 03/21/23 Left anterior fourth toe Amputation stump (Active)   Dressing Appearance intact;moist drainage 03/22/23 1130   Closure Other (Comment) 03/21/23 1800   Base moist;pink;red;subcutaneous;yellow;exposed structure 03/22/23 1130   Periwound intact;macerated 03/22/23 1130   Periwound Temperature warm 03/22/23 1130   Periwound Skin Turgor soft 03/22/23 1130   Edges irregular 03/22/23 1130   Wound Length (cm) 8 cm 03/22/23 1130   Wound Width (cm) 5 cm 03/22/23 1130   Wound Depth (cm) 2 cm 03/22/23 1130   Wound Surface Area (cm^2) 40 cm^2 03/22/23 1130   Wound Volume (cm^3) 80 cm^3 03/22/23 1130   Drainage Characteristics/Odor serosanguineous;sanguineous 03/22/23 1130   Drainage Amount moderate 03/22/23 1130   Care, Wound irrigated with;wound cleanser;negative pressure wound therapy 03/22/23 1130       NPWT (Negative Pressure Wound Therapy) 03/21/23 LEFT FOOT (Active)   Therapy Setting continuous therapy 03/22/23 1130   Dressing foam, black;foam, white 03/22/23 1130   Pressure Setting 125 mmHg 03/22/23 1130   Sponges Inserted 2 03/22/23 1130   Sponges Removed 1 03/22/23 1130   Finger sweep complete Yes 03/22/23 1130       NPWT (Negative Pressure Wound Therapy) 03/21/23 RIGHT FOOT  (Active)   Therapy Setting continuous therapy 03/22/23 1130   Dressing foam, black;foam, white 03/22/23 1130   Contact Layer other (see comments) 03/22/23 1130   Pressure Setting 125 mmHg 03/22/23 1130   Sponges Inserted 2 03/22/23 1130   Sponges Removed 1 03/22/23 1130   Finger sweep complete Yes 03/22/23 1130         WOUND DEBRIDEMENT                     PT Assessment (last 12 hours)     PT Evaluation and Treatment     Row Name 03/22/23 1130          Physical Therapy Time and Intention    Subjective Information complains of;weakness;fatigue  -MF     Document Type evaluation;therapy note (daily note);wound care  -MF     Mode of Treatment individual therapy;physical therapy  -     Row Name 03/22/23 1130          General Information    Patient Profile Reviewed yes  -     Patient Observations alert;cooperative;agree to therapy  -     Pertinent History of Current Functional Problem B foot wounds s/p surgical debridement.  -     Risks Reviewed patient:;increased discomfort  -     Benefits Reviewed patient:;improve skin integrity  -     Barriers to Rehab medically complex;previous functional deficit;physical barrier  -     Row Name 03/22/23 1130          Pain    Pretreatment Pain Rating 0/10 - no pain  -     Posttreatment Pain Rating 0/10 - no pain  -     Row Name             Wound Right anterior groin Puncture    Wound - Properties Group Side: Right  -TM Orientation: anterior  -TM Location: groin  -TM Primary Wound Type: Puncture  -TM    Retired Wound - Properties Group Side: Right  -TM Orientation: anterior  -TM Location: groin  -TM Primary Wound Type: Puncture  -TM    Retired Wound - Properties Group Side: Right  -TM Location: groin  -TM Primary Wound Type: Puncture  -TM    Row Name             Wound 02/16/23 1040 Left lateral plantar Venous Ulcer    Wound - Properties Group Placement Date: 02/16/23  [facility] Placement Time: 1040 -KH Present on Hospital Admission: Y  -KH Side: Left  - Orientation:  lateral  -KH Location: plantar  -KH Primary Wound Type: Venous ulcer  -KH    Retired Wound - Properties Group Placement Date: 02/16/23  -KH Placement Time: 1040  -KH Present on Hospital Admission: Y  -KH Side: Left  -KH Orientation: lateral  -KH Location: plantar  -KH Primary Wound Type: Venous ulcer  -KH    Retired Wound - Properties Group Date first assessed: 02/16/23  -KH Time first assessed: 1040  -KH Present on Hospital Admission: Y  -KH Side: Left  -KH Location: plantar  -KH Primary Wound Type: Venous ulcer  -KH    Row Name             Wound 02/16/23 1040 Right lateral plantar Venous Ulcer    Wound - Properties Group Placement Date: 02/16/23  -KH Placement Time: 1040  -KH Present on Hospital Admission: Y  -KH Side: Right  -KH Orientation: lateral  -KH Location: plantar  -KH Primary Wound Type: Venous ulcer  -KH    Retired Wound - Properties Group Placement Date: 02/16/23  -KH Placement Time: 1040  -KH Present on Hospital Admission: Y  -KH Side: Right  -KH Orientation: lateral  -KH Location: plantar  -KH Primary Wound Type: Venous ulcer  -KH    Retired Wound - Properties Group Date first assessed: 02/16/23  -KH Time first assessed: 1040  -KH Present on Hospital Admission: Y  -KH Side: Right  -KH Location: plantar  -KH Primary Wound Type: Venous ulcer  -KH    Row Name             Wound 02/16/23 1040 Right lower leg Abrasion    Wound - Properties Group Placement Date: 02/16/23  -KH Placement Time: 1040  -KH Present on Hospital Admission: Y  -KH Side: Right  -KH Orientation: lower  -KH Location: leg  -KH Primary Wound Type: Abrasion  -KH Additional Comments: Open cut from nail scratch.  -KH    Retired Wound - Properties Group Placement Date: 02/16/23  -KH Placement Time: 1040  -KH Present on Hospital Admission: Y  -KH Side: Right  -KH Orientation: lower  -KH Location: leg  -KH Primary Wound Type: Abrasion  -KH Additional Comments: Open cut from nail scratch.  -KH    Retired Wound - Properties Group Date first  assessed: 02/16/23  -KH Time first assessed: 1040  -KH Present on Hospital Admission: Y  -KH Side: Right  -KH Location: leg  -KH Primary Wound Type: Abrasion  -KH Additional Comments: Open cut from nail scratch.  -KH    Row Name             Wound 03/19/23 1715 Right upper arm Skin Tear    Wound - Properties Group Placement Date: 03/19/23  -TF Placement Time: 1715  -TF Present on Hospital Admission: N  -TF Side: Right  -TF Orientation: upper  -TF Location: arm  -TF Primary Wound Type: Skin tear  -TF    Retired Wound - Properties Group Placement Date: 03/19/23  -TF Placement Time: 1715  -TF Present on Hospital Admission: N  -TF Side: Right  -TF Orientation: upper  -TF Location: arm  -TF Primary Wound Type: Skin tear  -TF    Retired Wound - Properties Group Date first assessed: 03/19/23  -TF Time first assessed: 1715  -TF Present on Hospital Admission: N  -TF Side: Right  -TF Location: arm  -TF Primary Wound Type: Skin tear  -TF    Row Name             Wound 03/19/23 0750 Right lower arm Skin Tear    Wound - Properties Group Placement Date: 03/19/23  -TF Placement Time: 0750  -TF Side: Right  -TF Orientation: lower  -TF Location: arm  -TF Primary Wound Type: Skin tear  -TF    Retired Wound - Properties Group Placement Date: 03/19/23  -TF Placement Time: 0750  -TF Side: Right  -TF Orientation: lower  -TF Location: arm  -TF Primary Wound Type: Skin tear  -TF    Retired Wound - Properties Group Date first assessed: 03/19/23  -TF Time first assessed: 0750  -TF Side: Right  -TF Location: arm  -TF Primary Wound Type: Skin tear  -TF    Row Name             Wound 03/19/23 1715 Right distal arm Skin Tear    Wound - Properties Group Placement Date: 03/19/23  -TF Placement Time: 1715  -TF Side: Right  -TF Orientation: distal  -TF Location: arm  -TF Primary Wound Type: Skin tear  -TF    Retired Wound - Properties Group Placement Date: 03/19/23  -TF Placement Time: 1715  -TF Side: Right  -TF Orientation: distal  -TF Location: arm   -TF Primary Wound Type: Skin tear  -TF    Retired Wound - Properties Group Date first assessed: 03/19/23  -TF Time first assessed: 1715  -TF Side: Right  -TF Location: arm  -TF Primary Wound Type: Skin tear  -TF    Row Name 03/22/23 1130          Wound 03/21/23 Right anterior fourth toe Amputation stump    Wound - Properties Group Placement Date: 03/21/23  - Present on Hospital Admission: Y  -JH Side: Right  -JH Orientation: anterior  -JH Location: fourth toe  -JH Primary Wound Type: Amputation s  -JH    Dressing Appearance intact;moist drainage  moderate bleeding around wound vac  -MF     Base moist;red;subcutaneous;exposed structure  bone and fascia to wound base  -MF     Periwound intact;macerated  -MF     Periwound Temperature warm  -MF     Periwound Skin Turgor soft  -MF     Edges irregular  -MF     Wound Length (cm) 6 cm  -MF     Wound Width (cm) 3 cm  -MF     Wound Depth (cm) 2.5 cm  -MF     Wound Surface Area (cm^2) 18 cm^2  -MF     Wound Volume (cm^3) 45 cm^3  -MF     Drainage Characteristics/Odor sanguineous  -MF     Drainage Amount large  -MF     Care, Wound irrigated with;sterile normal saline;negative pressure wound therapy  -MF     Dressing Care dressing changed  -MF     Retired Wound - Properties Group Placement Date: 03/21/23  - Present on Hospital Admission: Y  -JH Side: Right  - Orientation: anterior  -JH Location: fourth toe  -JH Primary Wound Type: Amputation s  -JH    Retired Wound - Properties Group Date first assessed: 03/21/23  - Present on Hospital Admission: Y  -JH Side: Right  - Location: fourth toe  -JH Primary Wound Type: Amputation s  -JH    Row Name 03/22/23 1130          Wound 03/21/23 Left anterior fourth toe Amputation stump    Wound - Properties Group Placement Date: 03/21/23  - Present on Hospital Admission: Y  - Side: Left  - Orientation: anterior  - Location: fourth toe  -JH Primary Wound Type: Amputation s  -JH    Dressing Appearance intact;moist drainage  -MF      Base moist;pink;red;subcutaneous;yellow;exposed structure  bone and fascia to wound base  -MF     Periwound intact;macerated  -MF     Periwound Temperature warm  -MF     Periwound Skin Turgor soft  -MF     Edges irregular  -MF     Wound Length (cm) 8 cm  -MF     Wound Width (cm) 5 cm  -MF     Wound Depth (cm) 2 cm  -MF     Wound Surface Area (cm^2) 40 cm^2  -MF     Wound Volume (cm^3) 80 cm^3  -MF     Drainage Characteristics/Odor serosanguineous;sanguineous  -MF     Drainage Amount moderate  -     Care, Wound irrigated with;wound cleanser;negative pressure wound therapy  -MF     Retired Wound - Properties Group Placement Date: 03/21/23  - Present on Hospital Admission: Y  - Side: Left  - Orientation: anterior  - Location: fourth toe  - Primary Wound Type: Amputation s  -    Retired Wound - Properties Group Date first assessed: 03/21/23  - Present on Hospital Admission: Y  - Side: Left  - Location: fourth toe  - Primary Wound Type: Amputation s  -    Row Name 03/22/23 1130          NPWT (Negative Pressure Wound Therapy) 03/21/23 LEFT FOOT    NPWT (Negative Pressure Wound Therapy) - Properties Group Placement Date: 03/21/23  [facility] Location: LEFT FOOT  -    Therapy Setting continuous therapy  -MF     Dressing foam, black;foam, white  -     Pressure Setting 125 mmHg  -MF     Sponges Inserted 2  1 white 1 black  -MF     Sponges Removed 1  black  -MF     Finger sweep complete Yes  -     Retired NPWT (Negative Pressure Wound Therapy) - Properties Group Placement Date: 03/21/23  [facility] Location: LEFT FOOT  -    Retired NPWT (Negative Pressure Wound Therapy) - Properties Group Placement Date: 03/21/23  [facility] Location: [facility] FOOT  -    Row Name 03/22/23 1130          NPWT (Negative Pressure Wound Therapy) 03/21/23 RIGHT FOOT    NPWT (Negative Pressure Wound Therapy) - Properties Group Placement Date: 03/21/23  [facility] Location: RIGHT FOOT  -    Therapy Setting continuous therapy  -MF     Dressing  foam, black;foam, white  -MF     Contact Layer other (see comments)  tez  -MF     Pressure Setting 125 mmHg  -MF     Sponges Inserted 2  1 white 1 black  -MF     Sponges Removed 1  black  -MF     Finger sweep complete Yes  -MF     Retired NPWT (Negative Pressure Wound Therapy) - Properties Group Placement Date: 03/21/23  - Location: RIGHT FOOT  -    Retired NPWT (Negative Pressure Wound Therapy) - Properties Group Placement Date: 03/21/23  - Location: RIGHT FOOT  -    Row Name 03/22/23 1130          Coping    Observed Emotional State calm;cooperative  -     Verbalized Emotional State acceptance  -     Trust Relationship/Rapport care explained  -     Row Name 03/22/23 1130          Plan of Care Review    Plan of Care Reviewed With patient  -     Outcome Evaluation wound vacs changed today due to excessive bleeding around wound dressing. PT used silver nitrate with tez Ag to help slow bleeding to allow for application of wound vac.  PT will f/u with wound vac changes every 2-3 days.  -     Row Name 03/22/23 1130          Positioning and Restraints    Pre-Treatment Position in bed  -     Post Treatment Position bed  -     In Bed supine;call light within reach  -     Row Name 03/22/23 1130          Therapy Assessment/Plan (PT)    Patient/Family Therapy Goals Statement (PT) wound healing.  -     PT Diagnosis (PT) B foot wounds  -     Rehab Potential (PT) fair, will monitor progress closely  -     Criteria for Skilled Interventions Met (PT) yes;meets criteria;skilled treatment is necessary  -     Row Name 03/22/23 1130          Therapy Plan Review/Discharge Plan (PT)    Therapy Plan Review (PT) evaluation/treatment results reviewed;care plan/treatment goals reviewed;risks/benefits reviewed;current/potential barriers reviewed;participants voiced agreement with care plan;participants included;patient  -     Row Name 03/22/23 1130          Physical Therapy Goals    Wound Care Goal  Selection (PT) wound care, PT goal 1  -     Row Name 03/22/23 1130          Wound Care Goal 1 (PT)    Wound Care Goal 1 (PT) Decrease bleeding to none to improve healing potential.  -MF     Time Frame (Wound Care Goal 1, PT) 10 days  -           User Key  (r) = Recorded By, (t) = Taken By, (c) = Cosigned By    Initials Name Provider Type     Reed Jhonson, PT Physical Therapist    Fátima Armando RN Registered Nurse    Amarilys Barfield RN Registered Nurse    Gina Blanco RN Registered Nurse    Anna Awan RN Registered Nurse              Physical Therapy Education     Title: PT OT SLP Therapies (In Progress)     Topic: Physical Therapy (In Progress)     Point: Mobility training (In Progress)     Learning Progress Summary           Patient Acceptance, E, NR by  at 3/22/2023 1155                   Point: Home exercise program (Not Started)     Learner Progress:  Not documented in this visit.          Point: Body mechanics (In Progress)     Learning Progress Summary           Patient Acceptance, E, NR by  at 3/22/2023 1155                   Point: Precautions (In Progress)     Learning Progress Summary           Patient Acceptance, E, NR by  at 3/22/2023 1155                               User Key     Initials Effective Dates Name Provider Type Discipline     09/21/21 -  Bev Gottlieb, PT Physical Therapist PT                Recommendation and Plan  Anticipated Discharge Disposition (PT): skilled nursing facility  Planned Therapy Interventions (PT): balance training, bed mobility training, home exercise program, motor coordination training, neuromuscular re-education, patient/family education, postural re-education, ROM (range of motion), strengthening, transfer training, wheelchair management/propulsion training  Therapy Frequency (PT): daily  Plan of Care Reviewed With: patient           Outcome Evaluation: wound vacs changed today due to excessive bleeding  around wound dressing. PT used silver nitrate with tez Ag to help slow bleeding to allow for application of wound vac.  PT will f/u with wound vac changes every 2-3 days.  Plan of Care Reviewed With: patient            Time Calculation   PT Charges     Row Name 03/22/23 1155 03/22/23 1030          Time Calculation    Start Time 0952  -BA 1030  -MF     PT Received On 03/22/23  -BA --     PT Goal Re-Cert Due Date 04/01/23  -BA 04/01/23  -MF        Time Calculation- PT    Total Timed Code Minutes- PT 14 minute(s)  -BA --        Timed Charges    40406 - PT Therapeutic Activity Minutes 14  -BA --        Untimed Charges    PT Eval/Re-eval Minutes 56  -BA 35  -MF     Wound Care -- 75414 Neg Press (DME) wound TO 50 sqcm  -MF     57360-Ete Pressure wound to 50 sqcm -- 65  -MF        Total Minutes    Timed Charges Total Minutes 14  -BA --     Untimed Charges Total Minutes 56  -  -MF      Total Minutes 70  -  -MF           User Key  (r) = Recorded By, (t) = Taken By, (c) = Cosigned By    Initials Name Provider Type     Reed Johnson, PT Physical Therapist    BA Bev Gottlieb, PT Physical Therapist                  Therapy Charges for Today     Code Description Service Date Service Provider Modifiers Qty    19786238367 HC PT RE-EVAL ESTABLISHED PLAN 2 3/22/2023 Reed Johnson, PT GP 1    [id number] HC PT NEG PRESS WOUND TO 50SQCM DME4 3/22/2023 Reed Johnson, PT  1            PT G-Codes  Outcome Measure Options: AM-PAC 6 Clicks Basic Mobility (PT)  AM-PAC 6 Clicks Score (PT): 10       Reed Johnson, PT  3/22/2023

## 2023-03-22 NOTE — PROGRESS NOTES
INFECTIOUS DISEASE  Progress note     Swapnil Lawson  1950  [id number]      Admission Date: 3/17/2023      Requesting Provider: No ref. provider found  Evaluating Physician: Edgar Barksdale MD    Reason for Consultation: bilateral foot infections/gangrene.  Right fourth proximal phalanx osteomyelitis, left fifth metatarsal osteomyelitis    History of present illness:    Patient is a 72 y.o. male, with PMH COPD, CHF, CKD, CAD, DM, PAD, seen today for bilateral foot infections.  Admitted on 3/17/23 undergoing aortagram with stent placement/angioplasty of bilateral lower extremities with sharp debridement of both feet, plans for partial bilateral foot amputations early next week.  He has noted nonhealing foot ulcers for at least the past 6 months.  He was referred to CTS service and admitted 3/17/2023 for above. He has been afebrile without leukocytosis. Admitting labs with Scr 2.82, WBC 6.73, ESR 36, CRP 4.04. PCT 0.18.  Wound culture with no organisms, few WBCs, no growth.  He received a dose of Cefazolin and we were consulted for evaluation and treatment.  The patient denies immunocompromise status, TB, HIV, zoonotic exposures, ill contacts, or significant travel history.    3/19/23: History reviewed.  Visiting with his son and friend.  He remains afebrile. Tissue cultures no growth so far. Dr. Oviedo changed foot dressings earlier today.  No n/v/d. Having some urinary retention, required I & O catheterizations.     3/20/2023 history reviewed.  No high fevers or chills.  Being treated for bilateral foot infections and gangrene.     3/21/2023 history reviewed.  Tolerating daptomycin and piperacillin tazobactam awaiting disposition of his feet.  Duration of antibiotics to be determined.  Likely 6 weeks.    3/22/23 hx rev.  Underwent partial amputation of right 4th MT, and left midfoot 4th and 5th MT by Dr. Jose Cruz Etienne on 3/21 with placement of wound vac.  Leslie dapto and zosyn but changing to daptomycin  and cefepime with Flagyl till 5/9/23.    Past Medical History:   Diagnosis Date   • Abnormal ECG    • Cataracts, bilateral    • CHF (congestive heart failure) (HCC)    • Childhood asthma    • COPD (chronic obstructive pulmonary disease) (HCC)    • Coronary artery disease 12/14/2016    CABG, 1993, Abimael Tomlin MD. CABG, August 2013, [facility].   • Diabetes mellitus (HCC) 12/14/2016    type II   • DVT (deep venous thrombosis) ([facility])     RLE   • Gout    • Hepatitis     1970s unsure of which one- states it was caused from drinking contaminated water   • Hyperlipidemia 12/14/2016   • Hypertension 12/14/2016   • Kidney disease    • Myocardial infarction (HCC)      in 1992 and 1993 prior to CABG.   • Paroxysmal atrial fibrillation (HCC) 12/14/2016   • Pulmonary embolism (HCC)    • Sleep apnea    • Venous insufficiency 12/14/2016   • Wears glasses     for reading       Past Surgical History:   Procedure Laterality Date   • AORTAGRAM N/A 3/17/2023    Procedure: AORTAGRAM WITH  RUNOFFS WITH STENT PLACEMENT;  Surgeon: Jose Cruz Etienne MD;  Location: [facility]BRID [facility];  Service: Vascular;  Laterality: N/A;   fluoro  dose  contrast    • APPENDECTOMY  1981   • BUNIONECTOMY Left    • CARDIAC CATHETERIZATION      x4, no stents   • CARDIAC CATHETERIZATION N/A 02/16/2023    Procedure: Peripheral angiography  Left lower extremity angiogram [facility] interventional Cardiologist;  Surgeon: Reed Zaldivar MD;  Location: [facility] CATH INVASIVE LOCATION;  Service: Cardiovascular;  Laterality: N/A;  Left lower extremity angiogram with interventional cardioligist.   • CATARACT EXTRACTION W/ INTRAOCULAR LENS IMPLANT Right 06/25/2020    Procedure: CATARACT PHACO EXTRACTION WITH INTRAOCULAR LENS IMPLANT RIGHT;  Surgeon: Omar Blood MD;  Location: [facility] OR;  Service: Ophthalmology;  Laterality: Right;   • CATARACT EXTRACTION W/ INTRAOCULAR LENS IMPLANT Left 07/09/2020    Procedure: CATARACT PHACO EXTRACTION WITH INTRAOCULAR LENS  IMPLANT LEFT;  Surgeon: Omar Blood MD;  Location: [facility] OR;  Service: Ophthalmology;  Laterality: Left;   • CHOLECYSTECTOMY     • COLON RESECTION Right 2022    Procedure: COLON RESECTION LAPAROSCOPIC HAND ASSISTED;  Surgeon: Kenji Garcias MD;  Location: [facility] OR;  Service: General;  Laterality: Right;   • COLON RESECTION N/A    • COLONOSCOPY     • COLONOSCOPY N/A 2022    Procedure: COLONOSCOPY, biopsy, polypectomy x1 and tattooing;  Surgeon: Kenji Garcias MD;  Location: [facility] ENDOSCOPY;  Service: Gastroenterology;  Laterality: N/A;   • CORONARY ARTERY BYPASS GRAFT       triple bypass   • CORONARY ARTERY BYPASS GRAFT      double bypass   • FINGER SURGERY      Rt index (second finger)   • OTHER SURGICAL HISTORY      Bone spur removal   • TOE AMPUTATION Left     4th toe   • VASECTOMY     • WOUND DEBRIDEMENT Bilateral 3/17/2023    Procedure: DEBRIDEMENT FOOT BILATERAL;  Surgeon: Jose Cruz Etienne MD;  Location: [facility];  Service: Vascular;  Laterality: Bilateral;       Family History   Problem Relation Age of Onset   • COPD Mother    • Heart attack Father    • Asthma Father      Social history lives with son.  Social History     Socioeconomic History   • Marital status:    • Number of children: 1   Tobacco Use   • Smoking status: Former     Packs/day: 1.00     Years: 28.00     Pack years: 28.00     Types: Cigarettes     Start date: 1964     Quit date: 1993     Years since quittin.2   • Smokeless tobacco: Never   • Tobacco comments:     Wish I'd never started   Vaping Use   • Vaping Use: Never used   Substance and Sexual Activity   • Alcohol use: No   • Drug use: No   • Sexual activity: Not Currently     Partners: Female     Birth control/protection: Other, Vasectomy, Birth control pill       No Known Allergies      Medication:      Antibiotics:  Anti-Infectives (From admission, onward)    Ordered     Dose/Rate Route Frequency Start Stop     23 1106  DAPTOmycin (CUBICIN) 450 mg in sodium chloride 0.9 % 50 mL IVPB        Ordering Provider: Gloria Sherman PA-C    6 mg/kg × 73 kg  100 mL/hr over 30 Minutes Intravenous Every 48 Hours 23 1000 23 0959    23 1107  piperacillin-tazobactam (ZOSYN) 2.25 g/100 mL 0.9% NS IVPB (mbp)        Ordering Provider: Gloria Sherman PA-C    2.25 g  over 4 Hours Intravenous Every 8 Hours 23 1600 23 1559    23 0841  piperacillin-tazobactam (ZOSYN) 3.375 g in iso-osmotic dextrose 50 ml (premix)        Ordering Provider: Jack Kidd PharmD    3.375 g  over 30 Minutes Intravenous Once 23 0930 23 0930    23 1153  ceFAZolin in dextrose (ANCEF) IVPB solution 2 g        Ordering Provider: Rolo Lawler PA    2 g  over 30 Minutes Intravenous Once 23 1155 23 1442        Review of Systems:  Constitutional-- No Fever, chills or sweats.  Appetite good, and no malaise. No fatigue.  HEENT-- No new vision, hearing or throat complaints.  No epistaxis or oral sores.  Denies odynophagia or dysphagia. No headache, photophobia or neck stiffness.  CV-- No chest pain, palpitation or syncope  Resp-- No SOB/cough/Hemoptysis, no wheezes  GI- No nausea, vomiting, or diarrhea.  No hematochezia, melena, or hematemesis. Denies jaundice or chronic liver disease.  -- No dysuria, hematuria, or flank pain.  Denies hesitancy, urgency or flank pain.  Lymph- no swollen lymph nodes in neck/axilla or groin.   Heme- No active bruising or bleeding; no Hx of DVT or PE.  MS-- no swelling or pain in the bones or joints of arms/legs.  No new back pain.  Neuro-- No acute focal weakness or numbness in the arms or legs.  No seizures.  Skin--No rashes   Bilateral foot ulcers as per HPI.  No nodules.    Physical Exam:   Vital Signs  Temp (24hrs), Av.8 °F (36.6 °C), Min:97 °F (36.1 °C), Max:98.6 °F (37 °C)    Temp  Min: 97 °F (36.1 °C)  Max: 98.6 °F (37 °C)  BP  Min: 80/53  Max:  119/66  Pulse  Min: 86  Max: 122  Resp  Min: 14  Max: 20  SpO2  Min: 90 %  Max: 100 %    GENERAL: Awake and alert, in mod distress. Appears older than stated age, cachectic, resting in bed.  HEENT: Normocephalic, atraumatic.  EOMI. No conjunctival injection. No icterus. Oropharynx clear without evidence of thrush or exudate. No evidence of peridontal disease.    NECK: Supple   HEART: RRR; No murmur, rubs, gallops.    LUNGS: Clear to auscultation bilaterally without wheezing, rales, rhonchi. Normal respiratory effort. Nonlabored.  ABDOMEN: Soft, nontender, minimal distention. Positive bowel sounds. No rebound or guarding. NO mass or HSM.  EXT:  No cyanosis, clubbing or edema. No cord.  :  Without Dueñas catheter.  MSK: No joint effusions or erythema  SKIN: Warm and dry  Left and right surg dressing in place.   NEURO: Oriented to PPT.  Nonfocal  PSYCHIATRIC: Normal insight and judgment. Cooperative with PE    Micro: Tissue and urine culture 3/17 and 3/18 negative.  Blood cultures 3/7 negative.  Surg tissue cx from 3/21/23 neg to date.    Laboratory Data    Results from last 7 days   Lab Units 03/21/23  0645 03/20/23 2216 03/19/23  0543   WBC 10*3/mm3 8.15 7.97 5.70   HEMOGLOBIN g/dL 9.9* 9.9* 9.7*   HEMATOCRIT % 31.8* 32.0* 31.0*   PLATELETS 10*3/mm3 108* 107* 117*     Results from last 7 days   Lab Units 03/21/23  0645   SODIUM mmol/L 137   POTASSIUM mmol/L 4.3   CHLORIDE mmol/L 109*   CO2 mmol/L 17.0*   BUN mg/dL 41*   CREATININE mg/dL 2.84*   GLUCOSE mg/dL 95   CALCIUM mg/dL 8.5*     Results from last 7 days   Lab Units 03/21/23  0645   ALK PHOS U/L 67   BILIRUBIN mg/dL 0.5   ALT (SGPT) U/L <5   AST (SGOT) U/L 12             Results from last 7 days   Lab Units 03/20/23 2216   LACTATE mmol/L 1.2     Results from last 7 days   Lab Units 03/19/23  0543 03/18/23  0643   CK TOTAL U/L 18* 26         Estimated Creatinine Clearance: 23.9 mL/min (A) (by C-G formula based on SCr of 2.84 mg/dL (H)).      Microbiology:  No  results found for: ACANTHNAEG, AFBCX, BPERTUSSISCX, BLOODCX  No results found for: BCIDPCR, CXREFLEX, CSFCX, CULTURETIS  No results found for: CULTURES, HSVCX, URCX  No results found for: EYECULTURE, GCCX, HSVCULTURE, LABHSV  No results found for: LEGIONELLA, MRSACX, MUMPSCX, MYCOPLASCX  No results found for: NOCARDIACX, STOOLCX  No results found for: THROATCX, UNSTIMCULT, URINECX, CULTURE, VZVCULTUR  No results found for: VIRALCULTU, WOUNDCX        Radiology:  Imaging Results (Last 72 Hours)     Procedure Component Value Units Date/Time    XR Abdomen KUB [[id number]] Collected: 03/20/23 1421     Updated: 03/20/23 1428    Narrative:      XR ABDOMEN KUB    Date of Exam: 3/20/2023 1:47 PM EDT    Indication: Abdominal pain and distention..    Comparison: 9/24/2022.    Findings:  There is increased gas throughout the stomach, colon, and small bowel. Several small bowel loops measure up to 4.8 cm. This is concerning for a small bowel obstruction. There is no pneumatosis or free air. There are vascular calcifications within the   abdomen or pelvis. There are degenerative changes within the thoracolumbar spine, hip, and sacroiliac joints. There are bilateral basilar pleural effusions with atelectasis.      Impression:      Impression:  Abnormal bowel gas pattern. This is concerning for a small bowel obstruction. There is no pneumatosis or free air.    Electronically Signed: Feroz Mcghee    3/20/2023 2:25 PM EDT    Workstation ID: [id number]    MRI Foot Right Without Contrast [[id number]] Collected: 03/20/23 0821     Updated: 03/20/23 0827    Narrative:        MRI FOOT RIGHT WO CONTRAST    Date of Exam: 3/19/2023 10:45 AM EDT    Indication: Osteomyelitis, foot.     Comparison: None available.    Technique:  Routine multiplanar/multisequence sequence images of the right foot were obtained without contrast administration.    Findings:   Mild diffuse soft tissue swelling is present. A wound is seen along the plantar aspect of the 4  foot region (series 9 image 13). This extends along the plantar aspect of the fourth metatarsal with wound likely extending to the underlying bone (series 9   image 13 and 14). Patchy edema is present within the fourth metatarsal head as well as the base of the fourth proximal phalanx. Slightly limited evaluation of the remaining phalanges due to incomplete fat saturation. No definite drainable fluid   collections identified. Mild to moderate degenerative changes are present. Edema seen within the plantar musculature likely related to denervation. No significant tenosynovitis identified.      Impression:      Impression:   Wound seen along the plantar aspect of the fourth metatarsal head with adjacent osseous edema present within the fourth metatarsal head extending to the base of the fourth proximal phalanx likely related to osteomyelitis and septic arthritis. No definite   drainable collections identified. No significant effusion. No definite additional osseous involvement. Increased T2 signal seen within the phalanges is likely artifactual and related to incomplete fat saturation. No suspicious tenosynovitis.    Electronically Signed: Masha Lu    3/20/2023 8:24 AM EDT    Workstation ID: [id number]    MRI Foot Left Without Contrast [[id number]] Collected: 03/20/23 0816     Updated: 03/20/23 0824    Narrative:        MRI FOOT LEFT WO CONTRAST    Date of Exam: 3/19/2023 10:34 AM EDT    Indication: Osteomyelitis, foot.     Comparison: None available.    Technique:  Routine multiplanar/multisequence sequence images of the left foot were obtained without contrast administration.    Findings:   Susceptibility artifact is seen within the lateral midfoot region with overlying dressing present. Patchy edema is present within the fifth metatarsal base extending to the fifth metatarsal diaphysis along the midportion (series 11 image 14). No   additional suspicious osseous edema identified. Increased T2 signal seen within the  phalanges is likely related to incomplete fat saturation. No definite drainable fluid collection identified. A small amount of edema seen within the adjacent subcutaneous   tissues along the lateral mid foot wound. No suspicious tenosynovitis identified. No significant joint effusion identified. Mild to moderate degenerative changes are present within the interphalangeal joints as well as the midfoot. No erosions   identified. No definite periostitis.      Impression:      Impression:   Wound along the lateral midfoot region with patchy edema seen within the fifth metatarsal base extending to the mid fifth metatarsal diaphysis consistent with osteomyelitis. No drainable collections identified. No additional osseous involvement. No   suspicious tenosynovitis identified.    Electronically Signed: Masha Lu    3/20/2023 8:21 AM EDT    Workstation ID: [id number]    XR Spine Thoracic 3 View [[id number]] Collected: 03/19/23 1853     Updated: 03/19/23 2058    Narrative:      3 views thoracic spine    Indication:  Fall, pain    Comparison:  None    Findings:    There are median sternotomy wires. No acute fracture or listhesis seen in the thoracic spine. There are mild multilevel degenerative changes in the spine. There appear to be bilateral pleural fluid collections on the lateral image.      Impression:      Impression:    No visible acute fracture or listhesis in the thoracic spine by radiograph.    Nonacute findings as above.    Likely bilateral pleural fluid collections. This could be further assessed with dedicated chest imaging.    American College of Radiology Appropriateness Criteria in the setting of Suspected Spine Trauma:   ??? Multidetector CT with sagittal and coronal reconstructions is the recommended screening imaging procedure in adult patients with high-risk criteria by NEXUS or CCR.    Electronically signed by:  Jose Cruz Stephenson M.D.    3/19/2023 6:57 PM Mountain Time            Impression:   - Bilateral  foot infections, s/p sharp debridement with plans for partial amputation early next week- culture no growth so far from 3/17/2023, AFB and fungal also pending, with likely osteomyelitis underneath at left fifth metatarsal, right fourth proximal per MRI 3/20.  Wounds are likely related to diabetes mellitus type 2, peripheral vascular disease including microvascular disease, and neuropathy.  Tissue culture 3/17 negative.  Cx 3/21 pending.  - S/p surgical resection of right 4th, and left 4th and 5th on 3/21 for OM.  - PVD s/p angioplasty/stent  3/17/23, lower extremities  - Diabetes mellitus, type 2, with increased risk for infection.  - Chronic kidney disease, creatinine 2.13 on 3/18.  Acute on chronic.  2.84 on 3/21, worse, neg urine eosinophils  -COPD  -Hypocalcemia 8.4.  -Anemia, chronic disease worse.  -Thrombocytopenia, related to above issues and medications.  Resolved.     PLAN/RECOMMENDATIONS:     1.  Diagnostically, monitor blood cultures, physical examination, radiology imaging, CBC, CMP, ESR, CRP, lactic acid, and procalcitonin wound cultures, tissue culture 3/17/2023.  Previous blood cultures 3/7 were negative.  Tissue surg cx 3/21 follow.  2.  Therapeutically, Daptomycin continue, and change zosyn to cefepime/flagyl till 5/9/23.  Renal dose adjusted.  Watch for mental status changes or seizures with cefepime.  3.  Continue supportive care    I discussed the patients findings and my recommendations with patient, and his son.    Our group would be pleased to follow this patient over the course of their hospitalization and assist with outpatient antimicrobial therapy, as indicated.  Further recommendations depend on the results of the cultures and clinical course.     Side effects were discussed with the patient.  Increased risk for adverse drug reactions, complications of IV access, readmission, loss of limb.    Case management orders: Please arrange for outpatient IV antibiotics at home versus facility  with daptomycin 450 mg IV every 48 hours, cefepime 2 g IV every 24 hours, Flagyl 500 mg p.o. 3 times daily until 5/9/2023 for osteomyelitis of left and right foot metatarsals.  Status post resection 3/21/2023.  Check CBC, CMP, CPK weekly while on IV antibiotics.  Fax orders to [phone], call [phone] with final arrangements.  Arrange for follow-up with me in 2 weeks post discharge.    Edgar Barksdale MD  3/22/2023  07:53 EDT

## 2023-03-22 NOTE — THERAPY EVALUATION
Patient Name: Swapnil Lawson  : 1950    MRN: [id number]                              Today's Date: 3/22/2023       Admit Date: 3/17/2023    Visit Dx:     ICD-10-CM ICD-9-CM   1. Ulcer of right foot with other severity (HCC)  L97.518 707.15   2. PVD (peripheral vascular disease) (HCC)  I73.9 443.9     Patient Active Problem List   Diagnosis   • Coronary artery disease   • Hypertension   • Paroxysmal atrial fibrillation (HCC)   • Type 2 diabetes mellitus with nephropathy (HCC)   • Venous insufficiency   • Hyperlipidemia LDL goal <70   • Nuclear sclerotic cataract of right eye   • Cortical age-related cataract of right eye   • Nuclear sclerotic cataract of left eye   • Cortical age-related cataract of left eye   • Thrombocytopenia (HCC)   • History of colon polyps   • Chronic idiopathic constipation   • Adenomatous polyp of transverse colon   • Chronic kidney disease, stage III (moderate) (HCC)   • PVD (peripheral vascular disease) (HCC)   • Foot ulcer (HCC)   • Asymptomatic PVD (peripheral vascular disease) (HCC)   • Severe malnutrition (HCC)   • Ulcer of right foot with other severity (HCC)     Past Medical History:   Diagnosis Date   • Abnormal ECG    • Cataracts, bilateral    • CHF (congestive heart failure) (HCC)    • Childhood asthma    • COPD (chronic obstructive pulmonary disease) (HCC)    • Coronary artery disease 2016    CABG, , Abimael Tomlin MD. CABG, 2013, [facility].   • Diabetes mellitus (HCC) 2016    type II   • DVT (deep venous thrombosis) (HCC)     RLE   • Gout    • Hepatitis     1970s unsure of which one- states it was caused from drinking contaminated water   • Hyperlipidemia 2016   • Hypertension 2016   • Kidney disease    • Myocardial infarction (HCC)      in  and  prior to CABG.   • Paroxysmal atrial fibrillation (HCC) 2016   • Pulmonary embolism (HCC)    • Sleep apnea    • Venous insufficiency 2016   • Wears glasses      for reading     Past Surgical History:   Procedure Laterality Date   • AMPUTATION FOOT Bilateral 3/21/2023    Procedure: FOOT RAY TRANSMETATARSAL AMPUTATION LATERAL LEFT, RIGHT FOOT TRANSMETATARSAL AMPUTATION;  Surgeon: Jose Cruz Etienne MD;  Location: [facility] OR;  Service: Vascular;  Laterality: Bilateral;   • AORTAGRAM N/A 3/17/2023    Procedure: AORTAGRAM WITH  RUNOFFS WITH STENT PLACEMENT;  Surgeon: Jose Cruz Etienne MD;  Location: [facility] HYBRID [place];  Service: Vascular;  Laterality: N/A;   fluoro  dose  contrast    • APPENDECTOMY  1981   • BUNIONECTOMY Left    • CARDIAC CATHETERIZATION      x4, no stents   • CARDIAC CATHETERIZATION N/A 02/16/2023    Procedure: Peripheral angiography  Left lower extremity angiogram wit interventional Cardiologist;  Surgeon: Reed Zaldivar MD;  Location: [facility] CATH INVASIVE LOCATION;  Service: Cardiovascular;  Laterality: N/A;  Left lower extremity angiogram with interventional cardioligist.   • CATARACT EXTRACTION W/ INTRAOCULAR LENS IMPLANT Right 06/25/2020    Procedure: CATARACT PHACO EXTRACTION WITH INTRAOCULAR LENS IMPLANT RIGHT;  Surgeon: Omar Blood MD;  Location: [facility] OR;  Service: Ophthalmology;  Laterality: Right;   • CATARACT EXTRACTION W/ INTRAOCULAR LENS IMPLANT Left 07/09/2020    Procedure: CATARACT PHACO EXTRACTION WITH INTRAOCULAR LENS IMPLANT LEFT;  Surgeon: Omar Blood MD;  Location: [facility] OR;  Service: Ophthalmology;  Laterality: Left;   • CHOLECYSTECTOMY  2002   • COLON RESECTION Right 09/12/2022    Procedure: COLON RESECTION LAPAROSCOPIC HAND ASSISTED;  Surgeon: Kenji Garcias MD;  Location: [facility] OR;  Service: General;  Laterality: Right;   • COLON RESECTION N/A    • COLONOSCOPY     • COLONOSCOPY N/A 08/11/2022    Procedure: COLONOSCOPY, biopsy, polypectomy x1 and tattooing;  Surgeon: Kenji Garcias MD;  Location: [facility] ENDOSCOPY;  Service: Gastroenterology;  Laterality: N/A;   • CORONARY ARTERY BYPASS GRAFT      1993 triple bypass   •  CORONARY ARTERY BYPASS GRAFT  2013    double bypass   • FINGER SURGERY      Rt index (second finger)   • OTHER SURGICAL HISTORY  2010    Bone spur removal   • TOE AMPUTATION Left 2009    4th toe   • VASECTOMY     • WOUND DEBRIDEMENT Bilateral 3/17/2023    Procedure: DEBRIDEMENT FOOT BILATERAL;  Surgeon: Jose Cruz Etienne MD;  Location: [facility];  Service: Vascular;  Laterality: Bilateral;      General Information     Row Name 03/22/23 1034          Physical Therapy Time and Intention    Document Type evaluation  -     Mode of Treatment physical therapy  -     Row Name 03/22/23 1034          General Information    Patient Profile Reviewed yes  -BA     Prior Level of Function independent:;all household mobility;community mobility;gait;transfer;bed mobility;ADL's;home management;cooking;cleaning;driving  Reported he used a cane for ambulation.  Hx falls, with last fall ~3 wks ago.  Takes sponge baths.  Uses 2L O2 at night only.  -     Existing Precautions/Restrictions fall;oxygen therapy device and L/min;non-weight bearing;left;right;other (see comments)   NWB BLE; wound vacs placed B feet; cardona catheter; monitor BP (low)  -     Barriers to Rehab medically complex  -     Row Name 03/22/23 1034          Living Environment    People in Home child(michelle), adult  son  -     Row Name 03/22/23 1034          Home Main Entrance    Number of Stairs, Main Entrance none;other (see comments)  has ramp  -     Row Name 03/22/23 1034          Stairs Within Home, Primary    Number of Stairs, Within Home, Primary none  -     Row Name 03/22/23 1034          Cognition    Orientation Status (Cognition) oriented x 3;verbal cues/prompts needed for orientation;time;other (see comments)  Oriented to month, but not year.  -     Row Name 03/22/23 1034          Safety Issues, Functional Mobility    Safety Issues Affecting Function (Mobility) awareness of need for assistance;insight into deficits/self-awareness;safety  precaution awareness;safety precautions follow-through/compliance  -     Impairments Affecting Function (Mobility) balance;endurance/activity tolerance;postural/trunk control;strength;shortness of breath  -           User Key  (r) = Recorded By, (t) = Taken By, (c) = Cosigned By    Initials Name Provider Type    Bev Perez, JOHNNY Physical Therapist               Mobility     Row Name 03/22/23 1040          Bed Mobility    Bed Mobility rolling left;rolling right  -     Rolling Left Ogle (Bed Mobility) minimum assist (75% patient effort);1 person assist;verbal cues;nonverbal cues (demo/gesture)  -     Rolling Right Ogle (Bed Mobility) minimum assist (75% patient effort);1 person assist;verbal cues;nonverbal cues (demo/gesture)  -     Assistive Device (Bed Mobility) bed rails;draw sheet  -     Comment, (Bed Mobility) VCs/TCs for sequencing, hand placement, and to maintain NWB BLE with rolling in bed to Lx2 and Rx1 for lift sling placement.  -     Row Name 03/22/23 1040          Bed-Chair Transfer    Bed-Chair Ogle (Transfers) dependent (less than 25% patient effort);2 person assist;verbal cues;nonverbal cues (demo/gesture)  -     Assistive Device (Bed-Chair Transfers) lift device  -     Comment, (Bed-Chair Transfer) Tolerated mechanical lift from bed to chair with VCs for sequencing and hand placement.  -     Row Name 03/22/23 1040          Sit-Stand Transfer    Comment, (Sit-Stand Transfer) STS deferred d/t NWB BLE.  -     Row Name 03/22/23 1040          Mobility    Extremity Weight-bearing Status left lower extremity;right lower extremity  -     Left Lower Extremity (Weight-bearing Status) non weight-bearing (NWB)   -     Right Lower Extremity (Weight-bearing Status) non weight-bearing (NWB)   -           User Key  (r) = Recorded By, (t) = Taken By, (c) = Cosigned By    Initials Name Provider Type    Bev Perez, JOHNNY Physical Therapist                Obj/Interventions     [facility] Name 03/22/23 1044          Range of Motion Comprehensive    General Range of Motion lower extremity range of motion deficits identified  -     Comment, General Range of Motion AROM B hip and knee WFL.  Decreased AROM L ankle DF to ~neutral and R ankle DF to ~5 degrees.  -     Row Name 03/22/23 1044          Strength Comprehensive (MMT)    General Manual Muscle Testing (MMT) Assessment lower extremity strength deficits identified  -     Comment, General Manual Muscle Testing (MMT) Assessment BLE grossly 4-/5 with functional observation.  -     Row Name 03/22/23 1044          Sensory Assessment (Somatosensory)    Sensory Assessment (Somatosensory) LE sensation intact  -           User Key  (r) = Recorded By, (t) = Taken By, (c) = Cosigned By    Initials Name Provider Type    Bev Perez, PT Physical Therapist               Goals/Plan     [facility] Name 03/22/23 1151          Bed Mobility Goal 1 (PT)    Activity/Assistive Device (Bed Mobility Goal 1, PT) sit to supine/supine to sit  -     [place] Level/Cues Needed (Bed Mobility Goal 1, PT) contact guard required  -     Time Frame (Bed Mobility Goal 1, PT) long term goal (LTG);10 days  -     Row Name 03/22/23 1151          Transfer Goal 1 (PT)    Activity/Assistive Device (Transfer Goal 1, PT) bed-to-chair/chair-to-bed;wheelchair transfer;sliding board  -     [place] Level/Cues Needed (Transfer Goal 1, PT) maximum assist (25-49% patient effort)  -     Time Frame (Transfer Goal 1, PT) long term goal (LTG);2 weeks  -     Row Name 03/22/23 1151          Problem Specific Goal 1 (PT)    Problem Specific Goal 1 (PT) Pt will be able to self-propel w/c 150ft with CGA and good obstacle navigation.  -     Time Frame (Problem Specific Goal 1, PT) long-term goal (LTG);2 weeks  -     Row Name 03/22/23 1151          Patient Education Goal (PT)    Activity (Patient Education Goal, PT) HEP  -      [place]/Cues/Accuracy (Memory Goal 2, PT) demonstrates adequately  -BA     Time Frame (Patient Education Goal, PT) long term goal (LTG);10 days  -BA     Row Name 03/22/23 1151          Therapy Assessment/Plan (PT)    Planned Therapy Interventions (PT) balance training;bed mobility training;home exercise program;motor coordination training;neuromuscular re-education;patient/family education;postural re-education;ROM (range of motion);strengthening;transfer training;wheelchair management/propulsion training  -BA           User Key  (r) = Recorded By, (t) = Taken By, (c) = Cosigned By    Initials Name Provider Type    Bev Perez, PT Physical Therapist               Clinical Impression     Row Name 03/22/23 1049          Pain    Pretreatment Pain Rating 0/10 - no pain  -BA     Posttreatment Pain Rating 0/10 - no pain  -BA     Row Name 03/22/23 1049          Plan of Care Review    Plan of Care Reviewed With patient  -BA     Outcome Evaluation PT initial Eval completed.  Pt presents with generalized weakness, impaired balance, decreased functional endurance, and decreased indep with functional mobility compared to baseline level of function.  Currently NWB BLE.  Rolling in bed with Katie.  Mechanical lift from bed to chair with depAx2.  Pt warrants skilled IP PT to improve indep with mobility and return to PLOF.  Rec SNF upon d/c.  -     Row Name 03/22/23 1049          Therapy Assessment/Plan (PT)    Rehab Potential (PT) good, to achieve stated therapy goals  -BA     Criteria for Skilled Interventions Met (PT) yes;meets criteria;skilled treatment is necessary  -BA     Therapy Frequency (PT) daily  -BA     Row Name 03/22/23 1049          Vital Signs    Pre Systolic BP Rehab 81  RN notified and aware  -BA     Pre Treatment Diastolic BP 40  -BA     Post Systolic BP Rehab 85  -BA     Post Treatment Diastolic BP 46  -BA     Pretreatment Heart Rate (beats/min) 100  -BA     Posttreatment Heart Rate (beats/min)  99  -BA     Pre SpO2 (%) 99  -BA     O2 Delivery Pre Treatment nasal cannula  -BA     Intra SpO2 (%) 93  -BA     O2 Delivery Intra Treatment nasal cannula  -BA     Post SpO2 (%) 96  -BA     O2 Delivery Post Treatment nasal cannula  -BA     Pre Patient Position Supine  -BA     Intra Patient Position Supine  -BA     Post Patient Position Sitting  -BA     Row Name 03/22/23 1049          Positioning and Restraints    Pre-Treatment Position in bed  -BA     Post Treatment Position chair  -BA     In Chair notified nsg;reclined;sitting;call light within reach;encouraged to call for assist;exit alarm on;waffle cushion;on mechanical lift sling;legs elevated;heels elevated  -BA           User Key  (r) = Recorded By, (t) = Taken By, (c) = Cosigned By    Initials Name Provider Type    Bev Perez, JOHNNY Physical Therapist               Outcome Measures     Row Name 03/22/23 1154          How much help from another person do you currently need...    Turning from your back to your side while in flat bed without using bedrails? 3  -BA     Moving from lying on back to sitting on the side of a flat bed without bedrails? 3  -BA     Moving to and from a bed to a chair (including a wheelchair)? 1  -BA     Standing up from a chair using your arms (e.g., wheelchair, bedside chair)? 1  -BA     Climbing 3-5 steps with a railing? 1  -BA     To walk in hospital room? 1  -BA     AM-PAC 6 Clicks Score (PT) 10  -BA     Highest level of mobility 4 --> Transferred to chair/commode  -BA     Row Name 03/22/23 1154          Functional Assessment    Outcome Measure Options AM-PAC 6 Clicks Basic Mobility (PT)  -BA           User Key  (r) = Recorded By, (t) = Taken By, (c) = Cosigned By    Initials Name Provider Type    Bev Perez, JOHNNY Physical Therapist                             Physical Therapy Education     Title: PT OT SLP Therapies (In Progress)     Topic: Physical Therapy (In Progress)     Point: Mobility training (In Progress)      Learning Progress Summary           Patient Acceptance, E, NR by BA at 3/22/2023 1155                   Point: Home exercise program (Not Started)     Learner Progress:  Not documented in this visit.          Point: Body mechanics (In Progress)     Learning Progress Summary           Patient Acceptance, E, NR by BA at 3/22/2023 1155                   Point: Precautions (In Progress)     Learning Progress Summary           Patient Acceptance, E, NR by BA at 3/22/2023 1155                               User Key     Initials Effective Dates Name Provider Type Discipline     09/21/21 -  Bev Gottlieb, PT Physical Therapist PT              PT Recommendation and Plan  Planned Therapy Interventions (PT): balance training, bed mobility training, home exercise program, motor coordination training, neuromuscular re-education, patient/family education, postural re-education, ROM (range of motion), strengthening, transfer training, wheelchair management/propulsion training  Plan of Care Reviewed With: patient  Outcome Evaluation: PT initial Eval completed.  Pt presents with generalized weakness, impaired balance, decreased functional endurance, and decreased indep with functional mobility compared to baseline level of function.  Currently NWB BLE.  Rolling in bed with Katie.  Mechanical lift from bed to chair with depAx2.  Pt warrants skilled IP PT to improve indep with mobility and return to PLOF.  Rec SNF upon d/c.     Time Calculation:    PT Charges     Row Name 03/22/23 1155             Time Calculation    Start Time 0952  -BA      PT Received On 03/22/23  -BA      PT Goal Re-Cert Due Date 04/01/23  -         Time Calculation- PT    Total Timed Code Minutes- PT 14 minute(s)  -BA         Timed Charges    55274 - PT Therapeutic Activity Minutes 14  -BA         Untimed Charges    PT Eval/Re-eval Minutes 56  -BA         Total Minutes    Timed Charges Total Minutes 14  -BA      Untimed Charges Total Minutes 56  -BA        Total Minutes 70  -BA            User Key  (r) = Recorded By, (t) = Taken By, (c) = Cosigned By    Initials Name Provider Type    Bev Perez, PT Physical Therapist              Therapy Charges for Today     Code Description Service Date Service Provider Modifiers Qty    87073846616 HC PT THERAPEUTIC ACT EA 15 MIN 3/22/2023 Bev Gottlieb, PT GP 1    72707969402 HC PT EVAL MOD COMPLEXITY 4 3/22/2023 Bev Gottlieb, PT GP 1          PT G-Codes  Outcome Measure Options: AM-PAC 6 Clicks Basic Mobility (PT)  AM-PAC 6 Clicks Score (PT): 10  PT Discharge Summary  Anticipated Discharge Disposition (PT): skilled nursing facility    Bev Gottlieb PT  3/22/2023

## 2023-03-22 NOTE — PROGRESS NOTES
ECHO shows new systolic CHF with EF 37% and severe TR (RVSP > 55mmHg)  Cr 3.23 today with worsening acidosis  MAP holding above 60 but consistently hypotensive despite max dose of Midodrine. Discussed with nephrology who recommended continuing midodrine. Metoprolol stopped but he has not taken this since AM of Monday 3/20. Per RN, minimal urine in cardona - planning to bladder scan but may be anuric related to renal failure. Dr. Etienne called and requested transfer to ICU. Spoke with [facility] Tamayo who accepted patient.     Spoke with patient at bedside. He confirms FULL CODE with CPR and intubation. He is agreeable to pressors and dialysis including CRRT if needed. I updated his son, Jack, over the telephone. He is aware patient moving to ICU - requests an update with room number and telephone number when possible

## 2023-03-22 NOTE — PLAN OF CARE
Goal Outcome Evaluation:  Plan of Care Reviewed With: patient           Outcome Evaluation: PT initial Eval completed.  Pt presents with generalized weakness, impaired balance, decreased functional endurance, and decreased indep with functional mobility compared to baseline level of function.  Currently NWB BLE.  Rolling in bed with Katie.  Mechanical lift from bed to chair with depAx2.  Pt warrants skilled IP PT to improve indep with mobility and return to PLOF.  Rec SNF upon d/c.

## 2023-03-22 NOTE — PLAN OF CARE
Goal Outcome Evaluation:  Plan of Care Reviewed With: patient           Outcome Evaluation: wound vacs changed today due to excessive bleeding around wound dressing. PT used silver nitrate with tez Ag to help slow bleeding to allow for application of wound vac.  PT will f/u with wound vac changes every 2-3 days.

## 2023-03-22 NOTE — PROGRESS NOTES
CTS Progress Note         LOS: 0 days     POD #4 s/p aortogram, angioplasty of left AT, left PT, placement of Xience drug eluting stent at proximal left AT, debridement of left midfott wound, right lateral forefoot wound  POD # 1 left foot 4th and 5th toe amputation, right 4th toe amputation, woundvac placmement    Subjective  No acute events overnight.   Sitting up, eating breakfast this am, no complaints    Objective    Vital Signs  Temp:  [97 °F (36.1 °C)-98.6 °F (37 °C)] 97.2 °F (36.2 °C)  Heart Rate:  [] 97  Resp:  [14-20] 14  BP: ()/(47-70) 83/51    Physical Exam:   General Appearance: alert, appears stated age and cooperative   Lungs: clear to auscultation    Heart: regular rhythm & normal rate, normal S1, S2 and no murmur, no gallop, no rub   Skin: Bilateral foot wounds with wound vac, seal intact     Results     Results from last 7 days   Lab Units 03/21/23  0645   WBC 10*3/mm3 8.15   HEMOGLOBIN g/dL 9.9*   HEMATOCRIT % 31.8*   PLATELETS 10*3/mm3 108*     Results from last 7 days   Lab Units 03/21/23  0645   SODIUM mmol/L 137   POTASSIUM mmol/L 4.3   CHLORIDE mmol/L 109*   CO2 mmol/L 17.0*   BUN mg/dL 41*   CREATININE mg/dL 2.84*   GLUCOSE mg/dL 95   CALCIUM mg/dL 8.5*       Imaging Results (Last 24 Hours)     ** No results found for the last 24 hours. **          Assessment    PVD (peripheral vascular disease) (HCC)    Foot ulcer (HCC)    Severe malnutrition (HCC)    Ulcer of right foot with other severity (HCC)      Plan   DAPT and warfarin  PT wound care consult, initiate vac change in 2 days  Surgically clear for discharge to rehab anytime    Gloria Sherman PA-C  03/22/23  08:27 EDT

## 2023-03-22 NOTE — PLAN OF CARE
Goal Outcome Evaluation:                 Patient is alert and oriented x4.  VS stable. bladder scan done same 270 mls,  f/c anchored patient denied any discomfort or pain to bladder region. A total of 300 mls clear yellow urine emptied. Wound vac bilaterally on foot remain in effect. No pain voiced. Due care and observation continue.

## 2023-03-22 NOTE — PROGRESS NOTES
"   LOS: 0 days    Patient Care Team:  Argenis Osborne APRN as PCP - General (Family Medicine)  Kenji Garcias MD as Consulting Physician (General Surgery)  Naseem Campbell III, MD as Cardiologist (Cardiology)    Reason For Visit:  F/U DEMARIO ON CKD  Subjective           Review of Systems:    Pulm: No soa   CV:  No CP. GI: NO N/V/D. SKIN. NO ITCHING.        Objective     aspirin, 81 mg, Oral, Daily  atorvastatin, 10 mg, Oral, Nightly  cefepime, 2 g, Intravenous, Q24H  clopidogrel, 75 mg, Oral, Daily  DAPTOmycin, 6 mg/kg, Intravenous, Q48H  Dulaglutide, 1.5 mg, Subcutaneous, Weekly  ferrous sulfate, 325 mg, Oral, Daily With Breakfast  heparin (porcine), 5,000 Units, Subcutaneous, Q12H  metroNIDAZOLE, 500 mg, Oral, Q8H  midodrine, 10 mg, Oral, TID AC  polyethylene glycol, 17 g, Oral, BID With Meals  senna-docusate sodium, 2 tablet, Oral, BID  sodium bicarbonate, 1,300 mg, Oral, BID  tamsulosin, 0.4 mg, Oral, Daily             Vital Signs:  Blood pressure (!) 77/49, pulse 94, temperature 97.2 °F (36.2 °C), temperature source Oral, resp. rate 14, height 185.4 cm (73\"), weight 73 kg (161 lb), SpO2 98 %.    Flowsheet Rows    Flowsheet Row First Filed Value   Admission Height 185.4 cm (73\") Documented at 03/21/2023 1254   Admission Weight 73 kg (161 lb) Documented at 03/21/2023 1254          03/21 0701 - 03/22 0700  In: 1000 [I.V.:700]  Out: 170 [Urine:100]    Physical Exam:    General Appearance: NAD, alert and cooperative, Ox3  Eyes: PER, conjunctivae and sclerae normal, no icterus  Lungs: respirations regular and unlabored, no crepitus, clear to auscultation  Heart/CV: regular rhythm & normal rate, no murmur, no gallop, no rub and TR edema  Abdomen: not distended, soft, non-tender, no masses,  bowel sounds present  Skin: No rash, Warm and dry. WOUND VAC FOOT.      Renal Imaging: RENAL U/S PENDING.        Labs: U EOS NEG. U NA 20. P/C RATIO 927. CPK 44. FERRITIN 357.  Results from last 7 days   Lab Units 03/22/23  1126 " 03/21/23  0645 03/20/23 2216 03/19/23  0543   WBC 10*3/mm3  --  8.15 7.97 5.70   HEMOGLOBIN g/dL 9.8* 9.9* 9.9* 9.7*   HEMATOCRIT % 33.5* 31.8* 32.0* 31.0*   PLATELETS 10*3/mm3  --  108* 107* 117*     Results from last 7 days   Lab Units 03/22/23  0651 03/21/23  0645 03/20/23 2216 03/20/23  0839 03/19/23  0543   SODIUM mmol/L 140 137 136 139 136   POTASSIUM mmol/L 4.9 4.3 4.1 4.1 3.9   CHLORIDE mmol/L 108* 109* 106 110* 108*   CO2 mmol/L 12.0* 17.0* 19.0* 18.0* 19.0*   BUN mg/dL 54* 41* 42* 49* 45*   CREATININE mg/dL 3.23* 2.84* 2.79* 2.60* 2.59*   CALCIUM mg/dL 8.7 8.5* 8.5* 8.5* 8.4*   PHOSPHORUS mg/dL 6.6*  --   --   --   --    ALBUMIN g/dL 2.8* 2.9* 2.9*  --  2.8*     Results from last 7 days   Lab Units 03/22/23  0651   GLUCOSE mg/dL 145*       Results from last 7 days   Lab Units 03/21/23  0645   ALK PHOS U/L 67   BILIRUBIN mg/dL 0.5   ALT (SGPT) U/L <5   AST (SGOT) U/L 12           Results from last 7 days   Lab Units 03/18/23  1044   COLOR UA  Yellow   CLARITY UA  Clear   PH, URINE  5.5   SPECIFIC GRAVITY, URINE  1.018   GLUCOSE UA  Negative   KETONES UA  Negative   BILIRUBIN UA  Negative   PROTEIN UA  30 mg/dL (1+)*   BLOOD UA  Moderate (2+)*   LEUKOCYTES UA  Small (1+)*   NITRITE UA  Negative       Estimated Creatinine Clearance: 21 mL/min (A) (by C-G formula based on SCr of 3.23 mg/dL (H)).      Assessment       PVD (peripheral vascular disease) (HCC)    Foot ulcer (HCC)    Severe malnutrition (HCC)    Ulcer of right foot with other severity (HCC)            Impression: DEMARIO ON PROBABLE STAGE 4 CKD ( PATIENT STATES HE SEES DR STYLES AND HAS BEEN TOLD THAT HIS KIDNEYS ARE BARELY WORKING ). WORSE GFR LIKELY FROM HYPOTENSION. ANEMIA WITH MARGINAL FE STORES. ACIDOSIS.            Recommendations: STOP METOPROLOL. AGREE MIDODRINE. START PO BICARBONATE. AWAIT RENAL U/S. MONITOR GFR/LYTES. MAY BE HEADING TOWARD RRT.       Dakota Kaiser MD  03/22/23  14:28 EDT

## 2023-03-22 NOTE — PROGRESS NOTES
[facility] Medicine Services  PROGRESS NOTE    Patient Name: Swapnil Lawson  : 1950  MRN: [id number]    Date of Admission: 3/17/2023  Primary Care Physician: Argenis Osborne, RADHA    Subjective     CC: f/u PAD / foot ulcerations     HPI:  In bed. BP has been low despite midodrine. He feels fine and has no complaints. No pain in feet. Renal function continues to worsen.      ROS:  Gen- No fevers, chills  CV- No chest pain, palpitations  Resp- No cough, dyspnea  GI- No N/V/D, abd pain    Objective     Vital Signs:   Temp:  [97 °F (36.1 °C)-98.6 °F (37 °C)] 97.2 °F (36.2 °C)  Heart Rate:  [] 106  Resp:  [14-16] 14  BP: ()/(47-70) 84/49  Flow (L/min):  [2-4] 2     Physical Exam:  Constitutional: No acute distress, awake, alert and conversant. Thin and chronically ill appearing   HENT: NCAT, mucous membranes moist. Poor dentition   Respiratory: Clear to auscultation bilaterally, respiratory effort normal - sats stable on 1L NC however he requested to be placed on 2L NC  Cardiovascular: IRR, rate 80's without m/r/g   Gastrointestinal: Positive bowel sounds, soft, nontender, nondistended  Musculoskeletal: Trace to 1+ pitting bilateral ankle edema. Wound vacs on feet bilaterally, copious blood in cannisters.   Psychiatric: Appropriate affect, cooperative  Neurologic: Oriented x 3, moves all extremities spontaneously without focal deficits, speech clear    Results Reviewed:  LAB RESULTS:      Lab 23  1126 23  0645 23  2216 23  0839 23  0543 23  0643 23  1340 23  1611   WBC  --  8.15 7.97  --  5.70 5.19  --  6.73   HEMOGLOBIN 9.8* 9.9* 9.9*  --  9.7* 10.4*  --  11.2*   HEMATOCRIT 33.5* 31.8* 32.0*  --  31.0* 33.2*  --  35.0*   PLATELETS  --  108* 107*  --  117* 114*  --  133*   NEUTROS ABS  --  6.69 6.66  --  4.60 4.50  --   --    IMMATURE GRANS (ABS)  --  0.10* 0.12*  --  0.05 0.07*  --   --    LYMPHS ABS  --  0.54* 0.47*   --  0.56* 0.41*  --   --    MONOS ABS  --  0.66 0.58  --  0.40 0.19  --   --    EOS ABS  --  0.12 0.11  --  0.06 0.00  --   --    MCV  --  90.9 90.4  --  89.1 90.2  --  87.9   PROCALCITONIN  --   --  0.18  --   --   --   --   --    LACTATE  --   --  1.2  --  0.7  --   --   --    PROTIME  --  19.1*  --  19.7* 40.2*  --  30.2* 29.5*   APTT  --   --   --   --   --   --   --  54.6*   D DIMER QUANT  --   --  1.79*  --   --   --   --   --          Lab 03/22/23  0651 03/21/23  0645 03/20/23 2216 03/20/23  0839 03/19/23  0543 03/18/23  0643 03/16/23  1611   SODIUM 140 137 136 139 136   < > 139   POTASSIUM 4.9 4.3 4.1 4.1 3.9   < > 4.8   CHLORIDE 108* 109* 106 110* 108*   < > 110*   CO2 12.0* 17.0* 19.0* 18.0* 19.0*   < > 18.2*   ANION GAP 20.0* 11.0 11.0 11.0 9.0   < > 10.8   BUN 54* 41* 42* 49* 45*   < > 43*   CREATININE 3.23* 2.84* 2.79* 2.60* 2.59*   < > 2.82*   EGFR 19.5* 22.7* 23.2* 25.2* 25.4*   < > 22.9*   GLUCOSE 145* 95 120* 130* 99   < > 105*   CALCIUM 8.7 8.5* 8.5* 8.5* 8.4*   < > 9.7   PHOSPHORUS 6.6*  --   --   --   --   --   --    HEMOGLOBIN A1C  --   --   --   --   --   --  5.80*    < > = values in this interval not displayed.         Lab 03/22/23  0651 03/21/23  0645 03/20/23 2216 03/19/23  0543   TOTAL PROTEIN  --  5.4* 5.4* 5.6*   ALBUMIN 2.8* 2.9* 2.9* 2.8*   GLOBULIN  --  2.5 2.5 2.8   ALT (SGPT)  --  <5 <5 <5   AST (SGOT)  --  12 11 9   BILIRUBIN  --  0.5 0.5 0.2   ALK PHOS  --  67 68 68         Lab 03/21/23  0645 03/20/23  0839 03/19/23  0543 03/17/23  1340 03/16/23  1611   PROTIME 19.1* 19.7* 40.2* 30.2* 29.5*   INR 1.61* 1.68* 4.10* 2.87* 2.78*         Lab 03/22/23  0651 03/20/23  0839 03/20/23  0839 03/19/23  0930 03/17/23  1352 03/17/23  1258   IRON  --   --  36*  --   --   --    IRON SATURATION  --   --  16*  --   --   --    TIBC  --   --  228*  --   --   --    TRANSFERRIN  --   --  153*  --   --   --    FERRITIN 357.70   < > 191.80  --   --   --    FOLATE  --   --   --  6.77  --   --    VITAMIN  B 12  --   --  345  --   --   --    ABO TYPING  --   --   --   --  A A   RH TYPING  --   --   --   --  Positive Positive   ANTIBODY SCREEN  --   --   --   --   --  Negative    < > = values in this interval not displayed.     Brief Urine Lab Results  (Last result in the past 365 days)      Color   Clarity   Blood   Leuk Est   Nitrite   Protein   CREAT   Urine HCG        03/22/23 0022             93.7             Microbiology Results Abnormal     Procedure Component Value - Date/Time    AFB Culture - Wound, Foot, Left [[id number]] Collected: 03/21/23 1400    Lab Status: Preliminary result Specimen: Wound from Foot, Left Updated: 03/22/23 1306     AFB Stain No acid fast bacilli seen on concentrated smear    AFB Culture - Wound, Foot, Right [[id number]] Collected: 03/21/23 1401    Lab Status: Preliminary result Specimen: Wound from Foot, Right Updated: 03/22/23 1306     AFB Stain No acid fast bacilli seen on concentrated smear    Wound Culture - Wound, Foot, Left [[id number]] Collected: 03/21/23 1400    Lab Status: Preliminary result Specimen: Wound from Foot, Left Updated: 03/22/23 1002     Wound Culture No growth     Gram Stain Few (2+) WBCs seen      No organisms seen    Wound Culture - Wound, Foot, Right [[id number]] Collected: 03/21/23 1401    Lab Status: Preliminary result Specimen: Wound from Foot, Right Updated: 03/22/23 1002     Wound Culture No growth     Gram Stain Few (2+) WBCs seen      No organisms seen    Eosinophil Smear - Urine, Urine, Clean Catch [[id number]]  (Normal) Collected: 03/22/23 0022    Lab Status: Final result Specimen: Urine, Clean Catch Updated: 03/22/23 0223     Eosinophil Smear 0 % EOS/100 Cells     Narrative:      No eosinophil seen    Tissue / Bone Culture - Bone, Foot, Left [[id number]] Collected: 03/17/23 1649    Lab Status: Final result Specimen: Bone from Foot, Left Updated: 03/20/23 0834     Tissue Culture No growth at 3 days     Gram Stain Few (2+) WBCs seen      No organisms seen     Urine Culture - Urine, Straight Cath [[id number]]  (Normal) Collected: 03/18/23 1044    Lab Status: Final result Specimen: Urine from Straight Cath Updated: 03/19/23 1257     Urine Culture No growth    AFB Culture - Bone, Foot, Left [[id number]] Collected: 03/17/23 1649    Lab Status: Preliminary result Specimen: Bone from Foot, Left Updated: 03/18/23 1238     AFB Stain No acid fast bacilli seen on concentrated smear        No radiology results from the last 24 hrs  Current medications:  Scheduled Meds:aspirin, 81 mg, Oral, Daily  atorvastatin, 10 mg, Oral, Nightly  cefepime, 2 g, Intravenous, Q24H  clopidogrel, 75 mg, Oral, Daily  DAPTOmycin, 6 mg/kg, Intravenous, Q48H  Dulaglutide, 1.5 mg, Subcutaneous, Weekly  ferrous sulfate, 325 mg, Oral, Daily With Breakfast  heparin (porcine), 5,000 Units, Subcutaneous, Q12H  metroNIDAZOLE, 500 mg, Oral, Q8H  midodrine, 10 mg, Oral, TID AC  polyethylene glycol, 17 g, Oral, BID With Meals  senna-docusate sodium, 2 tablet, Oral, BID  sodium bicarbonate, 1,300 mg, Oral, BID  tamsulosin, 0.4 mg, Oral, Daily      Continuous Infusions:   PRN Meds:.•  acetaminophen  •  acetaminophen  •  senna-docusate sodium **AND** bisacodyl **AND** bisacodyl  •  Chlorhexidine Gluconate Cloth  •  diphenhydrAMINE  •  docusate sodium  •  HYDROcodone-acetaminophen  •  HYDROmorphone **AND** naloxone  •  ipratropium  •  ondansetron **OR** ondansetron    Assessment & Plan     Active Hospital Problems    Diagnosis  POA   • **PVD (peripheral vascular disease) ([facility]) [I73.9]  Unknown   • Ulcer of right foot with other severity ([facility]) [L97.518]  Yes   • Severe malnutrition ([facility]) [E43]  Yes   • Foot ulcer ([facility]) [L97.509]  Unknown      Resolved Hospital Problems   No resolved problems to display.     Brief Hospital Course to date:  Swapnil Lawson is a 72 y.o. male with PMH significant for CAD s/p CABG, chronic diastolic CHF, atrial fibrillation (Warfarin), prior DVT/PE, COPD, non-insulin dependent DMII, CKD III,  venous insufficiency and PAD. Evaluated by Dr. Etienne on 3/16/2023 for non-healing ulcer on the lateral aspect of his L foot, present for 6 months as well as a fairly recently new ulceration on the plantar aspect of the R lateral forefoot. Pedal pulses were not palpable. Previously evaluated by Dr. Zaldivar - arteriography of LLE revealed severe stenosis at proximal tibial artery.  Dr. Etienne recommended hospital admission but the patient declined, opting to return to the hospital on 3/17/23, at which time he underwent aortagram with run off with angioplasty of L anterior tibial, L posterior tibial, L popliteal arteries, stent placement at proximal L anterior tibial artery as well as sharp debridement of L lateral midfoot wound down to level of bone and sharp debridement of R lateral forefoot wound down to level of bone. He was admitted to CT surgery service. ID consulted. Hospital medicine asked to assume care. ID has initiated Daptomycin / Zosyn. Dr. Oviedo to evaluate for consideration of amputation in the upcoming days.      Foot ulcerations / gangrene  Osteomyelitis of L 5th metatarsal and R 4th metatarsal and proximal phalanx   Peripheral arterial disease / critical limb ischemia  - s/p angiogram with runoff with angioplasty and stent placement by Dr. Etienne on 3/17/23  - Continue ASA and Plavix   - ID following - now on IV Daptomycin/Zosyn  - MRI of both feet obtained and show osteomyelitis of L 5th metatarsal and R 4th metatarsal and proximal phalanx    - s/p debridement at left midfoot with partial amputation of left fourth and fifth mid-metatarsal bones with application of wound VAC as well as debridement and amputation of right fourth metatarsal bone with application of wound VAC  - Will need PT/OT evals      Hypotension  - BP low. Increasde Midodrine to 10mg TID   - Check ECHO   - Give albumin IV today      DEMARIO on CKD III  Metabolic acidosis   - Baseline creatinine appears to be 1.6-2.0  - Creatinine and  acidosis worsening today - Cr 3.23 / CO2 12  - Nephrology following - starting PO bicarb. May need RRT if continues to worsen  - AM renal function panel      Atrial fibrillation   Chronic anticoagulation   - Chronically anticoagulated with Warfarin. GOAL INR 2-3   - Warfarin currently on hold for possible surgical intervention   - DC Metoprolol due to hypotension  - Consider cardiology consultation if develops rapid afib   - INR 4.1 on 3/19 - s/p Vitamin K 10mg  - Repeat PT/INR in AM      Chronic diastolic CHF   - No ECHO on file. Per Dr. Campbell 5/2022 note, February 2019 ECHO with EF 50%, moderately dilated LV, moderately dilated RV with normal function, moderate to severe L atrial enlargement and dilated IVC  - Appears euvolemic. Known history of venous insufficiency which contributes to lower extremity edema   - Repeat ECHO today     Anemia  - Iron studies consistent with ACOD / mild iron deficiency  - Continue PO iron supplement      Non-insulin dependent DMII  - Hgb A1c 5.8%   - Took home Trulicity on Seth 3/19  - Stopped accuchecks and SSI. Not requiring sliding scale insulin     COPD  - Wears 2L NC QHS at baseline. Says wears O2 PRN during the day     Expected Discharge Location and Transportation: home vs rehab?  Expected Discharge   Expected Discharge Date and Time     Expected Discharge Date Expected Discharge Time    Mar 27, 2023         DVT prophylaxis:Medical DVT prophylaxis orders are present.     AM-PAC 6 Clicks Score (PT): 10 (03/22/23 2872)    CODE STATUS:   Code Status and Medical Interventions:   Ordered at: 03/17/23 2001     Level Of Support Discussed With:    Patient     Code Status (Patient has no pulse and is not breathing):    CPR (Attempt to Resuscitate)     Medical Interventions (Patient has pulse or is breathing):    Full Support     Release to patient:    Routine Release     Khadijah Chiu PA-C  03/22/23

## 2023-03-23 NOTE — PROGRESS NOTES
INTENSIVIST   PROGRESS NOTE     Hospital:  LOS: 1 day     Mr. Swapnil Lawson, 72 y.o. male is followed for a Chief Complaint of: Shock, DEMARIO      Subjective   S     Interval History:  On Levophed. Renal function worsening. On a bicarb infusion.       The patient's relevant past medical, surgical and social history were reviewed and updated in Epic as appropriate.      ROS:   Constitutional: Negative for fever.   Respiratory: Positive for dyspnea.   Cardiovascular: Negative for chest pain.   Gastrointestinal: Negative for  nausea, vomiting and diarrhea.     Objective   O     Vitals:  Temp  Min: 96.4 °F (35.8 °C)  Max: 97.3 °F (36.3 °C)  BP  Min: 69/42  Max: 126/93  Pulse  Min: 94  Max: 120  Resp  Min: 14  Max: 16  SpO2  Min: 92 %  Max: 100 % Flow (L/min)  Min: 2  Max: 2    Intake/Ouptut 24 hrs (7:00AM - 6:59 AM)  Intake & Output (last 3 days)       03/20 0701 03/21 0700 03/21 0701  03/22 0700 03/22 0701  03/23 0700 03/23 0701  03/24 0700    P.O. 360  480     I.V. (mL/kg)  700 (9.6) 517 (7.1) 541 (7.4)    Other  250      IV Piggyback 50 50      Total Intake(mL/kg) 410 1000 (13.7) 997 (13.7) 541 (7.4)    Urine (mL/kg/hr)  100 (0.1) 50 (0)     Blood  70      Total Output  170 50     Net +410 +830 +947 +541            Urine Unmeasured Occurrence 1 x       Stool Unmeasured Occurrence 2 x             Medications (drips):  norepinephrine, Last Rate: 0.04 mcg/kg/min (03/23/23 1115)  Phoxillum BK4/2.5  Phoxillum BK4/2.5  Phoxillum BK4/2.5  sodium bicarbonate drip (greater than 75 mEq/bag), Last Rate: 150 mEq (03/23/23 1146)          Physical Examination  Telemetry:  Normal sinus rhythm.    Constitutional:  No acute distress. Thin elderly male.  Resting in bed on nasal cannula.    Eyes: No scleral icterus.   PERRL, EOM intact.    Neck:  Supple, FROM   Cardiovascular: Normal rate, regular and rhythm. Normal heart sounds.  No murmurs, gallop or rub.   Respiratory: No respiratory distress. Normal respiratory  effort.  Diminished bilaterally.    Abdominal:  Soft. No masses. Nontender. No distension. No HSM.   Extremities: No digital cyanosis. No clubbing.  No peripheral edema.   Skin: No rashes, lesions or ulcers   Neurological:   Alert and interactive.               Results from last 7 days   Lab Units 03/23/23 0041 03/22/23 2101 03/22/23  1126 03/21/23  0645 03/20/23  2216   WBC 10*3/mm3 11.09*  --   --  8.15 7.97   HEMOGLOBIN g/dL 8.9* 9.5* 9.8* 9.9* 9.9*   MCV fL 91.3  --   --  90.9 90.4   PLATELETS 10*3/mm3 142  --   --  108* 107*     Results from last 7 days   Lab Units 03/23/23 0041 03/22/23 2101 03/22/23  0651   SODIUM mmol/L 136 137 140   POTASSIUM mmol/L 4.4 4.4 4.9   CO2 mmol/L 17.0* 16.0* 12.0*   CREATININE mg/dL 3.91* 3.89* 3.23*   GLUCOSE mg/dL 178* 185* 145*   MAGNESIUM mg/dL 2.3  --   --    PHOSPHORUS mg/dL 6.1*  --  6.6*     Estimated Creatinine Clearance: 17.4 mL/min (A) (by C-G formula based on SCr of 3.91 mg/dL (H)).  Results from last 7 days   Lab Units 03/23/23 0041 03/22/23 2101 03/21/23  0645   ALK PHOS U/L 59 74 67   BILIRUBIN mg/dL 0.2 0.2 0.5   ALT (SGPT) U/L <5 <5 <5   AST (SGOT) U/L 11 13 12       Results from last 7 days   Lab Units 03/22/23 2021   PH, ARTERIAL pH units 7.190*   PCO2, ARTERIAL mm Hg 37.0   PO2 ART mm Hg 91.6   FIO2 % 28       Images:  Imaging Results (Last 24 Hours)     Procedure Component Value Units Date/Time    XR Chest 1 View [[id number]] Collected: 03/22/23 2155     Updated: 03/22/23 2357    Narrative:      Chest one view.    DATE: 3/22/2023 at 11:19 PM.    COMPARISON: 3/22/2023 at 9:15 PM.    CLINICAL HISTORY: Central line placement.      Impression:        There is been placement of a left internal jugular central venous catheter with the catheter tip overlying the superior vena cava.    Evaluation of the heart and lungs is otherwise unchanged with cardiomegaly, pulmonary edema and bilateral pleural effusions.      Slot 94.    Electronically signed by:  Roberto  JOHNY Norman    3/22/2023 9:56 PM Mountain Time    XR Chest 1 View [[id number]] Collected: 03/22/23 2235     Updated: 03/22/23 2240    Narrative:      XR CHEST 1 VW    Date of Exam: 3/22/2023 9:01 PM EDT    Indication: shock state.    Comparison: Chest x-ray 9/25/2022    Findings:  Redemonstration of prior CABG. There is cardiomegaly. There is a moderate right layering pleural effusion. There is a small left layering pleural effusion. There is associated bibasilar opacities which may reflect comminution of pleural fluid and   associated atelectasis. There is diffuse interstitial prominence and perihilar vascular congestion compatible with component of interstitial edema. No pneumothorax. No acute osseous findings.      Impression:      Impression:  Cardiomegaly, interstitial edema, and moderate right and small left pleural effusions.    Electronically Signed: Silvio Aviles    3/22/2023 10:37 PM EDT    Workstation ID: [id number]    US Renal Limited [[id number]] Collected: 03/22/23 1903     Updated: 03/22/23 1911    Narrative:      US RENAL LIMITED    Date of Exam: 3/22/2023 4:15 PM EDT    Indication: DEMARIO. ? CKD.    Comparison: No comparisons available.    Technique: Grayscale and color Doppler ultrasound evaluation of the kidneys and urinary bladder was performed.    Findings:  Right kidney:  Normal size of the kidney measuring 10.2 x 4.8 x 4.8 cm. Normal renal cortical echogenicity. No renal cortical atrophy. There is a small simple appearing renal cortical cyst at the inferior pole measuring 2.0 cm in diameter. Normal renal hilar   vascularity on color Doppler assessment. No hydronephrosis. No sonographic evidence of nephrolithiasis. No perinephric fluid.    Left kidney:  Normal size of the kidney measuring 11.2 x 5.4 x 5.6 cm. Normal renal cortical echogenicity. No renal cortical atrophy. There is a small simple appearing renal cortical cyst at the inferior pole measuring 2.0 cm in diameter. Normal renal hilar    vascularity on color Doppler assessment. No hydronephrosis. No sonographic evidence of nephrolithiasis. No perinephric fluid.    Bladder:  Unremarkable.     Other:   There is evidence of ascites.      Impression:      Impression:  No hydronephrosis. Incidental note of small bilateral renal cysts with otherwise unremarkable appearance of the kidneys.    Electronically Signed: Silvio Aviles    3/22/2023 7:08 PM EDT    Workstation ID: [id number]            Results: Reviewed.  I reviewed the patient's new laboratory and imaging results.  I independently reviewed the patient's new images.    Medications: Reviewed.    Assessment & Plan   A / P     Mr. Lawson is a 71yo M with a history of HTN, T2DM, HLD, CAD s/p CABG (1993 and redo 2013), PAF on coumadin, and PAD with severe left tibial artery stenosis who was admitted on 3/16/23 for elective aortogram with extensive lower extremity revascularization (angioplasty of left anterior tibial, left posterior tibial and left popliteal artery with ALVA to proximal left anterior tibial) with sharp debridement of both feet by Dr. Etienne for critical limb ischemia/bilateral foot infections with ischemic gangrene.     ID is following for antibiotic management.     He returned to the OR on 3/21/22 and underwent partial amputation of the left 4th and 5th toes and the right 4th toe.     Postoperatively, he developed worsening renal function and Nephrology was consulted and started on PO bicarb. He developed hypotension despite the initiation and increases of Midodrine.     A transthoracic echocardiogram was performed on 3/22/23 and showed a newly reduced EF of 38% with moderate to severe TR and an RVSP > 55mHg.     He was transferred to the ICU on the evening of 3/22/23 for the initiation of vasopressors.     He remains on Levophed this AM. Renal function continues to worsen.     Nutrition:   Diet: Regular/House Diet; Texture: Soft to Chew (NDD 3); Soft to Chew: Chopped Meat; Fluid  Consistency: Thin (IDDSI 0)  Advance Directives:   Code Status and Medical Interventions:   Ordered at: 03/17/23 2001     Level Of Support Discussed With:    Patient     Code Status (Patient has no pulse and is not breathing):    CPR (Attempt to Resuscitate)     Medical Interventions (Patient has pulse or is breathing):    Full Support     Release to patient:    Routine Release       Active Hospital Problems    Diagnosis    • **Acute HFrEF    • Ischemic gangrene    • Chronic anticoagulation on Coumadin    • DEMARIO superimposed on CKD    • Hypotension    • PVD    • Stage III CKD    • CAD s/p CABG (1993; redo 2013)    • HTN (hypertension)    • PAF    • Dyslipidemia    • T2DM        Assessment / Plan:    Continue ICU care  Titrate Levophed  Tunneled dialysis catheter placement with initiation of CRRT after.  Antibiotics per ID  Cardiology following for new systolic heart failure and Afib.  Postoperative management per Dr. Etienne  Continue ASA/Plavix/Statin  AM labs    Critically ill secondary to cardiogenic shock with resultant renal failure.     High level of risk due to:  severe exacerbation of chronic illness and illness with threat to life or bodily function.    Plan of care and goals reviewed during interdisciplinary rounds.  I discussed the patient's findings and my recommendations with patient and nursing staff    Critical Care Time: was greater than 35 minutes.(This excludes time spent performing separately reportable procedures and services). including high complexity decision making to assess, manipulate, and support vital organ system failure in this individual who has impairment of one or more vital organ systems such that there is a high probability of imminent or life threatening deterioration in the patient’s condition.      Elham Morales, DO    Intensive Care Medicine and Pulmonary Medicine

## 2023-03-23 NOTE — PRE-SEDATION DOCUMENTATION
[facility]   Vascular Interventional Radiology  History & Physicial    Pertinent information including components of history, physical examination, review of systems, lab and imaging data, and impression and plan from this source was also reviewed for the creation of the following pre-procedural note: Progress Notes by Ben Painter MD (2023 09:27)     Progress Notes by Andrew Elham BETHEA  (2023 13:09)         Patient Name:Swapnil Lawson    : 1950    MRN: [id number]    Primary Care Physician: Argenis Osborne APRN    Referring Physician: No ref. provider found     Date of admission: 3/17/2023    Subjective     Reason for Consult: TDC placement    History of Present Illness     Swapnil Lawson is a 72 y.o. male referred to IR as noted above.      Active Hospital Problems:  Active Hospital Problems    Diagnosis    • **Acute HFrEF    • Ischemic gangrene    • Chronic anticoagulation on Coumadin    • DEMARIO superimposed on CKD    • Hypotension    • PVD    • Stage III CKD    • CAD s/p CABG (; redo )    • HTN (hypertension)    • PAF    • Dyslipidemia    • T2DM        Personal History     Past Medical History:   Diagnosis Date   • Abnormal ECG    • Cataracts, bilateral    • CHF (congestive heart failure) (HCC)    • Childhood asthma    • COPD (chronic obstructive pulmonary disease) (HCC)    • Coronary artery disease 2016    CABG, , Abimael Tomlin MD. CABG, 2013, [facility].   • Diabetes mellitus (HCC) 2016    type II   • DVT (deep venous thrombosis) (HCC)     RLE   • Gout    • Hepatitis     1970s unsure of which one- states it was caused from drinking contaminated water   • Hyperlipidemia 2016   • Hypertension 2016   • Kidney disease    • Myocardial infarction (HCC)      in  and  prior to CABG.   • Paroxysmal atrial fibrillation (HCC) 2016   • Pulmonary embolism (HCC)    • Sleep apnea    • Venous insufficiency 2016    • Wears glasses     for reading       Past Surgical History:   Procedure Laterality Date   • AMPUTATION FOOT Bilateral 3/21/2023    Procedure: FOOT RAY TRANSMETATARSAL AMPUTATION LATERAL LEFT, RIGHT FOOT TRANSMETATARSAL AMPUTATION;  Surgeon: Jose Cruz Etienne MD;  Location: [facility] OR;  Service: Vascular;  Laterality: Bilateral;   • AORTAGRAM N/A 3/17/2023    Procedure: AORTAGRAM WITH  RUNOFFS WITH STENT PLACEMENT;  Surgeon: Jose Cruz Etienne MD;  Location: [facility] HYBRID [facility];  Service: Vascular;  Laterality: N/A;   fluoro  dose  contrast    • APPENDECTOMY  1981   • BUNIONECTOMY Left    • CARDIAC CATHETERIZATION      x4, no stents   • CARDIAC CATHETERIZATION N/A 02/16/2023    Procedure: Peripheral angiography  Left lower extremity angiogram wit interventional Cardiologist;  Surgeon: Reed Zaldivar MD;  Location: [facility] CATH INVASIVE LOCATION;  Service: Cardiovascular;  Laterality: N/A;  Left lower extremity angiogram with interventional cardioligist.   • CATARACT EXTRACTION W/ INTRAOCULAR LENS IMPLANT Right 06/25/2020    Procedure: CATARACT PHACO EXTRACTION WITH INTRAOCULAR LENS IMPLANT RIGHT;  Surgeon: Omar Blood MD;  Location: [facility] OR;  Service: Ophthalmology;  Laterality: Right;   • CATARACT EXTRACTION W/ INTRAOCULAR LENS IMPLANT Left 07/09/2020    Procedure: CATARACT PHACO EXTRACTION WITH INTRAOCULAR LENS IMPLANT LEFT;  Surgeon: Omar Blood MD;  Location: [facility] OR;  Service: Ophthalmology;  Laterality: Left;   • CHOLECYSTECTOMY  2002   • COLON RESECTION Right 09/12/2022    Procedure: COLON RESECTION LAPAROSCOPIC HAND ASSISTED;  Surgeon: Kenji Garcias MD;  Location: [facility] OR;  Service: General;  Laterality: Right;   • COLON RESECTION N/A    • COLONOSCOPY     • COLONOSCOPY N/A 08/11/2022    Procedure: COLONOSCOPY, biopsy, polypectomy x1 and tattooing;  Surgeon: Kenji Garcias MD;  Location: [facility] ENDOSCOPY;  Service: Gastroenterology;  Laterality: N/A;   • CORONARY ARTERY BYPASS GRAFT       1993 triple bypass   • CORONARY ARTERY BYPASS GRAFT  2013    double bypass   • FINGER SURGERY      Rt index (second finger)   • OTHER SURGICAL HISTORY  2010    Bone spur removal   • TOE AMPUTATION Left 2009    4th toe   • VASECTOMY     • WOUND DEBRIDEMENT Bilateral 3/17/2023    Procedure: DEBRIDEMENT FOOT BILATERAL;  Surgeon: Jose Cruz Etienne MD;  Location: [facility];  Service: Vascular;  Laterality: Bilateral;       Family History: His family history includes Asthma in his father; COPD in his mother; Heart attack in his father.     Social History: He  reports that he quit smoking about 30 years ago. His smoking use included cigarettes. He started smoking about 58 years ago. He has a 28.00 pack-year smoking history. He has never used smokeless tobacco. He reports that he does not drink alcohol and does not use drugs.    Home Medications:  Dulaglutide, O2, aspirin, colchicine, diphenhydrAMINE, docusate sodium, ferrous gluconate, ipratropium, lovastatin, metoprolol succinate XL, nitroglycerin, polycarbophil, and warfarin    Current Medications:  •  acetaminophen  •  acetaminophen  •  albumin human  •  alteplase  •  anticoagulant citrate (ACD) formula A  •  aspirin  •  atorvastatin  •  senna-docusate sodium **AND** bisacodyl **AND** bisacodyl  •  Calcium Replacement - Follow Nurse / BPA Driven Protocol  •  cefepime  •  Chlorhexidine Gluconate Cloth  •  clopidogrel  •  [START ON 3/25/2023] DAPTOmycin  •  diphenhydrAMINE  •  docusate sodium  •  Dulaglutide  •  ferrous sulfate  •  glycopyrrolate  •  heparin (porcine)  •  HYDROcodone-acetaminophen  •  hydrocortisone sodium succinate  •  HYDROmorphone **AND** naloxone  •  HYDROmorphone  •  insulin lispro  •  ipratropium  •  LORazepam  •  Magnesium Standard Dose Replacement - Follow Nurse / BPA Driven Protocol  •  metroNIDAZOLE  •  ondansetron **OR** ondansetron  •  pantoprazole  •  Phosphorus Replacement - Follow Nurse / BPA Driven Protocol  •  polyethylene  "glycol  •  Potassium Replacement - Follow Nurse / BPA Driven Protocol  •  sodium chloride  •  tamsulosin     Allergies:  He has No Known Allergies.    Review of Systems    IR Procedure pertinent significant findings are mentioned in the PMH and PSH above.    Objective     Visit Vitals  BP 90/54   Pulse 110   Temp 97.4 °F (36.3 °C)   Resp 20   Ht 185.4 cm (73\")   Wt 73 kg (161 lb)   SpO2 90%   BMI 21.24 kg/m²        Physical Exam    A&O.   Able to communicate  No Apparent Distress  Average physique   CVS: Regular rate and rhythm  Respiratory: Non labored breathing. No signs of respiratory compromise.    Result Review      I have personally reviewed the results from the time of this admission to 3/24/2023 14:54 EDT and agree with these findings.  [x]  Laboratory  []  Microbiology  [x]  Radiology  []  EKG/Telemetry   []  Cardiology/Vascular   []  Pathology  []  Old records  []  Other:    Most notable findings include: As noted:    Results from last 7 days   Lab Units 03/24/23  0542 03/23/23  0041 03/22/23  2101 03/22/23  1126 03/21/23  0645 03/20/23  2216 03/20/23  0839 03/19/23  0543 03/18/23  0643   INR   --  1.47*  --   --  1.61*  --  1.68* 4.10*  --    HEMOGLOBIN g/dL 7.9* 8.9* 9.5* 9.8* 9.9* 9.9*  --  9.7* 10.4*   HEMATOCRIT % 24.5* 29.3* 31.4* 33.5* 31.8* 32.0*  --  31.0* 33.2*   PLATELETS 10*3/mm3 138* 142  --   --  108* 107*  --  117* 114*       Estimated Creatinine Clearance: 32.5 mL/min (A) (by C-G formula based on SCr of 2.09 mg/dL (H)).   Creatinine   Date Value Ref Range Status   03/24/2023 2.09 (H) 0.76 - 1.27 mg/dL Final   03/24/2023 1.86 (H) 0.76 - 1.27 mg/dL Final   03/24/2023 2.38 (H) 0.76 - 1.27 mg/dL Final       COVID19   Date Value Ref Range Status   07/06/2020 Not Detected Not Detected - Ref. Range Final        No results found for: PREGTESTUR, PREGSERUM, HCG, HCGQUANT     ASA SCALE ASSESSMENT (applicable ONLY if sedation planned):   4     MALLAMPATI CLASSIFICATION (applicable ONLY if sedation " planned):   2    Assessment / Plan     Swapnil Lawson is a 72 y.o. male referred to the IR service with above problem.    Plan:   As above.    Notice: The note was created before the performance of the procedure. It might have been left in the pending status and signed off after the procedure. The time stamp on the note may be misleading.    Fidencio Puentes MD   Vascular Interventional Radiology  03/23/23   3:10 PM EDT

## 2023-03-23 NOTE — CONSULTS
[facility]  Cardiovascular Consultation Note    Reason for consultation: #1 postop hypotension #2 elevated high-sensitivity troponin in a patient with known prior CAD #3 LV systolic dysfunction    History of Present Illness:  72-year-old male with permanent A-fib, hyperlipidemia, hypertension, chronic kidney disease, prior MI.  CABG in 93 and again in 2013.  Has peripheral vascular disease and underwent a recent procedure and developed postop hypotension.  He was started on a pressor agent.  At bedtime troponins are elevated.  EKG did not show any acute changes.  The patient is awake and alert.  He denies tightness heaviness squeezing pressure in his chest jaw throat arms.  He states however prior to his previous bypass surgery he had no symptoms.  The patient wears oxygen at home and gets dyspnea on exertion.  He states he is more short of breath since he has been in the hospital.  The nurse reports this morning his systolic blood pressure in the low 70s so she started him on Levophed.    Cardiac risk factors: Male sex #2 age greater 55 #3 prior CAD #4 hypertension 5 hyperlipidemia    Past medical and surgical history, social and family history reviewed in EMR.    REVIEW OF SYSTEMS:     Past Medical History:   Diagnosis Date   • Abnormal ECG    • Cataracts, bilateral    • CHF (congestive heart failure) (HCC)    • Childhood asthma    • COPD (chronic obstructive pulmonary disease) ([facility])    • Coronary artery disease 12/14/2016    CABG, 1993, Abimael Tomlin MD. CABG, August 2013, [facility].   • Diabetes mellitus (HCC) 12/14/2016    type II   • DVT (deep venous thrombosis) ([facility])     RLE   • Gout    • Hepatitis     1970s unsure of which one- states it was caused from drinking contaminated water   • Hyperlipidemia 12/14/2016   • Hypertension 12/14/2016   • Kidney disease    • Myocardial infarction (HCC)      in 1992 and 1993 prior to CABG.   • Paroxysmal atrial  fibrillation (HCC) 12/14/2016   • Pulmonary embolism (HCC)    • Sleep apnea    • Venous insufficiency 12/14/2016   • Wears glasses     for reading     Past Surgical History:   Procedure Laterality Date   • AMPUTATION FOOT Bilateral 3/21/2023    Procedure: FOOT RAY TRANSMETATARSAL AMPUTATION LATERAL LEFT, RIGHT FOOT TRANSMETATARSAL AMPUTATION;  Surgeon: Jose Cruz Etienne MD;  Location: [facility] OR;  Service: Vascular;  Laterality: Bilateral;   • AORTAGRAM N/A 3/17/2023    Procedure: AORTAGRAM WITH  RUNOFFS WITH STENT PLACEMENT;  Surgeon: Jose Cruz Etienne MD;  Location: [facility] HYBRID [facility];  Service: Vascular;  Laterality: N/A;   fluoro  dose  contrast    • APPENDECTOMY  1981   • BUNIONECTOMY Left    • CARDIAC CATHETERIZATION      x4, no stents   • CARDIAC CATHETERIZATION N/A 02/16/2023    Procedure: Peripheral angiography  Left lower extremity angiogram wit interventional Cardiologist;  Surgeon: Reed Zaldivar MD;  Location:  [facility] CATH INVASIVE LOCATION;  Service: Cardiovascular;  Laterality: N/A;  Left lower extremity angiogram with interventional cardioligist.   • CATARACT EXTRACTION W/ INTRAOCULAR LENS IMPLANT Right 06/25/2020    Procedure: CATARACT PHACO EXTRACTION WITH INTRAOCULAR LENS IMPLANT RIGHT;  Surgeon: Omar Blood MD;  Location: [facility] OR;  Service: Ophthalmology;  Laterality: Right;   • CATARACT EXTRACTION W/ INTRAOCULAR LENS IMPLANT Left 07/09/2020    Procedure: CATARACT PHACO EXTRACTION WITH INTRAOCULAR LENS IMPLANT LEFT;  Surgeon: Omar Blood MD;  Location: [facility] OR;  Service: Ophthalmology;  Laterality: Left;   • CHOLECYSTECTOMY  2002   • COLON RESECTION Right 09/12/2022    Procedure: COLON RESECTION LAPAROSCOPIC HAND ASSISTED;  Surgeon: Kenji Garcias MD;  Location: [facility] OR;  Service: General;  Laterality: Right;   • COLON RESECTION N/A    • COLONOSCOPY     • COLONOSCOPY N/A 08/11/2022    Procedure: COLONOSCOPY, biopsy, polypectomy x1 and tattooing;  Surgeon: Kenji Garcias MD;   Location: [facility] ENDOSCOPY;  Service: Gastroenterology;  Laterality: N/A;   • CORONARY ARTERY BYPASS GRAFT       triple bypass   • CORONARY ARTERY BYPASS GRAFT      double bypass   • FINGER SURGERY      Rt index (second finger)   • OTHER SURGICAL HISTORY      Bone spur removal   • TOE AMPUTATION Left     4th toe   • VASECTOMY     • WOUND DEBRIDEMENT Bilateral 3/17/2023    Procedure: DEBRIDEMENT FOOT BILATERAL;  Surgeon: Jose Cruz Etienne MD;  Location: [facility];  Service: Vascular;  Laterality: Bilateral;     Social History     Socioeconomic History   • Marital status:    • Number of children: 1   Tobacco Use   • Smoking status: Former     Packs/day: 1.00     Years: 28.00     Pack years: 28.00     Types: Cigarettes     Start date: 1964     Quit date: 1993     Years since quittin.2   • Smokeless tobacco: Never   • Tobacco comments:     Wish I'd never started   Vaping Use   • Vaping Use: Never used   Substance and Sexual Activity   • Alcohol use: No   • Drug use: No   • Sexual activity: Not Currently     Partners: Female     Birth control/protection: Other, Vasectomy, Birth control pill     Family History   Problem Relation Age of Onset   • COPD Mother    • Heart attack Father    • Asthma Father        H&P ROS reviewed and pertinent CV ROS as noted in HPI.    Cardiac: CAD with prior CABG x2 first 1 and 93-second 1 in .  The patient denies any chest pain or tightness.  However he had no symptoms prior to his 2 bypass surgeries.  The patient developed significant hypotension.  Respiratory: The patient is on oxygen at home.  He has chronic dyspnea on exertion.  However he states he is more short of breath now than when he first came in.  Lower Extremities: Patient has necrotic tissue with peripheral vascular disease status post interventional procedure on   GI: No abdominal pain  Neuro: No history of stroke TIA or neurologic event.  Hematology: Has history of iron  deficiency anemia  Renal: Patient has known CKD with worsening renal function here  Musculoskeletal: Has history of gout  Endocrine: No hypothyroidism  Constitutional: No fever or chills  Psych: No depression or suicidal ideation      Physical Exam: General elderly frail man with no overt dyspnea heart rate 115 systolic blood pressure 106       HEENT: JVP is present to the jaw.  Central line is present left neck.  Nasal O2 present       Respiratory: Equal bilateral symmetrical expansion with crackles bilaterally       Cardiovascular: Tachycardic and irregular without any obvious murmur and no obvious edema to palpation       GI: Soft and flat       Lower Extremities: Stasis abnormalities are present the thighs and the calves are soft bilaterally       Neuro: Facial expressions are symmetrical.  Moves up extremities.  Appears weak       Skin: Warm and dry no edema palpation       Psych: Pleasant affect    Results Review: EKG shows A-fib IVCD with no acute ischemic abnormalities.  HST is elevated.  Stat echocardiogram inferior and apical lateral wall motion abnormalities.  Last reported echo was from 2018 at [facility] where the EF was 40 to 45% with wall motion abnormalities           Vital Sign Min/Max for last 24 hours  Temp  Min: 97 °F (36.1 °C)  Max: 97.3 °F (36.3 °C)   BP  Min: 69/42  Max: 106/61   Pulse  Min: 94  Max: 120   Resp  Min: 14  Max: 16   SpO2  Min: 92 %  Max: 100 %   No data recorded      Intake/Output Summary (Last 24 hours) at 3/23/2023 0914  Last data filed at 3/23/2023 0400  Gross per 24 hour   Intake 757 ml   Output 50 ml   Net 707 ml               Lab Review:   Results from last 7 days   Lab Units 03/23/23  0041 03/22/23 2101 03/22/23  1126 03/21/23  0645 03/20/23  2216   WBC 10*3/mm3 11.09*  --   --  8.15 7.97   HEMOGLOBIN g/dL 8.9* 9.5* 9.8* 9.9* 9.9*   PLATELETS 10*3/mm3 142  --   --  108* 107*     Results from last 7 days   Lab Units 03/23/23  0041 03/22/23  2101 03/22/23  0651   SODIUM  mmol/L 136 137 140   POTASSIUM mmol/L 4.4 4.4 4.9   CO2 mmol/L 17.0* 16.0* 12.0*   BUN mg/dL 58* 58* 54*   CREATININE mg/dL 3.91* 3.89* 3.23*   MAGNESIUM mg/dL 2.3  --   --    PHOSPHORUS mg/dL 6.1*  --  6.6*   GLUCOSE mg/dL 178* 185* 145*     Estimated Creatinine Clearance: 17.4 mL/min (A) (by C-G formula based on SCr of 3.91 mg/dL (H)).    Results from last 7 days   Lab Units 03/16/23  1611   HEMOGLOBIN A1C % 5.80*     .lipd  Lab Results   Component Value Date    CHLPL 104 02/08/2023    TRIG 49 02/16/2023    HDL 51 02/16/2023    AST 11 03/23/2023    ALT <5 03/23/2023       Radiology Reports:  Imaging Results (Last 72 Hours)     Procedure Component Value Units Date/Time    XR Chest 1 View [[id number]] Collected: 03/22/23 2155     Updated: 03/22/23 2357    Narrative:      Chest one view.    DATE: 3/22/2023 at 11:19 PM.    COMPARISON: 3/22/2023 at 9:15 PM.    CLINICAL HISTORY: Central line placement.      Impression:        There is been placement of a left internal jugular central venous catheter with the catheter tip overlying the superior vena cava.    Evaluation of the heart and lungs is otherwise unchanged with cardiomegaly, pulmonary edema and bilateral pleural effusions.      Slot 94.    Electronically signed by:  Roberto Norman D.O.    3/22/2023 9:56 PM Mountain Time    XR Chest 1 View [[id number]] Collected: 03/22/23 2235     Updated: 03/22/23 2240    Narrative:      XR CHEST 1 VW    Date of Exam: 3/22/2023 9:01 PM EDT    Indication: shock state.    Comparison: Chest x-ray 9/25/2022    Findings:  Redemonstration of prior CABG. There is cardiomegaly. There is a moderate right layering pleural effusion. There is a small left layering pleural effusion. There is associated bibasilar opacities which may reflect comminution of pleural fluid and   associated atelectasis. There is diffuse interstitial prominence and perihilar vascular congestion compatible with component of interstitial edema. No pneumothorax. No  acute osseous findings.      Impression:      Impression:  Cardiomegaly, interstitial edema, and moderate right and small left pleural effusions.    Electronically Signed: Silvio Aviles    3/22/2023 10:37 PM EDT    Workstation ID: [id number]    US Renal Limited [[id number]] Collected: 03/22/23 1903     Updated: 03/22/23 1911    Narrative:      US RENAL LIMITED    Date of Exam: 3/22/2023 4:15 PM EDT    Indication: DEMARIO. ? CKD.    Comparison: No comparisons available.    Technique: Grayscale and color Doppler ultrasound evaluation of the kidneys and urinary bladder was performed.    Findings:  Right kidney:  Normal size of the kidney measuring 10.2 x 4.8 x 4.8 cm. Normal renal cortical echogenicity. No renal cortical atrophy. There is a small simple appearing renal cortical cyst at the inferior pole measuring 2.0 cm in diameter. Normal renal hilar   vascularity on color Doppler assessment. No hydronephrosis. No sonographic evidence of nephrolithiasis. No perinephric fluid.    Left kidney:  Normal size of the kidney measuring 11.2 x 5.4 x 5.6 cm. Normal renal cortical echogenicity. No renal cortical atrophy. There is a small simple appearing renal cortical cyst at the inferior pole measuring 2.0 cm in diameter. Normal renal hilar   vascularity on color Doppler assessment. No hydronephrosis. No sonographic evidence of nephrolithiasis. No perinephric fluid.    Bladder:  Unremarkable.     Other:   There is evidence of ascites.      Impression:      Impression:  No hydronephrosis. Incidental note of small bilateral renal cysts with otherwise unremarkable appearance of the kidneys.    Electronically Signed: Silvio Aviles    3/22/2023 7:08 PM EDT    Workstation ID: [id number]    XR Abdomen KUB [[id number]] Collected: 03/20/23 1421     Updated: 03/20/23 1428    Narrative:      XR ABDOMEN KUB    Date of Exam: 3/20/2023 1:47 PM EDT    Indication: Abdominal pain and distention..    Comparison: 9/24/2022.    Findings:  There is  increased gas throughout the stomach, colon, and small bowel. Several small bowel loops measure up to 4.8 cm. This is concerning for a small bowel obstruction. There is no pneumatosis or free air. There are vascular calcifications within the   abdomen or pelvis. There are degenerative changes within the thoracolumbar spine, hip, and sacroiliac joints. There are bilateral basilar pleural effusions with atelectasis.      Impression:      Impression:  Abnormal bowel gas pattern. This is concerning for a small bowel obstruction. There is no pneumatosis or free air.    Electronically Signed: Feroz Mcghee    3/20/2023 2:25 PM EDT    Workstation ID: [id number]          Assessment/Plan: 1 postop hypotension-the  troponins are elevated but there are no acute ischemic EKG changes and the patient denies chest pain.  The patient has had previous bypass surgery and had no antecedent chest pain.  He certainly could have had an acute coronary syndrome but that should not account for his metabolic acidosis.  Would currently continue aspirin Plavix.  Would avoid cardiac cath unless he has significant chest pain or EKG changes as cardiac cath would probably cause complete and total irreparable renal failure  Continue Levophed for blood pressure support.  He is already on midodrine  3 EF 38% which may be down from echocardiogram 2018.  That echo was described as 40 to 45%.  There were wall motion abnormalities present on there as well.  Clinically the patient has significant JVP and bilateral rales.  I discussed the case with nephrology and they are planning on doing CRRT  #4 A-fib RVR-patient's not receiving his metoprolol secondary to hypotension.  I will give him a dose of digoxin 0.125        Lalo Gutierrez MD  03/23/23  09:14 EDT

## 2023-03-23 NOTE — PLAN OF CARE
Goal Outcome Evaluation:           Progress: no change     Pt vital signs stable at NC 2L. Now 1L NC. Pt AxOx3. MAP > 65 throughout night; not requiring Levophed. Bicarb drip @ 75 cc/hr. BLE Wound vac dressing oozing- Reinforced with gauze and kerlix gauze wrap.

## 2023-03-23 NOTE — PROGRESS NOTES
Clinical Nutrition       Reason for Visit: MDR, Identified at risk by screening criteria      Patient Name: Swapnil Lawson  YOB: 1950  MRN: [id number]  Date of Encounter: 03/23/23 17:21 EDT  Admission date: 3/17/2023    Nutrition Assessment   Admission Diagnosis:  PVD (peripheral vascular disease) ([facility]) [I73.9]  Ulcer of right foot with other severity ([facility]) [L97.518]      Problem List:    Acute HFrEF    CAD s/p CABG (1993; redo 2013)    HTN (hypertension)    PAF    T2DM    Dyslipidemia    Stage III CKD    PVD    Ischemic gangrene    Chronic anticoagulation on Coumadin    DEMARIO superimposed on CKD    Hypotension    s/p Aortogram with runoffs with stent placement, debridement B feet 3-17-23      s/p Debridement of gangrene at left midfoot with partial amputation of left fourth and fifth mid-metatarsal bones with application of wound VAC   Debridement of gangrene and amputation of right fourth metatarsal bone with application of wound VAC  3-21-23        DEMARIO/CKD       CRRT initiated 3-23-23    PMH:   He  has a past medical history of Abnormal ECG, Cataracts, bilateral, CHF (congestive heart failure) ([facility]), Childhood asthma, COPD (chronic obstructive pulmonary disease) ([facility]), Coronary artery disease (12/14/2016), Diabetes mellitus ([facility]) (12/14/2016), DVT (deep venous thrombosis) ([facility]), Gout, Hepatitis, Hyperlipidemia (12/14/2016), Hypertension (12/14/2016), Kidney disease, Myocardial infarction ([facility]), Paroxysmal atrial fibrillation ([facility]) (12/14/2016), Pulmonary embolism ([facility]), Sleep apnea, Venous insufficiency (12/14/2016), and Wears glasses.    PSH:  He  has a past surgical history that includes Appendectomy (1981); Cholecystectomy (2002); Toe amputation (Left, 2009); Other surgical history (2010); Finger surgery; Colonoscopy; Cataract extraction w/ intraocular lens implant (Right, 06/25/2020); Vasectomy; Cataract extraction w/ intraocular lens implant (Left, 07/09/2020); Bunionectomy  (Left); Colonoscopy (N/A, 08/11/2022); Coronary artery bypass graft; Coronary artery bypass graft (2013); Cardiac catheterization; Colectomy (Right, 09/12/2022); Cardiac catheterization (N/A, 02/16/2023); Bowel resection (N/A); Aortagram (N/A, 3/17/2023); Wound debridement (Bilateral, 3/17/2023); and Foot Amputation (Bilateral, 3/21/2023).      Applicable Nutrition Concerns:   Skin:mutilple skin tears, R 4th toe amputation, L 4th and 5th partial toe amputation, L lateral plantar ulcer, R lateral plantar ulcer, wound vacs applied to both feet    GI: abdomen distended, round, last bm 3-20      Reported/Observed/Food/Nutrition Related History:     Pt resting in bed, alert, emaciated, profoundly ill appearing, CRRT started today     + levophed 0.04mcg, bicab @75ml/h    Per RN: pt choked earlier when swallowing, has been anuric, oozing around wound vac    Labs    Labs Reviewed: Yes     Results from last 7 days   Lab Units 03/23/23  0041 03/22/23 2101 03/22/23  0651 03/21/23  0645   GLUCOSE mg/dL 178* 185* 145* 95   BUN mg/dL 58* 58* 54* 41*   CREATININE mg/dL 3.91* 3.89* 3.23* 2.84*   SODIUM mmol/L 136 137 140 137   CHLORIDE mmol/L 106 108* 108* 109*   POTASSIUM mmol/L 4.4 4.4 4.9 4.3   PHOSPHORUS mg/dL 6.1*  --  6.6*  --    MAGNESIUM mg/dL 2.3  --   --   --    ALT (SGPT) U/L <5 <5  --  <5       Results from last 7 days   Lab Units 03/23/23  0041 03/22/23  2101 03/22/23  0651   ALBUMIN g/dL 2.9* 3.0* 2.8*       Results from last 7 days   Lab Units 03/23/23  1126 03/21/23  1447 03/18/23  1259 03/17/23  1732 03/17/23  1256   GLUCOSE mg/dL 243* 109 144* 122 115     Lab Results   Lab Value Date/Time    HGBA1C 5.80 (H) 03/16/2023 1611    HGBA1C 5.70 (H) 02/16/2023 1039    HGBA1C 5.7 02/08/2023 1007    HGBA1C 5.6 10/19/2022 1041               Medications    Medications Reviewed: Yes  Abx, iron, heparin, flagyl, protonic, bowel regimen  Bibarb @75ml, levophed    Intake/Ouptut 24 hrs (0701 - 0700)   I&O's Reviewed: Yes   Intake  & Output (last day)        0701   0700  0701   0700    P.O. 480 120    I.V. (mL/kg) 517 (7.1) 541 (7.4)    Other      IV Piggyback      Total Intake(mL/kg) 997 (13.7) 661 (9.1)    Urine (mL/kg/hr) 50 (0)     Blood      Total Output 50     Net +947 +661                Anthropometrics     Height: 73in  Weight: 161lb MD office    BMI: 21  BMI classification: Normal: 18.5-24.9kg/m2    UBW: 190 lb in 10/22 =>161 lb now per pt, confirmed by EMR:  Last 15 Recorded Weights  View Complete Flowsheet  Weight Weight (kg) Weight (lbs) Weight Method VISIT REPORT   3/16/2023 73.029 kg 161 lb - Report   3/7/2023 73.483 kg 162 lb Stated -   3/7/2023 73.483 kg 162 lb Stated -   2023 75.751 kg 167 lb - -   2023 76.023 kg 167 lb 9.6 oz Standing scale -   2023 75.932 kg 167 lb 6.4 oz - Report   2023 72.576 kg 160 lb - Report   10/19/2022 76.658 kg 169 lb - Report   2022 86.3 kg 190 lb 4.1 oz Bed scale -   2022 84.5 kg 186 lb 4.6 oz Bed scale -   2022 82.7 kg 182 lb 5.1 oz Bed scale -   2022 80.9 kg 178 lb 5.6 oz Bed scale -   2022 71.668 kg 158 lb Stated  -   9/15/2022 78.1 kg 172 lb 2.9 oz Bed scale -   2022 75 kg 165 lb 5.5 oz Bed scale    22   190 lb   PERNELL    Per RN Note 3-18-23:Weight change: pt had significant weight loss of 21 lb / 11.1% body weight within the month of 10/22 and has continued losing since. He has had a total significant weight loss of 29 lb  / 15.3% body weight past 6 months      Assessment 3-23-23    Goal ~1800kcal intital  25-30kcal per k-2195kcal  MSJ x 1.2: 1837kcal    Goal ~110g protein minimum  1.5-2.5g protein per k-183g protein    Nutrition Focused Physical Exam     Date: 3/18    Patient meets criteria for malnutrition diagnosis, see MSA note.    Current Nutrition Prescription     PO: Diet: Regular/House Diet; Texture: Soft to Chew (NDD 3); Soft to Chew: Chopped Meat; Fluid Consistency: Thin (IDDSI 0)  Oral Nutrition  Supplement: Boost + 2x/day    Intake: 25% of 4 meals since admit    Nutrition Diagnosis   Date: 3-18-23 Updated: 3-23-23  Problem Malnutrition severe/chronic   Etiology Energy needs>energy intake 2/2 decreased appetite in setting of chronic diarrhea of unclear etiology s/p colectomy 6 months ago   Signs/Symptoms PO intakes <75% est. needs and loss 15.3% body weight past 6 months, severe muscle wasting, and severe subcutaneous fat loss.        Date:  3-18-23 Updated:3-23-23  Problem Altered GI function   Etiology Chronic diarrhea of unclear etiology, s/p colectomy 6 months ago   Signs/Symptoms Pt report, med hx   Status: constipated at present    Goal:   General: Nutrition to support treatment  PO: Increase intake  Consider EN if unsafe to swallow, or unable to increase oral intake    Nutrition Intervention      Follow treatment progress     Pt with severe chronic malnutrition, minimal intake since admission    Rec SLP eval as concern for choking    If fails SLP eval, or if fails to increase oral intake, rec start EN    TF recs if needed:    Day 1: Start  Novasource Renal start @25ml, Prosource 2x day, free water @10ml/hr    Day 2: Increase TF to goal rate @45m/hr    Monitor electrolytes closely, and replace as needed, as pt at risk for refeeding syndrome    TF at goal  volume will provide 900ml, 1920kcal, 107% kcal needs, 112g protein, 100% protein needs,848ml free water    Suggest add renal MVI as TF volume too low to meet RDIs'      Monitoring/Evaluation:   Per protocol, I&O, PO intake, Supplement intake, Pertinent labs, Weight, Skin status, GI status, Symptoms, POC/GOC, Swallow function, Hemodynamic stability      Carleen Rowland, BUCKY  Time Spent: 60m

## 2023-03-23 NOTE — PROCEDURES
"Insert Central Line At Bedside    Date/Time: 3/22/2023 11:20 PM  Performed by: Andressa Cody DNP, APRN  Authorized by: Andressa Cody DNP, APRN   Consent: Verbal consent obtained. Written consent obtained.  Risks and benefits: risks, benefits and alternatives were discussed  Consent given by: patient  Patient understanding: patient states understanding of the procedure being performed  Patient consent: the patient's understanding of the procedure matches consent given  Procedure consent: procedure consent matches procedure scheduled  Patient identity confirmed: arm band and hospital-assigned identification number  Time out: Immediately prior to procedure a \"time out\" was called to verify the correct patient, procedure, equipment, support staff and site/side marked as required.  Indications: vascular access  Anesthesia: local infiltration    Anesthesia:  Local Anesthetic: lidocaine 1% without epinephrine  Anesthetic total: 5 mL    Sedation:  Patient sedated: no    Preparation: skin prepped with ChloraPrep  Skin prep agent dried: skin prep agent completely dried prior to procedure  Sterile barriers: all five maximum sterile barriers used - cap, mask, sterile gown, sterile gloves, and large sterile sheet  Hand hygiene: hand hygiene performed prior to central venous catheter insertion  Location details: left internal jugular  Patient position: reverse Trendelenburg  Catheter type: triple lumen  Catheter size: 7 Fr  Pre-procedure: landmarks identified  Ultrasound guidance: yes  Sterile ultrasound techniques: sterile gel and sterile probe covers were used  Number of attempts: 1  Successful placement: yes  Post-procedure: line sutured and dressing applied  Assessment: blood return through all ports,  placement verified by x-ray and no pneumothorax on x-ray  Patient tolerance: patient tolerated the procedure well with no immediate complications  Comments: Dark red non-pulsatile blood aspirated from all three ports and " flushed back well. Guidewire visualized in LIJ via ultrasound. Sutures x2. Sterile dressing applied.    Andressa Cody, MARYANN, APRN, AGACNP-BC  Pulmonary and Critical Care Medicine

## 2023-03-23 NOTE — PROGRESS NOTES
"   LOS: 1 day    Patient Care Team:  Argenis Osborne APRN as PCP - General (Family Medicine)  Kenji Garcias MD as Consulting Physician (General Surgery)  Naseem Campbell III, MD as Cardiologist (Cardiology)    Chief Complaint:    DEMARIO on CKD, followed with Dr. Newby, history of peripheral vascular disease, amputation toes this admission, history of CAD/CABG, atrial fibrillation, history of DVT and PE, diabetes.  Since admission renal function deteriorating.  Hypotension is also noted.    Subjective     Interval History:   Hypotensive, oliguric.    Review of Systems:   Denies any nausea vomiting chest pain or shortness of breath.  Complain of generalized weakness.    Objective     Vital Sign Min/Max for last 24 hours  Temp  Min: 97 °F (36.1 °C)  Max: 97.3 °F (36.3 °C)   BP  Min: 69/42  Max: 106/61   Pulse  Min: 94  Max: 120   Resp  Min: 14  Max: 16   SpO2  Min: 92 %  Max: 100 %   Flow (L/min)  Min: 2  Max: 2   Weight  Min: 73 kg (161 lb)  Max: 73 kg (161 lb)     Flowsheet Rows    Flowsheet Row First Filed Value   Admission Height 185.4 cm (73\") Documented at 03/21/2023 1254   Admission Weight 73 kg (161 lb) Documented at 03/21/2023 1254          No intake/output data recorded.  I/O last 3 completed shifts:  In: 1247 [P.O.:480; I.V.:517; Other:250]  Out: 150 [Urine:150]    Physical Exam:  General Appearance: Alert, oriented, no obvious distress.  Eyes: PER, EOMI.  Neck: Supple no JVD.  Left triple-lumen in place.  Lungs: Clear auscultation, no rales rhonchi's, equal chest movement, nonlabored.  Heart: No gallop, positive murmur, no rub, irregular heartbeat.  Abdomen: Soft, nontender, positive bowel sounds, no organomegaly.  Extremities: Stasis dermatitis noted.  Left foot and bandage.  No edema, no cyanosis.  Neuro: No focal deficit, moving all extremities, alert oriented X 4  Psych: Mood and affect are normal appropriate.  Skin is warm and dry.  Ecchymosis, stasis dermatitis lower extremity   no suprapubic " fullness or tenderness noticed  WBC WBC   Date Value Ref Range Status   03/23/2023 11.09 (H) 3.40 - 10.80 10*3/mm3 Final   03/21/2023 8.15 3.40 - 10.80 10*3/mm3 Final   03/20/2023 7.97 3.40 - 10.80 10*3/mm3 Final      HGB Hemoglobin   Date Value Ref Range Status   03/23/2023 8.9 (L) 13.0 - 17.7 g/dL Final   03/22/2023 9.5 (L) 13.0 - 17.7 g/dL Final   03/22/2023 9.8 (L) 13.0 - 17.7 g/dL Final   03/21/2023 9.9 (L) 13.0 - 17.7 g/dL Final   03/20/2023 9.9 (L) 13.0 - 17.7 g/dL Final      HCT Hematocrit   Date Value Ref Range Status   03/23/2023 29.3 (L) 37.5 - 51.0 % Final   03/22/2023 31.4 (L) 37.5 - 51.0 % Final   03/22/2023 33.5 (L) 37.5 - 51.0 % Final   03/21/2023 31.8 (L) 37.5 - 51.0 % Final   03/20/2023 32.0 (L) 37.5 - 51.0 % Final      Platlets No results found for: LABPLAT   MCV MCV   Date Value Ref Range Status   03/23/2023 91.3 79.0 - 97.0 fL Final   03/21/2023 90.9 79.0 - 97.0 fL Final   03/20/2023 90.4 79.0 - 97.0 fL Final          Sodium Sodium   Date Value Ref Range Status   03/23/2023 136 136 - 145 mmol/L Final   03/22/2023 137 136 - 145 mmol/L Final   03/22/2023 140 136 - 145 mmol/L Final   03/21/2023 137 136 - 145 mmol/L Final   03/20/2023 136 136 - 145 mmol/L Final      Potassium Potassium   Date Value Ref Range Status   03/23/2023 4.4 3.5 - 5.2 mmol/L Final     Comment:     Slight hemolysis detected by analyzer. Results may be affected.   03/22/2023 4.4 3.5 - 5.2 mmol/L Final   03/22/2023 4.9 3.5 - 5.2 mmol/L Final   03/21/2023 4.3 3.5 - 5.2 mmol/L Final     Comment:     Slight hemolysis detected by analyzer. Results may be affected.   03/20/2023 4.1 3.5 - 5.2 mmol/L Final      Chloride Chloride   Date Value Ref Range Status   03/23/2023 106 98 - 107 mmol/L Final   03/22/2023 108 (H) 98 - 107 mmol/L Final   03/22/2023 108 (H) 98 - 107 mmol/L Final   03/21/2023 109 (H) 98 - 107 mmol/L Final   03/20/2023 106 98 - 107 mmol/L Final      CO2 CO2   Date Value Ref Range Status   03/23/2023 17.0 (L) 22.0 -  29.0 mmol/L Final   03/22/2023 16.0 (L) 22.0 - 29.0 mmol/L Final   03/22/2023 12.0 (L) 22.0 - 29.0 mmol/L Final   03/21/2023 17.0 (L) 22.0 - 29.0 mmol/L Final   03/20/2023 19.0 (L) 22.0 - 29.0 mmol/L Final      BUN BUN   Date Value Ref Range Status   03/23/2023 58 (H) 8 - 23 mg/dL Final   03/22/2023 58 (H) 8 - 23 mg/dL Final   03/22/2023 54 (H) 8 - 23 mg/dL Final   03/21/2023 41 (H) 8 - 23 mg/dL Final   03/20/2023 42 (H) 8 - 23 mg/dL Final      Creatinine Creatinine   Date Value Ref Range Status   03/23/2023 3.91 (H) 0.76 - 1.27 mg/dL Final   03/22/2023 3.89 (H) 0.76 - 1.27 mg/dL Final   03/22/2023 3.23 (H) 0.76 - 1.27 mg/dL Final   03/21/2023 2.84 (H) 0.76 - 1.27 mg/dL Final   03/20/2023 2.79 (H) 0.76 - 1.27 mg/dL Final      Calcium Calcium   Date Value Ref Range Status   03/23/2023 8.5 (L) 8.6 - 10.5 mg/dL Final   03/22/2023 8.6 8.6 - 10.5 mg/dL Final   03/22/2023 8.7 8.6 - 10.5 mg/dL Final   03/21/2023 8.5 (L) 8.6 - 10.5 mg/dL Final   03/20/2023 8.5 (L) 8.6 - 10.5 mg/dL Final      PO4 No results found for: CAPO4   Albumin Albumin   Date Value Ref Range Status   03/23/2023 2.9 (L) 3.5 - 5.2 g/dL Final   03/22/2023 3.0 (L) 3.5 - 5.2 g/dL Final   03/22/2023 2.8 (L) 3.5 - 5.2 g/dL Final   03/21/2023 2.9 (L) 3.5 - 5.2 g/dL Final   03/20/2023 2.9 (L) 3.5 - 5.2 g/dL Final      Magnesium Magnesium   Date Value Ref Range Status   03/23/2023 2.3 1.6 - 2.4 mg/dL Final      Uric Acid No results found for: URICACID        Results Review:     I reviewed the patient's new clinical results.    aspirin, 81 mg, Oral, Daily  atorvastatin, 10 mg, Oral, Nightly  cefepime, 2 g, Intravenous, Q24H  clopidogrel, 75 mg, Oral, Daily  DAPTOmycin, 6 mg/kg, Intravenous, Q48H  Dulaglutide, 1.5 mg, Subcutaneous, Weekly  ferrous sulfate, 325 mg, Oral, Daily With Breakfast  heparin (porcine), 5,000 Units, Subcutaneous, Q12H  hydrocortisone sodium succinate, 50 mg, Intravenous, Q6H  metroNIDAZOLE, 500 mg, Oral, Q8H  pantoprazole, 40 mg, Oral, Q  AM  polyethylene glycol, 17 g, Oral, BID With Meals  senna-docusate sodium, 2 tablet, Oral, BID  tamsulosin, 0.4 mg, Oral, Daily      norepinephrine, 0.02-0.3 mcg/kg/min, Last Rate: 0.04 mcg/kg/min (03/23/23 9112)  sodium bicarbonate drip (greater than 75 mEq/bag), 150 mEq, Last Rate: 150 mEq (03/22/23 2115)        Medication Review: Reviewed    Assessment & Plan       Acute HFrEF    CAD s/p CABG (1993; redo 2013)    HTN (hypertension)    PAF    T2DM    Dyslipidemia    Stage III CKD    PVD    Ischemic gangrene    Chronic anticoagulation on Coumadin    DEMARIO superimposed on CKD    Hypotension  1.  DEMARIO on CKD: Questionable ATN from hypotension.  Oliguric at this time.  2.  Peripheral vascular disease s/p toe amputation.  3.  Hypotension: On midodrine.  4.  Diabetes with diabetic nephropathy.  5.  Anemia.  6.  Metabolic acidosis.  Plan:  Critically ill patient hypotensive, oliguric, acidosis will require CRRT.  Discussed with the patient detail regards to the procedure.  Patient already has discussed in the past with Dr. Newby regards to dialysis when the time comes.  Case discussed with staff nurse taking care of the patient  Bladder scan been ordered.  Ben Painter MD  03/23/23  09:27 EDT

## 2023-03-23 NOTE — PROGRESS NOTES
CTS Progress Note         LOS: 1 day     POD #5 s/p aortogram, angioplasty of left AT, left PT, placement of Xience drug eluting stent at proximal left AT, debridement of left midfott wound, right lateral forefoot wound  POD # 2 left foot 4th and 5th toe amputation, right 4th toe amputation, woundvac placmement    Subjective  Central line place 2nd to hypotension.  Started on levo    Objective    Vital Signs  Temp:  [97 °F (36.1 °C)-97.3 °F (36.3 °C)] 97 °F (36.1 °C)  Heart Rate:  [] 105  Resp:  [14-16] 16  BP: ()/(42-72) 96/63    Physical Exam:   General Appearance:  No acute distress  CV: Increased rate regular rhythm  Respiratory: Decreased bilaterally  Extremities: Warm without significant edema.  Wound vacs in place.  Surgical bandage with bloody drainage noted    Skin: Bilateral foot wounds with wound vac, seal intact     Results     Results from last 7 days   Lab Units 03/23/23  0041   WBC 10*3/mm3 11.09*   HEMOGLOBIN g/dL 8.9*   HEMATOCRIT % 29.3*   PLATELETS 10*3/mm3 142     Results from last 7 days   Lab Units 03/23/23  0041   SODIUM mmol/L 136   POTASSIUM mmol/L 4.4   CHLORIDE mmol/L 106   CO2 mmol/L 17.0*   BUN mg/dL 58*   CREATININE mg/dL 3.91*   GLUCOSE mg/dL 178*   CALCIUM mg/dL 8.5*       Imaging Results (Last 24 Hours)     Procedure Component Value Units Date/Time    XR Chest 1 View [[id number]] Collected: 03/22/23 2155     Updated: 03/22/23 2357    Narrative:      Chest one view.    DATE: 3/22/2023 at 11:19 PM.    COMPARISON: 3/22/2023 at 9:15 PM.    CLINICAL HISTORY: Central line placement.      Impression:        There is been placement of a left internal jugular central venous catheter with the catheter tip overlying the superior vena cava.    Evaluation of the heart and lungs is otherwise unchanged with cardiomegaly, pulmonary edema and bilateral pleural effusions.      Slot 94.    Electronically signed by:  Roberto Norman D.O.    3/22/2023 9:56 PM Mountain Time    XR Chest 1 View  [[id number]] Collected: 03/22/23 2235     Updated: 03/22/23 2240    Narrative:      XR CHEST 1 VW    Date of Exam: 3/22/2023 9:01 PM EDT    Indication: shock state.    Comparison: Chest x-ray 9/25/2022    Findings:  Redemonstration of prior CABG. There is cardiomegaly. There is a moderate right layering pleural effusion. There is a small left layering pleural effusion. There is associated bibasilar opacities which may reflect comminution of pleural fluid and   associated atelectasis. There is diffuse interstitial prominence and perihilar vascular congestion compatible with component of interstitial edema. No pneumothorax. No acute osseous findings.      Impression:      Impression:  Cardiomegaly, interstitial edema, and moderate right and small left pleural effusions.    Electronically Signed: Silvio Aviles    3/22/2023 10:37 PM EDT    Workstation ID: [id number]    US Renal Limited [[id number]] Collected: 03/22/23 1903     Updated: 03/22/23 1911    Narrative:      US RENAL LIMITED    Date of Exam: 3/22/2023 4:15 PM EDT    Indication: DEMARIO. ? CKD.    Comparison: No comparisons available.    Technique: Grayscale and color Doppler ultrasound evaluation of the kidneys and urinary bladder was performed.    Findings:  Right kidney:  Normal size of the kidney measuring 10.2 x 4.8 x 4.8 cm. Normal renal cortical echogenicity. No renal cortical atrophy. There is a small simple appearing renal cortical cyst at the inferior pole measuring 2.0 cm in diameter. Normal renal hilar   vascularity on color Doppler assessment. No hydronephrosis. No sonographic evidence of nephrolithiasis. No perinephric fluid.    Left kidney:  Normal size of the kidney measuring 11.2 x 5.4 x 5.6 cm. Normal renal cortical echogenicity. No renal cortical atrophy. There is a small simple appearing renal cortical cyst at the inferior pole measuring 2.0 cm in diameter. Normal renal hilar   vascularity on color Doppler assessment. No hydronephrosis. No  sonographic evidence of nephrolithiasis. No perinephric fluid.    Bladder:  Unremarkable.     Other:   There is evidence of ascites.      Impression:      Impression:  No hydronephrosis. Incidental note of small bilateral renal cysts with otherwise unremarkable appearance of the kidneys.    Electronically Signed: Silvio Aviles    3/22/2023 7:08 PM EDT    Workstation ID: [id number]          Assessment  PAD  POD #5 s/p aortogram, angioplasty of left AT, left PT, placement of Xience drug eluting stent at proximal left AT, debridement of left midfott wound, right lateral forefoot wound  POD # 2 left foot 4th and 5th toe amputation, right 4th toe amputation, woundvac placmement        Plan   DAPT and warfarin  PT wound care consult, initiate vac change in 2 days      SEB Castro  03/23/23  06:38 EDT

## 2023-03-23 NOTE — PLAN OF CARE
Goal Outcome Evaluation:  Plan of Care Reviewed With: patient        Progress: no change     Pt required levophed for hypotension this am. Max dose being 0.06mg/kg/hr. Bicarb gtt continues for acidosis. Son updated and called for consent.Tunneled dialysis catheter placement placed in JEREMY this afternoon. CRRT initiated without complications. Pt remains drowsy and slightly confused to time. Resting comfortable on 2 L. Will attempt to pull 25ml/hr off with CRRT if pt tolerates. Pt anuric. Bladder scan this am 65ml. Recheck labs and ABG. Bilateral LE wounds continue to ooze sanguineous drainage outside of wound vac. Dressings reinforced. H&H stable. Digoxin Iv given. Pt remains in afibb with rate 's.

## 2023-03-23 NOTE — PROGRESS NOTES
INFECTIOUS DISEASE  Progress note     Swapnil Lawson  1950  [id number]      Admission Date: 3/17/2023      Requesting Provider: No ref. provider found  Evaluating Physician: Edgar Barksdale MD    Reason for Consultation: bilateral foot infections/gangrene.  Right fourth proximal phalanx osteomyelitis, left fifth metatarsal osteomyelitis    History of present illness:    Patient is a 72 y.o. male, with PMH COPD, CHF, CKD, CAD, DM, PAD, seen today for bilateral foot infections.  Admitted on 3/17/23 undergoing aortagram with stent placement/angioplasty of bilateral lower extremities with sharp debridement of both feet, plans for partial bilateral foot amputations early next week.  He has noted nonhealing foot ulcers for at least the past 6 months.  He was referred to CTS service and admitted 3/17/2023 for above. He has been afebrile without leukocytosis. Admitting labs with Scr 2.82, WBC 6.73, ESR 36, CRP 4.04. PCT 0.18.  Wound culture with no organisms, few WBCs, no growth.  He received a dose of Cefazolin and we were consulted for evaluation and treatment.  The patient denies immunocompromise status, TB, HIV, zoonotic exposures, ill contacts, or significant travel history.    3/19/23: History reviewed.  Visiting with his son and friend.  He remains afebrile. Tissue cultures no growth so far. Dr. Oviedo changed foot dressings earlier today.  No n/v/d. Having some urinary retention, required I & O catheterizations.     3/20/2023 history reviewed.  No high fevers or chills.  Being treated for bilateral foot infections and gangrene.     3/21/2023 history reviewed.  Tolerating daptomycin and piperacillin tazobactam awaiting disposition of his feet.  Duration of antibiotics to be determined.  Likely 6 weeks.    3/22/23 hx rev.  Underwent partial amputation of right 4th MT, and left midfoot 4th and 5th MT by Dr. Jose Cruz Etienne on 3/21 with placement of wound vac.  Leslie dapto and zosyn but changing to daptomycin  and cefepime with Flagyl till 5/9/23.    3/23/2023 history reviewed.  Status post partial amputation right fourth metatarsal, left midfoot fourth and fifth metatarsal by Dr. Fraser on 3/21.  Developed worsening hypotension yesterday and transferred to the ICU with new finding of ejection fraction decreased.  No high fever.  Was on daptomycin and cefepime and Flagyl till 5/9/2023 for osteomyelitis.  This    Past Medical History:   Diagnosis Date   • Abnormal ECG    • Cataracts, bilateral    • CHF (congestive heart failure) ([facility])    • Childhood asthma    • COPD (chronic obstructive pulmonary disease) ([facility])    • Coronary artery disease 12/14/2016    CABG, 1993, Abimael Tomlin MD. CABG, August 2013, [facility].   • Diabetes mellitus ([facility]) 12/14/2016    type II   • DVT (deep venous thrombosis) ([facility])     RLE   • Gout    • Hepatitis     1970s unsure of which one- states it was caused from drinking contaminated water   • Hyperlipidemia 12/14/2016   • Hypertension 12/14/2016   • Kidney disease    • Myocardial infarction ([facility])      in 1992 and 1993 prior to CABG.   • Paroxysmal atrial fibrillation ([facility]) 12/14/2016   • Pulmonary embolism ([facility])    • Sleep apnea    • Venous insufficiency 12/14/2016   • Wears glasses     for reading       Past Surgical History:   Procedure Laterality Date   • AMPUTATION FOOT Bilateral 3/21/2023    Procedure: FOOT RAY TRANSMETATARSAL AMPUTATION LATERAL LEFT, RIGHT FOOT TRANSMETATARSAL AMPUTATION;  Surgeon: Jose Cruz Etienne MD;  Location: [facility] OR;  Service: Vascular;  Laterality: Bilateral;   • AORTAGRAM N/A 3/17/2023    Procedure: AORTAGRAM WITH  RUNOFFS WITH STENT PLACEMENT;  Surgeon: Jose Cruz Etienne MD;  Location: [facility];  Service: Vascular;  Laterality: N/A;   fluoro  dose  contrast    • APPENDECTOMY  1981   • BUNIONECTOMY Left    • CARDIAC CATHETERIZATION      x4, no stents   • CARDIAC CATHETERIZATION N/A 02/16/2023    Procedure: Peripheral angiography  Left lower  extremity angiogram [facility] interventional Cardiologist;  Surgeon: Reed Zaldivar MD;  Location: [facility] CATH INVASIVE LOCATION;  Service: Cardiovascular;  Laterality: N/A;  Left lower extremity angiogram with interventional cardioligist.   • CATARACT EXTRACTION W/ INTRAOCULAR LENS IMPLANT Right 06/25/2020    Procedure: CATARACT PHACO EXTRACTION WITH INTRAOCULAR LENS IMPLANT RIGHT;  Surgeon: Omar Blood MD;  Location: [facility] OR;  Service: Ophthalmology;  Laterality: Right;   • CATARACT EXTRACTION W/ INTRAOCULAR LENS IMPLANT Left 07/09/2020    Procedure: CATARACT PHACO EXTRACTION WITH INTRAOCULAR LENS IMPLANT LEFT;  Surgeon: Omar Blood MD;  Location: [facility] OR;  Service: Ophthalmology;  Laterality: Left;   • CHOLECYSTECTOMY  2002   • COLON RESECTION Right 09/12/2022    Procedure: COLON RESECTION LAPAROSCOPIC HAND ASSISTED;  Surgeon: Kenji Garcias MD;  Location: [facility] OR;  Service: General;  Laterality: Right;   • COLON RESECTION N/A    • COLONOSCOPY     • COLONOSCOPY N/A 08/11/2022    Procedure: COLONOSCOPY, biopsy, polypectomy x1 and tattooing;  Surgeon: Kenji Garcias MD;  Location: [facility] ENDOSCOPY;  Service: Gastroenterology;  Laterality: N/A;   • CORONARY ARTERY BYPASS GRAFT      1993 triple bypass   • CORONARY ARTERY BYPASS GRAFT  2013    double bypass   • FINGER SURGERY      Rt index (second finger)   • OTHER SURGICAL HISTORY  2010    Bone spur removal   • TOE AMPUTATION Left 2009    4th toe   • VASECTOMY     • WOUND DEBRIDEMENT Bilateral 3/17/2023    Procedure: DEBRIDEMENT FOOT BILATERAL;  Surgeon: Jose Cruz Etienne MD;  Location: [facility];  Service: Vascular;  Laterality: Bilateral;       Family History   Problem Relation Age of Onset   • COPD Mother    • Heart attack Father    • Asthma Father      Social history lives with son.  Social History     Socioeconomic History   • Marital status:    • Number of children: 1   Tobacco Use   • Smoking status: Former     Packs/day:  1.00     Years: 28.00     Pack years: 28.00     Types: Cigarettes     Start date: 1964     Quit date: 1993     Years since quittin.2   • Smokeless tobacco: Never   • Tobacco comments:     Wish I'd never started   Vaping Use   • Vaping Use: Never used   Substance and Sexual Activity   • Alcohol use: No   • Drug use: No   • Sexual activity: Not Currently     Partners: Female     Birth control/protection: Other, Vasectomy, Birth control pill       No Known Allergies      Medication:      Antibiotics:  Anti-Infectives (From admission, onward)    Ordered     Dose/Rate Route Frequency Start Stop    23 0805  cefepime (MAXIPIME) 2 g/100 mL 0.9% NS (mbp)        Ordering Provider: Edgar Barksdale MD    2 g  over 4 Hours Intravenous Every 24 Hours 23 1700 23 1659    23 0805  metroNIDAZOLE (FLAGYL) tablet 500 mg        Ordering Provider: Edgar Barksdale MD    500 mg Oral Every 8 Hours Scheduled 23 1400 23 1359    23 1106  DAPTOmycin (CUBICIN) 450 mg in sodium chloride 0.9 % 50 mL IVPB        Ordering Provider: Gloria Sherman PA-C    6 mg/kg × 73 kg  100 mL/hr over 30 Minutes Intravenous Every 48 Hours 23 1000 23 0959    23 0805  cefepime (MAXIPIME) 2 g/100 mL 0.9% NS (mbp)        Ordering Provider: Edgar Barksdale MD    2 g  200 mL/hr over 30 Minutes Intravenous Once 23 0900 23 1153    23 0841  piperacillin-tazobactam (ZOSYN) 3.375 g in iso-osmotic dextrose 50 ml (premix)        Ordering Provider: Jack Kidd, PharmD    3.375 g  over 30 Minutes Intravenous Once 23 0930 23 0930    23 1153  ceFAZolin in dextrose (ANCEF) IVPB solution 2 g        Ordering Provider: Rolo Lawler PA    2 g  over 30 Minutes Intravenous Once 23 1155 23 1442        Review of Systems:  Constitutional-- No Fever, chills or sweats.  Appetite good, and no malaise. No fatigue.  HEENT-- No new vision, hearing or  throat complaints.  No epistaxis or oral sores.  Denies odynophagia or dysphagia. No headache, photophobia or neck stiffness.  CV-- No chest pain, palpitation or syncope  Resp--minimal SOB/cough/Hemoptysis, no wheezes  GI- No nausea, vomiting, or diarrhea.  No hematochezia, melena, or hematemesis. Denies jaundice or chronic liver disease.  -- No dysuria, hematuria, or flank pain.  Denies hesitancy, urgency or flank pain.  Lymph- no swollen lymph nodes in neck/axilla or groin.   Heme- No active bruising or bleeding; no Hx of DVT or PE.  MS-- no swelling or pain in the bones or joints of arms/legs.  No new back pain.  Neuro-- No acute focal weakness or numbness in the arms or legs.  No seizures.  Skin--No rashes   Bilateral foot ulcers as per HPI.  No nodules.    Physical Exam:   Vital Signs  Temp (24hrs), Av.1 °F (36.2 °C), Min:97 °F (36.1 °C), Max:97.3 °F (36.3 °C)    Temp  Min: 97 °F (36.1 °C)  Max: 97.3 °F (36.3 °C)  BP  Min: 69/42  Max: 106/61  Pulse  Min: 94  Max: 120  Resp  Min: 14  Max: 16  SpO2  Min: 92 %  Max: 100 %    GENERAL: Awake and alert, in mod distress. Appears older than stated age, cachectic, resting in bed.  HEENT: Normocephalic, atraumatic.  EOMI. No conjunctival injection. No icterus. Oropharynx clear without evidence of thrush or exudate. No evidence of peridontal disease.    NECK: Supple   HEART: RRR; No murmur, rubs, gallops.  No JVD.  LUNGS: Clear to auscultation bilaterally without wheezing, rales, rhonchi. Normal respiratory effort. Nonlabored.  ABDOMEN: Soft, nontender, minimal distention. Positive bowel sounds. No rebound or guarding. NO mass or HSM.  EXT:  No cyanosis, clubbing or edema. No cord.  :  Without Dueñas catheter.  MSK: No joint effusions or erythema  SKIN: Warm and dry  Left and right surg dressing in place.  No foul smell or crepitus.  NEURO: Oriented to PPT.  Nonfocal  PSYCHIATRIC: Normal insight and judgment. Cooperative with PE    Micro: Tissue and urine culture  3/17 and 3/18 negative, skin iglesia.  Blood cultures 3/7 negative.  Surg tissue cx from 3/21/23 neg to date, skin iglesia.    Laboratory Data    Results from last 7 days   Lab Units 03/23/23  0041 03/22/23  2101 03/22/23  1126 03/21/23  0645 03/20/23  2216   WBC 10*3/mm3 11.09*  --   --  8.15 7.97   HEMOGLOBIN g/dL 8.9* 9.5* 9.8* 9.9* 9.9*   HEMATOCRIT % 29.3* 31.4* 33.5* 31.8* 32.0*   PLATELETS 10*3/mm3 142  --   --  108* 107*     Results from last 7 days   Lab Units 03/23/23  0041   SODIUM mmol/L 136   POTASSIUM mmol/L 4.4   CHLORIDE mmol/L 106   CO2 mmol/L 17.0*   BUN mg/dL 58*   CREATININE mg/dL 3.91*   GLUCOSE mg/dL 178*   CALCIUM mg/dL 8.5*     Results from last 7 days   Lab Units 03/23/23  0041   ALK PHOS U/L 59   BILIRUBIN mg/dL 0.2   ALT (SGPT) U/L <5   AST (SGOT) U/L 11             Results from last 7 days   Lab Units 03/22/23  2101   LACTATE mmol/L 1.3     Results from last 7 days   Lab Units 03/22/23  0651 03/19/23  0543 03/18/23  0643   CK TOTAL U/L 44 18* 26         Estimated Creatinine Clearance: 17.4 mL/min (A) (by C-G formula based on SCr of 3.91 mg/dL (H)).      Microbiology:  No results found for: ACANTHNAEG, AFBCX, BPERTUSSISCX, BLOODCX  No results found for: BCIDPCR, CXREFLEX, CSFCX, CULTURETIS  No results found for: CULTURES, HSVCX, URCX  No results found for: EYECULTURE, GCCX, HSVCULTURE, LABHSV  No results found for: LEGIONELLA, MRSACX, MUMPSCX, MYCOPLASCX  No results found for: NOCARDIACX, STOOLCX  No results found for: THROATCX, UNSTIMCULT, URINECX, CULTURE, VZVCULTUR  No results found for: VIRALCULTU, WOUNDCX        Radiology:  Imaging Results (Last 72 Hours)     Procedure Component Value Units Date/Time    XR Chest 1 View [[id number]] Collected: 03/22/23 2155     Updated: 03/22/23 2357    Narrative:      Chest one view.    DATE: 3/22/2023 at 11:19 PM.    COMPARISON: 3/22/2023 at 9:15 PM.    CLINICAL HISTORY: Central line placement.      Impression:        There is been placement of a left  internal jugular central venous catheter with the catheter tip overlying the superior vena cava.    Evaluation of the heart and lungs is otherwise unchanged with cardiomegaly, pulmonary edema and bilateral pleural effusions.      Slot 94.    Electronically signed by:  Roberto Norman D.O.    3/22/2023 9:56 PM Mountain Time    XR Chest 1 View [[id number]] Collected: 03/22/23 2235     Updated: 03/22/23 2240    Narrative:      XR CHEST 1 VW    Date of Exam: 3/22/2023 9:01 PM EDT    Indication: shock state.    Comparison: Chest x-ray 9/25/2022    Findings:  Redemonstration of prior CABG. There is cardiomegaly. There is a moderate right layering pleural effusion. There is a small left layering pleural effusion. There is associated bibasilar opacities which may reflect comminution of pleural fluid and   associated atelectasis. There is diffuse interstitial prominence and perihilar vascular congestion compatible with component of interstitial edema. No pneumothorax. No acute osseous findings.      Impression:      Impression:  Cardiomegaly, interstitial edema, and moderate right and small left pleural effusions.    Electronically Signed: Silvio Aviles    3/22/2023 10:37 PM EDT    Workstation ID: [id number]     Renal Limited [[id number]] Collected: 03/22/23 1903     Updated: 03/22/23 1911    Narrative:      US RENAL LIMITED    Date of Exam: 3/22/2023 4:15 PM EDT    Indication: DEMARIO. ? CKD.    Comparison: No comparisons available.    Technique: Grayscale and color Doppler ultrasound evaluation of the kidneys and urinary bladder was performed.    Findings:  Right kidney:  Normal size of the kidney measuring 10.2 x 4.8 x 4.8 cm. Normal renal cortical echogenicity. No renal cortical atrophy. There is a small simple appearing renal cortical cyst at the inferior pole measuring 2.0 cm in diameter. Normal renal hilar   vascularity on color Doppler assessment. No hydronephrosis. No sonographic evidence of nephrolithiasis. No  perinephric fluid.    Left kidney:  Normal size of the kidney measuring 11.2 x 5.4 x 5.6 cm. Normal renal cortical echogenicity. No renal cortical atrophy. There is a small simple appearing renal cortical cyst at the inferior pole measuring 2.0 cm in diameter. Normal renal hilar   vascularity on color Doppler assessment. No hydronephrosis. No sonographic evidence of nephrolithiasis. No perinephric fluid.    Bladder:  Unremarkable.     Other:   There is evidence of ascites.      Impression:      Impression:  No hydronephrosis. Incidental note of small bilateral renal cysts with otherwise unremarkable appearance of the kidneys.    Electronically Signed: Silvio Aviles    3/22/2023 7:08 PM EDT    Workstation ID: [id number]    XR Abdomen KUB [[id number]] Collected: 03/20/23 1421     Updated: 03/20/23 1428    Narrative:      XR ABDOMEN KUB    Date of Exam: 3/20/2023 1:47 PM EDT    Indication: Abdominal pain and distention..    Comparison: 9/24/2022.    Findings:  There is increased gas throughout the stomach, colon, and small bowel. Several small bowel loops measure up to 4.8 cm. This is concerning for a small bowel obstruction. There is no pneumatosis or free air. There are vascular calcifications within the   abdomen or pelvis. There are degenerative changes within the thoracolumbar spine, hip, and sacroiliac joints. There are bilateral basilar pleural effusions with atelectasis.      Impression:      Impression:  Abnormal bowel gas pattern. This is concerning for a small bowel obstruction. There is no pneumatosis or free air.    Electronically Signed: Feroz Mcghee    3/20/2023 2:25 PM EDT    Workstation ID: [id number]            Impression:   - Bilateral foot infections, s/p sharp debridement with plans for partial amputation early next week- culture no growth so far from 3/17/2023, AFB and fungal also pending, with likely osteomyelitis underneath at left fifth metatarsal, right fourth proximal per MRI 3/20.  Wounds are  likely related to diabetes mellitus type 2, peripheral vascular disease including microvascular disease, and neuropathy.  Tissue culture 3/17 negative.  Cx 3/21 pending.  - S/p surgical resection of right 4th, and left 4th and 5th on 3/21 for OM.  - PVD s/p angioplasty/stent  3/17/23, lower extremities  - Diabetes mellitus, type 2, with increased risk for infection.  - Chronic kidney disease, creatinine 2.13 on 3/18.  Acute on chronic.  2.84 on 3/21, 3.91 on 3/23, worse, neg urine eosinophils.  Contrast toxicity?  -COPD  -Hypocalcemia 8.4.  -Anemia, chronic disease slightly worse.  -Thrombocytopenia, related to above issues and medications.  Resolved.   -Leukocytosis, neutrophilic, new.  Related to above issues.  -Hypotension, new issue and moved to the ICU on 3/22.  Possible cardiac issues.  -Acute hypoxic respiratory failure, new, on 2 L/min nasal cannula on 3/23.    PLAN/RECOMMENDATIONS:     1.  Diagnostically, monitor blood cultures, physical examination, radiology imaging, CBC, CMP, ESR, CRP, lactic acid, and procalcitonin wound cultures, tissue culture 3/17/2023.  Previous blood cultures 3/7 were negative.  Tissue surg cx 3/21 normal iglesia to date.  2.  Therapeutically, Daptomycin continue, and changed zosyn to cefepime/flagyl on 3/22 till 5/9/23.  Renal dose adjusted.  Watch for mental status changes or seizures with cefepime.  3.  Continue supportive care.  2 L/min nasal cannula on 3/23.    I discussed the patients findings and my recommendations with patient, and his son.    Our group would be pleased to follow this patient over the course of their hospitalization and assist with outpatient antimicrobial therapy, as indicated.  Further recommendations depend on the results of the cultures and clinical course.     Side effects were discussed with the patient.  Increased risk for adverse drug reactions, complications of IV access, readmission, loss of limb.    Case management orders: Please arrange for  outpatient IV antibiotics at home versus facility with daptomycin 450 mg IV every 48 hours, cefepime 2 g IV every 24 hours, Flagyl 500 mg p.o. 3 times daily until 5/9/2023 for osteomyelitis of left and right foot metatarsals.  Status post resection 3/21/2023.  Check CBC, CMP, CPK weekly while on IV antibiotics.  Fax orders to [phone], call [phone] with final arrangements.  Arrange for follow-up with me in 2 weeks post discharge.    Edgar Barksdale MD  3/23/2023  10:22 EDT

## 2023-03-23 NOTE — PROCEDURES
Please see corresponding Radiology report for in detail procedural information. The Radiology report will be dictated shortly, if not done so already. Please see the IR RN note for the information regarding medicines administered if any, christy-procedural vitals and I/O information.

## 2023-03-23 NOTE — NURSING NOTE
CRRT rounds complete. Policy and procedure reviewed with ELSY Miles. Orders and documentation reviewed. Hematocrit updated. Supplies adequate. Denies further need at this time. Encouraged to call [phone] for assistance or for questions.

## 2023-03-23 NOTE — NURSING NOTE
15.5 Sami Image guided tunneled dialysis catheter placed to right internal jugular by Dr Puentes. Patient tolerated well. Sedation time of 10minutes. Patient was given 25 mcg Fentanyl  Report given to Norma CHIN, ICU nurse present for case.

## 2023-03-24 NOTE — PROGRESS NOTES
INTENSIVIST   PROGRESS NOTE     Hospital:  LOS: 2 days     Mr. Swapnil Lawson, 72 y.o. male is followed for a Chief Complaint of: Shock, DEMARIO      Subjective   S     Interval History:  CRRT started but new dialysis catheter already clotted this AM. Remains on levophed.        The patient's relevant past medical, surgical and social history were reviewed and updated in Epic as appropriate.      ROS:   Constitutional: Negative for fever.   Respiratory: Positive for dyspnea.   Cardiovascular: Negative for chest pain.   Gastrointestinal: Negative for  nausea, vomiting and diarrhea.     Objective   O     Vitals:  Temp  Min: 93.1 °F (33.9 °C)  Max: 97.4 °F (36.3 °C)  BP  Min: 79/49  Max: 114/65  Pulse  Min: 92  Max: 129  Resp  Min: 12  Max: 22  SpO2  Min: 90 %  Max: 100 % Flow (L/min)  Min: 2  Max: 4    Intake/Ouptut 24 hrs (7:00AM - 6:59 AM)  Intake & Output (last 3 days)       03/21 0701  03/22 0700 03/22 0701  03/23 0700 03/23 0701  03/24 0700 03/24 0701  03/25 0700    P.O.  480 120     I.V. (mL/kg) 700 (9.6) 517 (7.1) 2207.9 (30.2) 279.6 (3.8)    Other 250       IV Piggyback 50  539.4 193.1    Total Intake(mL/kg) 1000 (13.7) 997 (13.7) 2867.3 (39.3) 472.7 (6.5)    Urine (mL/kg/hr) 100 (0.1) 50 (0)      Other   1408 -1    Stool   0     Blood 70       Total Output 912 42 7341 -1    Net +830 +947 +1459.3 +473.7            Stool Unmeasured Occurrence   1 x           Medications (drips):         Physical Examination  Telemetry:  Normal sinus rhythm.    Constitutional:  No acute distress. Thin elderly male.  Resting in bed on nasal cannula. CRRT running.    Eyes: No scleral icterus.   PERRL, EOM intact.    Neck:  Supple, FROM   Cardiovascular: Normal rate, regular and rhythm. Normal heart sounds.  No murmurs, gallop or rub.   Respiratory: No respiratory distress. Normal respiratory effort.  Diminished bilaterally.    Abdominal:  Soft. No masses. Nontender. No distension. No HSM.   Extremities: No digital cyanosis. No  clubbing.  No peripheral edema.   Skin: No rashes, lesions or ulcers   Neurological:   Alert and interactive.               Results from last 7 days   Lab Units 03/24/23  0542 03/23/23  0041 03/22/23 2101 03/22/23  1126 03/21/23  0645   WBC 10*3/mm3 16.16* 11.09*  --   --  8.15   HEMOGLOBIN g/dL 7.9* 8.9* 9.5*   < > 9.9*   MCV fL 87.8 91.3  --   --  90.9   PLATELETS 10*3/mm3 138* 142  --   --  108*    < > = values in this interval not displayed.     Results from last 7 days   Lab Units 03/24/23  0833 03/24/23  0542 03/24/23  0009 03/23/23  1639   SODIUM mmol/L 135* 137 138 135*   POTASSIUM mmol/L 4.2 4.5 4.4 4.4   CO2 mmol/L 22.0 21.0* 22.0 19.0*   CREATININE mg/dL 2.09* 1.86* 2.38* 4.47*   GLUCOSE mg/dL 158* 110* 104* 205*   MAGNESIUM mg/dL 2.3  --  2.2 2.2   PHOSPHORUS mg/dL 4.8*  --  4.9* 6.4*     Estimated Creatinine Clearance: 32.5 mL/min (A) (by C-G formula based on SCr of 2.09 mg/dL (H)).  Results from last 7 days   Lab Units 03/24/23  0542 03/23/23  0041 03/22/23 2101   ALK PHOS U/L 65 59 74   BILIRUBIN mg/dL 0.5 0.2 0.2   ALT (SGPT) U/L <5 <5 <5   AST (SGOT) U/L 21 11 13       Results from last 7 days   Lab Units 03/24/23  0543 03/22/23 2021   PH, ARTERIAL pH units 7.311* 7.190*   PCO2, ARTERIAL mm Hg 42.7 37.0   PO2 ART mm Hg 55.4* 91.6   FIO2 % 28 28       Images:  Imaging Results (Last 24 Hours)     Procedure Component Value Units Date/Time    IR Tunneled Catheter [[id number]] Collected: 03/23/23 1600     Updated: 03/23/23 1604    Narrative:      IR TUNNELED CATHETER                                                            History: tunneled dialysis access placement       : Fidencio Puentes MD.    Modality: Sonography and fluoroscopy                   DOSE REDUCTION: The examination was performed according to departmental dose-optimization program.     Fluoro time: 0.4 minutes    Radiation Dose: 3 mGy air Kerma.                                                                                    SEDATION: Moderate sedation was not administered. 0 milligram of Versed and 25 micrograms of fentanyl IV was used for moderate sedation. Total intra service time of sedation was not applicable minutes. The sedation was administered and the patient's   vital signs monitored throughout the procedure and recorded in the patient's medical record by the nurse under my direct supervision.    Medicines: Not applicable.    Anesthesia: Lidocaine with epinephrine local infiltration.     Estimated blood loss:  < 5 cc.             Technique:   A thorough discussion of the risks, benefits, and alternatives of the procedure, and if applicable, moderate sedation, was carried out with the patient. They were encouraged to ask any questions. Any questions were answered. They verbalized   understanding. A written informed consent was then signed.      A multi-component timeout was performed prior to starting the procedure using the departmental protocol.     The procedure room personnel used personal protective equipment. The operators used sterile gloves and if indicated, sterile gowns. The surgical site was prepped with chlorhexidine gluconate  and draped in the maximal applicable sterile fashion.    A preliminary ultrasonogram was performed of the target that revealed a patent and compressible Right internal jugular vein. Pertinent ultrasound images were stored in the PACS for documentation.    A sterile prep and drape of the right neck and upper chest was performed using maximal sterile technique.    Using aseptic precautions, real-time ultrasound guidance, the target vein was accessed after local anesthetic infiltration and dermatotomy with an access needle. A guidewire was advanced into the central venous system under fluoroscopic guidance. Over   the wire following standard technique a peel-away sheath was placed.    After local anesthesia, an incision was created at the exit site location and a cuffed tunneled dialysis  catheter of an appropriate length was tunneled from the exit site to the venotomy site using a tunneling device. The catheter was advanced into the   venous system through the peel-away sheath which was removed.    The catheter aspirated and flushed well and was terminally packed with heparin 1000 units per cc.    The catheter was secured to skin using nonabsorbable suture and a CHG dressing applied.    The venotomy site was closed using Dermabond. An aseptic dressing was applied using the protocol for Dermabond.    The patient was transferred to the recovery area and was discharged from the department in stable condition.                        Complications: None immediate.        Device: 15.5 Serbian x 19 cm cuff to tip Duraflow catheter.    Findings: Patent and compressible Right internal jugular. Final image shows the catheter to be in good position with the catheter tip in the right atrium, an excellent position for use. There is no complication.                                                                Impression:      Impression:      Successful ultrasound and fluoroscopic guided Right internal jugular vein route cuffed tunneled hemodialysis catheter placement as described above.    Thank you for the opportunity to assist in the care of your patient.    Electronically Signed: Fidencio Puentes    3/23/2023 4:01 PM EDT    Workstation ID: [id number]            Results: Reviewed.  I reviewed the patient's new laboratory and imaging results.  I independently reviewed the patient's new images.    Medications: Reviewed.    Assessment & Plan   A / P     Mr. Lawson is a 73yo M with a history of HTN, T2DM, HLD, CAD s/p CABG (1993 and redo 2013), PAF on coumadin, and PAD with severe left tibial artery stenosis who was admitted on 3/16/23 for elective aortogram with extensive lower extremity revascularization (angioplasty of left anterior tibial, left posterior tibial and left popliteal artery with ALVA to proximal left  anterior tibial) with sharp debridement of both feet by Dr. Etienne for critical limb ischemia/bilateral foot infections with ischemic gangrene.     ID is following for antibiotic management.     He returned to the OR on 3/21/22 and underwent partial amputation of the left 4th and 5th toes and the right 4th toe.     Postoperatively, he developed worsening renal function and Nephrology was consulted and started on PO bicarb. He developed hypotension despite the initiation and increases of Midodrine.     A transthoracic echocardiogram was performed on 3/22/23 and showed a newly reduced EF of 38% with moderate to severe TR and an RVSP > 55mHg.     He was transferred to the ICU on the evening of 3/22/23 for the initiation of vasopressors.     A tunneled dialysis catheter was placed and CRRT was started on the afternoon of 3/23/23.     He remains on Levophed this AM. Tunneled dialysis cathter clotted and CRRT currently on hold.     Nutrition:   Diet: Regular/House Diet; Texture: Soft to Chew (NDD 3); Soft to Chew: Chopped Meat; Fluid Consistency: Thin (IDDSI 0)  Advance Directives:   Code Status and Medical Interventions:   Ordered at: 03/24/23 1200     Code Status (Patient has no pulse and is not breathing):    No CPR (Do Not Attempt to Resuscitate)     Medical Interventions (Patient has pulse or is breathing):    Comfort Measures       Active Hospital Problems    Diagnosis    • **Acute HFrEF    • Ischemic gangrene    • Chronic anticoagulation on Coumadin    • DEMAROI superimposed on CKD    • Hypotension    • PVD    • Stage III CKD    • CAD s/p CABG (1993; redo 2013)    • HTN (hypertension)    • PAF    • Dyslipidemia    • T2DM        Assessment / Plan:    Continue ICU care  Titrate Levophed  CRRT per Nephrology.   Antibiotics per ID  Cardiology following for new systolic heart failure and Afib.  Postoperative management per Dr. Etienne  Continue ASA/Plavix/Statin  AM labs    Critically ill secondary to cardiogenic shock with  resultant renal failure. Patient is considering comfort measures.     High level of risk due to:  severe exacerbation of chronic illness and illness with threat to life or bodily function.    Plan of care and goals reviewed during interdisciplinary rounds.  I discussed the patient's findings and my recommendations with patient and nursing staff    Critical Care Time: was greater than 32 minutes.(This excludes time spent performing separately reportable procedures and services). including high complexity decision making to assess, manipulate, and support vital organ system failure in this individual who has impairment of one or more vital organ systems such that there is a high probability of imminent or life threatening deterioration in the patient’s condition.      Elham Case, DO    Intensive Care Medicine and Pulmonary Medicine

## 2023-03-24 NOTE — PROGRESS NOTES
Continued Stay Note  [facility]     Patient Name: Swapnil Lawson  MRN: [id number]  Today's Date: 3/24/2023    Admit Date: 3/17/2023    Plan: TBD   Discharge Plan     Row Name 03/24/23 1507       Plan    Plan TBD    Plan Comments Hospice consult received.  Chart reviewed.  Per discussion with Dr. King, hospice is to see patient tomorrow if he survives the night.                 Discharge Codes    No documentation.               Expected Discharge Date and Time     Expected Discharge Date Expected Discharge Time    Mar 27, 2023             Uyen Almazan RN

## 2023-03-24 NOTE — PROGRESS NOTES
"   LOS: 2 days    Patient Care Team:  Argenis Osborne APRN as PCP - General (Family Medicine)  Kenji Garcias MD as Consulting Physician (General Surgery)  Naseem Campbell III, MD as Cardiologist (Cardiology)    Chief Complaint:    DEMARIO on CKD, followed with Dr. Newby, history of peripheral vascular disease, amputation toes this admission, history of CAD/CABG, atrial fibrillation, history of DVT and PE, diabetes.  Since admission renal function deteriorating.  Hypotension is also noted.    Subjective     Interval History:   CRRT in progress.  Patient is requesting to stop all medications and procedures.  Stating I am ready to go.    Review of Systems:   Denies any nausea vomiting chest pain or shortness of breath.  Complain of generalized weakness.    Objective     Vital Sign Min/Max for last 24 hours  Temp  Min: 93.1 °F (33.9 °C)  Max: 97.4 °F (36.3 °C)   BP  Min: 79/49  Max: 114/65   Pulse  Min: 92  Max: 129   Resp  Min: 12  Max: 22   SpO2  Min: 90 %  Max: 100 %   Flow (L/min)  Min: 2  Max: 4   No data recorded     Flowsheet Rows    Flowsheet Row First Filed Value   Admission Height 185.4 cm (73\") Documented at 03/21/2023 1254   Admission Weight 73 kg (161 lb) Documented at 03/21/2023 1254          I/O this shift:  In: 472.7 [I.V.:279.6; IV Piggyback:193.1]  Out: -1   I/O last 3 completed shifts:  In: 3384.3 [P.O.:120; I.V.:2724.9; IV Piggyback:539.4]  Out: 1408 [Other:1408]    Physical Exam:  General Appearance: Alert, oriented x4 no obvious distress.  Eyes: PER, EOMI.  Neck: Supple no JVD.  Left triple-lumen in place.  Lungs: Clear auscultation, no rales rhonchi's, equal chest movement, nonlabored.  Heart: No gallop, positive murmur, no rub, irregular heartbeat.  Abdomen: Soft, nontender, positive bowel sounds, no organomegaly.  Extremities: Stasis dermatitis noted.  Left foot and bandage.  No edema, no cyanosis.  Neuro: No focal deficit, moving all extremities, alert oriented X 4  Psych: Mood and affect " are normal appropriate.  Skin is warm and dry.  Ecchymosis, stasis dermatitis lower extremity   no suprapubic fullness or tenderness noticed  WBC WBC   Date Value Ref Range Status   03/24/2023 16.16 (H) 3.40 - 10.80 10*3/mm3 Final   03/23/2023 11.09 (H) 3.40 - 10.80 10*3/mm3 Final      HGB Hemoglobin   Date Value Ref Range Status   03/24/2023 7.9 (L) 13.0 - 17.7 g/dL Final   03/23/2023 8.9 (L) 13.0 - 17.7 g/dL Final   03/22/2023 9.5 (L) 13.0 - 17.7 g/dL Final   03/22/2023 9.8 (L) 13.0 - 17.7 g/dL Final      HCT Hematocrit   Date Value Ref Range Status   03/24/2023 24.5 (L) 37.5 - 51.0 % Final   03/23/2023 29.3 (L) 37.5 - 51.0 % Final   03/22/2023 31.4 (L) 37.5 - 51.0 % Final   03/22/2023 33.5 (L) 37.5 - 51.0 % Final      Platlets No results found for: LABPLAT   MCV MCV   Date Value Ref Range Status   03/24/2023 87.8 79.0 - 97.0 fL Final   03/23/2023 91.3 79.0 - 97.0 fL Final          Sodium Sodium   Date Value Ref Range Status   03/24/2023 135 (L) 136 - 145 mmol/L Final   03/24/2023 137 136 - 145 mmol/L Final   03/24/2023 138 136 - 145 mmol/L Final   03/23/2023 135 (L) 136 - 145 mmol/L Final   03/23/2023 136 136 - 145 mmol/L Final   03/22/2023 137 136 - 145 mmol/L Final   03/22/2023 140 136 - 145 mmol/L Final      Potassium Potassium   Date Value Ref Range Status   03/24/2023 4.2 3.5 - 5.2 mmol/L Final   03/24/2023 4.5 3.5 - 5.2 mmol/L Final   03/24/2023 4.4 3.5 - 5.2 mmol/L Final   03/23/2023 4.4 3.5 - 5.2 mmol/L Final   03/23/2023 4.4 3.5 - 5.2 mmol/L Final     Comment:     Slight hemolysis detected by analyzer. Results may be affected.   03/22/2023 4.4 3.5 - 5.2 mmol/L Final   03/22/2023 4.9 3.5 - 5.2 mmol/L Final      Chloride Chloride   Date Value Ref Range Status   03/24/2023 102 98 - 107 mmol/L Final   03/24/2023 103 98 - 107 mmol/L Final   03/24/2023 105 98 - 107 mmol/L Final   03/23/2023 105 98 - 107 mmol/L Final   03/23/2023 106 98 - 107 mmol/L Final   03/22/2023 108 (H) 98 - 107 mmol/L Final    03/22/2023 108 (H) 98 - 107 mmol/L Final      CO2 CO2   Date Value Ref Range Status   03/24/2023 22.0 22.0 - 29.0 mmol/L Final   03/24/2023 21.0 (L) 22.0 - 29.0 mmol/L Final   03/24/2023 22.0 22.0 - 29.0 mmol/L Final   03/23/2023 19.0 (L) 22.0 - 29.0 mmol/L Final   03/23/2023 17.0 (L) 22.0 - 29.0 mmol/L Final   03/22/2023 16.0 (L) 22.0 - 29.0 mmol/L Final   03/22/2023 12.0 (L) 22.0 - 29.0 mmol/L Final      BUN BUN   Date Value Ref Range Status   03/24/2023 29 (H) 8 - 23 mg/dL Final   03/24/2023 26 (H) 8 - 23 mg/dL Final   03/24/2023 36 (H) 8 - 23 mg/dL Final   03/23/2023 63 (H) 8 - 23 mg/dL Final   03/23/2023 58 (H) 8 - 23 mg/dL Final   03/22/2023 58 (H) 8 - 23 mg/dL Final   03/22/2023 54 (H) 8 - 23 mg/dL Final      Creatinine Creatinine   Date Value Ref Range Status   03/24/2023 2.09 (H) 0.76 - 1.27 mg/dL Final   03/24/2023 1.86 (H) 0.76 - 1.27 mg/dL Final   03/24/2023 2.38 (H) 0.76 - 1.27 mg/dL Final   03/23/2023 4.47 (H) 0.76 - 1.27 mg/dL Final   03/23/2023 3.91 (H) 0.76 - 1.27 mg/dL Final   03/22/2023 3.89 (H) 0.76 - 1.27 mg/dL Final   03/22/2023 3.23 (H) 0.76 - 1.27 mg/dL Final      Calcium Calcium   Date Value Ref Range Status   03/24/2023 7.8 (L) 8.6 - 10.5 mg/dL Final   03/24/2023 7.8 (L) 8.6 - 10.5 mg/dL Final   03/24/2023 8.0 (L) 8.6 - 10.5 mg/dL Final   03/23/2023 8.3 (L) 8.6 - 10.5 mg/dL Final   03/23/2023 8.5 (L) 8.6 - 10.5 mg/dL Final   03/22/2023 8.6 8.6 - 10.5 mg/dL Final   03/22/2023 8.7 8.6 - 10.5 mg/dL Final      PO4 No results found for: CAPO4   Albumin Albumin   Date Value Ref Range Status   03/24/2023 2.7 (L) 3.5 - 5.2 g/dL Final   03/24/2023 2.7 (L) 3.5 - 5.2 g/dL Final   03/24/2023 2.8 (L) 3.5 - 5.2 g/dL Final   03/23/2023 2.8 (L) 3.5 - 5.2 g/dL Final   03/23/2023 2.9 (L) 3.5 - 5.2 g/dL Final   03/22/2023 3.0 (L) 3.5 - 5.2 g/dL Final   03/22/2023 2.8 (L) 3.5 - 5.2 g/dL Final      Magnesium Magnesium   Date Value Ref Range Status   03/24/2023 2.3 1.6 - 2.4 mg/dL Final   03/24/2023 2.2 1.6 -  2.4 mg/dL Final   03/23/2023 2.2 1.6 - 2.4 mg/dL Final   03/23/2023 2.3 1.6 - 2.4 mg/dL Final      Uric Acid No results found for: URICACID        Results Review:     I reviewed the patient's new clinical results.    aspirin, 81 mg, Oral, Daily  atorvastatin, 10 mg, Oral, Nightly  cefepime, 1 g, Intravenous, Q12H  clopidogrel, 75 mg, Oral, Daily  [START ON 3/25/2023] DAPTOmycin, 6 mg/kg, Intravenous, Q48H  Dulaglutide, 1.5 mg, Subcutaneous, Weekly  ferrous sulfate, 325 mg, Oral, Daily With Breakfast  heparin (porcine), 5,000 Units, Subcutaneous, Q12H  hydrocortisone sodium succinate, 50 mg, Intravenous, Q6H  insulin lispro, 0-9 Units, Subcutaneous, 4x Daily With Meals & Nightly  metroNIDAZOLE, 500 mg, Intravenous, Q8H  pantoprazole, 40 mg, Oral, Q AM  polyethylene glycol, 17 g, Oral, BID With Meals  senna-docusate sodium, 2 tablet, Oral, BID  tamsulosin, 0.4 mg, Oral, Daily      norepinephrine, 0.02-0.3 mcg/kg/min, Last Rate: 0.1 mcg/kg/min (03/24/23 1118)  Pharmacy to Dose Heparin,   Phoxillum BK4/2.5, 1,500 mL/hr, Last Rate: 1,500 mL/hr (03/24/23 0926)  Phoxillum BK4/2.5, 1,500 mL/hr, Last Rate: 1,500 mL/hr (03/24/23 0928)  Phoxillum BK4/2.5, 1,500 mL/hr, Last Rate: 1,500 mL/hr (03/24/23 0930)  sodium bicarbonate drip (greater than 75 mEq/bag), 150 mEq, Last Rate: 150 mEq (03/24/23 0143)        Medication Review: Reviewed    Assessment & Plan       Acute HFrEF    CAD s/p CABG (1993; redo 2013)    HTN (hypertension)    PAF    T2DM    Dyslipidemia    Stage III CKD    PVD    Ischemic gangrene    Chronic anticoagulation on Coumadin    DEMARIO superimposed on CKD    Hypotension  1.  DEMARIO on CKD: Questionable ATN from hypotension.  Oliguric at this time.  2.  Peripheral vascular disease s/p toe amputation.  3.  Hypotension: On midodrine.  4.  Diabetes with diabetic nephropathy.  5.  Anemia.  6.  Metabolic acidosis.  Plan:  Critically ill patient hypotensive on CRRT, IV pressors.  Discussed with the RN, discussed with the  patient.  Patient would like to stop all medications and CRRT and likely die.  Staff RN has discussed with the patient's son who agree with the patient regards to stopping everything.  Dr. Fraser has also discussed with the patient's family  We will stop the CRRT at this time, reinfusion orders given.  Ben Painter MD  03/24/23  11:54 EDT

## 2023-03-24 NOTE — PROGRESS NOTES
INFECTIOUS DISEASE  Progress note     Swapnil Lawson  1950  [id number]      Admission Date: 3/17/2023      Requesting Provider: No ref. provider found  Evaluating Physician: Edgar Barksdale MD    Reason for Consultation: bilateral foot infections/gangrene.  Right fourth proximal phalanx osteomyelitis, left fifth metatarsal osteomyelitis    History of present illness:    Patient is a 72 y.o. male, with PMH COPD, CHF, CKD, CAD, DM, PAD, seen today for bilateral foot infections.  Admitted on 3/17/23 undergoing aortagram with stent placement/angioplasty of bilateral lower extremities with sharp debridement of both feet, plans for partial bilateral foot amputations early next week.  He has noted nonhealing foot ulcers for at least the past 6 months.  He was referred to CTS service and admitted 3/17/2023 for above. He has been afebrile without leukocytosis. Admitting labs with Scr 2.82, WBC 6.73, ESR 36, CRP 4.04. PCT 0.18.  Wound culture with no organisms, few WBCs, no growth.  He received a dose of Cefazolin and we were consulted for evaluation and treatment.  The patient denies immunocompromise status, TB, HIV, zoonotic exposures, ill contacts, or significant travel history.    3/19/23: History reviewed.  Visiting with his son and friend.  He remains afebrile. Tissue cultures no growth so far. Dr. Oviedo changed foot dressings earlier today.  No n/v/d. Having some urinary retention, required I & O catheterizations.     3/20/2023 history reviewed.  No high fevers or chills.  Being treated for bilateral foot infections and gangrene.     3/21/2023 history reviewed.  Tolerating daptomycin and piperacillin tazobactam awaiting disposition of his feet.  Duration of antibiotics to be determined.  Likely 6 weeks.    3/22/23 hx rev.  Underwent partial amputation of right 4th MT, and left midfoot 4th and 5th MT by Dr. Jose Cruz Etienne on 3/21 with placement of wound vac.  Leslie dapto and zosyn but changing to daptomycin  and cefepime with Flagyl till 5/9/23.    3/23/2023 history reviewed.  Status post partial amputation right fourth metatarsal, left midfoot fourth and fifth metatarsal by Dr. Fraser on 3/21.  Developed worsening hypotension yesterday and transferred to the ICU with new finding of ejection fraction decreased.  No high fever.  Was on daptomycin and cefepime and Flagyl till 5/9/2023 for osteomyelitis.      3/24/2023 history reviewed.  Status post amputation of bilateral feet from metatarsals and osteomyelitis on 3/21/2023.  Moved to the ICU with some multiorgan failure, on CRRT.  Tolerating cefepime, daptomycin, and metronidazole until 5/9/2023.  Patient now DNR DNI.  Deciding on whether he wants to pursue aggressive care.    Past Medical History:   Diagnosis Date   • Abnormal ECG    • Cataracts, bilateral    • CHF (congestive heart failure) (HCC)    • Childhood asthma    • COPD (chronic obstructive pulmonary disease) ([facility])    • Coronary artery disease 12/14/2016    CABG, 1993, Abimael Tomlin MD. CABG, August 2013, [facility].   • Diabetes mellitus (HCC) 12/14/2016    type II   • DVT (deep venous thrombosis) ([facility])     RLE   • Gout    • Hepatitis     1970s unsure of which one- states it was caused from drinking contaminated water   • Hyperlipidemia 12/14/2016   • Hypertension 12/14/2016   • Kidney disease    • Myocardial infarction ([facility])      in 1992 and 1993 prior to CABG.   • Paroxysmal atrial fibrillation ([facility]) 12/14/2016   • Pulmonary embolism ([facility])    • Sleep apnea    • Venous insufficiency 12/14/2016   • Wears glasses     for reading       Past Surgical History:   Procedure Laterality Date   • AMPUTATION FOOT Bilateral 3/21/2023    Procedure: FOOT RAY TRANSMETATARSAL AMPUTATION LATERAL LEFT, RIGHT FOOT TRANSMETATARSAL AMPUTATION;  Surgeon: Jose Cruz Etienne MD;  Location: [facility];  Service: Vascular;  Laterality: Bilateral;   • AORTAGRAM N/A 3/17/2023    Procedure: AORTAGRAM WITH  RUNOFFS WITH STENT  PLACEMENT;  Surgeon: Jose Cruz Etienne MD;  Location: [facility];  Service: Vascular;  Laterality: N/A;   fluoro  dose  contrast    • APPENDECTOMY  1981   • BUNIONECTOMY Left    • CARDIAC CATHETERIZATION      x4, no stents   • CARDIAC CATHETERIZATION N/A 02/16/2023    Procedure: Peripheral angiography  Left lower extremity angiogram [facility] interventional Cardiologist;  Surgeon: Reed Zaldivar MD;  Location: [facility] CATH INVASIVE LOCATION;  Service: Cardiovascular;  Laterality: N/A;  Left lower extremity angiogram with interventional cardioligist.   • CATARACT EXTRACTION W/ INTRAOCULAR LENS IMPLANT Right 06/25/2020    Procedure: CATARACT PHACO EXTRACTION WITH INTRAOCULAR LENS IMPLANT RIGHT;  Surgeon: Omar Blood MD;  Location: [facility] OR;  Service: Ophthalmology;  Laterality: Right;   • CATARACT EXTRACTION W/ INTRAOCULAR LENS IMPLANT Left 07/09/2020    Procedure: CATARACT PHACO EXTRACTION WITH INTRAOCULAR LENS IMPLANT LEFT;  Surgeon: Omar Blood MD;  Location: [facility] OR;  Service: Ophthalmology;  Laterality: Left;   • CHOLECYSTECTOMY  2002   • COLON RESECTION Right 09/12/2022    Procedure: COLON RESECTION LAPAROSCOPIC HAND ASSISTED;  Surgeon: Kenji Garcias MD;  Location: [facility] OR;  Service: General;  Laterality: Right;   • COLON RESECTION N/A    • COLONOSCOPY     • COLONOSCOPY N/A 08/11/2022    Procedure: COLONOSCOPY, biopsy, polypectomy x1 and tattooing;  Surgeon: Kenji Garcias MD;  Location: [facility] ENDOSCOPY;  Service: Gastroenterology;  Laterality: N/A;   • CORONARY ARTERY BYPASS GRAFT      1993 triple bypass   • CORONARY ARTERY BYPASS GRAFT  2013    double bypass   • FINGER SURGERY      Rt index (second finger)   • OTHER SURGICAL HISTORY  2010    Bone spur removal   • TOE AMPUTATION Left 2009    4th toe   • VASECTOMY     • WOUND DEBRIDEMENT Bilateral 3/17/2023    Procedure: DEBRIDEMENT FOOT BILATERAL;  Surgeon: Jose Cruz Etienne MD;  Location:  [facility];  Service: Vascular;   Laterality: Bilateral;       Family History   Problem Relation Age of Onset   • COPD Mother    • Heart attack Father    • Asthma Father      Social history lives with son.  Social History     Socioeconomic History   • Marital status:    • Number of children: 1   Tobacco Use   • Smoking status: Former     Packs/day: 1.00     Years: 28.00     Pack years: 28.00     Types: Cigarettes     Start date: 1964     Quit date: 1993     Years since quittin.2   • Smokeless tobacco: Never   • Tobacco comments:     Wish I'd never started   Vaping Use   • Vaping Use: Never used   Substance and Sexual Activity   • Alcohol use: No   • Drug use: No   • Sexual activity: Not Currently     Partners: Female     Birth control/protection: Other, Vasectomy, Birth control pill       No Known Allergies      Medication:      Antibiotics:  Anti-Infectives (From admission, onward)    Ordered     Dose/Rate Route Frequency Start Stop    23 1531  cefepime (MAXIPIME) 2 g/100 mL 0.9% NS (mbp)        Ordering Provider: Edgar Barksdale MD    2 g  over 4 Hours Intravenous Every 8 Hours 23 1700 23 1659    23 1535  DAPTOmycin (CUBICIN) 450 mg in sodium chloride 0.9 % 50 mL IVPB        Ordering Provider: Edgar Barksdale MD    6 mg/kg × 73 kg  100 mL/hr over 30 Minutes Intravenous Every 24 Hours 23 1700 23 1659    23 1433  metroNIDAZOLE (FLAGYL) IVPB 500 mg        Ordering Provider: Ross Moralessa V., DO    500 mg  over 30 Minutes Intravenous Every 8 Hours 23 1500 23 1459    23 0805  cefepime (MAXIPIME) 2 g/100 mL 0.9% NS (mbp)        Ordering Provider: Edgar Barksdale MD    2 g  200 mL/hr over 30 Minutes Intravenous Once 23 0900 23 1153    23 0841  piperacillin-tazobactam (ZOSYN) 3.375 g in iso-osmotic dextrose 50 ml (premix)        Ordering Provider: Jack Kidd, PharmD    3.375 g  over 30 Minutes Intravenous Once 23 0930 23 0930     23 1153  ceFAZolin in dextrose (ANCEF) IVPB solution 2 g        Ordering Provider: Rolo Lawler PA    2 g  over 30 Minutes Intravenous Once 23 1155 23 1442        Review of Systems:  Constitutional-- No Fever, chills or sweats.  Appetite poor, and no malaise. No fatigue.  HEENT-- No new vision, hearing or throat complaints.  No epistaxis or oral sores.  Denies odynophagia or dysphagia. No headache, photophobia or neck stiffness.  CV-- No chest pain, palpitation or syncope  Resp--minimal SOB/cough/Hemoptysis, no wheezes  GI- No nausea, vomiting, or diarrhea.  No hematochezia, melena, or hematemesis. Denies jaundice or chronic liver disease.  -- No dysuria, hematuria, or flank pain.  Denies hesitancy, urgency or flank pain.  Lymph- no swollen lymph nodes in neck/axilla or groin.   Heme- No active bruising or bleeding; no Hx of DVT or PE.  MS-- no swelling or pain in the bones or joints of arms/legs.  No new back pain.  Neuro-- No acute focal weakness or numbness in the arms or legs.  No seizures.  Skin--No rashes   No nodules.    Physical Exam:   Vital Signs  Temp (24hrs), Av °F (35 °C), Min:93.1 °F (33.9 °C), Max:97.4 °F (36.3 °C)    Temp  Min: 93.1 °F (33.9 °C)  Max: 97.4 °F (36.3 °C)  BP  Min: 79/49  Max: 126/93  Pulse  Min: 92  Max: 129  Resp  Min: 12  Max: 22  SpO2  Min: 90 %  Max: 100 %    GENERAL: Awake and alert, in mod distress. Appears older than stated age, cachectic, resting in bed.  HEENT: Normocephalic, atraumatic.  No conjunctival injection. No icterus. Oropharynx clear without evidence of thrush or exudate. No evidence of peridontal disease.    NECK: Supple   HEART: RRR; No murmur, rubs, gallops.    LUNGS: Clear to auscultation bilaterally without wheezing, rales, rhonchi. Normal respiratory effort. Nonlabored.  ABDOMEN: Soft, nontender, minimal distention. Positive bowel sounds. No rebound or guarding. NO mass or HSM.  EXT:  No cyanosis, clubbing or edema. No cord.  :   Without Dueñas catheter.  MSK: No joint effusions or erythema  SKIN: Warm and dry  Left and right surg dressing in place.  No foul smell or crepitus.  NEURO: Oriented to name 6.  Nonfocal  PSYCHIATRIC: Normal insight and judgment. Cooperative with PE    Micro: Tissue and urine culture 3/17 and 3/18 negative, skin iglesia.  Blood cultures 3/7 negative.  Surg tissue cx from 3/21/23 neg to date, skin iglesia.    Laboratory Data    Results from last 7 days   Lab Units 03/24/23  0542 03/23/23  0041 03/22/23  2101 03/22/23  1126 03/21/23  0645   WBC 10*3/mm3 16.16* 11.09*  --   --  8.15   HEMOGLOBIN g/dL 7.9* 8.9* 9.5*   < > 9.9*   HEMATOCRIT % 24.5* 29.3* 31.4*   < > 31.8*   PLATELETS 10*3/mm3 138* 142  --   --  108*    < > = values in this interval not displayed.     Results from last 7 days   Lab Units 03/24/23  0833   SODIUM mmol/L 135*   POTASSIUM mmol/L 4.2   CHLORIDE mmol/L 102   CO2 mmol/L 22.0   BUN mg/dL 29*   CREATININE mg/dL 2.09*   GLUCOSE mg/dL 158*   CALCIUM mg/dL 7.8*     Results from last 7 days   Lab Units 03/24/23  0542   ALK PHOS U/L 65   BILIRUBIN mg/dL 0.5   ALT (SGPT) U/L <5   AST (SGOT) U/L 21             Results from last 7 days   Lab Units 03/24/23  0833   LACTATE mmol/L 1.9     Results from last 7 days   Lab Units 03/24/23  0009 03/22/23  0651 03/19/23  0543   CK TOTAL U/L 54 44 18*         Estimated Creatinine Clearance: 32.5 mL/min (A) (by C-G formula based on SCr of 2.09 mg/dL (H)).      Microbiology:  No results found for: ACANTHNAEG, AFBCX, BPERTUSSISCX, BLOODCX  No results found for: BCIDPCR, CXREFLEX, CSFCX, CULTURETIS  No results found for: CULTURES, HSVCX, URCX  No results found for: EYECULTURE, GCCX, HSVCULTURE, LABHSV  No results found for: LEGIONELLA, MRSACX, MUMPSCX, MYCOPLASCX  No results found for: NOCARDIACX, STOOLCX  No results found for: THROATCX, UNSTIMCULT, URINECX, CULTURE, VZVCULTUR  No results found for: VIRALCULTU, WOUNDCX        Radiology:  Imaging Results (Last 72 Hours)      Procedure Component Value Units Date/Time    IR Tunneled Catheter [[id number]] Collected: 03/23/23 1600     Updated: 03/23/23 1604    Narrative:      IR TUNNELED CATHETER                                                            History: tunneled dialysis access placement       : Fidencio Puentes MD.    Modality: Sonography and fluoroscopy                   DOSE REDUCTION: The examination was performed according to departmental dose-optimization program.     Fluoro time: 0.4 minutes    Radiation Dose: 3 mGy air Kerma.                                                                                   SEDATION: Moderate sedation was not administered. 0 milligram of Versed and 25 micrograms of fentanyl IV was used for moderate sedation. Total intra service time of sedation was not applicable minutes. The sedation was administered and the patient's   vital signs monitored throughout the procedure and recorded in the patient's medical record by the nurse under my direct supervision.    Medicines: Not applicable.    Anesthesia: Lidocaine with epinephrine local infiltration.     Estimated blood loss:  < 5 cc.             Technique:   A thorough discussion of the risks, benefits, and alternatives of the procedure, and if applicable, moderate sedation, was carried out with the patient. They were encouraged to ask any questions. Any questions were answered. They verbalized   understanding. A written informed consent was then signed.      A multi-component timeout was performed prior to starting the procedure using the departmental protocol.     The procedure room personnel used personal protective equipment. The operators used sterile gloves and if indicated, sterile gowns. The surgical site was prepped with chlorhexidine gluconate  and draped in the maximal applicable sterile fashion.    A preliminary ultrasonogram was performed of the target that revealed a patent and compressible Right internal jugular vein.  Pertinent ultrasound images were stored in the PACS for documentation.    A sterile prep and drape of the right neck and upper chest was performed using maximal sterile technique.    Using aseptic precautions, real-time ultrasound guidance, the target vein was accessed after local anesthetic infiltration and dermatotomy with an access needle. A guidewire was advanced into the central venous system under fluoroscopic guidance. Over   the wire following standard technique a peel-away sheath was placed.    After local anesthesia, an incision was created at the exit site location and a cuffed tunneled dialysis catheter of an appropriate length was tunneled from the exit site to the venotomy site using a tunneling device. The catheter was advanced into the   venous system through the peel-away sheath which was removed.    The catheter aspirated and flushed well and was terminally packed with heparin 1000 units per cc.    The catheter was secured to skin using nonabsorbable suture and a CHG dressing applied.    The venotomy site was closed using Dermabond. An aseptic dressing was applied using the protocol for Dermabond.    The patient was transferred to the recovery area and was discharged from the department in stable condition.                        Complications: None immediate.        Device: 15.5 Bulgarian x 19 cm cuff to tip Duraflow catheter.    Findings: Patent and compressible Right internal jugular. Final image shows the catheter to be in good position with the catheter tip in the right atrium, an excellent position for use. There is no complication.                                                                Impression:      Impression:      Successful ultrasound and fluoroscopic guided Right internal jugular vein route cuffed tunneled hemodialysis catheter placement as described above.    Thank you for the opportunity to assist in the care of your patient.    Electronically Signed: Fidencio Puentes    3/23/2023  4:01 PM EDT    Workstation ID: [id number]    XR Chest 1 View [[id number]] Collected: 03/22/23 2155     Updated: 03/22/23 2357    Narrative:      Chest one view.    DATE: 3/22/2023 at 11:19 PM.    COMPARISON: 3/22/2023 at 9:15 PM.    CLINICAL HISTORY: Central line placement.      Impression:        There is been placement of a left internal jugular central venous catheter with the catheter tip overlying the superior vena cava.    Evaluation of the heart and lungs is otherwise unchanged with cardiomegaly, pulmonary edema and bilateral pleural effusions.      Slot 94.    Electronically signed by:  Roberto Norman D.O.    3/22/2023 9:56 PM Mountain Time    XR Chest 1 View [[id number]] Collected: 03/22/23 2235     Updated: 03/22/23 2240    Narrative:      XR CHEST 1 VW    Date of Exam: 3/22/2023 9:01 PM EDT    Indication: shock state.    Comparison: Chest x-ray 9/25/2022    Findings:  Redemonstration of prior CABG. There is cardiomegaly. There is a moderate right layering pleural effusion. There is a small left layering pleural effusion. There is associated bibasilar opacities which may reflect comminution of pleural fluid and   associated atelectasis. There is diffuse interstitial prominence and perihilar vascular congestion compatible with component of interstitial edema. No pneumothorax. No acute osseous findings.      Impression:      Impression:  Cardiomegaly, interstitial edema, and moderate right and small left pleural effusions.    Electronically Signed: Silvio Aviles    3/22/2023 10:37 PM EDT    Workstation ID: [id number]    US Renal Limited [[id number]] Collected: 03/22/23 1903     Updated: 03/22/23 1911    Narrative:      US RENAL LIMITED    Date of Exam: 3/22/2023 4:15 PM EDT    Indication: DEMARIO. ? CKD.    Comparison: No comparisons available.    Technique: Grayscale and color Doppler ultrasound evaluation of the kidneys and urinary bladder was performed.    Findings:  Right kidney:  Normal size of the kidney  measuring 10.2 x 4.8 x 4.8 cm. Normal renal cortical echogenicity. No renal cortical atrophy. There is a small simple appearing renal cortical cyst at the inferior pole measuring 2.0 cm in diameter. Normal renal hilar   vascularity on color Doppler assessment. No hydronephrosis. No sonographic evidence of nephrolithiasis. No perinephric fluid.    Left kidney:  Normal size of the kidney measuring 11.2 x 5.4 x 5.6 cm. Normal renal cortical echogenicity. No renal cortical atrophy. There is a small simple appearing renal cortical cyst at the inferior pole measuring 2.0 cm in diameter. Normal renal hilar   vascularity on color Doppler assessment. No hydronephrosis. No sonographic evidence of nephrolithiasis. No perinephric fluid.    Bladder:  Unremarkable.     Other:   There is evidence of ascites.      Impression:      Impression:  No hydronephrosis. Incidental note of small bilateral renal cysts with otherwise unremarkable appearance of the kidneys.    Electronically Signed: Silvio Aviles    3/22/2023 7:08 PM EDT    Workstation ID: [id number]            Impression:   - Bilateral foot infections, s/p sharp debridement with plans for partial amputation early next week- culture no growth so far from 3/17/2023, AFB and fungal also pending, with likely osteomyelitis underneath at left fifth metatarsal, right fourth proximal per MRI 3/20.  Wounds are likely related to diabetes mellitus type 2, peripheral vascular disease including microvascular disease, and neuropathy.  Tissue culture 3/17 negative.  Cx 3/21 pending.  - S/p surgical resection of right 4th, and left 4th and 5th on 3/21 for OM.  - PVD s/p angioplasty/stent  3/17/23, lower extremities  - Diabetes mellitus, type 2, with increased risk for infection.  - Chronic kidney disease, creatinine 2.13 on 3/18.  Acute on chronic.  2.84 on 3/21, 3.91 on 3/23, worse, neg urine eosinophils.  Contrast toxicity?  Now on CRRT.  -COPD  -Hypocalcemia 7.8, worse.  -Anemia, chronic  disease worse.  -Thrombocytopenia, related to above issues and medications.  New.   -Leukocytosis, neutrophilic, worse.  Related to above issues.  -Hypotension, new issue and moved to the ICU on 3/22.  Possible cardiac issues.  -Acute hypoxic respiratory failure, new, on 2 L/min nasal cannula on 3/23.    PLAN/RECOMMENDATIONS:     1.  Diagnostically, monitor blood cultures, physical examination, radiology imaging, CBC, CMP, ESR, CRP, lactic acid, and procalcitonin wound cultures, tissue culture 3/17/2023.  Previous blood cultures 3/7 were negative.  Tissue surg cx 3/21 normal iglesia to date.  2.  Therapeutically, Daptomycin continue, and changed zosyn to cefepime/flagyl on 3/22 till 5/9/23.  Renal dose adjusted.  Watch for mental status changes or seizures with cefepime.  3.  Continue supportive care.  2 L/min nasal cannula on 3/23, 4 L/min on 3/24.  4.  DNR, DNI.  New.    I discussed the patients findings and my recommendations with patient, and his son.    Our group would be pleased to follow this patient over the course of their hospitalization and assist with outpatient antimicrobial therapy, as indicated.  Further recommendations depend on the results of the cultures and clinical course.     Side effects were discussed with the patient.  Increased risk for adverse drug reactions, complications of IV access, readmission, loss of limb.    Case management orders: Please arrange for outpatient IV antibiotics at home versus facility with daptomycin 450 mg IV every 48 hours, cefepime 2 g IV every 24 hours, Flagyl 500 mg p.o. 3 times daily until 5/9/2023 for osteomyelitis of left and right foot metatarsals.  Status post resection 3/21/2023.  Check CBC, CMP, CPK weekly while on IV antibiotics.  Fax orders to [phone], call [phone] with final arrangements.  Arrange for follow-up with me in 2 weeks post discharge.    See next on Monday, call sooner if needed.    Edgar Barksdale MD  3/24/2023  11:24 EDT

## 2023-03-24 NOTE — PLAN OF CARE
Goal Outcome Evaluation:  Plan of Care Reviewed With: patient, son        Progress: no change  Outcome Evaluation: Palliative consult for GOC/ACP. Pt has no ACP doc on file; son Jack Tristan is NOK. Pt denied pain at time of initial Palliative encounter; noted intermittent pain documented, prn meds available. Pt seen by Dr. King; pt requested transition to comfort measures, stop interventions, allow natural death. Pt requested Dr. King call his son, discuss pt's stated wishes; after phone conversation, son wished to speak with pt by phone, after which, pt transitioned to comfort measures only. Comfort orders placed. Palliative following for continued symptom management and support in EOL care. Hospice consult placed, to follow up tomorrow.    1300 Palliative IDT meeting: , RN, APRN, SW    After hours, weekends and holidays, contact Palliative Provider by calling [phone]    Problem: Palliative Care  Goal: Enhanced Quality of Life  Outcome: Ongoing, Progressing  Intervention: Promote Advance Care Planning  Flowsheets (Taken 3/24/2023 1249)  Life Transition/Adjustment:  • palliative care discussed  • palliative care initiated  • end-of-life care initiated  • decision-making facilitated  Intervention: Maximize Comfort  Flowsheets (Taken 3/24/2023 1249)  Pain Management Interventions: pain management plan reviewed with patient/caregiver  Intervention: Optimize Function  Flowsheets (Taken 3/24/2023 1249)  Sensory Stimulation Regulation: quiet environment promoted  Fatigue Management: frequent rest breaks encouraged  Intervention: Optimize Psychosocial Wellbeing  Flowsheets (Taken 3/24/2023 1249)  Supportive Measures:  • active listening utilized  • decision-making supported  • positive reinforcement provided  • self-reflection promoted  • self-responsibility promoted  • verbalization of feelings encouraged  Spiritual Activities Assistance: (Spiritual Care consult) other (see comments)  Family/Support System  Care:  • caregiver stress acknowledged  • family care conference arranged  • involvement promoted  • self-care encouraged  • support provided

## 2023-03-24 NOTE — PLAN OF CARE
Goal Outcome Evaluation:  This AM Cathflow was instilled in blue port, able to get blood return, CRRT resumed. However, pt began stating that he was ready to die and wanted no further interventions. Palliative addressed pt's concerns, pt switched to comfort measures, CRRT and IV medications discontinued. Pt is comfortable in bed with a dose of PRN dilaudid and ativan. Pt remains on 4L nasal cannula for comfort. Hospice will evaluate pt tomorrow, CT surgery aware of the plan.

## 2023-03-24 NOTE — DISCHARGE PLACEMENT REQUEST
"Lenin Lawson (72 y.o. Male)     Date of Birth   1950    Social Security Number       Address   [address]  [place] [address]    Home Phone   [phone]    MRN   [id number]       Muslim   [employer]    Marital Status                               Admission Date   3/17/23    Admission Type   Elective    Admitting Provider   Jose Cruz Etienne MD    Attending Provider   Jose Cruz Etienne MD    Department, Room/Bed   [facility] 2A ICU, N208/1       Discharge Date       Discharge Disposition       Discharge Destination                               Attending Provider: Jose Cruz Etienne MD    Allergies: No Known Allergies    Isolation: None   Infection: None   Code Status: No CPR    Ht: 185.4 cm (73\")   Wt: 73 kg (161 lb)    Admission Cmt: None   Principal Problem: Acute HFrEF [I50.21]                 Active Insurance as of 3/17/2023     Primary Coverage     Payor Plan Insurance Group Employer/Plan Group    HUMANA MEDICARE REPLACEMENT HUMANA MEDICARE REPLACEMENT [id number]     Payor Plan Address Payor Plan Phone Number Payor Plan Fax Number Effective Dates    [address] [address] [phone]  1/1/2019 - None Entered    Formerly [facility] [phone]       Subscriber Name Subscriber Birth Date Member ID       LENIN LAWSON 1950 [id number]                 Emergency Contacts      (Rel.) Home Phone Work Phone Mobile Phone    Jack Tristan (Son) [phone] -- --    Dinesh Lawson (Brother) [phone] -- [phone]            Emergency Contact Information     Name Relation Home Work Mobile    Jack Tristan Son [phone]      Dinesh Lawson Brother [phone]          Insurance Information                HUMANA MEDICARE REPLACEMENT/HUMANA MEDICARE REPLACEMENT Phone: [phone]    Subscriber: Lenin Lawson Kalin Subscriber#: [id number]    Group#: [id number] Precert#: [id number]             History & Physical      Jose Cruz Etienne MD at 03/17/23 1400      "     CARDIOTHORACIC AND VASCULAR SURGERY HISTORY AND PHYSICAL NOTE    Chief Complaint/Reason for Consultation:   Bilateral foot wounds, PAD    HPI:   72-year-old man with history of Afib on Warfarin, DM, CABG, CHF, CKD, and PAD who presented to the outpatient Cardiovascular Surgery clinic with bilateral diabetic gangrenous foot ulcers concerning for chronic limb-threatening ischemia.     Location - As above  Severity - Severe  Timing - Several weeks  Duration - Several weeks  Radiation - None  Context - As above  Modifying factors - None   Associated symptoms - None    Review of Systems   General ROS: negative  Psychological ROS: negative  Ophthalmic ROS: negative  ENT ROS: negative  Allergy and Immunology ROS: negative  Hematological and Lymphatic ROS: negative  Endocrine ROS: negative  Breast ROS: negative  Respiratory ROS: negative  Cardiovascular ROS: negative  Gastrointestinal ROS: negative  Genito-Urinary ROS: negative  Musculoskeletal ROS: negative  Neurological ROS: negative  Dermatological ROS: negative    Past Medical History:   Diagnosis Date   • Abnormal ECG    • Cataracts, bilateral    • CHF (congestive heart failure) (HCC)    • Childhood asthma    • COPD (chronic obstructive pulmonary disease) (HCC)    • Coronary artery disease 12/14/2016    CABG, 1993, Abimael Tomlin MD. CABG, August 2013, [facility].   • Diabetes mellitus (HCC) 12/14/2016    type II   • DVT (deep venous thrombosis) ([facility])     RLE   • Gout    • Hepatitis     1970s unsure of which one- states it was caused from drinking contaminated water   • Hyperlipidemia 12/14/2016   • Hypertension 12/14/2016   • Kidney disease    • Myocardial infarction (HCC)      in 1992 and 1993 prior to CABG.   • Paroxysmal atrial fibrillation (HCC) 12/14/2016   • Pulmonary embolism (HCC)    • Sleep apnea    • Venous insufficiency 12/14/2016   • Wears glasses     for reading       Past Surgical History:   Procedure Laterality Date   • APPENDECTOMY   1981   • BUNIONECTOMY Left    • CARDIAC CATHETERIZATION      x4, no stents   • CARDIAC CATHETERIZATION N/A 02/16/2023    Procedure: Peripheral angiography  Left lower extremity angiogram wit interventional Cardiologist;  Surgeon: Reed Zaldivar MD;  Location: [facility] CATH INVASIVE LOCATION;  Service: Cardiovascular;  Laterality: N/A;  Left lower extremity angiogram with interventional cardioligist.   • CATARACT EXTRACTION W/ INTRAOCULAR LENS IMPLANT Right 06/25/2020    Procedure: CATARACT PHACO EXTRACTION WITH INTRAOCULAR LENS IMPLANT RIGHT;  Surgeon: Omar Blood MD;  Location: [facility] OR;  Service: Ophthalmology;  Laterality: Right;   • CATARACT EXTRACTION W/ INTRAOCULAR LENS IMPLANT Left 07/09/2020    Procedure: CATARACT PHACO EXTRACTION WITH INTRAOCULAR LENS IMPLANT LEFT;  Surgeon: Omar Blood MD;  Location: [facility] OR;  Service: Ophthalmology;  Laterality: Left;   • CHOLECYSTECTOMY  2002   • COLON RESECTION Right 09/12/2022    Procedure: COLON RESECTION LAPAROSCOPIC HAND ASSISTED;  Surgeon: Kenji Garcias MD;  Location: [facility] OR;  Service: General;  Laterality: Right;   • COLON RESECTION N/A    • COLONOSCOPY     • COLONOSCOPY N/A 08/11/2022    Procedure: COLONOSCOPY, biopsy, polypectomy x1 and tattooing;  Surgeon: Kenji Garcias MD;  Location: [facility] ENDOSCOPY;  Service: Gastroenterology;  Laterality: N/A;   • CORONARY ARTERY BYPASS GRAFT      1993 triple bypass   • CORONARY ARTERY BYPASS GRAFT  2013    double bypass   • FINGER SURGERY      Rt index (second finger)   • OTHER SURGICAL HISTORY  2010    Bone spur removal   • TOE AMPUTATION Left 2009    4th toe   • VASECTOMY         Family History   Problem Relation Age of Onset   • COPD Mother    • Heart attack Father    • Asthma Father        Social History     Socioeconomic History   • Marital status:    • Number of children: 1   Tobacco Use   • Smoking status: Former     Packs/day: 1.00     Years: 28.00     Pack years: 28.00      Types: Cigarettes     Start date: 1964     Quit date: 1993     Years since quittin.2   • Smokeless tobacco: Never   • Tobacco comments:     Wish I'd never started   Vaping Use   • Vaping Use: Never used   Substance and Sexual Activity   • Alcohol use: No   • Drug use: No   • Sexual activity: Not Currently     Partners: Female     Birth control/protection: Other, Vasectomy, Birth control pill       No Known Allergies      OBJECTIVE  Patient Vitals for the past 24 hrs:   BP Temp Temp src Pulse Resp SpO2   23 0700 99/56 97.4 °F (36.3 °C) Oral 82 16 98 %   23 0633 -- 96.5 °F (35.8 °C) Rectal -- -- --   23 0300 101/71 95.6 °F (35.3 °C) Rectal 71 16 100 %   23 1853 105/65 97.4 °F (36.3 °C) Oral 81 16 99 %   23 1849 -- 97.4 °F (36.3 °C) Oral -- 16 --   23 1810 110/68 97.3 °F (36.3 °C) Temporal 81 17 100 %   23 1755 111/70 -- -- 78 20 100 %   23 1740 116/73 -- -- 85 18 100 %   23 1725 118/76 97 °F (36.1 °C) Temporal 83 20 100 %   23 1720 123/72 -- -- 83 18 97 %   23 1715 120/69 -- -- 63 16 100 %   23 1710 116/74 97.5 °F (36.4 °C) Temporal 61 14 99 %   23 1246 110/71 97.6 °F (36.4 °C) Temporal 91 18 95 %     There were no vitals filed for this visit.  S RR:  [6-11] 6    Exam  General no acute distress, pleasant, interactive  Head normocephalic, atraumatic  Eyes clear sclerae  ENT no discharge, neck supple  Mouth mucous membranes moist  Cardiac AFib  Vascular non palpable pedal pulses  Pulmonary lungs clear to auscultation bilaterally  Abdomen soft nontender nondistended  Lymphatic 2+ edema bilateral lower extremities  Neurological grossly intact  Psychological appropriate  Dermatological open wounds at bilateral feet   Musculoskeletal low bulk       Diet Orders (active) (From admission, onward)     Start     Ordered    23  Diet: Cardiac Diets, Diabetic Diets, Renal Diets; Healthy Heart (2-3 Na+); Consistent Carbohydrate; Low  Sodium (2-3g), Low Potassium, Low Phosphorus; Texture: Regular Texture (IDDSI 7); Fluid Consistency: Thin (IDDSI 0)  Diet Effective Now         03/17/23 2001                  CBC  WBC   Date Value Ref Range Status   03/18/2023 5.19 3.40 - 10.80 10*3/mm3 Final     RBC   Date Value Ref Range Status   03/18/2023 3.68 (L) 4.14 - 5.80 10*6/mm3 Final     Hemoglobin   Date Value Ref Range Status   03/18/2023 10.4 (L) 13.0 - 17.7 g/dL Final     Hematocrit   Date Value Ref Range Status   03/18/2023 33.2 (L) 37.5 - 51.0 % Final     MCV   Date Value Ref Range Status   03/18/2023 90.2 79.0 - 97.0 fL Final     MCH   Date Value Ref Range Status   03/18/2023 28.3 26.6 - 33.0 pg Final     MCHC   Date Value Ref Range Status   03/18/2023 31.3 (L) 31.5 - 35.7 g/dL Final     RDW   Date Value Ref Range Status   03/18/2023 17.6 (H) 12.3 - 15.4 % Final     RDW-SD   Date Value Ref Range Status   03/18/2023 58.4 (H) 37.0 - 54.0 fl Final     MPV   Date Value Ref Range Status   03/18/2023 11.2 6.0 - 12.0 fL Final     Platelets   Date Value Ref Range Status   03/18/2023 114 (L) 140 - 450 10*3/mm3 Final     Neutrophil %   Date Value Ref Range Status   03/18/2023 86.7 (H) 42.7 - 76.0 % Final     Lymphocyte %   Date Value Ref Range Status   03/18/2023 7.9 (L) 19.6 - 45.3 % Final     Monocyte %   Date Value Ref Range Status   03/18/2023 3.7 (L) 5.0 - 12.0 % Final     Eosinophil %   Date Value Ref Range Status   03/18/2023 0.0 (L) 0.3 - 6.2 % Final     Basophil %   Date Value Ref Range Status   03/18/2023 0.4 0.0 - 1.5 % Final     Immature Grans %   Date Value Ref Range Status   03/18/2023 1.3 (H) 0.0 - 0.5 % Final     Neutrophils, Absolute   Date Value Ref Range Status   03/18/2023 4.50 1.70 - 7.00 10*3/mm3 Final     Lymphocytes, Absolute   Date Value Ref Range Status   03/18/2023 0.41 (L) 0.70 - 3.10 10*3/mm3 Final     Monocytes, Absolute   Date Value Ref Range Status   03/18/2023 0.19 0.10 - 0.90 10*3/mm3 Final     Eosinophils, Absolute   Date  Value Ref Range Status   03/18/2023 0.00 0.00 - 0.40 10*3/mm3 Final     Basophils, Absolute   Date Value Ref Range Status   03/18/2023 0.02 0.00 - 0.20 10*3/mm3 Final     Immature Grans, Absolute   Date Value Ref Range Status   03/18/2023 0.07 (H) 0.00 - 0.05 10*3/mm3 Final     nRBC   Date Value Ref Range Status   03/18/2023 0.0 0.0 - 0.2 /100 WBC Final     Basic Metabolic Panel    Sodium Sodium   Date Value Ref Range Status   03/18/2023 136 136 - 145 mmol/L Final   03/16/2023 139 136 - 145 mmol/L Final      Potassium Potassium   Date Value Ref Range Status   03/18/2023 4.4 3.5 - 5.2 mmol/L Final   03/16/2023 4.8 3.5 - 5.2 mmol/L Final      Chloride Chloride   Date Value Ref Range Status   03/18/2023 110 (H) 98 - 107 mmol/L Final   03/16/2023 110 (H) 98 - 107 mmol/L Final      Bicarbonate No results found for: PLASMABICARB   BUN BUN   Date Value Ref Range Status   03/18/2023 43 (H) 8 - 23 mg/dL Final   03/16/2023 43 (H) 8 - 23 mg/dL Final      Creatinine Creatinine   Date Value Ref Range Status   03/18/2023 2.13 (H) 0.76 - 1.27 mg/dL Final   03/16/2023 2.82 (H) 0.76 - 1.27 mg/dL Final      Calcium Calcium   Date Value Ref Range Status   03/18/2023 8.2 (L) 8.6 - 10.5 mg/dL Final   03/16/2023 9.7 8.6 - 10.5 mg/dL Final      Glucose      No components found for: GLUCOSE.*       Current Facility-Administered Medications:   •  acetaminophen (TYLENOL) tablet 650 mg, 650 mg, Oral, Q4H PRN, Jose Cruz Etienne MD  •  acetaminophen (TYLENOL) tablet 650 mg, 650 mg, Oral, Q4H PRN, Jose Cruz Etienne MD  •  aspirin chewable tablet 81 mg, 81 mg, Oral, Daily, Jose Cruz Etienne MD  •  ceFAZolin in dextrose (ANCEF) IVPB solution 2 g, 2 g, Intravenous, Q8H, Jose Cruz Etienne MD, 2 g at 03/17/23 5016  •  Chlorhexidine Gluconate Cloth 2 % pads 1 application, 1 application, Topical, Q12H PRN, Rolo Lawler PA  •  clopidogrel (PLAVIX) tablet 75 mg, 75 mg, Oral, Daily, Jose Cruz Etienne MD  •  heparin (porcine) 5000 UNIT/ML injection 5,000 Units,  5,000 Units, Subcutaneous, Q12H, Jose Cruz Etienne MD  •  HYDROcodone-acetaminophen (NORCO) 7.5-325 MG per tablet 1 tablet, 1 tablet, Oral, Q4H PRN, Jose Cruz Etienne MD, 1 tablet at 03/17/23 2055  •  HYDROmorphone (DILAUDID) injection 0.5 mg, 0.5 mg, Intravenous, Q2H PRN **AND** naloxone (NARCAN) injection 0.4 mg, 0.4 mg, Intravenous, Q5 Min PRN, Jose Cruz Etienne MD  •  niCARdipine (CARDENE) 25mg in 250mL NS infusion, 5-15 mg/hr, Intravenous, Titrated, Jose Cruz Etienne MD, Held at 03/17/23 2057  •  tamsulosin (FLOMAX) 24 hr capsule 0.4 mg, 0.4 mg, Oral, Daily, Emmanuel Steven PA-C  Current Outpatient Medications   Medication Instructions   • aspirin 81 mg, Oral, Daily   • colchicine 0.6 MG capsule capsule TAKE 1 CAPSULE BY MOUTH DAILY AS NEEDED FOR PAIN   • diphenhydrAMINE (BENADRYL) 25 mg, Oral, Every Night at Bedtime   • docusate sodium (COLACE) 300 mg, Oral, 2 Times Daily PRN   • Dulaglutide (Trulicity) 1.5 MG/0.5ML solution pen-injector Subcutaneous, Weekly   • ferrous gluconate (FERGON) 324 mg, Oral, Daily With Breakfast   • ipratropium (ATROVENT HFA) 17 MCG/ACT inhaler 2 puffs, Inhalation, As Needed   • ipratropium (ATROVENT) 0.5 mg, Nebulization, Every 4 Hours PRN   • lovastatin (MEVACOR) 20 mg, Oral, Nightly   • metoprolol succinate XL (TOPROL-XL) 12.5 mg, Oral, Daily   • nitroglycerin (NITROSTAT) 0.4 mg, Sublingual, Every 5 Minutes PRN, Take no more than 3 doses in 15 minutes. <BR>States he has not taken since 1993   • O2 (OXYGEN) 2 L/min, Inhalation, Nightly   • polycarbophil 625 mg, Oral, 2 Times Daily, Takes 2 tablets BID   • warfarin (COUMADIN) 5 mg, Oral, Daily Warfarin, 5mg every other day  2.5mg every other day alternating        IMAGING     Left tibial artery severe stenosis noted on arteriogram by Dr. Zaldivar last month    ASSESSMENT    DFU   Concern for limb loss without intervention      PVD (peripheral vascular disease) ([facility])        PLAN  Arteriograms with CO2  Wound debridements    *Complex medical  decision making*    Jose Cruz Etienne M.D., R.P.V.I.  Cardiothoracic and Vascular Surgeon  [facility]    Jose Cruz Etienne MD  03/18/23  08:09 EDT      Electronically signed by Jose Cruz Etienne MD at 03/18/23 0816

## 2023-03-24 NOTE — PLAN OF CARE
Goal Outcome Evaluation:  Plan of Care Reviewed With: patient        Progress: no change  Outcome Evaluation: .    - Pt remains on CRRT. Pt tolerating fluid removal rate of 25mL/hr above non-tez intake.   - Low dose Levo continued. Titrated up to 0.06 from .04 around 0630  - Pt remains oriented x 3-4. Low to respond/lethargic at times. Pt voiced many feelings of hopelessness this shift. Care and current situation explained and questions encouraged.   - Remains afib per monitor  - 2L NC  - Appetite remains extremely low, only a couple bites of applesauce eaten and no PO fluid intake   - Bladder scan around 2200 showed 150 mL  - Dilaudid given x 1 for pain   - foot wounds still having moderate amount of bloody drainage from around wound vac. Dressings reinforced and loosely wrapped in kerlex   - Temperature low. Bear hugger unsuccessful in raising temp, CRRT blood warmer initiated around 0430       Around 0615 this morning, CRRT alarmed of high return pressure. When attempting to change set, the return port of the catheter was found to be clotted. When catheter pulled back, there was no blood return or release of the clot. Nephrology notified and order for cath-damian given. Critical lactic 2.8 this AM, APRN notified.

## 2023-03-24 NOTE — THERAPY WOUND CARE TREATMENT
Acute Care - Wound/Debridement Treatment Note  [facility]     Patient Name: Swapnil Lawson  : 1950  MRN: [id number]  Today's Date: 3/24/2023                Admit Date: 3/17/2023    Visit Dx:    ICD-10-CM ICD-9-CM   1. Ulcer of right foot with other severity (HCC)  L97.518 707.15   2. PVD (peripheral vascular disease) (HCC)  I73.9 443.9   3. Hypotension, unspecified hypotension type  I95.9 458.9       Patient Active Problem List   Diagnosis   • CAD s/p CABG (; redo )   • HTN (hypertension)   • PAF   • T2DM   • Venous insufficiency   • Dyslipidemia   • Nuclear sclerotic cataract of right eye   • Cortical age-related cataract of right eye   • Nuclear sclerotic cataract of left eye   • Cortical age-related cataract of left eye   • Thrombocytopenia (HCC)   • History of colon polyps   • Chronic idiopathic constipation   • Adenomatous polyp of transverse colon   • Stage III CKD   • PVD   • Asymptomatic PVD (peripheral vascular disease) (HCC)   • Severe malnutrition (HCC)   • Ulcer of right foot with other severity (HCC)   • Ischemic gangrene   • Chronic anticoagulation on Coumadin   • DEMARIO superimposed on CKD   • Acute HFrEF   • Hypotension        Past Medical History:   Diagnosis Date   • Abnormal ECG    • Cataracts, bilateral    • CHF (congestive heart failure) (HCC)    • Childhood asthma    • COPD (chronic obstructive pulmonary disease) (HCC)    • Coronary artery disease 2016    CABG, , Abimael Tomlin MD. CABG, 2013, [facility].   • Diabetes mellitus (HCC) 2016    type II   • DVT (deep venous thrombosis) (HCC)     RLE   • Gout    • Hepatitis     1970s unsure of which one- states it was caused from drinking contaminated water   • Hyperlipidemia 2016   • Hypertension 2016   • Kidney disease    • Myocardial infarction (HCC)      in  and  prior to CABG.   • Paroxysmal atrial fibrillation (HCC) 2016   • Pulmonary embolism (HCC)    • Sleep apnea     • Venous insufficiency 12/14/2016   • Wears glasses     for reading        Past Surgical History:   Procedure Laterality Date   • AMPUTATION FOOT Bilateral 3/21/2023    Procedure: FOOT RAY TRANSMETATARSAL AMPUTATION LATERAL LEFT, RIGHT FOOT TRANSMETATARSAL AMPUTATION;  Surgeon: Jose Cruz Etienne MD;  Location: [facility] OR;  Service: Vascular;  Laterality: Bilateral;   • AORTAGRAM N/A 3/17/2023    Procedure: AORTAGRAM WITH  RUNOFFS WITH STENT PLACEMENT;  Surgeon: Jose Cruz Etienne MD;  Location: [facility] HYBRID [place];  Service: Vascular;  Laterality: N/A;   fluoro  dose  contrast    • APPENDECTOMY  1981   • BUNIONECTOMY Left    • CARDIAC CATHETERIZATION      x4, no stents   • CARDIAC CATHETERIZATION N/A 02/16/2023    Procedure: Peripheral angiography  Left lower extremity angiogram [facility] interventional Cardiologist;  Surgeon: Reed Zaldivar MD;  Location: [facility] CATH INVASIVE LOCATION;  Service: Cardiovascular;  Laterality: N/A;  Left lower extremity angiogram with interventional cardioligist.   • CATARACT EXTRACTION W/ INTRAOCULAR LENS IMPLANT Right 06/25/2020    Procedure: CATARACT PHACO EXTRACTION WITH INTRAOCULAR LENS IMPLANT RIGHT;  Surgeon: Omar Blood MD;  Location: [facility] OR;  Service: Ophthalmology;  Laterality: Right;   • CATARACT EXTRACTION W/ INTRAOCULAR LENS IMPLANT Left 07/09/2020    Procedure: CATARACT PHACO EXTRACTION WITH INTRAOCULAR LENS IMPLANT LEFT;  Surgeon: Omar Blood MD;  Location: [facility] OR;  Service: Ophthalmology;  Laterality: Left;   • CHOLECYSTECTOMY  2002   • COLON RESECTION Right 09/12/2022    Procedure: COLON RESECTION LAPAROSCOPIC HAND ASSISTED;  Surgeon: Kenji Garcias MD;  Location: [facility] OR;  Service: General;  Laterality: Right;   • COLON RESECTION N/A    • COLONOSCOPY     • COLONOSCOPY N/A 08/11/2022    Procedure: COLONOSCOPY, biopsy, polypectomy x1 and tattooing;  Surgeon: Kenji Garcias MD;  Location: [facility] ENDOSCOPY;  Service: Gastroenterology;  Laterality: N/A;    • CORONARY ARTERY BYPASS GRAFT      1993 triple bypass   • CORONARY ARTERY BYPASS GRAFT  2013    double bypass   • FINGER SURGERY      Rt index (second finger)   • OTHER SURGICAL HISTORY  2010    Bone spur removal   • TOE AMPUTATION Left 2009    4th toe   • VASECTOMY     • WOUND DEBRIDEMENT Bilateral 3/17/2023    Procedure: DEBRIDEMENT FOOT BILATERAL;  Surgeon: Jose Cruz Etienne MD;  Location: [facility];  Service: Vascular;  Laterality: Bilateral;           Wound 02/16/23 1040 Left lateral plantar Venous Ulcer (Active)   Dressing Appearance dry;intact 03/24/23 1000   Closure Adhesive bandage 03/24/23 1000   Base dressing in place, unable to visualize 03/24/23 1000   Drainage Characteristics/Odor sanguineous 03/23/23 1600   Drainage Amount moderate 03/23/23 1600   Dressing Care dressing reinforced 03/23/23 1600   Periwound Care dry periwound area maintained 03/24/23 1000       Wound 02/16/23 1040 Right lateral plantar Venous Ulcer (Active)   Dressing Appearance intact;dry 03/24/23 1000   Closure CLARK 03/24/23 1000   Base dressing in place, unable to visualize 03/24/23 1000   Drainage Characteristics/Odor sanguineous 03/23/23 1800   Drainage Amount moderate 03/23/23 1800   Dressing Care dressing reinforced 03/23/23 1600   Periwound Care dry periwound area maintained 03/24/23 1000       Wound 02/16/23 1040 Right lower leg Abrasion (Active)   Dressing Appearance dry 03/24/23 1000   Closure Adhesive bandage 03/24/23 1000   Base dressing in place, unable to visualize 03/24/23 1000   Periwound intact;dry;blanchable 03/24/23 0400   Periwound Temperature warm 03/24/23 0400   Periwound Skin Turgor soft 03/24/23 0400   Drainage Amount none 03/23/23 1600   Periwound Care dry periwound area maintained 03/24/23 1000       Wound Right anterior groin Puncture (Active)   Dressing Appearance open to air 03/24/23 1000   Closure Open to air 03/24/23 1000   Base dry;pink 03/24/23 0400   Periwound intact 03/24/23 0400   Periwound  Temperature warm 03/24/23 0400   Periwound Skin Turgor soft 03/24/23 0400       Wound 03/19/23 1715 Right upper arm Skin Tear (Active)   Dressing Appearance dry;intact 03/24/23 1000   Closure Adhesive bandage 03/24/23 0800   Base dressing in place, unable to visualize 03/24/23 0800   Periwound intact;blanchable;ecchymotic 03/24/23 0800   Periwound Temperature warm;cool 03/24/23 0800   Periwound Skin Turgor soft 03/24/23 0800   Edges irregular 03/24/23 0800   Periwound Care dry periwound area maintained 03/24/23 0800       Wound 03/19/23 0750 Right lower arm Skin Tear (Active)   Dressing Appearance dry;intact 03/24/23 1000   Closure Adhesive bandage 03/24/23 0800   Base dressing in place, unable to visualize 03/24/23 0800   Periwound intact;ecchymotic 03/24/23 0800   Periwound Temperature warm;cool 03/24/23 0800   Periwound Skin Turgor soft 03/24/23 0800   Edges irregular 03/24/23 0800   Periwound Care dry periwound area maintained 03/24/23 0800       Wound 03/19/23 1715 Right distal arm Skin Tear (Active)   Dressing Appearance dry;intact 03/24/23 1000   Closure Adhesive bandage 03/24/23 0800   Base dressing in place, unable to visualize 03/24/23 0800   Periwound intact;blanchable;ecchymotic 03/24/23 0800   Periwound Temperature cool 03/24/23 0800   Periwound Skin Turgor soft 03/24/23 0800   Edges irregular 03/24/23 0800   Periwound Care dry periwound area maintained 03/24/23 0800       Wound 03/21/23 Right anterior fourth toe Amputation stump (Active)   Dressing Appearance intact;moist drainage 03/24/23 1300   Closure Other (Comment) 03/24/23 1000   Base moist;red;exposed structure 03/24/23 1300   Periwound intact;blanchable;dry 03/24/23 1300   Periwound Temperature cool 03/24/23 1300   Periwound Skin Turgor soft 03/24/23 1300   Edges irregular 03/24/23 1300   Drainage Characteristics/Odor sanguineous 03/24/23 1300   Drainage Amount moderate 03/24/23 1300   Care, Wound irrigated with;sterile normal saline;negative  pressure wound therapy 03/24/23 1300   Dressing Care dressing changed 03/24/23 1300   Periwound Care dry periwound area maintained 03/24/23 1000       Wound 03/21/23 Left anterior fourth toe Amputation stump (Active)   Dressing Appearance intact;moist drainage 03/24/23 1300   Closure Other (Comment) 03/24/23 1000   Base moist;pink;red;subcutaneous;exposed structure 03/24/23 1300   Periwound intact;blanchable;dry 03/24/23 1300   Periwound Temperature cool 03/24/23 1300   Periwound Skin Turgor soft 03/24/23 1300   Edges irregular 03/24/23 1300   Drainage Characteristics/Odor sanguineous 03/24/23 1300   Drainage Amount moderate 03/24/23 1300   Care, Wound irrigated with;sterile normal saline;negative pressure wound therapy 03/24/23 1300   Dressing Care dressing changed 03/24/23 1300   Periwound Care dry periwound area maintained 03/24/23 1000       NPWT (Negative Pressure Wound Therapy) 03/21/23 LEFT FOOT (Active)   Therapy Setting continuous therapy 03/24/23 1300   Dressing foam, black;foam, white 03/24/23 1300   Pressure Setting 150 mmHg 03/24/23 1300   Sponges Inserted 2 03/24/23 1300   Sponges Removed 2 03/24/23 1300   Finger sweep complete Yes 03/24/23 1300       NPWT (Negative Pressure Wound Therapy) 03/21/23 RIGHT FOOT (Active)   Therapy Setting continuous therapy 03/24/23 1300   Dressing foam, black;foam, white 03/24/23 1300   Pressure Setting 150 mmHg 03/24/23 1300   Sponges Inserted 2 03/24/23 1300   Sponges Removed 2 03/24/23 1300   Finger sweep complete Yes 03/24/23 1300         WOUND DEBRIDEMENT                     PT Assessment (last 12 hours)     PT Evaluation and Treatment     Row Name 03/24/23 1300          Physical Therapy Time and Intention    Subjective Information complains of;weakness;fatigue  -MF     Document Type therapy note (daily note);wound care  -MF     Mode of Treatment individual therapy;physical therapy  -     Row Name 03/24/23 1300          Pain    Pretreatment Pain Rating 0/10 - no  pain  -MF     Posttreatment Pain Rating 0/10 - no pain  -MF     Row Name             Wound Right anterior groin Puncture    Wound - Properties Group Side: Right  -TM Orientation: anterior  -TM Location: groin  -TM Primary Wound Type: Puncture  -TM    Retired Wound - Properties Group Side: Right  -TM Orientation: anterior  -TM Location: groin  -TM Primary Wound Type: Puncture  -TM    Retired Wound - Properties Group Side: Right  -TM Location: groin  -TM Primary Wound Type: Puncture  -TM    Row Name             Wound 02/16/23 1040 Left lateral plantar Venous Ulcer    Wound - Properties Group Placement Date: 02/16/23 -KH Placement Time: 1040  -KH Present on Hospital Admission: Y  -KH Side: Left  -KH Orientation: lateral  -KH Location: plantar  -KH Primary Wound Type: Venous ulcer  -KH    Retired Wound - Properties Group Placement Date: 02/16/23 -KH Placement Time: 1040  -KH Present on Hospital Admission: Y  -KH Side: Left  -KH Orientation: lateral  -KH Location: plantar  -KH Primary Wound Type: Venous ulcer  -KH    Retired Wound - Properties Group Date first assessed: 02/16/23 -KH Time first assessed: 1040  -KH Present on Hospital Admission: Y  -KH Side: Left  -KH Location: plantar  -KH Primary Wound Type: Venous ulcer  -KH    Row Name             Wound 02/16/23 1040 Right lateral plantar Venous Ulcer    Wound - Properties Group Placement Date: 02/16/23 -KH Placement Time: 1040  -KH Present on Hospital Admission: Y  -KH Side: Right  -KH Orientation: lateral  -KH Location: plantar  -KH Primary Wound Type: Venous ulcer  -KH    Retired Wound - Properties Group Placement Date: 02/16/23 -KH Placement Time: 1040  -KH Present on Hospital Admission: Y  -KH Side: Right  -KH Orientation: lateral  -KH Location: plantar  -KH Primary Wound Type: Venous ulcer  -KH    Retired Wound - Properties Group Date first assessed: 02/16/23 -KH Time first assessed: 1040  -KH Present on Hospital Admission: Y  -KH Side: Right  -KH Location:  plantar  -KH Primary Wound Type: Venous ulcer  -KH    Row Name             Wound 02/16/23 1040 Right lower leg Abrasion    Wound - Properties Group Placement Date: 02/16/23  -KH Placement Time: 1040  -KH Present on Hospital Admission: Y  -KH Side: Right  -KH Orientation: lower  -KH Location: leg  -KH Primary Wound Type: Abrasion  -KH Additional Comments: Open cut from nail scratch.  -KH    Retired Wound - Properties Group Placement Date: 02/16/23  -KH Placement Time: 1040  -KH Present on Hospital Admission: Y  -KH Side: Right  -KH Orientation: lower  -KH Location: leg  -KH Primary Wound Type: Abrasion  -KH Additional Comments: Open cut from nail scratch.  -KH    Retired Wound - Properties Group Date first assessed: 02/16/23  -KH Time first assessed: 1040  -KH Present on Hospital Admission: Y  -KH Side: Right  -KH Location: leg  -KH Primary Wound Type: Abrasion  -KH Additional Comments: Open cut from nail scratch.  -KH    Row Name             Wound 03/19/23 1715 Right upper arm Skin Tear    Wound - Properties Group Placement Date: 03/19/23  -TF Placement Time: 1715  -TF Present on Hospital Admission: N  -TF Side: Right  -TF Orientation: upper  -TF Location: arm  -TF Primary Wound Type: Skin tear  -TF    Retired Wound - Properties Group Placement Date: 03/19/23  -TF Placement Time: 1715  -TF Present on Hospital Admission: N  -TF Side: Right  -TF Orientation: upper  -TF Location: arm  -TF Primary Wound Type: Skin tear  -TF    Retired Wound - Properties Group Date first assessed: 03/19/23  -TF Time first assessed: 1715  -TF Present on Hospital Admission: N  -TF Side: Right  -TF Location: arm  -TF Primary Wound Type: Skin tear  -TF    Row Name             Wound 03/19/23 0750 Right lower arm Skin Tear    Wound - Properties Group Placement Date: 03/19/23  -TF Placement Time: 0750  -TF Side: Right  -TF Orientation: lower  -TF Location: arm  -TF Primary Wound Type: Skin tear  -TF    Retired Wound - Properties Group Placement  Date: 03/19/23  -TF Placement Time: 0750  -TF Side: Right  -TF Orientation: lower  -TF Location: arm  -TF Primary Wound Type: Skin tear  -TF    Retired Wound - Properties Group Date first assessed: 03/19/23  -TF Time first assessed: 0750  -TF Side: Right  -TF Location: arm  -TF Primary Wound Type: Skin tear  -TF    Row Name             Wound 03/19/23 1715 Right distal arm Skin Tear    Wound - Properties Group Placement Date: 03/19/23  -TF Placement Time: 1715  -TF Side: Right  -TF Orientation: distal  -TF Location: arm  -TF Primary Wound Type: Skin tear  -TF    Retired Wound - Properties Group Placement Date: 03/19/23  -TF Placement Time: 1715  -TF Side: Right  -TF Orientation: distal  -TF Location: arm  -TF Primary Wound Type: Skin tear  -TF    Retired Wound - Properties Group Date first assessed: 03/19/23  -TF Time first assessed: 1715  -TF Side: Right  -TF Location: arm  -TF Primary Wound Type: Skin tear  -TF    Row Name 03/24/23 1300          Wound 03/21/23 Right anterior fourth toe Amputation stump    Wound - Properties Group Placement Date: 03/21/23  - Present on Hospital Admission: Y  -JH Side: Right  -JH Orientation: anterior  -JH Location: fourth toe  -JH Primary Wound Type: Amputation s  -JH    Dressing Appearance intact;moist drainage  -MF     Base moist;red;exposed structure  -MF     Periwound intact;blanchable;dry  -MF     Periwound Temperature cool  -MF     Periwound Skin Turgor soft  -MF     Edges irregular  -MF     Drainage Characteristics/Odor sanguineous  -MF     Drainage Amount moderate  -MF     Care, Wound irrigated with;sterile normal saline;negative pressure wound therapy  -MF     Dressing Care dressing changed  -MF     Retired Wound - Properties Group Placement Date: 03/21/23  - Present on Hospital Admission: Y  -JH Side: Right  - Orientation: anterior  - Location: fourth toe  -JH Primary Wound Type: Amputation s  -JH    Retired Wound - Properties Group Date first assessed: 03/21/23   - Present on Hospital Admission: Y  - Side: Right  - Location: fourth toe  -JH Primary Wound Type: Amputation s  -JH    Row Name 03/24/23 1300          Wound 03/21/23 Left anterior fourth toe Amputation stump    Wound - Properties Group Placement Date: 03/21/23  - Present on Hospital Admission: Y  - Side: Left  - Orientation: anterior  - Location: fourth toe  -JH Primary Wound Type: Amputation s  -JH    Dressing Appearance intact;moist drainage  -MF     Base moist;pink;red;subcutaneous;exposed structure  -MF     Periwound intact;blanchable;dry  -MF     Periwound Temperature cool  -     Periwound Skin Turgor soft  -     Edges irregular  -MF     Drainage Characteristics/Odor sanguineous  -MF     Drainage Amount moderate  -MF     Care, Wound irrigated with;sterile normal saline;negative pressure wound therapy  -MF     Dressing Care dressing changed  -     Retired Wound - Properties Group Placement Date: 03/21/23  - Present on Hospital Admission: Y  - Side: Left  - Orientation: anterior  - Location: fourth toe  -JH Primary Wound Type: Amputation s  -JH    Retired Wound - Properties Group Date first assessed: 03/21/23  - Present on Hospital Admission: Y  - Side: Left  - Location: fourth toe  - Primary Wound Type: Amputation s  -JH    Row Name 03/24/23 1300          NPWT (Negative Pressure Wound Therapy) 03/21/23 LEFT FOOT    NPWT (Negative Pressure Wound Therapy) - Properties Group Placement Date: 03/21/23  [facility] Location: LEFT FOOT  -    Therapy Setting continuous therapy  -MF     Dressing foam, black;foam, white  -MF     Pressure Setting 150 mmHg  -MF     Sponges Inserted 2  1 white 1 black  -MF     Sponges Removed 2  1 white 1 black  -MF     Finger sweep complete Yes  -MF     Retired NPWT (Negative Pressure Wound Therapy) - Properties Group Placement Date: 03/21/23  - Location: LEFT FOOT  -    Retired NPWT (Negative Pressure Wound Therapy) - Properties Group Placement Date:  03/21/23  - Location: LEFT FOOT  -    Row Name 03/24/23 1300          NPWT (Negative Pressure Wound Therapy) 03/21/23 RIGHT FOOT    NPWT (Negative Pressure Wound Therapy) - Properties Group Placement Date: 03/21/23  - Location: RIGHT FOOT  -JH    Therapy Setting continuous therapy  -MF     Dressing foam, black;foam, white  -MF     Pressure Setting 150 mmHg  -MF     Sponges Inserted 2  1 white 1 black  -MF     Sponges Removed 2  1 white 1 black  -MF     Finger sweep complete Yes  -MF     Retired NPWT (Negative Pressure Wound Therapy) - Properties Group Placement Date: 03/21/23  - Location: RIGHT FOOT  -JH    Retired NPWT (Negative Pressure Wound Therapy) - Properties Group Placement Date: 03/21/23  - Location: RIGHT FOOT  -    Row Name 03/24/23 1300          Coping    Observed Emotional State calm  -     Verbalized Emotional State acceptance  -MF     Trust Relationship/Rapport care explained  -     Row Name 03/24/23 1300          Plan of Care Review    Plan of Care Reviewed With patient  -     Outcome Evaluation wound vac to B feet changed today, comfort measures noted and PT will hold further changes after today to limit discomfort.  -     Row Name 03/24/23 1300          Positioning and Restraints    Pre-Treatment Position in bed  -     Post Treatment Position bed  -MF     In Bed supine;call light within reach  -           User Key  (r) = Recorded By, (t) = Taken By, (c) = Cosigned By    Initials Name Provider Type    Reed Ramirez, PT Physical Therapist    Fátima Armando RN Registered Nurse    Amarilys Barfield RN Registered Nurse    Gina Blanco RN Registered Nurse    Anna Awan RN Registered Nurse              Physical Therapy Education     Title: PT OT SLP Therapies (In Progress)     Topic: Physical Therapy (In Progress)     Point: Mobility training (In Progress)     Learning Progress Summary           Patient Acceptance, E, NR by BA at 3/22/2023  1155                   Point: Home exercise program (Not Started)     Learner Progress:  Not documented in this visit.          Point: Body mechanics (In Progress)     Learning Progress Summary           Patient Acceptance, E, NR by BA at 3/22/2023 1155                   Point: Precautions (In Progress)     Learning Progress Summary           Patient Acceptance, E, NR by BA at 3/22/2023 1155                               User Key     Initials Effective Dates Name Provider Type Discipline     09/21/21 -  Bev Gottlieb, PT Physical Therapist PT                Recommendation and Plan  Anticipated Discharge Disposition (PT): skilled nursing facility  Planned Therapy Interventions (PT): balance training, bed mobility training, home exercise program, motor coordination training, neuromuscular re-education, patient/family education, postural re-education, ROM (range of motion), strengthening, transfer training, wheelchair management/propulsion training  Therapy Frequency (PT): daily  Plan of Care Reviewed With: patient           Outcome Evaluation: wound vac to B feet changed today, comfort measures noted and PT will hold further changes after today to limit discomfort.  Plan of Care Reviewed With: patient            Time Calculation   PT Charges     Row Name 03/24/23 1300             Time Calculation    Start Time 1300  -MF      PT Goal Re-Cert Due Date 04/01/23  -MF         Untimed Charges    [id number] Pressure wound to 50 sqcm 60  -MF         Total Minutes    Untimed Charges Total Minutes 60  -MF       Total Minutes 60  -MF            User Key  (r) = Recorded By, (t) = Taken By, (c) = Cosigned By    Initials Name Provider Type     Reed Johnson, PT Physical Therapist                  Therapy Charges for Today     Code Description Service Date Service Provider Modifiers Qty    [id number]  PT NEG PRESS WOUND TO 50SQCM DME4 3/24/2023 Reed Johnson, PT  1            PT G-Codes  Outcome Measure Options:  AM-[facility] 6 Clicks Basic Mobility (PT)  -[facility] 6 Clicks Score (PT): 10       Reed Johnson, PT  3/24/2023

## 2023-03-24 NOTE — CONSULTS
Argenis Osborne APRN  Consulting physician: [place]    No chief complaint on file.      Reason for consult: [facility]    HPI: Patient 72-year-old male brought to hospital for elective surgery for his peripheral vascular disease.  Patient ultimately underwent amputation of multiple toes on both left as well as on his right foot.  Patient was transferred to the ICU but continued to have issues ultimately going into renal failure requiring CRRT as well as developing hypotension.  Apparently starting yesterday the patient stated that he felt that he was dying and was just overall tired and fatigued.  Nursing staff notes some restlessness as well.    Pain assesment: Denies    Dyspnea: Positive    N/V: Denies    PPS: 20      Past Medical History:   Diagnosis Date   • Abnormal ECG    • Cataracts, bilateral    • CHF (congestive heart failure) (HCC)    • Childhood asthma    • COPD (chronic obstructive pulmonary disease) (HCC)    • Coronary artery disease 12/14/2016    CABG, 1993, Abimael Tomlin MD. CABG, August 2013, [facility].   • Diabetes mellitus (HCC) 12/14/2016    type II   • DVT (deep venous thrombosis) ([facility])     RLE   • Gout    • Hepatitis     1970s unsure of which one- states it was caused from drinking contaminated water   • Hyperlipidemia 12/14/2016   • Hypertension 12/14/2016   • Kidney disease    • Myocardial infarction (HCC)      in 1992 and 1993 prior to CABG.   • Paroxysmal atrial fibrillation (HCC) 12/14/2016   • Pulmonary embolism (HCC)    • Sleep apnea    • Venous insufficiency 12/14/2016   • Wears glasses     for reading     Past Surgical History:   Procedure Laterality Date   • AMPUTATION FOOT Bilateral 3/21/2023    Procedure: FOOT RAY TRANSMETATARSAL AMPUTATION LATERAL LEFT, RIGHT FOOT TRANSMETATARSAL AMPUTATION;  Surgeon: JoseC ruz Etienne MD;  Location: [facility];  Service: Vascular;  Laterality: Bilateral;   • AORTAGRAM N/A 3/17/2023    Procedure: AORTAGRAM WITH  RUNOFFS WITH STENT  PLACEMENT;  Surgeon: Jose Cruz Etienne MD;  Location: [facility];  Service: Vascular;  Laterality: N/A;   fluoro  dose  contrast    • APPENDECTOMY  1981   • BUNIONECTOMY Left    • CARDIAC CATHETERIZATION      x4, no stents   • CARDIAC CATHETERIZATION N/A 02/16/2023    Procedure: Peripheral angiography  Left lower extremity angiogram [facility] interventional Cardiologist;  Surgeon: Reed Zaldivar MD;  Location: [facility] CATH INVASIVE LOCATION;  Service: Cardiovascular;  Laterality: N/A;  Left lower extremity angiogram with interventional cardioligist.   • CATARACT EXTRACTION W/ INTRAOCULAR LENS IMPLANT Right 06/25/2020    Procedure: CATARACT PHACO EXTRACTION WITH INTRAOCULAR LENS IMPLANT RIGHT;  Surgeon: Omar Blood MD;  Location: [facility] OR;  Service: Ophthalmology;  Laterality: Right;   • CATARACT EXTRACTION W/ INTRAOCULAR LENS IMPLANT Left 07/09/2020    Procedure: CATARACT PHACO EXTRACTION WITH INTRAOCULAR LENS IMPLANT LEFT;  Surgeon: Omar Blood MD;  Location: [facility] OR;  Service: Ophthalmology;  Laterality: Left;   • CHOLECYSTECTOMY  2002   • COLON RESECTION Right 09/12/2022    Procedure: COLON RESECTION LAPAROSCOPIC HAND ASSISTED;  Surgeon: Kenji Garcias MD;  Location: [facility] OR;  Service: General;  Laterality: Right;   • COLON RESECTION N/A    • COLONOSCOPY     • COLONOSCOPY N/A 08/11/2022    Procedure: COLONOSCOPY, biopsy, polypectomy x1 and tattooing;  Surgeon: Kenji Garcias MD;  Location: [facility] ENDOSCOPY;  Service: Gastroenterology;  Laterality: N/A;   • CORONARY ARTERY BYPASS GRAFT      1993 triple bypass   • CORONARY ARTERY BYPASS GRAFT  2013    double bypass   • FINGER SURGERY      Rt index (second finger)   • OTHER SURGICAL HISTORY  2010    Bone spur removal   • TOE AMPUTATION Left 2009    4th toe   • VASECTOMY     • WOUND DEBRIDEMENT Bilateral 3/17/2023    Procedure: DEBRIDEMENT FOOT BILATERAL;  Surgeon: Jose Cruz Etienne MD;  Location:  [facility];  Service: Vascular;   Laterality: Bilateral;     Current Code Status     Date Active Code Status Order ID Comments User Context       3/24/2023 1107 No CPR (Do Not Attempt to Resuscitate) [id number]  Serge King, DO Inpatient      Question Answer    Code Status (Patient has no pulse and is not breathing) No CPR (Do Not Attempt to Resuscitate)    Medical Interventions (Patient has pulse or is breathing) Limited Support    Medical Intervention Limits: NO intubation (DNI)                norepinephrine, 0.02-0.3 mcg/kg/min, Last Rate: 0.08 mcg/kg/min (23 1054)  Phoxillum BK4/2.5, 1,500 mL/hr, Last Rate: 1,500 mL/hr (23 09)  Phoxillum BK4/2.5, 1,500 mL/hr, Last Rate: 1,500 mL/hr (23)  Phoxillum BK4/2.5, 1,500 mL/hr, Last Rate: 1,500 mL/hr (23)  sodium bicarbonate drip (greater than 75 mEq/bag), 150 mEq, Last Rate: 150 mEq (23 0143)      •  acetaminophen  •  acetaminophen  •  albumin human  •  alteplase  •  anticoagulant citrate (ACD) formula A  •  senna-docusate sodium **AND** bisacodyl **AND** bisacodyl  •  Calcium Replacement - Follow Nurse / BPA Driven Protocol  •  Chlorhexidine Gluconate Cloth  •  diphenhydrAMINE  •  docusate sodium  •  HYDROcodone-acetaminophen  •  HYDROmorphone **AND** naloxone  •  ipratropium  •  Magnesium Standard Dose Replacement - Follow Nurse / BPA Driven Protocol  •  ondansetron **OR** ondansetron  •  Phosphorus Replacement - Follow Nurse / BPA Driven Protocol  •  Potassium Replacement - Follow Nurse / BPA Driven Protocol  •  sodium chloride  No Known Allergies  Family History   Problem Relation Age of Onset   • COPD Mother    • Heart attack Father    • Asthma Father      Social History     Socioeconomic History   • Marital status:    • Number of children: 1   Tobacco Use   • Smoking status: Former     Packs/day: 1.00     Years: 28.00     Pack years: 28.00     Types: Cigarettes     Start date: 1964     Quit date: 1993     Years since quittin.2   •  "Smokeless tobacco: Never   • Tobacco comments:     Wish I'd never started   Vaping Use   • Vaping Use: Never used   Substance and Sexual Activity   • Alcohol use: No   • Drug use: No   • Sexual activity: Not Currently     Partners: Female     Birth control/protection: Other, Vasectomy, Birth control pill     Review of Systems -all others reviewed and found negative except as mentioned in the HPI      BP (!) 80/54   Pulse (!) 128   Temp 94.2 °F (34.6 °C) (Rectal)   Resp 18   Ht 185.4 cm (73\")   Wt 73 kg (161 lb)   SpO2 94%   BMI 21.24 kg/m²     Intake/Output Summary (Last 24 hours) at 3/24/2023 1110  Last data filed at 3/24/2023 1000  Gross per 24 hour   Intake 3040.02 ml   Output 1408 ml   Net 1632.02 ml     Physical Exam:      General Appearance:   Frail and cachectic appearing   Head:    Normocephalic, without obvious abnormality, atraumatic   Eyes:            Lids and lashes normal, conjunctivae and sclerae normal, no   icterus, no pallor, corneas clear, PERRLA   Ears:    Ears appear intact with no abnormalities noted   Throat:   No oral lesions, no thrush, oral mucosa moist   Neck:   No adenopathy, supple, trachea midline, no thyromegaly, no   carotid bruit, no JVD   Back:     No kyphosis present, no scoliosis present, no skin lesions,      erythema or scars, no tenderness to percussion or                   palpation,   range of motion normal   Lungs:     Clear to auscultation,respirations regular, even and                  unlabored    Heart:    Regular rhythm and normal rate, normal S1 and S2, no            murmur, no gallop, no rub, no click   Chest Wall:    No abnormalities observed   Abdomen:     Normal bowel sounds, no masses, no organomegaly, soft        non-tender, non-distended, no guarding, no rebound                tenderness   Rectal:     Deferred   Extremities:  Dressings over both feet clean dry and intact   Pulses:   Pulses palpable and equal bilaterally   Skin:   No bleeding, bruising or " rash   Lymph nodes:   No palpable adenopathy   Neurologic:   Cranial nerves 2 - 12 grossly intact, sensation intact, DTR       present and equal bilaterally       Results from last 7 days   Lab Units 03/24/23  0542   WBC 10*3/mm3 16.16*   HEMOGLOBIN g/dL 7.9*   HEMATOCRIT % 24.5*   PLATELETS 10*3/mm3 138*     Results from last 7 days   Lab Units 03/24/23  0833 03/24/23  0542   SODIUM mmol/L 135* 137   POTASSIUM mmol/L 4.2 4.5   CHLORIDE mmol/L 102 103   CO2 mmol/L 22.0 21.0*   BUN mg/dL 29* 26*   CREATININE mg/dL 2.09* 1.86*   CALCIUM mg/dL 7.8* 7.8*   BILIRUBIN mg/dL  --  0.5   ALK PHOS U/L  --  65   ALT (SGPT) U/L  --  <5   AST (SGOT) U/L  --  21   GLUCOSE mg/dL 158* 110*     Results from last 7 days   Lab Units 03/24/23  0833   SODIUM mmol/L 135*   POTASSIUM mmol/L 4.2   CHLORIDE mmol/L 102   CO2 mmol/L 22.0   BUN mg/dL 29*   CREATININE mg/dL 2.09*   GLUCOSE mg/dL 158*   CALCIUM mg/dL 7.8*     Imaging Results (Last 72 Hours)     Procedure Component Value Units Date/Time    IR Tunneled Catheter [[id number]] Collected: 03/23/23 1600     Updated: 03/23/23 1604    Narrative:      IR TUNNELED CATHETER                                                            History: tunneled dialysis access placement       : Fidencio Puentes MD.    Modality: Sonography and fluoroscopy                   DOSE REDUCTION: The examination was performed according to departmental dose-optimization program.     Fluoro time: 0.4 minutes    Radiation Dose: 3 mGy air Kerma.                                                                                   SEDATION: Moderate sedation was not administered. 0 milligram of Versed and 25 micrograms of fentanyl IV was used for moderate sedation. Total intra service time of sedation was not applicable minutes. The sedation was administered and the patient's   vital signs monitored throughout the procedure and recorded in the patient's medical record by the nurse under my direct  supervision.    Medicines: Not applicable.    Anesthesia: Lidocaine with epinephrine local infiltration.     Estimated blood loss:  < 5 cc.             Technique:   A thorough discussion of the risks, benefits, and alternatives of the procedure, and if applicable, moderate sedation, was carried out with the patient. They were encouraged to ask any questions. Any questions were answered. They verbalized   understanding. A written informed consent was then signed.      A multi-component timeout was performed prior to starting the procedure using the departmental protocol.     The procedure room personnel used personal protective equipment. The operators used sterile gloves and if indicated, sterile gowns. The surgical site was prepped with chlorhexidine gluconate  and draped in the maximal applicable sterile fashion.    A preliminary ultrasonogram was performed of the target that revealed a patent and compressible Right internal jugular vein. Pertinent ultrasound images were stored in the PACS for documentation.    A sterile prep and drape of the right neck and upper chest was performed using maximal sterile technique.    Using aseptic precautions, real-time ultrasound guidance, the target vein was accessed after local anesthetic infiltration and dermatotomy with an access needle. A guidewire was advanced into the central venous system under fluoroscopic guidance. Over   the wire following standard technique a peel-away sheath was placed.    After local anesthesia, an incision was created at the exit site location and a cuffed tunneled dialysis catheter of an appropriate length was tunneled from the exit site to the venotomy site using a tunneling device. The catheter was advanced into the   venous system through the peel-away sheath which was removed.    The catheter aspirated and flushed well and was terminally packed with heparin 1000 units per cc.    The catheter was secured to skin using nonabsorbable suture and  a CHG dressing applied.    The venotomy site was closed using Dermabond. An aseptic dressing was applied using the protocol for Dermabond.    The patient was transferred to the recovery area and was discharged from the department in stable condition.                        Complications: None immediate.        Device: 15.5 [place] x 19 cm cuff to tip Duraflow catheter.    Findings: Patent and compressible Right internal jugular. Final image shows the catheter to be in good position with the catheter tip in the right atrium, an excellent position for use. There is no complication.                                                                Impression:      Impression:      Successful ultrasound and fluoroscopic guided Right internal jugular vein route cuffed tunneled hemodialysis catheter placement as described above.    Thank you for the opportunity to assist in the care of your patient.    Electronically Signed: Fidencio Puentes    3/23/2023 4:01 PM EDT    Workstation ID: [id number]    XR Chest 1 View [[id number]] Collected: 03/22/23 2155     Updated: 03/22/23 2357    Narrative:      Chest one view.    DATE: 3/22/2023 at 11:19 PM.    COMPARISON: 3/22/2023 at 9:15 PM.    CLINICAL HISTORY: Central line placement.      Impression:        There is been placement of a left internal jugular central venous catheter with the catheter tip overlying the superior vena cava.    Evaluation of the heart and lungs is otherwise unchanged with cardiomegaly, pulmonary edema and bilateral pleural effusions.      Slot 94.    Electronically signed by:  Roberto Norman D.O.    3/22/2023 9:56 PM Mountain Time    XR Chest 1 View [[id number]] Collected: 03/22/23 2235     Updated: 03/22/23 2240    Narrative:      XR CHEST 1 VW    Date of Exam: 3/22/2023 9:01 PM EDT    Indication: shock state.    Comparison: Chest x-ray 9/25/2022    Findings:  Redemonstration of prior CABG. There is cardiomegaly. There is a moderate right layering pleural  effusion. There is a small left layering pleural effusion. There is associated bibasilar opacities which may reflect comminution of pleural fluid and   associated atelectasis. There is diffuse interstitial prominence and perihilar vascular congestion compatible with component of interstitial edema. No pneumothorax. No acute osseous findings.      Impression:      Impression:  Cardiomegaly, interstitial edema, and moderate right and small left pleural effusions.    Electronically Signed: Silvio Aviles    3/22/2023 10:37 PM EDT    Workstation ID: [id number]    US Renal Limited [[id number]] Collected: 03/22/23 1903     Updated: 03/22/23 1911    Narrative:      US RENAL LIMITED    Date of Exam: 3/22/2023 4:15 PM EDT    Indication: DEMARIO. ? CKD.    Comparison: No comparisons available.    Technique: Grayscale and color Doppler ultrasound evaluation of the kidneys and urinary bladder was performed.    Findings:  Right kidney:  Normal size of the kidney measuring 10.2 x 4.8 x 4.8 cm. Normal renal cortical echogenicity. No renal cortical atrophy. There is a small simple appearing renal cortical cyst at the inferior pole measuring 2.0 cm in diameter. Normal renal hilar   vascularity on color Doppler assessment. No hydronephrosis. No sonographic evidence of nephrolithiasis. No perinephric fluid.    Left kidney:  Normal size of the kidney measuring 11.2 x 5.4 x 5.6 cm. Normal renal cortical echogenicity. No renal cortical atrophy. There is a small simple appearing renal cortical cyst at the inferior pole measuring 2.0 cm in diameter. Normal renal hilar   vascularity on color Doppler assessment. No hydronephrosis. No sonographic evidence of nephrolithiasis. No perinephric fluid.    Bladder:  Unremarkable.     Other:   There is evidence of ascites.      Impression:      Impression:  No hydronephrosis. Incidental note of small bilateral renal cysts with otherwise unremarkable appearance of the kidneys.    Electronically Signed: Silvio  Traci    3/22/2023 7:08 PM EDT    Workstation ID: [id number]        Impression: Peripheral vascular disease  Renal failure  Restlessness  Dyspnea  Debility  Anorexia  Goals of care  Plan: Did have discussion with the patient about goals of care primary talking about CODE STATUS.  Patient agrees with DNR/DNI at this point in time.  Did discuss with patient about whether or not to stop dialysis and pressors, given the fact that if we stop these we would be looking at end-of-life care.  Patient is considering this but asked that we talk to his son.  I did call the patient's son and talk to him over the phone about the above as well.  Son is wanting to talk to his father but does understand that the patient probably will not survive this hospitalization.  Nursing staff helping the patient called son and I will plan on touching base with him after that phone conversation.        Serge King DO  03/24/23  11:10 EDT

## 2023-03-24 NOTE — PROGRESS NOTES
CTS Progress Note         LOS: 2 days     POD #6 s/p aortogram, angioplasty of left AT, left PT, placement of Xience drug eluting stent at proximal left AT, debridement of left midfott wound, right lateral forefoot wound  POD # 3 left foot 4th and 5th toe amputation, right 4th toe amputation, woundvac placmement    Subjective  On CRRT  On levo  Patient stating that he does not feel like he is living.  He is tired of being hooked up to tubes and monitors.  He has recently had a discussion with the palliative care team and Dr. King regarding transition to DNR and comfort care.  His son was apparently involved in this conversation as well.  Patient seems to understand that he can die without the current interventions but he states that this is his wish.    Objective    Vital Signs  Temp:  [93.1 °F (33.9 °C)-96.5 °F (35.8 °C)] 94.2 °F (34.6 °C)  Heart Rate:  [] 117  Resp:  [12-22] 18  BP: ()/(52-93) 100/65    Physical Exam:   General Appearance:  No acute distress  CV: Increased rate regular rhythm  Respiratory: Decreased bilaterally  Extremities: Warm without significant edema.  Wound vacs in place.  Surgical bandage with bloody drainage noted    Skin: Bilateral foot wounds with wound vac, seal intact     Results     Results from last 7 days   Lab Units 03/24/23  0542   WBC 10*3/mm3 16.16*   HEMOGLOBIN g/dL 7.9*   HEMATOCRIT % 24.5*   PLATELETS 10*3/mm3 138*     Results from last 7 days   Lab Units 03/24/23  0833   SODIUM mmol/L 135*   POTASSIUM mmol/L 4.2   CHLORIDE mmol/L 102   CO2 mmol/L 22.0   BUN mg/dL 29*   CREATININE mg/dL 2.09*   GLUCOSE mg/dL 158*   CALCIUM mg/dL 7.8*       Imaging Results (Last 24 Hours)     Procedure Component Value Units Date/Time    IR Tunneled Catheter [[id number]] Collected: 03/23/23 1600     Updated: 03/23/23 1604    Narrative:      IR TUNNELED CATHETER                                                            History: tunneled dialysis access placement       Primary  : Fidencio Puentes MD.    Modality: Sonography and fluoroscopy                   DOSE REDUCTION: The examination was performed according to departmental dose-optimization program.     Fluoro time: 0.4 minutes    Radiation Dose: 3 mGy air Kerma.                                                                                   SEDATION: Moderate sedation was not administered. 0 milligram of Versed and 25 micrograms of fentanyl IV was used for moderate sedation. Total intra service time of sedation was not applicable minutes. The sedation was administered and the patient's   vital signs monitored throughout the procedure and recorded in the patient's medical record by the nurse under my direct supervision.    Medicines: Not applicable.    Anesthesia: Lidocaine with epinephrine local infiltration.     Estimated blood loss:  < 5 cc.             Technique:   A thorough discussion of the risks, benefits, and alternatives of the procedure, and if applicable, moderate sedation, was carried out with the patient. They were encouraged to ask any questions. Any questions were answered. They verbalized   understanding. A written informed consent was then signed.      A multi-component timeout was performed prior to starting the procedure using the departmental protocol.     The procedure room personnel used personal protective equipment. The operators used sterile gloves and if indicated, sterile gowns. The surgical site was prepped with chlorhexidine gluconate  and draped in the maximal applicable sterile fashion.    A preliminary ultrasonogram was performed of the target that revealed a patent and compressible Right internal jugular vein. Pertinent ultrasound images were stored in the PACS for documentation.    A sterile prep and drape of the right neck and upper chest was performed using maximal sterile technique.    Using aseptic precautions, real-time ultrasound guidance, the target vein was accessed after local  anesthetic infiltration and dermatotomy with an access needle. A guidewire was advanced into the central venous system under fluoroscopic guidance. Over   the wire following standard technique a peel-away sheath was placed.    After local anesthesia, an incision was created at the exit site location and a cuffed tunneled dialysis catheter of an appropriate length was tunneled from the exit site to the venotomy site using a tunneling device. The catheter was advanced into the   venous system through the peel-away sheath which was removed.    The catheter aspirated and flushed well and was terminally packed with heparin 1000 units per cc.    The catheter was secured to skin using nonabsorbable suture and a CHG dressing applied.    The venotomy site was closed using Dermabond. An aseptic dressing was applied using the protocol for Dermabond.    The patient was transferred to the recovery area and was discharged from the department in stable condition.                        Complications: None immediate.        Device: 15.5 [place] x 19 cm cuff to tip Duraflow catheter.    Findings: Patent and compressible Right internal jugular. Final image shows the catheter to be in good position with the catheter tip in the right atrium, an excellent position for use. There is no complication.                                                                Impression:      Impression:      Successful ultrasound and fluoroscopic guided Right internal jugular vein route cuffed tunneled hemodialysis catheter placement as described above.    Thank you for the opportunity to assist in the care of your patient.    Electronically Signed: Fidencio Puentes    3/23/2023 4:01 PM EDT    Workstation ID: [id number]          Assessment  PAD  POD #6 s/p aortogram, angioplasty of left AT, left PT, placement of Xience drug eluting stent at proximal left AT, debridement of left midfott wound, right lateral forefoot wound  POD # 3 left foot 4th and 5th toe  amputation, right 4th toe amputation, woundvac placmement   DEMARIO    Plan   DAPT and warfarin  PT wound care consult, initiate vac change today  CRRT per nephrology  Palliative care consulted, appreciate their help with this complicated case      Gloria Sherman PA-C  03/24/23  10:28 EDT

## 2023-03-24 NOTE — CASE MANAGEMENT/SOCIAL WORK
Continued Stay Note  [facility]     Patient Name: Swapnil Lawson  MRN: [id number]  Today's Date: 3/24/2023    Admit Date: 3/17/2023    Plan: Update   Discharge Plan     Row Name 03/24/23 1120       Plan    Plan Update    Patient/Family in Agreement with Plan other (see comments)    Plan Comments Events noted. CM is available if needed.    Final Discharge Disposition Code 30 - still a patient               Discharge Codes    No documentation.               Expected Discharge Date and Time     Expected Discharge Date Expected Discharge Time    Mar 27, 2023             Cheli Gutierres RN

## 2023-03-24 NOTE — PLAN OF CARE
Goal Outcome Evaluation:  Plan of Care Reviewed With: patient           Outcome Evaluation: wound vac to B feet changed today, comfort measures noted and PT will hold further changes after today to limit discomfort.

## 2023-03-25 NOTE — PROGRESS NOTES
Patient decided to stop all treatment.  Hospice to see the patient.  I will sign off, please call if any change

## 2023-03-25 NOTE — PROGRESS NOTES
"Continued Stay Note  [facility]     Patient Name: Swapnil Lawson  MRN: [id number]  Today's Date: 3/25/2023    Admit Date: 3/17/2023    Plan: TBD   Discharge Plan     Row Name 03/25/23 1301       Plan    Plan Comments Received phone call from patient's son, Abhinav. He relayed that he has spoken with his uncle and that they feel that since the patient has comfort medication ordered that they are comfortable with the care that he is receiving. They are pleased with the medications that he is receiving and has available. He states \"no hospice\". Rn encouraged open communication, open availability, and support. Updated [facility] and palliative care. Should hospice be of assistance please contact [phone].    Row Name        Plan    Plan Comments                Discharge Codes    No documentation.               Expected Discharge Date and Time     Expected Discharge Date Expected Discharge Time    Mar 27, 2023             Hiwot Noel RN    "

## 2023-03-25 NOTE — SIGNIFICANT NOTE
Exam confirms with auscultation zero audible heart tones and zero audible respirations. Mr.Bennie Kalin Lawson was pronounced dead at 1800.  MD notified by Patient's RN.    Wilmer Goncalves RN  Clinical House Supervisor  3/25/2023 18:58 EDT

## 2023-03-25 NOTE — PLAN OF CARE
Goal Outcome Evaluation:  Plan of Care Reviewed With: patient        Progress: no change  Outcome Evaluation: .    Pt remains comfort measures. Resting comfortably overnight, on 2L NC. Dilaudid given x1 for back pain. No UOP or BM. Wound vacs remain in place with no leakage.

## 2023-03-25 NOTE — PROGRESS NOTES
CTS Progress Note         LOS: 3 days     POD #7 s/p aortogram, angioplasty of left AT, left PT, placement of Xience drug eluting stent at proximal left AT, debridement of left midfott wound, right lateral forefoot wound  POD # 4 left foot 4th and 5th toe amputation, right 4th toe amputation, woundvac placmement    Subjective  On CRRT  On levo  Patient stating that he does not feel like he is living.  He is tired of being hooked up to tubes and monitors.  He has recently had a discussion with the palliative care team and Dr. King regarding transition to DNR and comfort care.  His son was apparently involved in this conversation as well.  Patient seems to understand that he can die without the current interventions but he states that this is his wish.    Objective    Vital Signs  Temp:  [93 °F (33.9 °C)] 93 °F (33.9 °C)  Heart Rate:  [0-112] 82  Resp:  [16-20] 20  BP: (72-74)/(53-54) 72/54    Physical Exam:   General Appearance:  No acute distress  CV: Increased rate regular rhythm  Respiratory: Decreased bilaterally  Extremities: Warm without significant edema.  Wound vacs in place.  Surgical bandage with bloody drainage noted    Skin: Bilateral foot wounds with wound vac, seal intact     Results     Results from last 7 days   Lab Units 03/24/23  0542   WBC 10*3/mm3 16.16*   HEMOGLOBIN g/dL 7.9*   HEMATOCRIT % 24.5*   PLATELETS 10*3/mm3 138*     Results from last 7 days   Lab Units 03/24/23  0833   SODIUM mmol/L 135*   POTASSIUM mmol/L 4.2   CHLORIDE mmol/L 102   CO2 mmol/L 22.0   BUN mg/dL 29*   CREATININE mg/dL 2.09*   GLUCOSE mg/dL 158*   CALCIUM mg/dL 7.8*       Imaging Results (Last 24 Hours)     ** No results found for the last 24 hours. **          Assessment  PAD  POD7 s/p aortogram, angioplasty of left AT, left PT, placement of Xience drug eluting stent at proximal left AT, debridement of left midfott wound, right lateral forefoot wound  POD # 4 left foot 4th and 5th toe amputation, right 4th toe  amputation, woundvac placmement   DEMARIO    Plan   DAPT and warfarin  PT wound care consult, initiate vac change today  CRRT per nephrology  Palliative care consulted, appreciate their help with this complicated case  Hospitalist and palliative care    Emmanuel Steven PA-C  03/25/23  14:47 EDT

## 2023-03-25 NOTE — DISCHARGE SUMMARY
Death Summary    Patient name: Swapnil Lawson  CSN: [id number]  MRN: [id number]  : 1950  Today's date: 3/25/2023     Date of Admission: 3/17/2023  Date and Time of Death:  3/25/2023 at 1800    Admitting Physician:  Dr. Etienne  Primary Care Provider: Argenis Osborne APRN  Consultations:   Hospitalist who assumed attending role  ID:  Dr. Barksdale  Nephrology:  Dr. Kaiser  Cardiology:  Dr. Gutierrez  Palliative Care:  Dr. King    Admission Diagnosis:  Acute HFrEF     Diagnoses at the Time of Death:     Acute HFrEF    CAD s/p CABG (; redo )    HTN (hypertension)    PAF    T2DM    Dyslipidemia    Stage III CKD    PVD    Ischemic gangrene    Chronic anticoagulation on Coumadin    DEMARIO superimposed on CKD    Hypotension    Procedures:  3/17/23 Aortogram with bilateral lower extremity runoffs using carbon dioxide,  third order selective catheterization of the left anterior and posterior tibial arteries,  angioplasty of left anterior tibial artery using 2 mm balloon, angioplasty of left posterior tibial artery using 2 mm balloon, angioplasty of left popliteal artery using 2 mm balloon, placement of 2.5 mm x 15 mm Xience drug-eluting stent at the proximal left anterior tibial artery, sharp debridement of left lateral midfoot wound down to the level of the bone, sharp debridement of the right lateral forefoot wound down to the level of the bone, the right common femoral artery arteriotomy site was closed with a Perclose ProGlide standard technique.     3/21/23 debridement of gangrene at left midfoot with partial amputation of left fourth and fifth mid-metatarsal bones with application of wound VAC,  debridement of gangrene and amputation of right fourth metatarsal bone with application of wound VAC    3/22/23 Lt IJ central line placement    3/23/23 Rt IJ tunneled catheter placement    History of Present Illness:  72-year-old man with history of Afib on Warfarin, DM, CABG, CHF, CKD, and PAD who  presented to the outpatient Cardiovascular Surgery clinic with bilateral diabetic gangrenous foot ulcers concerning for chronic limb-threatening ischemia.     Hospital Course:  On 3/17/23, he underwent elective aortogram with extensive lower extremity revascularization (angioplasty of left anterior tibial, left posterior tibial and left popliteal artery with ALVA to proximal left anterior tibial) with sharp debridement of both feet by Dr. Etienne for critical limb ischemia/bilateral foot infections with ischemic gangrene.  ID was consulted for antibiotic management.   He returned to the OR on 3/21/22 and underwent partial amputation of the left 4th and 5th toes and the right 4th toe.  Postoperatively, he developed worsening renal function and Nephrology was consulted.  He was started on PO bicarb. He developed hypotension despite the initiation and increases of Midodrine.  TTE 3/22/23 revealed 3/22/23 a newly reduced EF of 38% with moderate to severe TR and an RVSP > 55mHg.  He was transferred to the ICU on the evening of 3/22/23 for the initiation of vasopressors.  A tunneled dialysis catheter was placed and CRRT was started on the afternoon of 3/23/23.  Tunneled dialysis cathter clotted and CRRT put on hold.  On 3/24/23, patient expressed his wishes to be DNR/DNI.  Stated that he was tired and ready to die.  Palliative care was consulted.  Dialysis was stopped. He was started on comfort medications and hospice was consulted.  Family opted out of hospice.  He was continued on comfort medications and patient  3/25/23 at 1800.  Family was notified by RN.    RADHA Oconnor, AGACNP-BC, FNP-BC  Pulmonary & Critical Care Medicine

## 2023-03-25 NOTE — PROGRESS NOTES
Continued Stay Note  [facility]     Patient Name: Swapnil Lawson  MRN: [id number]  Today's Date: 3/25/2023    Admit Date: 3/17/2023    Plan: TBD   Discharge Plan     Row Name 03/25/23 1035       Plan    Plan Comments Hospice RN spoke with patient's son Abhinav. He and his family do not plan on coming back to hospital as it is patient's wish for them not to see him in this condition-the patient has verbalized to them not to return to the facility. Rn actively listened as Abhinav relayed this. Rn discussed hospice, inpatient hospice. Abhinav relayed comfort goals of care with quality of life focus.RN discussed email admission and what that entails. At the time Abhinav would like to discuss hospice with his uncle and get back with RN with their decision. Contact information provided. RN updated [facility]. Please call [phone] with questions/concerns.               Discharge Codes    No documentation.               Expected Discharge Date and Time     Expected Discharge Date Expected Discharge Time    Mar 27, 2023             Hiwot Noel RN

## 2023-03-25 NOTE — PROGRESS NOTES
INTENSIVIST   PROGRESS NOTE     Hospital:  LOS: 3 days     Mr. Swapnil Lawson, 72 y.o. male is followed for a Chief Complaint of: Shock, DEMARIO      Subjective   S     Interval History:  Patient is resting in bed and states that he is miserable.  He states that he has no pain though.  Hospice saw him and family opted out of hospice due to bad prior experience with another family member.  They signed off.  Palliative is following.         The patient's relevant past medical, surgical and social history were reviewed and updated in Epic as appropriate.      ROS:   Constitutional: Negative for fever.   Respiratory: Positive for dyspnea.   Cardiovascular: Negative for chest pain.   Gastrointestinal: Negative for  nausea, vomiting and diarrhea.     Objective   O     Vitals:  Temp  Min: 93 °F (33.9 °C)  Max: 93 °F (33.9 °C)  BP  Min: 72/54  Max: 74/53  Pulse  Min: 0  Max: 112  Resp  Min: 16  Max: 20  SpO2  Min: 83 %  Max: 100 % Flow (L/min)  Min: 2  Max: 4    Intake/Ouptut 24 hrs (7:00AM - 6:59 AM)  Intake & Output (last 3 days)       03/22 0701  03/23 0700 03/23 0701  03/24 0700 03/24 0701  03/25 0700 03/25 0701  03/26 0700    P.O. 480 120      I.V. (mL/kg) 517 (7.1) 2207.9 (30.2) 279.6 (3.8)     Other        IV Piggyback  539.4 193.1     Total Intake(mL/kg) 997 (13.7) 2867.3 (39.3) 472.7 (6.5)     Urine (mL/kg/hr) 50 (0)       Other  1408 -1     Stool  0      Blood        Total Output 50 1408 -1     Net +947 +1459.3 +473.7             Stool Unmeasured Occurrence  1 x          Medications (drips):         Physical Examination  Telemetry:  Normal sinus rhythm.    Constitutional:  No acute distress. Thin elderly male.  Resting in bed on nasal cannula.     Eyes: No scleral icterus.   PERRL, EOM intact.    Neck:  Supple, FROM   Cardiovascular: Normal rate, regular and rhythm. Normal heart sounds.  No murmurs, gallop or rub.   Respiratory: No respiratory distress. Normal respiratory effort.  Diminished bilaterally.     Abdominal:  Soft. No masses. Nontender. No distension. No HSM.   Extremities: No digital cyanosis. No clubbing.  No peripheral edema.   Skin: No rashes, lesions or ulcers   Neurological:   Alert and interactive.               Results from last 7 days   Lab Units 03/24/23  0542 03/23/23  0041 03/22/23 2101 03/22/23  1126 03/21/23  0645   WBC 10*3/mm3 16.16* 11.09*  --   --  8.15   HEMOGLOBIN g/dL 7.9* 8.9* 9.5*   < > 9.9*   MCV fL 87.8 91.3  --   --  90.9   PLATELETS 10*3/mm3 138* 142  --   --  108*    < > = values in this interval not displayed.     Results from last 7 days   Lab Units 03/24/23  0833 03/24/23  0542 03/24/23  0009 03/23/23  1639   SODIUM mmol/L 135* 137 138 135*   POTASSIUM mmol/L 4.2 4.5 4.4 4.4   CO2 mmol/L 22.0 21.0* 22.0 19.0*   CREATININE mg/dL 2.09* 1.86* 2.38* 4.47*   GLUCOSE mg/dL 158* 110* 104* 205*   MAGNESIUM mg/dL 2.3  --  2.2 2.2   PHOSPHORUS mg/dL 4.8*  --  4.9* 6.4*     Estimated Creatinine Clearance: 32.5 mL/min (A) (by C-G formula based on SCr of 2.09 mg/dL (H)).  Results from last 7 days   Lab Units 03/24/23  0542 03/23/23  0041 03/22/23 2101   ALK PHOS U/L 65 59 74   BILIRUBIN mg/dL 0.5 0.2 0.2   ALT (SGPT) U/L <5 <5 <5   AST (SGOT) U/L 21 11 13       Results from last 7 days   Lab Units 03/24/23  0543 03/22/23 2021   PH, ARTERIAL pH units 7.311* 7.190*   PCO2, ARTERIAL mm Hg 42.7 37.0   PO2 ART mm Hg 55.4* 91.6   FIO2 % 28 28     Images:  Imaging Results (Last 24 Hours)     ** No results found for the last 24 hours. **        Results: Reviewed.  I reviewed the patient's new laboratory and imaging results.  I independently reviewed the patient's new images.    Medications: Reviewed.    Assessment & Plan   A / P     Mr. Lawson is a 73yo M with a history of HTN, T2DM, HLD, CAD s/p CABG (1993 and redo 2013), PAF on coumadin, and PAD with severe left tibial artery stenosis who was admitted on 3/16/23 for elective aortogram with extensive lower extremity revascularization  (angioplasty of left anterior tibial, left posterior tibial and left popliteal artery with ALVA to proximal left anterior tibial) with sharp debridement of both feet by Dr. Etienne for critical limb ischemia/bilateral foot infections with ischemic gangrene.     ID is following for antibiotic management.     He returned to the OR on 3/21/22 and underwent partial amputation of the left 4th and 5th toes and the right 4th toe.     Postoperatively, he developed worsening renal function and Nephrology was consulted and started on PO bicarb. He developed hypotension despite the initiation and increases of Midodrine.     A transthoracic echocardiogram was performed on 3/22/23 and showed a newly reduced EF of 38% with moderate to severe TR and an RVSP > 55mHg.     He was transferred to the ICU on the evening of 3/22/23 for the initiation of vasopressors.     A tunneled dialysis catheter was placed and CRRT was started on the afternoon of 3/23/23.  Tunneled dialysis cathter clotted and CRRT currently on hold.  On 3/24/23, patient expressed his wishes to be DNR/DNI.  Stated that he was tired and ready to die.  Palliative care was consulted.  Dialysis was stopped.  He was started on comfort medications and hospice was consulted.  Family opted out of hospice.  He has been ordered to the palliative floor.      Nutrition:   Diet: Regular/House Diet; Texture: Soft to Chew (NDD 3); Soft to Chew: Chopped Meat; Fluid Consistency: Thin (IDDSI 0)  Advance Directives:   Code Status and Medical Interventions:   Ordered at: 03/24/23 1200     Code Status (Patient has no pulse and is not breathing):    No CPR (Do Not Attempt to Resuscitate)     Medical Interventions (Patient has pulse or is breathing):    Comfort Measures     Active Hospital Problems    Diagnosis    • **Acute HFrEF    • Ischemic gangrene    • Chronic anticoagulation on Coumadin    • DEMARIO superimposed on CKD    • Hypotension    • PVD    • Stage III CKD    • CAD s/p CABG (1993;  redo 2013)    • HTN (hypertension)    • PAF    • Dyslipidemia    • T2DM      Assessment / Plan:    Transfer to palliative floor  Continue comfort care and medications  Palliative following    Plan of care and goals reviewed with multidisciplinary/antibiotic stewardship team during rounds.   I discussed the patient's findings and my recommendations with patient, nursing staff and primary care team     Time: 35 minutes, face to face, with the patient or on the chase coordinating care with other health care providers.     I spent > 50% percent of this time, counseling and discussing evaluation, current status and management.    RADHA Oconnor, AGACNP-BC, FNP-BC  Pulmonary and Critical Care Service

## 2023-03-27 RX ORDER — METOPROLOL SUCCINATE 25 MG/1
TABLET, EXTENDED RELEASE ORAL
Qty: 90 TABLET | Refills: 1 | OUTPATIENT
Start: 2023-03-27

## 2023-03-31 LAB
BACTERIA SPEC ANAEROBE CULT: NORMAL
BACTERIA SPEC ANAEROBE CULT: NORMAL

## 2023-04-18 LAB
FUNGUS WND CULT: NORMAL
FUNGUS WND CULT: NORMAL
MYCOBACTERIUM SPEC CULT: NORMAL
MYCOBACTERIUM SPEC CULT: NORMAL
NIGHT BLUE STAIN TISS: NORMAL
NIGHT BLUE STAIN TISS: NORMAL

## 2023-04-21 LAB
FUNGUS WND CULT: NORMAL
MYCOBACTERIUM SPEC CULT: NORMAL
NIGHT BLUE STAIN TISS: NORMAL

## 2023-04-28 LAB
FUNGUS WND CULT: NORMAL
MYCOBACTERIUM SPEC CULT: NORMAL
NIGHT BLUE STAIN TISS: NORMAL

## 2023-08-27 NOTE — H&P (VIEW-ONLY)
[facility]  Office visit  Swapnil Lawson  1950  [phone]    VISIT DATE:  2/9/2023      PCP: Argenis Osborne, APRN  [address] [facility] [address]    CC:  Chief Complaint   Patient presents with   • Coronary artery disease involving native coronary artery of       PROBLEM LIST:  1. Coronary artery disease:  a. CABG, 1993, Abimael Tomlin MD.  b. CABG, August 2013, [facility].  c. January 2018 echo - Ejection fraction is visually estimated to be 40-45 %.   mild concentric left ventricular hypertrophy.   Pseudonormal filling pattern noted.   Akinesis of the mid posterolateral, and basal to mid inferior walls   consistent with previous myocardial infarction.  2. Hypertension.  3. Paroxysmal atrial fibrillation.  4. Diabetes mellitus.   5. Venous insufficiency.   6.  Surgical history:  a.  Appendectomy, 1981.  b. Cholecystectomy, 2002.  c. Toe amputation, 2009.  d. Bone spur removal, 2010.  7. Previous history of myocardial infarction in 1992 and 1993 prior to CABG.  8. Hyperlipidemia.      Cardiac studies and procedures:  February 2019:Transthoracic echo - EF of 50%, moderately dilated left ventricle, moderately dilated right ventricle with normal function, moderate to severe left atrial enlargement, dilated IVC    September 2021 bilateral carotid duplex  1. Right internal carotid artery estimated diameter reduction: 20-49 %.   2. Left internal carotid artery estimated diameter reduction: 20-49 %.   3. Vertebral arteries demonstrate bilateral antegrade flow.     March 2022  Venous reflux study  · Chronic post thrombotic changes are noted in the left femoral and popliteal veins.  · Reflux is detected in the left common femoral, femoral, popliteal, posterior tibial, and peroneal veins.  Bilateral lower extremity arterial duplex  · Greater than 70% distal right SFA stenosis  · Greater than 50% right proximal posterior tibial artery stenosis  · Otherwise mild  Physical Therapy Daily Treatment Note  [address] [place] [address]      Patient: El Greenwood   : 1960  Referring practitioner: Jose Ibrahim MD  Date of Initial Visit: Type: THERAPY  Noted: 2023  Today's Date: 2023  Patient seen for 5 sessions         Visit Diagnoses:    ICD-10-CM ICD-9-CM   1. Chronic pain in right shoulder  M25.511 719.41    G89.29 338.29       Subjective   Patient reports continued improvement with the shoulder. Has been able to advance weight to 3# but still getting pain with putting his coffee cup on the table. Notes he has stopped performing the inferior glides. Reports he also has back pain that flares itself up sometimes.    Objective   See Exercise, Manual, and Modality Logs for complete treatment.   Observation: continues with elevated shoulder posture during outstretched arm activity    Assessment/Plan  Pt tolerated treatment with out report of increased pain. Educated pt that his humeral head is still not depressing appropriately such that even though he has the strength to lift an item he does not have the scapular/HH control to avoid impingement. Encouraged pt to return to inferior glides. Also educated pt regarding scapular motion with activities through use of the mirror. Decrease frequency to 1X every 2 weeks to allow pt time to work on strength and scapular control. Progress pt per tolerance.    Timed:         Manual Therapy:  16       mins  36223;     Therapeutic Exercise:   28      mins  55991;     Neuromuscular Kenneth:      mins  66985;    Therapeutic Activity:          mins  50440;     Gait Training:           mins  33580;     Ultrasound:          mins  81202;    Ionto                                   mins   98402  Self Care                            mins   07970  Canalith Repos         mins 10916      Un-Timed:  Electrical Stimulation:        mins  83087 ( );  Dry Needling          mins self-pay  Traction          mins  "diffuse nonobstructive atherosclerosis bilaterally.        ASSESSMENT:   Diagnosis Plan   1. Paroxysmal atrial fibrillation (HCC)        2. Primary hypertension        3. Mixed hyperlipidemia        4. Coronary artery disease involving native coronary artery of native heart without angina pectoris            PLAN:  Congestive heart failure, diastolic, chronic: Currently euvolemic and compensated.  Continue current medical therapy.      Coronary artery disease: Currently stable and asymptomatic.  Continue aspirin, statin and afterload reduction    Hypertension: Goal less than 130/80 mmHg.  Controlled based on home blood pressure readings.  Continue current medications.    Venous insufficiency, left: Currently well controlled.  Continue compression stockings and elevating feet when possible.    Hyperlipidemia: Goal LDL <100, ideally less than 70.  Continue statin.    Persistent atrial fibrillation: Currently asymptomatic.  Continue stroke prophylaxis with Coumadin, goal INR 2-3.  Continue rate control with metoprolol.      Peripheral vascular disease: Potential high-grade right SFA disease.  Currently asymptomatic.  Continue current medical therapy.    Subjective  Denies chest pain, palpitations or dyspnea.  Reports lower extremity edema has been well controlled.  Blood pressures running less than 120/80 mmHg.  Recent left foot surgery.    PHYSICAL EXAMINATION:  Vitals:    02/09/23 1408   BP: 112/54   BP Location: Left arm   Patient Position: Sitting   Pulse: 94   SpO2: 97%   Weight: 75.9 kg (167 lb 6.4 oz)   Height: 185.4 cm (73\")     General Appearance:    Alert, cooperative, no distress, appears stated age   Head:    Normocephalic, without obvious abnormality, atraumatic   Eyes:    conjunctiva/corneas clear   Nose:   Nares normal, septum midline, mucosa normal, no drainage   Throat:   Lips, teeth and gums normal   Neck:   Supple, symmetrical, trachea midline, no carotid    bruit or JVD   Lungs:     Minimal right " [id number]      Timed Treatment:   44   mins   Total Treatment:     44   mins    Rosanne Huitron, PT  [place] License: [id number]       basilar inspiratory rales, respirations unlabored   Chest Wall:    No tenderness or deformity    Heart:   Irregularly irregular, S1 and S2 normal, no murmur, rub   or gallop, normal carotid impulse bilaterally without bruit.   Abdomen:     Soft, non-tender   Extremities:   Extremities normal, atraumatic, no cyanosis, trivial pretibial edema bilaterally, compression stockings in place    Pulses:   2+ and symmetric all extremities   Skin:   Skin color, texture, turgor normal, no rashes or lesions       Diagnostic Data:  Procedures  Lab Results   Component Value Date    CHLPL 104 02/08/2023    TRIG 57 02/08/2023    HDL 47 02/08/2023     Lab Results   Component Value Date    GLUCOSE 63 (L) 09/28/2022    BUN 20 09/28/2022    CREATININE 1.91 (H) 09/28/2022     (L) 09/28/2022    K 3.9 09/28/2022     (H) 09/28/2022    CO2 16.7 (L) 09/28/2022     Lab Results   Component Value Date    HGBA1C 5.7 02/08/2023     Lab Results   Component Value Date    WBC 8.2 02/08/2023    HGB 11.6 (L) 02/08/2023    HCT 36.8 (L) 02/08/2023     (L) 02/08/2023       Allergies  No Known Allergies    Current Medications    Current Outpatient Medications:   •  amLODIPine (NORVASC) 2.5 MG tablet, Take 2.5 mg by mouth Daily., Disp: , Rfl:   •  aspirin 81 MG tablet, Take 81 mg by mouth Daily., Disp: , Rfl:   •  calcium polycarbophil (FIBERCON) 625 MG tablet, Take 625 mg by mouth 2 (Two) Times a Day. Takes 2 tablets BID, Disp: , Rfl:   •  colchicine 0.6 MG capsule capsule, TAKE 1 CAPSULE BY MOUTH DAILY AS NEEDED FOR PAIN, Disp: , Rfl:   •  diphenhydrAMINE (BENADRYL) 25 MG tablet, Take 25 mg by mouth every night at bedtime., Disp: , Rfl:   •  docusate sodium (COLACE) 100 MG capsule, Take 300 mg by mouth 2 (Two) Times a Day As Needed., Disp: , Rfl:   •  Dulaglutide (Trulicity) 1.5 MG/0.5ML solution pen-injector, Inject  under the skin into the appropriate area as directed 1 (One) Time Per Week., Disp: , Rfl:   •  ferrous gluconate  (FERGON) 324 MG tablet, Take 1 tablet by mouth Daily With Breakfast., Disp: 30 tablet, Rfl: 0  •  ipratropium (ATROVENT HFA) 17 MCG/ACT inhaler, Inhale 2 puffs As Needed., Disp: , Rfl:   •  ipratropium (ATROVENT) 0.02 % nebulizer solution, Take 0.5 mg by nebulization Every 4 (Four) Hours As Needed., Disp: , Rfl:   •  lovastatin (MEVACOR) 20 MG tablet, Take 20 mg by mouth Every Night., Disp: , Rfl:   •  metoprolol succinate XL (TOPROL-XL) 25 MG 24 hr tablet, Take 12.5 mg by mouth Daily., Disp: , Rfl:   •  nitroglycerin (NITROSTAT) 0.4 MG SL tablet, Place 0.4 mg under the tongue Every 5 (Five) Minutes As Needed for Chest Pain. Take no more than 3 doses in 15 minutes.  States he has not taken since , Disp: , Rfl:   •  O2 (OXYGEN), Inhale 2 L/min Every Night., Disp: , Rfl:   •  polyethylene glycol (MIRALAX) 17 GM/SCOOP powder, Mix 238g powder with 64 oz of clear liquid. Starting at 5pm drink 80z every 10-15 minutes until consumed., Disp: 238 g, Rfl: 0  •  warfarin (COUMADIN) 5 MG tablet, Take 1 tablet by mouth Daily. 5mg every other day  2.5mg every other day alternating, Disp: , Rfl:           ROS  Review of Systems   Cardiovascular: Positive for dyspnea on exertion, irregular heartbeat and leg swelling. Negative for chest pain and palpitations.   Respiratory: Negative for cough.        SOCIAL HX  Social History     Socioeconomic History   • Marital status:    Tobacco Use   • Smoking status: Former     Packs/day: 1.00     Years: 28.00     Pack years: 28.00     Types: Cigarettes     Start date: 1964     Quit date: 1993     Years since quittin.1   • Smokeless tobacco: Never   • Tobacco comments:     Wish I'd never started   Vaping Use   • Vaping Use: Never used   Substance and Sexual Activity   • Alcohol use: No   • Drug use: No   • Sexual activity: Not Currently     Partners: Female     Birth control/protection: Other, Vasectomy, Birth control pill       FAMILY HX  Family History   Problem  Relation Age of Onset   • COPD Mother    • Heart attack Father    • Asthma Father              Naseem Campbell III, MD, FACC

## (undated) DEVICE — GLV SURG SENSICARE W/ALOE PF LF 7.5 STRL

## (undated) DEVICE — Device

## (undated) DEVICE — CVR PROB ULTRASND/TRANSD W/GEL 7X11IN STRL

## (undated) DEVICE — COVER,MAYO STAND,STERILE: Brand: MEDLINE

## (undated) DEVICE — DRSNG WND GZ PAD BORDERED LF 4X5IN STRL

## (undated) DEVICE — DRSNG SURESITE123 2.4X2.8IN

## (undated) DEVICE — PINNACLE INTRODUCER SHEATH: Brand: PINNACLE

## (undated) DEVICE — CATH OMNI FLUSH 5FR

## (undated) DEVICE — ENDOSCOPY PORT CONNECTOR FOR OLYMPUS® SCOPES: Brand: ERBE

## (undated) DEVICE — 3M™ STERI-DRAPE™ FLUOROSCOPE DRAPE, 10 PER CARTON / 4 CARTONS PER CASE, 1012: Brand: STERI-DRAPE™

## (undated) DEVICE — SPNG LAP 18X18IN LF STRL PK/5

## (undated) DEVICE — TRAP FLD MINIVAC MEGADYNE 100ML

## (undated) DEVICE — SINGLE-USE POLYPECTOMY SNARE: Brand: CAPTIVATOR II

## (undated) DEVICE — TUBING, SUCTION, 1/4" X 12', STRAIGHT: Brand: MEDLINE

## (undated) DEVICE — MODEL BT2000 P/N 700287-012KIT CONTENTS: MANIFOLD WITH SALINE AND CONTRAST PORTS, SALINE TUBING WITH SPIKE AND HAND SYRINGE, TRANSDUCER: Brand: BT2000 AUTOMATED MANIFOLD KIT

## (undated) DEVICE — HI-TORQUE COMMAND ES GUIDE WIRE .014" 300 CM: Brand: HI-TORQUE COMMAND

## (undated) DEVICE — PATIENT RETURN ELECTRODE, SINGLE-USE, CONTACT QUALITY MONITORING, ADULT, WITH 9FT CORD, FOR PATIENTS WEIGING OVER 33LBS. (15KG): Brand: MEGADYNE

## (undated) DEVICE — SUT SILK 3/0 SH CR8 18IN C013D

## (undated) DEVICE — MEDI-VAC NON-CONDUCTIVE SUCTION TUBING: Brand: CARDINAL HEALTH

## (undated) DEVICE — FLTR PLUMEPORT LAP W/CONN STRL

## (undated) DEVICE — PK ANGIO OR 10

## (undated) DEVICE — ENDOPATH XCEL BLADELESS TROCARS WITH STABILITY SLEEVES: Brand: ENDOPATH XCEL

## (undated) DEVICE — CLEARCUT® HP2 SLIT KNIFE INTREPID MICRO-COAXIAL SYSTEM 2.4 DB: Brand: CLEARCUT®; INTREPID

## (undated) DEVICE — FLUSH KIT W/3-WAY "ON" STOPCOCK: Brand: ICU MEDICAL

## (undated) DEVICE — TOWEL,OR,DSP,ST,BLUE,STD,4/PK,20PK/CS: Brand: MEDLINE

## (undated) DEVICE — ANGIO-SEAL VIP VASCULAR CLOSURE DEVICE: Brand: ANGIO-SEAL

## (undated) DEVICE — SUT SILK 2/0 SUTUPAK TIES 24IN SA75H

## (undated) DEVICE — POST OP EYE CARE KIT: Brand: MEDLINE

## (undated) DEVICE — 2, DISPOSABLE SUCTION/IRRIGATOR WITHOUT DISPOSABLE TIP: Brand: STRYKEFLOW

## (undated) DEVICE — HARMONIC 1100 SHEARS, 36CM SHAFT LENGTH: Brand: HARMONIC

## (undated) DEVICE — TP SXN YANKR BULB STRL

## (undated) DEVICE — FRCP BIOP RADLJAW4 HOT 2.2X240 BX40

## (undated) DEVICE — SYR LUERLOK 5CC

## (undated) DEVICE — POOLE SUCTION INSTRUMENT WITH REMOVABLE SHEATH: Brand: POOLE

## (undated) DEVICE — SHEET,DRAPE,70X100,STERILE: Brand: MEDLINE

## (undated) DEVICE — ST ACC MICROPUNCTURE .018 TRANSLSS/SS/TP 5F/10CM 21G/7CM

## (undated) DEVICE — Device: Brand: SPOT EX ENDOSCOPIC TATTOO

## (undated) DEVICE — MODEL AT P65, P/N 701554-001KIT CONTENTS: HAND CONTROLLER, 3-WAY HIGH-PRESSURE STOPCOCK WITH ROTATING END AND PREMIUM HIGH-PRESSURE TUBING: Brand: ANGIOTOUCH® KIT

## (undated) DEVICE — GLV SURG SENSICARE W/ALOE PF LF 8 STRL

## (undated) DEVICE — PK EXTREM LOWR 10

## (undated) DEVICE — 15 DEG. MICROKNIFE - 3MM: Brand: SHARPOINT

## (undated) DEVICE — CVR HNDL LIGHT RIGID

## (undated) DEVICE — SYR LL TP 10ML STRL

## (undated) DEVICE — HP CONCL INTREPID COAX I/A CRV .3MM

## (undated) DEVICE — 3M™ STERI-DRAPE™ ISOLATION BAG, 10 PER CARTON / 4 CARTONS PER CASE, 1003: Brand: 3M™ STERI-DRAPE™

## (undated) DEVICE — CVR PROB ULTRASND/TRANSD W/GEL LNG 18X250CM STRL

## (undated) DEVICE — AVANTI + 5F STD W/GW: Brand: AVANTI

## (undated) DEVICE — FORCEPS RAD JAW W NEEDLE 160CM

## (undated) DEVICE — PROVIDES A STERILE INTERFACE BETWEEN THE OPERATING ROOM SURGICAL LAMPS (NON-STERILE) AND THE SURGEON OR NURSE (STERILE).: Brand: STERION®CLAMP COVER FABRIC

## (undated) DEVICE — SUT PROLN 0/0 CTX 30IN 8454H

## (undated) DEVICE — UNDYED BRAIDED (POLYGLACTIN 910), SYNTHETIC ABSORBABLE SUTURE: Brand: COATED VICRYL

## (undated) DEVICE — SOL IRRIG H2O 1000ML STRL

## (undated) DEVICE — RADIFOCUS GLIDEWIRE ADVANTAGE GUIDEWIRE: Brand: GLIDEWIRE ADVANTAGE

## (undated) DEVICE — CATH GUIDE SOFTVU SELECT/V HT OMNI .035 4F 65CM

## (undated) DEVICE — GW INQWIRE FC PTFE STD J/1.5 .035 260

## (undated) DEVICE — CANN IRR/INJ AIR ANT CHAMBER 6MM BEND 27G

## (undated) DEVICE — PERIPHERAL SHIELD-ORANGE: Brand: RADPAD

## (undated) DEVICE — SNAP KOVER: Brand: UNBRANDED

## (undated) DEVICE — INFLATION DEVICE: Brand: ENCORE™ 26

## (undated) DEVICE — BLD CLIP UNIV SURG GRY

## (undated) DEVICE — RADIFOCUS GLIDECATH: Brand: GLIDECATH

## (undated) DEVICE — NAVICROSS SUPPORT CATHETER: Brand: NAVICROSS

## (undated) DEVICE — ADHS SKIN PREMIERPRO EXOFIN TOPICAL HI/VISC .5ML

## (undated) DEVICE — SUT SILK 2/0 SH 30IN K833H

## (undated) DEVICE — EYE PAK* 1033 NON-WOVEN OPHTHALMIC DRAPE APERTURE POUCHES: Brand: ALCON EYE-PAK

## (undated) DEVICE — NDL SCLEROTHERAPY INTERJECT 25G 4 240CM

## (undated) DEVICE — GOWN,PREVENTION PLUS,XLARGE,STERILE: Brand: MEDLINE

## (undated) DEVICE — KT CANSTR VAC WND W/ISOLYSER SENSATRAC 500CC 5CS

## (undated) DEVICE — KT INTRO MINISTICK MAX W/GW SS ECHO 5F 21G 4CM

## (undated) DEVICE — VLV SXN AIR/H2O ORCAPOD3 1P/U STRL

## (undated) DEVICE — RICH GENERAL LAPAROSCOPY: Brand: MEDLINE INDUSTRIES, INC.

## (undated) DEVICE — DESTINATION PERIPHERAL GUIDING SHEATH: Brand: DESTINATION

## (undated) DEVICE — LUBE JELLY PK/2.75GM STRL BX/144

## (undated) DEVICE — DRSNG WND VAC GRANUFOAM SENSATRAC MD 10PK

## (undated) DEVICE — APPL CHLORAPREP TINTED 26ML TEAL

## (undated) DEVICE — TBG PENCL TELESCP MEGADYNE SMOKE EVAC 10FT

## (undated) DEVICE — HYBRID TUBING/CAP SET FOR OLYMPUS® SCOPES: Brand: ERBE

## (undated) DEVICE — ELECTRD BLD EZ CLN MOD XLNG 2.75IN

## (undated) DEVICE — ENDOPATH XCEL UNIVERSAL TROCAR STABLILITY SLEEVES: Brand: ENDOPATH XCEL

## (undated) DEVICE — PENCL ROCKRSWCH MEGADYNE W/HOLSTR 10FT SS

## (undated) DEVICE — SUT PROLN 3/0 8832H

## (undated) DEVICE — SUT VIC 3/0 SH 27IN J416H

## (undated) DEVICE — SLV SCD CALF HEMOFORCE DVT THERP REPROC MD

## (undated) DEVICE — PK CATH CARD 10

## (undated) DEVICE — PERCLOSE™ PROSTYLE™ SUTURE-MEDIATED CLOSURE AND REPAIR SYSTEM: Brand: PERCLOSE™ PROSTYLE™

## (undated) DEVICE — TOTAL TRAY, 16FR 10ML SIL FOLEY, URN: Brand: MEDLINE

## (undated) DEVICE — ACCESS PLATFORM FOR MINIMALLY INVASIVE SURGERY.: Brand: GELPORT® LAPAROSCOPIC  SYSTEM

## (undated) DEVICE — SUT SILK 0 TIES 30IN A306H

## (undated) DEVICE — SPNG GZ WOVN 4X4IN 12PLY 10/BX STRL

## (undated) DEVICE — PTA BALLOON DILATATION CATHETER: Brand: COYOTE™

## (undated) DEVICE — KT INTRO MINISTICK MAX W/GW NITNL/TUNG ECHO STFF 4F 21G 7CM

## (undated) DEVICE — GLV SURG PREMIERPRO MIC LTX PF SZ7 BRN

## (undated) DEVICE — SYR LL 3CC

## (undated) DEVICE — QUICK-CROSS™ SUPPORT CATHETER: Brand: QUICK-CROSS™